# Patient Record
Sex: MALE | Race: WHITE | NOT HISPANIC OR LATINO | Employment: OTHER | URBAN - NONMETROPOLITAN AREA
[De-identification: names, ages, dates, MRNs, and addresses within clinical notes are randomized per-mention and may not be internally consistent; named-entity substitution may affect disease eponyms.]

---

## 2017-01-09 ENCOUNTER — ANTICOAGULATION - GICH (OUTPATIENT)
Dept: INTERNAL MEDICINE | Facility: OTHER | Age: 57
End: 2017-01-09

## 2017-01-09 DIAGNOSIS — Z79.01 LONG TERM CURRENT USE OF ANTICOAGULANT: ICD-10-CM

## 2017-01-09 DIAGNOSIS — I48.91 ATRIAL FIBRILLATION (H): ICD-10-CM

## 2017-01-09 LAB — INR - HISTORICAL: 2.2

## 2017-02-03 ENCOUNTER — HISTORY (OUTPATIENT)
Dept: EMERGENCY MEDICINE | Facility: OTHER | Age: 57
End: 2017-02-03

## 2017-02-03 ENCOUNTER — AMBULATORY - GICH (OUTPATIENT)
Dept: SCHEDULING | Facility: OTHER | Age: 57
End: 2017-02-03

## 2017-02-15 ENCOUNTER — AMBULATORY - GICH (OUTPATIENT)
Dept: SCHEDULING | Facility: OTHER | Age: 57
End: 2017-02-15

## 2017-02-17 ENCOUNTER — ANTICOAGULATION - GICH (OUTPATIENT)
Dept: INTERNAL MEDICINE | Facility: OTHER | Age: 57
End: 2017-02-17

## 2017-02-17 DIAGNOSIS — Z79.01 LONG TERM CURRENT USE OF ANTICOAGULANT: ICD-10-CM

## 2017-02-17 DIAGNOSIS — I48.20 CHRONIC ATRIAL FIBRILLATION (H): ICD-10-CM

## 2017-02-17 LAB — INR - HISTORICAL: 2.6

## 2017-02-20 ENCOUNTER — ANTICOAGULATION - GICH (OUTPATIENT)
Dept: INTERNAL MEDICINE | Facility: OTHER | Age: 57
End: 2017-02-20

## 2017-02-20 DIAGNOSIS — I48.20 CHRONIC ATRIAL FIBRILLATION (H): ICD-10-CM

## 2017-02-20 DIAGNOSIS — Z79.01 LONG TERM CURRENT USE OF ANTICOAGULANT: ICD-10-CM

## 2017-02-20 LAB — INR - HISTORICAL: 3

## 2017-02-21 ENCOUNTER — AMBULATORY - GICH (OUTPATIENT)
Dept: FAMILY MEDICINE | Facility: OTHER | Age: 57
End: 2017-02-21

## 2017-02-22 ENCOUNTER — OFFICE VISIT - GICH (OUTPATIENT)
Dept: FAMILY MEDICINE | Facility: OTHER | Age: 57
End: 2017-02-22

## 2017-02-22 DIAGNOSIS — I48.20 CHRONIC ATRIAL FIBRILLATION (H): ICD-10-CM

## 2017-02-22 DIAGNOSIS — R52 PAIN: ICD-10-CM

## 2017-02-22 DIAGNOSIS — M48.52XD: ICD-10-CM

## 2017-02-22 DIAGNOSIS — I10 ESSENTIAL (PRIMARY) HYPERTENSION: ICD-10-CM

## 2017-02-22 DIAGNOSIS — I63.9 CEREBRAL INFARCTION (H): ICD-10-CM

## 2017-02-23 ENCOUNTER — ANTICOAGULATION - GICH (OUTPATIENT)
Dept: INTERNAL MEDICINE | Facility: OTHER | Age: 57
End: 2017-02-23

## 2017-02-23 DIAGNOSIS — Z79.01 LONG TERM CURRENT USE OF ANTICOAGULANT: ICD-10-CM

## 2017-02-23 DIAGNOSIS — I48.20 CHRONIC ATRIAL FIBRILLATION (H): ICD-10-CM

## 2017-02-23 LAB — INR - HISTORICAL: 4.1

## 2017-02-27 ENCOUNTER — AMBULATORY - GICH (OUTPATIENT)
Dept: SCHEDULING | Facility: OTHER | Age: 57
End: 2017-02-27

## 2017-02-27 ENCOUNTER — ANTICOAGULATION - GICH (OUTPATIENT)
Dept: INTERNAL MEDICINE | Facility: OTHER | Age: 57
End: 2017-02-27

## 2017-02-27 DIAGNOSIS — Z79.01 LONG TERM CURRENT USE OF ANTICOAGULANT: ICD-10-CM

## 2017-02-27 DIAGNOSIS — I48.20 CHRONIC ATRIAL FIBRILLATION (H): ICD-10-CM

## 2017-02-27 LAB — INR - HISTORICAL: 3.2

## 2017-03-01 ENCOUNTER — OFFICE VISIT - GICH (OUTPATIENT)
Dept: FAMILY MEDICINE | Facility: OTHER | Age: 57
End: 2017-03-01

## 2017-03-01 DIAGNOSIS — I63.411 CEREBRAL INFARCTION DUE TO EMBOLISM OF RIGHT MIDDLE CEREBRAL ARTERY (H): ICD-10-CM

## 2017-03-01 DIAGNOSIS — I10 ESSENTIAL (PRIMARY) HYPERTENSION: ICD-10-CM

## 2017-03-01 DIAGNOSIS — R52 PAIN: ICD-10-CM

## 2017-03-01 LAB
ANION GAP - HISTORICAL: 8 (ref 5–18)
BUN SERPL-MCNC: 23 MG/DL (ref 7–25)
BUN/CREAT RATIO - HISTORICAL: 29
CALCIUM SERPL-MCNC: 10 MG/DL (ref 8.6–10.3)
CHLORIDE SERPLBLD-SCNC: 101 MMOL/L (ref 98–107)
CO2 SERPL-SCNC: 29 MMOL/L (ref 21–31)
CREAT SERPL-MCNC: 0.78 MG/DL (ref 0.7–1.3)
GFR IF NOT AFRICAN AMERICAN - HISTORICAL: >60 ML/MIN/1.73M2
GLUCOSE SERPL-MCNC: 96 MG/DL (ref 70–105)
POTASSIUM SERPL-SCNC: 4.1 MMOL/L (ref 3.5–5.1)
SODIUM SERPL-SCNC: 138 MMOL/L (ref 133–143)

## 2017-03-03 ENCOUNTER — ANTICOAGULATION - GICH (OUTPATIENT)
Dept: INTERNAL MEDICINE | Facility: OTHER | Age: 57
End: 2017-03-03

## 2017-03-03 DIAGNOSIS — Z79.01 LONG TERM CURRENT USE OF ANTICOAGULANT: ICD-10-CM

## 2017-03-03 DIAGNOSIS — I48.20 CHRONIC ATRIAL FIBRILLATION (H): ICD-10-CM

## 2017-03-03 DIAGNOSIS — I48.91 ATRIAL FIBRILLATION (H): ICD-10-CM

## 2017-03-03 LAB — INR - HISTORICAL: 2.4

## 2017-03-10 ENCOUNTER — ANTICOAGULATION - GICH (OUTPATIENT)
Dept: INTERNAL MEDICINE | Facility: OTHER | Age: 57
End: 2017-03-10

## 2017-03-10 DIAGNOSIS — I48.91 ATRIAL FIBRILLATION (H): ICD-10-CM

## 2017-03-10 DIAGNOSIS — Z79.01 LONG TERM CURRENT USE OF ANTICOAGULANT: ICD-10-CM

## 2017-03-10 DIAGNOSIS — I48.20 CHRONIC ATRIAL FIBRILLATION (H): ICD-10-CM

## 2017-03-10 LAB — INR - HISTORICAL: 3.4

## 2017-03-14 ENCOUNTER — COMMUNICATION - GICH (OUTPATIENT)
Dept: FAMILY MEDICINE | Facility: OTHER | Age: 57
End: 2017-03-14

## 2017-03-14 DIAGNOSIS — I10 ESSENTIAL (PRIMARY) HYPERTENSION: ICD-10-CM

## 2017-03-17 ENCOUNTER — ANTICOAGULATION - GICH (OUTPATIENT)
Dept: INTERNAL MEDICINE | Facility: OTHER | Age: 57
End: 2017-03-17

## 2017-03-17 DIAGNOSIS — Z79.01 LONG TERM CURRENT USE OF ANTICOAGULANT: ICD-10-CM

## 2017-03-17 DIAGNOSIS — I48.91 ATRIAL FIBRILLATION (H): ICD-10-CM

## 2017-03-17 LAB — INR - HISTORICAL: 3.4

## 2017-03-24 ENCOUNTER — ANTICOAGULATION - GICH (OUTPATIENT)
Dept: INTERNAL MEDICINE | Facility: OTHER | Age: 57
End: 2017-03-24

## 2017-03-24 DIAGNOSIS — I48.91 ATRIAL FIBRILLATION (H): ICD-10-CM

## 2017-03-24 DIAGNOSIS — Z79.01 LONG TERM CURRENT USE OF ANTICOAGULANT: ICD-10-CM

## 2017-03-24 LAB — INR - HISTORICAL: 3.2

## 2017-04-07 ENCOUNTER — ANTICOAGULATION - GICH (OUTPATIENT)
Dept: INTERNAL MEDICINE | Facility: OTHER | Age: 57
End: 2017-04-07

## 2017-04-07 DIAGNOSIS — Z79.01 LONG TERM CURRENT USE OF ANTICOAGULANT: ICD-10-CM

## 2017-04-07 DIAGNOSIS — I48.91 ATRIAL FIBRILLATION (H): ICD-10-CM

## 2017-04-07 LAB — INR - HISTORICAL: 2.9

## 2017-04-13 ENCOUNTER — COMMUNICATION - GICH (OUTPATIENT)
Dept: FAMILY MEDICINE | Facility: OTHER | Age: 57
End: 2017-04-13

## 2017-04-13 DIAGNOSIS — I10 ESSENTIAL (PRIMARY) HYPERTENSION: ICD-10-CM

## 2017-04-14 ENCOUNTER — OFFICE VISIT - GICH (OUTPATIENT)
Dept: FAMILY MEDICINE | Facility: OTHER | Age: 57
End: 2017-04-14

## 2017-04-14 DIAGNOSIS — I63.411 CEREBRAL INFARCTION DUE TO EMBOLISM OF RIGHT MIDDLE CEREBRAL ARTERY (H): ICD-10-CM

## 2017-04-14 DIAGNOSIS — Z87.39 PERSONAL HISTORY OF OTHER DISEASES OF THE MUSCULOSKELETAL SYSTEM AND CONNECTIVE TISSUE: ICD-10-CM

## 2017-04-14 DIAGNOSIS — I10 ESSENTIAL (PRIMARY) HYPERTENSION: ICD-10-CM

## 2017-04-14 DIAGNOSIS — G81.94 HEMIPLEGIA AFFECTING LEFT NONDOMINANT SIDE (H): ICD-10-CM

## 2017-05-03 ENCOUNTER — AMBULATORY - GICH (OUTPATIENT)
Dept: SCHEDULING | Facility: OTHER | Age: 57
End: 2017-05-03

## 2017-05-04 ENCOUNTER — ANTICOAGULATION - GICH (OUTPATIENT)
Dept: INTERNAL MEDICINE | Facility: OTHER | Age: 57
End: 2017-05-04

## 2017-05-04 DIAGNOSIS — I48.91 ATRIAL FIBRILLATION (H): ICD-10-CM

## 2017-05-04 DIAGNOSIS — Z79.01 LONG TERM CURRENT USE OF ANTICOAGULANT: ICD-10-CM

## 2017-05-04 LAB — INR - HISTORICAL: 3.5

## 2017-05-18 ENCOUNTER — ANTICOAGULATION - GICH (OUTPATIENT)
Dept: INTERNAL MEDICINE | Facility: OTHER | Age: 57
End: 2017-05-18

## 2017-05-18 DIAGNOSIS — Z79.01 LONG TERM CURRENT USE OF ANTICOAGULANT: ICD-10-CM

## 2017-05-18 DIAGNOSIS — I48.91 ATRIAL FIBRILLATION (H): ICD-10-CM

## 2017-05-18 LAB — INR - HISTORICAL: 3.1

## 2017-05-22 ENCOUNTER — COMMUNICATION - GICH (OUTPATIENT)
Dept: FAMILY MEDICINE | Facility: OTHER | Age: 57
End: 2017-05-22

## 2017-05-22 DIAGNOSIS — I48.20 CHRONIC ATRIAL FIBRILLATION (H): ICD-10-CM

## 2017-05-22 DIAGNOSIS — Z79.01 LONG TERM CURRENT USE OF ANTICOAGULANT: ICD-10-CM

## 2017-05-23 ENCOUNTER — COMMUNICATION - GICH (OUTPATIENT)
Dept: FAMILY MEDICINE | Facility: OTHER | Age: 57
End: 2017-05-23

## 2017-05-23 ENCOUNTER — AMBULATORY - GICH (OUTPATIENT)
Dept: SCHEDULING | Facility: OTHER | Age: 57
End: 2017-05-23

## 2017-05-23 DIAGNOSIS — M10.9 GOUT: ICD-10-CM

## 2017-06-01 ENCOUNTER — ANTICOAGULATION - GICH (OUTPATIENT)
Dept: INTERNAL MEDICINE | Facility: OTHER | Age: 57
End: 2017-06-01

## 2017-06-01 DIAGNOSIS — Z79.01 LONG TERM CURRENT USE OF ANTICOAGULANT: ICD-10-CM

## 2017-06-01 DIAGNOSIS — I48.20 CHRONIC ATRIAL FIBRILLATION (H): ICD-10-CM

## 2017-06-01 DIAGNOSIS — I48.91 ATRIAL FIBRILLATION (H): ICD-10-CM

## 2017-06-01 LAB — INR - HISTORICAL: 3.5

## 2017-06-02 ENCOUNTER — AMBULATORY - GICH (OUTPATIENT)
Dept: SCHEDULING | Facility: OTHER | Age: 57
End: 2017-06-02

## 2017-06-13 ENCOUNTER — COMMUNICATION - GICH (OUTPATIENT)
Dept: FAMILY MEDICINE | Facility: OTHER | Age: 57
End: 2017-06-13

## 2017-06-13 DIAGNOSIS — M10.9 GOUT: ICD-10-CM

## 2017-06-16 ENCOUNTER — ANTICOAGULATION - GICH (OUTPATIENT)
Dept: INTERNAL MEDICINE | Facility: OTHER | Age: 57
End: 2017-06-16

## 2017-06-16 DIAGNOSIS — Z79.01 LONG TERM CURRENT USE OF ANTICOAGULANT: ICD-10-CM

## 2017-06-16 DIAGNOSIS — I48.91 ATRIAL FIBRILLATION (H): ICD-10-CM

## 2017-06-16 LAB — INR - HISTORICAL: 3.5

## 2017-06-30 ENCOUNTER — ANTICOAGULATION - GICH (OUTPATIENT)
Dept: INTERNAL MEDICINE | Facility: OTHER | Age: 57
End: 2017-06-30

## 2017-06-30 DIAGNOSIS — Z79.01 LONG TERM CURRENT USE OF ANTICOAGULANT: ICD-10-CM

## 2017-06-30 DIAGNOSIS — I48.91 ATRIAL FIBRILLATION (H): ICD-10-CM

## 2017-06-30 DIAGNOSIS — I48.20 CHRONIC ATRIAL FIBRILLATION (H): ICD-10-CM

## 2017-06-30 LAB — INR - HISTORICAL: 3.3

## 2017-07-12 ENCOUNTER — AMBULATORY - GICH (OUTPATIENT)
Dept: SCHEDULING | Facility: OTHER | Age: 57
End: 2017-07-12

## 2017-07-17 ENCOUNTER — COMMUNICATION - GICH (OUTPATIENT)
Dept: FAMILY MEDICINE | Facility: OTHER | Age: 57
End: 2017-07-17

## 2017-07-17 DIAGNOSIS — Z87.39 PERSONAL HISTORY OF OTHER DISEASES OF THE MUSCULOSKELETAL SYSTEM AND CONNECTIVE TISSUE: ICD-10-CM

## 2017-07-21 ENCOUNTER — ANTICOAGULATION - GICH (OUTPATIENT)
Dept: INTERNAL MEDICINE | Facility: OTHER | Age: 57
End: 2017-07-21

## 2017-07-21 DIAGNOSIS — I48.91 ATRIAL FIBRILLATION (H): ICD-10-CM

## 2017-07-21 DIAGNOSIS — Z79.01 LONG TERM CURRENT USE OF ANTICOAGULANT: ICD-10-CM

## 2017-07-21 LAB — INR - HISTORICAL: 3.2

## 2017-08-10 ENCOUNTER — HISTORY (OUTPATIENT)
Dept: EMERGENCY MEDICINE | Facility: OTHER | Age: 57
End: 2017-08-10

## 2017-08-18 ENCOUNTER — ANTICOAGULATION - GICH (OUTPATIENT)
Dept: INTERNAL MEDICINE | Facility: OTHER | Age: 57
End: 2017-08-18

## 2017-08-18 ENCOUNTER — COMMUNICATION - GICH (OUTPATIENT)
Dept: FAMILY MEDICINE | Facility: OTHER | Age: 57
End: 2017-08-18

## 2017-08-18 DIAGNOSIS — I48.20 CHRONIC ATRIAL FIBRILLATION (H): ICD-10-CM

## 2017-08-18 DIAGNOSIS — Z79.01 LONG TERM CURRENT USE OF ANTICOAGULANT: ICD-10-CM

## 2017-08-18 DIAGNOSIS — I10 ESSENTIAL (PRIMARY) HYPERTENSION: ICD-10-CM

## 2017-08-18 LAB — INR - HISTORICAL: 2.8

## 2017-08-22 ENCOUNTER — OFFICE VISIT - GICH (OUTPATIENT)
Dept: FAMILY MEDICINE | Facility: OTHER | Age: 57
End: 2017-08-22

## 2017-08-22 ENCOUNTER — HISTORY (OUTPATIENT)
Dept: FAMILY MEDICINE | Facility: OTHER | Age: 57
End: 2017-08-22

## 2017-08-22 DIAGNOSIS — I48.20 CHRONIC ATRIAL FIBRILLATION (H): ICD-10-CM

## 2017-08-22 DIAGNOSIS — E66.9 OBESITY: ICD-10-CM

## 2017-08-22 DIAGNOSIS — I63.411 CEREBRAL INFARCTION DUE TO EMBOLISM OF RIGHT MIDDLE CEREBRAL ARTERY (H): ICD-10-CM

## 2017-08-22 DIAGNOSIS — I10 ESSENTIAL (PRIMARY) HYPERTENSION: ICD-10-CM

## 2017-09-15 ENCOUNTER — ANTICOAGULATION - GICH (OUTPATIENT)
Dept: INTERNAL MEDICINE | Facility: OTHER | Age: 57
End: 2017-09-15

## 2017-09-15 DIAGNOSIS — I48.20 CHRONIC ATRIAL FIBRILLATION (H): ICD-10-CM

## 2017-09-15 DIAGNOSIS — Z79.01 LONG TERM CURRENT USE OF ANTICOAGULANT: ICD-10-CM

## 2017-09-15 LAB — INR - HISTORICAL: 4.7

## 2017-09-22 ENCOUNTER — ANTICOAGULATION - GICH (OUTPATIENT)
Dept: INTERNAL MEDICINE | Facility: OTHER | Age: 57
End: 2017-09-22

## 2017-09-22 DIAGNOSIS — I48.20 CHRONIC ATRIAL FIBRILLATION (H): ICD-10-CM

## 2017-09-22 DIAGNOSIS — Z79.01 LONG TERM CURRENT USE OF ANTICOAGULANT: ICD-10-CM

## 2017-09-22 LAB — INR - HISTORICAL: 2.7

## 2017-10-03 ENCOUNTER — OFFICE VISIT - GICH (OUTPATIENT)
Dept: FAMILY MEDICINE | Facility: OTHER | Age: 57
End: 2017-10-03

## 2017-10-03 ENCOUNTER — ANTICOAGULATION - GICH (OUTPATIENT)
Dept: INTERNAL MEDICINE | Facility: OTHER | Age: 57
End: 2017-10-03

## 2017-10-03 ENCOUNTER — HISTORY (OUTPATIENT)
Dept: FAMILY MEDICINE | Facility: OTHER | Age: 57
End: 2017-10-03

## 2017-10-03 DIAGNOSIS — I48.20 CHRONIC ATRIAL FIBRILLATION (H): ICD-10-CM

## 2017-10-03 DIAGNOSIS — Z79.01 LONG TERM CURRENT USE OF ANTICOAGULANT: ICD-10-CM

## 2017-10-03 DIAGNOSIS — I10 ESSENTIAL (PRIMARY) HYPERTENSION: ICD-10-CM

## 2017-10-03 LAB — INR - HISTORICAL: 3.6

## 2017-10-08 ENCOUNTER — COMMUNICATION - GICH (OUTPATIENT)
Dept: FAMILY MEDICINE | Facility: OTHER | Age: 57
End: 2017-10-08

## 2017-10-08 DIAGNOSIS — M10.9 GOUT: ICD-10-CM

## 2017-10-08 DIAGNOSIS — Z87.39 PERSONAL HISTORY OF OTHER DISEASES OF THE MUSCULOSKELETAL SYSTEM AND CONNECTIVE TISSUE: ICD-10-CM

## 2017-10-19 ENCOUNTER — ANTICOAGULATION - GICH (OUTPATIENT)
Dept: INTERNAL MEDICINE | Facility: OTHER | Age: 57
End: 2017-10-19

## 2017-10-19 ENCOUNTER — AMBULATORY - GICH (OUTPATIENT)
Dept: FAMILY MEDICINE | Facility: OTHER | Age: 57
End: 2017-10-19

## 2017-10-19 DIAGNOSIS — I48.20 CHRONIC ATRIAL FIBRILLATION (H): ICD-10-CM

## 2017-10-19 DIAGNOSIS — Z79.01 LONG TERM CURRENT USE OF ANTICOAGULANT: ICD-10-CM

## 2017-10-19 LAB — INR - HISTORICAL: 3.2

## 2017-11-02 ENCOUNTER — ANTICOAGULATION - GICH (OUTPATIENT)
Dept: INTERNAL MEDICINE | Facility: OTHER | Age: 57
End: 2017-11-02

## 2017-11-02 DIAGNOSIS — Z79.01 LONG TERM CURRENT USE OF ANTICOAGULANT: ICD-10-CM

## 2017-11-02 DIAGNOSIS — I48.91 ATRIAL FIBRILLATION (H): ICD-10-CM

## 2017-11-02 DIAGNOSIS — I48.20 CHRONIC ATRIAL FIBRILLATION (H): ICD-10-CM

## 2017-11-02 LAB — INR - HISTORICAL: 2.7

## 2017-11-18 ENCOUNTER — COMMUNICATION - GICH (OUTPATIENT)
Dept: FAMILY MEDICINE | Facility: OTHER | Age: 57
End: 2017-11-18

## 2017-11-18 DIAGNOSIS — Z79.01 LONG TERM CURRENT USE OF ANTICOAGULANT: ICD-10-CM

## 2017-11-18 DIAGNOSIS — I48.20 CHRONIC ATRIAL FIBRILLATION (H): ICD-10-CM

## 2017-11-30 ENCOUNTER — ANTICOAGULATION - GICH (OUTPATIENT)
Dept: INTERNAL MEDICINE | Facility: OTHER | Age: 57
End: 2017-11-30

## 2017-11-30 ENCOUNTER — AMBULATORY - GICH (OUTPATIENT)
Dept: FAMILY MEDICINE | Facility: OTHER | Age: 57
End: 2017-11-30

## 2017-11-30 DIAGNOSIS — I48.20 CHRONIC ATRIAL FIBRILLATION (H): ICD-10-CM

## 2017-11-30 DIAGNOSIS — Z79.01 LONG TERM CURRENT USE OF ANTICOAGULANT: ICD-10-CM

## 2017-11-30 LAB — INR - HISTORICAL: 2.2

## 2017-12-03 ENCOUNTER — COMMUNICATION - GICH (OUTPATIENT)
Dept: FAMILY MEDICINE | Facility: OTHER | Age: 57
End: 2017-12-03

## 2017-12-03 DIAGNOSIS — Z87.39 PERSONAL HISTORY OF OTHER DISEASES OF THE MUSCULOSKELETAL SYSTEM AND CONNECTIVE TISSUE: ICD-10-CM

## 2017-12-27 NOTE — PROGRESS NOTES
Patient Information     Patient Name MRN Sex Babak Ribeiro Jr. [id number] Male 1960      Progress Notes by Alvino Yi MD at 10/3/2017  9:30 AM     Author:  Alvino Yi MD Service:  (none) Author Type:  Physician     Filed:  10/8/2017  9:59 AM Encounter Date:  10/3/2017 Status:  Signed     :  Alvino Yi MD (Physician)            Nursing Notes:   AndreaCarolina stoner  10/3/2017  9:43 AM  Signed  Patient presents to the clinic today to follow up on his blood pressure. He states this is at the request of Dr Yi and his provider in [place].      SUBJECTIVE:  Babak Moran Jr. is a 57 y.o. Male.  Patient comes back in to follow-up on hypertension. He is currently taking losartan 50 mg, metoprolol 100 mg and hydrochlorothiazide 25 mg. Blood pressure slightly elevated today most his home blood pressures have been better. He denies any chest pain or shortness of breath. Continues to have gradual improvement and coordination and strength following his CVA.      OBJECTIVE:  /82  Pulse 68  Wt (!) 148.3 kg (327 lb)  BMI 41.98 kg/m2  EXAM:  General Appearance: Pleasant, alert, appropriate appearance for age. No acute distress  Chest/Respiratory Exam: Normal chest wall and respirations. Clear to auscultation.  Cardiovascular Exam: Regular rate and rhythm. S1, S2, no murmur, click, gallop, or rubs.    Results for orders placed or performed in visit on 17      INR,POCT      Result  Value Ref Range    INR 2.7 (H) <1.3       ASSESSMENT/Plan :      Babak was seen today for follow up.    Diagnoses and all orders for this visit:    HTN (hypertension)  patient's blood pressure is elevated today and has been in the borderline range at home. He has not had any symptoms of orthostasis. Will restart Norvasc at 5 mg. Suggest continued healthy eating and gradual weight loss. Will recheck blood pressure with a nurse only visit in a week, sooner with any issues.  -     amLODIPine (NORVASC)  5 mg tablet; Take 1 tablet by mouth once daily.        There are no Patient Instructions on file for this visit.    Alvino Yi MD    This document was created using computer generated templates and voice activated software.

## 2017-12-28 ENCOUNTER — ANTICOAGULATION - GICH (OUTPATIENT)
Dept: INTERNAL MEDICINE | Facility: OTHER | Age: 57
End: 2017-12-28

## 2017-12-28 DIAGNOSIS — Z79.01 LONG TERM CURRENT USE OF ANTICOAGULANT: ICD-10-CM

## 2017-12-28 DIAGNOSIS — I48.20 CHRONIC ATRIAL FIBRILLATION (H): ICD-10-CM

## 2017-12-28 LAB — INR - HISTORICAL: 2.2

## 2017-12-28 NOTE — TELEPHONE ENCOUNTER
Patient Information     Patient Name MRN Sex Babak Ribeiro Jr. [id number] Male 1960      Telephone Encounter by Austin Lombardo RN at 2017 10:39 AM     Author:  Austin Lombardo RN Service:  (none) Author Type:  NURS- Registered Nurse     Filed:  2017 10:46 AM Encounter Date:  2017 Status:  Signed     :  Austin Lombardo RN (NURS- Registered Nurse)            Redundant Refill Request for Indocin refused;    Prescribing Provider: Alvino Lynn MD             Order Date: 2017  Ordered by: ALVINO LYNN  Medication:indomethacin (INDOCIN) 50 mg capsule  Prescription #:[id number]    Qty:60 capsule  Ref:1  Start:2017   End:              Route:                      TED:No   Class:eRx    Sig:Take 1 capsule by mouth 2 times daily as needed for gout flare, max 3d         per flare.    Pharmacy:[facility] [address] [place] [address]   AT [address]              OF [facility] [address] - [phone]    Chart review shows that patient with active RX as noted above. Patient is to utilize rx on an as needed basis, not scheduled. Refills shouldn't be due at this time. Will refuse rx request at this time.    Unable to complete prescription refill per RN Medication Refill Policy.................... Austin Lombardo RN ....................  2017   10:45 AM

## 2017-12-28 NOTE — TELEPHONE ENCOUNTER
Patient Information     Patient Name MRN Sex Babak Ribeiro Jr. [id number] Male 1960      Telephone Encounter by Alvino Yi MD at 2017  9:26 AM     Author:  Alvino Yi MD Service:  (none) Author Type:  Physician     Filed:  2017  9:27 AM Encounter Date:  2017 Status:  Signed     :  Alvino Yi MD (Physician)            Patient should be on uloric either once daily or stop altogether.    The indomethacin should be prn gout flare, max 2 per day, max 3d per flare while he is still on coumadin.

## 2017-12-28 NOTE — TELEPHONE ENCOUNTER
Patient Information     Patient Name MRN Babak Vasquez Jr. [id number] Male 1960      Telephone Encounter by Austin Lombardo RN at 2017  2:24 PM     Author:  Austin Lombardo RN Service:  (none) Author Type:  NURS- Registered Nurse     Filed:  2017  2:32 PM Encounter Date:  2017 Status:  Signed     :  Austin Lombardo RN (NURS- Registered Nurse)            Chart review shows that patient is being seen by PCP for an appointment with relation to his blood pressure medication on 17. Refill request received is for norvasc, of which is listed as historical on patient's med list. Call placed to patient to discuss if rx is needed urgently, or could be cared for at tomorrow's office visit with PCP. Was unable to reach patient at this time. Writer will route this encounter to PCP for his consideration/approval/discussion at tomorrow's appointment with patient.    Unable to complete prescription refill per RN Medication Refill Policy.................... Austin Lombardo RN ....................  2017   2:28 PM

## 2017-12-28 NOTE — TELEPHONE ENCOUNTER
Patient Information     Patient Name MRN Sex Babak Ribeiro Jr. [id number] Male 1960      Telephone Encounter by Austin Lombardo RN at 2017  9:58 AM     Author:  Austin Lombardo RN Service:  (none) Author Type:  NURS- Registered Nurse     Filed:  2017 10:08 AM Encounter Date:  2017 Status:  Signed     :  Austin Lombardo RN (NURS- Registered Nurse)            This is a Refill request from: [facility]  Name of Medication: Indomethacin  Quantity requested: 60 caps with 3 refills  Last fill date: 3/14/17 as per rx request-however rx as requested with different sig than current rx in chart. Rx in chart was filled on 17  Due for refill: Yes, as per rx request and per chart review  Last visit with ALVINO LYNN was on: 2017 in [facility]  PCP:  Alvino Lynn MD  Controlled Substance Agreement:  N/A   Diagnosis r/t this medication request: GOUT    Chart review shows that patient was last seen by PCP for diagnosis of GOUT on 17. Plan at that office visit as follows:    discussed with patient that'll be reasonable to be off the Uloric if he wants to see if dietary changes haven't been sufficient to prevent gout flare. Otherwise if he wants to stay on the medication that is fine but I would not suggest he take it intermittently.    - indomethacin (INDOCIN) 50 mg capsule; Take 1 capsule by mouth 2 times daily as needed    Unfortunately, indocin is ordered as PRN at this time. Writer cannot tell per above office visit notes if patient is to remain on rx as needed, or scheduled. Writer would assume that PCP meant it was ok to stay on rx on an as needed basis since it was ordered that way, but documentation doesn't support this. Will route rx request to PCP for his consideration/approval.     Unable to complete prescription refill per RN Medication Refill Policy.................... Austin Lombardo RN ....................  2017   10:02 AM

## 2017-12-28 NOTE — TELEPHONE ENCOUNTER
Patient Information     Patient Name MRN Sex Babak Ribeiro Jr. [id number] Male 1960      Telephone Encounter by Shelley Mercado RN at 2017  8:13 AM     Author:  Shelley Mercado RN Service:  (none) Author Type:  NURS- Registered Nurse     Filed:  2017  8:17 AM Encounter Date:  2017 Status:  Signed     :  Shelley Mercado RN (NURS- Registered Nurse)            Anticoagulant    Office visit in the past 12 months.    Last visit with PEPE LYNN was on: 2017 in [facility]  [facility]  Next visit with Family Practice is on: 2017 in [facility]    Lab tests:  PT/INR at least monthly    INR (no units)    Date Value   08/10/2017 2.7 (H)     HEMOGLOBIN                (g/dL)    Date Value   08/10/2017 15.7           Prescription refilled per RN Medication Refill Policy.................... Shelley Mercado RN ....................  2017   8:13 AM

## 2017-12-28 NOTE — TELEPHONE ENCOUNTER
Patient Information     Patient Name MRN Sex Babak Ribeiro Jr. [id number] Male 1960      Telephone Encounter by Alysa Rodriguez RN at 10/10/2017 11:00 AM     Author:  Alysa Rodriguez RN Service:  (none) Author Type:  NURS- Registered Nurse     Filed:  10/10/2017 11:03 AM Encounter Date:  10/8/2017 Status:  Signed     :  Alysa Rodriguez RN (NURS- Registered Nurse)            GOUT  Office visit in the past 12 months or per provider note.  Last visit with PEPE LYNN was on: 10/03/2017 in [facility]  Next visit with PEPE LYNN is on: No future appointment listed with this provider  Next visit with Family Practice is on: No future appointment listed in this department  Max refill for 12 months from last office visit or per provider note.  Prescription refilled per RN Medication Refill Policy.................... Alysa Rodriguez RN ....................  10/10/2017   11:03 AM

## 2017-12-28 NOTE — TELEPHONE ENCOUNTER
Patient Information     Patient Name MRN Sex Babak Ribeiro Jr. [id number] Male 1960      Telephone Encounter by Savannah Sifuentes RN at 2017  3:16 PM     Author:  Savannah Sifuentes RN Service:  (none) Author Type:  NURS- Registered Nurse     Filed:  2017  3:19 PM Encounter Date:  2017 Status:  Signed     :  Savannah Sifuentes RN (NURS- Registered Nurse)            Anticoagulant    Office visit in the past 12 months.    Last visit with PEPE LYNN was on: 10/03/2017 in [facility]  Next visit with PEPE LYNN is on: No future appointment listed with this provider  Next visit with Family Practice is on: No future appointment listed in this department    Lab tests:  PT/INR at least monthly    INR (no units)    Date Value   2017 2.7 (H)     HEMOGLOBIN                (g/dL)    Date Value   08/10/2017 15.7     No components found for: CBC      Max refills 6 months.    Prescription refilled per RN Medication Refill Policy.................... Savannah Sifuentes RN ....................  2017   3:18 PM

## 2017-12-28 NOTE — PROGRESS NOTES
"Patient Information     Patient Name MRN Babak Vasquez Jr. [id number] Male 1960      Progress Notes by Alvino Yi MD at 2017  9:30 AM     Author:  Alvino Yi MD Service:  (none) Author Type:  Physician     Filed:  2017 11:50 AM Encounter Date:  2017 Status:  Signed     :  Alvino Yi MD (Physician)            SUBJECTIVE:  Babak Moran Jr. is a 57 y.o. Male.  He comes in with his wife to follow-up on recent emergency department visit. He reports he was under a great deal of job stress and started to feel overwhelmed. He then noticed that his thinking seemed to be foggy. He reports that he almost felt dizzy but it was not like the room was spinning. Just felt overwhelmed. Was then found to have elevated systolic blood pressure. Contacted neurology department and it sounds like the cardiology department as well and they both recommended that he would've the emergency department. In the emergency department she was found to have minimal blood pressure elevation and overall workup was reassuring. His symptoms resolved.    He reports shortly after this he got \"written up\" for going in to the doctor and received a three-day suspension that started after a Saturday work today. He reports there is just a lot of issues going on at work at this time and it seemed to be significantly impacting his health. He quit his job and has since got a new job. He reports all these symptoms have resolved and he has not had any recurrent symptoms of hypertension, confusion. He reports gradual improvement in his left lower extremity. Wife notices that at times he still will have stumbled but has not been falling. She reports that he is walking much better. He reports the strength in his left arm is not quite completely normal but his manual dexterity seems to be back to normal.    He has not had chest pain. No shortness of breath. No other anginal symptoms. He has not had " "any transient neurologic symptoms since his massive hemorrhagic stroke.      Social History     Substance Use Topics       Smoking status: Passive Smoke Exposure - Never Smoker     Smokeless tobacco: Never Used     Alcohol use No       I have personally reviewed and updated above noted social, family and/or past medical history.    A comprehensive review of systems was negative except for items noted in HPI/Subjective.      OBJECTIVE:  /80  Pulse 68  Ht 1.88 m (6' 2\")  Wt (!) 152.4 kg (336 lb)  BMI 43.14 kg/m2  EXAM:  General Appearance: Pleasant, alert, appropriate appearance for age. No acute distress  Chest/Respiratory Exam: Normal chest wall and respirations. Clear to auscultation.  Cardiovascular Exam: Regular rate and rhythm. S1, S2, no murmur, click, gallop, or rubs.  Gastrointestinal Exam: Soft, nontender, no abnormal masses or organomegaly.  Musculoskeletal Exam: No synovitis.  Muscle strength age and body habitus appropriate as well as equal and even.     ASSESSMENT/Plan :    I personally reviewed the patients' labs and ED workup.    Results for orders placed or performed in visit on 08/18/17      INR,POCT      Result  Value Ref Range    INR 2.8 (H) <1.3       Babak was seen today for follow up.    Diagnoses and all orders for this visit:    HYPERTENSION  vision and blood pressure is actually excellent today. It was not overly high in the ER. He did have his amlodipine discontinued back during hospitalization but at this point I would not restart unless he has persistent elevation greater than 130/85.  -     BASIC METABOLIC PANEL; Future    Obesity, unspecified obesity severity, unspecified obesity type   patient has lost significant weight. Strongly suggest he continue with gradual weight loss. I explained to him that with additional weight loss of 30-40 pounds he will not only significantly improve his risk for developing end-stage osteoarthritis and large weightbearing joints but also may be " able to reduce his antihypertensives. If he does have any symptoms of orthostasis with ongoing weight loss I would first suggest reducing or discontinuing the hydrochlorothiazide. The metoprolol is used for rate control and should be the last medication discontinued if required by orthostasis.    Chronic atrial fibrillation (HC)   patient has had excellent rate control and has remained stable on Coumadin dose.    Cerebrovascular accident (CVA) due to embolism of right middle cerebral artery (HC)   continues to regain some neurologic function. At this point he is quite functional but still has some mild residual deficits.      I think a large component of his ER visit was due to job stress. He feels that this is resolved now with a change in job. I think this is excellent.    We also talked about his gout. He does not have insurance to cover you work. He has had severe nausea and vomiting with dramatic weight loss on allopurinol. He is currently taking indomethacin. I explained increased cardiovascular risk on indomethacin as well as potential for gastric bleeding which is increased with concomitant Coumadin dosage. I would suggest that he not take this every day but can definite take it with gout flare. It is possible that with weight loss and improved diet he can reduce incidence of gout flare. If he continues to have significant gout flares we will need to reconsider Uloric or retrial of allopurinol at low dose.        There are no Patient Instructions on file for this visit.    Alvino Yi MD    This document was created using computer generated templates and voice activated software.

## 2017-12-28 NOTE — PATIENT INSTRUCTIONS
Patient Information     Patient Name MRN Sex Babak Ribeiro Jr. [id number] Male 1960      Patient Instructions by Shelley Mercado RN at 2017  3:45 PM     Author:  Shelley Mercado RN Service:  (none) Author Type:  NURS- Registered Nurse     Filed:  2017  3:36 PM Encounter Date:  2017 Status:  Signed     :  Shelley Mercado RN (NURS- Registered Nurse)            2017 Details    Sun u Fri Sat         1               2               3                 4               5               6               7               8               9               10                 11               12               13               14               15               16      7.5 mg   See details      17      10 mg           18      10 mg         19      7.5 mg         20      10 mg         21      10 mg         22      10 mg         23      7.5 mg         24      10 mg           25      10 mg         26      7.5 mg         27      10 mg         28      10 mg         29      10 mg         30      7.5 mg           Date Details    This INR check       Date of next INR:  2017         How to take your warfarin dose     To take:  7.5 mg Take one and a half of the 5 mg tablets.    To take:  10 mg Take two of the 5 mg tablets.             Description          .Continue same dose and recheck in 2-3 weeks. ...............Shelley Mercado RN    2017    3:36 PM                                  Anticoagulation Summary as of 2017     INR goal 2.0-3.0    Today's INR 3.5    Next INR check 2017          Call your Anticoagulation Clinic at Dept: [phone]   if:   1. Any medications are started, stopped, or there is a change in dose.  2. You experience any bleeding that is not easily stopped or if it is recurrent.  3. You notice an increase in bruising or any bruising that does not heal.  4. You are scheduled for surgery, colonoscopy, dental extraction or any other procedure where  you may need to stop your Coumadin (warfarin).

## 2017-12-28 NOTE — PATIENT INSTRUCTIONS
Patient Information     Patient Name MRN Babak Vasquez Jr. [id number] Male 1960      Patient Instructions by Xochitl Pavon RN at 2017 10:45 AM     Author:  Xochitl Pavon RN Service:  (none) Author Type:  NURS- Registered Nurse     Filed:  2017 10:05 AM Encounter Date:  2017 Status:  Signed     :  Xochitl Pavon RN (NURS- Registered Nurse)            2017 Details    Sun Mon Tue Wed Thu Fri Sat        1               2               3               4                 5               6               7               8               9               10               11                 12               13               14               15               16               17               18                 19               20               21               22               23               24               25                 26               27               28               29               30      12.5 mg   See details         Date Details    This INR check               How to take your warfarin dose     To take:  12.5 mg Take two and a half of the 5 mg tablets.           2017 Details    Sun Mon Tue Wed Thu Fri Sat          1      7.5 mg         2      7.5 mg           3      10 mg         4      7.5 mg         5      7.5 mg         6      7.5 mg         7      10 mg         8      7.5 mg         9      7.5 mg           10      10 mg         11      7.5 mg         12      7.5 mg         13      7.5 mg         14      10 mg         15      7.5 mg         16      7.5 mg           17      10 mg         18      7.5 mg         19      7.5 mg         20      7.5 mg         21      10 mg         22      7.5 mg         23      7.5 mg           24      10 mg         25      7.5 mg         26      7.5 mg         27      7.5 mg         28      10 mg         29               30                 31                      Date Details   No additional details    Date of  next INR:  12/28/2017         How to take your warfarin dose     To take:  7.5 mg Take one and a half of the 5 mg tablets.    To take:  10 mg Take two of the 5 mg tablets.             Description          Take extra Warfarin today (12.5 mg instead of 10 mg), then continue same Warfarin dose and recheck in 1 month.  HAMILTON QUIÑONES RN ....................  11/30/2017   10:03 AM                                          Anticoagulation Summary as of 11/30/2017     INR goal 2.0-3.0    Today's INR 2.2    Next INR check 12/28/2017          Call your Anticoagulation Clinic at Dept: [phone]   if:   1. Any medications are started, stopped, or there is a change in dose.  2. You experience any bleeding that is not easily stopped or if it is recurrent.  3. You notice an increase in bruising or any bruising that does not heal.  4. You are scheduled for surgery, colonoscopy, dental extraction or any other procedure where you may need to stop your Coumadin (warfarin).

## 2017-12-28 NOTE — TELEPHONE ENCOUNTER
Patient Information     Patient Name MRN Sex Babak Ribeiro Jr. [id number] Male 1960      Telephone Encounter by Brittany Lynn at 2017  3:50 PM     Author:  Brittany Lynn Service:  (none) Author Type:  (none)     Filed:  2017  3:52 PM Encounter Date:  2017 Status:  Signed     :  Brittany Lynn            Fax received requesting Prior Authorization for Uloric.  Patient's insurance and ID #: [id number]  Phone number to help desk: [phone]  PA or change to alternative therapy? PA - filled out form provided to us by [employer]  Drugs tried and failed: Allopurinol and Colchicine- Severe anorexia and vomiting. Patient also takes Indocin along with Uloric for confirmed gout. See problem list  Diagnosis related to medication: Gout M10.9  Faxed PA form in.   Brittany Lynn CMA(AAMA)  ..................2017   3:50 PM

## 2017-12-29 ENCOUNTER — HISTORY (OUTPATIENT)
Dept: FAMILY MEDICINE | Facility: OTHER | Age: 57
End: 2017-12-29

## 2017-12-29 ENCOUNTER — AMBULATORY - GICH (OUTPATIENT)
Dept: FAMILY MEDICINE | Facility: OTHER | Age: 57
End: 2017-12-29

## 2017-12-29 ENCOUNTER — OFFICE VISIT - GICH (OUTPATIENT)
Dept: FAMILY MEDICINE | Facility: OTHER | Age: 57
End: 2017-12-29

## 2017-12-29 DIAGNOSIS — I10 ESSENTIAL (PRIMARY) HYPERTENSION: ICD-10-CM

## 2017-12-29 NOTE — PATIENT INSTRUCTIONS
Patient Information     Patient Name MRN Babak Vasquez Jr. [id number] Male 1960      Patient Instructions by Shelley Mercado RN at 2017  4:00 PM     Author:  Shelley Mercado RN Service:  (none) Author Type:  NURS- Registered Nurse     Filed:  2017  3:56 PM Encounter Date:  2017 Status:  Signed     :  Shelley Mercado RN (NURS- Registered Nurse)            2017 Details    Sun Mon Tue Wed Thu Fri Sat         1               2               3                 4               5               6               7               8               9               10                 11               12               13               14               15               16               17                 18               19               20               21               22               23               24                 25               26               27               28               29               30      7.5 mg   See details        Date Details    This INR check               How to take your warfarin dose     To take:  7.5 mg Take one and a half of the 5 mg tablets.           2017 Details    Sun Mon Tue Wed Thu Fri Sat           1      10 mg           2      10 mg         3      7.5 mg         4      10 mg         5      7.5 mg         6      10 mg         7      7.5 mg         8      10 mg           9      10 mg         10      7.5 mg         11      10 mg         12      7.5 mg         13      10 mg         14      7.5 mg         15      10 mg           16      10 mg         17      7.5 mg         18      10 mg         19      7.5 mg         20      10 mg         21      7.5 mg         22                 23               24               25               26               27               28               29                 30               31                     Date Details   No additional details    Date of next INR:  2017         How to take your warfarin dose      To take:  7.5 mg Take one and a half of the 5 mg tablets.    To take:  10 mg Take two of the 5 mg tablets.             Description          Decrease dose and recheck in 3 weeks.  ..............Shelley Mercado RN.............  6/30/2017    3:55 PM                                Anticoagulation Summary as of 6/30/2017     INR goal 2.0-3.0    Today's INR 3.3    Next INR check 7/21/2017          Call your Anticoagulation Clinic at Dept: [phone]   if:   1. Any medications are started, stopped, or there is a change in dose.  2. You experience any bleeding that is not easily stopped or if it is recurrent.  3. You notice an increase in bruising or any bruising that does not heal.  4. You are scheduled for surgery, colonoscopy, dental extraction or any other procedure where you may need to stop your Coumadin (warfarin).

## 2017-12-29 NOTE — PATIENT INSTRUCTIONS
Patient Information     Patient Name MRN Babak Vasquez Jr. [id number] Male 1960      Patient Instructions by Xochitl Pavon RN at 2017  3:15 PM     Author:  Xochitl Pavon RN Service:  (none) Author Type:  NURS- Registered Nurse     Filed:  2017  3:13 PM Encounter Date:  2017 Status:  Signed     :  Xochitl Pavon RN (NURS- Registered Nurse)            2017 Details    Sun Mon Tue Wed Thu Fri Sat         1      10 mg   See details      2      7.5 mg         3      10 mg           4      10 mg         5      7.5 mg         6      10 mg         7      10 mg         8      10 mg         9      7.5 mg         10      10 mg           11      10 mg         12      7.5 mg         13      10 mg         14      10 mg         15      10 mg         16               17                 18               19               20               21               22               23               24                 25               26               27               28               29               30                 Date Details    This INR check       Date of next INR:  6/15/2017         How to take your warfarin dose     To take:  7.5 mg Take one and a half of the 5 mg tablets.    To take:  10 mg Take two of the 5 mg tablets.             Description          Per patient, Cardiologist would like patient close to 3.0. Decrease Warfarin/Coumadin slightly and recheck INR in 2 weeks.  XOCHITL PAVON RN ....................  2017   3:13 PM                                  Anticoagulation Summary as of 2017     INR goal 2.0-3.0    Today's INR 3.5!    Next INR check 6/15/2017          Call your Anticoagulation Clinic at Dept: [phone]   if:   1. Any medications are started, stopped, or there is a change in dose.  2. You experience any bleeding that is not easily stopped or if it is recurrent.  3. You notice an increase in bruising or any bruising that does not heal.  4. You are  scheduled for surgery, colonoscopy, dental extraction or any other procedure where you may need to stop your Coumadin (warfarin).

## 2017-12-29 NOTE — PATIENT INSTRUCTIONS
Patient Information     Patient Name MRN Babak Vasquez Jr. [id number] Male 1960      Patient Instructions by Shelley Mercado RN at 9/15/2017  4:00 PM     Author:  Shelley Mercado RN Service:  (none) Author Type:  NURS- Registered Nurse     Filed:  9/15/2017  4:08 PM Encounter Date:  9/15/2017 Status:  Signed     :  Shelley Mercado RN (NURS- Registered Nurse)            2017 Details    Sun  Fri Sat          1               2                 3               4               5               6               7               8               9                 10               11               12               13               14               15      Hold   See details      16      10 mg           17      10 mg         18      7.5 mg         19      10 mg         20      7.5 mg         21      10 mg         22      7.5 mg         23                 24               25               26               27               28               29               30                Date Details   09/15 This INR check       Date of next INR:  2017         How to take your warfarin dose     To take:  7.5 mg Take one and a half of the 5 mg tablets.    To take:  10 mg Take two of the 5 mg tablets.    Hold Do not take your warfarin dose. The details table to the right may include additional information.                  Description          Hold today and then resume dose. Recheck in 1 week. Shelley Mercado RN    9/15/2017  4:08 PM                                    Anticoagulation Summary as of 9/15/2017     INR goal 2.0-3.0    Today's INR 4.7    Next INR check 2017          Call your Anticoagulation Clinic at Dept: [phone]   if:   1. Any medications are started, stopped, or there is a change in dose.  2. You experience any bleeding that is not easily stopped or if it is recurrent.  3. You notice an increase in bruising or any bruising that does not heal.  4. You are scheduled  for surgery, colonoscopy, dental extraction or any other procedure where you may need to stop your Coumadin (warfarin).

## 2017-12-29 NOTE — PATIENT INSTRUCTIONS
Patient Information     Patient Name MRN Babak Vasquez Jr. [id number] Male 1960      Patient Instructions by Shelley Mercado RN at 2017  4:00 PM     Author:  Shelley Mercado RN Service:  (none) Author Type:  NURS- Registered Nurse     Filed:  2017  3:50 PM Encounter Date:  2017 Status:  Signed     :  Shelley Mercado RN (NURS- Registered Nurse)            2017 Details    Sun Mon Tue Wed Thu Fri Sat           1                 2               3               4               5               6               7               8                 9               10               11               12               13               14               15                 16               17               18               19               20               21      7.5 mg   See details      22      10 mg           23      10 mg         24      7.5 mg         25      10 mg         26      7.5 mg         27      10 mg         28      7.5 mg         29      10 mg           30      10 mg         31      7.5 mg               Date Details    This INR check               How to take your warfarin dose     To take:  7.5 mg Take one and a half of the 5 mg tablets.    To take:  10 mg Take two of the 5 mg tablets.           2017 Details    Sun Mon Tue Wed Thu Fri Sat       1      10 mg         2      7.5 mg         3      10 mg         4      7.5 mg         5      10 mg           6      10 mg         7      7.5 mg         8      10 mg         9      7.5 mg         10      10 mg         11      7.5 mg         12      10 mg           13      10 mg         14      7.5 mg         15      10 mg         16      7.5 mg         17      10 mg         18      7.5 mg         19                 20               21               22               23               24               25               26                 27               28               29               30               31                   Date Details   No additional details    Date of next INR:  8/18/2017         How to take your warfarin dose     To take:  7.5 mg Take one and a half of the 5 mg tablets.    To take:  10 mg Take two of the 5 mg tablets.             Description          Continue same Warfarin dose and recheck in 1 month.  Shelley Mercado RN   7/21/2017    3:49 PM                                  Anticoagulation Summary as of 7/21/2017     INR goal 2.0-3.0    Today's INR 3.2    Next INR check 8/18/2017          Call your Anticoagulation Clinic at Dept: [phone]   if:   1. Any medications are started, stopped, or there is a change in dose.  2. You experience any bleeding that is not easily stopped or if it is recurrent.  3. You notice an increase in bruising or any bruising that does not heal.  4. You are scheduled for surgery, colonoscopy, dental extraction or any other procedure where you may need to stop your Coumadin (warfarin).

## 2017-12-29 NOTE — PATIENT INSTRUCTIONS
Patient Information     Patient Name MRN Babak Vasquez Jr. [id number] Male 1960      Patient Instructions by Shelley Mercado RN at 2017 10:30 AM     Author:  Shelley Mercado RN Service:  (none) Author Type:  NURS- Registered Nurse     Filed:  2017 11:14 AM Encounter Date:  2017 Status:  Signed     :  Shelley Mercado RN (NURS- Registered Nurse)            2017 Details    Sun Mon Tue Wed Thu Fri Sat        1               2      10 mg   See details      3      7.5 mg         4      7.5 mg           5      10 mg         6      7.5 mg         7      7.5 mg         8      7.5 mg         9      10 mg         10      7.5 mg         11      7.5 mg           12      10 mg         13      7.5 mg         14      7.5 mg         15      7.5 mg         16      10 mg         17      7.5 mg         18      7.5 mg           19      10 mg         20      7.5 mg         21      7.5 mg         22      7.5 mg         23      10 mg         24      7.5 mg         25      7.5 mg           26      10 mg         27      7.5 mg         28      7.5 mg         29      7.5 mg         30      10 mg            Date Details    This INR check       Date of next INR:  2017         How to take your warfarin dose     To take:  7.5 mg Take one and a half of the 5 mg tablets.    To take:  10 mg Take two of the 5 mg tablets.             Description          Continue same Warfarin dose and recheck in 1 month.  Shelley Mercado RN   2017    11:14 AM                                          Anticoagulation Summary as of 2017     INR goal 2.0-3.0    Today's INR 2.7    Next INR check 2017          Call your Anticoagulation Clinic at Dept: [phone]   if:   1. Any medications are started, stopped, or there is a change in dose.  2. You experience any bleeding that is not easily stopped or if it is recurrent.  3. You notice an increase in bruising or any bruising that does not  heal.  4. You are scheduled for surgery, colonoscopy, dental extraction or any other procedure where you may need to stop your Coumadin (warfarin).

## 2017-12-29 NOTE — PATIENT INSTRUCTIONS
Patient Information     Patient Name MRN Babak Vasquez Jr. [id number] Male 1960      Patient Instructions by Shelley Mercado RN at 2017  4:00 PM     Author:  Shelley Mercado RN  Service:  (none) Author Type:  NURS- Registered Nurse     Filed:  2017  4:03 PM  Encounter Date:  2017 Status:  Addendum     :  Shelley Mercado RN (NURS- Registered Nurse)        Related Notes: Original Note by Shelley Mercado RN (NURS- Registered Nurse) filed at 2017  3:59 PM            2017 Details    Sun Mon Tue Wed Thu Fri Sat       1               2               3               4               5                 6               7               8               9               10               11               12                 13               14               15               16               17               18      7.5 mg   See details      19      10 mg           20      10 mg         21      7.5 mg         22      10 mg         23      7.5 mg         24      10 mg         25      7.5 mg         26      10 mg           27      10 mg         28      7.5 mg         29      10 mg         30      7.5 mg         31      10 mg            Date Details    This INR check               How to take your warfarin dose     To take:  7.5 mg Take one and a half of the 5 mg tablets.    To take:  10 mg Take two of the 5 mg tablets.           2017 Details    Sun Mon Tue Wed Thu Fri Sat          1      7.5 mg         2      10 mg           3      10 mg         4      7.5 mg         5      10 mg         6      7.5 mg         7      10 mg         8      7.5 mg         9      10 mg           10      10 mg         11      7.5 mg         12      10 mg         13      7.5 mg         14      10 mg         15      7.5 mg         16                 17               18               19               20               21               22               23                 24               25                26               27               28               29               30                Date Details   No additional details    Date of next INR:  9/15/2017         How to take your warfarin dose     To take:  7.5 mg Take one and a half of the 5 mg tablets.    To take:  10 mg Take two of the 5 mg tablets.             Description          Continue same Warfarin dose and recheck in 1 month. Shelley Mercado RN    8/18/2017  3:58 PM                                  Anticoagulation Summary as of 8/18/2017     INR goal 2.0-3.0    Today's INR 2.8    Next INR check 9/15/2017          Call your Anticoagulation Clinic at Dept: [phone]   if:   1. Any medications are started, stopped, or there is a change in dose.  2. You experience any bleeding that is not easily stopped or if it is recurrent.  3. You notice an increase in bruising or any bruising that does not heal.  4. You are scheduled for surgery, colonoscopy, dental extraction or any other procedure where you may need to stop your Coumadin (warfarin).

## 2017-12-29 NOTE — PATIENT INSTRUCTIONS
Patient Information     Patient Name MRN Babak Vasquez Jr. [id number] Male 1960      Patient Instructions by Shelley Mercado RN at 10/3/2017 10:15 AM     Author:  Shelley Mercado RN Service:  (none) Author Type:  NURS- Registered Nurse     Filed:  10/3/2017 10:23 AM Encounter Date:  10/3/2017 Status:  Signed     :  Shelley Mercado RN (NURS- Registered Nurse)            2017 Details    Sun Mon e Wed Thu Fri Sat     1               2               3      7.5 mg   See details      4      7.5 mg         5      10 mg         6      7.5 mg         7      7.5 mg           8      10 mg         9      7.5 mg         10      7.5 mg         11      7.5 mg         12      10 mg         13      7.5 mg         14      7.5 mg           15      10 mg         16      7.5 mg         17      7.5 mg         18      7.5 mg         19      10 mg         20               21                 22               23               24               25               26               27               28                 29               30               31                    Date Details   10/03 This INR check       Date of next INR:  10/19/2017         How to take your warfarin dose     To take:  7.5 mg Take one and a half of the 5 mg tablets.    To take:  10 mg Take two of the 5 mg tablets.             Description          Decrease dose and recheck in 2 weeks.  .............Shelley Mercado RN............  10/3/2017    10:22 AM                                        Anticoagulation Summary as of 10/3/2017     INR goal 2.0-3.0    Today's INR 3.6    Next INR check 10/19/2017          Call your Anticoagulation Clinic at Dept: [phone]   if:   1. Any medications are started, stopped, or there is a change in dose.  2. You experience any bleeding that is not easily stopped or if it is recurrent.  3. You notice an increase in bruising or any bruising that does not heal.  4. You are scheduled for surgery, colonoscopy,  dental extraction or any other procedure where you may need to stop your Coumadin (warfarin).

## 2017-12-29 NOTE — PATIENT INSTRUCTIONS
Patient Information     Patient Name MRN Babak Vasquez Jr. [id number] Male 1960      Patient Instructions by Shelley Mercado RN at 2017  4:00 PM     Author:  Shelley Mercado RN Service:  (none) Author Type:  NURS- Registered Nurse     Filed:  2017  3:28 PM Encounter Date:  2017 Status:  Signed     :  Shelley Mercado RN (NURS- Registered Nurse)            2017 Details    Sun Mon Tue Wed Thu Fri Sat          1               2                 3               4               5               6               7               8               9                 10               11               12               13               14               15               16                 17               18               19               20               21               22      7.5 mg   See details      23      10 mg           24      10 mg         25      7.5 mg         26      10 mg         27      7.5 mg         28      10 mg         29      7.5 mg         30      10 mg          Date Details    This INR check               How to take your warfarin dose     To take:  7.5 mg Take one and a half of the 5 mg tablets.    To take:  10 mg Take two of the 5 mg tablets.           2017 Details    Sun Mon Tue Wed Thu Fri Sat     1      10 mg         2      7.5 mg         3      10 mg         4               5               6               7                 8               9               10               11               12               13               14                 15               16               17               18               19               20               21                 22               23               24               25               26               27               28                 29               30               31                    Date Details   No additional details    Date of next INR:  10/3/2017         How to take your warfarin dose     To  take:  7.5 mg Take one and a half of the 5 mg tablets.    To take:  10 mg Take two of the 5 mg tablets.             Description          Continue same dose and recheck in 2-3 weeks. ..............Shelley Mercado RN   9/22/2017    3:28 PM                                      Anticoagulation Summary as of 9/22/2017     INR goal 2.0-3.0    Today's INR 2.7    Next INR check 10/3/2017          Call your Anticoagulation Clinic at Dept: [phone]   if:   1. Any medications are started, stopped, or there is a change in dose.  2. You experience any bleeding that is not easily stopped or if it is recurrent.  3. You notice an increase in bruising or any bruising that does not heal.  4. You are scheduled for surgery, colonoscopy, dental extraction or any other procedure where you may need to stop your Coumadin (warfarin).

## 2017-12-29 NOTE — PATIENT INSTRUCTIONS
Patient Information     Patient Name MRN Babak Vasquez Jr. [id number] Male 1960      Patient Instructions by Shelley Mercado RN at 10/19/2017 10:15 AM     Author:  Shelley Mercado RN Service:  (none) Author Type:  NURS- Registered Nurse     Filed:  10/19/2017  9:46 AM Encounter Date:  10/19/2017 Status:  Signed     :  Shelley Mercado RN (NURS- Registered Nurse)            2017 Details    Sun Mon Tue Wed Thu Fri Sat     1               2               3               4               5               6               7                 8               9               10               11               12               13               14                 15               16               17               18               19      10 mg   See details      20      7.5 mg         21      7.5 mg           22      10 mg         23      7.5 mg         24      7.5 mg         25      7.5 mg         26      10 mg         27      7.5 mg         28      7.5 mg           29      10 mg         30      7.5 mg         31      7.5 mg              Date Details   10/19 This INR check               How to take your warfarin dose     To take:  7.5 mg Take one and a half of the 5 mg tablets.    To take:  10 mg Take two of the 5 mg tablets.           2017 Details    Sun Mon Tue Wed Thu Fri Sat        1      7.5 mg         2      10 mg         3               4                 5               6               7               8               9               10               11                 12               13               14               15               16               17               18                 19               20               21               22               23               24               25                 26               27               28               29               30                  Date Details   No additional details    Date of next INR:  2017         How to take your  warfarin dose     To take:  7.5 mg Take one and a half of the 5 mg tablets.    To take:  10 mg Take two of the 5 mg tablets.             Description          Continue same dose and recheck in 2-3 weeks. ..............Shelley Mercado RN   10/19/2017    9:45 AM                                        Anticoagulation Summary as of 10/19/2017     INR goal 2.0-3.0    Today's INR 3.2    Next INR check 11/2/2017          Call your Anticoagulation Clinic at Dept: [phone]   if:   1. Any medications are started, stopped, or there is a change in dose.  2. You experience any bleeding that is not easily stopped or if it is recurrent.  3. You notice an increase in bruising or any bruising that does not heal.  4. You are scheduled for surgery, colonoscopy, dental extraction or any other procedure where you may need to stop your Coumadin (warfarin).

## 2017-12-30 NOTE — NURSING NOTE
Patient Information     Patient Name MRN Sex Babak Ribeiro Jr. [id number] Male 1960      Nursing Note by Carolina Mendez at 10/3/2017  9:30 AM     Author:  Carolina Mendez Service:  (none) Author Type:  (none)     Filed:  10/3/2017  9:43 AM Encounter Date:  10/3/2017 Status:  Signed     :  Carolina Mendez            Patient presents to the clinic today to follow up on his blood pressure. He states this is at the request of Dr Yi and his provider in [place].

## 2017-12-30 NOTE — NURSING NOTE
Patient Information     Patient Name MRN Sex Babak Ribeiro Jr. [id number] Male 1960      Nursing Note by Brittany Lynn at 10/19/2017  9:30 AM     Author:  Brittany Lynn Service:  (none) Author Type:  (none)     Filed:  10/19/2017  9:36 AM Encounter Date:  10/19/2017 Status:  Signed     :  Brittany Lynn            Patient here for blood pressure check. Patient states he needs it checked every couple of weeks per Dr Yi because he recently started a new medication.  Brittany Lynn [facility]  ..................10/19/2017   9:34 AM

## 2017-12-30 NOTE — NURSING NOTE
Patient Information     Patient Name MRN Sex Babak Ribeiro Jr. [id number] Male 1960      Nursing Note by Olga Rivera at 2017 12:30 PM     Author:  Olga Rivera Service:  (none) Author Type:  (none)     Filed:  2017 12:34 PM Encounter Date:  2017 Status:  Signed     :  Olga Rivera            Patient stopped at  clinic requesting a blood pressure check.  Olga Rivera LPN .............2017  12:33 PM

## 2018-01-01 ENCOUNTER — COMMUNICATION - GICH (OUTPATIENT)
Dept: FAMILY MEDICINE | Facility: OTHER | Age: 58
End: 2018-01-01

## 2018-01-01 DIAGNOSIS — Z87.39 PERSONAL HISTORY OF OTHER DISEASES OF THE MUSCULOSKELETAL SYSTEM AND CONNECTIVE TISSUE: ICD-10-CM

## 2018-01-02 NOTE — PATIENT INSTRUCTIONS
Patient Information     Patient Name MRN Babak Vasquez Jr. [id number] Male 1960      Patient Instructions by Shelley Mercado RN at 2017  3:00 PM     Author:  Shelley Mercado RN Service:  (none) Author Type:  NURS- Registered Nurse     Filed:  2017  3:12 PM Encounter Date:  2017 Status:  Signed     :  Shelley Mercado RN (NURS- Registered Nurse)            2017 Details    Sun Mon Tue Wed Th Fri Sat     1               2               3               4               5               6               7                 8               9      7.5 mg   See details      10      10 mg         11      7.5 mg         12      10 mg         13      7.5 mg         14      10 mg           15      10 mg         16      7.5 mg         17      10 mg         18      7.5 mg         19      10 mg         20      7.5 mg         21      10 mg           22      10 mg         23      7.5 mg         24      10 mg         25      7.5 mg         26      10 mg         27      7.5 mg         28      10 mg           29      10 mg         30      7.5 mg         31      10 mg              Date Details    This INR check               How to take your warfarin dose     To take:  7.5 mg Take one and a half of the 5 mg tablets.    To take:  10 mg Take two of the 5 mg tablets.           2017 Details    Sun Mon  u Fri Sat        1      7.5 mg         2      10 mg         3      7.5 mg         4      10 mg           5      10 mg         6      7.5 mg         7      10 mg         8      7.5 mg         9      10 mg         10      7.5 mg         11      10 mg           12      10 mg         13      7.5 mg         14      10 mg         15      7.5 mg         16      10 mg         17      7.5 mg         18      10 mg           19      10 mg         20      7.5 mg         21      10 mg         22      7.5 mg         23      10 mg         24      7.5 mg         25      10 mg           26      10  mg         27      7.5 mg         28      10 mg              Date Details   No additional details            How to take your warfarin dose     To take:  7.5 mg Take one and a half of the 5 mg tablets.    To take:  10 mg Take two of the 5 mg tablets.           March 2017 Details    Sun Mon Tue Wed Thu Fri Sat        1      7.5 mg         2      10 mg         3      7.5 mg         4      10 mg           5      10 mg         6      7.5 mg         7               8               9               10               11                 12               13               14               15               16               17               18                 19               20               21               22               23               24               25                 26               27               28               29               30               31                 Date Details   No additional details    Date of next INR:  3/6/2017         How to take your warfarin dose     To take:  7.5 mg Take one and a half of the 5 mg tablets.    To take:  10 mg Take two of the 5 mg tablets.             Description          Continue same Warfarin dose and recheck in 8 weeks. Shelley Mercado RN    1/9/2017  3:11 PM    Reviewed reasons to check INR between appointments, such as medication changes.    Having labs drawn this week.                                  Anticoagulation Summary as of 1/9/2017     INR goal 2.0-3.0    Today's INR 2.2    Next INR check 3/6/2017          Call your Anticoagulation Clinic at Dept: [phone]   if:   1. Any medications are started, stopped, or there is a change in dose.  2. You experience any bleeding that is not easily stopped or if it is recurrent.  3. You notice an increase in bruising or any bruising that does not heal.  You are scheduled for surgery, colonoscopy, dental extraction or any other procedure where you may need to stop your Coumadin (warfarin).

## 2018-01-03 NOTE — PROGRESS NOTES
Patient Information     Patient Name MRN Sex Babak Ribeiro Jr. [id number] Male 1960      Progress Notes by Alvino Yi MD at 3/1/2017 10:00 AM     Author:  Alvino Yi MD Service:  (none) Author Type:  Physician     Filed:  3/6/2017  6:49 AM Encounter Date:  3/1/2017 Status:  Signed     :  Alvino Yi MD (Physician)            SUBJECTIVE:  Babak Moran Jr. is a 56 y.o. Male.  Comes in today to follow-up on hypertension as well as pain in his neck due to C-spine fracture. He reports increased soreness in his shoulders and lower neck. He has increased his activity. He does continue to take oxycodone and typically takes 3 per day. He denies any side effects. He has not had any problems with constipation. He feels his mood is stable. He reports his appetite and weight have been stable since last week.    He thinks his strength and coordination on the affected left side are about the same. Wife reports that he only had physical therapy scheduled for twice this week and reports the sessions lasted about an hour total. Prior to that he was doing 6 hours per day.      OBJECTIVE:  /78  Pulse 72  Wt (!) 143.3 kg (316 lb)  BMI 38.46 kg/m2  EXAM:  General Appearance: Pleasant, alert, appropriate appearance for age. No acute distress  Chest/Respiratory Exam: Normal chest wall and respirations. Clear to auscultation.  Cardiovascular Exam: Regular rate and irregular rhythm. S1, S2, no murmur, click, gallop, or rubs.  Gastrointestinal Exam: Soft, nontender, no abnormal masses or organomegaly.  Musculoskeletal Exam: No synovitis.  Muscle strength age and body habitus appropriate.  Very mild decrease 4+/5 on left extremities  Neurologic Exam: Fine motor notably diminished on left upper extremity    Results for orders placed or performed in visit on 17      BASIC METABOLIC PANEL      Result  Value Ref Range    SODIUM 138 133 - 143 mmol/L    POTASSIUM 4.1 3.5 - 5.1 mmol/L     "CHLORIDE 101 98 - 107 mmol/L    CO2,TOTAL 29 21 - 31 mmol/L    ANION GAP 8 5 - 18                    GLUCOSE 96 70 - 105 mg/dL    CALCIUM 10.0 8.6 - 10.3 mg/dL    BUN 23 7 - 25 mg/dL    CREATININE 0.78 0.70 - 1.30 mg/dL    BUN/CREAT RATIO           29                    GFR if African American >60 >60 ml/min/1.73m2    GFR if not African American >60 >60 ml/min/1.73m2       ASSESSMENT/Plan :      Babak was seen today for follow up.    Diagnoses and all orders for this visit:    HTN (hypertension)  blood pressure well controlled. Continue on current medication regimen. BMP normal.  -     BASIC METABOLIC PANEL; Future  -     AMB CONSULT TO PHYSICAL THERAPY; Future  -     AMB CONSULT TO OCCUPATIONAL THERAPY; Future  -     BASIC METABOLIC PANEL    Cerebrovascular accident (CVA) due to embolism of right middle cerebral artery (HC)   I do think patient needs increased frequency and intensity of physical therapy and occupational therapy. Those orders were written.    Pain  continue with oxycodone 5 mg. Patient understands both short and long-term risks of chronic narcotics.  -     oxyCODONE-acetaminophen, 5-325 mg, (PERCOCET) 5-325 mg per tablet; Take 1 tablet by mouth every 4 hours if needed  for Pain (For moderate to severe pain.) Ok to fill March 14th.      Patient's wife reports that with her insurance I am now out of network. Apparently I can \"prior authorize myself\" so that they will allow him to see me. I explained I never heard of this and do not know how to do this but I will look into it.    Patient Instructions   THings look good.  BP great.  See me back second week of April.      Alvino Yi MD    This document was created using computer generated templates and voice activated software.           "

## 2018-01-03 NOTE — PATIENT INSTRUCTIONS
Patient Information     Patient Name MRN Babak Vasquez Jr. [id number] Male 1960      Patient Instructions by Shelley Mercado RN at 2017  9:00 AM     Author:  Shelley Mercado RN Service:  (none) Author Type:  NURS- Registered Nurse     Filed:  2017  9:07 AM Encounter Date:  2017 Status:  Signed     :  Shelley Mercado RN (NURS- Registered Nurse)            2017 Details    Sun  Fri Sat        1               2               3               4                 5               6               7               8               9               10               11                 12               13               14               15               16               17               18                 19               20      10 mg   See details      21      10 mg         22      10 mg         23      10 mg         24               25                 26               27               28                    Date Details    This INR check       Date of next INR:  2017         How to take your warfarin dose     To take:  10 mg Take two of the 5 mg tablets.             Description          Take 10 mg x 3 days. Recheck on 17. Shelley Mercado RN    2017  9:06 AM                                  Anticoagulation Summary as of 2017     INR goal 2.0-3.0    Today's INR 3.0    Next INR check 2017          Call your Anticoagulation Clinic at Dept: [phone]   if:   1. Any medications are started, stopped, or there is a change in dose.  2. You experience any bleeding that is not easily stopped or if it is recurrent.  3. You notice an increase in bruising or any bruising that does not heal.  You are scheduled for surgery, colonoscopy, dental extraction or any other procedure where you may need to stop your Coumadin (warfarin).

## 2018-01-03 NOTE — PATIENT INSTRUCTIONS
Patient Information     Patient Name MRN Sex Babak Ribeiro Jr. [id number] Male 1960      Patient Instructions by Alvino Yi MD at 2017 10:30 AM     Author:  Alvino Yi MD Service:  (none) Author Type:  Physician     Filed:  2017 10:57 AM Encounter Date:  2017 Status:  Signed     :  Alvino Yi MD (Physician)            Restart the HCTZ and the losartan but start the losartan at 50mg.  See me back in a week and we will recheck BP and labs.  Keep doing PT/OT and stay on coumadin.  I would stay on the lipitor as you have decided.  I will fill and fax the paperwork.

## 2018-01-03 NOTE — PATIENT INSTRUCTIONS
Patient Information     Patient Name MRN Babak Vasquez Jr. [id number] Male 1960      Patient Instructions by Xochitl Pavon RN at 2017 10:30 AM     Author:  Xochitl Pavon RN Service:  (none) Author Type:  NURS- Registered Nurse     Filed:  2017 10:25 AM Encounter Date:  2017 Status:  Signed     :  Xochitl Pavon RN (NURS- Registered Nurse)            2017 Details    Sun Mon Tue Wed Thu Fri Sat        1               2               3               4                 5               6               7               8               9               10               11                 12               13               14               15               16               17               18                 19               20               21               22               23               24               25                 26               27      7.5 mg   See details      28      10 mg              Date Details    This INR check               How to take your warfarin dose     To take:  7.5 mg Take one and a half of the 5 mg tablets.    To take:  10 mg Take two of the 5 mg tablets.           2017 Details    Sun Mon Tue Wed Thu Fri Sat        1      7.5 mg         2      10 mg         3      10 mg         4                 5               6               7               8               9               10               11                 12               13               14               15               16               17               18                 19               20               21               22               23               24               25                 26               27               28               29               30               31                 Date Details   No additional details    Date of next INR:  3/3/2017         How to take your warfarin dose     To take:  7.5 mg Take one and a half of the 5 mg tablets.    To take:  10 mg  Take two of the 5 mg tablets.             Description          Decrease dose, take 7.5 mg two days, and 10 mg x two days, recheck INR 03/03/17.  HAMILTON QUIÑONES RN ....................  2/27/2017   10:23 AM                                Anticoagulation Summary as of 2/27/2017     INR goal 2.0-3.0    Today's INR 3.2!    Next INR check 3/3/2017          Call your Anticoagulation Clinic at Dept: [phone]   if:   1. Any medications are started, stopped, or there is a change in dose.  2. You experience any bleeding that is not easily stopped or if it is recurrent.  3. You notice an increase in bruising or any bruising that does not heal.  You are scheduled for surgery, colonoscopy, dental extraction or any other procedure where you may need to stop your Coumadin (warfarin).

## 2018-01-03 NOTE — PATIENT INSTRUCTIONS
Patient Information     Patient Name MRN Babak Vasquez Jr. [id number] Male 1960      Patient Instructions by Shelley Mercado RN at 3/10/2017 11:00 AM     Author:  Shelley Mercado RN Service:  (none) Author Type:  NURS- Registered Nurse     Filed:  3/10/2017 10:14 AM Encounter Date:  3/10/2017 Status:  Signed     :  Shelley Mercado RN (NURS- Registered Nurse)            2017 Details    Sun  Wed Thu Fri Sat        1               2               3               4                 5               6               7               8               9               10      10 mg   See details      11      10 mg           12      10 mg         13      10 mg         14      10 mg         15      7.5 mg         16      10 mg         17      10 mg         18                 19               20               21               22               23               24               25                 26               27               28               29               30               31                 Date Details   03/10 This INR check       Date of next INR:  3/17/2017         How to take your warfarin dose     To take:  7.5 mg Take one and a half of the 5 mg tablets.    To take:  10 mg Take two of the 5 mg tablets.             Description          Continue same Warfarin dose and recheck in 1 week  Shelley Mercado RN   3/10/2017    10:13 AM  Add some greens to diet.                                Anticoagulation Summary as of 3/10/2017     INR goal 2.0-3.0    Today's INR 3.4    Next INR check 3/17/2017          Call your Anticoagulation Clinic at Dept: [phone]   if:   1. Any medications are started, stopped, or there is a change in dose.  2. You experience any bleeding that is not easily stopped or if it is recurrent.  3. You notice an increase in bruising or any bruising that does not heal.  You are scheduled for surgery, colonoscopy, dental extraction or any other procedure where you may need  to stop your Coumadin (warfarin).

## 2018-01-03 NOTE — PATIENT INSTRUCTIONS
Patient Information     Patient Name MRN Babak Vasquez Jr. [id number] Male 1960      Patient Instructions by Shelley Mercado RN at 3/17/2017 10:00 AM     Author:  Shelley Mercado RN Service:  (none) Author Type:  NURS- Registered Nurse     Filed:  3/17/2017 10:03 AM Encounter Date:  3/17/2017 Status:  Signed     :  Shelley Mercado RN (NURS- Registered Nurse)            2017 Details    Sun  Wed Thu Fri Sat        1               2               3               4                 5               6               7               8               9               10               11                 12               13               14               15               16               17      10 mg   See details      18      10 mg           19      10 mg         20      10 mg         21      10 mg         22      7.5 mg         23      10 mg         24      10 mg         25                 26               27               28               29               30               31                 Date Details    This INR check       Date of next INR:  3/24/2017         How to take your warfarin dose     To take:  7.5 mg Take one and a half of the 5 mg tablets.    To take:  10 mg Take two of the 5 mg tablets.             Description          Continue same Warfarin dose and recheck in 1 week  Shelley Mercado RN    3/17/2017  10:01 AM    Add some greens to diet.                                Anticoagulation Summary as of 3/17/2017     INR goal 2.0-3.0    Today's INR 3.4    Next INR check 3/24/2017          Call your Anticoagulation Clinic at Dept: [phone]   if:   1. Any medications are started, stopped, or there is a change in dose.  2. You experience any bleeding that is not easily stopped or if it is recurrent.  3. You notice an increase in bruising or any bruising that does not heal.  You are scheduled for surgery, colonoscopy, dental extraction or any other procedure where you may  need to stop your Coumadin (warfarin).

## 2018-01-03 NOTE — TELEPHONE ENCOUNTER
Patient Information     Patient Name MRN Babak Vasquez Jr. [id number] Male 1960      Telephone Encounter by Austin Lombardo RN at 3/15/2017 10:13 AM     Author:  Austin Lombardo RN Service:  (none) Author Type:  NURS- Registered Nurse     Filed:  3/15/2017 10:18 AM Encounter Date:  3/14/2017 Status:  Signed     :  Austin Lombardo RN (NURS- Registered Nurse)            Diuretics (may be prescribed for edema)    Office visit in the past 12 months or per provider note.    [facility] with [facility]: [facility]  Next visit with PEPE LYNN is on: 2017 in [facility]  Next visit with [facility] is on: 2017 in [facility]    Lab testing requirements:  Creatinine and Potassium annually, if ordering lab, order BMP.  CREATININE (mg/dL)    Date Value   2017 0.78     POTASSIUM (mmol/L)    Date Value   2017 4.1     BP Readings from Last 4 Encounters:    17 114/78   17 140/92   17 140/92   02/15/17 132/84         Review last provider visit note.  If BP reviewed and plan is noted, can refill.  Max refill for 12 months from last office visit or per provider note.    Angiotensin Receptor Blockers (ARB)    Office visit in the past 12 months or per provider note.    Last visit with PE[facility] LY[facility] was on: 2017 [facility]  Next visit with [facility] is on: [facility]  Next visit with [facility] is on: 2017 in [facility]    Lab test requirements:  Creatinine and Potassium annually, if ordering lab, order BMP.  CREATININE (mg/dL)    Date Value   2017 0.78     POTASSIUM (mmol/L)    Date Value   2017 4.1       Max refill for 12 months from last office visit or per provider note    Chart review shows that patient was seen by PCP on 3/1/17. Dx of HTN reviewed at that time, as well as medication regimen in  relation to HTN. PCP states for patient to continue on current regimen. PCP also states for patient to follow up in April, 2017. Writer will fill requested rxs for one month supply.    Prescription refilled per RN Medication Refill Policy.................... Austin Lombardo RN ....................  3/15/2017   10:17 AM

## 2018-01-03 NOTE — PATIENT INSTRUCTIONS
Patient Information     Patient Name MRN Babak Vasquez Jr. [id number] Male 1960      Patient Instructions by Pati Christy at 3/3/2017 10:45 AM     Author:  Pati Christy Service:  (none) Author Type:  NURS- Registered Nurse     Filed:  3/3/2017 10:50 AM Encounter Date:  3/3/2017 Status:  Signed     :  Pati Christy (NURS- Registered Nurse)            2017 Details    Sun  Sat        1               2               3      10 mg   See details      4      10 mg           5      10 mg         6      10 mg         7      10 mg         8      7.5 mg         9      10 mg         10      10 mg         11                 12               13               14               15               16               17               18                 19               20               21               22               23               24               25                 26               27               28               29               30               31                 Date Details    This INR check       Date of next INR:  3/10/2017         How to take your warfarin dose     To take:  7.5 mg Take one and a half of the 5 mg tablets.    To take:  10 mg Take two of the 5 mg tablets.             Description          Increase dose-take 7.5mg on  and 10mg all other days. Recheck in one week.   Pati Christy, RN    3/3/2017  10:49 AM                                Anticoagulation Summary as of 3/3/2017     INR goal 2.0-3.0    Today's INR 2.4    Next INR check 3/10/2017          Call your Anticoagulation Clinic at Dept: [phone]   if:   1. Any medications are started, stopped, or there is a change in dose.  2. You experience any bleeding that is not easily stopped or if it is recurrent.  3. You notice an increase in bruising or any bruising that does not heal.  You are scheduled for surgery, colonoscopy, dental extraction or any other procedure where you may need  to stop your Coumadin (warfarin).

## 2018-01-03 NOTE — PATIENT INSTRUCTIONS
Patient Information     Patient Name MRN Babak Vasquez Jr. [id number] Male 1960      Patient Instructions by Alvino Yi MD at 3/1/2017 10:00 AM     Author:  Alvino Yi MD Service:  (none) Author Type:  Physician     Filed:  3/1/2017 10:36 AM Encounter Date:  3/1/2017 Status:  Signed     :  Alvino Yi MD (Physician)            THings look good.  BP great.  See me back second week of April.

## 2018-01-03 NOTE — PATIENT INSTRUCTIONS
Patient Information     Patient Name MRN Babak Vasquez Jr. [id number] Male 1960      Patient Instructions by Xochitl Pavon RN at 2017 10:15 AM     Author:  Xochitl Pavon RN Service:  (none) Author Type:  NURS- Registered Nurse     Filed:  2017 10:00 AM Encounter Date:  2017 Status:  Signed     :  Xochitl Pavon RN (NURS- Registered Nurse)            2017 Details    Sun  Fri Sat        1               2               3               4                 5               6               7               8               9               10               11                 12               13               14               15               16               17               18                 19               20               21               22               23      Hold   See details      24      10 mg         25      10 mg           26      10 mg         27      10 mg         28                    Date Details    This INR check       Date of next INR:  2017         How to take your warfarin dose     To take:  10 mg Take two of the 5 mg tablets.    Hold Do not take your warfarin dose. The details table to the right may include additional information.                  Description          HOLD today's dose, Take 10 mg x 3 days. Recheck on 17. XOCHITL PAVON RN ....................  2017   9:59 AM    Reviewed signs and symptoms of bleeding, including extreme caution against falling and hitting head. Patient advised to seek medical attention if any bleeding or injury occurs. Follow-up per protocol.                              Anticoagulation Summary as of 2017     INR goal 2.0-3.0    Today's INR 4.1!    Next INR check 2017          Call your Anticoagulation Clinic at Dept: [phone]   if:   1. Any medications are started, stopped, or there is a change in dose.  2. You experience any bleeding that is not easily stopped or if  it is recurrent.  3. You notice an increase in bruising or any bruising that does not heal.  You are scheduled for surgery, colonoscopy, dental extraction or any other procedure where you may need to stop your Coumadin (warfarin).

## 2018-01-03 NOTE — PROGRESS NOTES
"Patient Information     Patient Name MRN Babak Vasquez Jr. [id number] Male 1960      Progress Notes by Alvino Yi MD at 2017 10:30 AM     Author:  Alvino Yi MD  Service:  (none) Author Type:  Physician     Filed:  2017  9:40 AM  Encounter Date:  2017 Status:  Addendum     :  Alvino Yi MD (Physician)        Related Notes: Original Note by Alvino Yi MD (Physician) filed at 2017  7:51 AM            SUBJECTIVE:  Babak Moran Jr. is a 56 y.o. Male.  Patient comes in today to follow-up on recent hospitalization. He had forgotten to take his Coumadin for a few days\". While he was working he suddenly was unable to move his left side. 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 In doing so fell landing on the concrete floor on his head. Was taken to the emergency department. There he was found to have ischemic stroke, subdural hematoma as well as C-spine fracture. Through consultation with neurosurgery was given TPA in flight.  Symptoms of left hemiparesis started to improve almost immediately. They continued gradual improvement during hospitalization at [facility]. Was discharged to [facility] for rehabilitation. Did well there and was discharged to home a few days ago. Reports that he has some difficulties with left upper extremity coordination and does still have a little bit of weakness on the left side but reports that this is minimal. He tells me that his cognition, speech and other faculties are all normal and at baseline.    He has continued on the Coumadin. He tells me he understands the importance of taking this regularly.    He remains on metoprolol and has had good rate control. His other antihypertensives were held to allow for permissive hypertension.      Social History     Substance Use Topics       Smoking status: Passive Smoke Exposure - Never Smoker     Smokeless tobacco: Never " "Used     Alcohol use No       I have personally reviewed and updated above noted social, family and/or past medical history.    A comprehensive review of systems was negative except for items noted in HPI/Subjective.      OBJECTIVE:  /92  Pulse 64  Ht 1.93 m (6' 4\")  Wt (!) 142.9 kg (315 lb)  BMI 38.34 kg/m2  EXAM:  General Appearance: Pleasant, alert, appropriate appearance for age. No acute distress  Chest/Respiratory Exam: Normal chest wall and respirations. Clear to auscultation.  Cardiovascular Exam: Regular rate and irregular rhythm. S1, S2, no murmur, click, gallop, or rubs.  Gastrointestinal Exam: Soft, nontender, no abnormal masses or organomegaly.  Musculoskeletal Exam: No synovitis.  Muscle strength age and body habitus appropriate as well as equal and even. C-collar in place.  Distal strength 4+ out of 5 in left upper and left lower extremity.   Neurologic Exam: Nonfocal; symmetric DTRs, normal gross motor movement, tone.  Coordination minimally decreased in left upper extremity fine motor movement. No tremor.    ASSESSMENT/Plan :    I personally reviewed the patients' labs and imaging as well as all of the hospital documentation.    Results for orders placed or performed in visit on 02/20/17      INR,POCT      Result  Value Ref Range    INR 3.0 (H) <1.3       Babak was seen today for follow up.    Diagnoses and all orders for this visit:    HTN (hypertension)  we will have patient restart hydrochlorothiazide and losartan as at this time he should be compensated. We'll start losartan at lower dose as patient has had some significant weight loss and reports improved eating habits. I would like to see him back in a week at which point we can recheck his blood pressure and labs. If he has any symptoms of orthostasis or any worsening neurologic deficits he will call or come in urgently for evaluation.  -     hydroCHLOROthiazide (HCTZ) 25 mg tablet; Take 1 tablet by mouth once daily.  -     losartan " (COZAAR) 50 mg tablet; Take 1 tablet by mouth once daily.    Chronic atrial fibrillation (HC)   patient is currently rate controlled. He remains in atrial fibrillation. Stressed importance of remaining on the Coumadin lifelong.    Cerebrovascular accident (CVA), unspecified mechanism   patient will continue occupational therapy. At this point seems to be doing reasonably well.          Patient Instructions   Restart the HCTZ and the losartan but start the losartan at 50mg.  See me back in a week and we will recheck BP and labs.  Keep doing PT/OT and stay on coumadin.  I would stay on the lipitor as you have decided.  I will fill and fax the paperwork.      Alvino Yi MD    This document was created using computer generated templates and voice activated software.

## 2018-01-03 NOTE — PROGRESS NOTES
"Patient Information     Patient Name MRN Sex Babak Ribeiro Jr. [id number] Male 1960      Progress Notes by Alvino Yi MD at 2017 10:00 AM     Author:  Alvino Yi MD Service:  (none) Author Type:  Physician     Filed:  2017  7:48 AM Encounter Date:  2017 Status:  Signed     :  Alvino Yi MD (Physician)            SUBJECTIVE:  Babak Moran Jr. is a 56 y.o. Male.  He comes in today to follow-up on recent hospitalization. Patient fell off a forklift at work on  and sustained a C-spine fracture.  He fractured the facet of C7.  Patient was treated by neurosurgery and had inpatient rehabilitation by Dr. Rg of [facility]. He has been doing quite well. He reports some continued pain in his neck. He is taking Percocet sparingly. He has not had any problems with constipation, mood disturbance or any issues with respiratory depression.      OBJECTIVE:  /92  Pulse 64  Ht 1.93 m (6' 4\")  Wt (!) 142.9 kg (315 lb)  BMI 38.34 kg/m2  EXAM:  General Appearance: Pleasant, alert, appropriate appearance for age. No acute distress  Musculoskeletal Exam: Patient is wearing a c-collar which was not removed. He does have some tenderness with palpation of musculature in occiput and trapezium  Neurologic Exam: Nonfocal; symmetric DTRs, normal gross motor movement, tone, and coordination. No tremor.  Some mild decreased strength on the left upper and lower extremity which is unknown issue and seems to be gradually improving.        ASSESSMENT/Plan :      Babak was seen today for follow up.    Diagnoses and all orders for this visit:    Compression fracture of C-spine, with routine healing, subsequent encounter  patient's next appointment with neurosurgeon as on 2017. He seems to have had interval improvement in his pain but does still require Percocet 5 mg as needed for pain. He estimates he will need this 2-3 times per day " over the next couple of weeks. Prescription was written. He'll follow-up with me at which point we can reassess his pain needs and ensure that he is not having any side effects of the medication. He understands short-term risks which include respiratory sedation that can lead to death, constipation and can lead to narcotic bowel and mood disturbance which can be debilitating. He also understands medium and long-term side effects which include tolerance, dependence and addiction in addition to all the aforementioned acute issues. At this point but if it seems dull white risk.    Patient will need to remain off work at least until he has follow-up with neurosurgeon.    Pain  -     oxyCODONE-acetaminophen, 5-325 mg, (PERCOCET) 5-325 mg per tablet; Take 1 tablet by mouth every 4 hours if needed  for Pain (For moderate to severe pain.)        There are no Patient Instructions on file for this visit.    Alvino Yi MD    This document was created using computer generated templates and voice activated software.

## 2018-01-03 NOTE — PATIENT INSTRUCTIONS
Patient Information     Patient Name MRN Babak Vasquez Jr. [id number] Male 1960      Patient Instructions by Shelley Mercado RN at 2017  1:15 PM     Author:  Shelley Mercado RN Service:  (none) Author Type:  NURS- Registered Nurse     Filed:  2017  1:07 PM Encounter Date:  2017 Status:  Signed     :  Shelley Mercado RN (NURS- Registered Nurse)            2017 Details    Sun  Fri Sat        1               2               3               4                 5               6               7               8               9               10               11                 12               13               14               15               16               17      10 mg   See details      18      10 mg           19      10 mg         20      10 mg         21               22               23               24               25                 26               27               28                    Date Details    This INR check       Date of next INR:  2017         How to take your warfarin dose     To take:  10 mg Take two of the 5 mg tablets.             Description          Take 10 mg x 3 days. Recheck on 17. Shelley Mercado RN    2017  1:06 PM                                  Anticoagulation Summary as of 2017     INR goal 2.0-3.0    Today's INR 2.6    Next INR check 2017          Call your Anticoagulation Clinic at Dept: [phone]   if:   1. Any medications are started, stopped, or there is a change in dose.  2. You experience any bleeding that is not easily stopped or if it is recurrent.  3. You notice an increase in bruising or any bruising that does not heal.  You are scheduled for surgery, colonoscopy, dental extraction or any other procedure where you may need to stop your Coumadin (warfarin).

## 2018-01-04 NOTE — PROGRESS NOTES
Patient Information     Patient Name MRN Babak Vasquez Jr. [id number] Male 1960      Progress Notes by Alvino Yi MD at 2017 10:00 AM     Author:  Alvino Yi MD Service:  (none) Author Type:  Physician     Filed:  2017  6:19 AM Encounter Date:  2017 Status:  Signed     :  Alvino Yi MD (Physician)            SUBJECTIVE:  Babak Moran Jr. is a 56 y.o. Male.  He comes in today to follow-up on progress since CVA and cervical spine fracture. He reports overall he is doing okay. His pain has improved significantly. He reports he still has quite a few of the oxycodone left from his last refill. He has follow-up scheduled with spine specialist.    He has continued on Coumadin. Reports no side effects no black or bloody stools. He does still have some residual left-sided loss of strength but more so fine motor coordination that bothersome. It is gradually improving and he is doing daily therapy at home on his own as well with nuts/bolts and pegs in garage to hopefully restore manual dexterity. Regards to risk factors he does continue on losartan, hydrochlorothiazide and metoprolol for blood pressure control. Has not had any cardiopulmonary symptoms.    He is also on Lipitor.    Patient has not been taking his uloric on a regular basis. Has not had gout flare. Would like refill of indomethacin in the event he has a gout flare.    Does continue with Plaquenil for lupus.      Social History     Substance Use Topics       Smoking status: Passive Smoke Exposure - Never Smoker     Smokeless tobacco: Never Used     Alcohol use No       I have personally reviewed and updated above noted social, family and/or past medical history.    A comprehensive review of systems was negative except for items noted in HPI/Subjective.      OBJECTIVE:  /84  Pulse 72  Wt (!) 149.7 kg (330 lb)  BMI 40.17 kg/m2  EXAM:  General Appearance: Pleasant, alert, appropriate  appearance for age. No acute distress  Thyroid Exam: No nodules or enlargement.  Chest/Respiratory Exam: Normal chest wall and respirations. Clear to auscultation.  Cardiovascular Exam: Regular rate and irregular rhythm. S1, S2, no murmur, click, gallop, or rubs.  Gastrointestinal Exam: Soft, nontender, no abnormal masses or organomegaly.  Musculoskeletal Exam: I cannot appreciate any gross motor deficit but he definitely has some fine motor deficit in upper left and lower left extremities.  Skin: no rash or abnormalities  Neurologic Exam: reflexes are symmetric.    ASSESSMENT/Plan :    I personally reviewed the patients' labs and imaging.    Results for orders placed or performed in visit on 04/07/17      INR,POCT      Result  Value Ref Range    INR 2.9 (H) <1.3       Babak was seen today for follow up.    Diagnoses and all orders for this visit:    Cerebrovascular accident (CVA) due to embolism of right middle cerebral artery (HC)  continue Lipitor.  Suggest recheck a lipid panel at next visit.  -     atorvastatin (LIPITOR) 10 mg tablet; Take 1 tablet by mouth at bedtime.    Left hemiparesis (HC)  patient having some ongoing muscle spasm. He uses the Flexeril rarely. Will refill.  -     cyclobenzaprine (FLEXERIL) 5 mg tablet; Take 1 tablet by mouth at bedtime if needed for Muscle Spasm.    HTN (hypertension)  continue current antihypertensive regimen. Recheck labs next visit. Slightly above goal. Suggest low-salt diet and gradual weight loss. If persistent would suggest increase in losartan or metoprolol  -     hydroCHLOROthiazide (HCTZ) 25 mg tablet; Take 1 tablet by mouth once daily.  -     losartan (COZAAR) 50 mg tablet; Take 1 tablet by mouth once daily.    HYPERTENSION  -     metoprolol succinate (TOPROL XL) 100 mg Sustained-Release tablet; Take 1 tablet by mouth once daily.    Hx of gout  discussed with patient that'll be reasonable to be off the Uloric if he wants to see if dietary changes haven't been  sufficient to prevent gout flare. Otherwise if he wants to stay on the medication that is fine but I would not suggest he take it intermittently.  -     indomethacin (INDOCIN) 50 mg capsule; Take 1 capsule by mouth 2 times daily as needed          There are no Patient Instructions on file for this visit.    Alvino Yi MD    This document was created using computer generated templates and voice activated software.

## 2018-01-04 NOTE — PATIENT INSTRUCTIONS
Patient Information     Patient Name MRN Babak Vasquez Jr. [id number] Male 1960      Patient Instructions by Shelley Mercado RN at 2017 10:00 AM     Author:  Shelley Mercado RN Service:  (none) Author Type:  NURS- Registered Nurse     Filed:  2017 10:05 AM Encounter Date:  2017 Status:  Signed     :  Shelley Mercado RN (NURS- Registered Nurse)            2017 Details    Sun Mon e Wed Thu Fri Sat           1                 2               3               4               5               6               7      10 mg   See details      8      10 mg           9      10 mg         10      10 mg         11      10 mg         12      7.5 mg         13      10 mg         14      10 mg         15      10 mg           16      10 mg         17      10 mg         18      10 mg         19      7.5 mg         20      10 mg         21      10 mg         22      10 mg           23      10 mg         24      10 mg         25      10 mg         26      7.5 mg         27      10 mg         28      10 mg         29                 30                      Date Details    This INR check       Date of next INR:  2017         How to take your warfarin dose     To take:  7.5 mg Take one and a half of the 5 mg tablets.    To take:  10 mg Take two of the 5 mg tablets.             Description          Continue same Warfarin dose and recheck in 3 weeks. Pt prefers to be at higher end of range. Shelley Mercado RN    2017  10:04 AM                                    Anticoagulation Summary as of 2017     INR goal 2.0-3.0    Today's INR 2.9    Next INR check 2017          Call your Anticoagulation Clinic at Dept: [phone]   if:   1. Any medications are started, stopped, or there is a change in dose.  2. You experience any bleeding that is not easily stopped or if it is recurrent.  3. You notice an increase in bruising or any bruising that does not heal.  4. You are scheduled  for surgery, colonoscopy, dental extraction or any other procedure where you may need to stop your Coumadin (warfarin).

## 2018-01-04 NOTE — PATIENT INSTRUCTIONS
Patient Information     Patient Name MRBabak Garcia Jr. [id number] Male 1960      Patient Instructions by Faby Kimble RN at 2017 10:15 AM     Author:  Faby Kimble RN Service:  (none) Author Type:  NURS- Registered Nurse     Filed:  2017 10:19 AM Encounter Date:  2017 Status:  Signed     :  Faby Kimble RN (NURS- Registered Nurse)            May 2017 Details    Sun  Thu Fri Sat      1               2               3               4      5 mg   See details      5      10 mg         6      10 mg           7      10 mg         8      10 mg         9      10 mg         10      7.5 mg         11      10 mg         12      10 mg         13      10 mg           14      10 mg         15      10 mg         16      10 mg         17      7.5 mg         18      10 mg         19               20                 21             22               23               24               25               26               27                 28               29               30               31                   Date Details    This INR check 3.5       Date of next INR:  2017         How to take your warfarin dose     To take:  5 mg Take one of the 5 mg tablets.    To take:  7.5 mg Take one and a half of the 5 mg tablets.    To take:  10 mg Take two of the 5 mg tablets.             Description          Take 1/2 dose today (5 mg) then resume dosing. Recheck in two weeks. Ok to have a big salad today. FABY KIMBLE RN ....................  2017   10:19 AM                                      Anticoagulation Summary as of 2017     INR goal 2.0-3.0    Today's INR 3.5!    Next INR check 2017          Call your Anticoagulation Clinic at Dept: [phone]   if:   1. Any medications are started, stopped, or there is a change in dose.  2. You experience any bleeding that is not easily stopped or if it is recurrent.  3. You notice an increase in bruising or any bruising that  does not heal.  4. You are scheduled for surgery, colonoscopy, dental extraction or any other procedure where you may need to stop your Coumadin (warfarin).

## 2018-01-04 NOTE — PATIENT INSTRUCTIONS
Patient Information     Patient Name MRN Babak Vasquez Jr. [id number] Male 1960      Patient Instructions by Shelley Mercado RN at 3/24/2017 11:00 AM     Author:  Shelley Mercado RN Service:  (none) Author Type:  NURS- Registered Nurse     Filed:  3/24/2017 10:55 AM Encounter Date:  3/24/2017 Status:  Signed     :  Shelley Mercado RN (NURS- Registered Nurse)            2017 Details    Sun Mon Tue Wed Thu Fri Sat        1               2               3               4                 5               6               7               8               9               10               11                 12               13               14               15               16               17               18                 19               20               21               22               23               24      10 mg   See details      25      10 mg           26      10 mg         27      10 mg         28      10 mg         29      7.5 mg         30      10 mg         31      10 mg           Date Details    This INR check               How to take your warfarin dose     To take:  7.5 mg Take one and a half of the 5 mg tablets.    To take:  10 mg Take two of the 5 mg tablets.           2017 Details    Sun Mon Tue Wed Thu Fri Sat           1      10 mg           2      10 mg         3      10 mg         4      10 mg         5      7.5 mg         6      10 mg         7      10 mg         8                 9               10               11               12               13               14               15                 16               17               18               19               20               21               22                 23               24               25               26               27               28               29                 30                      Date Details   No additional details    Date of next INR:  2017         How to take your  warfarin dose     To take:  7.5 mg Take one and a half of the 5 mg tablets.    To take:  10 mg Take two of the 5 mg tablets.             Description          Continue same Warfarin dose and recheck in 2 weeks.  Continues to add greens and v8 juice to diet. Shelley Mercado RN    3/24/2017  10:54 AM                                  Anticoagulation Summary as of 3/24/2017     INR goal 2.0-3.0    Today's INR 3.2    Next INR check 4/7/2017          Call your Anticoagulation Clinic at Dept: [phone]   if:   1. Any medications are started, stopped, or there is a change in dose.  2. You experience any bleeding that is not easily stopped or if it is recurrent.  3. You notice an increase in bruising or any bruising that does not heal.  You are scheduled for surgery, colonoscopy, dental extraction or any other procedure where you may need to stop your Coumadin (warfarin).

## 2018-01-04 NOTE — TELEPHONE ENCOUNTER
Patient Information     Patient Name MRN Babak Vasquez Jr. [id number] Male 1960      Telephone Encounter by Austin Lombardo RN at 2017  3:05 PM     Author:  Austin Lombardo RN Service:  (none) Author Type:  NURS- Registered Nurse     Filed:  2017  3:13 PM Encounter Date:  2017 Status:  Signed     :  Austin Lombardo RN (NURS- Registered Nurse)            Refill requests received for patient's cozaar and HCTZ. Chart review shows that patient was last seen on 3/1/17 by PCP. Plan at that time was for patient to follow up in 2017. Writer had refilled both requested Rx's on 3/15/17 for a 30 day supply. Refills due on 4/15/17. Patient is being seen by PCP for follow up on 17. Call placed to patient to discuss. Patient unavailable, so spoke to patient's wife Erika. Patient's wife verifies sufficient supply until 4/15/17. Writer advised patient's wife to request refills at appointment with PCP on 17. Patient's wife stated understanding. Writer will refuse refills at this time.    Unable to complete prescription refill per RN Medication Refill Policy.................... Austin Lombardo RN ....................  2017   3:12 PM

## 2018-01-05 NOTE — TELEPHONE ENCOUNTER
Patient Information     Patient Name MRN Sex Babak Ribeiro Jr. [id number] Male 1960      Telephone Encounter by Shelley Mercado RN at 2017  7:53 AM     Author:  Shelley Mercado RN Service:  (none) Author Type:  NURS- Registered Nurse     Filed:  2017  7:55 AM Encounter Date:  2017 Status:  Signed     :  Shelley Mercado RN (NURS- Registered Nurse)            Anticoagulant    Office visit in the past 12 months.    Last visit with PEPE LYNN was on: 2017 in [facility]  Next visit with PEPE LYNN is on: No future appointment listed with this provider  Next visit with Family Practice is on: No future appointment listed in this department    Lab tests:  PT/INR at least monthly    INR (no units)    Date Value   2017 3.1 (H)     HEMOGLOBIN                (g/dL)    Date Value   2017 14.9           Prescription refilled per RN Medication Refill Policy.................... Shelley Mercado RN ....................  2017   7:53 AM      .

## 2018-01-05 NOTE — PATIENT INSTRUCTIONS
Patient Information     Patient Name MRN Bbaak Vasquez Jr. [id number] Male 1960      Patient Instructions by Xochitl Pavon RN at 2017  3:45 PM     Author:  Xochitl Pavon RN Service:  (none) Author Type:  NURS- Registered Nurse     Filed:  2017  3:50 PM Encounter Date:  2017 Status:  Signed     :  Xochitl Pavon RN (NURS- Registered Nurse)            May 2017 Details    Sun Mon Tue Wed Thu Fri Sat      1               2               3               4               5               6                 7               8               9               10               11               12               13                 14               15               16               17               18      10 mg   See details      19      10 mg         20      10 mg           21      10 mg         22      10 mg         23      10 mg         24      7.5 mg         25      10 mg         26      10 mg         27      10 mg           28      10 mg         29      10 mg         30      10 mg         31      7.5 mg             Date Details    This INR check               How to take your warfarin dose     To take:  7.5 mg Take one and a half of the 5 mg tablets.    To take:  10 mg Take two of the 5 mg tablets.           2017 Details    Sun Mon Tue Wed Thu Fri Sat         1      10 mg         2               3                 4               5               6               7               8               9               10                 11               12               13               14               15               16               17                 18               19               20               21               22               23               24               25               26               27               28               29               30                 Date Details   No additional details    Date of next INR:  2017         How to take your warfarin dose     To  take:  10 mg Take two of the 5 mg tablets.             Description          Per patient, Cardiologist would like patient close to 3.0.  Continue same dose and recheck in 2 weeks. HAMILTON QUIÑONES RN    5/18/2017    3:49 PM                                      Anticoagulation Summary as of 5/18/2017     INR goal 2.0-3.0    Today's INR 3.1!    Next INR check 6/1/2017          Call your Anticoagulation Clinic at Dept: [phone]   if:   1. Any medications are started, stopped, or there is a change in dose.  2. You experience any bleeding that is not easily stopped or if it is recurrent.  3. You notice an increase in bruising or any bruising that does not heal.  4. You are scheduled for surgery, colonoscopy, dental extraction or any other procedure where you may need to stop your Coumadin (warfarin).

## 2018-01-16 PROBLEM — Z92.82 TISSUE PLASMINOGEN ACTIVATOR (T-PA) ADMINISTERED AT OTHER FACILITY WITHIN 24 HOURS PRIOR TO CURRENT ADMISSION: Status: ACTIVE | Noted: 2018-01-16

## 2018-01-16 PROBLEM — E66.01 MORBID OBESITY WITH BMI OF 40.0-44.9, ADULT (H): Status: ACTIVE | Noted: 2017-02-08

## 2018-01-16 PROBLEM — G81.94 LEFT HEMIPARESIS (H): Status: ACTIVE | Noted: 2017-02-03

## 2018-01-16 PROBLEM — E66.9 OBESITY: Status: ACTIVE | Noted: 2018-01-16

## 2018-01-16 PROBLEM — I63.411 CEREBROVASCULAR ACCIDENT (CVA) DUE TO EMBOLISM OF RIGHT MIDDLE CEREBRAL ARTERY (H): Status: ACTIVE | Noted: 2017-02-03

## 2018-01-16 PROBLEM — M19.90 OSTEOARTHROSIS: Status: ACTIVE | Noted: 2018-01-16

## 2018-01-16 RX ORDER — ATORVASTATIN CALCIUM 10 MG/1
10 TABLET, FILM COATED ORAL
COMMUNITY
Start: 2017-04-14 | End: 2018-07-03

## 2018-01-16 RX ORDER — INDOMETHACIN 50 MG/1
50 CAPSULE ORAL
COMMUNITY
Start: 2018-01-05 | End: 2018-03-05

## 2018-01-16 RX ORDER — PIMECROLIMUS 10 MG/G
CREAM TOPICAL
Status: ON HOLD | COMMUNITY
Start: 2016-03-17 | End: 2019-09-05

## 2018-01-16 RX ORDER — WARFARIN SODIUM 5 MG/1
TABLET ORAL
COMMUNITY
Start: 2017-12-28 | End: 2018-03-29

## 2018-01-16 RX ORDER — METOPROLOL SUCCINATE 100 MG/1
100 TABLET, EXTENDED RELEASE ORAL
COMMUNITY
Start: 2017-04-14 | End: 2018-07-03

## 2018-01-16 RX ORDER — HYDROXYCHLOROQUINE SULFATE 200 MG/1
200 TABLET, FILM COATED ORAL
COMMUNITY
Start: 2017-02-14 | End: 2019-05-13

## 2018-01-16 RX ORDER — CLOBETASOL PROPIONATE 0.5 MG/G
OINTMENT TOPICAL
COMMUNITY
End: 2019-05-13

## 2018-01-16 RX ORDER — BENZOCAINE/MENTHOL 6 MG-10 MG
LOZENGE MUCOUS MEMBRANE
Status: ON HOLD | COMMUNITY
End: 2019-09-05

## 2018-01-16 RX ORDER — HYDROCHLOROTHIAZIDE 25 MG/1
25 TABLET ORAL
COMMUNITY
Start: 2017-04-14 | End: 2018-04-20

## 2018-01-16 RX ORDER — AMLODIPINE BESYLATE 10 MG/1
10 TABLET ORAL
COMMUNITY
Start: 2017-12-29 | End: 2018-03-30

## 2018-01-16 RX ORDER — LOSARTAN POTASSIUM 50 MG/1
50 TABLET ORAL
COMMUNITY
Start: 2017-04-14 | End: 2018-04-20

## 2018-01-25 ENCOUNTER — ANTICOAGULATION - GICH (OUTPATIENT)
Dept: INTERNAL MEDICINE | Facility: OTHER | Age: 58
End: 2018-01-25

## 2018-01-25 ENCOUNTER — AMBULATORY - GICH (OUTPATIENT)
Dept: SCHEDULING | Facility: OTHER | Age: 58
End: 2018-01-25

## 2018-01-25 DIAGNOSIS — I48.20 CHRONIC ATRIAL FIBRILLATION (H): ICD-10-CM

## 2018-01-25 DIAGNOSIS — Z79.01 LONG TERM CURRENT USE OF ANTICOAGULANT: ICD-10-CM

## 2018-01-25 DIAGNOSIS — I48.91 ATRIAL FIBRILLATION (H): ICD-10-CM

## 2018-01-25 LAB — INR - HISTORICAL: 2.8

## 2018-01-27 VITALS — SYSTOLIC BLOOD PRESSURE: 124 MMHG | HEART RATE: 86 BPM | DIASTOLIC BLOOD PRESSURE: 79 MMHG

## 2018-01-27 VITALS
HEIGHT: 74 IN | BODY MASS INDEX: 40.43 KG/M2 | DIASTOLIC BLOOD PRESSURE: 86 MMHG | SYSTOLIC BLOOD PRESSURE: 132 MMHG | HEART RATE: 60 BPM | DIASTOLIC BLOOD PRESSURE: 80 MMHG | SYSTOLIC BLOOD PRESSURE: 120 MMHG | WEIGHT: 315 LBS | HEART RATE: 68 BPM

## 2018-01-27 VITALS
WEIGHT: 315 LBS | BODY MASS INDEX: 41.8 KG/M2 | HEART RATE: 72 BPM | DIASTOLIC BLOOD PRESSURE: 84 MMHG | HEIGHT: 76 IN | BODY MASS INDEX: 38.36 KG/M2 | DIASTOLIC BLOOD PRESSURE: 92 MMHG | WEIGHT: 315 LBS | SYSTOLIC BLOOD PRESSURE: 132 MMHG | HEART RATE: 64 BPM | SYSTOLIC BLOOD PRESSURE: 140 MMHG

## 2018-01-27 VITALS
HEIGHT: 76 IN | HEART RATE: 64 BPM | SYSTOLIC BLOOD PRESSURE: 140 MMHG | DIASTOLIC BLOOD PRESSURE: 92 MMHG | BODY MASS INDEX: 38.36 KG/M2 | WEIGHT: 315 LBS

## 2018-01-27 VITALS
BODY MASS INDEX: 41.98 KG/M2 | HEART RATE: 68 BPM | WEIGHT: 315 LBS | WEIGHT: 315 LBS | DIASTOLIC BLOOD PRESSURE: 78 MMHG | HEART RATE: 72 BPM | SYSTOLIC BLOOD PRESSURE: 114 MMHG | BODY MASS INDEX: 38.46 KG/M2 | DIASTOLIC BLOOD PRESSURE: 82 MMHG | SYSTOLIC BLOOD PRESSURE: 144 MMHG

## 2018-01-30 ENCOUNTER — HISTORY (OUTPATIENT)
Dept: FAMILY MEDICINE | Facility: OTHER | Age: 58
End: 2018-01-30

## 2018-01-30 ENCOUNTER — OFFICE VISIT - GICH (OUTPATIENT)
Dept: FAMILY MEDICINE | Facility: OTHER | Age: 58
End: 2018-01-30

## 2018-01-30 DIAGNOSIS — Z12.5 ENCOUNTER FOR SCREENING FOR MALIGNANT NEOPLASM OF PROSTATE: ICD-10-CM

## 2018-01-30 DIAGNOSIS — I10 ESSENTIAL (PRIMARY) HYPERTENSION: ICD-10-CM

## 2018-01-30 DIAGNOSIS — Z79.01 LONG TERM CURRENT USE OF ANTICOAGULANT: ICD-10-CM

## 2018-01-30 DIAGNOSIS — F51.01 PRIMARY INSOMNIA: ICD-10-CM

## 2018-01-30 DIAGNOSIS — I63.411 CEREBRAL INFARCTION DUE TO EMBOLISM OF RIGHT MIDDLE CEREBRAL ARTERY (H): ICD-10-CM

## 2018-01-30 DIAGNOSIS — I48.20 CHRONIC ATRIAL FIBRILLATION (H): ICD-10-CM

## 2018-01-30 LAB
ANION GAP - HISTORICAL: 10 (ref 5–18)
BUN SERPL-MCNC: 27 MG/DL (ref 7–25)
BUN/CREAT RATIO - HISTORICAL: 32
CALCIUM SERPL-MCNC: 9.6 MG/DL (ref 8.6–10.3)
CHLORIDE SERPLBLD-SCNC: 100 MMOL/L (ref 98–107)
CHOL/HDL RATIO - HISTORICAL: 2.91
CHOLESTEROL TOTAL: 102 MG/DL
CO2 SERPL-SCNC: 28 MMOL/L (ref 21–31)
CREAT SERPL-MCNC: 0.84 MG/DL (ref 0.7–1.3)
GFR IF NOT AFRICAN AMERICAN - HISTORICAL: >60 ML/MIN/1.73M2
GLUCOSE SERPL-MCNC: 91 MG/DL (ref 70–105)
HDLC SERPL-MCNC: 35 MG/DL (ref 23–92)
LDLC SERPL CALC-MCNC: 59 MG/DL
NON-HDL CHOLESTEROL - HISTORICAL: 67 MG/DL
POTASSIUM SERPL-SCNC: 4.2 MMOL/L (ref 3.5–5.1)
PROVIDER ORDERDED STATUS - HISTORICAL: NORMAL
PSA TOTAL (DIAGNOSTIC) - HISTORICAL: 1.2 NG/ML
SODIUM SERPL-SCNC: 138 MMOL/L (ref 133–143)
TRIGL SERPL-MCNC: 39 MG/DL

## 2018-02-01 DIAGNOSIS — I48.20 CHRONIC ATRIAL FIBRILLATION (H): Primary | ICD-10-CM

## 2018-02-02 ENCOUNTER — COMMUNICATION - GICH (OUTPATIENT)
Dept: FAMILY MEDICINE | Facility: OTHER | Age: 58
End: 2018-02-02

## 2018-02-02 DIAGNOSIS — Z87.39 PERSONAL HISTORY OF OTHER DISEASES OF THE MUSCULOSKELETAL SYSTEM AND CONNECTIVE TISSUE: ICD-10-CM

## 2018-02-09 VITALS — BODY MASS INDEX: 43.65 KG/M2 | TEMPERATURE: 98 F | WEIGHT: 315 LBS

## 2018-02-09 VITALS
HEART RATE: 68 BPM | WEIGHT: 315 LBS | BODY MASS INDEX: 39.17 KG/M2 | HEIGHT: 75 IN | DIASTOLIC BLOOD PRESSURE: 82 MMHG | SYSTOLIC BLOOD PRESSURE: 122 MMHG

## 2018-02-09 VITALS — SYSTOLIC BLOOD PRESSURE: 154 MMHG | DIASTOLIC BLOOD PRESSURE: 98 MMHG | HEART RATE: 90 BPM

## 2018-02-11 PROBLEM — Z79.01 LONG TERM (CURRENT) USE OF ANTICOAGULANTS: Status: ACTIVE | Noted: 2018-02-11

## 2018-02-11 PROBLEM — I48.91 UNSPECIFIED ATRIAL FIBRILLATION: Status: ACTIVE | Noted: 2018-02-11

## 2018-02-12 NOTE — PATIENT INSTRUCTIONS
Patient Information     Patient Name MRN Babak Vasquez Jr. [id number] Male 1960      Patient Instructions by Xochitl Pavon RN at 2017 10:45 AM     Author:  Xochitl Pavon RN Service:  (none) Author Type:  NURS- Registered Nurse     Filed:  2017 10:53 AM Encounter Date:  2017 Status:  Signed     :  Xochitl Pavon RN (NURS- Registered Nurse)            2017 Details    Sun  Sat          1               2                 3               4               5               6               7               8               9                 10               11               12               13               14               15               16                 17               18               19               20               21               22               23                 24               25               26               27               28      10 mg   See details      29      7.5 mg         30      10 mg           31      7.5 mg                Date Details    This INR check               How to take your warfarin dose     To take:  7.5 mg Take one and a half of the 5 mg tablets.    To take:  10 mg Take two of the 5 mg tablets.           2018 Details    Sun  Sat      1      7.5 mg         2      10 mg         3      7.5 mg         4      10 mg         5      7.5 mg         6      10 mg           7      7.5 mg         8      7.5 mg         9      10 mg         10      7.5 mg         11      10 mg         12      7.5 mg         13      10 mg           14      7.5 mg         15      7.5 mg         16      10 mg         17      7.5 mg         18      10 mg         19      7.5 mg         20      10 mg           21      7.5 mg         22      7.5 mg         23      10 mg         24      7.5 mg         25      10 mg         26               27                 28               29               30               31                    Date Details   No additional details    Date of next INR:  1/25/2018         How to take your warfarin dose     To take:  7.5 mg Take one and a half of the 5 mg tablets.    To take:  10 mg Take two of the 5 mg tablets.             Description          Prefers to be closer to 3.0.  Increase Warfarin/Coumadin and recheck INR in one month (preference)  Patient requests staying closer to 3.0.                                    Anticoagulation Summary as of 12/28/2017     INR goal 2.0-3.0    Today's INR 2.2    Next INR check 1/25/2018          Call your Anticoagulation Clinic at Dept: [phone]   if:   1. Any medications are started, stopped, or there is a change in dose.  2. You experience any bleeding that is not easily stopped or if it is recurrent.  3. You notice an increase in bruising or any bruising that does not heal.  4. You are scheduled for surgery, colonoscopy, dental extraction or any other procedure where you may need to stop your Coumadin (warfarin).

## 2018-02-12 NOTE — PROGRESS NOTES
Patient Information     Patient Name MRN Sex Babak Ribeiro Jr. [id number] Male 1960      Progress Notes by Mariela Zarco MD at 2017  2:45 PM     Author:  Mariela Zarco MD Service:  (none) Author Type:  Physician     Filed:  2017  4:13 PM Encounter Date:  2017 Status:  Signed     :  Mariela Zarco MD (Physician)            SUBJECTIVE:    Babak Moran Jr. is a 57 y.o. male who presents for elevated blood pressure    HPI Comments: Stopped by clinic for BP check, 150/90, recheck after rest and remained elevated. He is due for refill of norvasc, this was restarted in the fall, he had been on it previously  He is compliant meds but  not checking at home      Allergies      Allergen   Reactions     Benemid [Probenecid]  Rash     Diatrizoate Meglumine (Iv Contrast Dye)  Rash     Ioxaglate Sodium  *Unknown - Follow up needed     Levofloxacin  Hives and Rash     Metrizamide  Rash     (Diagnostic X-Ray materials)     and   Current Outpatient Prescriptions on File Prior to Visit       Medication  Sig Dispense Refill     atorvastatin (LIPITOR) 10 mg tablet Take 1 tablet by mouth at bedtime. 90 tablet 3     clobetasol 0.05% (TEMOVATE 0.05% OINTMENT) 0.05 % ointment Apply  topically to affected area(s) 2 times daily. Use for 1 week then use Hydrocortisone cream then back to Clobetasole cream       hydroCHLOROthiazide (HCTZ) 25 mg tablet Take 1 tablet by mouth once daily. 90 tablet 3     hydrocortisone 1 % cream Apply  topically to affected area(s) 2 times daily. Alternate every other week with Clobetasol cream       hydroxychloroquine (PLAQUENIL) 200 mg tablet Take 1 tablet by mouth 2 times daily. 60 tablet 0     indomethacin (INDOCIN) 50 mg capsule Take one capsule by mouth two times daily as needed 60 capsule 0     losartan (COZAAR) 50 mg tablet Take 1 tablet by mouth once daily. 90 tablet 3     metoprolol succinate (TOPROL XL) 100 mg Sustained-Release tablet Take 1  tablet by mouth once daily. 90 tablet 3     pimecrolimus (ELIDEL) 1 % cream Apply  topically to affected area(s) 2 times daily if needed.       warfarin (COUMADIN) 5 mg tablet 10 mg x three days/week and 7.5 mg x four days/week or as directed by [facility]. 180 tablet 0     No current facility-administered medications on file prior to visit.        REVIEW OF SYSTEMS:  Review of Systems   Constitutional: Negative for chills and fever.   Respiratory: Negative for shortness of breath.    Cardiovascular: Negative for chest pain and palpitations.   Gastrointestinal: Negative for nausea and vomiting.   Neurological: Negative for dizziness and headaches.       OBJECTIVE:  Temp 98  F (36.7  C) (Oral)  Wt (!) 154.2 kg (340 lb)  BMI 43.65 kg/m2     154/90 on nurse check, manual check 140/100    EXAM:   Physical Exam   Constitutional: He is well-developed, well-nourished, and in no distress.   Cardiovascular: Normal rate and regular rhythm.    No murmur heard.  Pulmonary/Chest: Effort normal and breath sounds normal. No respiratory distress.       ASSESSMENT/PLAN:    ICD-10-CM    1. HTN (hypertension) I10 amLODIPine (NORVASC) 10 mg tablet        Plan:  Will increase norvasc to 10 mg daily which was his previous dose. Monitor at home, given card to track. Recheck in 1 month  Continue other medications as prescribed  Reinforced importance of weight loss, exercise, healthy diet    Mariela Bueno MD  4:13 PM 12/29/2017

## 2018-02-12 NOTE — NURSING NOTE
Patient Information     Patient Name MRN Babak Vasquez Jr. [id number] Male 1960      Nursing Note by Nata Mariee at 2017  2:45 PM     Author:  Nata Mariee Service:  (none) Author Type:  (none)     Filed:  2017  2:43 PM Encounter Date:  2017 Status:  Signed     :  Nata Mariee            Patient presents in the clinic for a blood pressure check. Patients blood pressure is elevated, non symptomatic.  Nata Mariee LPN............................ 2017 2:35 PM

## 2018-02-12 NOTE — TELEPHONE ENCOUNTER
Patient Information     Patient Name MRN Sex Babak Ribeiro Jr. [id number] Male 1960      Telephone Encounter by Austin Lombardo RN at 2017  4:19 PM     Author:  Austin Lombardo RN Service:  (none) Author Type:  NURS- Registered Nurse     Filed:  2017  4:25 PM Encounter Date:  12/3/2017 Status:  Signed     :  Austin Lombardo RN (NURS- Registered Nurse)            This is a Refill request from: [facility]  Name of Medication: Indomethacin  Quantity requested: 60 capsules  Last fill date: 17 for a 30 day supply if patient is taking rx regularly and not as needed  Due for refill: See above  Last visit with ALVINO LYNN was on: 10/03/2017 in [facility]  PCP:  Alvino Lynn MD  Controlled Substance Agreement: N/A   Diagnosis r/t this medication request: Gout    Chart review shows that patient was last seen by PCP for diagnosis of GOUT on 17. Plan as per office visit notes on that date as follows:    We also talked about his gout. He does not have insurance to cover you work. He has had severe nausea and vomiting with dramatic weight loss on allopurinol. He is currently taking indomethacin. I explained increased cardiovascular risk on indomethacin as well as potential for gastric bleeding which is increased with concomitant Coumadin dosage. I would suggest that he not take this every day but can definite take it with gout flare. It is possible that with weight loss and improved diet he can reduce incidence of gout flare. If he continues to have significant gout flares we will need to reconsider Uloric or retrial of allopurinol at low dose.    Refills from pharmacy indicate that patient may be taking indocin on a regular basis. Writer will route rx request to PCP for his consideration/approval.     Unable to complete prescription refill per RN Medication Refill Policy.................... Austin Lombardo RN ....................  2017   4:21 PM

## 2018-02-13 NOTE — NURSING NOTE
Patient Information     Patient Name MRN Sex Babak Ribeiro Jr. [id number] Male 1960      Nursing Note by Carolina Mendez at 2018 10:00 AM     Author:  Carolina Mendez Service:  (none) Author Type:  (none)     Filed:  2018 10:37 AM Encounter Date:  2018 Status:  Signed     :  Carolina Mendez            Patient comes in to the clinic today to follow up on his blood pressure.

## 2018-02-13 NOTE — PATIENT INSTRUCTIONS
Patient Information     Patient Name MRN Babak Vasquez Jr. [id number] Male 1960      Patient Instructions by Shelley Mercado RN at 2018 10:45 AM     Author:  Shelley Mercado RN Service:  (none) Author Type:  NURS- Registered Nurse     Filed:  2018 10:31 AM Encounter Date:  2018 Status:  Signed     :  Shelley Mercado RN (NURS- Registered Nurse)            2018 Details    Sun Mon Tue Wed Thu Fri Sat      1               2               3               4               5               6                 7               8               9               10               11               12               13                 14               15               16               17               18               19               20                 21               22               23               24               25      10 mg   See details      26      7.5 mg         27      10 mg           28      7.5 mg         29      7.5 mg         30      10 mg         31      7.5 mg             Date Details    This INR check               How to take your warfarin dose     To take:  7.5 mg Take one and a half of the 5 mg tablets.    To take:  10 mg Take two of the 5 mg tablets.           2018 Details    Sun Mon Tue Wed Thu Fri Sat         1      10 mg         2      7.5 mg         3      10 mg           4      7.5 mg         5      7.5 mg         6      10 mg         7      7.5 mg         8      10 mg         9      7.5 mg         10      10 mg           11      7.5 mg         12      7.5 mg         13      10 mg         14      7.5 mg         15      10 mg         16      7.5 mg         17      10 mg           18      7.5 mg         19      7.5 mg         20      10 mg         21      7.5 mg         22      10 mg         23               24                 25               26               27               28                   Date Details   No additional details    Date of next  INR:  2/22/2018         How to take your warfarin dose     To take:  7.5 mg Take one and a half of the 5 mg tablets.    To take:  10 mg Take two of the 5 mg tablets.             Description          Prefers to be closer to 3.0. Continue same Warfarin dose and recheck in 1 month.  Shelley Mercado RN   1/25/2018    10:31 AM                                    Anticoagulation Summary as of 1/25/2018     INR goal 2.0-3.0    Today's INR 2.8    Next INR check 2/22/2018          Call your Anticoagulation Clinic at Dept: [phone]   if:   1. Any medications are started, stopped, or there is a change in dose.  2. You experience any bleeding that is not easily stopped or if it is recurrent.  3. You notice an increase in bruising or any bruising that does not heal.  4. You are scheduled for surgery, colonoscopy, dental extraction or any other procedure where you may need to stop your Coumadin (warfarin).

## 2018-02-13 NOTE — TELEPHONE ENCOUNTER
Patient Information     Patient Name MRN Babak Vasquez Jr. [id number] Male 1960      Telephone Encounter by Mita Fontenot RN at 2018  8:38 AM     Author:  Mita Fontenot RN Service:  (none) Author Type:  NURS- Registered Nurse     Filed:  2018  9:09 AM Encounter Date:  2018 Status:  Signed     :  Mita Fontenot RN (NURS- Registered Nurse)            Call placed to patient to verify dosing. Unable to reach patient.    Per visit on 17, patient was to use for flares only, not daily, due to cardiovascular risk and use of coumadin     Mita Fontenot RN ....................  2018   9:01 AM

## 2018-02-13 NOTE — TELEPHONE ENCOUNTER
Patient Information     Patient Name MRN Sex Babak Ribeiro Jr. [id number] Male 1960      Telephone Encounter by Savannah Sifuentes RN at 2018  4:46 PM     Author:  Savannah Sifuentes RN Service:  (none) Author Type:  NURS- Registered Nurse     Filed:  2018  4:53 PM Encounter Date:  2018 Status:  Signed     :  Savannah Sifuentes RN (NURS- Registered Nurse)            PLEASE REVIEW, SIGN AND SEND AS APPROPRIATE: THANK YOU.    Per last refill request for Indomethacin from 2018, Patient was to use for flares only, not daily, due to cardiovascular risk and use of coumadin. When Patient as contacted, he stated he was taking it daily instead of PRN as instructed and it would be discussed at upcoming appointment with PCP on . Patient saw cardiologist on  and PCP on  and discussion was not noted.     Will send to PCP for consideration.    This is a Refill request from: [facility]  Name of Medication: indomethacin (INDOCIN) 50 mg capsule  Quantity requested: 60 caps  Last fill date: 2018 (#60, R-0)  Due for refill: Yes, Per Patient  Last visit with PEPE LYNN was on: 2018 in [facility]  PCP:  Pepe Lynn MD  Controlled Substance Agreement:N/a  Diagnosis r/t this medication request: History of gout     Unable to complete prescription refill per RN Medication Refill Policy.................... Savannah Sifuentes RN ....................  2018   4:51 PM

## 2018-02-13 NOTE — TELEPHONE ENCOUNTER
"Patient Information     Patient Name MRN Babak Vasquez Jr. [id number] Male 1960      Telephone Encounter by Mita Fontenot RN at 2018 10:09 AM     Author:  Mita Fontenot RN Service:  (none) Author Type:  NURS- Registered Nurse     Filed:  2018 10:15 AM Encounter Date:  2018 Status:  Signed     :  Mita Fontenot RN (NURS- Registered Nurse)            Patient states he is taking this medication daily, and has for \"years.\" Patient has upcoming appointment with PCP on 18. Will authorize limited refill and patient can address at next office visit.    GOUT    Office visit in the past 12 months or per provider note.    Last visit with PEPE LYNN was on: 10/03/2017 in [facility]  Next visit with PEPE LYNN is on: 2018 in [facility]  Next visit with Family Practice is on: 2018 in [facility]    Max refill for 12 months from last office visit or per provider note.    Prescription refilled per RN Medication Refill Policy.................... Mita Fontenot RN ....................  2018   10:11 AM              "

## 2018-02-13 NOTE — PROGRESS NOTES
Patient Information     Patient Name MRN Babak Vasquez Jr. [id number] Male 1960      Progress Notes by Alvino Yi MD at 2018 10:00 AM     Author:  Alvino Yi MD Service:  (none) Author Type:  Physician     Filed:  2018  2:56 PM Encounter Date:  2018 Status:  Signed     :  Alvino Yi MD (Physician)            Nursing Notes:   Carolina Mendez  2018 10:37 AM  Signed  Patient comes in to the clinic today to follow up on his blood pressure.      SUBJECTIVE:  Babak Morna Jr. is a 57 y.o. Male who comes in today to follow-up on hypertension as well as other chronic issues. Patient recently had his Norvasc increased from 5 mg 10 mg. He feels well. Blood pressure has been under better control. He continues on metoprolol 100 mg, losartan 50 mg and hydrochlorothiazide 25 mg. He has not had any orthostasis. He denies any chest pain or shortness of breath. He does continue to take Lipitor for hyperlipidemia.    Patient has not had any weight loss recently. He has gained a couple of pounds. He is currently not on any specific diet or exercising regularly.    He does continue to take Coumadin. He has not had any black or bloody stool. He reports having a single episode of bright red blood per rectum at the time of a hemorrhoid but this resolved.    He has been having difficulty with sleep. He reports he typically can get to sleep reasonably well although occasionally has up to one hour of sleep latency. He will then sleep for about 4 or 5 hours and wake up and connected back to sleep. Typically getting to bed at about 11 PM and then waking up at about 3 in the morning. He would like to get up at 6 in the morning.      Social History     Substance Use Topics       Smoking status: Passive Smoke Exposure - Never Smoker     Smokeless tobacco: Never Used     Alcohol use No       I have personally reviewed and updated above noted social, family and/or past medical  "history.    A comprehensive review of systems was negative except for items noted in HPI/Subjective.      OBJECTIVE:  /82  Pulse 68  Ht 1.905 m (6' 3\")  Wt (!) 153.8 kg (339 lb)  BMI 42.37 kg/m2  EXAM:  General Appearance: Pleasant, alert, appropriate appearance for age. No acute distress  Chest/Respiratory Exam: Normal chest wall and respirations. Clear to auscultation.  Cardiovascular Exam: Regular rate and rhythm. S1, S2, no murmur, click, gallop, or rubs.  Gastrointestinal Exam: Soft, nontender, no abnormal masses or organomegaly.  Musculoskeletal Exam: No synovitis.  Muscle strength age and body habitus appropriate as well as equal and even.   Neurologic Exam: Nonfocal; symmetric DTRs, normal gross motor movement, tone, and coordination. No tremor.    ASSESSMENT/Plan :    I personally reviewed the patients' labs    Results for orders placed or performed in visit on 01/30/18      LIPID PANEL      Result  Value Ref Range    CHOLESTEROL,TOTAL 102 <200 mg/dL    TRIGLYCERIDES 39 <150 mg/dL    HDL CHOLESTEROL 35 23 - 92 mg/dL    NON-HDL CHOLESTEROL 67 <145 mg/dl    CHOL/HDL RATIO            2.91 <4.50                    LDL CHOLESTEROL 59 <100 mg/dL    PROVIDER ORDERED STATUS FASTING    BASIC METABOLIC PANEL      Result  Value Ref Range    SODIUM 138 133 - 143 mmol/L    POTASSIUM 4.2 3.5 - 5.1 mmol/L    CHLORIDE 100 98 - 107 mmol/L    CO2,TOTAL 28 21 - 31 mmol/L    ANION GAP 10 5 - 18                    GLUCOSE 91 70 - 105 mg/dL    CALCIUM 9.6 8.6 - 10.3 mg/dL    BUN 27 (H) 7 - 25 mg/dL    CREATININE 0.84 0.70 - 1.30 mg/dL    BUN/CREAT RATIO           32                    GFR if African American >60 >60 ml/min/1.73m2    GFR if not African American >60 >60 ml/min/1.73m2   PSA, TOTAL      Result  Value Ref Range    PSA TOTAL (DIAGNOSTIC) 1.197 <=3.100 ng/mL       Babak was seen today for follow up.    Diagnoses and all orders for this visit:    Primary insomnia  Discussed good sleep hygiene with patient, this " includes using bed only for sleep or sex, no alcohol or caffiene for 5 hours prior to bedtime, no daytime naps and consistent sleep/wake times.  Also stressed importance of regular exercise.  Patient voiced good understanding.     We'll trial trazodone. Discussed with patient that I would suggest he get to bed by 10:00 at night if he wants to get up around 6 AM.    -     traZODone (DESYREL) 50 mg tablet; Take 1 tablet by mouth at bedtime.    HTN (hypertension)  blood pressure currently at goal. Continue current dose of antihypertensives. BMP normal. Lipid profile reasonable. Stay with [place] suggest gradual weight loss. BMI is currently 42.  -     amLODIPine (NORVASC) 10 mg tablet; Take 1 tablet by mouth once daily.  -     hydroCHLOROthiazide (HCTZ) 25 mg tablet; Take 1 tablet by mouth once daily.  -     losartan (COZAAR) 50 mg tablet; Take 1 tablet by mouth once daily.  -     metoprolol succinate (TOPROL XL) 100 mg Sustained-Release tablet; Take 1 tablet by mouth once daily.  -     LIPID PANEL; Future  -     BASIC METABOLIC PANEL; Future  -     LIPID PANEL  -     BASIC METABOLIC PANEL    Cerebrovascular accident (CVA) due to embolism of right middle cerebral artery (HC)    statin.  -     atorvastatin (LIPITOR) 10 mg tablet; Take 1 tablet by mouth at bedtime.    Permanent atrial fibrillation (HC)   patient is rate controlled. Suggest he continue with Coumadin. [id number] score of 3    Long-term (current) use of anticoagulants, INR goal 2.0-3.0    Prostate cancer screening  Discussed risks of false positives and benefits of early detection with LYDIA and PSA, he is currently asymptomatic.  Suggest both screening modalities.  He declines LYDIA but does accept PSA.  He understands limitations of this approach and that about 5% of prostate cancers do not produce PSA, making them undetectable by this method alone.  He voiced good understanding.      -     PSA, TOTAL; Future  -     PSA, TOTAL          There are no Patient  Instructions on file for this visit.    Alvino Yi MD    This document was created using computer generated templates and voice activated software.

## 2018-02-20 ENCOUNTER — DOCUMENTATION ONLY (OUTPATIENT)
Dept: FAMILY MEDICINE | Facility: OTHER | Age: 58
End: 2018-02-20

## 2018-02-22 ENCOUNTER — ANTICOAGULATION THERAPY VISIT (OUTPATIENT)
Dept: ANTICOAGULATION | Facility: OTHER | Age: 58
End: 2018-02-22
Attending: FAMILY MEDICINE

## 2018-02-22 LAB — INR POINT OF CARE: 3.3 (ref 2–3)

## 2018-02-22 PROCEDURE — 99207 ZZC NO CHARGE NURSE ONLY: CPT

## 2018-02-22 PROCEDURE — 85610 PROTHROMBIN TIME: CPT | Mod: QW

## 2018-02-22 PROCEDURE — 36416 COLLJ CAPILLARY BLOOD SPEC: CPT

## 2018-02-22 NOTE — PROGRESS NOTES
ANTICOAGULATION FOLLOW-UP CLINIC VISIT    Patient Name:  Babak Moran  Date:  2/22/2018  Contact Type:  Face to Face    SUBJECTIVE:     Patient Findings     Positives No Problem Findings           OBJECTIVE    INR Protime   Date Value Ref Range Status   02/22/2018 3.3 (A) 2.0 - 3.0 Final       ASSESSMENT / PLAN  INR assessment THER    Recheck INR In: 3 WEEKS    INR Location Clinic      Anticoagulation Summary as of 2/22/2018     INR goal 2.0-3.0   Today's INR 3.3!   Maintenance plan 10 mg (5 mg x 2) on Tue, Thu, Sat; 7.5 mg (5 mg x 1.5) all other days   Full instructions 2/22: 7.5 mg; Otherwise 10 mg on Tue, Thu, Sat; 7.5 mg all other days   Weekly total 60 mg   Next INR check 3/15/2018   Priority INR   Target end date Indefinite    Indications   Long term (current) use of anticoagulants [Z79.01]  Unspecified atrial fibrillation [I48.91]         Anticoagulation Episode Summary     INR check location     Preferred lab     Send INR reminders to  INR    Comments Babak prefers to be closer to 3.0 due to previous CVA.  Denclines to reduce dose when slightly over 3.0      Anticoagulation Care Providers     Provider Role Specialty Phone number    Alvino Yi MD [facility] [phone]            See the Encounter Report to view Anticoagulation Flowsheet and Dosing Calendar (Go to Encounters tab in chart review, and find the Anticoagulation Therapy Visit)        Xochitl Pavon RN

## 2018-02-22 NOTE — MR AVS SNAPSHOT
Babak Moran   2/22/2018 10:30 AM   Anticoagulation Therapy Visit    Description:  57 year old male   Provider:  [facility]   Department:  [facility]           INR as of 2/22/2018     Today's INR 3.3!      Anticoagulation Summary as of 2/22/2018     INR goal 2.0-3.0   Today's INR 3.3!   Full instructions 2/22: 7.5 mg; Otherwise 10 mg on Tue, Thu, Sat; 7.5 mg all other days   Next INR check 3/15/2018    Indications   Long term (current) use of anticoagulants [Z79.01]  Unspecified atrial fibrillation [I48.91]         Description     Take lesser dose today (7.5 mg instead of 10 mg), then continue same dose and recheck INR in 3 weeks.  Xochitl Pavon ....................  2/22/2018   10:36 AM          February 2018 Details    Sun Mon Tue Wed Thu Fri Sat         1               2               3                 4               5               6               7               8               9               10                 11               12               13               14               15               16               17                 18               19               20               21               22      7.5 mg   See details      23      7.5 mg         24      10 mg           25      7.5 mg         26      7.5 mg         27      10 mg         28      7.5 mg             Date Details   02/22 This INR check               How to take your warfarin dose     To take:  7.5 mg Take 1.5 of the 5 mg tablets.    To take:  10 mg Take 2 of the 5 mg tablets.           March 2018 Details    Sun Mon Tue Wed Thu Fri Sat         1      10 mg         2      7.5 mg         3      10 mg           4      7.5 mg         5      7.5 mg         6      10 mg         7      7.5 mg         8      10 mg         9      7.5 mg         10      10 mg           11      7.5 mg         12      7.5 mg         13      10 mg         14      7.5 mg         15            16               17                 18               19                20               21               22               23               24                 25               26               27               28               29               30               31                Date Details   No additional details    Date of next INR:  3/15/2018         How to take your warfarin dose     To take:  7.5 mg Take 1.5 of the 5 mg tablets.    To take:  10 mg Take 2 of the 5 mg tablets.

## 2018-03-05 DIAGNOSIS — Z87.39 HISTORY OF GOUT: Primary | ICD-10-CM

## 2018-03-05 NOTE — LETTER
March 13, 2018      Babak Moran  [address]   [place] [address]        Dear Babak,     This letter is to remind you that you are due for your updated Uric Acid Level lab.    A LIMITED refill of     Indomethacin (INDOCIN) 50 mg capsule     has been requested from your doctor. Additional refills require you to complete this appointment.    Please call the clinic at [phone] to schedule your LAB ONLY appointment.    If you should require additional refills before your scheduled appointment, please contact your pharmacy and we will refill your medication until the date of your appointment.    If you are no longer seeing Alvino Yi for primary care, please call to let us know. Doing so will remove you from our call/contact list.      Thank you for choosing [facility] and [facility] for your health care needs.    Sincerely,    Refill RN  [facility]

## 2018-03-12 RX ORDER — HYDROCHLOROTHIAZIDE 25 MG/1
25 TABLET ORAL
COMMUNITY
Start: 2018-01-30 | End: 2018-04-20

## 2018-03-12 RX ORDER — TRAZODONE HYDROCHLORIDE 50 MG/1
50 TABLET, FILM COATED ORAL
Status: ON HOLD | COMMUNITY
Start: 2018-01-30 | End: 2019-09-05

## 2018-03-12 RX ORDER — AMLODIPINE BESYLATE 10 MG/1
10 TABLET ORAL
COMMUNITY
Start: 2018-01-30 | End: 2018-07-03

## 2018-03-12 RX ORDER — ATORVASTATIN CALCIUM 10 MG/1
10 TABLET, FILM COATED ORAL
COMMUNITY
Start: 2018-01-30 | End: 2018-04-20

## 2018-03-12 RX ORDER — LOSARTAN POTASSIUM 50 MG/1
50 TABLET ORAL
COMMUNITY
Start: 2018-01-30 | End: 2018-07-03

## 2018-03-12 RX ORDER — METOPROLOL SUCCINATE 100 MG/1
100 TABLET, EXTENDED RELEASE ORAL
COMMUNITY
Start: 2018-01-30 | End: 2018-04-20

## 2018-03-12 RX ORDER — INDOMETHACIN 50 MG/1
50 CAPSULE ORAL
COMMUNITY
Start: 2018-02-06 | End: 2018-07-02

## 2018-03-12 NOTE — TELEPHONE ENCOUNTER
Indocin failed [facility] gout agent refill protocol due to no CBC or ALT on file in the past 12 months, however:      Last CBC: 8/10/2017      Last ALT: 8/10/2017  ALT (SGPT) - Historical 23 7 - 52 IU/L 08/10/2017  6:31 PM     Indomethacin (INDOCIN) 50 mg capsule   Last Written Prescription Date:  2/6/18  Last Fill Quantity: 60,   # refills: 0  Last Office Visit: 1/30/2018  Future Office visit:   None.    Routing refill request to provider for review/approval because:      Patient does need current uric acid level on file. Lab ordered. Reminder letter sent to schedule LAB ONLY appointment.      Per notes from previous refill requests, Patient was to discuss with PCP and received limited supplies. It is not noted in last visit with PCP on 1/30/2018. Will send to PCP for consideration.    Unable to complete prescription refill per RN Medication Refill Policy. Savannah Sifuentes RN .............. 3/13/2018  8:23 AM

## 2018-03-13 RX ORDER — INDOMETHACIN 50 MG/1
CAPSULE ORAL
Qty: 60 CAPSULE | Refills: 0 | OUTPATIENT
Start: 2018-03-13

## 2018-03-13 RX ORDER — INDOMETHACIN 50 MG/1
CAPSULE ORAL
Qty: 60 CAPSULE | Refills: 1 | Status: SHIPPED | OUTPATIENT
Start: 2018-03-13 | End: 2018-04-20

## 2018-03-15 ENCOUNTER — ANTICOAGULATION THERAPY VISIT (OUTPATIENT)
Dept: ANTICOAGULATION | Facility: OTHER | Age: 58
End: 2018-03-15
Attending: FAMILY MEDICINE
Payer: COMMERCIAL

## 2018-03-15 LAB — INR POINT OF CARE: 3.1 (ref 2–3)

## 2018-03-15 PROCEDURE — 85610 PROTHROMBIN TIME: CPT | Mod: QW

## 2018-03-15 PROCEDURE — 99207 ZZC NO CHARGE NURSE ONLY: CPT

## 2018-03-15 PROCEDURE — 36416 COLLJ CAPILLARY BLOOD SPEC: CPT

## 2018-03-15 NOTE — PROGRESS NOTES
ANTICOAGULATION FOLLOW-UP CLINIC VISIT    Patient Name:  Babak Moran  Date:  3/15/2018  Contact Type:  Face to Face    SUBJECTIVE:     Patient Findings     Positives No Problem Findings           OBJECTIVE    INR Protime   Date Value Ref Range Status   03/15/2018 3.1 (A) 2.0 - 3.0 Final       ASSESSMENT / PLAN  INR assessment THER    Recheck INR In: 4 WEEKS    INR Location Clinic      Anticoagulation Summary as of 3/15/2018     INR goal 2.0-3.0   Today's INR 3.1!   Maintenance plan 10 mg (5 mg x 2) on Tue, Thu, Sat; 7.5 mg (5 mg x 1.5) all other days   Full instructions 3/15: 7.5 mg; Otherwise 10 mg on Tue, Thu, Sat; 7.5 mg all other days   Weekly total 60 mg   Next INR check 4/12/2018   Priority INR   Target end date Indefinite    Indications   Long term (current) use of anticoagulants [Z79.01]  Unspecified atrial fibrillation [I48.91]         Anticoagulation Episode Summary     INR check location     Preferred lab     Send INR reminders to  INR    Comments Babak prefers to be closer to 3.0 due to previous CVA.  Denclines to reduce dose when slightly over 3.0      Anticoagulation Care Providers     Provider Role Specialty Phone number    Alvino Yi MD [facility] [phone]            See the Encounter Report to view Anticoagulation Flowsheet and Dosing Calendar (Go to Encounters tab in chart review, and find the Anticoagulation Therapy Visit)        Xochitl Pavon RN

## 2018-03-15 NOTE — MR AVS SNAPSHOT
Babak Moran   3/15/2018 10:45 AM   Anticoagulation Therapy Visit    Description:  57 year old male   Provider:  [facility]   Department:  [facility]           INR as of 3/15/2018     Today's INR 3.1!      Anticoagulation Summary as of 3/15/2018     INR goal 2.0-3.0   Today's INR 3.1!   Full instructions 3/15: 7.5 mg; Otherwise 10 mg on Tue, Thu, Sat; 7.5 mg all other days   Next INR check 4/12/2018    Indications   Long term (current) use of anticoagulants [Z79.01]  Unspecified atrial fibrillation [I48.91]         Description     Take lesser dose today (7.5 mg instead of 10 mg), then Continue same Coumadin/Warfarin dose and recheck INR in 4 weeks.  Xochitl Pavon ....................  3/15/2018   10:37 AM  Prefers to be closer to 3.0 than 2.0      Your next Anticoagulation Clinic appointment(s)     Mar 15, 2018 10:45 AM CDT   Anticoagulation Visit with [facility]   [facility] ([facility]    [address]  [place] [address]   [phone]              March 2018 Details    Sun Mon Tue Wed Thu Fri Sat         1               2               3                 4               5               6               7               8               9               10                 11               12               13               14               15      7.5 mg   See details      16      7.5 mg         17      10 mg           18      7.5 mg         19      7.5 mg         20      10 mg         21      7.5 mg         22      10 mg         23      7.5 mg         24      10 mg           25      7.5 mg         26      7.5 mg         27      10 mg         28      7.5 mg         29      10 mg         30      7.5 mg         31      10 mg          Date Details   03/15 This INR check               How to take your warfarin dose     To take:  7.5 mg Take 1.5 of the 5 mg tablets.    To take:  10 mg Take 2 of the 5 mg tablets.           April 2018 Details    Sun  Mon Tue Wed Thu Fri Sat     1      7.5 mg         2      7.5 mg         3      10 mg         4      7.5 mg         5      10 mg         6      7.5 mg         7      10 mg           8      7.5 mg         9      7.5 mg         10      10 mg         11      7.5 mg         12            13               14                 15               16               17               18               19               20               21                 22               23               24               25               26               27               28                 29               30                     Date Details   No additional details    Date of next INR:  4/12/2018         How to take your warfarin dose     To take:  7.5 mg Take 1.5 of the 5 mg tablets.    To take:  10 mg Take 2 of the 5 mg tablets.

## 2018-03-29 DIAGNOSIS — I48.91 ATRIAL FIBRILLATION (H): Primary | ICD-10-CM

## 2018-03-29 DIAGNOSIS — Z79.01 LONG-TERM (CURRENT) USE OF ANTICOAGULANTS, INR GOAL 2.0-3.0: ICD-10-CM

## 2018-03-29 RX ORDER — WARFARIN SODIUM 5 MG/1
TABLET ORAL
Qty: 144 TABLET | Refills: 1 | Status: SHIPPED | OUTPATIENT
Start: 2018-03-29 | End: 2018-07-16

## 2018-03-29 NOTE — TELEPHONE ENCOUNTER
Last INR was 03/15/18:  3.1  Last office visit with Dr. Yi:  01/30/18  Prescription approved per [facility] Refill Protocol.

## 2018-03-30 DIAGNOSIS — I10 BENIGN ESSENTIAL HYPERTENSION: Primary | ICD-10-CM

## 2018-04-03 ENCOUNTER — TELEPHONE (OUTPATIENT)
Dept: FAMILY MEDICINE | Facility: OTHER | Age: 58
End: 2018-04-03

## 2018-04-03 RX ORDER — AMLODIPINE BESYLATE 10 MG/1
10 TABLET ORAL DAILY
Qty: 90 TABLET | Refills: 3 | Status: SHIPPED | OUTPATIENT
Start: 2018-04-03 | End: 2018-04-20

## 2018-04-03 NOTE — TELEPHONE ENCOUNTER
Diagnosis of HTN changed to benign essential HTN. If ok, please sign order.    Savannah Sifuentes RN .............. 4/3/2018  8:30 AM

## 2018-04-06 DIAGNOSIS — Z87.39 HISTORY OF GOUT: ICD-10-CM

## 2018-04-06 LAB — URATE SERPL-MCNC: 4.9 MG/DL (ref 4.4–7.6)

## 2018-04-06 PROCEDURE — 84550 ASSAY OF BLOOD/URIC ACID: CPT | Performed by: FAMILY MEDICINE

## 2018-04-06 PROCEDURE — 36415 COLL VENOUS BLD VENIPUNCTURE: CPT | Performed by: FAMILY MEDICINE

## 2018-04-06 NOTE — PROGRESS NOTES
Patient presents to clinic today for lab only. See telephone call.  Brittany Lynn CMA([facility])..................4/6/2018   1:00 PM

## 2018-04-06 NOTE — TELEPHONE ENCOUNTER
Patient stopped in to ask about his Indomethacin refill. He didn't know he had a refill available at [facility] because he received a letter that said he needed to have labs done. There is an order for a uric acid level in the chart, so will do labs today, but there is a refill available at [facility] so they will get that ready.  Brittany Lynn CMA([facility])..................4/6/2018   12:53 PM

## 2018-04-06 NOTE — LETTER
April 7, 2018      Babak Moran     [facility] [address]        Dear Dean,    We are writing to inform you of your test results.    Your test results fall within the expected range(s) or remain unchanged from previous results.  Please continue with current treatment plan.    Resulted Orders   **Uric acid FUTURE 2mo   Result Value Ref Range    Uric Acid 4.9 4.4 - 7.6 mg/dL       If you have any questions or concerns, please call the clinic at the number listed above.       Sincerely,         LAB

## 2018-04-20 ENCOUNTER — ANTICOAGULATION THERAPY VISIT (OUTPATIENT)
Dept: ANTICOAGULATION | Facility: OTHER | Age: 58
End: 2018-04-20
Attending: FAMILY MEDICINE

## 2018-04-20 LAB — INR POINT OF CARE: 2.7 (ref 2–3)

## 2018-04-20 PROCEDURE — 99207 ZZC NO CHARGE NURSE ONLY: CPT

## 2018-04-20 PROCEDURE — 85610 PROTHROMBIN TIME: CPT | Mod: QW

## 2018-04-20 PROCEDURE — 36416 COLLJ CAPILLARY BLOOD SPEC: CPT

## 2018-04-20 NOTE — PROGRESS NOTES
ANTICOAGULATION FOLLOW-UP CLINIC VISIT    Patient Name:  Babak Moran  Date:  4/20/2018  Contact Type:  Face to Face    SUBJECTIVE:     Patient Findings     Positives Change in medications (stopped taking plaquenil)           OBJECTIVE    INR Protime   Date Value Ref Range Status   04/20/2018 2.7 2.0 - 3.0 Final       ASSESSMENT / PLAN  INR assessment THER    Recheck INR In: 4 WEEKS    INR Location Clinic      Anticoagulation Summary as of 4/20/2018     INR goal 2.0-3.0   Today's INR 2.7   Maintenance plan 10 mg (5 mg x 2) on Tue, Thu, Sat; 7.5 mg (5 mg x 1.5) all other days   Full instructions 10 mg on Tue, Thu, Sat; 7.5 mg all other days   Weekly total 60 mg   No change documented Shelley Mercado, DANYELLE   Next INR check 5/18/2018   Priority INR   Target end date Indefinite    Indications   Long term (current) use of anticoagulants [Z79.01]  Unspecified atrial fibrillation [I48.91]         Anticoagulation Episode Summary     INR check location     Preferred lab     Send INR reminders to  INR    Comments Babak prefers to be closer to 3.0 due to previous CVA.  Denclines to reduce dose when slightly over 3.0      Anticoagulation Care Providers     Provider Role Specialty Phone number    Alvino Yi MD [facility] [phone]            See the Encounter Report to view Anticoagulation Flowsheet and Dosing Calendar (Go to Encounters tab in chart review, and find the Anticoagulation Therapy Visit)        Shelley Mercado, RN

## 2018-04-20 NOTE — MR AVS SNAPSHOT
Babak Moran   4/20/2018 8:00 AM   Anticoagulation Therapy Visit    Description:  57 year old male   Provider:  [facility]   Department:  [facility]           INR as of 4/20/2018     Today's INR 2.7      Anticoagulation Summary as of 4/20/2018     INR goal 2.0-3.0   Today's INR 2.7   Full instructions 10 mg on Tue, Thu, Sat; 7.5 mg all other days   Next INR check 5/18/2018    Indications   Long term (current) use of anticoagulants [Z79.01]  Unspecified atrial fibrillation [I48.91]         Description     Continue same Warfarin dose and recheck in 1 month.  Shelley Mercado RN   4/20/2018    8:01 AM      April 2018 Details    Sun Mon Tue Wed Thu Fri Sat     1               2               3               4               5               6               7                 8               9               10               11               12               13               14                 15               16               17               18               19               20      7.5 mg   See details      21      10 mg           22      7.5 mg         23      7.5 mg         24      10 mg         25      7.5 mg         26      10 mg         27      7.5 mg         28      10 mg           29      7.5 mg         30      7.5 mg               Date Details   04/20 This INR check               How to take your warfarin dose     To take:  7.5 mg Take 1.5 of the 5 mg tablets.    To take:  10 mg Take 2 of the 5 mg tablets.           May 2018 Details    Sun Mon Tue Wed Thu Fri Sat       1      10 mg         2      7.5 mg         3      10 mg         4      7.5 mg         5      10 mg           6      7.5 mg         7      7.5 mg         8      10 mg         9      7.5 mg         10      10 mg         11      7.5 mg         12      10 mg           13      7.5 mg         14      7.5 mg         15      10 mg         16      7.5 mg         17      10 mg         18            19                 20               21                22               23               24               25               26                 27               28               29               30               31                  Date Details   No additional details    Date of next INR:  5/18/2018         How to take your warfarin dose     To take:  7.5 mg Take 1.5 of the 5 mg tablets.    To take:  10 mg Take 2 of the 5 mg tablets.

## 2018-05-03 RX ORDER — HYDROCHLOROTHIAZIDE 25 MG/1
TABLET ORAL
Qty: 90 TABLET | Refills: 0 | OUTPATIENT
Start: 2018-05-03

## 2018-05-03 NOTE — TELEPHONE ENCOUNTER
Redundant refill request refused: Too soon.    hydroCHLOROthiazide (HCTZ) 25 mg tablet 90 tablet 3 1/30/2018     Sig - Route: Take 1 tablet by mouth once daily. - Oral    Class: eRx    E-Prescribing Status: Receipt confirmed by pharmacy (1/30/2018 10:45 AM CST)      [id number] - [place], [place] - [address] [facility] - [phone]     Unable to complete prescription refill per RN Medication Refill Policy. Savannah Sifuentes RN .............. 5/3/2018  8:25 AM

## 2018-05-13 DIAGNOSIS — Z87.39 HISTORY OF GOUT: ICD-10-CM

## 2018-05-15 RX ORDER — INDOMETHACIN 50 MG/1
50 CAPSULE ORAL 2 TIMES DAILY PRN
Qty: 180 CAPSULE | Refills: 0 | Status: SHIPPED | OUTPATIENT
Start: 2018-05-15 | End: 2018-07-03

## 2018-05-15 NOTE — TELEPHONE ENCOUNTER
Patient stopped in and asked about his Indomethacin refill. He thought he would have had more refills on the one that we discussed on 4/6/18. Apparently that was the last refill.  He is requesting a 90 day supply for this, if possible, please.  Brittany Lynn CMA([facility])..................5/15/2018   3:33 PM

## 2018-05-16 NOTE — TELEPHONE ENCOUNTER
LMTCB patient needs to schedule a follow up with .  Brandy Maurice LPN .......................5/16/2018  10:40 AM

## 2018-05-21 ENCOUNTER — ANTICOAGULATION THERAPY VISIT (OUTPATIENT)
Dept: ANTICOAGULATION | Facility: OTHER | Age: 58
End: 2018-05-21
Attending: FAMILY MEDICINE

## 2018-05-21 LAB — INR POINT OF CARE: 2.5 (ref 0.86–1.14)

## 2018-05-21 PROCEDURE — 99207 ZZC NO CHARGE NURSE ONLY: CPT

## 2018-05-21 PROCEDURE — 85610 PROTHROMBIN TIME: CPT | Mod: QW

## 2018-05-21 PROCEDURE — 36416 COLLJ CAPILLARY BLOOD SPEC: CPT

## 2018-05-21 NOTE — MR AVS SNAPSHOT
Babak Moran   5/21/2018 9:15 AM   Anticoagulation Therapy Visit    Description:  57 year old male   Provider:  [facility]   Department:  [facility]           INR as of 5/21/2018     Today's INR 2.5      Anticoagulation Summary as of 5/21/2018     INR goal 2.0-3.0   Today's INR 2.5   Full instructions 10 mg on Tue, Thu, Sat; 7.5 mg all other days   Next INR check 6/18/2018    Indications   Long term (current) use of anticoagulants [Z79.01]  Unspecified atrial fibrillation [I48.91]         Description     Continue same Warfarin dose and recheck in 1 month.  Shelley Mercado RN   5/21/2018    9:06 AM      Your next Anticoagulation Clinic appointment(s)     May 21, 2018  9:15 AM CDT   Anticoagulation Visit with [facility] 1   [facility] ([facility])    [address]  [place] [address]   [phone]              May 2018 Details    Sun Mon Tue Wed Thu Fri Sat       1               2               3               4               5                 6               7               8               9               10               11               12                 13               14               15               16               17               18               19                 20               21      7.5 mg   See details      22      10 mg         23      7.5 mg         24      10 mg         25      7.5 mg         26      10 mg           27      7.5 mg         28      7.5 mg         29      10 mg         30      7.5 mg         31      10 mg            Date Details   05/21 This INR check               How to take your warfarin dose     To take:  7.5 mg Take 1.5 of the 5 mg tablets.    To take:  10 mg Take 2 of the 5 mg tablets.           June 2018 Details    Sun Mon Tue Wed Thu Fri Sat          1      7.5 mg         2      10 mg           3      7.5 mg         4      7.5 mg         5      10 mg         6      7.5 mg         7      10 mg         8       7.5 mg         9      10 mg           10      7.5 mg         11      7.5 mg         12      10 mg         13      7.5 mg         14      10 mg         15      7.5 mg         16      10 mg           17      7.5 mg         18            19               20               21               22               23                 24               25               26               27               28               29               30                Date Details   No additional details    Date of next INR:  6/18/2018         How to take your warfarin dose     To take:  7.5 mg Take 1.5 of the 5 mg tablets.    To take:  10 mg Take 2 of the 5 mg tablets.

## 2018-05-21 NOTE — PROGRESS NOTES
ANTICOAGULATION FOLLOW-UP CLINIC VISIT    Patient Name:  Babak Moran  Date:  5/21/2018  Contact Type:  Face to Face    SUBJECTIVE:     Patient Findings     Positives No Problem Findings           OBJECTIVE    INR Protime   Date Value Ref Range Status   05/21/2018 2.5 (A) 0.86 - 1.14 Final       ASSESSMENT / PLAN  INR assessment THER    Recheck INR In: 4 WEEKS    INR Location Clinic      Anticoagulation Summary as of 5/21/2018     INR goal 2.0-3.0   Today's INR 2.5   Maintenance plan 10 mg (5 mg x 2) on Tue, Thu, Sat; 7.5 mg (5 mg x 1.5) all other days   Full instructions 10 mg on Tue, Thu, Sat; 7.5 mg all other days   Weekly total 60 mg   No change documented Shelley Mercado RN   Next INR check 6/18/2018   Priority INR   Target end date Indefinite    Indications   Long term (current) use of anticoagulants [Z79.01]  Unspecified atrial fibrillation [I48.91]         Anticoagulation Episode Summary     INR check location     Preferred lab     Send INR reminders to  INR    Comments Babak prefers to be closer to 3.0 due to previous CVA.  Denclines to reduce dose when slightly over 3.0      Anticoagulation Care Providers     Provider Role Specialty Phone number    Alvino Yi MD [facility] [phone]            See the Encounter Report to view Anticoagulation Flowsheet and Dosing Calendar (Go to Encounters tab in chart review, and find the Anticoagulation Therapy Visit)        Shelley Mercado, RN

## 2018-06-18 ENCOUNTER — ANTICOAGULATION THERAPY VISIT (OUTPATIENT)
Dept: ANTICOAGULATION | Facility: OTHER | Age: 58
End: 2018-06-18
Attending: FAMILY MEDICINE

## 2018-06-18 LAB — INR POINT OF CARE: 2.9 (ref 0.86–1.14)

## 2018-06-18 PROCEDURE — 85610 PROTHROMBIN TIME: CPT | Mod: QW

## 2018-06-18 PROCEDURE — 36416 COLLJ CAPILLARY BLOOD SPEC: CPT

## 2018-06-18 NOTE — MR AVS SNAPSHOT
Babak Moran   6/18/2018 8:45 AM   Anticoagulation Therapy Visit    Description:  58 year old male   Provider:  [facility]   Department:  [facility]           INR as of 6/18/2018     Today's INR 2.9      Anticoagulation Summary as of 6/18/2018     INR goal 2.0-3.0   Today's INR 2.9   Full warfarin instructions 10 mg on Tue, Thu, Sat; 7.5 mg all other days   Next INR check 7/16/2018    Indications   Long term (current) use of anticoagulants [Z79.01]  Unspecified atrial fibrillation [I48.91]         Description     Continue same Warfarin dose and recheck in 1 month.  Shelley Mercado RN   6/18/2018    8:39 AM      Your next Anticoagulation Clinic appointment(s)     Jun 18, 2018  8:45 AM CDT   Anticoagulation Visit with [facility] CO[facility] 1   [facility]    [address]  [place] [address]   [phone]              June 2018 Details    Sun Mon Tue Wed Thu Fri Sat          1               2                 3               4               5               6               7               8               9                 10               11               12               13               14               15               16                 17               18      7.5 mg   See details      19      10 mg         20      7.5 mg         21      10 mg         22      7.5 mg         23      10 mg           24      7.5 mg         25      7.5 mg         26      10 mg         27      7.5 mg         28      10 mg         29      7.5 mg         30      10 mg          Date Details   06/18 This INR check               How to take your warfarin dose     To take:  7.5 mg Take 1.5 of the 5 mg tablets.    To take:  10 mg Take 2 of the 5 mg tablets.           July 2018 Details    Sun Mon Tue Wed Thu Fri Sat     1      7.5 mg         2      7.5 mg         3      10 mg         4      7.5 mg         5      10 mg         6      7.5 mg         7      10 mg           8       7.5 mg         9      7.5 mg         10      10 mg         11      7.5 mg         12      10 mg         13      7.5 mg         14      10 mg           15      7.5 mg         16            17               18               19               20               21                 22               23               24               25               26               27               28                 29               30               31                    Date Details   No additional details    Date of next INR:  7/16/2018         How to take your warfarin dose     To take:  7.5 mg Take 1.5 of the 5 mg tablets.    To take:  10 mg Take 2 of the 5 mg tablets.

## 2018-06-29 RX ORDER — HYDROCHLOROTHIAZIDE 25 MG/1
1 TABLET ORAL DAILY
Refills: 3 | COMMUNITY
Start: 2018-05-14 | End: 2019-02-15

## 2018-07-03 ENCOUNTER — OFFICE VISIT (OUTPATIENT)
Dept: FAMILY MEDICINE | Facility: OTHER | Age: 58
End: 2018-07-03
Attending: FAMILY MEDICINE

## 2018-07-03 VITALS
SYSTOLIC BLOOD PRESSURE: 129 MMHG | DIASTOLIC BLOOD PRESSURE: 87 MMHG | WEIGHT: 315 LBS | TEMPERATURE: 98.4 F | RESPIRATION RATE: 16 BRPM | BODY MASS INDEX: 45.87 KG/M2 | HEART RATE: 79 BPM

## 2018-07-03 DIAGNOSIS — I10 BENIGN ESSENTIAL HYPERTENSION: Primary | ICD-10-CM

## 2018-07-03 DIAGNOSIS — I48.21 PERMANENT ATRIAL FIBRILLATION (H): ICD-10-CM

## 2018-07-03 DIAGNOSIS — Z87.39 HISTORY OF GOUT: ICD-10-CM

## 2018-07-03 PROCEDURE — 99213 OFFICE O/P EST LOW 20 MIN: CPT | Performed by: FAMILY MEDICINE

## 2018-07-03 RX ORDER — AMLODIPINE BESYLATE 10 MG/1
10 TABLET ORAL DAILY
Qty: 90 TABLET | Refills: 3 | Status: SHIPPED | OUTPATIENT
Start: 2018-07-03 | End: 2019-09-25

## 2018-07-03 RX ORDER — INDOMETHACIN 50 MG/1
50 CAPSULE ORAL 2 TIMES DAILY PRN
Qty: 180 CAPSULE | Refills: 3 | Status: ON HOLD | OUTPATIENT
Start: 2018-07-03 | End: 2019-08-30

## 2018-07-03 RX ORDER — METOPROLOL SUCCINATE 200 MG/1
200 TABLET, EXTENDED RELEASE ORAL DAILY
Qty: 90 TABLET | Refills: 3 | Status: SHIPPED | OUTPATIENT
Start: 2018-07-03 | End: 2019-09-14

## 2018-07-03 ASSESSMENT — PAIN SCALES - GENERAL: PAINLEVEL: NO PAIN (0)

## 2018-07-03 NOTE — MR AVS SNAPSHOT
After Visit Summary   7/3/2018    Babak Moran    MRN: [id number]           Patient Information     Date Of Birth          1960        Visit Information        Provider Department      7/3/2018 8:00 AM Alvino Yi MD [facility]        Today's Diagnoses     Benign essential hypertension    -  1    Permanent atrial fibrillation (H)        History of gout          Care Instructions    Stop the losartan.  Increase the metoprolol to 100mg po bid.  Really watch out for any stomach issues with the indomethacin.  You should get your kidneys checked about every six months.  Come in for a lab only visit and also let me know your BP checks once you change your meds.          Follow-ups after your visit        Your next 10 appointments already scheduled     Jul 16, 2018  8:30 AM CDT   Anticoagulation Visit with  ANTI COAG 2   [facility] ([facility])    [address]  [place] [address] [phone]              Future tests that were ordered for you today     Open Standing Orders        Priority Remaining Interval Expires Ordered    **Basic metabolic panel FUTURE anytime Routine 2/2 6 month 7/3/2019 7/3/2018            Who to contact     If you have questions or need follow up information about today's clinic visit or your schedule please contact [facility] directly at [phone].  Normal or non-critical lab and imaging results will be communicated to you by MyChart, letter or phone within 4 business days after the clinic has received the results. If you do not hear from us within 7 days, please contact the clinic through MyChart or phone. If you have a critical or abnormal lab result, we will notify you by phone as soon as possible.  Submit refill requests through [employer] or call your pharmacy and they will forward the refill request to us. Please allow 3 business days for your refill to be completed.           "Additional Information About Your Visit        MyChart Information     Modebo lets you send messages to your doctor, view your test results, renew your prescriptions, schedule appointments and more. To sign up, go to [email or web address] . Click on \"Log in\" on the left side of the screen, which will take you to the Welcome page. Then click on \"Sign up Now\" on the right side of the page.     You will be asked to enter the access code listed below, as well as some personal information. Please follow the directions to create your username and password.     Your access code is: [email or web address]  Expires: 10/1/2018  8:38 AM     Your access code will  in 90 days. If you need help or a new code, please call your [facility] clinic or [phone].        Care EveryWhere ID     This is your Care EveryWhere ID. This could be used by other organizations to access your [facility] medical records  [id number]        Your Vitals Were     Pulse Temperature Respirations BMI (Body Mass Index)          79 98.4  F (36.9  C) (Tympanic) 16 45.87 kg/m2         Blood Pressure from Last 3 Encounters:   18 129/87   18 122/82   17 (!) 154/98    Weight from Last 3 Encounters:   18 (!) 367 lb (166.5 kg)   18 (!) 339 lb (153.8 kg)   17 (!) 340 lb (154.2 kg)                 Today's Medication Changes          These changes are accurate as of 7/3/18  8:38 AM.  If you have any questions, ask your nurse or doctor.               These medicines have changed or have updated prescriptions.        Dose/Directions    amLODIPine 10 MG tablet   Commonly known as:  NORVASC   This may have changed:  when to take this   Used for:  Benign essential hypertension   Changed by:  Alvino Yi MD        Dose:  10 mg   Take 1 tablet (10 mg) by mouth daily   Quantity:  90 tablet   Refills:  3       metoprolol succinate 200 MG 24 hr tablet   Commonly known as:  TOPROL-XL   This may have changed:    - medication strength  - " how much to take  - when to take this   Used for:  Benign essential hypertension, Permanent atrial fibrillation (H)   Changed by:  Alvino Yi MD        Dose:  200 mg   Take 1 tablet (200 mg) by mouth daily   Quantity:  90 tablet   Refills:  3         Stop taking these medicines if you haven't already. Please contact your care team if you have questions.     atorvastatin 10 MG tablet   Commonly known as:  LIPITOR   Stopped by:  Alvino Yi MD           losartan 50 MG tablet   Commonly known as:  COZAAR   Stopped by:  Alvino Yi MD                Where to get your medicines      These medications were sent to [facility] - [place], [place] - [address] AT [address]  [address], [facility] [address]     Phone:  [phone]     amLODIPine 10 MG tablet    indomethacin 50 MG capsule    metoprolol succinate 200 MG 24 hr tablet                Primary Care Provider Office Phone # Fax #    Alvino Yi [phone]       [address]  [facility] [address]        Equal Access to Services     [facility]: Hadii aad ku hadasho Soomaali, waaxda luqadaha, qaybta kaalmada adeegyada, waxay makayla haykatt flores . So [facility] [phone].    ATENCIÓN: Si habla español, tiene a ludwig disposición servicios gratuitos de asistencia lingüística. Llame al [phone].    We comply with applicable federal civil rights laws and [place] laws. We do not discriminate on the basis of race, color, national origin, age, disability, sex, sexual orientation, or gender identity.            Thank you!     Thank you for choosing [facility]  for your care. Our goal is always to provide you with excellent care. Hearing back from our patients is one way we can continue to improve our services. Please take a few minutes to complete the written survey that you may receive in the mail after your visit with us. Thank you!             Your  Updated Medication List - Protect others around you: Learn how to safely use, store and throw away your medicines at www.disposemymeds.org.          This list is accurate as of 7/3/18  8:38 AM.  Always use your most recent med list.                   Brand Name Dispense Instructions for use Diagnosis    amLODIPine 10 MG tablet    NORVASC    90 tablet    Take 1 tablet (10 mg) by mouth daily    Benign essential hypertension       clobetasol 0.05 % ointment    TEMOVATE          hydrochlorothiazide 25 MG tablet    HYDRODIURIL     Take 1 tablet by mouth daily        hydrocortisone 1 % cream    CORTAID          hydroxychloroquine 200 MG tablet    PLAQUENIL     Take 200 mg by mouth        indomethacin 50 MG capsule    INDOCIN    180 capsule    Take 1 capsule (50 mg) by mouth 2 times daily as needed for moderate pain    History of gout       metoprolol succinate 200 MG 24 hr tablet    TOPROL-XL    90 tablet    Take 1 tablet (200 mg) by mouth daily    Benign essential hypertension, Permanent atrial fibrillation (H)       pimecrolimus 1 % cream    ELIDEL          traZODone 50 MG tablet    DESYREL     Take 50 mg by mouth        warfarin 5 MG tablet    COUMADIN    144 tablet    Take 10 mg x three days/week and 7.5 mg x four days/week.  Or as directed by the protime clinic.    Atrial fibrillation (H), Long-term (current) use of anticoagulants, INR goal 2.0-3.0

## 2018-07-03 NOTE — PATIENT INSTRUCTIONS
Stop the losartan.  Increase the metoprolol to 100mg po bid.  Really watch out for any stomach issues with the indomethacin.  You should get your kidneys checked about every six months.  Come in for a lab only visit and also let me know your BP checks once you change your meds.

## 2018-07-03 NOTE — PROGRESS NOTES
Nursing Notes:   Alvino Chao LPN  7/3/2018  8:21 AM  Signed  Patient presents to clinic for med check and renewal. Patient denies any complaints or concerns with his health. States he has stopped taking Plaquenil and Lipitor due to cost.  Alvino Chao LPN...... 8:00 AM 7/3/2018        SUBJECTIVE:  Babak Moran is a 58 year old male who comes in today to follow-up on medications.  Patient has steadily increased his employment which has been good but unfortunately does not have any health insurance.  He has been paying out of pocket for his medications and has not been able to afford his Plaquenil or Lipitor as they are each over $100 per month.    He has a history of gout.  He was not able tolerate allopurinol with persistent severe vomiting, anorexia and weight loss.  He did tolerate you Uloric and it worked well but even with insurance was extremely expensive and now without insurance she absolutely cannot afford this.  Instead he has been taking indomethacin on a regular basis.  He has not had any indigestion or acid reflux.  He has not had any black or bloody stool.  He does continue on Coumadin.    Patient has not had any issues with RVR.  He has not had any transient or persistent neurologic deficits.  No chest pain or anginal equivalents.    Current Outpatient Prescriptions   Medication Sig Dispense Refill     amLODIPine (NORVASC) 10 MG tablet Take 1 tablet (10 mg) by mouth daily 90 tablet 3     indomethacin (INDOCIN) 50 MG capsule Take 1 capsule (50 mg) by mouth 2 times daily as needed for moderate pain 180 capsule 3     metoprolol succinate (TOPROL-XL) 200 MG 24 hr tablet Take 1 tablet (200 mg) by mouth daily 90 tablet 3     clobetasol (TEMOVATE) 0.05 % ointment        hydrochlorothiazide (HYDRODIURIL) 25 MG tablet Take 1 tablet by mouth daily  3     hydrocortisone (CORTAID) 1 % cream        hydroxychloroquine (PLAQUENIL) 200 MG tablet Take 200 mg by mouth       pimecrolimus (ELIDEL) 1 % cream         traZODone (DESYREL) 50 MG tablet Take 50 mg by mouth       warfarin (COUMADIN) 5 MG tablet Take 10 mg x three days/week and 7.5 mg x four days/week.  Or as directed by the protime clinic. 144 tablet 1     [DISCONTINUED] amLODIPine (NORVASC) 10 MG tablet Take 10 mg by mouth       [DISCONTINUED] indomethacin (INDOCIN) 50 MG capsule Take 1 capsule (50 mg) by mouth 2 times daily as needed for moderate pain 180 capsule 0     [DISCONTINUED] metoprolol succinate (TOPROL-XL) 100 MG 24 hr tablet Take 100 mg by mouth         /87 (BP Location: Right arm, Patient Position: Sitting, Cuff Size: Adult Large)  Pulse 79  Temp 98.4  F (36.9  C) (Tympanic)  Resp 16  Wt (!) 367 lb (166.5 kg)  BMI 45.87 kg/m2    Exam:  GEN:  NAD  RESP: Clear to ascultation bilaterally.  No increased respiratory effort.   CV: Regular rate and irregular rhythm.  No murmurs rubs or gallops heard.  GI: Abdomen Soft, non-tender.  No masses, rebound or guarding.         ASSESSMENT/Plan :    Results for orders placed or performed in visit on 06/18/18   INR point of care   Result Value Ref Range    INR Protime 2.9 (A) 0.86 - 1.14       No images are attached to the encounter or orders placed in the encounter.      1. Benign essential hypertension  Blood pressure well controlled but she is under quite a bit of financial stress even with current medications.  He reports she is paying over $200 per month.  It looks like his losartan is probably running him about $100 per month and with him taking indomethacin regularly I have concerns for potential for acute kidney injury.  Will discontinue the losartan and instead increase his Toprol as his pulse is 80 and I think he could tolerate additional beta blockade.  He will check his blood pressure regularly for the next week and come back in for BMP and to let us know what his blood pressure has been.  Continue on the amlodipine as well.  - metoprolol succinate (TOPROL-XL) 200 MG 24 hr tablet; Take 1 tablet  (200 mg) by mouth daily  Dispense: 90 tablet; Refill: 3  - **Basic metabolic panel FUTURE anytime; Standing  - amLODIPine (NORVASC) 10 MG tablet; Take 1 tablet (10 mg) by mouth daily  Dispense: 90 tablet; Refill: 3    2. Permanent atrial fibrillation (H)  Patient has been rate controlled.  He has had good control of his INR.  I discussed continuing statin therapy.  We reviewed all the statin medications all of which are going to cost him about $100 per month.  I discussed with him that they would significantly reduce his risk for cardiovascular event.  He does not think he can afford new medications.  He understands risks of this approach.  - metoprolol succinate (TOPROL-XL) 200 MG 24 hr tablet; Take 1 tablet (200 mg) by mouth daily  Dispense: 90 tablet; Refill: 3    3. History of gout    I discussed with patient significant risks for severe bleeding if he were to develop gastric ulcer.  He is at increased risk with indomethacin.  Currently has absolutely no symptoms.  I explained that he may not have symptoms prior to having a life-threatening event.  He voices good understanding but absolutely cannot afford you Lorick and has had severe reaction to allopurinol.  Colchicine also cost prohibitive.  Really no other options as he has had persistent severe gout flares that impact his ability to work when he goes off the Indocin.    I ordered a BMP and again in 6 months so that he hopefully will not have to come in for office visit until he is able to reobtain insurance next year unless he has concerns.    - indomethacin (INDOCIN) 50 MG capsule; Take 1 capsule (50 mg) by mouth 2 times daily as needed for moderate pain  Dispense: 180 capsule; Refill: 3          Patient Instructions   Stop the losartan.  Increase the metoprolol to 100mg po bid.  Really watch out for any stomach issues with the indomethacin.  You should get your kidneys checked about every six months.  Come in for a lab only visit and also let me know your  BP checks once you change your meds.      Alvino Yi    This document was created using computer generated templates and voiceactivated software.

## 2018-07-03 NOTE — NURSING NOTE
Patient presents to clinic for med check and renewal. Patient denies any complaints or concerns with his health. States he has stopped taking Plaquenil and Lipitor due to cost.  Alvino Chao LPN...... 8:00 AM 7/3/2018

## 2018-07-16 ENCOUNTER — ANTICOAGULATION THERAPY VISIT (OUTPATIENT)
Dept: ANTICOAGULATION | Facility: OTHER | Age: 58
End: 2018-07-16
Attending: FAMILY MEDICINE

## 2018-07-16 DIAGNOSIS — Z79.01 LONG-TERM (CURRENT) USE OF ANTICOAGULANTS, INR GOAL 2.0-3.0: ICD-10-CM

## 2018-07-16 DIAGNOSIS — I48.91 ATRIAL FIBRILLATION (H): ICD-10-CM

## 2018-07-16 LAB — INR POINT OF CARE: 3.8 (ref 2–3)

## 2018-07-16 PROCEDURE — 85610 PROTHROMBIN TIME: CPT | Mod: QW

## 2018-07-16 PROCEDURE — 36416 COLLJ CAPILLARY BLOOD SPEC: CPT

## 2018-07-16 RX ORDER — WARFARIN SODIUM 5 MG/1
TABLET ORAL
Qty: 144 TABLET | Refills: 1 | COMMUNITY
Start: 2018-07-16 | End: 2018-10-30

## 2018-07-16 NOTE — PROGRESS NOTES
ANTICOAGULATION FOLLOW-UP CLINIC VISIT    Patient Name:  Babak Moran  Date:  7/16/2018  Contact Type:  Face to Face    SUBJECTIVE:     Patient Findings     Positives Change in medications (stopped Losartan, increased Metoprolol)           OBJECTIVE    INR Protime   Date Value Ref Range Status   07/16/2018 3.8 (A) 2.0 - 3.0 Final       ASSESSMENT / PLAN  INR assessment SUPRA    Recheck INR In: 2 WEEKS    INR Location Clinic      Anticoagulation Summary as of 7/16/2018     INR goal 2.0-3.0   Today's INR 3.8!   Warfarin maintenance plan 10 mg (5 mg x 2) on Mon, Fri; 7.5 mg (5 mg x 1.5) all other days   Full warfarin instructions 7/16: 5 mg; Otherwise 10 mg on Mon, Fri; 7.5 mg all other days   Weekly warfarin total 57.5 mg   Plan last modified Xochitl Pavon RN (7/16/2018)   Next INR check 7/30/2018   Priority INR   Target end date Indefinite    Indications   Long term (current) use of anticoagulants [Z79.01]  Unspecified atrial fibrillation [I48.91]         Anticoagulation Episode Summary     INR check location     Preferred lab     Send INR reminders to  INR    Comments Babak prefers to be closer to 3.0 due to previous CVA.check Q 4 weeks.Declines to reduce dose when slightly over 3.0      Anticoagulation Care Providers     Provider Role Specialty Phone number    Alvino Yi MD [facility] [phone]            See the Encounter Report to view Anticoagulation Flowsheet and Dosing Calendar (Go to Encounters tab in chart review, and find the Anticoagulation Therapy Visit)        Xochitl Pavon RN

## 2018-07-16 NOTE — MR AVS SNAPSHOT
Babak Moran   7/16/2018 8:30 AM   Anticoagulation Therapy Visit    Description:  58 year old male   Provider:  MACIEL ANTI COAG 2   Department:  [facility]           INR as of 7/16/2018     Today's INR 3.8!      Anticoagulation Summary as of 7/16/2018     INR goal 2.0-3.0   Today's INR 3.8!   Full warfarin instructions 7/16: 5 mg; Otherwise 10 mg on Mon, Fri; 7.5 mg all other days   Next INR check 7/30/2018    Indications   Long term (current) use of anticoagulants [Z79.01]  Unspecified atrial fibrillation [I48.91]         Description     Take 1/2 dose today (5 mg), then decrease Warfarin slightly and recheck INR in 2 weeks.  Xochitl Pavon ....................  7/16/2018   8:29 AM        Your next Anticoagulation Clinic appointment(s)     Jul 16, 2018  8:30 AM CDT   Anticoagulation Visit with MACIEL ANTI COAG 2   [facility] ([facility])    [address]  [place] [address]   [phone]              July 2018 Details    Sun Mon Tue Wed Thu Fri Sat     1               2               3               4               5               6               7                 8               9               10               11               12               13               14                 15               16      5 mg   See details      17      7.5 mg         18      7.5 mg         19      7.5 mg         20      10 mg         21      7.5 mg           22      7.5 mg         23      10 mg         24      7.5 mg         25      7.5 mg         26      7.5 mg         27      10 mg         28      7.5 mg           29      7.5 mg         30            31                    Date Details   07/16 This INR check       Date of next INR:  7/30/2018         How to take your warfarin dose     To take:  5 mg Take 1 of the 5 mg tablets.    To take:  7.5 mg Take 1.5 of the 5 mg tablets.    To take:  10 mg Take 2 of the 5 mg tablets.

## 2018-07-17 DIAGNOSIS — I10 BENIGN ESSENTIAL HYPERTENSION: ICD-10-CM

## 2018-07-17 LAB
ANION GAP SERPL CALCULATED.3IONS-SCNC: 7 MMOL/L (ref 3–14)
BUN SERPL-MCNC: 27 MG/DL (ref 7–25)
CALCIUM SERPL-MCNC: 9.5 MG/DL (ref 8.6–10.3)
CHLORIDE SERPL-SCNC: 104 MMOL/L (ref 98–107)
CO2 SERPL-SCNC: 30 MMOL/L (ref 21–31)
CREAT SERPL-MCNC: 0.84 MG/DL (ref 0.7–1.3)
GFR SERPL CREATININE-BSD FRML MDRD: >90 ML/MIN/1.7M2
GLUCOSE SERPL-MCNC: 91 MG/DL (ref 70–105)
POTASSIUM SERPL-SCNC: 4 MMOL/L (ref 3.5–5.1)
SODIUM SERPL-SCNC: 141 MMOL/L (ref 134–144)

## 2018-07-17 PROCEDURE — 80048 BASIC METABOLIC PNL TOTAL CA: CPT | Performed by: FAMILY MEDICINE

## 2018-07-17 PROCEDURE — 36415 COLL VENOUS BLD VENIPUNCTURE: CPT | Performed by: FAMILY MEDICINE

## 2018-07-23 NOTE — PROGRESS NOTES
Patient Information     Patient Name  Babak Moran Jr. MRN  [id number] Sex  Male   1960      Letter by Alvino Yi MD at      Author:  Alvino Yi MD Service:  (none) Author Type:  (none)    Filed:   Encounter Date:  2018 Status:  (Other)         Babak Moran Jr.  [address]  [facility] [address]    2018      Dear Mr. Moran,      We've gotten results back from the laboratory for the samples you gave in clinic.  Things look great! Contact us with any questions.    I'm sending along your actual numbers so that you will have them for your general interest and your records.       Take Care,   Alvino Yi MD      Resulted Orders      LIPID PANEL (Collected: 2018 10:56 AM)      Result  Value Ref Range    CHOLESTEROL,TOTAL 102 <200 mg/dL    TRIGLYCERIDES 39 <150 mg/dL    HDL CHOLESTEROL 35 23 - 92 mg/dL    NON-HDL CHOLESTEROL 67 <145 mg/dl    CHOL/HDL RATIO            2.91 <4.50                    LDL CHOLESTEROL 59 <100 mg/dL    PROVIDER ORDERED STATUS FASTING    BASIC METABOLIC PANEL (Collected: 2018 10:56 AM)      Result  Value Ref Range    SODIUM 138 133 - 143 mmol/L    POTASSIUM 4.2 3.5 - 5.1 mmol/L    CHLORIDE 100 98 - 107 mmol/L    CO2,TOTAL 28 21 - 31 mmol/L    ANION GAP 10 5 - 18                    GLUCOSE 91 70 - 105 mg/dL    CALCIUM 9.6 8.6 - 10.3 mg/dL    BUN 27 (H) 7 - 25 mg/dL    CREATININE 0.84 0.70 - 1.30 mg/dL    BUN/CREAT RATIO           32                    GFR if African American >60 >60 ml/min/1.73m2    GFR if not African American >60 >60 ml/min/1.73m2   PSA, TOTAL (Collected: 2018 10:56 AM)      Result  Value Ref Range    PSA TOTAL (DIAGNOSTIC) 1.197 <=3.100 ng/mL    Narrative      The percentage of Free PSA can be used to enhance the   differentiation of prostate cancer from benign prostatic  disease in subjects whose PSA levels are between 4.00 and  10.00 ng/mL.      The DXI PSA assay is a Chemiluminescent Assay.  Assay values obtained with  different assay   methods cannot be used interchangeably due to differences  in assay methods and reagent specificity.

## 2018-07-30 ENCOUNTER — ANTICOAGULATION THERAPY VISIT (OUTPATIENT)
Dept: ANTICOAGULATION | Facility: OTHER | Age: 58
End: 2018-07-30
Attending: FAMILY MEDICINE

## 2018-07-30 LAB — INR POINT OF CARE: 3.6 (ref 0.86–1.14)

## 2018-07-30 PROCEDURE — 99207 ZZC NO CHARGE NURSE ONLY: CPT

## 2018-07-30 PROCEDURE — 36416 COLLJ CAPILLARY BLOOD SPEC: CPT

## 2018-07-30 PROCEDURE — 85610 PROTHROMBIN TIME: CPT | Mod: QW

## 2018-07-30 NOTE — PROGRESS NOTES
ANTICOAGULATION FOLLOW-UP CLINIC VISIT    Patient Name:  Babak Moran  Date:  7/30/2018  Contact Type:  Face to Face    SUBJECTIVE:     Patient Findings     Positives No Problem Findings           OBJECTIVE    INR Protime   Date Value Ref Range Status   07/30/2018 3.6 (A) 0.86 - 1.14 Final       ASSESSMENT / PLAN  INR assessment SUPRA    Recheck INR In: 2 WEEKS    INR Location Clinic      Anticoagulation Summary as of 7/30/2018     INR goal 2.0-3.0   Today's INR 3.6!   Warfarin maintenance plan 10 mg (5 mg x 2) on Mon, Fri; 7.5 mg (5 mg x 1.5) all other days   Full warfarin instructions 10 mg on Mon, Fri; 7.5 mg all other days   Weekly warfarin total 57.5 mg   No change documented Shelley Mercado RN   Plan last modified Xochitl Pavon RN (7/16/2018)   Next INR check 8/13/2018   Priority INR   Target end date Indefinite    Indications   Long term (current) use of anticoagulants [Z79.01]  Unspecified atrial fibrillation [I48.91]         Anticoagulation Episode Summary     INR check location     Preferred lab     Send INR reminders to  INR    Comments Babak prefers to be closer to 3.0 due to previous CVA.check Q 4 weeks.Declines to reduce dose when slightly over 3.0      Anticoagulation Care Providers     Provider Role Specialty Phone number    Alvino Yi MD [facility] [phone]            See the Encounter Report to view Anticoagulation Flowsheet and Dosing Calendar (Go to Encounters tab in chart review, and find the Anticoagulation Therapy Visit)        Shelley Mercado, RN

## 2018-07-30 NOTE — MR AVS SNAPSHOT
Babak Moran   7/30/2018 8:00 AM   Anticoagulation Therapy Visit    Description:  58 year old male   Provider:  MACI[facility] ANTI COAG 2   Department:  [facility]           INR as of 7/30/2018     Today's INR 3.6!      Anticoagulation Summary as of 7/30/2018     INR goal 2.0-3.0   Today's INR 3.6!   Full warfarin instructions 10 mg on Mon, Fri; 7.5 mg all other days   Next INR check 8/13/2018    Indications   Long term (current) use of anticoagulants [Z79.01]  Unspecified atrial fibrillation [I48.91]         Description     Continue same dose and recheck in 2-3 weeks. ...............Shelley Mercado RN    7/30/2018    8:03 AM        July 2018 Details    Sun Mon Tue Wed Thu Fri Sat     1               2               3               4               5               6               7                 8               9               10               11               12               13               14                 15               16               17               18               19               20               21                 22               23               24               25               26               27               28                 29               30      10 mg   See details      31      7.5 mg              Date Details   07/30 This INR check               How to take your warfarin dose     To take:  7.5 mg Take 1.5 of the 5 mg tablets.    To take:  10 mg Take 2 of the 5 mg tablets.           August 2018 Details    Sun Mon Tue Wed Thu Fri Sat        1      7.5 mg         2      7.5 mg         3      10 mg         4      7.5 mg           5      7.5 mg         6      10 mg         7      7.5 mg         8      7.5 mg         9      7.5 mg         10      10 mg         11      7.5 mg           12      7.5 mg         13            14               15               16               17               18                 19               20               21               22               23               24                25                 26               27               28               29               30               31                 Date Details   No additional details    Date of next INR:  8/13/2018         How to take your warfarin dose     To take:  7.5 mg Take 1.5 of the 5 mg tablets.    To take:  10 mg Take 2 of the 5 mg tablets.

## 2018-08-14 ENCOUNTER — ANTICOAGULATION THERAPY VISIT (OUTPATIENT)
Dept: ANTICOAGULATION | Facility: OTHER | Age: 58
End: 2018-08-14
Attending: FAMILY MEDICINE

## 2018-08-14 LAB — INR POINT OF CARE: 3.3 (ref 0.86–1.14)

## 2018-08-14 PROCEDURE — 36416 COLLJ CAPILLARY BLOOD SPEC: CPT

## 2018-08-14 PROCEDURE — 99207 ZZC NO CHARGE NURSE ONLY: CPT

## 2018-08-14 PROCEDURE — 85610 PROTHROMBIN TIME: CPT | Mod: QW

## 2018-08-14 NOTE — PROGRESS NOTES
ANTICOAGULATION FOLLOW-UP CLINIC VISIT    Patient Name:  Babak Moran  Date:  8/14/2018  Contact Type:  Face to Face    SUBJECTIVE:     Patient Findings     Positives No Problem Findings           OBJECTIVE    INR Protime   Date Value Ref Range Status   08/14/2018 3.3 (A) 0.86 - 1.14 Final       ASSESSMENT / PLAN  INR assessment THER    Recheck INR In: 4 WEEKS    INR Location Clinic      Anticoagulation Summary as of 8/14/2018     INR goal 2.0-3.0   Today's INR 3.3!   Warfarin maintenance plan 10 mg (5 mg x 2) on Mon, Fri; 7.5 mg (5 mg x 1.5) all other days   Full warfarin instructions 10 mg on Mon, Fri; 7.5 mg all other days   Weekly warfarin total 57.5 mg   No change documented Shelley Mercado RN   Plan last modified Xochitl Pavon RN (7/16/2018)   Next INR check 9/11/2018   Priority INR   Target end date Indefinite    Indications   Long term (current) use of anticoagulants [Z79.01]  Unspecified atrial fibrillation [I48.91]         Anticoagulation Episode Summary     INR check location     Preferred lab     Send INR reminders to  INR    Comments Babak prefers to be closer to 3.0 due to previous CVA.check Q 4 weeks.Declines to reduce dose when slightly over 3.0      Anticoagulation Care Providers     Provider Role Specialty Phone number    Alvino Yi MD [facility] [phone]            See the Encounter Report to view Anticoagulation Flowsheet and Dosing Calendar (Go to Encounters tab in chart review, and find the Anticoagulation Therapy Visit)        Shelley Mercado, RN               
n/a
n/a

## 2018-08-14 NOTE — MR AVS SNAPSHOT
Babak Moran   8/14/2018 8:15 AM   Anticoagulation Therapy Visit    Description:  58 year old male   Provider:  [facility]   Department:  [facility]           INR as of 8/14/2018     Today's INR 3.3!      Anticoagulation Summary as of 8/14/2018     INR goal 2.0-3.0   Today's INR 3.3!   Full warfarin instructions 10 mg on Mon, Fri; 7.5 mg all other days   Next INR check 9/11/2018    Indications   Long term (current) use of anticoagulants [Z79.01]  Unspecified atrial fibrillation [I48.91]         Description     Continue same Warfarin dose and recheck in 1 month.  Shelley Mercado RN   8/14/2018    8:02 AM      Your next Anticoagulation Clinic appointment(s)     Aug 14, 2018  8:15 AM CDT   Anticoagulation Visit with [facility] COAG 1   [facility] ([facility])    [address]  [place] [address]   [phone]              August 2018 Details    Sun Mon Tue Wed Thu Fri Sat        1               2               3               4                 5               6               7               8               9               10               11                 12               13               14      7.5 mg   See details      15      7.5 mg         16      7.5 mg         17      10 mg         18      7.5 mg           19      7.5 mg         20      10 mg         21      7.5 mg         22      7.5 mg         23      7.5 mg         24      10 mg         25      7.5 mg           26      7.5 mg         27      10 mg         28      7.5 mg         29      7.5 mg         30      7.5 mg         31      10 mg           Date Details   08/14 This INR check               How to take your warfarin dose     To take:  7.5 mg Take 1.5 of the 5 mg tablets.    To take:  10 mg Take 2 of the 5 mg tablets.           September 2018 Details    Sun Mon Tue Wed Thu Fri Sat           1      7.5 mg           2      7.5 mg         3      10 mg         4      7.5 mg         5      7.5  mg         6      7.5 mg         7      10 mg         8      7.5 mg           9      7.5 mg         10      10 mg         11            12               13               14               15                 16               17               18               19               20               21               22                 23               24               25               26               27               28               29                 30                      Date Details   No additional details    Date of next INR:  9/11/2018         How to take your warfarin dose     To take:  7.5 mg Take 1.5 of the 5 mg tablets.    To take:  10 mg Take 2 of the 5 mg tablets.

## 2018-08-16 DIAGNOSIS — Z87.39 HISTORY OF GOUT: ICD-10-CM

## 2018-08-17 RX ORDER — INDOMETHACIN 50 MG/1
CAPSULE ORAL
Qty: 180 CAPSULE | Refills: 0 | OUTPATIENT
Start: 2018-08-17

## 2018-08-17 NOTE — TELEPHONE ENCOUNTER
Redundant refill request refused: Too soon.    indomethacin (INDOCIN) 50 MG capsule 180 capsule 3 7/3/2018  No   Sig - Route: Take 1 capsule (50 mg) by mouth 2 times daily as needed for moderate pain - Oral   Class: E-Prescribe   Order: [id number]   E-Prescribing Status: Receipt confirmed by pharmacy (7/3/2018  8:37 AM CDT)     [facility] - [place], [place] - [address]     Unable to complete prescription refill per RN Medication Refill Policy. Savannah Sifuentes RN .............. 8/17/2018  4:21 PM

## 2018-09-11 ENCOUNTER — ANTICOAGULATION THERAPY VISIT (OUTPATIENT)
Dept: ANTICOAGULATION | Facility: OTHER | Age: 58
End: 2018-09-11
Attending: FAMILY MEDICINE

## 2018-09-11 LAB — INR POINT OF CARE: 3.5 (ref 0.86–1.14)

## 2018-09-11 PROCEDURE — 36416 COLLJ CAPILLARY BLOOD SPEC: CPT

## 2018-09-11 PROCEDURE — 85610 PROTHROMBIN TIME: CPT | Mod: QW

## 2018-09-11 NOTE — PROGRESS NOTES
ANTICOAGULATION FOLLOW-UP CLINIC VISIT    Patient Name:  Babak Moran  Date:  9/11/2018  Contact Type:  Face to Face    SUBJECTIVE:     Patient Findings     Positives No Problem Findings           OBJECTIVE    INR Protime   Date Value Ref Range Status   09/11/2018 3.5 (A) 0.86 - 1.14 Final       ASSESSMENT / PLAN  INR assessment SUPRA    Recheck INR In: 3 WEEKS    INR Location Clinic      Anticoagulation Summary as of 9/11/2018     INR goal 2.0-3.0   Today's INR 3.5!   Warfarin maintenance plan 10 mg (5 mg x 2) on Mon; 7.5 mg (5 mg x 1.5) all other days   Full warfarin instructions 10 mg on Mon; 7.5 mg all other days   Weekly warfarin total 55 mg   Plan last modified Shelley Mercado, RN (9/11/2018)   Next INR check 10/2/2018   Priority INR   Target end date Indefinite    Indications   Long term (current) use of anticoagulants [Z79.01]  Unspecified atrial fibrillation [I48.91]         Anticoagulation Episode Summary     INR check location     Preferred lab     Send INR reminders to  INR    Comments Babak prefers to be closer to 3.0 due to previous CVA.check Q 4 weeks.Declines to reduce dose when slightly over 3.0      Anticoagulation Care Providers     Provider Role Specialty Phone number    Alvino Yi MD [facility] [phone]            See the Encounter Report to view Anticoagulation Flowsheet and Dosing Calendar (Go to Encounters tab in chart review, and find the Anticoagulation Therapy Visit)        Shelley Mercado, RN

## 2018-09-11 NOTE — MR AVS SNAPSHOT
Babak Moran   9/11/2018 8:00 AM   Anticoagulation Therapy Visit    Description:  58 year old male   Provider:  [facility] 1   Department:  [facility]           INR as of 9/11/2018     Today's INR 3.5!      Anticoagulation Summary as of 9/11/2018     INR goal 2.0-3.0   Today's INR 3.5!   Full warfarin instructions 10 mg on Mon; 7.5 mg all other days   Next INR check 10/2/2018    Indications   Long term (current) use of anticoagulants [Z79.01]  Unspecified atrial fibrillation [I48.91]         Description     Decrease dose and recheck in 3 weeks.  ..............Shelley Mercado RN.............  9/11/2018    8:02 AM        September 2018 Details    Sun Mon Tue Wed Thu Fri Sat           1                 2               3               4               5               6               7               8                 9               10               11      7.5 mg   See details      12      7.5 mg         13      7.5 mg         14      7.5 mg         15      7.5 mg           16      7.5 mg         17      10 mg         18      7.5 mg         19      7.5 mg         20      7.5 mg         21      7.5 mg         22      7.5 mg           23      7.5 mg         24      10 mg         25      7.5 mg         26      7.5 mg         27      7.5 mg         28      7.5 mg         29      7.5 mg           30      7.5 mg                Date Details   09/11 This INR check               How to take your warfarin dose     To take:  7.5 mg Take 1.5 of the 5 mg tablets.    To take:  10 mg Take 2 of the 5 mg tablets.           October 2018 Details    Sun Mon Tue Wed Thu Fri Sat      1      10 mg         2            3               4               5               6                 7               8               9               10               11               12               13                 14               15               16               17               18               19               20                 21                22               23               24               25               26               27                 28               29               30               31                   Date Details   No additional details    Date of next INR:  10/2/2018         How to take your warfarin dose     To take:  7.5 mg Take 1.5 of the 5 mg tablets.    To take:  10 mg Take 2 of the 5 mg tablets.

## 2018-10-02 ENCOUNTER — ANTICOAGULATION THERAPY VISIT (OUTPATIENT)
Dept: ANTICOAGULATION | Facility: OTHER | Age: 58
End: 2018-10-02
Attending: FAMILY MEDICINE

## 2018-10-02 LAB — INR POINT OF CARE: 3.2 (ref 0.86–1.14)

## 2018-10-02 PROCEDURE — 36416 COLLJ CAPILLARY BLOOD SPEC: CPT

## 2018-10-02 PROCEDURE — 85610 PROTHROMBIN TIME: CPT | Mod: QW

## 2018-10-02 NOTE — PROGRESS NOTES
ANTICOAGULATION FOLLOW-UP CLINIC VISIT    Patient Name:  Babak Moran  Date:  10/2/2018  Contact Type:  Face to Face    SUBJECTIVE:     Patient Findings     Positives No Problem Findings           OBJECTIVE    INR Protime   Date Value Ref Range Status   10/02/2018 3.2 (A) 0.86 - 1.14 Final       ASSESSMENT / PLAN  INR assessment THER    Recheck INR In: 4 WEEKS    INR Location Clinic      Anticoagulation Summary as of 10/2/2018     INR goal 2.0-3.0   Today's INR 3.2!   Warfarin maintenance plan 10 mg (5 mg x 2) on Mon; 7.5 mg (5 mg x 1.5) all other days   Full warfarin instructions 10 mg on Mon; 7.5 mg all other days   Weekly warfarin total 55 mg   No change documented Shelley Mercado RN   Plan last modified Shelley Mercado RN (9/11/2018)   Next INR check 10/30/2018   Priority INR   Target end date Indefinite    Indications   Long term (current) use of anticoagulants [Z79.01]  Unspecified atrial fibrillation [I48.91]         Anticoagulation Episode Summary     INR check location     Preferred lab     Send INR reminders to  INR    Comments Babak prefers to be closer to 3.0 due to previous CVA.check Q 4 weeks.Declines to reduce dose when slightly over 3.0      Anticoagulation Care Providers     Provider Role Specialty Phone number    Alvino Yi MD [facility] [phone]            See the Encounter Report to view Anticoagulation Flowsheet and Dosing Calendar (Go to Encounters tab in chart review, and find the Anticoagulation Therapy Visit)        Shelley Mercado, RN

## 2018-10-02 NOTE — MR AVS SNAPSHOT
Babak Moran   10/2/2018 8:00 AM   Anticoagulation Therapy Visit    Description:  58 year old male   Provider:  [facility] 1   Department:  [facility]           INR as of 10/2/2018     Today's INR 3.2!      Anticoagulation Summary as of 10/2/2018     INR goal 2.0-3.0   Today's INR 3.2!   Full warfarin instructions 10 mg on Mon; 7.5 mg all other days   Next INR check 10/30/2018    Indications   Long term (current) use of anticoagulants [Z79.01]  Unspecified atrial fibrillation [I48.91]         Description     Continue same Warfarin dose and recheck in 1 month.  Shelley Mercado RN   10/2/2018    7:54 AM      Your next Anticoagulation Clinic appointment(s)     Oct 02, 2018  8:00 AM CDT   Anticoagulation Visit with [facility] ANTI COAG 1   [facility] ([facility])    [address]  [place] [address]   [phone]              October 2018 Details    Sun Mon Tue Wed Thu Fri Sat      1               2      7.5 mg   See details      3      7.5 mg         4      7.5 mg         5      7.5 mg         6      7.5 mg           7      7.5 mg         8      10 mg         9      7.5 mg         10      7.5 mg         11      7.5 mg         12      7.5 mg         13      7.5 mg           14      7.5 mg         15      10 mg         16      7.5 mg         17      7.5 mg         18      7.5 mg         19      7.5 mg         20      7.5 mg           21      7.5 mg         22      10 mg         23      7.5 mg         24      7.5 mg         25      7.5 mg         26      7.5 mg         27      7.5 mg           28      7.5 mg         29      10 mg         30            31                   Date Details   10/02 This INR check       Date of next INR:  10/30/2018         How to take your warfarin dose     To take:  7.5 mg Take 1.5 of the 5 mg tablets.    To take:  10 mg Take 2 of the 5 mg tablets.

## 2018-10-03 DIAGNOSIS — Z79.01 LONG-TERM (CURRENT) USE OF ANTICOAGULANTS, INR GOAL 2.0-3.0: ICD-10-CM

## 2018-10-03 DIAGNOSIS — I48.91 ATRIAL FIBRILLATION (H): Primary | ICD-10-CM

## 2018-10-03 RX ORDER — WARFARIN SODIUM 5 MG/1
TABLET ORAL
Qty: 144 TABLET | Refills: 1 | Status: SHIPPED | OUTPATIENT
Start: 2018-10-03 | End: 2019-04-11

## 2018-10-03 NOTE — TELEPHONE ENCOUNTER
Prescription approved per [facility].  Shelley Mercado RN    10/3/2018  10:12 AM

## 2018-10-30 ENCOUNTER — ANTICOAGULATION THERAPY VISIT (OUTPATIENT)
Dept: ANTICOAGULATION | Facility: OTHER | Age: 58
End: 2018-10-30
Attending: FAMILY MEDICINE

## 2018-10-30 DIAGNOSIS — Z79.01 ANTICOAGULATION MONITORING, INR RANGE 2-3: ICD-10-CM

## 2018-10-30 LAB — INR POINT OF CARE: 2.8 (ref 0.86–1.14)

## 2018-10-30 PROCEDURE — 36416 COLLJ CAPILLARY BLOOD SPEC: CPT

## 2018-10-30 PROCEDURE — 85610 PROTHROMBIN TIME: CPT | Mod: QW

## 2018-10-30 NOTE — MR AVS SNAPSHOT
Babak Moran   10/30/2018 8:00 AM   Anticoagulation Therapy Visit    Description:  58 year old male   Provider:  [facility] 1   Department:  [facility]           INR as of 10/30/2018     Today's INR 2.8      Anticoagulation Summary as of 10/30/2018     INR goal 2.0-3.0   Today's INR 2.8   Full warfarin instructions 10 mg on Mon; 7.5 mg all other days   Next INR check 11/27/2018    Indications   Unspecified atrial fibrillation [I48.91]  Anticoagulation monitoring  INR range 2-3 [Z79.01]         Description     Continue same Warfarin dose and recheck in 1 month.  Shelley Mercado RN   10/30/2018    7:57 AM        Your next Anticoagulation Clinic appointment(s)     Oct 30, 2018  8:00 AM CDT   Anticoagulation Visit with GH ANTI COAG 1   [facility] ([facility])    [address]  [facility] [address]   [phone]              October 2018 Details    Sun Mon Tue Wed Thu Fri Sat      1               2               3               4               5               6                 7               8               9               10               11               12               13                 14               15               16               17               18               19               20                 21               22               23               24               25               26               27                 28               29               30      7.5 mg   See details      31      7.5 mg             Date Details   10/30 This INR check               How to take your warfarin dose     To take:  7.5 mg Take 1.5 of the 5 mg tablets.           November 2018 Details    Sun Mon Tue Wed Thu Fri Sat         1      7.5 mg         2      7.5 mg         3      7.5 mg           4      7.5 mg         5      10 mg         6      7.5 mg         7      7.5 mg         8      7.5 mg         9      7.5 mg         10      7.5 mg           11      7.5 mg          12      10 mg         13      7.5 mg         14      7.5 mg         15      7.5 mg         16      7.5 mg         17      7.5 mg           18      7.5 mg         19      10 mg         20      7.5 mg         21      7.5 mg         22      7.5 mg         23      7.5 mg         24      7.5 mg           25      7.5 mg         26      10 mg         27            28               29               30                 Date Details   No additional details    Date of next INR:  11/27/2018         How to take your warfarin dose     To take:  7.5 mg Take 1.5 of the 5 mg tablets.    To take:  10 mg Take 2 of the 5 mg tablets.

## 2018-10-30 NOTE — PROGRESS NOTES
ANTICOAGULATION FOLLOW-UP CLINIC VISIT    Patient Name:  Babak Moran  Date:  10/30/2018  Contact Type:  Face to Face    SUBJECTIVE:     Patient Findings     Positives No Problem Findings           OBJECTIVE    INR Protime   Date Value Ref Range Status   10/30/2018 2.8 (A) 0.86 - 1.14 Final       ASSESSMENT / PLAN  INR assessment THER    Recheck INR In: 4 WEEKS    INR Location Clinic      Anticoagulation Summary as of 10/30/2018     INR goal 2.0-3.0   Today's INR 2.8   Warfarin maintenance plan 10 mg (5 mg x 2) on Mon; 7.5 mg (5 mg x 1.5) all other days   Full warfarin instructions 10 mg on Mon; 7.5 mg all other days   Weekly warfarin total 55 mg   No change documented Shelley Mercado RN   Plan last modified Shelley Mercado RN (9/11/2018)   Next INR check 11/27/2018   Priority INR   Target end date Indefinite    Indications   Unspecified atrial fibrillation [I48.91]  Anticoagulation monitoring  INR range 2-3 [Z79.01]         Anticoagulation Episode Summary     INR check location     Preferred lab     Send INR reminders to  INR    Comments Babak prefers to be closer to 3.0 d/t previous CVA check Q 4 weeks.Declines to reduce dose when slightly over 3.0      Anticoagulation Care Providers     Provider Role Specialty Phone number    Alvino Yi MD [facility] [phone]            See the Encounter Report to view Anticoagulation Flowsheet and Dosing Calendar (Go to Encounters tab in chart review, and find the Anticoagulation Therapy Visit)        Shelley Mercado, RN

## 2018-11-28 ENCOUNTER — ANTICOAGULATION THERAPY VISIT (OUTPATIENT)
Dept: ANTICOAGULATION | Facility: OTHER | Age: 58
End: 2018-11-28
Attending: FAMILY MEDICINE

## 2018-11-28 DIAGNOSIS — Z79.01 ANTICOAGULATION MONITORING, INR RANGE 2-3: ICD-10-CM

## 2018-11-28 LAB — INR POINT OF CARE: 3.8 (ref 0.86–1.14)

## 2018-11-28 PROCEDURE — 36416 COLLJ CAPILLARY BLOOD SPEC: CPT

## 2018-11-28 PROCEDURE — 85610 PROTHROMBIN TIME: CPT | Mod: QW

## 2018-11-28 NOTE — PROGRESS NOTES
ANTICOAGULATION FOLLOW-UP CLINIC VISIT    Patient Name:  Babak Moran  Date:  11/28/2018  Contact Type:  Face to Face    SUBJECTIVE:     Patient Findings     Positives Unexplained INR or factor level change           OBJECTIVE    INR Protime   Date Value Ref Range Status   11/28/2018 3.8 (A) 0.86 - 1.14 Final       ASSESSMENT / PLAN  INR assessment SUPRA    Recheck INR In: 2 WEEKS    INR Location Clinic      Anticoagulation Summary as of 11/28/2018     INR goal 2.0-3.0   Today's INR 3.8!   Warfarin maintenance plan 10 mg (5 mg x 2) on Mon; 7.5 mg (5 mg x 1.5) all other days   Full warfarin instructions 11/28: 5 mg; Otherwise 10 mg on Mon; 7.5 mg all other days   Weekly warfarin total 55 mg   Plan last modified Shelley Mercado, RN (9/11/2018)   Next INR check 12/12/2018   Priority INR   Target end date Indefinite    Indications   Unspecified atrial fibrillation [I48.91]  Anticoagulation monitoring  INR range 2-3 [Z79.01]         Anticoagulation Episode Summary     INR check location     Preferred lab     Send INR reminders to  INR    Comments Babak prefers to be closer to 3.0 d/t previous CVA check Q 4 weeks.Declines to reduce dose when slightly over 3.0      Anticoagulation Care Providers     Provider Role Specialty Phone number    Alvino Yi MD [facility] [phone]            See the Encounter Report to view Anticoagulation Flowsheet and Dosing Calendar (Go to Encounters tab in chart review, and find the Anticoagulation Therapy Visit)        Shelley Mercado, RN

## 2018-11-28 NOTE — MR AVS SNAPSHOT
Babak Moran   11/28/2018 3:45 PM   Anticoagulation Therapy Visit    Description:  58 year old male   Provider:  [facility] 1   Department:  [facility]           INR as of 11/28/2018     Today's INR 3.8!      Anticoagulation Summary as of 11/28/2018     INR goal 2.0-3.0   Today's INR 3.8!   Full warfarin instructions 11/28: 5 mg; Otherwise 10 mg on Mon; 7.5 mg all other days   Next INR check 12/12/2018    Indications   Unspecified atrial fibrillation [I48.91]  Anticoagulation monitoring  INR range 2-3 [Z79.01]         Description     Take 5 mg today then resume dose and recheck in 2 weeks. Shelley Mercado RN    11/28/2018  1:25 PM        Your next Anticoagulation Clinic appointment(s)     Nov 28, 2018  3:45 PM CST   Anticoagulation Visit with [facility] ANTI COAG 1   [facility] ([facility])    [address]  [facility] [address]   [phone]              November 2018 Details    Sun Mon Tue Wed Thu Fri Sat         1               2               3                 4               5               6               7               8               9               10                 11               12               13               14               15               16               17                 18               19               20               21               22               23               24                 25               26               27               28      5 mg   See details      29      7.5 mg         30      7.5 mg           Date Details   11/28 This INR check               How to take your warfarin dose     To take:  5 mg Take 1 of the 5 mg tablets.    To take:  7.5 mg Take 1.5 of the 5 mg tablets.           December 2018 Details    Sun Mon Tue Wed Thu Fri Sat           1      7.5 mg           2      7.5 mg         3      10 mg         4      7.5 mg         5      7.5 mg         6      7.5 mg         7      7.5 mg         8      7.5 mg            9      7.5 mg         10      10 mg         11      7.5 mg         12            13               14               15                 16               17               18               19               20               21               22                 23               24               25               26               27               28               29                 30               31                     Date Details   No additional details    Date of next INR:  12/12/2018         How to take your warfarin dose     To take:  7.5 mg Take 1.5 of the 5 mg tablets.    To take:  10 mg Take 2 of the 5 mg tablets.

## 2018-12-12 ENCOUNTER — ANTICOAGULATION THERAPY VISIT (OUTPATIENT)
Dept: ANTICOAGULATION | Facility: OTHER | Age: 58
End: 2018-12-12
Attending: FAMILY MEDICINE

## 2018-12-12 DIAGNOSIS — Z79.01 ANTICOAGULATION MONITORING, INR RANGE 2-3: ICD-10-CM

## 2018-12-12 LAB — INR POINT OF CARE: 2.9 (ref 0.86–1.14)

## 2018-12-12 PROCEDURE — 36416 COLLJ CAPILLARY BLOOD SPEC: CPT

## 2018-12-12 PROCEDURE — 85610 PROTHROMBIN TIME: CPT | Mod: QW

## 2018-12-12 NOTE — PROGRESS NOTES
ANTICOAGULATION FOLLOW-UP CLINIC VISIT    Patient Name:  Babak Moran  Date:  2018  Contact Type:  Face to Face    SUBJECTIVE:     Patient Findings     Positives:   No Problem Findings           OBJECTIVE    INR Protime   Date Value Ref Range Status   2018 2.9 (A) 0.86 - 1.14 Final       ASSESSMENT / PLAN  INR assessment THER    Recheck INR In: 4 WEEKS    INR Location Clinic      Anticoagulation Summary  As of 2018    INR goal:   2.0-3.0   TTR:   61.2 % (3.4 y)   INR used for dosin.9 (2018)   Warfarin maintenance plan:   10 mg (5 mg x 2) every Mon; 7.5 mg (5 mg x 1.5) all other days   Full warfarin instructions:   10 mg every Mon; 7.5 mg all other days   Weekly warfarin total:   55 mg   No change documented:   Shelley Mercado RN   Plan last modified:   Shelley Mercado RN (2018)   Next INR check:   2019   Priority:   INR   Target end date:   Indefinite    Indications    Unspecified atrial fibrillation [I48.91]  Anticoagulation monitoring  INR range 2-3 [Z79.01]             Anticoagulation Episode Summary     INR check location:       Preferred lab:       Send INR reminders to:    INR    Comments:   Babak prefers to be closer to 3.0 d/t previous CVA check Q 4 weeks.Declines to reduce dose when slightly over 3.0      Anticoagulation Care Providers     Provider Role Specialty Phone number    Alvino Yi MD [facility] [phone]            See the Encounter Report to view Anticoagulation Flowsheet and Dosing Calendar (Go to Encounters tab in chart review, and find the Anticoagulation Therapy Visit)        Shelley Mercado RN

## 2019-01-08 DIAGNOSIS — I10 BENIGN ESSENTIAL HYPERTENSION: ICD-10-CM

## 2019-01-08 DIAGNOSIS — Z82.49 FAMILY HISTORY OF CORONARY ARTERY DISEASE: Primary | ICD-10-CM

## 2019-01-08 LAB
ANION GAP SERPL CALCULATED.3IONS-SCNC: 7 MMOL/L (ref 3–14)
BUN SERPL-MCNC: 26 MG/DL (ref 7–25)
CALCIUM SERPL-MCNC: 9.2 MG/DL (ref 8.6–10.3)
CHLORIDE SERPL-SCNC: 103 MMOL/L (ref 98–107)
CO2 SERPL-SCNC: 31 MMOL/L (ref 21–31)
CREAT SERPL-MCNC: 0.98 MG/DL (ref 0.7–1.3)
GFR SERPL CREATININE-BSD FRML MDRD: 79 ML/MIN/{1.73_M2}
GLUCOSE SERPL-MCNC: 103 MG/DL (ref 70–105)
POTASSIUM SERPL-SCNC: 4.1 MMOL/L (ref 3.5–5.1)
SODIUM SERPL-SCNC: 141 MMOL/L (ref 134–144)

## 2019-01-08 PROCEDURE — 36415 COLL VENOUS BLD VENIPUNCTURE: CPT | Performed by: FAMILY MEDICINE

## 2019-01-08 PROCEDURE — 80048 BASIC METABOLIC PNL TOTAL CA: CPT | Performed by: FAMILY MEDICINE

## 2019-01-08 NOTE — LETTER
January 14, 2019      Bab[facility] [address]        Dear AvrilDean,    We are writing to inform you of your test results.    Your test results fall within the expected range(s) or remain unchanged from previous results.      Please continue with current treatment plan.    Resulted Orders   **Basic metabolic panel FUTURE anytime   Result Value Ref Range    Sodium 141 134 - 144 mmol/L    Potassium 4.1 3.5 - 5.1 mmol/L    Chloride 103 98 - 107 mmol/L    Carbon Dioxide 31 21 - 31 mmol/L    Anion Gap 7 3 - 14 mmol/L    Glucose 103 70 - 105 mg/dL    Urea Nitrogen 26 (H) 7 - 25 mg/dL    Creatinine 0.98 0.70 - 1.30 mg/dL    GFR Estimate 79 >60 mL/min/[1.73_m2]    GFR Estimate If Black >90 >60 mL/min/[1.73_m2]    Calcium 9.2 8.6 - 10.3 mg/dL       If you have any questions or concerns, please call the clinic at the number listed above.       Sincerely,         LAB

## 2019-01-08 NOTE — PROGRESS NOTES
"Patient walked in for lab only today. He states he is fasting, but the only order is for BMP. He was told that basically this should be part of a \"global fee\" with his visit last July so he believes he shouldn't get a bill for this lab (he has no insurance).  However, he does want to have his cholesterol rechecked because he has been off of his cholesterol medication and wants to know where it's at.     Last Lipid was done 1/30/18:    Cholesterol/HDL Ratio - Historical 2.91   <4.50    Non-HDL Cholesterol - Historical 67   <145 mg/dL   Cholesterol Total 102   <200 mg/dL   HDL Cholesterol 35   23 - 92 mg/dL   Triglycerides 39   <150 mg/dL   LDL Cholesterol Calculated 59   <100 mg/dL     Would you order?  Brittany Lynn CMA([facility])..................1/8/2019   9:25 AM     "

## 2019-01-15 ENCOUNTER — ANTICOAGULATION THERAPY VISIT (OUTPATIENT)
Dept: ANTICOAGULATION | Facility: OTHER | Age: 59
End: 2019-01-15
Attending: FAMILY MEDICINE

## 2019-01-15 DIAGNOSIS — Z79.01 ANTICOAGULATION MONITORING, INR RANGE 2-3: ICD-10-CM

## 2019-01-15 LAB — INR POINT OF CARE: 2.3 (ref 0.86–1.14)

## 2019-01-15 PROCEDURE — 85610 PROTHROMBIN TIME: CPT | Mod: QW

## 2019-01-15 PROCEDURE — 36416 COLLJ CAPILLARY BLOOD SPEC: CPT

## 2019-01-15 NOTE — PROGRESS NOTES
ANTICOAGULATION FOLLOW-UP CLINIC VISIT    Patient Name:  Babak Moran  Date:  1/15/2019  Contact Type:  Face to Face    SUBJECTIVE:     Patient Findings     Positives:   No Problem Findings           OBJECTIVE    INR Protime   Date Value Ref Range Status   01/15/2019 2.3 (A) 0.86 - 1.14 Final       ASSESSMENT / PLAN  INR assessment THER    Recheck INR In: 4 WEEKS    INR Location Clinic      Anticoagulation Summary  As of 1/15/2019    INR goal:   2.0-3.0   TTR:   62.2 % (3.5 y)   INR used for dosin.3 (1/15/2019)   Warfarin maintenance plan:   10 mg (5 mg x 2) every Mon; 7.5 mg (5 mg x 1.5) all other days   Full warfarin instructions:   10 mg every Mon; 7.5 mg all other days   Weekly warfarin total:   55 mg   No change documented:   Shelley Mercado RN   Plan last modified:   Shelley Mercado RN (2018)   Next INR check:   2019   Priority:   INR   Target end date:   Indefinite    Indications    Unspecified atrial fibrillation [I48.91]  Anticoagulation monitoring  INR range 2-3 [Z79.01]             Anticoagulation Episode Summary     INR check location:       Preferred lab:       Send INR reminders to:    INR    Comments:   Babak prefers to be closer to 3.0 d/t previous CVA check Q 4 weeks.Declines to reduce dose when slightly over 3.0      Anticoagulation Care Providers     Provider Role Specialty Phone number    Alvino Yi MD [facility] [phone]            See the Encounter Report to view Anticoagulation Flowsheet and Dosing Calendar (Go to Encounters tab in chart review, and find the Anticoagulation Therapy Visit)        Shelley Mercado RN

## 2019-02-12 ENCOUNTER — ANTICOAGULATION THERAPY VISIT (OUTPATIENT)
Dept: ANTICOAGULATION | Facility: OTHER | Age: 59
End: 2019-02-12
Attending: FAMILY MEDICINE

## 2019-02-12 DIAGNOSIS — Z79.01 ANTICOAGULATION MONITORING, INR RANGE 2-3: ICD-10-CM

## 2019-02-12 DIAGNOSIS — I63.411 CEREBROVASCULAR ACCIDENT (CVA) DUE TO EMBOLISM OF RIGHT MIDDLE CEREBRAL ARTERY (H): Primary | ICD-10-CM

## 2019-02-12 LAB — INR POINT OF CARE: 2.6 (ref 0.86–1.14)

## 2019-02-12 PROCEDURE — 85610 PROTHROMBIN TIME: CPT | Mod: QW

## 2019-02-12 PROCEDURE — 36416 COLLJ CAPILLARY BLOOD SPEC: CPT

## 2019-02-12 NOTE — PROGRESS NOTES
ANTICOAGULATION FOLLOW-UP CLINIC VISIT    Patient Name:  Babak Moran  Date:  2019  Contact Type:  Face to Face    SUBJECTIVE:     Patient Findings     Positives:   No Problem Findings           OBJECTIVE    INR Protime   Date Value Ref Range Status   2019 2.6 (A) 0.86 - 1.14 Final       ASSESSMENT / PLAN  INR assessment THER    Recheck INR In: 4 WEEKS    INR Location Clinic      Anticoagulation Summary  As of 2019    INR goal:   2.0-3.0   TTR:   63.0 % (3.6 y)   INR used for dosin.6 (2019)   Warfarin maintenance plan:   10 mg (5 mg x 2) every Mon; 7.5 mg (5 mg x 1.5) all other days   Full warfarin instructions:   10 mg every Mon; 7.5 mg all other days   Weekly warfarin total:   55 mg   No change documented:   Shelley Mercado RN   Plan last modified:   Shelley Mercado RN (2018)   Next INR check:   3/12/2019   Priority:   INR   Target end date:   Indefinite    Indications    Unspecified atrial fibrillation [I48.91]  Anticoagulation monitoring  INR range 2-3 [Z79.01]             Anticoagulation Episode Summary     INR check location:       Preferred lab:       Send INR reminders to:    INR    Comments:   Babak prefers to be closer to 3.0 d/t previous CVA check Q 4 weeks.Declines to reduce dose when slightly over 3.0      Anticoagulation Care Providers     Provider Role Specialty Phone number    Alvino Yi MD [facility] [phone]            See the Encounter Report to view Anticoagulation Flowsheet and Dosing Calendar (Go to Encounters tab in chart review, and find the Anticoagulation Therapy Visit)        Shelley Mercado RN

## 2019-02-15 DIAGNOSIS — I10 BENIGN ESSENTIAL HYPERTENSION: Primary | ICD-10-CM

## 2019-02-18 RX ORDER — HYDROCHLOROTHIAZIDE 25 MG/1
TABLET ORAL
Qty: 90 TABLET | Refills: 0 | Status: SHIPPED | OUTPATIENT
Start: 2019-02-18 | End: 2019-05-22

## 2019-02-18 NOTE — TELEPHONE ENCOUNTER
Oswaldo KELLY sent Rx request for the following:      HYDROCHLOROTHIAZIDE 25MG TABLETS  Sig: TAKE 1 TABLET BY MOUTH ONCE DAILY  Last Written Prescription Date:  1/30/18  Last Fill Quantity: 90,   # refills: 3    Last Office Visit: 7/3/18  Future Office visit: None.    Prescription approved per [facility] Refill Protocol for 90 days at this time. Savannah Sifuentes RN .............. 2/18/2019  3:06 PM

## 2019-03-12 ENCOUNTER — ANTICOAGULATION THERAPY VISIT (OUTPATIENT)
Dept: ANTICOAGULATION | Facility: OTHER | Age: 59
End: 2019-03-12
Attending: FAMILY MEDICINE

## 2019-03-12 DIAGNOSIS — Z79.01 ANTICOAGULATION MONITORING, INR RANGE 2-3: ICD-10-CM

## 2019-03-12 LAB — INR POINT OF CARE: 3.3 (ref 0.86–1.14)

## 2019-03-12 PROCEDURE — 85610 PROTHROMBIN TIME: CPT | Mod: QW

## 2019-03-12 PROCEDURE — 36416 COLLJ CAPILLARY BLOOD SPEC: CPT

## 2019-03-12 NOTE — PROGRESS NOTES
ANTICOAGULATION FOLLOW-UP CLINIC VISIT    Patient Name:  Babak Moran  Date:  3/12/2019  Contact Type:  Face to Face    SUBJECTIVE:     Patient Findings     Positives:   No Problem Findings           OBJECTIVE    INR Protime   Date Value Ref Range Status   03/12/2019 3.3 (A) 0.86 - 1.14 Final       ASSESSMENT / PLAN  INR assessment THER    Recheck INR In: 4 WEEKS    INR Location Clinic      Anticoagulation Summary  As of 3/12/2019    INR goal:   2.0-3.0   TTR:   62.9 % (3.7 y)   INR used for dosing:   3.3! (3/12/2019)   Warfarin maintenance plan:   10 mg (5 mg x 2) every Mon; 7.5 mg (5 mg x 1.5) all other days   Full warfarin instructions:   10 mg every Mon; 7.5 mg all other days   Weekly warfarin total:   55 mg   No change documented:   Xochitl Pavon RN   Plan last modified:   Shelley Mercado RN (9/11/2018)   Next INR check:   4/9/2019   Priority:   INR   Target end date:   Indefinite    Indications    Unspecified atrial fibrillation [I48.91]  Anticoagulation monitoring  INR range 2-3 [Z79.01]             Anticoagulation Episode Summary     INR check location:       Preferred lab:       Send INR reminders to:    INR    Comments:   Babak prefers to be closer to 3.0 d/t previous CVA check Q 4 weeks.Declines to reduce dose when slightly over 3.0      Anticoagulation Care Providers     Provider Role Specialty Phone number    Alvino Yi MD [facility] [phone]            See the Encounter Report to view Anticoagulation Flowsheet and Dosing Calendar (Go to Encounters tab in chart review, and find the Anticoagulation Therapy Visit)        Xochitl Pavon RN

## 2019-04-09 ENCOUNTER — ANTICOAGULATION THERAPY VISIT (OUTPATIENT)
Dept: ANTICOAGULATION | Facility: OTHER | Age: 59
End: 2019-04-09
Attending: FAMILY MEDICINE

## 2019-04-09 DIAGNOSIS — Z79.01 ANTICOAGULATION MONITORING, INR RANGE 2-3: ICD-10-CM

## 2019-04-09 LAB — INR POINT OF CARE: 3.1 (ref 0.86–1.14)

## 2019-04-09 PROCEDURE — 85610 PROTHROMBIN TIME: CPT | Mod: QW

## 2019-04-09 PROCEDURE — 36416 COLLJ CAPILLARY BLOOD SPEC: CPT

## 2019-04-09 NOTE — PROGRESS NOTES
ANTICOAGULATION FOLLOW-UP CLINIC VISIT    Patient Name:  Babak Moran  Date:  4/9/2019  Contact Type:  Face to Face    SUBJECTIVE:     Patient Findings     Comments:   The patient was assessed for diet, medication, and activity level changes, missed or extra doses, bruising or bleeding, with no problem findings.             OBJECTIVE    INR Protime   Date Value Ref Range Status   04/09/2019 3.1 (A) 0.86 - 1.14 Final       ASSESSMENT / PLAN  INR assessment THER    Recheck INR In: 4 WEEKS    INR Location Clinic      Anticoagulation Summary  As of 4/9/2019    INR goal:   2.0-3.0   TTR:   61.6 % (3.7 y)   INR used for dosing:   3.1! (4/9/2019)   Warfarin maintenance plan:   10 mg (5 mg x 2) every Mon; 7.5 mg (5 mg x 1.5) all other days   Full warfarin instructions:   10 mg every Mon; 7.5 mg all other days   Weekly warfarin total:   55 mg   No change documented:   Shelley Mercado RN   Plan last modified:   Shelley Mercado RN (9/11/2018)   Next INR check:   5/7/2019   Priority:   INR   Target end date:   Indefinite    Indications    Unspecified atrial fibrillation [I48.91]  Anticoagulation monitoring  INR range 2-3 [Z79.01]             Anticoagulation Episode Summary     INR check location:       Preferred lab:       Send INR reminders to:    INR    Comments:   Babak prefers to be closer to 3.0 d/t previous CVA check Q 4 weeks.Declines to reduce dose when slightly over 3.0      Anticoagulation Care Providers     Provider Role Specialty Phone number    Alvino Yi MD [facility] [phone]            See the Encounter Report to view Anticoagulation Flowsheet and Dosing Calendar (Go to Encounters tab in chart review, and find the Anticoagulation Therapy Visit)        Shelley Mercado, RN

## 2019-04-11 DIAGNOSIS — I48.91 ATRIAL FIBRILLATION (H): ICD-10-CM

## 2019-04-11 DIAGNOSIS — Z79.01 LONG-TERM (CURRENT) USE OF ANTICOAGULANTS, INR GOAL 2.0-3.0: ICD-10-CM

## 2019-04-12 RX ORDER — WARFARIN SODIUM 5 MG/1
TABLET ORAL
Qty: 144 TABLET | Refills: 1 | Status: SHIPPED | OUTPATIENT
Start: 2019-04-12 | End: 2019-09-20

## 2019-04-12 NOTE — TELEPHONE ENCOUNTER
"Requested Prescriptions   Pending Prescriptions Disp Refills     warfarin (COUMADIN) 5 MG tablet [Pharmacy Med Name: WARFARIN SOD 5MG TABLETS (PEACH)] 144 tablet 0     Sig: TAKE 2 TABLETS(10MG) X 1 DAY AND 1 1/2 TABLETS(7.5MG) X 6 DAYS/WEEK OR AS DIRECTED BY [facility]       Vitamin K Antagonists Failed - 4/11/2019 10:51 PM        Failed - INR is within goal in the past 6 weeks     Confirm INR is within goal in the past 6 weeks.     Recent Labs   Lab Test 04/09/19   INR 3.1*                       Passed - Recent (12 mo) or future (30 days) visit within the authorizing provider's specialty     Patient had office visit in the last 12 months or has a visit in the next 30 days with authorizing provider or within the authorizing provider's specialty.  See \"Patient Info\" tab in inbasket, or \"Choose Columns\" in Meds & Orders section of the refill encounter.              Passed - Medication is active on med list        Passed - Patient is 18 years of age or older        Prescription approved per [facility] Refill Protocol.    "

## 2019-05-07 ENCOUNTER — ANTICOAGULATION THERAPY VISIT (OUTPATIENT)
Dept: ANTICOAGULATION | Facility: OTHER | Age: 59
End: 2019-05-07
Attending: FAMILY MEDICINE

## 2019-05-07 ENCOUNTER — HOSPITAL ENCOUNTER (OUTPATIENT)
Dept: GENERAL RADIOLOGY | Facility: OTHER | Age: 59
Discharge: HOME | End: 2019-05-07
Admitting: FAMILY MEDICINE
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
## 2019-05-07 DIAGNOSIS — Z79.01 ANTICOAGULATION MONITORING, INR RANGE 2-3: ICD-10-CM

## 2019-05-07 LAB — INR POINT OF CARE: 3.4 (ref 0.86–1.14)

## 2019-05-07 PROCEDURE — 36416 COLLJ CAPILLARY BLOOD SPEC: CPT

## 2019-05-07 PROCEDURE — 85610 PROTHROMBIN TIME: CPT | Mod: QW

## 2019-05-07 PROCEDURE — 73090 X-RAY EXAM OF FOREARM: CPT | Mod: LT

## 2019-05-07 NOTE — PROGRESS NOTES
ANTICOAGULATION FOLLOW-UP CLINIC VISIT    Patient Name:  Babak Moran  Date:  5/7/2019  Contact Type:  Face to Face    SUBJECTIVE:     Patient Findings     Positives:   Signs/symptoms of bleeding (blood in stool today states he fell a couple days ago and left arm swollen and brusied very painfull with be seeing physican today)           OBJECTIVE    INR Protime   Date Value Ref Range Status   05/07/2019 3.4 (A) 0.86 - 1.14 Final       ASSESSMENT / PLAN  INR assessment SUPRA    Recheck INR In: 3 WEEKS    INR Location Clinic      Anticoagulation Summary  As of 5/7/2019    INR goal:   2.0-3.0   TTR:   60.4 % (3.8 y)   INR used for dosing:   3.4! (5/7/2019)   Warfarin maintenance plan:   10 mg (5 mg x 2) every Mon; 7.5 mg (5 mg x 1.5) all other days   Full warfarin instructions:   5/7: 5 mg; Otherwise 10 mg every Mon; 7.5 mg all other days   Weekly warfarin total:   55 mg   Plan last modified:   Shelley Mercado RN (9/11/2018)   Next INR check:   5/28/2019   Priority:   INR   Target end date:   Indefinite    Indications    Unspecified atrial fibrillation [I48.91]  Anticoagulation monitoring  INR range 2-3 [Z79.01]             Anticoagulation Episode Summary     INR check location:       Preferred lab:       Send INR reminders to:    INR    Comments:   Babak prefers to be closer to 3.0 d/t previous CVA check Q 4 weeks.Declines to reduce dose when slightly over 3.0      Anticoagulation Care Providers     Provider Role Specialty Phone number    Alvino Yi MD [facility] [phone]            See the Encounter Report to view Anticoagulation Flowsheet and Dosing Calendar (Go to Encounters tab in chart review, and find the Anticoagulation Therapy Visit)        Shelley Mercado, RN

## 2019-05-13 ENCOUNTER — HOSPITAL ENCOUNTER (OUTPATIENT)
Dept: GENERAL RADIOLOGY | Facility: OTHER | Age: 59
Discharge: HOME | End: 2019-05-13
Attending: ORTHOPAEDIC SURGERY | Admitting: ORTHOPAEDIC SURGERY

## 2019-05-13 ENCOUNTER — OFFICE VISIT (OUTPATIENT)
Dept: FAMILY MEDICINE | Facility: OTHER | Age: 59
End: 2019-05-13
Attending: NURSE PRACTITIONER

## 2019-05-13 ENCOUNTER — OFFICE VISIT (OUTPATIENT)
Dept: ORTHOPEDICS | Facility: OTHER | Age: 59
End: 2019-05-13
Attending: ORTHOPAEDIC SURGERY

## 2019-05-13 VITALS
OXYGEN SATURATION: 97 % | WEIGHT: 315 LBS | RESPIRATION RATE: 20 BRPM | DIASTOLIC BLOOD PRESSURE: 76 MMHG | HEART RATE: 56 BPM | BODY MASS INDEX: 40.43 KG/M2 | SYSTOLIC BLOOD PRESSURE: 110 MMHG | HEIGHT: 74 IN | TEMPERATURE: 97.3 F

## 2019-05-13 DIAGNOSIS — I48.21 PERMANENT ATRIAL FIBRILLATION (H): ICD-10-CM

## 2019-05-13 DIAGNOSIS — I63.411 CEREBROVASCULAR ACCIDENT (CVA) DUE TO EMBOLISM OF RIGHT MIDDLE CEREBRAL ARTERY (H): ICD-10-CM

## 2019-05-13 DIAGNOSIS — G47.33 OBSTRUCTIVE SLEEP APNEA SYNDROME: ICD-10-CM

## 2019-05-13 DIAGNOSIS — I77.89 ASCENDING AORTA ENLARGEMENT (H): ICD-10-CM

## 2019-05-13 DIAGNOSIS — Z01.818 PREOP GENERAL PHYSICAL EXAM: Primary | ICD-10-CM

## 2019-05-13 DIAGNOSIS — S62.102D CLOSED FRACTURE OF LEFT WRIST WITH ROUTINE HEALING, SUBSEQUENT ENCOUNTER: Primary | ICD-10-CM

## 2019-05-13 DIAGNOSIS — Z79.01 LONG-TERM (CURRENT) USE OF ANTICOAGULANTS, INR GOAL 2.0-3.0: ICD-10-CM

## 2019-05-13 DIAGNOSIS — E66.01 MORBID OBESITY WITH BMI OF 50.0-59.9, ADULT (H): ICD-10-CM

## 2019-05-13 DIAGNOSIS — I10 ESSENTIAL HYPERTENSION: ICD-10-CM

## 2019-05-13 LAB
ERYTHROCYTE [DISTWIDTH] IN BLOOD BY AUTOMATED COUNT: 15.5 % (ref 10–15)
HCT VFR BLD AUTO: 48.9 % (ref 40–53)
HGB BLD-MCNC: 15.3 G/DL (ref 13.3–17.7)
MCH RBC QN AUTO: 28.7 PG (ref 26.5–33)
MCHC RBC AUTO-ENTMCNC: 31.3 G/DL (ref 31.5–36.5)
MCV RBC AUTO: 92 FL (ref 78–100)
PLATELET # BLD AUTO: 236 10E9/L (ref 150–450)
RBC # BLD AUTO: 5.34 10E12/L (ref 4.4–5.9)
WBC # BLD AUTO: 7.1 10E9/L (ref 4–11)

## 2019-05-13 PROCEDURE — 99213 OFFICE O/P EST LOW 20 MIN: CPT | Performed by: NURSE PRACTITIONER

## 2019-05-13 PROCEDURE — G0463 HOSPITAL OUTPT CLINIC VISIT: HCPCS | Mod: 25

## 2019-05-13 PROCEDURE — 73110 X-RAY EXAM OF WRIST: CPT | Mod: LT

## 2019-05-13 PROCEDURE — 93005 ELECTROCARDIOGRAM TRACING: CPT | Performed by: NURSE PRACTITIONER

## 2019-05-13 PROCEDURE — 36415 COLL VENOUS BLD VENIPUNCTURE: CPT | Mod: ZL | Performed by: NURSE PRACTITIONER

## 2019-05-13 PROCEDURE — 85027 COMPLETE CBC AUTOMATED: CPT | Mod: ZL | Performed by: NURSE PRACTITIONER

## 2019-05-13 PROCEDURE — 93000 ELECTROCARDIOGRAM COMPLETE: CPT | Performed by: INTERNAL MEDICINE

## 2019-05-13 ASSESSMENT — PAIN SCALES - GENERAL: PAINLEVEL: SEVERE PAIN (6)

## 2019-05-13 ASSESSMENT — MIFFLIN-ST. JEOR: SCORE: 2746.56

## 2019-05-13 NOTE — NURSING NOTE
"Chief Complaint   Patient presents with     Pre-Op Exam     Left wrist 5/14/19         Initial /76   Pulse 56   Temp 97.3  F (36.3  C) (Temporal)   Resp 20   Ht 1.867 m (6' 1.5\")   Wt (!) 186 kg (410 lb)   SpO2 97%   BMI 53.36 kg/m   Estimated body mass index is 53.36 kg/m  as calculated from the following:    Height as of this encounter: 1.867 m (6' 1.5\").    Weight as of this encounter: 186 kg (410 lb).    Medication Reconciliation: complete      Norma J. Gosselin, LPN  "

## 2019-05-13 NOTE — PROGRESS NOTES
[facility]  [address]  [place] [address]  [phone]  Dept: [phone]    PRE-OP EVALUATION:  Today's date: 2019    Babak Moran (: 1960) presents for pre-operative evaluation assessment as requested by Dr. Johnson.  He requires evaluation and anesthesia risk assessment prior to undergoing surgery/procedure for treatment of Left wrist fracture .    Proposed Surgery/ Procedure: Left wrist   Date of Surgery/ Procedure: 19  Time of Surgery/ Procedure:   Hospital/Surgical Facility: [facility]    Primary Physician: Alvino Yi  Type of Anesthesia Anticipated: General    Patient has a Health Care Directive or Living Will:  NO    1. YES - DO YOU HAVE A HISTORY OF HEART ATTACK, STROKE, STENT, BYPASS OR SURGERY ON AN ARTERY IN THE HEAD, NECK, HEART OR LEG? Personal hx of stroke in 2017  2. NO - Do you ever have any pain or discomfort in your chest?  3. NO - Do you have a history of  Heart Failure?  4. YES - ARE YOUR TROUBLED BY SHORTNESS OF BREATH WHEN WALKING ON THE LEVEL, UP A SLIGHT HILL OR AT NIGHT? Up a hill  5. NO - Do you currently have a cold, bronchitis or other respiratory infection?  6. NO - Do you have a cough, shortness of breath or wheezing?  7. NO - Do you sometimes get pains in the calves of your legs when you walk?  8. YES - DO YOU OR ANYONE IN YOUR FAMILY HAVE PREVIOUS HISTORY OF BLOOD CLOTS? Personal, taking warfarin  9. YES - DO YOU OR DOES ANYONE IN YOUR FAMILY HAVE A SERIOUS BLEEDING PROBLEM SUCH AS PROLONGED BLEEDING FOLLOWING SURGERIES OR CUTS? Taking Warfarin  10. NO - Have you ever had problems with anemia or been told to take iron pills?  11. YES - HAVE YOU HAD ANY ABNORMAL BLOOD LOSS SUCH AS BLACK, TARRY OR BLOODY STOOLS, OR ABNORMAL VAGINAL BLEEDING? Personal hx of Bloody stools, internal hemorrhoid.  12. NO - Have you ever had a blood transfusion?  13. NO - Have you or any of your relatives ever had problems with  anesthesia?  14. NO - Do you have sleep apnea, excessive snoring or daytime drowsiness?  15. NO - Do you have any prosthetic heart valves?  16. NO - Do you have prosthetic joints?  17. NO - Is there any chance that you may be pregnant?      HPI:     HPI related to upcoming procedure: Fell and broke and dislocated L wrist 2 weeks ago. Just saw Dr Johnson today in clinic, was determined to need urgent repair of wrist tomorrow at [facility] in [place].     A-FIB - Patient has a longstanding history of chronic A-fib currently on rate control. Current treatment regimen includes Warfarin for stroke prevention and denies significant symptoms of lightheadedness, palpitations or dyspnea.                                                                                                                                                                             .  HYPERTENSION - Patient has longstanding history of HTN , currently denies any symptoms referable to elevated blood pressure. Specifically denies chest pain, palpitations, dyspnea, orthopnea, PND or peripheral edema. Blood pressure readings have been in normal range. Current medication regimen is as listed below. Patient denies any side effects of medication.                                                                                                                                                                 MORBID OBESITY: Weight significantly elevated, up to a high of 410lb today from a documented low of 315lb (2/22/17). Admittedly stays away from the clinic, aside from his INR which he is faithful about checking. Does not exercise, eats a regular diet.     PERSONAL HX OF CVA - on chronic anticoagulation for clot prevention. Will continue warfarin pre and post operatively per Dr Johnson. Has minimal residual effects, most notably a foot drop when tired.     OBSTRUCTIVE SLEEP APNEA - History of, has not been able to tolerate CPAP in the past.    MEDICAL  HISTORY:     Patient Active Problem List    Diagnosis Date Noted     Long term (current) use of anticoagulants 02/11/2018     Priority: Medium     Unspecified atrial fibrillation 02/11/2018     Priority: Medium     Osteoarthrosis 01/16/2018     Priority: Medium     Obesity 01/16/2018     Priority: Medium     Tissue plasminogen activator (t-PA) administered at other facility within 24 hours prior to current admission 01/16/2018     Priority: Medium     Morbid obesity with BMI of 40.0-44.9, adult (H) 02/08/2017     Priority: Medium     Cerebrovascular accident (CVA) due to embolism of right middle cerebral artery (H) 02/03/2017     Priority: Medium     Left hemiparesis (H) 02/03/2017     Priority: Medium     Anticoagulation monitoring, INR range 2-3 07/01/2015     Priority: Medium     Atrial fibrillation (H) 06/25/2015     Priority: Medium     Long-term (current) use of anticoagulants, INR goal 2.0-3.0 06/25/2015     Priority: Medium     Family history of coronary artery disease 05/19/2014     Priority: Medium     Ascending aorta enlargement (H) 02/13/2014     Priority: Medium     Gout 01/07/2014     Priority: Medium     Overview:   Overview:   after 3 attacks in < a year, tapped and crystal proven 5/2/13;  Allopurinol started then got ? Atypical side effect, stopped; (short term colchicine prophylaxis)       Pain in joint, ankle and foot 05/17/2012     Priority: Medium     Weight gain 12/13/2011     Priority: Medium     Essential hypertension 09/08/2009     Priority: Medium     Cutaneous lupus erythematosus 11/12/2008     Priority: Medium     Systemic lupus erythematosus (H) 09/18/2008     Priority: Medium     Overview:   Dermatitis       Hypoglycemia 08/26/2008     Priority: Medium     Overview:   Possible        Past Medical History:   Diagnosis Date     Atrial fibrillation (H) 02/03/2017    on Warfarin     Cerebral infarction due to unspecified occlusion or stenosis of right middle cerebral artery (H) 02/03/2017     ,Left hemiparesis, left neglect, impaired balance and gait following R MCA stroke with mild hemorrhagic transformation s/p tissue plasminogen activator and mechanical thrombectomy, s/p C7 facet fracture from fall off forklift.     Chest pain 02/1997     Disorder of skin or subcutaneous tissue 07/25/2011     Essential (primary) hypertension 02/1997     Fracture of cervical vertebra (H)     02/03/2017,C7 facet fracture from fall off forklift, nondisplaced     Gout      Hemiplegia affecting left nondominant side (H) 02/03/2017     Obesity 02/03/2017    body mass index of 40     Other local lupus erythematosus      Past Surgical History:   Procedure Laterality Date     ARTHROSCOPY KNEE      bilateral knees     COLONOSCOPY  01/02/2012    Normal; Next due 2022     OTHER SURGICAL HISTORY  02/03/2017     Current Outpatient Medications   Medication Sig Dispense Refill     amLODIPine (NORVASC) 10 MG tablet Take 1 tablet (10 mg) by mouth daily 90 tablet 3     hydrochlorothiazide (HYDRODIURIL) 25 MG tablet TAKE 1 TABLET BY MOUTH ONCE DAILY 90 tablet 0     hydrocortisone (CORTAID) 1 % cream        indomethacin (INDOCIN) 50 MG capsule Take 1 capsule (50 mg) by mouth 2 times daily as needed for moderate pain 180 capsule 3     metoprolol succinate (TOPROL-XL) 200 MG 24 hr tablet Take 1 tablet (200 mg) by mouth daily 90 tablet 3     pimecrolimus (ELIDEL) 1 % cream        warfarin (COUMADIN) 5 MG tablet TAKE 2 TABLETS(10MG) X 1 DAY AND 1 1/2 TABLETS(7.5MG) X 6 DAYS/WEEK OR AS DIRECTED BY PROTIME CLINIC 144 tablet 1     traZODone (DESYREL) 50 MG tablet Take 50 mg by mouth       OTC products: NSAIDS and CBD cream on knees    Allergies   Allergen Reactions     Ioxaglate Unknown     Diatrizoate Rash     Levofloxacin Hives and Rash     Metrizamide Rash     (Diagnostic X-Ray materials)     Probenecid Rash      Latex Allergy: NO    Social History     Tobacco Use     Smoking status: Passive Smoke Exposure - Never Smoker     Smokeless  "tobacco: Never Used   Substance Use Topics     Alcohol use: No     History   Drug use: Unknown     Types: Other     Comment: Drug use: No       REVIEW OF SYSTEMS:   CONSTITUTIONAL: NEGATIVE for fever, chills, change in weight  INTEGUMENTARY/SKIN: NEGATIVE for worrisome rashes, moles or lesions  EYES: NEGATIVE for vision changes or irritation  ENT/MOUTH: NEGATIVE for ear, mouth and throat problems  RESP: NEGATIVE for significant cough or SOB  CV: NEGATIVE for chest pain, palpitations or peripheral edema  GI: NEGATIVE for nausea, abdominal pain, heartburn, or change in bowel habits  : NEGATIVE for frequency, dysuria, or hematuria  MUSCULOSKELETAL:POSITIVE  for L wrist pain/fracture/dislocation  NEURO: POSITIVE for paresthesias fingers of L hand  ENDOCRINE: NEGATIVE for temperature intolerance, skin/hair changes  HEME: NEGATIVE for bleeding problems  PSYCHIATRIC: NEGATIVE for changes in mood or affect    EXAM:   /76   Pulse 56   Temp 97.3  F (36.3  C) (Temporal)   Resp 20   Ht 1.867 m (6' 1.5\")   Wt (!) 186 kg (410 lb)   SpO2 97%   BMI 53.36 kg/m      GENERAL APPEARANCE: alert, active, no distress, cooperative and morbidly obese     EYES: EOMI,  PERRL     HENT: ear canals and TM's normal and nose and mouth without ulcers or lesions     NECK: no adenopathy, no asymmetry, masses, or scars and thyroid normal to palpation     RESP: lungs clear to auscultation - no rales, rhonchi or wheezes     CV: irregularly irregular, normal S1 S2, no S3 or S4 and no murmur, click or rub     ABDOMEN:  soft, nontender, no HSM or masses and bowel sounds normal     MS: L wrist in splint     SKIN: no suspicious lesions or rashes     NEURO: Normal strength and tone, sensory exam grossly normal, mentation intact and speech normal     PSYCH: mentation appears normal. and affect normal/bright    Of note, cardiac and lung sounds distant to ausculation. Abd exam significantly limited due to size.     DIAGNOSTICS:   EKG: atrial " fibrillation with premature ventricular or aberrantly conducted complexes, rate 63. Nonspecific ST abnormality. Reviewed with cardiology, stable.     Results for orders placed or performed in visit on 05/13/19   CBC W PLT No Diff   Result Value Ref Range    WBC 7.1 4.0 - 11.0 10e9/L    RBC Count 5.34 4.4 - 5.9 10e12/L    Hemoglobin 15.3 13.3 - 17.7 g/dL    Hematocrit 48.9 40.0 - 53.0 %    MCV 92 78 - 100 fl    MCH 28.7 26.5 - 33.0 pg    MCHC 31.3 (L) 31.5 - 36.5 g/dL    RDW 15.5 (H) 10.0 - 15.0 %    Platelet Count 236 150 - 450 10e9/L       Recent Labs   Lab Test 05/07/19 04/09/19 01/08/19  0913  07/17/18  0831  08/10/17  1809  02/03/17  1124   HGB  --   --   --   --   --   --   --  15.7  --  15.8   PLT  --   --   --   --   --   --   --  234  --  200   INR 3.4* 3.1*   < >  --    < >  --    < >  --    < > 1.2   NA  --   --   --  141  --  141   < >  --    < >  --    POTASSIUM  --   --   --  4.1  --  4.0   < >  --    < >  --    CR  --   --   --  0.98  --  0.84   < >  --    < >  --     < > = values in this interval not displayed.      IMPRESSION:   Reason for surgery/procedure: Fracture and dislocation of L wrist  Diagnosis/reason for consult: Pre-operative clearance    The proposed surgical procedure is considered INTERMEDIATE risk.    REVISED CARDIAC RISK INDEX  The patient has the following serious cardiovascular risks for perioperative complications such as (MI, PE, VFib and 3  AV Block):  Cerebrovascular Disease (TIA or CVA)  INTERPRETATION: 1 risks: Class II (low risk - 0.9% complication rate)    The patient has the following additional risks for perioperative complications:  Morbid obesity  Long-term anticoagulation use      ICD-10-CM    1. Preop general physical exam Z01.818 CBC W PLT No Diff     CBC W PLT No Diff   2. Permanent atrial fibrillation (H) I48.2    3. Long-term (current) use of anticoagulants, INR goal 2.0-3.0 Z79.01    4. Cerebrovascular accident (CVA) due to embolism of right middle cerebral  artery (H) I63.411    5. Morbid obesity with BMI of 50.0-59.9, adult (H) E66.01     Z68.43    6. Ascending aorta enlargement (H) I77.89    7. Essential hypertension I10        RECOMMENDATIONS:       Cardiovascular Risk  Patient is already on a Beta Blocker. Continue Betablocker therapy after surgery, using Beta blocker order set as necessary for NPO status.    Pulmonary Risk  Incentive spirometry post op    Obstructive Sleep Apnea (or suspected sleep apnea)  Hospital staff are advised to monitor for sleep related oxygen desaturations due to suspicion of BARRIE  Has not been able to tolerate CPAP in the past, does not use at home.     --Patient is to take all scheduled medications on the day of surgery EXCEPT for modifications listed below.  Discussed use of warfarin with Dr Johnson, he is not concerned with risk of bleeding as he uses a tourniquet in surgery to control blood loss. Patient will not take any further Motrin until instructed by ortho, will take hydrochlorothiazide and indocin after surgery. Takes coumadin at night.     APPROVAL GIVEN to proceed with proposed procedure, without further diagnostic evaluation       Signed Electronically by: Lili Braun NP    Copy of this evaluation report is provided to requesting physician.    [facility] Preop Guidelines    Revised Cardiac Risk Index

## 2019-05-15 NOTE — PROGRESS NOTES
VITALS:   Height:   76  Weight:   405  Pulse rate:  68  Blood Pressure:  140 / 88      CHIEF COMPLAINT: Left Wrist Pain    PROBLEMS:   Patient has no noted problems.    PATIENT REPORTED MEDICATIONS:  COUMADIN 5 MG ORAL TABLET (WARFARIN SODIUM) ; Route: ORAL  TRAZODONE HCL 50 MG ORAL TABLET (TRAZODONE HCL) ; Route: ORAL  ELIDEL 1 % EXTERNAL CREAM (PIMECROLIMUS) ; Route: EXTERNAL  TOPROL  MG ORAL TABLET EXTENDED RELEASE 24 HOUR (METOPROLOL SUCCINATE) ; Route: ORAL  INDOMETHACIN 50 MG ORAL CAPSULE (INDOMETHACIN) ; Route: ORAL  PLAQUENIL 200 MG ORAL TABLET (HYDROXYCHLOROQUINE SULFATE) ; Route: ORAL  CORTAID MAXIMUM STRENGTH 1 % EXTERNAL CREAM (HYDROCORTISONE) ; Route: EXTERNAL  HYDROCHLOROTHIAZIDE 25 MG ORAL TABLET (HYDROCHLOROTHIAZIDE) ; Route: ORAL  CLOBETASOL PROPIONATE 0.05 % EXTERNAL CREAM (CLOBETASOL PROPIONATE) ; Route: EXTERNAL  NORVASC 10 MG ORAL TABLET (AMLODIPINE BESYLATE) ; Route: ORAL    Medications were reviewed with patient this visit.    PATIENT REPORTED ALLERGIES:  * IOXAGLATE (SevereReaction: Unknown)  * DIATRIZOATE (SevereReaction: Rash)  * LEVOFLOXACIN (SevereReaction: Hives, Rash)  * METRIZAMIDE (SevereReaction: Rash)  * PROBENECID (SevereReaction: Rash)    Allergies were reviewed during this visit.    RISK FACTORS:  Tobacco use:   never smoker  Passive smoke exposure: Yes  Alcohol Use:   No    HISTORY OF PRESENT ILLNESS:    Babak Moran is a 58-year-old gentleman who fell approximately two weeks ago outside of his home.   He had immediate pain and deformity about his left wrist.   He was seen by someone in the clinic, who said that she did not like the look of his left wrist, but that he could probably get by without seeing anybody recently because he was not able to sleep with the pain severity of five to six and throbbing in the wrist.   He finally went to the clinic and had x-rays done of his left wrist.  X-rays revealed that he had a displaced distal radius fracture of his left wrist.     The x-rays were obtained 05/07/19, approximately five days ago.   The patient was placed into a sugar-tong splint at that time and was asked to follow up with orthopedics.  He comes in today for consult.        PMH:   Health history form dated 05/13/19 is reviewed and signed.  Past medical history, surgical history, social history, family history, medications, and review of systems is noted and scanned into the chart.     PAST MEDICAL HISTORY:    High Blood Pressure  Heart Trouble  Bleeding Tendency  Stroke  Gout    PAST ORTHOPEDIC SURGICAL HISTORY:    Knee Arthroscopy    PAST SURGICAL HISTORY:    None    SOCIAL HISTORY:   Marital Status:    Occupation:  Caregiver  Alcohol Use:  No  Tobacco Use:  Never  Secondhand Smoke Exposure:  Yes    REVIEW OF SYSTEMS:  Joint or Muscle pain: Yes  Stiffness:  Yes  Swelling:  Yes  Difficulty in walking: No  Cold extremities: No  Weakness of muscles: No  Rash or Itching: No  Bruising:  Yes  Numbness/Tingling: Yes    PHYSICAL EXAMINATION:    On examination today the patient's arm was not taken out of the splint.  He is a comfortable-appearing gentleman in the clinic today.    He is able to wiggle his fingers without too much difficulty.  He is neurologically and vascularly intact as well.   Body habitus prevents us from really being able to tell that he has a significant amount of deformity about the wrist.       X-RAY:  X-ray examination of the left forearm taken 05/07/19 shows a comminuted distal radius fracture of the left wrist.   It is dorsally angulated, as an estimate about 30  to 45  dorsally.    The wrist is in significant flexion.        ASSESSMENT:    This is a 58-year-old gentleman with a displaced and at this point not reducible left distal radius fracture.    At this point he is not going to do well with this left wrist unless he has a more anatomic alignment of the distal radius.   Being two weeks out he is going to require likely a surgical reduction of  this.    PLAN:    At this point he is not going to do well with this left wrist unless he has a more anatomic alignment of the distal radius.   Being two weeks out he is going to require likely a surgical reduction of this.  All of the risks and benefits of surgical intervention for this were discussed with him.  He does wish to proceed.   We will get him preoperative evaluation as he has a history of a stroke and he needs to be at an INR of about 3, per his cardiologist.    We will get him preopped but he will require surgical intervention for this, which will be done under tourniquet.    I will see him back at the time of surgery.    Dictated by Elliott Johnson M.D.  D:  05/13/19  T:   05/14/19    Typed and/or reviewed and corrected by signing  below, and sent to the Physician for final review and signature.     This report was created using voice recording software and computer-generated templates. Although every effort has been made to review for and eliminate errors, some errors may still occur.     Electronically signed by Beth Gibbs  on 05/14/2019 at 9:22 AM    ________________________________________________________________________

## 2019-05-22 DIAGNOSIS — I10 BENIGN ESSENTIAL HYPERTENSION: ICD-10-CM

## 2019-05-23 ENCOUNTER — TRANSFERRED RECORDS (OUTPATIENT)
Dept: HEALTH INFORMATION MANAGEMENT | Facility: OTHER | Age: 59
End: 2019-05-23

## 2019-05-23 RX ORDER — HYDROCHLOROTHIAZIDE 25 MG/1
TABLET ORAL
Qty: 90 TABLET | Refills: 1 | Status: ON HOLD | OUTPATIENT
Start: 2019-05-23 | End: 2019-09-13

## 2019-05-23 RX ORDER — AMLODIPINE BESYLATE 10 MG/1
TABLET ORAL
Qty: 90 TABLET | Refills: 1 | Status: ON HOLD | OUTPATIENT
Start: 2019-05-23 | End: 2019-09-05

## 2019-05-23 NOTE — TELEPHONE ENCOUNTER
Prescription approved per [facility].  Savannah Gan RN on 5/23/2019 at 12:17 PM

## 2019-05-28 ENCOUNTER — ANTICOAGULATION THERAPY VISIT (OUTPATIENT)
Dept: ANTICOAGULATION | Facility: OTHER | Age: 59
End: 2019-05-28
Attending: FAMILY MEDICINE

## 2019-05-28 DIAGNOSIS — Z79.01 ANTICOAGULATION MONITORING, INR RANGE 2-3: ICD-10-CM

## 2019-05-28 LAB — INR POINT OF CARE: 2.8 (ref 0.86–1.14)

## 2019-05-28 PROCEDURE — 85610 PROTHROMBIN TIME: CPT | Mod: QW

## 2019-05-28 PROCEDURE — 36416 COLLJ CAPILLARY BLOOD SPEC: CPT

## 2019-05-28 NOTE — PROGRESS NOTES
ANTICOAGULATION FOLLOW-UP CLINIC VISIT    Patient Name:  Babak Moran  Date:  2019  Contact Type:  Face to Face    SUBJECTIVE:  Patient Findings         Clinical Outcomes     Negatives:   Major bleeding event, Thromboembolic event, Anticoagulation-related hospital admission, Anticoagulation-related ED visit, Anticoagulation-related fatality           OBJECTIVE    INR Protime   Date Value Ref Range Status   2019 2.8 (A) 0.86 - 1.14 Final       ASSESSMENT / PLAN  INR assessment THER    Recheck INR In: 4 WEEKS    INR Location Clinic      Anticoagulation Summary  As of 2019    INR goal:   2.0-3.0   TTR:   59.9 % (3.9 y)   INR used for dosin.8 (2019)   Warfarin maintenance plan:   10 mg (5 mg x 2) every Mon; 7.5 mg (5 mg x 1.5) all other days   Full warfarin instructions:   10 mg every Mon; 7.5 mg all other days   Weekly warfarin total:   55 mg   No change documented:   Shelley Mercado RN   Plan last modified:   Shelley Mercado RN (2018)   Next INR check:   2019   Priority:   INR   Target end date:   Indefinite    Indications    Unspecified atrial fibrillation [I48.91]  Anticoagulation monitoring  INR range 2-3 [Z79.01]             Anticoagulation Episode Summary     INR check location:       Preferred lab:       Send INR reminders to:    INR    Comments:   Babak prefers to be closer to 3.0 d/t previous CVA check Q 4 weeks.Declines to reduce dose when slightly over 3.0      Anticoagulation Care Providers     Provider Role Specialty Phone number    Alvino Yi MD [facility] [phone]            See the Encounter Report to view Anticoagulation Flowsheet and Dosing Calendar (Go to Encounters tab in chart review, and find the Anticoagulation Therapy Visit)        Shelley Mercado RN

## 2019-06-07 DIAGNOSIS — S62.102D CLOSED FRACTURE OF LEFT WRIST WITH ROUTINE HEALING, SUBSEQUENT ENCOUNTER: Primary | ICD-10-CM

## 2019-06-10 ENCOUNTER — OFFICE VISIT (OUTPATIENT)
Dept: ORTHOPEDICS | Facility: OTHER | Age: 59
End: 2019-06-10
Attending: ORTHOPAEDIC SURGERY

## 2019-06-10 DIAGNOSIS — Z00.00 ROUTINE GENERAL MEDICAL EXAMINATION AT A HEALTH CARE FACILITY: Primary | ICD-10-CM

## 2019-06-10 PROCEDURE — 99024 POSTOP FOLLOW-UP VISIT: CPT | Performed by: ORTHOPAEDIC SURGERY

## 2019-06-13 NOTE — PROGRESS NOTES
CHIEF COMPLAINT: ORIF Left Distal Radius Fracture Recheck    PROBLEMS:  Colles' fracture of left radius, sequela ([id number])    PATIENT REPORTED MEDICATIONS:  COUMADIN 5 MG ORAL TABLET (WARFARIN SODIUM) ; Route: ORAL  TRAZODONE HCL 50 MG ORAL TABLET (TRAZODONE HCL) ; Route: ORAL  ELIDEL 1 % EXTERNAL CREAM (PIMECROLIMUS) ; Route: EXTERNAL  TOPROL  MG ORAL TABLET EXTENDED RELEASE 24 HOUR (METOPROLOL SUCCINATE) ; Route: ORAL  INDOMETHACIN 50 MG ORAL CAPSULE (INDOMETHACIN) ; Route: ORAL  PLAQUENIL 200 MG ORAL TABLET (HYDROXYCHLOROQUINE SULFATE) ; Route: ORAL  CORTAID MAXIMUM STRENGTH 1 % EXTERNAL CREAM (HYDROCORTISONE) ; Route: EXTERNAL  HYDROCHLOROTHIAZIDE 25 MG ORAL TABLET (HYDROCHLOROTHIAZIDE) ; Route: ORAL  CLOBETASOL PROPIONATE 0.05 % EXTERNAL CREAM (CLOBETASOL PROPIONATE) ; Route: EXTERNAL  NORVASC 10 MG ORAL TABLET (AMLODIPINE BESYLATE) ; Route: ORAL    PATIENT REPORTED ALLERGIES:  * IOXAGLATE (SevereReaction: Unknown)  * DIATRIZOATE (SevereReaction: Rash)  * LEVOFLOXACIN (SevereReaction: Hives, Rash)  * METRIZAMIDE (SevereReaction: Rash)  * PROBENECID (SevereReaction: Rash)      HISTORY OF PRESENT ILLNESS:    The patient is four weeks status post open reduction internal fixation of a left distal radius fracture.  He is doing really well.  He is wearing a removable splint at this point.  He does not want x-rays today because of the cost associated with that.  He also is not interested in doing physical therapy secondary to the cost associated with that.    PAST MEDICAL HISTORY:    High Blood Pressure  Heart Trouble  Bleeding Tendency  Stroke  Gout    PAST ORTHOPEDIC SURGICAL HISTORY:    Left Wrist ORIF 5/14/19, Elliott Johnson MD  Knee Arthroscopy    PAST SURGICAL HISTORY:    None    SOCIAL HISTORY:   Marital Status:    Occupation:  Caregiver  Alcohol Use:  No  Tobacco Use:  Never  Secondhand Smoke Exposure:  Yes    PHYSICAL EXAMINATION:    On examination today his wound has healed nicely.  The  wrist appears completely benign.  He has about 30 degrees of dorsiflexion, 30 degrees of volar flexion and 10 degrees of ulnar and radial deviation.  He has almost 180 degrees actually of pronation and supination at this point.      ASSESSMENT:    Four weeks status post open reduction internal fixation of a left distal radius fracture.  He is doing very well with this.  No real issues at all.    PLAN:   We are going to see him back in three more weeks for a final follow up.    Dictated by Elliott Johnson MD  cc:  MD Dr. Alex Wray at [facility]    D:  06/10/2019  T:  06/13/2019    Typed and/or reviewed and corrected by signing  below, and sent to the Physician for final review and signature.    This report was created using voice recording software and computer-generated templates. Although every effort has been made to review for and eliminate errors, some errors may still occur.         Electronically signed by Alec Romero -  on 06/13/2019 at 11:44 AM    Electronically signed by Elliott Johnson MD on 06/13/2019 at 1:43 PM

## 2019-06-25 ENCOUNTER — ANTICOAGULATION THERAPY VISIT (OUTPATIENT)
Dept: ANTICOAGULATION | Facility: OTHER | Age: 59
End: 2019-06-25
Attending: FAMILY MEDICINE

## 2019-06-25 DIAGNOSIS — Z79.01 ANTICOAGULATION MONITORING, INR RANGE 2-3: ICD-10-CM

## 2019-06-25 LAB — INR POINT OF CARE: 2.6 (ref 0.86–1.14)

## 2019-06-25 PROCEDURE — 36416 COLLJ CAPILLARY BLOOD SPEC: CPT

## 2019-06-25 PROCEDURE — 85610 PROTHROMBIN TIME: CPT | Mod: QW

## 2019-06-25 NOTE — PROGRESS NOTES
ANTICOAGULATION FOLLOW-UP CLINIC VISIT    Patient Name:  Babak Moran  Date:  2019  Contact Type:  Face to Face    SUBJECTIVE:  Patient Findings     Comments:   The patient was assessed for diet, medication, and activity level changes, missed or extra doses, bruising or bleeding, with no problem findings.          Clinical Outcomes     Negatives:   Major bleeding event, Thromboembolic event, Anticoagulation-related hospital admission, Anticoagulation-related ED visit, Anticoagulation-related fatality    Comments:   The patient was assessed for diet, medication, and activity level changes, missed or extra doses, bruising or bleeding, with no problem findings.             OBJECTIVE    INR Protime   Date Value Ref Range Status   2019 2.6 (A) 0.86 - 1.14 Final       ASSESSMENT / PLAN  INR assessment THER    Recheck INR In: 4 WEEKS    INR Location Clinic      Anticoagulation Summary  As of 2019    INR goal:   2.0-3.0   TTR:   60.7 % (4 y)   INR used for dosin.6 (2019)   Warfarin maintenance plan:   10 mg (5 mg x 2) every Mon; 7.5 mg (5 mg x 1.5) all other days   Full warfarin instructions:   10 mg every Mon; 7.5 mg all other days   Weekly warfarin total:   55 mg   No change documented:   Shelley Mercado RN   Plan last modified:   Shelley Mercado RN (2018)   Next INR check:   2019   Priority:   INR   Target end date:   Indefinite    Indications    Unspecified atrial fibrillation [I48.91]  Anticoagulation monitoring  INR range 2-3 [Z79.01]             Anticoagulation Episode Summary     INR check location:       Preferred lab:       Send INR reminders to:    INR    Comments:   Babak prefers to be closer to 3.0 d/t previous CVA check Q 4 weeks.Declines to reduce dose when slightly over 3.0      Anticoagulation Care Providers     Provider Role Specialty Phone number    Alvino Yi MD [facility] [phone]            See the Encounter Report to view  Anticoagulation Flowsheet and Dosing Calendar (Go to Encounters tab in chart review, and find the Anticoagulation Therapy Visit)        Shelley Mercado RN

## 2019-07-15 ENCOUNTER — HOSPITAL ENCOUNTER (OUTPATIENT)
Dept: GENERAL RADIOLOGY | Facility: OTHER | Age: 59
Discharge: HOME | End: 2019-07-15
Attending: ORTHOPAEDIC SURGERY | Admitting: ORTHOPAEDIC SURGERY

## 2019-07-15 ENCOUNTER — OFFICE VISIT (OUTPATIENT)
Dept: ORTHOPEDICS | Facility: OTHER | Age: 59
End: 2019-07-15
Attending: ORTHOPAEDIC SURGERY

## 2019-07-15 DIAGNOSIS — Z00.00 ROUTINE GENERAL MEDICAL EXAMINATION AT A HEALTH CARE FACILITY: Primary | ICD-10-CM

## 2019-07-15 DIAGNOSIS — S62.102D CLOSED FRACTURE OF LEFT WRIST WITH ROUTINE HEALING, SUBSEQUENT ENCOUNTER: ICD-10-CM

## 2019-07-15 PROCEDURE — 99024 POSTOP FOLLOW-UP VISIT: CPT | Performed by: ORTHOPAEDIC SURGERY

## 2019-07-15 PROCEDURE — 73110 X-RAY EXAM OF WRIST: CPT | Mod: LT

## 2019-07-22 NOTE — PROGRESS NOTES
CHIEF COMPLAINT: ORIF Left Distal Radius Fracture Recheck    PROBLEMS:  Colles' fracture of left radius, sequela ([id number])    PATIENT REPORTED MEDICATIONS:  COUMADIN 5 MG ORAL TABLET (WARFARIN SODIUM) ; Route: ORAL  TRAZODONE HCL 50 MG ORAL TABLET (TRAZODONE HCL) ; Route: ORAL  ELIDEL 1 % EXTERNAL CREAM (PIMECROLIMUS) ; Route: EXTERNAL  TOPROL  MG ORAL TABLET EXTENDED RELEASE 24 HOUR (METOPROLOL SUCCINATE) ; Route: ORAL  INDOMETHACIN 50 MG ORAL CAPSULE (INDOMETHACIN) ; Route: ORAL  PLAQUENIL 200 MG ORAL TABLET (HYDROXYCHLOROQUINE SULFATE) ; Route: ORAL  CORTAID MAXIMUM STRENGTH 1 % EXTERNAL CREAM (HYDROCORTISONE) ; Route: EXTERNAL  HYDROCHLOROTHIAZIDE 25 MG ORAL TABLET (HYDROCHLOROTHIAZIDE) ; Route: ORAL  CLOBETASOL PROPIONATE 0.05 % EXTERNAL CREAM (CLOBETASOL PROPIONATE) ; Route: EXTERNAL  NORVASC 10 MG ORAL TABLET (AMLODIPINE BESYLATE) ; Route: ORAL    PATIENT REPORTED ALLERGIES:  * IOXAGLATE (SevereReaction: Unknown)  * DIATRIZOATE (SevereReaction: Rash)  * LEVOFLOXACIN (SevereReaction: Hives, Rash)  * METRIZAMIDE (SevereReaction: Rash)  * PROBENECID (SevereReaction: Rash)      HISTORY OF PRESENT ILLNESS:    He is nine weeks status post open reduction internal fixation of a left distal radius fracture.  He is doing very well at this time.    PAST MEDICAL HISTORY:    High Blood Pressure  Heart Trouble  Bleeding Tendency  Stroke  Gout    PAST ORTHOPEDIC SURGICAL HISTORY:    Left Wrist ORIF 5/14/19, Elliott Johnson MD  Knee Arthroscopy    PAST SURGICAL HISTORY:    None    SOCIAL HISTORY:   Marital Status:    Occupation:  Caregiver  Alcohol Use:  No  Tobacco Use:  Never  Secondhand Smoke Exposure:  Yes    PHYSICAL EXAMINATION:    He has full range of motion of his wrist.  No significant abnormalities are really appreciated.  His wound is well-healed.    ASSESSMENT:    Nine weeks status post left distal radius fracture.    PLAN:   I am going to see him back on an as needed basis.  He seems to be  doing just fine.    Dictated by Elliott Johnson MD  cc:  MD Dr. Alex Wray at [facility]    D:  07/15/2019  T:  07/19/2019    Typed and/or reviewed and corrected by signing  below, and sent to the Physician for final review and signature.    This report was created using voice recording software and computer-generated templates. Although every effort has been made to review for and eliminate errors, some errors may still occur.

## 2019-07-23 ENCOUNTER — ANTICOAGULATION THERAPY VISIT (OUTPATIENT)
Dept: ANTICOAGULATION | Facility: OTHER | Age: 59
End: 2019-07-23
Attending: FAMILY MEDICINE

## 2019-07-23 DIAGNOSIS — Z79.01 ANTICOAGULATION MONITORING, INR RANGE 2-3: ICD-10-CM

## 2019-07-23 LAB — INR POINT OF CARE: 2.5 (ref 0.86–1.14)

## 2019-07-23 PROCEDURE — 36416 COLLJ CAPILLARY BLOOD SPEC: CPT

## 2019-07-23 PROCEDURE — 85610 PROTHROMBIN TIME: CPT | Mod: QW

## 2019-07-23 NOTE — PROGRESS NOTES
ANTICOAGULATION FOLLOW-UP CLINIC VISIT    Patient Name:  Babak Moran  Date:  2019  Contact Type:  Face to Face    SUBJECTIVE:  Patient Findings     Comments:   The patient was assessed for diet, medication, and activity level changes, missed or extra doses, bruising or bleeding, with no problem findings.        Clinical Outcomes     Negatives:   Major bleeding event, Thromboembolic event, Anticoagulation-related hospital admission, Anticoagulation-related ED visit, Anticoagulation-related fatality    Comments:   The patient was assessed for diet, medication, and activity level changes, missed or extra doses, bruising or bleeding, with no problem findings.           OBJECTIVE    INR Protime   Date Value Ref Range Status   2019 2.5 (A) 0.86 - 1.14 Final       ASSESSMENT / PLAN  INR assessment THER    Recheck INR In: 4 WEEKS    INR Location Clinic      Anticoagulation Summary  As of 2019    INR goal:   2.0-3.0   TTR:   61.5 % (4 y)   INR used for dosin.5 (2019)   Warfarin maintenance plan:   10 mg (5 mg x 2) every Mon; 7.5 mg (5 mg x 1.5) all other days   Full warfarin instructions:   10 mg every Mon; 7.5 mg all other days   Weekly warfarin total:   55 mg   Plan last modified:   Shelley Mercado RN (2018)   Next INR check:   2019   Priority:   INR   Target end date:   Indefinite    Indications    Unspecified atrial fibrillation [I48.91]  Anticoagulation monitoring  INR range 2-3 [Z79.01]             Anticoagulation Episode Summary     INR check location:       Preferred lab:       Send INR reminders to:    INR    Comments:   Babak prefers to be closer to 3.0 d/t previous CVA check Q 4 weeks.Declines to reduce dose when slightly over 3.0      Anticoagulation Care Providers     Provider Role Specialty Phone number    Alvino Yi MD [facility] [phone]            See the Encounter Report to view Anticoagulation Flowsheet and Dosing Calendar (Go to  Encounters tab in chart review, and find the Anticoagulation Therapy Visit)      Dorcas Jonas RN

## 2019-08-20 ENCOUNTER — ANTICOAGULATION THERAPY VISIT (OUTPATIENT)
Dept: ANTICOAGULATION | Facility: OTHER | Age: 59
End: 2019-08-20

## 2019-08-20 DIAGNOSIS — Z79.01 ANTICOAGULATION MONITORING, INR RANGE 2-3: ICD-10-CM

## 2019-08-20 LAB — INR POINT OF CARE: 3 (ref 0.86–1.14)

## 2019-08-20 PROCEDURE — 85610 PROTHROMBIN TIME: CPT | Mod: QW

## 2019-08-20 PROCEDURE — 36416 COLLJ CAPILLARY BLOOD SPEC: CPT

## 2019-08-20 NOTE — PROGRESS NOTES
ANTICOAGULATION FOLLOW-UP CLINIC VISIT    Patient Name:  Babak Moran  Date:  8/20/2019  Contact Type:  Face to Face    SUBJECTIVE:  Patient Findings         Clinical Outcomes     Negatives:   Major bleeding event, Thromboembolic event, Anticoagulation-related hospital admission, Anticoagulation-related ED visit, Anticoagulation-related fatality           OBJECTIVE    INR Protime   Date Value Ref Range Status   08/20/2019 3.0 (A) 0.86 - 1.14 Final       ASSESSMENT / PLAN  INR assessment THER    Recheck INR In: 4 WEEKS    INR Location Clinic      Anticoagulation Summary  As of 8/20/2019    INR goal:   2.0-3.0   TTR:   62.2 % (4.1 y)   INR used for dosing:   3.0 (8/20/2019)   Warfarin maintenance plan:   10 mg (5 mg x 2) every Mon; 7.5 mg (5 mg x 1.5) all other days   Full warfarin instructions:   10 mg every Mon; 7.5 mg all other days   Weekly warfarin total:   55 mg   No change documented:   Shelley Mercado RN   Plan last modified:   Shelley Mercado RN (9/11/2018)   Next INR check:   9/17/2019   Priority:   INR   Target end date:   Indefinite    Indications    Unspecified atrial fibrillation [I48.91]  Anticoagulation monitoring  INR range 2-3 [Z79.01]             Anticoagulation Episode Summary     INR check location:       Preferred lab:       Send INR reminders to:    INR    Comments:   Babak prefers to be closer to 3.0 d/t previous CVA check Q 4 weeks.Declines to reduce dose when slightly over 3.0      Anticoagulation Care Providers     Provider Role Specialty Phone number    Alvino Yi MD [facility] [phone]            See the Encounter Report to view Anticoagulation Flowsheet and Dosing Calendar (Go to Encounters tab in chart review, and find the Anticoagulation Therapy Visit)        Shelley Mercado RN

## 2019-09-05 ENCOUNTER — HOSPITAL ENCOUNTER (EMERGENCY)
Facility: OTHER | Age: 59
Discharge: HOME | DRG: 872 | End: 2019-09-05
Attending: FAMILY MEDICINE

## 2019-09-05 ENCOUNTER — APPOINTMENT (OUTPATIENT)
Dept: ULTRASOUND IMAGING | Facility: OTHER | Age: 59
DRG: 872 | End: 2019-09-05
Attending: FAMILY MEDICINE

## 2019-09-05 ENCOUNTER — NURSE TRIAGE (OUTPATIENT)
Dept: FAMILY MEDICINE | Facility: OTHER | Age: 59
End: 2019-09-05

## 2019-09-05 ENCOUNTER — HOSPITAL ENCOUNTER (INPATIENT)
Facility: OTHER | Age: 59
LOS: 8 days | Discharge: HOME | DRG: 872 | End: 2019-09-13
Attending: FAMILY MEDICINE | Admitting: FAMILY MEDICINE

## 2019-09-05 VITALS
OXYGEN SATURATION: 93 % | TEMPERATURE: 98.1 F | DIASTOLIC BLOOD PRESSURE: 96 MMHG | RESPIRATION RATE: 18 BRPM | SYSTOLIC BLOOD PRESSURE: 145 MMHG

## 2019-09-05 DIAGNOSIS — Z86.73 PERSONAL HISTORY OF TRANSIENT CEREBRAL ISCHEMIA: ICD-10-CM

## 2019-09-05 DIAGNOSIS — Z79.899 ENCOUNTER FOR LONG-TERM (CURRENT) USE OF OTHER MEDICATIONS: ICD-10-CM

## 2019-09-05 DIAGNOSIS — L03.116 CELLULITIS OF LEFT LOWER EXTREMITY: ICD-10-CM

## 2019-09-05 DIAGNOSIS — M32.9 SYSTEMIC LUPUS ERYTHEMATOSUS, UNSPECIFIED SLE TYPE, UNSPECIFIED ORGAN INVOLVEMENT STATUS (H): ICD-10-CM

## 2019-09-05 DIAGNOSIS — I48.91 ATRIAL FIBRILLATION WITH RVR (H): ICD-10-CM

## 2019-09-05 DIAGNOSIS — M79.89 SWELLING OF LIMB: ICD-10-CM

## 2019-09-05 DIAGNOSIS — I48.91 ATRIAL FIBRILLATION, UNSPECIFIED TYPE (H): ICD-10-CM

## 2019-09-05 DIAGNOSIS — A09 DIARRHEA OF INFECTIOUS ORIGIN: Primary | ICD-10-CM

## 2019-09-05 DIAGNOSIS — I10 ESSENTIAL HYPERTENSION, BENIGN: ICD-10-CM

## 2019-09-05 DIAGNOSIS — Z79.01 LONG TERM (CURRENT) USE OF ANTICOAGULANTS: ICD-10-CM

## 2019-09-05 DIAGNOSIS — L03.116 LEFT LEG CELLULITIS: ICD-10-CM

## 2019-09-05 PROBLEM — L03.90 CELLULITIS: Status: ACTIVE | Noted: 2019-09-05

## 2019-09-05 LAB
ANION GAP SERPL CALCULATED.3IONS-SCNC: 10 MMOL/L (ref 3–14)
APTT PPP: 48 SEC (ref 22–37)
BASOPHILS # BLD AUTO: 0 10E9/L (ref 0–0.2)
BASOPHILS NFR BLD AUTO: 0.2 %
BUN SERPL-MCNC: 27 MG/DL (ref 7–25)
C DIFF TOX B STL QL: NEGATIVE
CALCIUM SERPL-MCNC: 9.3 MG/DL (ref 8.6–10.3)
CHLORIDE SERPL-SCNC: 95 MMOL/L (ref 98–107)
CO2 SERPL-SCNC: 30 MMOL/L (ref 21–31)
CREAT SERPL-MCNC: 1.09 MG/DL (ref 0.7–1.3)
CRP SERPL-MCNC: 25.6 MG/L
D DIMER PPP DDU-MCNC: 243 NG/ML D-DU (ref 0–230)
DIFFERENTIAL METHOD BLD: ABNORMAL
EOSINOPHIL # BLD AUTO: 0 10E9/L (ref 0–0.7)
EOSINOPHIL NFR BLD AUTO: 0 %
ERYTHROCYTE [DISTWIDTH] IN BLOOD BY AUTOMATED COUNT: 16.1 % (ref 10–15)
GFR SERPL CREATININE-BSD FRML MDRD: 69 ML/MIN/{1.73_M2}
GLUCOSE SERPL-MCNC: 109 MG/DL (ref 70–105)
HCT VFR BLD AUTO: 50.8 % (ref 40–53)
HGB BLD-MCNC: 16.4 G/DL (ref 13.3–17.7)
IMM GRANULOCYTES # BLD: 0.1 10E9/L (ref 0–0.4)
IMM GRANULOCYTES NFR BLD: 0.5 %
INR PPP: 3.58 (ref 0–1.3)
LACTATE SERPL-SCNC: 1.5 MMOL/L (ref 0.5–2.2)
LYMPHOCYTES # BLD AUTO: 0.9 10E9/L (ref 0.8–5.3)
LYMPHOCYTES NFR BLD AUTO: 5.5 %
MCH RBC QN AUTO: 28.3 PG (ref 26.5–33)
MCHC RBC AUTO-ENTMCNC: 32.3 G/DL (ref 31.5–36.5)
MCV RBC AUTO: 88 FL (ref 78–100)
MONOCYTES # BLD AUTO: 1.5 10E9/L (ref 0–1.3)
MONOCYTES NFR BLD AUTO: 8.9 %
NEUTROPHILS # BLD AUTO: 14.6 10E9/L (ref 1.6–8.3)
NEUTROPHILS NFR BLD AUTO: 84.9 %
PLATELET # BLD AUTO: 228 10E9/L (ref 150–450)
POTASSIUM SERPL-SCNC: 3.2 MMOL/L (ref 3.5–5.1)
POTASSIUM SERPL-SCNC: 3.2 MMOL/L (ref 3.5–5.1)
PROCALCITONIN SERPL-MCNC: 1.5 NG/ML
RBC # BLD AUTO: 5.79 10E12/L (ref 4.4–5.9)
SODIUM SERPL-SCNC: 135 MMOL/L (ref 134–144)
SPECIMEN SOURCE: NORMAL
WBC # BLD AUTO: 17.2 10E9/L (ref 4–11)

## 2019-09-05 PROCEDURE — 99285 EMERGENCY DEPT VISIT HI MDM: CPT | Mod: 25 | Performed by: FAMILY MEDICINE

## 2019-09-05 PROCEDURE — 80048 BASIC METABOLIC PNL TOTAL CA: CPT | Performed by: FAMILY MEDICINE

## 2019-09-05 PROCEDURE — 85379 FIBRIN DEGRADATION QUANT: CPT | Performed by: FAMILY MEDICINE

## 2019-09-05 PROCEDURE — 87040 BLOOD CULTURE FOR BACTERIA: CPT | Performed by: FAMILY MEDICINE

## 2019-09-05 PROCEDURE — 87493 C DIFF AMPLIFIED PROBE: CPT | Performed by: FAMILY MEDICINE

## 2019-09-05 PROCEDURE — 25000128 H RX IP 250 OP 636: Performed by: FAMILY MEDICINE

## 2019-09-05 PROCEDURE — 85610 PROTHROMBIN TIME: CPT | Performed by: FAMILY MEDICINE

## 2019-09-05 PROCEDURE — 84145 PROCALCITONIN (PCT): CPT | Performed by: FAMILY MEDICINE

## 2019-09-05 PROCEDURE — 25000132 ZZH RX MED GY IP 250 OP 250 PS 637: Performed by: FAMILY MEDICINE

## 2019-09-05 PROCEDURE — 25800030 ZZH RX IP 258 OP 636: Performed by: FAMILY MEDICINE

## 2019-09-05 PROCEDURE — 93005 ELECTROCARDIOGRAM TRACING: CPT | Performed by: FAMILY MEDICINE

## 2019-09-05 PROCEDURE — 83605 ASSAY OF LACTIC ACID: CPT | Performed by: FAMILY MEDICINE

## 2019-09-05 PROCEDURE — 84132 ASSAY OF SERUM POTASSIUM: CPT | Performed by: FAMILY MEDICINE

## 2019-09-05 PROCEDURE — 99285 EMERGENCY DEPT VISIT HI MDM: CPT | Mod: Z6 | Performed by: FAMILY MEDICINE

## 2019-09-05 PROCEDURE — 93010 ELECTROCARDIOGRAM REPORT: CPT | Performed by: INTERNAL MEDICINE

## 2019-09-05 PROCEDURE — 99222 1ST HOSP IP/OBS MODERATE 55: CPT | Performed by: FAMILY MEDICINE

## 2019-09-05 PROCEDURE — 86140 C-REACTIVE PROTEIN: CPT | Performed by: FAMILY MEDICINE

## 2019-09-05 PROCEDURE — 36415 COLL VENOUS BLD VENIPUNCTURE: CPT | Performed by: FAMILY MEDICINE

## 2019-09-05 PROCEDURE — 93971 EXTREMITY STUDY: CPT | Mod: LT

## 2019-09-05 PROCEDURE — 12000000 ZZH R&B MED SURG/OB

## 2019-09-05 PROCEDURE — 85025 COMPLETE CBC W/AUTO DIFF WBC: CPT | Performed by: FAMILY MEDICINE

## 2019-09-05 PROCEDURE — 85730 THROMBOPLASTIN TIME PARTIAL: CPT | Performed by: FAMILY MEDICINE

## 2019-09-05 RX ORDER — POTASSIUM CHLORIDE 1500 MG/1
20-40 TABLET, EXTENDED RELEASE ORAL
Status: DISCONTINUED | OUTPATIENT
Start: 2019-09-05 | End: 2019-09-13 | Stop reason: HOSPADM

## 2019-09-05 RX ORDER — HYDROCHLOROTHIAZIDE 25 MG/1
25 TABLET ORAL DAILY
Status: CANCELLED | OUTPATIENT
Start: 2019-09-05

## 2019-09-05 RX ORDER — NALOXONE HYDROCHLORIDE 0.4 MG/ML
.1-.4 INJECTION, SOLUTION INTRAMUSCULAR; INTRAVENOUS; SUBCUTANEOUS
Status: CANCELLED | OUTPATIENT
Start: 2019-09-05

## 2019-09-05 RX ORDER — LIDOCAINE 40 MG/G
CREAM TOPICAL
Status: DISCONTINUED | OUTPATIENT
Start: 2019-09-05 | End: 2019-09-13 | Stop reason: HOSPADM

## 2019-09-05 RX ORDER — AMLODIPINE BESYLATE 10 MG/1
10 TABLET ORAL DAILY
Status: DISCONTINUED | OUTPATIENT
Start: 2019-09-05 | End: 2019-09-06

## 2019-09-05 RX ORDER — ACETAMINOPHEN 650 MG/1
650 SUPPOSITORY RECTAL EVERY 4 HOURS PRN
Status: DISCONTINUED | OUTPATIENT
Start: 2019-09-05 | End: 2019-09-06

## 2019-09-05 RX ORDER — POTASSIUM CHLORIDE 7.45 MG/ML
10 INJECTION INTRAVENOUS
Status: DISCONTINUED | OUTPATIENT
Start: 2019-09-05 | End: 2019-09-13 | Stop reason: HOSPADM

## 2019-09-05 RX ORDER — TRAZODONE HYDROCHLORIDE 50 MG/1
50 TABLET, FILM COATED ORAL AT BEDTIME
Status: DISCONTINUED | OUTPATIENT
Start: 2019-09-05 | End: 2019-09-06

## 2019-09-05 RX ORDER — NALOXONE HYDROCHLORIDE 0.4 MG/ML
.1-.4 INJECTION, SOLUTION INTRAMUSCULAR; INTRAVENOUS; SUBCUTANEOUS
Status: DISCONTINUED | OUTPATIENT
Start: 2019-09-05 | End: 2019-09-13 | Stop reason: HOSPADM

## 2019-09-05 RX ORDER — METOPROLOL SUCCINATE 100 MG/1
200 TABLET, EXTENDED RELEASE ORAL DAILY
Status: CANCELLED | OUTPATIENT
Start: 2019-09-05

## 2019-09-05 RX ORDER — LIDOCAINE 40 MG/G
CREAM TOPICAL
Status: CANCELLED | OUTPATIENT
Start: 2019-09-05

## 2019-09-05 RX ORDER — METOPROLOL SUCCINATE 100 MG/1
200 TABLET, EXTENDED RELEASE ORAL DAILY
Status: DISCONTINUED | OUTPATIENT
Start: 2019-09-05 | End: 2019-09-13 | Stop reason: HOSPADM

## 2019-09-05 RX ORDER — TRAZODONE HYDROCHLORIDE 50 MG/1
50 TABLET, FILM COATED ORAL AT BEDTIME
Status: CANCELLED | OUTPATIENT
Start: 2019-09-05

## 2019-09-05 RX ORDER — ACETAMINOPHEN 325 MG/1
650 TABLET ORAL EVERY 4 HOURS PRN
Status: DISCONTINUED | OUTPATIENT
Start: 2019-09-05 | End: 2019-09-13 | Stop reason: HOSPADM

## 2019-09-05 RX ORDER — HYDROCHLOROTHIAZIDE 25 MG/1
25 TABLET ORAL DAILY
Status: DISCONTINUED | OUTPATIENT
Start: 2019-09-05 | End: 2019-09-06

## 2019-09-05 RX ORDER — AMLODIPINE BESYLATE 5 MG/1
10 TABLET ORAL DAILY
Status: CANCELLED | OUTPATIENT
Start: 2019-09-05

## 2019-09-05 RX ORDER — HYDROCODONE BITARTRATE AND ACETAMINOPHEN 5; 325 MG/1; MG/1
1-2 TABLET ORAL EVERY 4 HOURS PRN
Status: DISCONTINUED | OUTPATIENT
Start: 2019-09-05 | End: 2019-09-13 | Stop reason: HOSPADM

## 2019-09-05 RX ADMIN — POTASSIUM CHLORIDE 20 MEQ: 20 TABLET, EXTENDED RELEASE ORAL at 17:35

## 2019-09-05 RX ADMIN — TAZOBACTAM SODIUM AND PIPERACILLIN SODIUM 4.5 G: 500; 4 INJECTION, SOLUTION INTRAVENOUS at 13:27

## 2019-09-05 RX ADMIN — VANCOMYCIN HYDROCHLORIDE 2000 MG: 1 INJECTION, POWDER, LYOPHILIZED, FOR SOLUTION INTRAVENOUS at 13:54

## 2019-09-05 RX ADMIN — METOPROLOL SUCCINATE 200 MG: 100 TABLET, EXTENDED RELEASE ORAL at 13:54

## 2019-09-05 RX ADMIN — TAZOBACTAM SODIUM AND PIPERACILLIN SODIUM 4.5 G: 500; 4 INJECTION, SOLUTION INTRAVENOUS at 21:32

## 2019-09-05 RX ADMIN — ACETAMINOPHEN 650 MG: 325 TABLET, FILM COATED ORAL at 14:14

## 2019-09-05 RX ADMIN — WARFARIN SODIUM 7.5 MG: 2.5 TABLET ORAL at 17:35

## 2019-09-05 RX ADMIN — HYDROCHLOROTHIAZIDE 25 MG: 25 TABLET ORAL at 13:54

## 2019-09-05 RX ADMIN — POTASSIUM CHLORIDE 40 MEQ: 20 TABLET, EXTENDED RELEASE ORAL at 15:26

## 2019-09-05 RX ADMIN — ACETAMINOPHEN 650 MG: 325 TABLET, FILM COATED ORAL at 21:40

## 2019-09-05 RX ADMIN — AMLODIPINE BESYLATE 10 MG: 10 TABLET ORAL at 13:54

## 2019-09-05 RX ADMIN — POTASSIUM CHLORIDE 40 MEQ: 20 TABLET, EXTENDED RELEASE ORAL at 23:33

## 2019-09-05 ASSESSMENT — ENCOUNTER SYMPTOMS
NEUROLOGICAL NEGATIVE: 1
HEMATOLOGIC/LYMPHATIC NEGATIVE: 1
GASTROINTESTINAL NEGATIVE: 1
FEVER: 1
WHEEZING: 0
COLOR CHANGE: 1
PSYCHIATRIC NEGATIVE: 1
STRIDOR: 0
CHEST TIGHTNESS: 0
SHORTNESS OF BREATH: 0

## 2019-09-05 ASSESSMENT — ACTIVITIES OF DAILY LIVING (ADL)
SWALLOWING: 0-->SWALLOWS FOODS/LIQUIDS WITHOUT DIFFICULTY
BATHING: 0-->INDEPENDENT
COGNITION: 0 - NO COGNITION ISSUES REPORTED
RETIRED_EATING: 0-->INDEPENDENT
ADLS_ACUITY_SCORE: 13
AMBULATION: 0-->INDEPENDENT
DRESS: 0-->INDEPENDENT
FALL_HISTORY_WITHIN_LAST_SIX_MONTHS: NO
ADLS_ACUITY_SCORE: 11
RETIRED_COMMUNICATION: 0-->UNDERSTANDS/COMMUNICATES WITHOUT DIFFICULTY
TRANSFERRING: 0-->INDEPENDENT
TOILETING: 0-->INDEPENDENT

## 2019-09-05 ASSESSMENT — MIFFLIN-ST. JEOR: SCORE: 2648.88

## 2019-09-05 NOTE — ED PROVIDER NOTES
History     Chief Complaint   Patient presents with     Leg Pain     HPI  Babak Moran is a 59 year old male who presents with increased redness and swelling of the left leg starting about 2 days . Has also had a fever up to 100.3 . Has not taken tylenol or ibuprofen. . NO history of diabetes. NO history of cellulitis No shortness of breath . NO chest pain .  Is allergic to levaquin . Has history of stroke . No history of PE . NO history of long road trips, plane travel, or recent surgery . No recent trauma to the leg. NO chills or sweats.      Allergies:  Allergies   Allergen Reactions     Ioxaglate Unknown     Diatrizoate Rash     Levofloxacin Hives and Rash     Metrizamide Rash     (Diagnostic X-Ray materials)     Probenecid Rash       Problem List:    Patient Active Problem List    Diagnosis Date Noted     Obstructive sleep apnea syndrome 05/13/2019     Priority: Medium     Long term (current) use of anticoagulants 02/11/2018     Priority: Medium     Unspecified atrial fibrillation 02/11/2018     Priority: Medium     Osteoarthrosis 01/16/2018     Priority: Medium     Obesity 01/16/2018     Priority: Medium     Tissue plasminogen activator (t-PA) administered at other facility within 24 hours prior to current admission 01/16/2018     Priority: Medium     Morbid obesity with BMI of 40.0-44.9, adult (H) 02/08/2017     Priority: Medium     Cerebrovascular accident (CVA) due to embolism of right middle cerebral artery (H) 02/03/2017     Priority: Medium     Left hemiparesis (H) 02/03/2017     Priority: Medium     Anticoagulation monitoring, INR range 2-3 07/01/2015     Priority: Medium     Atrial fibrillation (H) 06/25/2015     Priority: Medium     Long-term (current) use of anticoagulants, INR goal 2.0-3.0 06/25/2015     Priority: Medium     Family history of coronary artery disease 05/19/2014     Priority: Medium     Ascending aorta enlargement (H) 02/13/2014     Priority: Medium     Gout 01/07/2014     Priority:  Medium     Overview:   Overview:   after 3 attacks in < a year, tapped and crystal proven 13;  Allopurinol started then got ? Atypical side effect, stopped; (short term colchicine prophylaxis)       Pain in joint, ankle and foot 2012     Priority: Medium     Weight gain 2011     Priority: Medium     Essential hypertension 2009     Priority: Medium     Cutaneous lupus erythematosus 2008     Priority: Medium     Systemic lupus erythematosus (H) 2008     Priority: Medium     Overview:   Dermatitis       Hypoglycemia 2008     Priority: Medium     Overview:   Possible          Past Medical History:    Past Medical History:   Diagnosis Date     Atrial fibrillation (H) 2017     Cerebral infarction (H)      Cerebral infarction due to unspecified occlusion or stenosis of right middle cerebral artery (H) 2017     Chest pain 1997     Disorder of skin or subcutaneous tissue 2011     Essential (primary) hypertension 1997     Fracture of cervical vertebra (H)      Gout      Hemiplegia affecting left nondominant side (H) 2017     Obesity 2017     Other local lupus erythematosus        Past Surgical History:    Past Surgical History:   Procedure Laterality Date     ARTHROSCOPY KNEE      bilateral knees     COLONOSCOPY  2012    Normal; Next due      OTHER SURGICAL HISTORY  2017       Family History:    Family History   Problem Relation Age of Onset     Unknown/Adopted Paternal Grandfather         Unknown, around age 67.     Other - See Comments Father         Parkinson's disease Alzheimer's     Cancer Father         Cancer     Heart Disease Maternal Grandmother         Heart Disease,MI in her 60's.     Heart Disease Mother 60        Heart Disease,MI     Other - See Comments Mother         rheumatoid arthritis.     Heart Disease Maternal Uncle 69        Heart Disease     Hypertension Sister         Hypertension     Cancer Sister          Cancer,melanoma     Heart Disease Paternal Uncle         Heart Disease, around 69.       Social History:  Marital Status:   [2]  Social History     Tobacco Use     Smoking status: Passive Smoke Exposure - Never Smoker     Smokeless tobacco: Never Used   Substance Use Topics     Alcohol use: Not Currently     Drug use: Unknown     Types: Other     Comment: Drug use: No        Medications:      amLODIPine (NORVASC) 10 MG tablet   hydrochlorothiazide (HYDRODIURIL) 25 MG tablet   indomethacin (INDOCIN) 50 MG capsule   metoprolol succinate (TOPROL-XL) 200 MG 24 hr tablet   warfarin (COUMADIN) 5 MG tablet   amLODIPine (NORVASC) 10 MG tablet   hydrocortisone (CORTAID) 1 % cream   pimecrolimus (ELIDEL) 1 % cream   traZODone (DESYREL) 50 MG tablet         Review of Systems   Constitutional: Positive for fever.   HENT: Negative.    Respiratory: Negative for chest tightness, shortness of breath, wheezing and stridor.    Cardiovascular: Negative for chest pain and leg swelling.   Gastrointestinal: Negative.    Genitourinary: Negative.    Musculoskeletal:        Left lower leg swelling, erythema ,    Skin: Positive for color change and rash.   Neurological: Negative.    Hematological: Negative.    Psychiatric/Behavioral: Negative.        Physical Exam   BP: (!) 145/96  Heart Rate: 82  Temp: 98.1  F (36.7  C)  Resp: 18  SpO2: 93 %      Physical Exam   Constitutional: He is oriented to person, place, and time. He appears well-developed and well-nourished.   HENT:   Head: Normocephalic and atraumatic.   Eyes: Pupils are equal, round, and reactive to light.   Neck: Normal range of motion. Neck supple. No JVD present.   Cardiovascular:   Irregularly irregular rate and rhythm    Pulmonary/Chest: Effort normal and breath sounds normal.   Abdominal: Soft.   Protuberant non tender    Musculoskeletal: He exhibits edema. He exhibits no tenderness.   Negative homans    Lymphadenopathy:     He has no cervical adenopathy.    Neurological: He is alert and oriented to person, place, and time.   Skin: There is erythema.   Teto left lower extremity venous dermatitis. Increased redness , warmth , swelling compared to right extending to knee. Non tender Negative homans . NO cord    Nursing note and vitals reviewed.      ED Course        Procedures               EKG Interpretation:      Interpreted by Isaiah Ragsdale MD  Time reviewed: 0913  Symptoms at time of EKG: None   Rhythm: atrial fibrillation - rapid  Rate: 100-110  Axis: Normal  Ectopy: premature ventricular contractions (multifocal)  Conduction: normal  ST Segments/ T Waves: Non-specific ST-T wave changes  Q Waves: v1  Comparison to prior: increased ST segment depression lateral leads compared to previous EKG    Clinical Impression: non-specific EKG      Patient presents to ER with concern of 2 day history of increase redness , Swellling , warmth of left lower extremity associated with intermittent fever.  Patient without previous history of cellulitis.but has history of CVA so very concerned regarding blood clot. Patient triaged to exam room . Vital signs reviewed .History and exam completed. History and exam most suggestive of cellulitis. Venous doppler ultrasound, labs and diagnostics ordered. Venous doppler ultrasound negative for DVT . Labs suggestive of significant cellulitis with marked elevation of white count to 17.5 with left shift, marked elevation of CRP and moderate risk procalcitonin. Given severity of cellultis and rapidly progressive nature discussed patient with Dr Mcgarry. REcommend admission for IV antibiotics while await blood culture results and symptomatic improvement as patient high risk for progression to sepsis   Results for orders placed or performed during the hospital encounter of 09/05/19   US Lower Extremity Venous Duplex Left    Narrative    Procedure: US LOWER EXTREMITY VENOUS DUPLEX LEFT    HISTORY:  Left lower leg  edema.    TECHNIQUE: Grayscale, color Doppler and compression views of the deep  venous structures of the left lower extremity.    COMPARISON: None.    FINDINGS:    Interrogation of the deep venous structures from the left common  femoral vein through the veins of the proximal calf demonstrate normal  grayscale appearance, compressibility and augmentable color Doppler  flow. Subcutaneous edema is present.      Impression    IMPRESSION:     No evidence of left lower extremity DVT.      PRAVIN BARRAGAN MD   CBC with platelets differential   Result Value Ref Range    WBC 17.2 (H) 4.0 - 11.0 10e9/L    RBC Count 5.79 4.4 - 5.9 10e12/L    Hemoglobin 16.4 13.3 - 17.7 g/dL    Hematocrit 50.8 40.0 - 53.0 %    MCV 88 78 - 100 fl    MCH 28.3 26.5 - 33.0 pg    MCHC 32.3 31.5 - 36.5 g/dL    RDW 16.1 (H) 10.0 - 15.0 %    Platelet Count 228 150 - 450 10e9/L    Diff Method Automated Method     % Neutrophils 84.9 %    % Lymphocytes 5.5 %    % Monocytes 8.9 %    % Eosinophils 0.0 %    % Basophils 0.2 %    % Immature Granulocytes 0.5 %    Absolute Neutrophil 14.6 (H) 1.6 - 8.3 10e9/L    Absolute Lymphocytes 0.9 0.8 - 5.3 10e9/L    Absolute Monocytes 1.5 (H) 0.0 - 1.3 10e9/L    Absolute Eosinophils 0.0 0.0 - 0.7 10e9/L    Absolute Basophils 0.0 0.0 - 0.2 10e9/L    Abs Immature Granulocytes 0.1 0 - 0.4 10e9/L   D-Dimer (HI,GH)   Result Value Ref Range    D-Dimer ng/mL 243 (H) 0 - 230 ng/ml D-DU   INR   Result Value Ref Range    INR 3.58 (H) 0 - 1.3   Partial thromboplastin time   Result Value Ref Range    PTT 48 (H) 22 - 37 sec   Basic metabolic panel   Result Value Ref Range    Sodium 135 134 - 144 mmol/L    Potassium 3.2 (L) 3.5 - 5.1 mmol/L    Chloride 95 (L) 98 - 107 mmol/L    Carbon Dioxide 30 21 - 31 mmol/L    Anion Gap 10 3 - 14 mmol/L    Glucose 109 (H) 70 - 105 mg/dL    Urea Nitrogen 27 (H) 7 - 25 mg/dL    Creatinine 1.09 0.70 - 1.30 mg/dL    GFR Estimate 69 >60 mL/min/[1.73_m2]    GFR Estimate If Black 84 >60  mL/min/[1.73_m2]    Calcium 9.3 8.6 - 10.3 mg/dL   Lactic acid   Result Value Ref Range    Lactic Acid 1.5 0.5 - 2.2 mmol/L   CRP inflammation   Result Value Ref Range    CRP Inflammation 25.6 (H) <0.5 mg/L   Procalcitonin   Result Value Ref Range    Procalcitonin 1.50 ng/ml                     No results found for this or any previous visit (from the past 24 hour(s)).    Medications - No data to display    Assessments & Plan (with Medical Decision Making)     I have reviewed the nursing notes.    I have reviewed the findings, diagnosis, plan and need for follow up with the patient.      New Prescriptions    No medications on file       Final diagnoses:   Left leg cellulitis       9/5/2019   [facility] [place] Sania Colon MD  09/05/19 1288

## 2019-09-05 NOTE — TELEPHONE ENCOUNTER
"Patient called stating he might have a blood clot in his leg. His leg is swollen and red from the knee down. Pt transferred to nurse triage call line. Pt's last name and  verified. Pt triaged:    Additional Information    Thigh, calf, or ankle swelling in only one leg    Negative: Thigh or calf pain and only 1 side and present > 1 hour    Negative: SEVERE swelling (e.g., swelling extends above knee, entire leg is swollen, weeping fluid)    Negative: Difficulty breathing at rest    Negative: Entire foot is cool or blue in comparison to other side    Negative: Chest pain    Negative: Small area of swelling and followed an insect bite to the area    Negative: Followed a knee injury    Negative: Ankle or foot injury    Negative: Pregnant with leg swelling or edema    Negative: Sounds like a life-threatening emergency to the triager    Answer Assessment - Initial Assessment Questions  Pt states \"I have been sicker than a dog over the past 4 days with puking, diarrhea, and no energy.\" Pt reports vomiting 4 times per day and 1-2 watery diarrhea stools per day. Pt drinks 1 gallon of water and was surprised that his urine has been very dark. Denies: foul smelling urine, burning with urination, flank pain, blood in urine.    Pt states he is on blood thinner for A-fib and has hx of stroke.    1. ONSET: \"When did the swelling start?\" (e.g., minutes, hours, days)  2 days ago (9/3)    2. LOCATION: \"What part of the leg is swollen?\"  \"Are both legs swollen or just one leg?\"  Left leg, knee down to ankle and top of foot.     3. SEVERITY: \"How bad is the swelling?\" (e.g., localized; mild, moderate, severe)  MODERATE edema - swelling of lower leg to knee    Able to rest leg, but cannot elevated it, as he is a caregiver.    4. REDNESS: \"Does the swelling look red or infected?\"  Red like sunburn, slightly warmer than right leg. No change in color of affected foot compared to other foot.    5. PAIN: \"Is the swelling painful to " "touch?\" If so, ask: \"How painful is it?\"   (Scale 1-10; mild, moderate or severe)  Not painful to touch, slightly stiff    6. FEVER: \"Do you have a fever?\" If so, ask: \"What is it, how was it measured, and when did it start?\"   Fever/chills/sweating the past couple of days: 99.9, 100.3. Pt has not tried taking anything to relieve his fever.    7. CAUSE: \"What do you think is causing the leg swelling?\"  Patient thinks he has a blood clot. With Pt being sick the past 4 days, it is possible, that his blood thinner is not working like it should.    8. MEDICAL HISTORY: \"Do you have a history of heart failure, kidney disease, liver failure, or cancer?\"  Denies the above issues.    9. RECURRENT SYMPTOM: \"Have you had leg swelling before?\" If so, ask: \"When was the last time?\" \"What happened that time?\"  Pt has had swelling in his legs in the past, but not the redness. He never did anything about it and it resolved on its own in the past.    10. OTHER SYMPTOMS: \"Do you have any other symptoms?\" (e.g., chest pain, difficulty breathing)  Denies chest pain, SOB or increased pain with deep breath.    Protocols used: LEG SWELLING AND EDEMA-A-OH    Protocol recommends Pt go to ED now and have another adult drive him. 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 The patient indicates understanding of these issues and agrees, however will drive himself. He states he is nearby and can be here in 5 minutes. Writer will close encounter at this time. Savannah Sifuentes RN .............. 9/5/2019  8:35 AM        "

## 2019-09-05 NOTE — PROGRESS NOTES
Received report at 3:30 pm from DANYELLE Herbert. No further questions or concerns at this time.    Jaleel Reynolds RN 09/05/19 3:32 PM

## 2019-09-05 NOTE — PROGRESS NOTES
Spoke with Dr. Mcgarry and placed patient on enteric precautions for possible c-diff. Patient has had x4 loose stools since admit to [facility] today. Patient aware that we need to collect a stool sample and send to lab. Supplies in room at this time.    Jaleel Reynolds RN 09/05/19 7:00 PM

## 2019-09-05 NOTE — ED TRIAGE NOTES
Sept 2nd started getting sore, red, swollen left lower leg and foot. Was camping this past weekend and had some scratched on lower leg then this developed. Also for same amount of time he has had nausea, vomiting and diarrhea. Currently on warfarin and has had a previous stroke in 2017.   24-May-2019 00:15

## 2019-09-05 NOTE — PHARMACY-ANTICOAGULATION SERVICE
Pharmacy Consult- Initial Coumadin Assessment    Babak Moran is a 59 year old male admitted on 9/5/2019 with Cellulitis of left lower extremity    Primary Indication(s) for Anticoagulation: afib    Goal INR:2-3 (note that patient prefers to keep ~3 due to previous CVA)    FYI, patient is followed by the Anticoagulation/[facility]time Clinic at: [facility]    Patient Active Problem List   Diagnosis     Pain in joint, ankle and foot     Anticoagulation monitoring, INR range 2-3     Atrial fibrillation (H)     Cerebrovascular accident (CVA) due to embolism of right middle cerebral artery (H)     Osteoarthrosis     Cutaneous lupus erythematosus     Gout     Ascending aorta enlargement (H)     Obesity     Family history of coronary artery disease     Essential hypertension     Left hemiparesis (H)     Long-term (current) use of anticoagulants, INR goal 2.0-3.0     Systemic lupus erythematosus (H)     Hypoglycemia     Tissue plasminogen activator (t-PA) administered at other facility within 24 hours prior to current admission     Morbid obesity with BMI of 40.0-44.9, adult (H)     Weight gain     Long term (current) use of anticoagulants     Unspecified atrial fibrillation     Obstructive sleep apnea syndrome     Cellulitis of left lower extremity     Cellulitis       Patient previously anticoagulated on Coumadin at a dose of 55mg/week; dosed as: 10mg Monday and 7.5mg ROW        Recent Labs   Lab Test 09/05/19  0918 08/20/19 07/23/19 06/25/19 05/13/19  1028  08/10/17  1809  02/03/17  1124   HGB 16.4  --   --   --   --  15.3  --  15.7  --  15.8   INR 3.58* 3.0* 2.5* 2.6*   < >  --    < >  --    < > 1.2     --   --   --   --  236  --  234  --  200    < > = values in this interval not displayed.        Plan: Discussed dosing with patient today given acute illness of cellulitis and starting of antibiotic therapy today. Agreed to give normal 7.5mg to keep INR ~3, but advised that we will likely need to adjust down if upward trend  detected tomorrow. Patient is correct that data supports safety in 3-4 range and increased risk of CVA <2.    Thank You for the Consult. Will continue to follow.    Radha Hogue [facility] ....................  9/5/2019   1:53 PM

## 2019-09-05 NOTE — PHARMACY-ADMISSION MEDICATION HISTORY
Pharmacy -- Admission Medication Reconciliation    Prior to admission (PTA) medications were reviewed and the patient's PTA medication list was updated.    Sources Consulted: patientFrench    The reliability of this Medication Reconciliation is: Reliability: Reliable    The following significant changes were made:  REMOVED creams - hydrocortisone and pimecrolimus   REMOVED trazodone  ADDED non-prescription CBD oil    In addition, the patient's allergies were reviewed with the patient and updated as follows:   Allergies: Ioxaglate; Diatrizoate; Levofloxacin; Metrizamide; and Probenecid    The pharmacist has reviewed with the patient that all personal medications should be removed from the building or locked in the belongings safe.  Patient shall only take medications ordered by the physician and administered by the nursing staff.       Medication barriers identified: Patient is on warfarin for afib and has had a previous CVA. He wants to keep his INR ~3 and does not reduce dose when slightly above 3. He is very fearful of having another stroke.   Medication adherence concerns: none - very adherent   Understanding of emergency medications: n/a    Radha Hogue [facility], 9/5/2019,  2:53 PM

## 2019-09-05 NOTE — H&P
[facility]    History and Physical  Hospitalist       Date of Admission:  9/5/2019    Assessment & Plan   Babak Moran is a 59 year old male who presents with a swollen painful reddened left leg associated with a low-grade fever and chills.    Principal Problem:    Cellulitis of left lower extremity    Assessment: Patient had a fever up to 100.3 at home.  Symptoms started about 2 days ago.  Ultrasound for DVT is negative.    Plan: Admit for IV antibiotics with vancomycin and Zosyn.  Cultures have been obtained.  There are no open wounds for culture.  Active Problems:    Atrial fibrillation (H)    Assessment: Stable and chronic atrial fibrillation on warfarin for anticoagulation    Plan: Continue warfarin per pharmacy    Cerebrovascular accident (CVA) due to embolism of right middle cerebral artery (H)    Assessment: No significant sequelae other than very mild left leg weakness    Plan: PT/OT    Cutaneous lupus erythematosus    Assessment: Presently in remission.  No lupus in the area of the cellulitis.    Plan: No treatment needed    Essential hypertension    Assessment: Control is been adequate on his current meds    Plan: Continue the same    Left hemiparesis (H)    Assessment: Very minimal left lower extremity    Plan: PT/OT    DVT Prophylaxis: Warfarin  Code Status: Full Code    LANE MOLINA    Primary Care Physician   Physician No Ref-Primary    Chief Complaint   Left leg pain associated with low-grade fever and chills.    History is obtained from the patient, ED physician, and chart review.    History of Present Illness   Babak Moran is a 59 year old male who presents with above complaint.  Symptoms started about 2 days ago.  He has a history of varicose veins and has cracked skin on his feet, but he has had no open sores, no history of diabetes, and no history of previous cellulitis.  He does have a history of cutaneous lupus, but this was over his knees and is not in the area  of the cellulitis, and he has never had an infection.  The lupus is in remission and he has not been on any anti-inflammatories or Biologics.  He has had no chest pain, shortness of breath, or any other associated symptoms.  He has a history of stroke but has no significant sequelae other than mild left leg weakness.  There is been no trauma.  He states he has had chills at home but no rigors.  He is a non-smoker and uses minimal alcohol.    Past Medical History    I have reviewed this patient's medical history and updated it with pertinent information if needed.   Past Medical History:   Diagnosis Date     Atrial fibrillation (H) 02/03/2017    on Warfarin     Cerebral infarction (H)      Cerebral infarction due to unspecified occlusion or stenosis of right middle cerebral artery (H) 02/03/2017    ,Left hemiparesis, left neglect, impaired balance and gait following R MCA stroke with mild hemorrhagic transformation s/p tissue plasminogen activator and mechanical thrombectomy, s/p C7 facet fracture from fall off forklift.     Chest pain 02/1997     Disorder of skin or subcutaneous tissue 07/25/2011     Essential (primary) hypertension 02/1997     Fracture of cervical vertebra (H)     02/03/2017,C7 facet fracture from fall off forklift, nondisplaced     Gout      Hemiplegia affecting left nondominant side (H) 02/03/2017     Obesity 02/03/2017    body mass index of 40     Other local lupus erythematosus        Past Surgical History   I have reviewed this patient's surgical history and updated it with pertinent information if needed.  Past Surgical History:   Procedure Laterality Date     ARTHROSCOPY KNEE      bilateral knees     COLONOSCOPY  01/02/2012    Normal; Next due 2022     OTHER SURGICAL HISTORY  02/03/2017       Prior to Admission Medications   Prior to Admission Medications   Prescriptions Last Dose Informant Patient Reported? Taking?   amLODIPine (NORVASC) 10 MG tablet   No No   Sig: Take 1 tablet (10 mg) by  mouth daily   amLODIPine (NORVASC) 10 MG tablet   No No   Sig: TAKE 1 TABLET(10 MG) BY MOUTH DAILY   hydrochlorothiazide (HYDRODIURIL) 25 MG tablet   No No   Sig: TAKE 1 TABLET BY MOUTH ONCE DAILY   hydrocortisone (CORTAID) 1 % cream   Yes No   indomethacin (INDOCIN) 50 MG capsule   No No   Sig: Take 1 capsule (50 mg) by mouth 2 times daily as needed for moderate pain   metoprolol succinate (TOPROL-XL) 200 MG 24 hr tablet   No No   Sig: Take 1 tablet (200 mg) by mouth daily   pimecrolimus (ELIDEL) 1 % cream   Yes No   traZODone (DESYREL) 50 MG tablet   Yes No   Sig: Take 50 mg by mouth   warfarin (COUMADIN) 5 MG tablet   No No   Sig: TAKE 2 TABLETS(10MG) X 1 DAY AND 1 1/2 TABLETS(7.5MG) X 6 DAYS/WEEK OR AS DIRECTED BY [facility]      Facility-Administered Medications: None     Allergies   Allergies   Allergen Reactions     Ioxaglate Unknown     Diatrizoate Rash     Levofloxacin Hives and Rash     Metrizamide Rash     (Diagnostic X-Ray materials)     Probenecid Rash       Social History   I have reviewed this patient's social history and updated it with pertinent information if needed. Babak MANA Moran  reports that he is a non-smoker but has been exposed to tobacco smoke. He has never used smokeless tobacco. He reports that he drank alcohol.    Family History   I have reviewed this patient's family history and updated it with pertinent information if needed.   Family History   Problem Relation Age of Onset     Unknown/Adopted Paternal Grandfather         Unknown, around age 67.     Other - See Comments Father         Parkinson's disease Alzheimer's     Cancer Father         Cancer     Heart Disease Maternal Grandmother         Heart Disease,MI in her 60's.     Heart Disease Mother 60        Heart Disease,MI     Other - See Comments Mother         rheumatoid arthritis.     Heart Disease Maternal Uncle 69        Heart Disease     Hypertension Sister         Hypertension     Cancer Sister         Cancer,melanoma      Heart Disease Paternal Uncle         Heart Disease, around 69.       Review of Systems     REVIEW OF SYSTEMS:    Constitutional: normal energy and appetite, no recent sick contacts  Eyes: no changes in vision  Ears, nose, mouth, throat, and face: no mouth sores, dysphagia, or odynophagia  Respiratory: no shortness of breath, cough, or wheezing. No aspiration symptoms.   Cardiovascular: no chest pain, palpitations, orthopnea, increased lower extremity edema, or syncope.   Gastrointestinal: no constipation, diarrhea, nausea, vomiting or abdominal pain.  Genitourinary: no dysuria, hematuria, urgency or frequency.   Hematologic/lymphatic: no unintentional weight loss or night sweats.  He has had some mild chills.  Musculoskeletal: no pain to extremities or falls.   Neurological: no new weakness, tingling, numbness.  History of an old stroke with very minimal left-sided weakness  Psychiatric: no hallucinations ordelusions.  Endocrine: Is not a known diabetic.     Physical Exam                      Vital Signs with Ranges  Temp:  [98.1  F (36.7  C)] 98.1  F (36.7  C)  Heart Rate:  [82] 82  Resp:  [18] 18  BP: (145)/(96) 145/96  SpO2:  [93 %] 93 %  0 lbs 0 oz    Constitutional: He is a very pleasant 59-year-old man, cooperative, answers questions appropriately, quite large, and in no acute distress   Eyes: Within normal limits  HEENT: Normal  Respiratory: Lungs are clear  Cardiovascular: Irregularly irregular rhythm with a rate of 80, no murmur gallop appreciated  GI: Obese but soft and nontender  Lymph/Hematologic: No adenopathy, no bruising  Genitourinary: Not examined  Skin: Warm and dry, no diaphoresis.  Examination of the left lower extremity reveals marked erythema, warmth, and swelling from the knee to the ankle.  There are varicosities present on both lower extremities.  Homans sign is negative.  No tenderness over the Achilles.  Distal CMS is intact.  Musculoskeletal: No significant muscle weakness  noted  Neurologic: Intact and nonfocal  Psychiatric: Alert and oriented with normal mood and affect    Data   Data reviewed today:  I personally reviewed no images or EKG's today.  The patient had a lower extremity ultrasound which was negative for DVT.  Laboratory studies are remarkable for an elevated white count 17,200, INR 3.58, potassium of 3.2, normal renal function, no elevation of lactic acid, and minimal elevation of procalcitonin.  Recent Labs   Lab 09/05/19  0918   WBC 17.2*   HGB 16.4   MCV 88      INR 3.58*      POTASSIUM 3.2*   CHLORIDE 95*   CO2 30   BUN 27*   CR 1.09   ANIONGAP 10   VIRGEN 9.3   *       Recent Results (from the past 24 hour(s))   US Lower Extremity Venous Duplex Left    Narrative    Procedure: US LOWER EXTREMITY VENOUS DUPLEX LEFT    HISTORY:  Left lower leg edema.    TECHNIQUE: Grayscale, color Doppler and compression views of the deep  venous structures of the left lower extremity.    COMPARISON: None.    FINDINGS:    Interrogation of the deep venous structures from the left common  femoral vein through the veins of the proximal calf demonstrate normal  grayscale appearance, compressibility and augmentable color Doppler  flow. Subcutaneous edema is present.      Impression    IMPRESSION:     No evidence of left lower extremity DVT.      PRAVIN BARRAGAN MD

## 2019-09-05 NOTE — PHARMACY-VANCOMYCIN DOSING SERVICE
"Pharmacy Consult- Vancomycin Assessment    Babak Moran is a 59 year old male admitted on 2019.    Vancomycin has been ordered per MD, for the indication of: cellulitis    Current Antibiotic Regimen Includes: Zosyn    Patient Active Problem List   Diagnosis     Pain in joint, ankle and foot     Anticoagulation monitoring, INR range 2-3     Atrial fibrillation (H)     Cerebrovascular accident (CVA) due to embolism of right middle cerebral artery (H)     Osteoarthrosis     Cutaneous lupus erythematosus     Gout     Ascending aorta enlargement (H)     Obesity     Family history of coronary artery disease     Essential hypertension     Left hemiparesis (H)     Long-term (current) use of anticoagulants, INR goal 2.0-3.0     Systemic lupus erythematosus (H)     Hypoglycemia     Tissue plasminogen activator (t-PA) administered at other facility within 24 hours prior to current admission     Morbid obesity with BMI of 40.0-44.9, adult (H)     Weight gain     Long term (current) use of anticoagulants     Unspecified atrial fibrillation     Obstructive sleep apnea syndrome     Cellulitis of left lower extremity     Cellulitis       Allergies (and reaction): Ioxaglate; Diatrizoate; Levofloxacin; Metrizamide; and Probenecid    Most recent flowsheet Weight: (!) 177.5 kg (391 lb 5.1 oz)  Most recent flowsheet Height: 185.4 cm (6' 1\")      Intake/Output Summary (Last 24 hours) at 2019 1358  Last data filed at 2019 1330  Gross per 24 hour   Intake 80 ml   Output --   Net 80 ml       Tmax = Temp (24hrs), Av.6  F (37.6  C), Min:98.1  F (36.7  C), Max:101  F (38.3  C)      Recent Labs   Lab Test 1918   WBC 17.2*       Recent Labs   Lab Test 1918 19  0913 18  0831   BUN 27* 26* 27*   CR 1.09 0.98 0.84       estimated creatinine clearance is 124.2 mL/min (based on SCr of 1.09 mg/dL).    Cultures Pending: blood    Plan: Start 2g (max dose per protocol) = 16mg/kg q12h for goal trough of " 10-15 mcg/ml.    Thank You for the consult. Will continue to follow.    Radha Hogue, [facility] ....................  9/5/2019   1:58 PM

## 2019-09-05 NOTE — PROGRESS NOTES
Admission Note    Data:  Babak Moran admitted to 314 from emergency room at 12:00 pm.      Action:  Dr. Mcgarry has been notified of admission. Pt oriented to unit, call light in reach.     Response:  Patient tolerated transfer, is slightly unsteady on feet, bed alarm on. Denies any pain at this time. IV access and water at bedside. Initial vitals taken, stable at this time. Refuses lunch.    Jaleel Reynolds RN 09/05/19 1:21 PM

## 2019-09-06 PROBLEM — A41.9 SEPSIS (H): Status: ACTIVE | Noted: 2019-09-06

## 2019-09-06 PROBLEM — L03.90 CELLULITIS: Status: RESOLVED | Noted: 2019-09-05 | Resolved: 2019-09-06

## 2019-09-06 PROBLEM — I48.91 UNSPECIFIED ATRIAL FIBRILLATION: Status: RESOLVED | Noted: 2018-02-11 | Resolved: 2019-09-06

## 2019-09-06 PROBLEM — Z79.01 LONG TERM (CURRENT) USE OF ANTICOAGULANTS: Status: RESOLVED | Noted: 2018-02-11 | Resolved: 2019-09-06

## 2019-09-06 LAB
ALBUMIN UR-MCNC: NEGATIVE MG/DL
ANION GAP SERPL CALCULATED.3IONS-SCNC: 8 MMOL/L (ref 3–14)
APPEARANCE UR: CLEAR
BILIRUB UR QL STRIP: NEGATIVE
BUN SERPL-MCNC: 18 MG/DL (ref 7–25)
CALCIUM SERPL-MCNC: 8.7 MG/DL (ref 8.6–10.3)
CHLORIDE SERPL-SCNC: 98 MMOL/L (ref 98–107)
CO2 SERPL-SCNC: 31 MMOL/L (ref 21–31)
COLOR UR AUTO: YELLOW
CREAT SERPL-MCNC: 1.03 MG/DL (ref 0.7–1.3)
ERYTHROCYTE [DISTWIDTH] IN BLOOD BY AUTOMATED COUNT: 15.9 % (ref 10–15)
GFR SERPL CREATININE-BSD FRML MDRD: 74 ML/MIN/{1.73_M2}
GLUCOSE SERPL-MCNC: 105 MG/DL (ref 70–105)
GLUCOSE UR STRIP-MCNC: NEGATIVE MG/DL
HBA1C MFR BLD: 6 % (ref 4–6)
HCT VFR BLD AUTO: 47.5 % (ref 40–53)
HGB BLD-MCNC: 15.4 G/DL (ref 13.3–17.7)
HGB UR QL STRIP: ABNORMAL
INR PPP: 5.94 (ref 0–1.3)
KETONES UR STRIP-MCNC: NEGATIVE MG/DL
LACTATE SERPL-SCNC: 1 MMOL/L (ref 0.5–2.2)
LEUKOCYTE ESTERASE UR QL STRIP: NEGATIVE
MAGNESIUM SERPL-MCNC: 2.3 MG/DL (ref 1.9–2.7)
MCH RBC QN AUTO: 28.1 PG (ref 26.5–33)
MCHC RBC AUTO-ENTMCNC: 32.4 G/DL (ref 31.5–36.5)
MCV RBC AUTO: 87 FL (ref 78–100)
NITRATE UR QL: NEGATIVE
NON-SQ EPI CELLS #/AREA URNS LPF: NORMAL /LPF
PH UR STRIP: 5 PH (ref 5–9)
PLATELET # BLD AUTO: 205 10E9/L (ref 150–450)
POTASSIUM SERPL-SCNC: 3.3 MMOL/L (ref 3.5–5.1)
POTASSIUM SERPL-SCNC: 3.4 MMOL/L (ref 3.5–5.1)
RBC # BLD AUTO: 5.49 10E12/L (ref 4.4–5.9)
RBC #/AREA URNS AUTO: NORMAL /HPF
SODIUM SERPL-SCNC: 137 MMOL/L (ref 134–144)
SOURCE: ABNORMAL
SP GR UR STRIP: 1.02 (ref 1–1.03)
UROBILINOGEN UR STRIP-ACNC: 0.2 EU/DL (ref 0.2–1)
WBC # BLD AUTO: 19.3 10E9/L (ref 4–11)
WBC #/AREA URNS AUTO: NORMAL /HPF

## 2019-09-06 PROCEDURE — 81001 URINALYSIS AUTO W/SCOPE: CPT | Performed by: INTERNAL MEDICINE

## 2019-09-06 PROCEDURE — 25000131 ZZH RX MED GY IP 250 OP 636 PS 637: Performed by: INTERNAL MEDICINE

## 2019-09-06 PROCEDURE — 85610 PROTHROMBIN TIME: CPT | Performed by: FAMILY MEDICINE

## 2019-09-06 PROCEDURE — 25800030 ZZH RX IP 258 OP 636: Performed by: INTERNAL MEDICINE

## 2019-09-06 PROCEDURE — 80048 BASIC METABOLIC PNL TOTAL CA: CPT | Performed by: FAMILY MEDICINE

## 2019-09-06 PROCEDURE — 25000128 H RX IP 250 OP 636: Performed by: FAMILY MEDICINE

## 2019-09-06 PROCEDURE — 86618 LYME DISEASE ANTIBODY: CPT | Performed by: INTERNAL MEDICINE

## 2019-09-06 PROCEDURE — 83735 ASSAY OF MAGNESIUM: CPT | Performed by: INTERNAL MEDICINE

## 2019-09-06 PROCEDURE — 36415 COLL VENOUS BLD VENIPUNCTURE: CPT | Performed by: INTERNAL MEDICINE

## 2019-09-06 PROCEDURE — 36415 COLL VENOUS BLD VENIPUNCTURE: CPT | Performed by: FAMILY MEDICINE

## 2019-09-06 PROCEDURE — 25000132 ZZH RX MED GY IP 250 OP 250 PS 637: Performed by: FAMILY MEDICINE

## 2019-09-06 PROCEDURE — 12000000 ZZH R&B MED SURG/OB

## 2019-09-06 PROCEDURE — 84132 ASSAY OF SERUM POTASSIUM: CPT | Performed by: INTERNAL MEDICINE

## 2019-09-06 PROCEDURE — 25000132 ZZH RX MED GY IP 250 OP 250 PS 637: Performed by: INTERNAL MEDICINE

## 2019-09-06 PROCEDURE — 83036 HEMOGLOBIN GLYCOSYLATED A1C: CPT | Performed by: INTERNAL MEDICINE

## 2019-09-06 PROCEDURE — 85027 COMPLETE CBC AUTOMATED: CPT | Performed by: FAMILY MEDICINE

## 2019-09-06 PROCEDURE — 83605 ASSAY OF LACTIC ACID: CPT | Performed by: INTERNAL MEDICINE

## 2019-09-06 PROCEDURE — 25800030 ZZH RX IP 258 OP 636: Performed by: FAMILY MEDICINE

## 2019-09-06 PROCEDURE — 87040 BLOOD CULTURE FOR BACTERIA: CPT | Performed by: INTERNAL MEDICINE

## 2019-09-06 PROCEDURE — 40000275 ZZH STATISTIC RCP TIME EA 10 MIN

## 2019-09-06 PROCEDURE — 99233 SBSQ HOSP IP/OBS HIGH 50: CPT | Performed by: INTERNAL MEDICINE

## 2019-09-06 PROCEDURE — 87798 DETECT AGENT NOS DNA AMP: CPT | Performed by: INTERNAL MEDICINE

## 2019-09-06 RX ORDER — POTASSIUM CHLORIDE 7.45 MG/ML
10 INJECTION INTRAVENOUS
Status: DISCONTINUED | OUTPATIENT
Start: 2019-09-06 | End: 2019-09-13 | Stop reason: HOSPADM

## 2019-09-06 RX ORDER — NYSTATIN 100000 U/G
CREAM TOPICAL 2 TIMES DAILY
Status: DISCONTINUED | OUTPATIENT
Start: 2019-09-06 | End: 2019-09-13 | Stop reason: HOSPADM

## 2019-09-06 RX ORDER — POTASSIUM CHLORIDE 1500 MG/1
20-40 TABLET, EXTENDED RELEASE ORAL
Status: DISCONTINUED | OUTPATIENT
Start: 2019-09-06 | End: 2019-09-13 | Stop reason: HOSPADM

## 2019-09-06 RX ORDER — LOPERAMIDE HCL 2 MG
2 CAPSULE ORAL 4 TIMES DAILY PRN
Status: DISCONTINUED | OUTPATIENT
Start: 2019-09-06 | End: 2019-09-10

## 2019-09-06 RX ORDER — ONDANSETRON 2 MG/ML
4 INJECTION INTRAMUSCULAR; INTRAVENOUS EVERY 6 HOURS PRN
Status: DISCONTINUED | OUTPATIENT
Start: 2019-09-06 | End: 2019-09-13 | Stop reason: HOSPADM

## 2019-09-06 RX ORDER — ONDANSETRON 4 MG/1
4 TABLET, ORALLY DISINTEGRATING ORAL EVERY 6 HOURS PRN
Status: DISCONTINUED | OUTPATIENT
Start: 2019-09-06 | End: 2019-09-13 | Stop reason: HOSPADM

## 2019-09-06 RX ORDER — SODIUM CHLORIDE 9 MG/ML
INJECTION, SOLUTION INTRAVENOUS CONTINUOUS
Status: DISCONTINUED | OUTPATIENT
Start: 2019-09-06 | End: 2019-09-08

## 2019-09-06 RX ORDER — MAGNESIUM SULFATE HEPTAHYDRATE 40 MG/ML
4 INJECTION, SOLUTION INTRAVENOUS EVERY 4 HOURS PRN
Status: DISCONTINUED | OUTPATIENT
Start: 2019-09-06 | End: 2019-09-13 | Stop reason: HOSPADM

## 2019-09-06 RX ADMIN — ONDANSETRON 4 MG: 4 TABLET, ORALLY DISINTEGRATING ORAL at 14:32

## 2019-09-06 RX ADMIN — TAZOBACTAM SODIUM AND PIPERACILLIN SODIUM 4.5 G: 500; 4 INJECTION, SOLUTION INTRAVENOUS at 20:39

## 2019-09-06 RX ADMIN — LOPERAMIDE HYDROCHLORIDE 2 MG: 2 CAPSULE ORAL at 11:30

## 2019-09-06 RX ADMIN — ONDANSETRON 4 MG: 4 TABLET, ORALLY DISINTEGRATING ORAL at 08:05

## 2019-09-06 RX ADMIN — ACETAMINOPHEN 650 MG: 325 TABLET, FILM COATED ORAL at 02:02

## 2019-09-06 RX ADMIN — ACETAMINOPHEN 650 MG: 325 TABLET, FILM COATED ORAL at 18:04

## 2019-09-06 RX ADMIN — VANCOMYCIN HYDROCHLORIDE 2000 MG: 1 INJECTION, POWDER, LYOPHILIZED, FOR SOLUTION INTRAVENOUS at 03:02

## 2019-09-06 RX ADMIN — VANCOMYCIN HYDROCHLORIDE 2000 MG: 1 INJECTION, POWDER, LYOPHILIZED, FOR SOLUTION INTRAVENOUS at 15:26

## 2019-09-06 RX ADMIN — POTASSIUM CHLORIDE 40 MEQ: 20 TABLET, EXTENDED RELEASE ORAL at 08:05

## 2019-09-06 RX ADMIN — METOPROLOL SUCCINATE 200 MG: 100 TABLET, EXTENDED RELEASE ORAL at 11:08

## 2019-09-06 RX ADMIN — ONDANSETRON 4 MG: 4 TABLET, ORALLY DISINTEGRATING ORAL at 23:08

## 2019-09-06 RX ADMIN — TAZOBACTAM SODIUM AND PIPERACILLIN SODIUM 4.5 G: 500; 4 INJECTION, SOLUTION INTRAVENOUS at 14:32

## 2019-09-06 RX ADMIN — SODIUM CHLORIDE: 9 INJECTION, SOLUTION INTRAVENOUS at 09:18

## 2019-09-06 RX ADMIN — TAZOBACTAM SODIUM AND PIPERACILLIN SODIUM 4.5 G: 500; 4 INJECTION, SOLUTION INTRAVENOUS at 02:03

## 2019-09-06 RX ADMIN — POTASSIUM CHLORIDE 20 MEQ: 20 TABLET, EXTENDED RELEASE ORAL at 01:54

## 2019-09-06 RX ADMIN — ACETAMINOPHEN 650 MG: 325 TABLET, FILM COATED ORAL at 11:08

## 2019-09-06 RX ADMIN — SODIUM CHLORIDE: 9 INJECTION, SOLUTION INTRAVENOUS at 18:17

## 2019-09-06 RX ADMIN — POTASSIUM CHLORIDE 20 MEQ: 20 TABLET, EXTENDED RELEASE ORAL at 12:05

## 2019-09-06 RX ADMIN — TAZOBACTAM SODIUM AND PIPERACILLIN SODIUM 4.5 G: 500; 4 INJECTION, SOLUTION INTRAVENOUS at 07:56

## 2019-09-06 RX ADMIN — LOPERAMIDE HYDROCHLORIDE 2 MG: 2 CAPSULE ORAL at 08:51

## 2019-09-06 RX ADMIN — NYSTATIN: 100000 CREAM TOPICAL at 11:12

## 2019-09-06 ASSESSMENT — ACTIVITIES OF DAILY LIVING (ADL)
ADLS_ACUITY_SCORE: 14
ADLS_ACUITY_SCORE: 14
ADLS_ACUITY_SCORE: 15
ADLS_ACUITY_SCORE: 14
ADLS_ACUITY_SCORE: 13
ADLS_ACUITY_SCORE: 13

## 2019-09-06 ASSESSMENT — MIFFLIN-ST. JEOR: SCORE: 2545.84

## 2019-09-06 NOTE — PROGRESS NOTES
:    Received a call from Mee .  She met with patient to discuss insurance and provided with a irving care application.     JASON Griffith on 9/6/2019 at 2:51 PM

## 2019-09-06 NOTE — PROGRESS NOTES
RCAT completed and pt score scored 1.  RT recommends O2 @ HS if O2 sats drop - pt stated he does nothing for his BARRIE .  RT will continue to follow and re-assess as needed.

## 2019-09-06 NOTE — PLAN OF CARE
"HR remains elevated between 100-115, tele applied per order, temp this AM 99.5. Has been given Imodium x's 2 PRN for diarrhea and has not yet been effective, Zofran PRN for nausea and was effective, and Tylenol PRN for headache and was effective. Continues to feel weaker than usual, poor appetite, drank grape juice for breakfast and wants nothing to eat for lunch, encouraged adequate hydration. LLE continues to be red to a maroon/purple color that worsens when in a dependent position, denies any pain or discomfort to that leg, cap refill greater than 3 seconds and unable to palpate tibial pulse. BLE remain edematous, more so on left. Alarms continue due to weakness and unsteady gait. Urine sample sent to lab for evaluation.     /58   Pulse 112   Temp 99.5  F (37.5  C) (Tympanic)   Resp 18   Ht 1.854 m (6' 1\")   Wt (!) 167.2 kg (368 lb 9.6 oz)   SpO2 96%   BMI 48.63 kg/m      Lydia Vigil RN on 9/6/2019 at 12:00 PM    "

## 2019-09-06 NOTE — PROGRESS NOTES
Call received from lab with critical INR value 5.94. Primary nurse and Dr. Gonzales notified. Adalgisa Castro RN on 9/6/2019 at 8:34 AM

## 2019-09-06 NOTE — PROGRESS NOTES
:    Received  consult, as it appears patient does not have any insurance. Called Mee,  and left voicemail notifying her that patient does not have insurance listed. Requested that she meet with patient.     BHARGAVI Santacruz on 9/6/2019 at 7:42 AM

## 2019-09-06 NOTE — PLAN OF CARE
"Problem: Adult Inpatient Plan of Care  Goal: Plan of Care Review  Note:   Pt had temp of 101.8, PRN tylenol given. Pt refused trazodone stating that he doesn't take it anymore \"It really messes with my head\". C.Diff sample negative, enteric precautions discontinued. Pt states that he does not have insurance and this hospital stay will be expensive. Writer offered , pt declined stating that he makes too much money for any financial assistance. Fall precautions in place.   Floridalma Holt RN on 9/5/2019 at 9:54 PM    /71   Pulse 112   Temp 101.8  F (38.8  C) (Tympanic)   Resp 22   Ht 1.854 m (6' 1\")   Wt (!) 177.5 kg (391 lb 5.1 oz)   SpO2 92%   BMI 51.63 kg/m         "

## 2019-09-06 NOTE — PROGRESS NOTES
[facility]    Hospitalist Progress Note      Assessment & Plan   Babak Moran is a 59 year old male who was admitted on 9/5/2019.       Sepsis (H)    Assessment: present on admission and initial criteria met given persistent fevers overnight, stable blood pressures, tachycardia, normal lactate and source likely due to nonnecrotizing soft tissue infection of the left lower leg.  White count has worsened overnight, erythema has spread from previously outlined border.    Plan: - IV vancomycin/Zosyn day 2   - topical nystatin to left groin   - check Lyme/Anaplasma   - repeat blood cultures   - start IV fluids   - hemoglobin A1c negative for dm2   - C. difficile negative   -Ultrasound negative for DVT   -UA    Active Problems:    Atrial fibrillation (H)    Assessment: Chronic, rate controlled and stable.    Plan: - Continue beta-blocker   - Coumadin consult   - tele      Cerebrovascular accident (CVA) due to embolism of right middle cerebral artery (H)    Assessment: Chronic    Plan: - monitor      Essential hypertension    Assessment: stable    Plan: - hold hydrochlorothiazide   - continue with bb   - hold norvasc      Obstructive sleep apnea syndrome    Assessment: chronic and untreated    Plan: - appreciate rcat    FEN: regular diet, normal saline at 125 mL/hr, mg/k replacement protocol  PPX: coumadin, PROTON PUMP INHIBITOR    Code Status: Full Code    Jean Claude Lick    Interval History   Overnight noted frequent fevers and chills, no headaches, neck stiffness, shortness of breath, he has had significant nausea but no vomiting, ongoing intermittent diarrhea without blood, his left leg remains painless but the erythema has spread slightly, no new tingling or numbness, left groin remains stable and unchanged with erythema, he was incontinent for days prior to coming in because he felt so poorly.  No known tick bites.    -Data reviewed today: I reviewed all new labs and imaging results over the last 24  hours. I personally reviewed no images or EKG's today.    Physical Exam   Temp: 101  F (38.3  C) Temp src: Tympanic BP: 128/64 Pulse: 112 Heart Rate: 102 Resp: 20 SpO2: 90 % O2 Device: None (Room air)    Vitals:    09/05/19 1210 09/06/19 0509   Weight: (!) 177.5 kg (391 lb 5.1 oz) (!) 167.2 kg (368 lb 9.6 oz)     Vital Signs with Ranges  Temp:  [100.5  F (38.1  C)-101.8  F (38.8  C)] 101  F (38.3  C)  Pulse:  [104-112] 112  Heart Rate:  [] 102  Resp:  [20-24] 20  BP: (118-161)/(61-78) 128/64  SpO2:  [90 %-94 %] 90 %  I/O last 3 completed shifts:  In: 164.6 [P.O.:80; I.V.:84.6]  Out: 1000 [Urine:1000]    Exam:   GENERAL: Talkative, in no apparent distress.  CARDIOVASCULAR: Tachycardic rate and irregular rhythm consistent with A. fib, no murmurs, rubs, or gallops. Normal S1/S2.   RESPIRATORY: clear to auscultation bilaterally, no wheezes, no crackles.   GI: Obese, soft, non-tender, non-distended, normoactive bowel sounds.  MUSCULOSKELETAL: warm and well perfused, 2+ dorsalis pedis pulses bilaterally.    SKIN: Left groin with nonraised erythema with small satellite lytic lesions with greasy appearance consistent with probable candidal infection.  No fluctuance or active drainage.  Left leg with diffuse nonraised warm erythema which is spread slightly to cover all of his toes small nonraised patch of erythema that spread approximately over his kneecap, no pain with palpation, no vesicles, bullae, fluctuance and no pain with range of motion of the left knee.    Medications     - MEDICATION INSTRUCTIONS -       sodium chloride 125 mL/hr at 09/06/19 0918     Warfarin Therapy Reminder         metoprolol succinate ER  200 mg Oral Daily     nystatin   Topical BID     piperacillin-tazobactam  4.5 g Intravenous Q6H     sodium chloride (PF)  3 mL Intracatheter Q8H     vancomycin (VANCOCIN) IV  2,000 mg Intravenous Q12H     warfarin-No DOSE today  1 each Does not apply no dose today (warfarin)       Data   Recent Labs   Lab  09/06/19  0535 09/05/19  2205 09/05/19  0918   WBC 19.3*  --  17.2*   HGB 15.4  --  16.4   MCV 87  --  88     --  228   INR 5.94*  --  3.58*     --  135   POTASSIUM 3.3* 3.2* 3.2*   CHLORIDE 98  --  95*   CO2 31  --  30   BUN 18  --  27*   CR 1.03  --  1.09   ANIONGAP 8  --  10   VIRGEN 8.7  --  9.3     --  109*       Recent Results (from the past 24 hour(s))   US Lower Extremity Venous Duplex Left    Narrative    Procedure: US LOWER EXTREMITY VENOUS DUPLEX LEFT    HISTORY:  Left lower leg edema.    TECHNIQUE: Grayscale, color Doppler and compression views of the deep  venous structures of the left lower extremity.    COMPARISON: None.    FINDINGS:    Interrogation of the deep venous structures from the left common  femoral vein through the veins of the proximal calf demonstrate normal  grayscale appearance, compressibility and augmentable color Doppler  flow. Subcutaneous edema is present.      Impression    IMPRESSION:     No evidence of left lower extremity DVT.      PRAVIN BARRAGAN MD

## 2019-09-06 NOTE — PHARMACY-VANCOMYCIN DOSING SERVICE
"Pharmacy Consult- Vancomycin Assessment    Babak Moran is a 59 year old male admitted on 2019.    Vancomycin has been ordered per MD, for the indication of: cellulitis    Current Antibiotic Regimen Includes: Zosyn    Patient Active Problem List   Diagnosis     Pain in joint, ankle and foot     Anticoagulation monitoring, INR range 2-3     Atrial fibrillation (H)     Cerebrovascular accident (CVA) due to embolism of right middle cerebral artery (H)     Osteoarthrosis     Cutaneous lupus erythematosus     Gout     Ascending aorta enlargement (H)     Obesity     Family history of coronary artery disease     Essential hypertension     Left hemiparesis (H)     Long-term (current) use of anticoagulants, INR goal 2.0-3.0     Systemic lupus erythematosus (H)     Hypoglycemia     Tissue plasminogen activator (t-PA) administered at other facility within 24 hours prior to current admission     Morbid obesity with BMI of 40.0-44.9, adult (H)     Obstructive sleep apnea syndrome     Cellulitis of left lower extremity     Sepsis (H)       Allergies (and reaction): Ioxaglate; Diatrizoate; Levofloxacin; Metrizamide; and Probenecid    Most recent flowsheet Weight: (!) 167.2 kg (368 lb 9.6 oz)  Most recent flowsheet Height: 185.4 cm (6' 1\")      Intake/Output Summary (Last 24 hours) at 2019 1256  Last data filed at 2019 1112  Gross per 24 hour   Intake 164.6 ml   Output 1620 ml   Net -1455.4 ml       Tmax = Temp (24hrs), Av  F (38.3  C), Min:99.5  F (37.5  C), Max:101.8  F (38.8  C)      Recent Labs   Lab Test 19  0535   WBC 19.3*       Recent Labs   Lab Test 19  0535 19  0918 19  0913   BUN 18 27* 26*   CR 1.03 1.09 0.98       estimated creatinine clearance is 126.9 mL/min (based on SCr of 1.03 mg/dL).    Cultures Pending: blood      Plan: Per MD, will discontinue vanco tomorrow if improvement continues. Plan to continue 2g q12 overnight with goal trough of 10-15 mcg/ml. Plan to draw trough " tomorrow if not discontinued.    Thank You for the consult. Will continue to follow.    Radha Hogue [facility] ....................  9/6/2019   12:56 PM

## 2019-09-06 NOTE — PHARMACY-ANTICOAGULATION SERVICE
Pharmacy Consult- Coumadin Follow-Up    Babak Moran is a 59 year old male admitted on 9/5/2019 with Cellulitis of left lower extremity    Primary Indication(s) for Anticoagulation: afib    Goal INR:2-3    FYI, patient is followed by the Anticoagulation/Protime Clinic at: [facility]    Patient Active Problem List   Diagnosis     Pain in joint, ankle and foot     Anticoagulation monitoring, INR range 2-3     Atrial fibrillation (H)     Cerebrovascular accident (CVA) due to embolism of right middle cerebral artery (H)     Osteoarthrosis     Cutaneous lupus erythematosus     Gout     Ascending aorta enlargement (H)     Obesity     Family history of coronary artery disease     Essential hypertension     Left hemiparesis (H)     Long-term (current) use of anticoagulants, INR goal 2.0-3.0     Systemic lupus erythematosus (H)     Hypoglycemia     Tissue plasminogen activator (t-PA) administered at other facility within 24 hours prior to current admission     Morbid obesity with BMI of 40.0-44.9, adult (H)     Obstructive sleep apnea syndrome     Cellulitis of left lower extremity       New factors that may increase patient's sensitivity to warfarin (Coumadin) include: antibiotics      Recent Labs   Lab Test 09/06/19  0535 09/05/19  0918 08/20/19 07/23/19 05/13/19  1028  08/10/17  1809   HGB 15.4 16.4  --   --   --  15.3  --  15.7   INR 5.94* 3.58* 3.0* 2.5*   < >  --    < >  --     228  --   --   --  236  --  234    < > = values in this interval not displayed.        Recent Dosing:    Date INR Dose Given   9/5 3.58 7.5mg                       Plan: No dose today    Thank You for the Consult. Will continue to follow.    Radha Hogue [facility] ....................  9/6/2019   8:48 AM

## 2019-09-06 NOTE — PLAN OF CARE
"Problem: Adult Inpatient Plan of Care  Goal: Plan of Care Review  9/6/2019 0524 by Floridalma Holt, RN  Note:   Pt A&O x4, impulsive at times, bed alarm on. Left lower leg remains hot and red, no open area noted. Pt denies pain. Pt encouraged to keep leg elevated. LS diminished, HR regular but tachy at times. Pt noted to have temp of 101.8 at the highest and 100.5 at the lowest. PRN Tylenol given x2 for fever. IV Abx given. Potassium replaced per protocol. Will continue to monitor.   Floridalma Holt, RN on 9/6/2019 at 5:31 AM    /64   Pulse 112   Temp 101  F (38.3  C) (Tympanic)   Resp 20   Ht 1.854 m (6' 1\")   Wt (!) 167.2 kg (368 lb 9.6 oz)   SpO2 90%   BMI 48.63 kg/m           "

## 2019-09-07 LAB
ANION GAP SERPL CALCULATED.3IONS-SCNC: 9 MMOL/L (ref 3–14)
BUN SERPL-MCNC: 11 MG/DL (ref 7–25)
CALCIUM SERPL-MCNC: 7.9 MG/DL (ref 8.6–10.3)
CHLORIDE SERPL-SCNC: 99 MMOL/L (ref 98–107)
CO2 SERPL-SCNC: 30 MMOL/L (ref 21–31)
CREAT SERPL-MCNC: 0.91 MG/DL (ref 0.7–1.3)
ERYTHROCYTE [DISTWIDTH] IN BLOOD BY AUTOMATED COUNT: 16 % (ref 10–15)
GFR SERPL CREATININE-BSD FRML MDRD: 86 ML/MIN/{1.73_M2}
GLUCOSE SERPL-MCNC: 108 MG/DL (ref 70–105)
HCT VFR BLD AUTO: 48.1 % (ref 40–53)
HGB BLD-MCNC: 15.4 G/DL (ref 13.3–17.7)
INR PPP: 6.77 (ref 0–1.3)
MAGNESIUM SERPL-MCNC: 2.4 MG/DL (ref 1.9–2.7)
MCH RBC QN AUTO: 28.1 PG (ref 26.5–33)
MCHC RBC AUTO-ENTMCNC: 32 G/DL (ref 31.5–36.5)
MCV RBC AUTO: 88 FL (ref 78–100)
PLATELET # BLD AUTO: 255 10E9/L (ref 150–450)
POTASSIUM SERPL-SCNC: 3.4 MMOL/L (ref 3.5–5.1)
RBC # BLD AUTO: 5.49 10E12/L (ref 4.4–5.9)
SODIUM SERPL-SCNC: 138 MMOL/L (ref 134–144)
VANCOMYCIN SERPL-MCNC: 10.4 UG/ML (ref 7–45)
WBC # BLD AUTO: 21.1 10E9/L (ref 4–11)

## 2019-09-07 PROCEDURE — 80202 ASSAY OF VANCOMYCIN: CPT | Performed by: INTERNAL MEDICINE

## 2019-09-07 PROCEDURE — 36415 COLL VENOUS BLD VENIPUNCTURE: CPT | Performed by: FAMILY MEDICINE

## 2019-09-07 PROCEDURE — 85027 COMPLETE CBC AUTOMATED: CPT | Performed by: FAMILY MEDICINE

## 2019-09-07 PROCEDURE — 25000128 H RX IP 250 OP 636: Performed by: FAMILY MEDICINE

## 2019-09-07 PROCEDURE — 25000132 ZZH RX MED GY IP 250 OP 250 PS 637: Performed by: FAMILY MEDICINE

## 2019-09-07 PROCEDURE — 25800030 ZZH RX IP 258 OP 636: Performed by: FAMILY MEDICINE

## 2019-09-07 PROCEDURE — 85610 PROTHROMBIN TIME: CPT | Performed by: FAMILY MEDICINE

## 2019-09-07 PROCEDURE — 80048 BASIC METABOLIC PNL TOTAL CA: CPT | Performed by: FAMILY MEDICINE

## 2019-09-07 PROCEDURE — 25000132 ZZH RX MED GY IP 250 OP 250 PS 637: Performed by: INTERNAL MEDICINE

## 2019-09-07 PROCEDURE — 99232 SBSQ HOSP IP/OBS MODERATE 35: CPT | Performed by: INTERNAL MEDICINE

## 2019-09-07 PROCEDURE — 12000000 ZZH R&B MED SURG/OB

## 2019-09-07 PROCEDURE — 25000128 H RX IP 250 OP 636: Performed by: INTERNAL MEDICINE

## 2019-09-07 PROCEDURE — 83735 ASSAY OF MAGNESIUM: CPT | Performed by: FAMILY MEDICINE

## 2019-09-07 PROCEDURE — 25800030 ZZH RX IP 258 OP 636: Performed by: INTERNAL MEDICINE

## 2019-09-07 RX ADMIN — SODIUM CHLORIDE: 9 INJECTION, SOLUTION INTRAVENOUS at 18:33

## 2019-09-07 RX ADMIN — ONDANSETRON HYDROCHLORIDE 4 MG: 2 SOLUTION INTRAMUSCULAR; INTRAVENOUS at 08:37

## 2019-09-07 RX ADMIN — NYSTATIN: 100000 CREAM TOPICAL at 09:48

## 2019-09-07 RX ADMIN — ONDANSETRON HYDROCHLORIDE 4 MG: 2 SOLUTION INTRAMUSCULAR; INTRAVENOUS at 17:24

## 2019-09-07 RX ADMIN — METOPROLOL SUCCINATE 200 MG: 100 TABLET, EXTENDED RELEASE ORAL at 09:47

## 2019-09-07 RX ADMIN — PROCHLORPERAZINE EDISYLATE 5 MG: 5 INJECTION INTRAMUSCULAR; INTRAVENOUS at 13:07

## 2019-09-07 RX ADMIN — TAZOBACTAM SODIUM AND PIPERACILLIN SODIUM 4.5 G: 500; 4 INJECTION, SOLUTION INTRAVENOUS at 19:33

## 2019-09-07 RX ADMIN — TAZOBACTAM SODIUM AND PIPERACILLIN SODIUM 4.5 G: 500; 4 INJECTION, SOLUTION INTRAVENOUS at 01:25

## 2019-09-07 RX ADMIN — TAZOBACTAM SODIUM AND PIPERACILLIN SODIUM 4.5 G: 500; 4 INJECTION, SOLUTION INTRAVENOUS at 08:28

## 2019-09-07 RX ADMIN — PROCHLORPERAZINE EDISYLATE 5 MG: 5 INJECTION INTRAMUSCULAR; INTRAVENOUS at 19:39

## 2019-09-07 RX ADMIN — VANCOMYCIN HYDROCHLORIDE 2000 MG: 1 INJECTION, POWDER, LYOPHILIZED, FOR SOLUTION INTRAVENOUS at 02:16

## 2019-09-07 RX ADMIN — PROCHLORPERAZINE EDISYLATE 5 MG: 5 INJECTION INTRAMUSCULAR; INTRAVENOUS at 00:13

## 2019-09-07 RX ADMIN — TAZOBACTAM SODIUM AND PIPERACILLIN SODIUM 4.5 G: 500; 4 INJECTION, SOLUTION INTRAVENOUS at 13:56

## 2019-09-07 RX ADMIN — VANCOMYCIN HYDROCHLORIDE 2000 MG: 1 INJECTION, POWDER, LYOPHILIZED, FOR SOLUTION INTRAVENOUS at 15:17

## 2019-09-07 RX ADMIN — SODIUM CHLORIDE: 9 INJECTION, SOLUTION INTRAVENOUS at 05:41

## 2019-09-07 RX ADMIN — LOPERAMIDE HYDROCHLORIDE 2 MG: 2 CAPSULE ORAL at 09:47

## 2019-09-07 ASSESSMENT — ACTIVITIES OF DAILY LIVING (ADL)
ADLS_ACUITY_SCORE: 13
ADLS_ACUITY_SCORE: 14
ADLS_ACUITY_SCORE: 14
ADLS_ACUITY_SCORE: 13
ADLS_ACUITY_SCORE: 14
ADLS_ACUITY_SCORE: 13

## 2019-09-07 ASSESSMENT — MIFFLIN-ST. JEOR: SCORE: 2655.61

## 2019-09-07 NOTE — PLAN OF CARE
"Pt is A&O x4. VSS. /58   Pulse 95   Temp 99.8  F (37.7  C) (Tympanic)   Resp 16   Ht 1.854 m (6' 1\")   Wt (!) 167.2 kg (368 lb 9.6 oz)   SpO2 92%   BMI 48.63 kg/m  .   O2 is 92% on RA. LS diminished. LLE +3 edema. LLE afshin and red in color. Redness has not spread beyond boarders. Will continue to monitor.     Kary Shabazz RN on 9/7/2019 at 3:18 AM    "

## 2019-09-07 NOTE — PHARMACY-ANTICOAGULATION SERVICE
Pharmacy Consult- Coumadin Follow-Up    Babak Moran is a 59 year old male admitted on 9/5/2019 with Cellulitis of left lower extremity    Primary Indication(s) for Anticoagulation: A Fib    Goal INR: 2.5 (2-3)    FYI, patient is followed by the Anticoagulation/Protime Clinic at: [facility]    Patient Active Problem List   Diagnosis     Pain in joint, ankle and foot     Anticoagulation monitoring, INR range 2-3     Atrial fibrillation (H)     Cerebrovascular accident (CVA) due to embolism of right middle cerebral artery (H)     Osteoarthrosis     Cutaneous lupus erythematosus     Gout     Ascending aorta enlargement (H)     Obesity     Family history of coronary artery disease     Essential hypertension     Left hemiparesis (H)     Long-term (current) use of anticoagulants, INR goal 2.0-3.0     Systemic lupus erythematosus (H)     Hypoglycemia     Tissue plasminogen activator (t-PA) administered at other facility within 24 hours prior to current admission     Morbid obesity with BMI of 40.0-44.9, adult (H)     Obstructive sleep apnea syndrome     Cellulitis of left lower extremity     Sepsis (H)       New factors that may increase patient's sensitivity to warfarin (Coumadin) include: antibiotics, acute illness    New factors that may decrease patient's sensitivity to warfarin (Coumadin) include: none noted        Recent Labs   Lab Test 09/07/19  0430 09/06/19  0535 09/05/19  0918 08/20/19 05/13/19  1028   HGB 15.4 15.4 16.4  --   --  15.3   INR 6.77* 5.94* 3.58* 3.0*   < >  --     205 228  --   --  236    < > = values in this interval not displayed.        Recent Dosing:    Date INR Dose Given   09/05/19 3.58 7.5mg   09/06/19 5.94 HELD       Plan: INR continues to increase and be supra therapeutic.  Will HOLD warfarin x 1 today.  INR with am labs.  Vitamin K discussed with MD, agreed to hold off and evaluate tomorrow.    Thank You for the Consult. Will continue to follow.    Alicia Livingston [facility]  ....................  9/7/2019   9:26 AM

## 2019-09-07 NOTE — PHARMACY-VANCOMYCIN DOSING SERVICE
"Pharmacy Consult- Vancomycin Assessment Day 3    Babak Moran is a 59 year old male admitted on 2019.    Vancomycin has been ordered per MD, for the indication of: Cellulitis    Current Antibiotic Regimen Includes: Zosyn, Vancomycin per pharmacy    Patient Active Problem List   Diagnosis     Pain in joint, ankle and foot     Anticoagulation monitoring, INR range 2-3     Atrial fibrillation (H)     Cerebrovascular accident (CVA) due to embolism of right middle cerebral artery (H)     Osteoarthrosis     Cutaneous lupus erythematosus     Gout     Ascending aorta enlargement (H)     Obesity     Family history of coronary artery disease     Essential hypertension     Left hemiparesis (H)     Long-term (current) use of anticoagulants, INR goal 2.0-3.0     Systemic lupus erythematosus (H)     Hypoglycemia     Tissue plasminogen activator (t-PA) administered at other facility within 24 hours prior to current admission     Morbid obesity with BMI of 40.0-44.9, adult (H)     Obstructive sleep apnea syndrome     Cellulitis of left lower extremity     Sepsis (H)       Allergies (and reaction): Ioxaglate; Diatrizoate; Levofloxacin; Metrizamide; and Probenecid    Most recent flowsheet Weight: (!) 178.2 kg (392 lb 12.8 oz)  Most recent flowsheet Height: 185.4 cm (6' 1\")      Intake/Output Summary (Last 24 hours) at 2019 0921  Last data filed at 2019 0900  Gross per 24 hour   Intake 5602.2 ml   Output 3320 ml   Net 2282.2 ml       Tmax = Temp (24hrs), Av.6  F (37.6  C), Min:98.9  F (37.2  C), Max:100.7  F (38.2  C)      Recent Labs   Lab Test 19  0430   WBC 21.1*       Recent Labs   Lab Test 19  0430 19  0535 19  0918   BUN 11 18 27*   CR 0.91 1.03 1.09       estimated creatinine clearance is 149.2 mL/min (based on SCr of 0.91 mg/dL).    Cultures Pending: blood    Culture Results: pending    Plan: Per MD, he wants one more day of Vancomycin due to no improvement in cellulitis and no cultures " yet.  Patient has had 4 doses, will draw trough today prior to fifth dose.  Plan to continue 2000mg Q12H if trough results appropriately.  Goal trough 10-15 mcg/ml.    Trough resulted at 10.4 mcg/ml.  Continue current dose 2000mg Q12H.        Thank You for the consult. Will continue to follow.    Alicia Livingston [facility] ....................  9/7/2019   9:21 AM

## 2019-09-07 NOTE — PROGRESS NOTES
[facility]    Hospitalist Progress Note      Assessment & Plan   Babak Moran is a 59 year old male who was admitted on 9/5/2019.       Sepsis (H)    Assessment: Improved, no further fevers overnight, stable blood pressures, no tachycardia or elevated lactate.  Source likely due to nonnecrotizing soft tissue infection of the left lower leg.  White count has worsened overnight likely leg and clinical course, exam otherwise improved.    Plan: - IV vancomycin/Zosyn day 3   - topical nystatin to left groin   - follow-up Lyme/Anaplasma   - repeat blood cultures   - hemoglobin A1c negative for dm2   - C. difficile negative   -Ultrasound negative for DVT    Active Problems:    Atrial fibrillation (H)    Assessment: Chronic, rate controlled and stable.  INR supratherapeutic likely from antibiotic interaction, no evidence of active bleeding, per pharmacy recommendations, hold Coumadin dose.  Active reversal today.    Plan: - Continue beta-blocker   - Coumadin consult   - tele      Cerebrovascular accident (CVA) due to embolism of right middle cerebral artery (H)    Assessment: Chronic    Plan: - monitor      Essential hypertension    Assessment: stable    Plan: - hold hydrochlorothiazide   - continue with bb   - hold norvasc      Obstructive sleep apnea syndrome    Assessment: chronic and untreated    Plan: - appreciate rcat    FEN: regular diet, normal saline at 100 mL/hr, mg/k replacement protocol  PPX: coumadin, PROTON PUMP INHIBITOR    Code Status: Full Code    Jean Claude Mariahzully    Interval History   Overnight no acute events and afebrile, appetite remains poor but feeling somewhat better today, no shortness of breath, cough, abdominal pain nausea or vomiting, diarrhea has cough, leg feels sore but less swollen and overall improved.    -Data reviewed today: I reviewed all new labs and imaging results over the last 24 hours. I personally reviewed no images or EKG's today.    Physical Exam   Temp: 98.9  F  (37.2  C) Temp src: Tympanic BP: 121/55 Pulse: 66 Heart Rate: 100 Resp: 16 SpO2: 93 % O2 Device: Nasal cannula Oxygen Delivery: 1.5 LPM  Vitals:    09/05/19 1210 09/06/19 0509 09/07/19 0426   Weight: (!) 177.5 kg (391 lb 5.1 oz) (!) 167.2 kg (368 lb 9.6 oz) (!) 178.2 kg (392 lb 12.8 oz)     Vital Signs with Ranges  Temp:  [98.9  F (37.2  C)-100.7  F (38.2  C)] 98.9  F (37.2  C)  Pulse:  [] 66  Heart Rate:  [] 100  Resp:  [16-18] 16  BP: (103-136)/(55-83) 121/55  SpO2:  [87 %-93 %] 93 %  I/O last 3 completed shifts:  In: 5357.2 [P.O.:2640; I.V.:2717.2]  Out: 2895 [Urine:2895]    Exam:   GENERAL: Talkative, in no apparent distress.  CARDIOVASCULAR: regular rate and irregular rhythm consistent with A. fib, no murmurs, rubs, or gallops. Normal S1/S2.   RESPIRATORY: clear to auscultation bilaterally, no wheezes, no crackles.   GI: Obese, soft, non-tender, non-distended, normoactive bowel sounds.  MUSCULOSKELETAL: warm and well perfused, 2+ dorsalis pedis pulses bilaterally.    SKIN: Left groin with nonraised erythema with small satellite lesions with dried out appearance consistent with probable yeast.  No fluctuance or active drainage.  Left leg with diffuse nonraised warm erythema which is regressed from prior boarder, also no longer involving the toes, slightly improved erythema overall, no vesicles, bullae, fluctuance and no pain with range of motion of the left knee.    Medications     - MEDICATION INSTRUCTIONS -       sodium chloride 100 mL/hr at 09/07/19 0948     Warfarin Therapy Reminder         metoprolol succinate ER  200 mg Oral Daily     nystatin   Topical BID     piperacillin-tazobactam  4.5 g Intravenous Q6H     sodium chloride (PF)  3 mL Intracatheter Q8H     vancomycin (VANCOCIN) IV  2,000 mg Intravenous Q12H     warfarin-No DOSE today  1 each Does not apply no dose today (warfarin)       Data   Recent Labs   Lab 09/07/19  0430 09/06/19  1642 09/06/19  0535  09/05/19  0918   WBC 21.1*  --   19.3*  --  17.2*   HGB 15.4  --  15.4  --  16.4   MCV 88  --  87  --  88     --  205  --  228   INR 6.77*  --  5.94*  --  3.58*     --  137  --  135   POTASSIUM 3.4* 3.4* 3.3*   < > 3.2*   CHLORIDE 99  --  98  --  95*   CO2 30  --  31  --  30   BUN 11  --  18  --  27*   CR 0.91  --  1.03  --  1.09   ANIONGAP 9  --  8  --  10   VIRGEN 7.9*  --  8.7  --  9.3   *  --  105  --  109*    < > = values in this interval not displayed.       No results found for this or any previous visit (from the past 24 hour(s)).

## 2019-09-07 NOTE — PLAN OF CARE
"SpO2 has remained 91-93% on room air rest of shift, color is much less pale, energy level is up per patient report, appetite much improved today consuming 100% of each meals but smaller portions, and adequate fluid intake as well. No changes in warmth, redness, or edema to LLE from this AM assessment, denies any pain or discomfort. Reports loose stools are starting become more formed and has only had 1 this day. Continues to report nausea throughout the day and was given total of Zofran PRN x's 2 and Compazine x's 1 PRN and this was effective.  Has been up to bathroom several times, declined encouragement to ambulate in hallway, states too weak today, patient reports he will attempt again tomorrow. LS remain diminished throughout with short and shallow repsirations.     /67   Pulse 66   Temp 97.4  F (36.3  C) (Tympanic)   Resp 16   Ht 1.854 m (6' 1\")   Wt (!) 178.2 kg (392 lb 12.8 oz)   SpO2 92%   BMI 51.82 kg/m      Lydia Vigil RN on 9/7/2019 at 6:28 PM    "

## 2019-09-07 NOTE — PLAN OF CARE
"Alert and oriented, states his head feels \"a little fuzzy\", reports feeling nauseous and more weak today than yeterday, and denies any pain or discomfort.     SpO2 upon initial assessment 84-87% on RA, provided education on purse lipped breathing, Incentive spirometer, had sit on edge of bed to move around a little, these were not effective. Applied O2 at 2 lpm, SpO2 up to 89%, O2 up to 2.5 lpm and SpO2 up to 91%, will recheck in an hour after more alert and awake. Short shallow respiration, LS diminished throughout, no cough noted, and shortness of breath with exertion.    Continues to have diarrhea, reported x's 2 overnight, smaller quantities, and is less loose than yesterday. Was given Zofran PRN for nausea and this was effective. Encouraged to consume solid food for breakfast today, despite nausea and poor appetite, and did order food.     HR rapid this AM, update from ICU that rate was 120-130's, Dr. Gonzales was notified as well as requiring use of O2.     LLE continues to be warm, red, and edematous, with no growth of this outside of marked borders, no other new skin conditions.     Uses call light appropriately, asks for help as needed, does not independently ambulate around in room, alarms not on this day to allow patient to sit on edge of bed as desired.     /59   Pulse 66   Temp 98.9  F (37.2  C) (Tympanic)   Resp 16   Ht 1.854 m (6' 1\")   Wt (!) 178.2 kg (392 lb 12.8 oz)   SpO2 91%   BMI 51.82 kg/m      Lydia Vigil RN on 9/7/2019 at 9:35 AM    "

## 2019-09-08 LAB
ANION GAP SERPL CALCULATED.3IONS-SCNC: 9 MMOL/L (ref 3–14)
BUN SERPL-MCNC: 11 MG/DL (ref 7–25)
CALCIUM SERPL-MCNC: 8 MG/DL (ref 8.6–10.3)
CHLORIDE SERPL-SCNC: 99 MMOL/L (ref 98–107)
CO2 SERPL-SCNC: 29 MMOL/L (ref 21–31)
CREAT SERPL-MCNC: 1.06 MG/DL (ref 0.7–1.3)
ERYTHROCYTE [DISTWIDTH] IN BLOOD BY AUTOMATED COUNT: 16.3 % (ref 10–15)
GFR SERPL CREATININE-BSD FRML MDRD: 72 ML/MIN/{1.73_M2}
GLUCOSE SERPL-MCNC: 97 MG/DL (ref 70–105)
HCT VFR BLD AUTO: 45.1 % (ref 40–53)
HGB BLD-MCNC: 14 G/DL (ref 13.3–17.7)
INR PPP: 6.18 (ref 0–1.3)
MAGNESIUM SERPL-MCNC: 2.3 MG/DL (ref 1.9–2.7)
MCH RBC QN AUTO: 27.8 PG (ref 26.5–33)
MCHC RBC AUTO-ENTMCNC: 31 G/DL (ref 31.5–36.5)
MCV RBC AUTO: 90 FL (ref 78–100)
PLATELET # BLD AUTO: 238 10E9/L (ref 150–450)
POTASSIUM SERPL-SCNC: 3.2 MMOL/L (ref 3.5–5.1)
POTASSIUM SERPL-SCNC: 3.5 MMOL/L (ref 3.5–5.1)
RBC # BLD AUTO: 5.03 10E12/L (ref 4.4–5.9)
SODIUM SERPL-SCNC: 137 MMOL/L (ref 134–144)
WBC # BLD AUTO: 15.7 10E9/L (ref 4–11)

## 2019-09-08 PROCEDURE — 99233 SBSQ HOSP IP/OBS HIGH 50: CPT | Performed by: INTERNAL MEDICINE

## 2019-09-08 PROCEDURE — 25000132 ZZH RX MED GY IP 250 OP 250 PS 637: Performed by: INTERNAL MEDICINE

## 2019-09-08 PROCEDURE — 25800030 ZZH RX IP 258 OP 636: Performed by: INTERNAL MEDICINE

## 2019-09-08 PROCEDURE — 85610 PROTHROMBIN TIME: CPT | Performed by: FAMILY MEDICINE

## 2019-09-08 PROCEDURE — 25000128 H RX IP 250 OP 636: Performed by: FAMILY MEDICINE

## 2019-09-08 PROCEDURE — 25000131 ZZH RX MED GY IP 250 OP 636 PS 637: Performed by: INTERNAL MEDICINE

## 2019-09-08 PROCEDURE — 80048 BASIC METABOLIC PNL TOTAL CA: CPT | Performed by: FAMILY MEDICINE

## 2019-09-08 PROCEDURE — 36415 COLL VENOUS BLD VENIPUNCTURE: CPT | Performed by: INTERNAL MEDICINE

## 2019-09-08 PROCEDURE — 12000000 ZZH R&B MED SURG/OB

## 2019-09-08 PROCEDURE — 85027 COMPLETE CBC AUTOMATED: CPT | Performed by: INTERNAL MEDICINE

## 2019-09-08 PROCEDURE — 25000132 ZZH RX MED GY IP 250 OP 250 PS 637: Performed by: FAMILY MEDICINE

## 2019-09-08 PROCEDURE — 83735 ASSAY OF MAGNESIUM: CPT | Performed by: FAMILY MEDICINE

## 2019-09-08 PROCEDURE — 36415 COLL VENOUS BLD VENIPUNCTURE: CPT | Performed by: FAMILY MEDICINE

## 2019-09-08 PROCEDURE — 84132 ASSAY OF SERUM POTASSIUM: CPT | Performed by: INTERNAL MEDICINE

## 2019-09-08 PROCEDURE — 25000128 H RX IP 250 OP 636: Performed by: INTERNAL MEDICINE

## 2019-09-08 RX ORDER — HYDROCHLOROTHIAZIDE 25 MG/1
25 TABLET ORAL DAILY
Status: DISCONTINUED | OUTPATIENT
Start: 2019-09-08 | End: 2019-09-10

## 2019-09-08 RX ADMIN — POTASSIUM CHLORIDE 20 MEQ: 1500 TABLET, EXTENDED RELEASE ORAL at 11:20

## 2019-09-08 RX ADMIN — TAZOBACTAM SODIUM AND PIPERACILLIN SODIUM 4.5 G: 500; 4 INJECTION, SOLUTION INTRAVENOUS at 21:22

## 2019-09-08 RX ADMIN — TAZOBACTAM SODIUM AND PIPERACILLIN SODIUM 4.5 G: 500; 4 INJECTION, SOLUTION INTRAVENOUS at 01:29

## 2019-09-08 RX ADMIN — HYDROCHLOROTHIAZIDE 25 MG: 25 TABLET ORAL at 11:20

## 2019-09-08 RX ADMIN — NYSTATIN: 100000 CREAM TOPICAL at 21:58

## 2019-09-08 RX ADMIN — ACETAMINOPHEN 650 MG: 325 TABLET, FILM COATED ORAL at 21:51

## 2019-09-08 RX ADMIN — SODIUM CHLORIDE: 9 INJECTION, SOLUTION INTRAVENOUS at 08:45

## 2019-09-08 RX ADMIN — PROCHLORPERAZINE EDISYLATE 5 MG: 5 INJECTION INTRAMUSCULAR; INTRAVENOUS at 03:56

## 2019-09-08 RX ADMIN — VANCOMYCIN HYDROCHLORIDE 2000 MG: 1 INJECTION, POWDER, LYOPHILIZED, FOR SOLUTION INTRAVENOUS at 02:35

## 2019-09-08 RX ADMIN — NYSTATIN: 100000 CREAM TOPICAL at 10:09

## 2019-09-08 RX ADMIN — ONDANSETRON 4 MG: 4 TABLET, ORALLY DISINTEGRATING ORAL at 18:58

## 2019-09-08 RX ADMIN — TAZOBACTAM SODIUM AND PIPERACILLIN SODIUM 4.5 G: 500; 4 INJECTION, SOLUTION INTRAVENOUS at 14:49

## 2019-09-08 RX ADMIN — ACETAMINOPHEN 650 MG: 325 TABLET, FILM COATED ORAL at 00:27

## 2019-09-08 RX ADMIN — METOPROLOL SUCCINATE 200 MG: 100 TABLET, EXTENDED RELEASE ORAL at 10:08

## 2019-09-08 RX ADMIN — TAZOBACTAM SODIUM AND PIPERACILLIN SODIUM 4.5 G: 500; 4 INJECTION, SOLUTION INTRAVENOUS at 07:33

## 2019-09-08 RX ADMIN — PROCHLORPERAZINE EDISYLATE 5 MG: 5 INJECTION INTRAMUSCULAR; INTRAVENOUS at 12:27

## 2019-09-08 RX ADMIN — PROCHLORPERAZINE EDISYLATE 5 MG: 5 INJECTION INTRAMUSCULAR; INTRAVENOUS at 21:53

## 2019-09-08 RX ADMIN — POTASSIUM CHLORIDE 40 MEQ: 1500 TABLET, EXTENDED RELEASE ORAL at 06:43

## 2019-09-08 RX ADMIN — ONDANSETRON 4 MG: 4 TABLET, ORALLY DISINTEGRATING ORAL at 07:33

## 2019-09-08 ASSESSMENT — ACTIVITIES OF DAILY LIVING (ADL)
ADLS_ACUITY_SCORE: 13

## 2019-09-08 ASSESSMENT — MIFFLIN-ST. JEOR: SCORE: 2698.25

## 2019-09-08 NOTE — PLAN OF CARE
"Pt is A&O x4. Pleasant and cooperative. VSS. /71   Pulse 91   Temp 96.4  F (35.8  C) (Oral)   Resp 16   Ht 1.854 m (6' 1\")   Wt (!) 182.4 kg (402 lb 3.2 oz)   SpO2 91%   BMI 53.06 kg/m    SpO2 88% on RA HS. 1.5 LPM O2 applied. O2 stats remained above 91% throughout the night. Pt states he does not feel fuzzy as yesterday. LS diminished throughout. No cough. Denies SOB.     Complains of nausea at beginning of shift. PRN was administered and effective. Towards end the nausea continued and PRN was administered and effective. Temperature in middle of shift was 100.1. Pt requested tylenol, and was effective. Temp is now 96.4.     Pt appetite has increased. Adequate intake and output throughout shift.     LLE is warm to the touch. +3 edema, redness spreading beyond the boards in back on leg. Redness is also increased on L inner thigh between the boarders of the groin and lower extremity. Pt noticed as well and informed the writer. Pt stated that was not noticed yesterday.    Will continue to monitor.     Kary Shabazz RN on 9/8/2019 at 5:38 AM      "

## 2019-09-08 NOTE — PLAN OF CARE
"Continued to ambulate in bunn with staff x's 2 this shift, tolerated well, awake and alert most of shift, sat in recliner for meals, good appetite today was able to consume 100% of both meals thus far, SpO2 on RA was 91% for rest of day after approximately 0930, with no shortness of breath noted in afternoon after walking.     No other changes from earlier assessment and redness remains same.     /74   Pulse 105   Temp 96.7  F (35.9  C) (Oral)   Resp 16   Ht 1.854 m (6' 1\")   Wt (!) 182.4 kg (402 lb 3.2 oz)   SpO2 92%   BMI 53.06 kg/m      Lydia Vigil RN on 9/8/2019 at 5:59 PM    "

## 2019-09-08 NOTE — PROGRESS NOTES
[facility]    Hospitalist Progress Note      Assessment & Plan   Babak Moran is a 59 year old male who was admitted on 9/5/2019.       Sepsis (H)    Assessment: resolving.  No fevers in 48 hours.  Source likely due to nonnecrotizing soft tissue infection of the left lower leg.  White count has improved, erythema also improving but slow.    Plan: -Discontinue IV vancomycin after day 3   - IV Zosyn day 4   - topical nystatin to left groin   - follow-up Lyme/Anaplasma   - repeat blood cultures negative   - hemoglobin A1c negative for dm2   - C. difficile negative   - Ultrasound negative for DVT    Active Problems:    Atrial fibrillation (H)    Assessment: Chronic, rate controlled and stable.  INR supratherapeutic likely from antibiotic interaction, no evidence of active bleeding, per pharmacy recommendations, hold Coumadin dose.     Plan: - Continue beta-blocker   - Coumadin consult   - tele      Cerebrovascular accident (CVA) due to embolism of right middle cerebral artery (H)    Assessment: Chronic    Plan: - monitor      Essential hypertension    Assessment: stable    Plan: - resume home hydrochlorothiazide   - continue with bb   - hold norvasc      Obstructive sleep apnea syndrome    Assessment: chronic and untreated, comes hypoxic when sleeping.  He is not tolerated prior CPAP masks in the past and after prolonged counseling he will consider seeing a dentist for a mouth guard.    Plan: - appreciate rcat    FEN: regular diet, discontinue normal saline, mg/k replacement protocol  PPX: coumadin, PROTON PUMP INHIBITOR    Code Status: Full Code    Jean Claude Janet    Interval History   Overnight no acute events and afebrile, leg continues to feel stiff but no significant pain, up and ambulating today, appetite is improving, he has not tolerated CPAP masks in the past and is noted to become hypoxic when he sleeps, groin rash is improving, overall feeling much better but slow, no other new  complaints.    -Data reviewed today: I reviewed all new labs and imaging results over the last 24 hours. I personally reviewed no images or EKG's today.    Physical Exam   Temp: 96.8  F (36  C) Temp src: Oral BP: (!) 144/82 Pulse: 105 Heart Rate: 78 Resp: 16 SpO2: 92 % O2 Device: Nasal cannula Oxygen Delivery: 1 LPM  Vitals:    09/06/19 0509 09/07/19 0426 09/08/19 0327   Weight: (!) 167.2 kg (368 lb 9.6 oz) (!) 178.2 kg (392 lb 12.8 oz) (!) 182.4 kg (402 lb 3.2 oz)     Vital Signs with Ranges  Temp:  [96.4  F (35.8  C)-99.6  F (37.6  C)] 96.8  F (36  C)  Pulse:  [] 105  Heart Rate:  [] 78  Resp:  [16] 16  BP: (119-144)/(67-82) 144/82  SpO2:  [88 %-95 %] 92 %  I/O last 3 completed shifts:  In: 5114 [P.O.:2560; I.V.:2554]  Out: 3500 [Urine:3500]    Exam:   GENERAL: Talkative, in no apparent distress.  CARDIOVASCULAR: regular rate and irregular rhythm consistent with A. fib, no murmurs, rubs, or gallops. Normal S1/S2.   RESPIRATORY: clear to auscultation bilaterally, no wheezes, no crackles.   GI: Obese, soft, non-tender, non-distended, normoactive bowel sounds.  MUSCULOSKELETAL: warm and well perfused, 2+ dorsalis pedis pulses bilaterally.    SKIN: Left groin with nonraised erythema with improvement, regressing from previously outlined border.  Left leg with diffuse nonraised warm erythema which is regressed from prior boarder, also no longer involving the toes, significantly improved erythema overall, underlying chronic hyperpigmented changes of his legs, no pain with range of motion of the left knee.    Medications     - MEDICATION INSTRUCTIONS -       Warfarin Therapy Reminder         metoprolol succinate ER  200 mg Oral Daily     nystatin   Topical BID     piperacillin-tazobactam  4.5 g Intravenous Q6H     sodium chloride (PF)  3 mL Intracatheter Q8H     warfarin-No DOSE today  1 each Does not apply no dose today (warfarin)       Data   Recent Labs   Lab 09/08/19  0755 09/08/19  0528 09/07/19  9914  09/06/19  1642 09/06/19  0535   WBC 15.7*  --  21.1*  --  19.3*   HGB 14.0  --  15.4  --  15.4   MCV 90  --  88  --  87     --  255  --  205   INR  --  6.18* 6.77*  --  5.94*   NA  --  137 138  --  137   POTASSIUM  --  3.2* 3.4* 3.4* 3.3*   CHLORIDE  --  99 99  --  98   CO2  --  29 30  --  31   BUN  --  11 11  --  18   CR  --  1.06 0.91  --  1.03   ANIONGAP  --  9 9  --  8   VIRGEN  --  8.0* 7.9*  --  8.7   GLC  --  97 108*  --  105       No results found for this or any previous visit (from the past 24 hour(s)).     I have spent greater than 35 minutes withface to face patient care with greater than 50% of this time dedicated to care coordination and communication regarding clinical plan of care with wife and patient.

## 2019-09-08 NOTE — PHARMACY-ANTICOAGULATION SERVICE
Pharmacy Consult- Coumadin Follow-Up    Babak Moran is a 59 year old male admitted on 9/5/2019 with Cellulitis of left lower extremity    Primary Indication(s) for Anticoagulation: A fib    Goal INR: 2.5 (2-3)    FYI, patient is followed by the Anticoagulation/Protime Clinic at: [facility]    Patient Active Problem List   Diagnosis     Pain in joint, ankle and foot     Anticoagulation monitoring, INR range 2-3     Atrial fibrillation (H)     Cerebrovascular accident (CVA) due to embolism of right middle cerebral artery (H)     Osteoarthrosis     Cutaneous lupus erythematosus     Gout     Ascending aorta enlargement (H)     Obesity     Family history of coronary artery disease     Essential hypertension     Left hemiparesis (H)     Long-term (current) use of anticoagulants, INR goal 2.0-3.0     Systemic lupus erythematosus (H)     Hypoglycemia     Tissue plasminogen activator (t-PA) administered at other facility within 24 hours prior to current admission     Morbid obesity with BMI of 40.0-44.9, adult (H)     Obstructive sleep apnea syndrome     Cellulitis of left lower extremity     Sepsis (H)       New factors that may increase patient's sensitivity to warfarin (Coumadin) include: antibiotics, acute illness, poor appetite as noted in nursing note    New factors that may decrease patient's sensitivity to warfarin (Coumadin) include: none noted        Recent Labs   Lab Test 09/08/19  0755 09/08/19  0528 09/07/19  0430 09/06/19  0535 09/05/19  0918   HGB 14.0  --  15.4 15.4 16.4   INR  --  6.18* 6.77* 5.94* 3.58*     --  255 205 228        Recent Dosing:    Date INR Dose Given   09/05/19 3.58 7.5mg   09/06/19 5.94 HELD   09/07/19 6.77 HELD       Plan: INR supra therapeutic again today.  HOLD warfarin x 1 today, INR with am labs.  This will be third held dose, will discuss vitamin k with MD if INR remains elevated.    Thank You for the Consult. Will continue to follow.    Alicia Livingston RPH ....................   9/8/2019   9:59 AM

## 2019-09-08 NOTE — PLAN OF CARE
"Alert and oriented, denies any pain or discomfort reports still feeling weak, poor appetite, nausea was given Zofran PRN and this was effective.     Respirations shallow, dyspnea with exertion, and fine crackles to RLL that did not clear with cough. SpO2 88% on room air, did cough and deep breath, purse lipped breathing and was not effective. SpO2 on 1 lpm 91%. Did agree to attempt to walk in bunn today for a longer duration with use of walker 4-5 times.     Redness has grown outside marked border on posterior aspect of the knee and lower thigh, and groin and upper thigh area appear slightly more red than yesterday.Continues to have +3 edema to most of LLE.     /78   Pulse 105   Temp 96.8  F (36  C) (Oral)   Resp 16   Ht 1.854 m (6' 1\")   Wt (!) 182.4 kg (402 lb 3.2 oz)   SpO2 92%   BMI 53.06 kg/m      Lydia Vigil RN on 9/8/2019 at 8:25 AM     "

## 2019-09-09 LAB
ANION GAP SERPL CALCULATED.3IONS-SCNC: 4 MMOL/L (ref 3–14)
B BURGDOR IGG+IGM SER QL: 0.03 (ref 0–0.89)
BUN SERPL-MCNC: 11 MG/DL (ref 7–25)
CALCIUM SERPL-MCNC: 8.4 MG/DL (ref 8.6–10.3)
CHLORIDE SERPL-SCNC: 97 MMOL/L (ref 98–107)
CO2 SERPL-SCNC: 37 MMOL/L (ref 21–31)
CREAT SERPL-MCNC: 1.14 MG/DL (ref 0.7–1.3)
ERYTHROCYTE [DISTWIDTH] IN BLOOD BY AUTOMATED COUNT: 15.8 % (ref 10–15)
GFR SERPL CREATININE-BSD FRML MDRD: 66 ML/MIN/{1.73_M2}
GLUCOSE SERPL-MCNC: 105 MG/DL (ref 70–105)
HCT VFR BLD AUTO: 42.4 % (ref 40–53)
HGB BLD-MCNC: 13.4 G/DL (ref 13.3–17.7)
INR PPP: 4.48 (ref 0–1.3)
MAGNESIUM SERPL-MCNC: 2.4 MG/DL (ref 1.9–2.7)
MCH RBC QN AUTO: 28.1 PG (ref 26.5–33)
MCHC RBC AUTO-ENTMCNC: 31.6 G/DL (ref 31.5–36.5)
MCV RBC AUTO: 89 FL (ref 78–100)
PLATELET # BLD AUTO: 298 10E9/L (ref 150–450)
POTASSIUM SERPL-SCNC: 3.2 MMOL/L (ref 3.5–5.1)
POTASSIUM SERPL-SCNC: 3.5 MMOL/L (ref 3.5–5.1)
RBC # BLD AUTO: 4.77 10E12/L (ref 4.4–5.9)
SODIUM SERPL-SCNC: 138 MMOL/L (ref 134–144)
TSH SERPL DL<=0.05 MIU/L-ACNC: 1.85 IU/ML (ref 0.34–5.6)
VIT B12 SERPL-MCNC: 197 PG/ML (ref 180–914)
WBC # BLD AUTO: 12.9 10E9/L (ref 4–11)

## 2019-09-09 PROCEDURE — 83735 ASSAY OF MAGNESIUM: CPT | Performed by: FAMILY MEDICINE

## 2019-09-09 PROCEDURE — 25000131 ZZH RX MED GY IP 250 OP 636 PS 637: Performed by: INTERNAL MEDICINE

## 2019-09-09 PROCEDURE — 25000128 H RX IP 250 OP 636: Performed by: FAMILY MEDICINE

## 2019-09-09 PROCEDURE — 36415 COLL VENOUS BLD VENIPUNCTURE: CPT | Performed by: FAMILY MEDICINE

## 2019-09-09 PROCEDURE — 84443 ASSAY THYROID STIM HORMONE: CPT | Performed by: FAMILY MEDICINE

## 2019-09-09 PROCEDURE — 12000000 ZZH R&B MED SURG/OB

## 2019-09-09 PROCEDURE — 80048 BASIC METABOLIC PNL TOTAL CA: CPT | Performed by: FAMILY MEDICINE

## 2019-09-09 PROCEDURE — 25000132 ZZH RX MED GY IP 250 OP 250 PS 637: Performed by: INTERNAL MEDICINE

## 2019-09-09 PROCEDURE — 84132 ASSAY OF SERUM POTASSIUM: CPT | Performed by: INTERNAL MEDICINE

## 2019-09-09 PROCEDURE — 99232 SBSQ HOSP IP/OBS MODERATE 35: CPT | Performed by: FAMILY MEDICINE

## 2019-09-09 PROCEDURE — 85610 PROTHROMBIN TIME: CPT | Performed by: FAMILY MEDICINE

## 2019-09-09 PROCEDURE — 36415 COLL VENOUS BLD VENIPUNCTURE: CPT | Performed by: INTERNAL MEDICINE

## 2019-09-09 PROCEDURE — 25000132 ZZH RX MED GY IP 250 OP 250 PS 637: Performed by: FAMILY MEDICINE

## 2019-09-09 PROCEDURE — 25000128 H RX IP 250 OP 636: Performed by: INTERNAL MEDICINE

## 2019-09-09 PROCEDURE — 82607 VITAMIN B-12: CPT | Performed by: FAMILY MEDICINE

## 2019-09-09 PROCEDURE — 85027 COMPLETE CBC AUTOMATED: CPT | Performed by: FAMILY MEDICINE

## 2019-09-09 RX ORDER — SACCHAROMYCES BOULARDII 250 MG
250 CAPSULE ORAL 2 TIMES DAILY
Status: DISCONTINUED | OUTPATIENT
Start: 2019-09-09 | End: 2019-09-13 | Stop reason: HOSPADM

## 2019-09-09 RX ORDER — WARFARIN SODIUM 2.5 MG/1
2.5 TABLET ORAL
Status: DISCONTINUED | OUTPATIENT
Start: 2019-09-09 | End: 2019-09-09 | Stop reason: DRUGHIGH

## 2019-09-09 RX ADMIN — Medication 250 MG: at 09:46

## 2019-09-09 RX ADMIN — NYSTATIN: 100000 CREAM TOPICAL at 21:04

## 2019-09-09 RX ADMIN — NYSTATIN: 100000 CREAM TOPICAL at 11:50

## 2019-09-09 RX ADMIN — TAZOBACTAM SODIUM AND PIPERACILLIN SODIUM 4.5 G: 500; 4 INJECTION, SOLUTION INTRAVENOUS at 01:32

## 2019-09-09 RX ADMIN — POTASSIUM CHLORIDE 40 MEQ: 1500 TABLET, EXTENDED RELEASE ORAL at 06:28

## 2019-09-09 RX ADMIN — AMOXICILLIN AND CLAVULANATE POTASSIUM 1 TABLET: 875; 125 TABLET, FILM COATED ORAL at 09:46

## 2019-09-09 RX ADMIN — LOPERAMIDE HYDROCHLORIDE 2 MG: 2 CAPSULE ORAL at 18:06

## 2019-09-09 RX ADMIN — TAZOBACTAM SODIUM AND PIPERACILLIN SODIUM 4.5 G: 500; 4 INJECTION, SOLUTION INTRAVENOUS at 08:07

## 2019-09-09 RX ADMIN — Medication 250 MG: at 21:02

## 2019-09-09 RX ADMIN — POTASSIUM CHLORIDE 20 MEQ: 1500 TABLET, EXTENDED RELEASE ORAL at 09:02

## 2019-09-09 RX ADMIN — METOPROLOL SUCCINATE 200 MG: 100 TABLET, EXTENDED RELEASE ORAL at 09:46

## 2019-09-09 RX ADMIN — ONDANSETRON 4 MG: 4 TABLET, ORALLY DISINTEGRATING ORAL at 11:51

## 2019-09-09 RX ADMIN — ACETAMINOPHEN 650 MG: 325 TABLET, FILM COATED ORAL at 15:10

## 2019-09-09 RX ADMIN — HYDROCHLOROTHIAZIDE 25 MG: 25 TABLET ORAL at 09:46

## 2019-09-09 RX ADMIN — AMOXICILLIN AND CLAVULANATE POTASSIUM 1 TABLET: 875; 125 TABLET, FILM COATED ORAL at 21:02

## 2019-09-09 RX ADMIN — PROCHLORPERAZINE EDISYLATE 5 MG: 5 INJECTION INTRAMUSCULAR; INTRAVENOUS at 20:54

## 2019-09-09 ASSESSMENT — ACTIVITIES OF DAILY LIVING (ADL)
ADLS_ACUITY_SCORE: 13

## 2019-09-09 ASSESSMENT — MIFFLIN-ST. JEOR: SCORE: 2682.82

## 2019-09-09 NOTE — PHARMACY-ANTICOAGULATION SERVICE
Pharmacy Consult- Coumadin Follow-Up    Babak Moran is a 59 year old male admitted on 9/5/2019 with Cellulitis of left lower extremity    Primary Indication(s) for Anticoagulation: A fib    Goal INR: 2.5 (2-3).  Patient prefers to be close to 3 per anticoag note 8/19 FYI, patient is followed by the Anticoagulation/Protime Clinic at: [facility]    Patient Active Problem List   Diagnosis     Pain in joint, ankle and foot     Anticoagulation monitoring, INR range 2-3     Atrial fibrillation (H)     Cerebrovascular accident (CVA) due to embolism of right middle cerebral artery (H)     Osteoarthrosis     Cutaneous lupus erythematosus     Gout     Ascending aorta enlargement (H)     Obesity     Family history of coronary artery disease     Essential hypertension     Left hemiparesis (H)     Long-term (current) use of anticoagulants, INR goal 2.0-3.0     Systemic lupus erythematosus (H)     Hypoglycemia     Tissue plasminogen activator (t-PA) administered at other facility within 24 hours prior to current admission     Morbid obesity with BMI of 40.0-44.9, adult (H)     Obstructive sleep apnea syndrome     Cellulitis of left lower extremity     Sepsis (H)       New factors that may increase patient's sensitivity to warfarin (Coumadin) include: antibiotics, acute illness    New factors that may decrease patient's sensitivity to warfarin (Coumadin) include: warfarin held for three days, increasing appetite        Recent Labs   Lab Test 09/09/19  0510 09/08/19  0755 09/08/19  0528 09/07/19  0430 09/06/19  0535   HGB 13.4 14.0  --  15.4 15.4   INR 4.48*  --  6.18* 6.77* 5.94*    238  --  255 205        Recent Dosing:    Date INR Dose Given   09/05/19 3.58 7.5mg   09/06/19 5.94 HELD   09/07/19 6.77 HELD   09/08/19 6.18 HELD       Plan: INR remains supra therapeutic, however trending down.  Patient is very concerned about INR going lower than around 3.  Patient has had three doses held, will HOLD Warfarin x 1  tonight given  INR is still over 4.  INR with am labs.    Thank You for the Consult. Will continue to follow.    Alicia Livingston [facility] ....................  9/9/2019   10:07 AM

## 2019-09-09 NOTE — PROGRESS NOTES
[facility]    Medicine Progress Note - Hospitalist Service       Date of Admission:  9/5/2019  Assessment & Plan      Babak Moran is a 59 year old male who was admitted on 9/5/2019.       Sepsis (H)    Assessment: Resolved.  Remains afebrile with normalizing leukocytosis.  Source likely due to nonnecrotizing soft tissue infection of the left lower leg.  Erythema and edema slow to improve due to chronic stasis and body habitus.    Plan:  -vancomycin discontinued after day 3   - Zosyn day 4, change to Augmentin today.   - topical nystatin to left groin   - follow-up Lyme/Anaplasma   - repeat blood cultures negative   - hemoglobin A1c negative for dm2   - C. difficile negative   - Ultrasound negative for DVT    Active Problems:    Atrial fibrillation (H)    Assessment: Chronic, rate controlled and stable.  INR supratherapeutic likely from antibiotic interaction, no evidence of active bleeding, per pharmacy recommendations, hold Coumadin dose.     Plan: - Continue beta-blocker   - Coumadin consult   - tele      Cerebrovascular accident (CVA) due to embolism of right middle cerebral artery (H)    Assessment: Chronic    Plan: - monitor      Essential hypertension    Assessment: stable    Plan: - resume home hydrochlorothiazide   - continue with bb   - hold norvasc      Obstructive sleep apnea syndrome    Assessment: chronic and untreated, hypoxic when sleeping.  He is not tolerated prior CPAP masks in the past and after prolonged counseling he will consider seeing a dentist for a mouth guard.    Plan: - appreciate rcat        Hypokalemia    Assessment:  Likely secondary to hydrochlorothiazide   Plan:  Continue potassium replacement.    FEN: regular diet, discontinue normal saline, mg/k replacement protocol  PPX: coumadin, PROTON PUMP INHIBITOR         Diet: Combination Diet Regular Diet Adult    DVT Prophylaxis: Warfarin  Ramirez Catheter: not present  Code Status: Full Code      Disposition Plan  "  Expected discharge: Tomorrow, recommended to prior living arrangement once antibiotic plan established and SIRS/Sepsis treated.  Entered: Alvino Yi MD 09/09/2019, 8:44 AM       The patient's care was discussed with the Patient and Patient's Family.    Alvino Yi MD  Hospitalist Service  [facility]    ______________________________________________________________________    Interval History   \"Tiny\"  Continues to feel weak and tired but does note improvement since yesterday.  Continues to feel very thirsty and is drinking a lot of water.  Some pain in the back of his left calf when sitting in a recliner, otherwise no pain.  No fevers overnight.  Has noticed redness and swelling slowly improving.  Mild cough without shortness of breath, otherwise no new issues.    Data reviewed today: I reviewed all medications, new labs and imaging results over the last 24 hours. I personally reviewed no images or EKG's today.    Physical Exam   Vital Signs: Temp: 98.9  F (37.2  C) Temp src: Tympanic BP: 124/74 Pulse: 109 Heart Rate: 66 Resp: 18 SpO2: 94 % O2 Device: Nasal cannula Oxygen Delivery: 1.5 LPM  Weight: 398 lbs 12.8 oz  Constitutional: awake, alert, cooperative, no apparent distress, and appears stated age  Respiratory: No increased work of breathing, good air exchange, clear to auscultation bilaterally, no crackles or wheezing  Cardiovascular: Regular rate and irregular rhythm.  No murmurs rubs or gallops heard.   GI: Abdomen Soft, non-tender.  No masses, rebound or guarding.   Skin: Chronic stasis changes bilaterally.  Left leg with increased circumference below the knee, 2+ pitting edema compared with trace on left.  Minimal warmth.  Erythema persists but has receeded about 3-4cm from initial marking line.    Data   Recent Labs   Lab 09/09/19  0510 09/08/19  1544 09/08/19  0755 09/08/19  0528 09/07/19  0430   WBC 12.9*  --  15.7*  --  21.1*   HGB 13.4  --  14.0  --  15.4   MCV 89  " --  90  --  88     --  238  --  255   INR 4.48*  --   --  6.18* 6.77*     --   --  137 138   POTASSIUM 3.2* 3.5  --  3.2* 3.4*   CHLORIDE 97*  --   --  99 99   CO2 37*  --   --  29 30   BUN 11  --   --  11 11   CR 1.14  --   --  1.06 0.91   ANIONGAP 4  --   --  9 9   VIRGEN 8.4*  --   --  8.0* 7.9*     --   --  97 108*

## 2019-09-09 NOTE — PLAN OF CARE
"C/o diarrhea \"every time I go ever since I started eating.\" His stools are very dark brown, but not black. Watery. Not malodorous. Requested immodium. Will administer now as it is on the PRN med list. Denies abdominal cramping and pain r/t cellulitis of LLE.     Kyara Erazo RN on 9/9/2019 at 5:24 PM    "

## 2019-09-09 NOTE — PLAN OF CARE
"Pt is A&O x4. Is pleasant and cooperative. VSS. /85   Pulse 75   Temp 98.9  F (37.2  C) (Tympanic)   Resp 18   Ht 1.854 m (6' 1\")   Wt (!) 182.4 kg (402 lb 3.2 oz)   SpO2 93%   BMI 53.06 kg/m   . Denies pain. Reported nausea, PRN administered and effective. Temp elevated in beginning of shift, 99.7, tylenol given per Pt request. Pt has been afebrile throughout rest of shift.    LS crackles BLL. Denies SOB. O2 was 91% on RA in beginning of shift. Pt destated to 88% on RA HS. O2 was put on 1.5 LPM. SpO2 has been >93% throughout the rest of shift.     Tolerating ambulation well. Up multiple times during the night to the bathroom. Pt had one occurrence of fecal incontinence with diarrhea. Adequate intake and output.     +3 edema LLE. Redness is is receeding at the boarders anterior and posteriorly. Still warm to touch and reddened.     Will continue to monitor.     Kary Shabazz RN on 9/9/2019 at 4:52 AM      "

## 2019-09-09 NOTE — PLAN OF CARE
Assessment complete, see flowsheet, Vss WDL. Redness of Lt leg receding from marked boarders. Denies complaint at this time.

## 2019-09-10 LAB
A PHAGOCYTOPH DNA BLD QL NAA+PROBE: NOT DETECTED
E CHAFFEENSIS DNA BLD QL NAA+PROBE: NOT DETECTED
E EWINGII DNA SPEC QL NAA+PROBE: NOT DETECTED
EHRLICHIA DNA SPEC QL NAA+PROBE: NOT DETECTED
ERYTHROCYTE [DISTWIDTH] IN BLOOD BY AUTOMATED COUNT: 15.7 % (ref 10–15)
HCT VFR BLD AUTO: 39.9 % (ref 40–53)
HGB BLD-MCNC: 12.4 G/DL (ref 13.3–17.7)
INR PPP: 2.81 (ref 0–1.3)
MAGNESIUM SERPL-MCNC: 2.2 MG/DL (ref 1.9–2.7)
MCH RBC QN AUTO: 27.5 PG (ref 26.5–33)
MCHC RBC AUTO-ENTMCNC: 31.1 G/DL (ref 31.5–36.5)
MCV RBC AUTO: 89 FL (ref 78–100)
PLATELET # BLD AUTO: 334 10E9/L (ref 150–450)
POTASSIUM SERPL-SCNC: 3.5 MMOL/L (ref 3.5–5.1)
PROCALCITONIN SERPL-MCNC: 0.5 NG/ML
RBC # BLD AUTO: 4.51 10E12/L (ref 4.4–5.9)
WBC # BLD AUTO: 13.7 10E9/L (ref 4–11)

## 2019-09-10 PROCEDURE — 85610 PROTHROMBIN TIME: CPT | Performed by: FAMILY MEDICINE

## 2019-09-10 PROCEDURE — 84132 ASSAY OF SERUM POTASSIUM: CPT | Performed by: FAMILY MEDICINE

## 2019-09-10 PROCEDURE — 25000128 H RX IP 250 OP 636: Performed by: FAMILY MEDICINE

## 2019-09-10 PROCEDURE — 83735 ASSAY OF MAGNESIUM: CPT | Performed by: FAMILY MEDICINE

## 2019-09-10 PROCEDURE — 25000132 ZZH RX MED GY IP 250 OP 250 PS 637: Performed by: INTERNAL MEDICINE

## 2019-09-10 PROCEDURE — 84145 PROCALCITONIN (PCT): CPT | Performed by: FAMILY MEDICINE

## 2019-09-10 PROCEDURE — 12000000 ZZH R&B MED SURG/OB

## 2019-09-10 PROCEDURE — 25000132 ZZH RX MED GY IP 250 OP 250 PS 637: Performed by: FAMILY MEDICINE

## 2019-09-10 PROCEDURE — 85027 COMPLETE CBC AUTOMATED: CPT | Performed by: FAMILY MEDICINE

## 2019-09-10 PROCEDURE — 99232 SBSQ HOSP IP/OBS MODERATE 35: CPT | Performed by: FAMILY MEDICINE

## 2019-09-10 PROCEDURE — 36415 COLL VENOUS BLD VENIPUNCTURE: CPT | Performed by: FAMILY MEDICINE

## 2019-09-10 PROCEDURE — 25000131 ZZH RX MED GY IP 250 OP 636 PS 637: Performed by: INTERNAL MEDICINE

## 2019-09-10 RX ORDER — METOPROLOL TARTRATE 50 MG
50 TABLET ORAL ONCE
Status: COMPLETED | OUTPATIENT
Start: 2019-09-10 | End: 2019-09-10

## 2019-09-10 RX ORDER — LOPERAMIDE HCL 2 MG
2 CAPSULE ORAL 4 TIMES DAILY PRN
Status: DISCONTINUED | OUTPATIENT
Start: 2019-09-10 | End: 2019-09-13 | Stop reason: HOSPADM

## 2019-09-10 RX ADMIN — WARFARIN SODIUM 7.5 MG: 2.5 TABLET ORAL at 17:23

## 2019-09-10 RX ADMIN — AMOXICILLIN AND CLAVULANATE POTASSIUM 1 TABLET: 875; 125 TABLET, FILM COATED ORAL at 10:08

## 2019-09-10 RX ADMIN — ONDANSETRON 4 MG: 4 TABLET, ORALLY DISINTEGRATING ORAL at 20:37

## 2019-09-10 RX ADMIN — ACETAMINOPHEN 650 MG: 325 TABLET, FILM COATED ORAL at 20:37

## 2019-09-10 RX ADMIN — NYSTATIN: 100000 CREAM TOPICAL at 10:40

## 2019-09-10 RX ADMIN — NYSTATIN: 100000 CREAM TOPICAL at 20:44

## 2019-09-10 RX ADMIN — LOPERAMIDE HYDROCHLORIDE 2 MG: 2 CAPSULE ORAL at 00:52

## 2019-09-10 RX ADMIN — Medication 250 MG: at 20:43

## 2019-09-10 RX ADMIN — SODIUM CHLORIDE 500 ML: 9 INJECTION, SOLUTION INTRAVENOUS at 08:03

## 2019-09-10 RX ADMIN — Medication 250 MG: at 10:08

## 2019-09-10 RX ADMIN — METOPROLOL SUCCINATE 200 MG: 100 TABLET, EXTENDED RELEASE ORAL at 10:08

## 2019-09-10 RX ADMIN — AMOXICILLIN AND CLAVULANATE POTASSIUM 1 TABLET: 875; 125 TABLET, FILM COATED ORAL at 20:43

## 2019-09-10 RX ADMIN — LOPERAMIDE HYDROCHLORIDE 2 MG: 2 CAPSULE ORAL at 16:12

## 2019-09-10 RX ADMIN — METOPROLOL TARTRATE 50 MG: 50 TABLET, FILM COATED ORAL at 10:08

## 2019-09-10 ASSESSMENT — ACTIVITIES OF DAILY LIVING (ADL)
ADLS_ACUITY_SCORE: 17
ADLS_ACUITY_SCORE: 19

## 2019-09-10 ASSESSMENT — MIFFLIN-ST. JEOR: SCORE: 2669.22

## 2019-09-10 NOTE — PHARMACY-ANTICOAGULATION SERVICE
Pharmacy Consult- Coumadin Follow-Up    Babak Moran is a 59 year old male admitted on 9/5/2019 with Cellulitis of left lower extremity    Primary Indication(s) for Anticoagulation: A fib    Goal INR: 2.5 (2-3).  Patient prefers to be close to 3 per anticoag note 8/19 FYI, patient is followed by the Anticoagulation/Protime Clinic at: [facility]    Patient Active Problem List   Diagnosis     Pain in joint, ankle and foot     Anticoagulation monitoring, INR range 2-3     Atrial fibrillation with RVR (H)     Cerebrovascular accident (CVA) due to embolism of right middle cerebral artery (H)     Osteoarthrosis     Cutaneous lupus erythematosus     Gout     Ascending aorta enlargement (H)     Obesity     Family history of coronary artery disease     Essential hypertension     Left hemiparesis (H)     Long-term (current) use of anticoagulants, INR goal 2.0-3.0     Systemic lupus erythematosus (H)     Hypoglycemia     Tissue plasminogen activator (t-PA) administered at other facility within 24 hours prior to current admission     Morbid obesity with BMI of 40.0-44.9, adult (H)     Obstructive sleep apnea syndrome     Cellulitis of left lower extremity     Sepsis (H)       New factors that may increase patient's sensitivity to warfarin (Coumadin) include: antibiotics, acute illness    New factors that may decrease patient's sensitivity to warfarin (Coumadin) include: Warfarin held for FOUR doses        Recent Labs   Lab Test 09/10/19  0515 09/09/19  0510 09/08/19  0755 09/08/19  0528 09/07/19  0430   HGB 12.4* 13.4 14.0  --  15.4   INR 2.81* 4.48*  --  6.18* 6.77*    298 238  --  255        Recent Dosing:    Date INR Dose Given   09/05/19 3.58 7.5mg   09/06/19 5.94 HELD   09/07/19 6.77 HELD   09/08/19 6.18 HELD   09/09/19 4.48 HELD       Plan: Patient's INR is therapeutic.  Due to 4 doses being held as well as patient having a preference to keeping INR as close to 3 as possible, will give home dose of Warfarin 7.5mg x1  today.  INR with am labs.    Thank You for the Consult. Will continue to follow.    Alicia Livingston formerly [facility] ....................  9/10/2019   12:06 PM

## 2019-09-10 NOTE — PLAN OF CARE
Discharge Planner PT   Patient observed to ambulate with Fww and SBA in hallway with nursing staff along with performance of ADLs. PT/OT to remain available to assist nursing staff with patient mobility and ADLs as needed.       Entered by: Moi Caba 09/10/2019 4:21 PM

## 2019-09-10 NOTE — PROGRESS NOTES
Tele review.  Several times during the night patients heart rate was elevated. At approx. 2315 pt was up to the bathroom and forgot his oxygen,   Then again at 4:45 it was at 170, contacted Medical staff, pt sitting at the edge  of bed using the urinal.

## 2019-09-10 NOTE — PROGRESS NOTES
[facility]    Medicine Progress Note - Hospitalist Service       Date of Admission:  9/5/2019  Assessment & Plan         Babak Moran is a 59 year old male who was admitted on 9/5/2019.       Sepsis (H) due to cellulitis    Assessment: Resolved.  Remains afebrile.  Leukocytosis improved from admit but slightly worse than yesterday.  Procalcitonin also improved.  Patient feels improvement but still with fatigue and achiness.  Source likely due to nonnecrotizing cellulitis of the left lower leg has improved but not resolved..  Erythema and edema slow improvement likely due to chronic stasis and body habitus.  I think he needs to remain in the hospital for an additional day.  We will continue Augmentin unless he develops fever or worsening condition and recheck CBC and procalcitonin tomorrow to trend.  If improving will finish course of Augmentin.  If labs/condition worsening will need to broaden coverage to include MRSA.    Plan:  -vancomycin discontinued after day 3   - Zosyn discontinued after day 4,    - Augmentin day #2.   - topical nystatin to left groin   - Lyme/Anaplasma negative   - repeat blood cultures negative x4 days   - hemoglobin A1c negative for dm2   - C. difficile negative   - Ultrasound negative for DVT              - PT/OT    Active Problems:    Atrial fibrillation (H)    Assessment: Chronic, developed rapid ventricular response overnight.  Currently rate to 120s.  Gave fluid bolus with no improvement.  Will increase metoprolol.  INR now therapeutic.  Pharmacy to continue to manage.    Plan: - Continue beta-blocker and increase total daily dose   -Pharmacy to continue to manage anticoagulation   - tele      Cerebrovascular accident (CVA) due to embolism of right middle cerebral artery (H)    Assessment: Chronic    Plan: - monitor      Essential hypertension    Assessment: stable    Plan: - resume home hydrochlorothiazide   - continue with bb   - hold norvasc      Obstructive  sleep apnea syndrome    Assessment: chronic and untreated, hypoxic when sleeping.  He is not tolerated prior CPAP masks in the past and after prolonged counseling he will consider seeing a dentist for a mouth guard.    Plan: - appreciate rcat      Morbid Obesity   Assessment:  Chronic   Plan:  Suggest low calorie, high protein diet as outpatient.        Hypokalemia - POA    Assessment:  Likely secondary to hydrochlorothiazide as well as GI losses with persistent diarrhea.   Plan:  Continue potassium replacement.  Trial Imodium.    FEN: regular diet, normal saline tko, mg/k replacement protocol  PPX: coumadin, PROTON PUMP INHIBITOR       Diet: Combination Diet Regular Diet Adult    DVT Prophylaxis: Warfarin  Ramirez Catheter: not present  Code Status: Full Code      Disposition Plan   Expected discharge: Tomorrow, recommended to prior living arrangement once antibiotic plan established, but rate controlled and diarrhea managed.  Entered: Alvino Yi MD 09/10/2019, 9:42 AM       The patient's care was discussed with the Patient and Patient's Family.    Alvino Yi MD  Hospitalist Service  [facility]    ______________________________________________________________________    Interval History   Patient reports he continues to feel fatigued.  Continues to have some pain in his left lower leg.  Not any worse than yesterday.  Perhaps slightly better.  No fevers overnight.  Tells me that he did not drink much fluid yesterday evening or overnight.  Did not have any sensation of shortness of breath, chest pain or palpitations despite heart rate as high as 180 with activity.  Now resting heart rate 120 and again asymptomatic.  His main concern is persistent diarrhea.  Reports has been going on for greater than 7 days.  C. difficile was negative.  Has not tried anything for this yet.    Data reviewed today: I reviewed all medications, new labs and imaging results over the last 24 hours. I  personally reviewed no images or EKG's today.    Physical Exam   Vital Signs: Temp: 98.8  F (37.1  C) Temp src: Tympanic BP: 134/72 Pulse: 130 Heart Rate: 107 Resp: 18 SpO2: 93 % O2 Device: None (Room air) Oxygen Delivery: 2 LPM  Weight: 395 lbs 12.8 oz  Constitutional: awake, alert, cooperative, no apparent distress, and appears stated age  Respiratory: No increased work of breathing, good air exchange, clear to auscultation bilaterally, no crackles or wheezing  Cardiovascular: Tachycardic rate, irregular rhythm.  No murmurs rubs or gallops.  GI: Abdomen soft, nontender.  Skin: Persistent erythema of left lower extremity with pitting edema 2+.  Area of erythema is about the same as yesterday.  Small blisters also persistent with no change.  No purulence.  Patient has underlying chronic venous stasis changes in both lower extremities.  Neuropsychiatric: General: normal, calm and normal eye contact  Orientation: oriented to self, place, time and situation  Memory and insight: normal, memory for past and recent events intact and thought process normal    Data   Recent Labs   Lab 09/10/19  0515 09/09/19  1252 09/09/19  0510  09/08/19  0755 09/08/19  0528 09/07/19  0430   WBC 13.7*  --  12.9*  --  15.7*  --  21.1*   HGB 12.4*  --  13.4  --  14.0  --  15.4   MCV 89  --  89  --  90  --  88     --  298  --  238  --  255   INR 2.81*  --  4.48*  --   --  6.18* 6.77*   NA  --   --  138  --   --  137 138   POTASSIUM 3.5 3.5 3.2*   < >  --  3.2* 3.4*   CHLORIDE  --   --  97*  --   --  99 99   CO2  --   --  37*  --   --  29 30   BUN  --   --  11  --   --  11 11   CR  --   --  1.14  --   --  1.06 0.91   ANIONGAP  --   --  4  --   --  9 9   VIRGEN  --   --  8.4*  --   --  8.0* 7.9*   GLC  --   --  105  --   --  97 108*    < > = values in this interval not displayed.     No results found for this or any previous visit (from the past 24 hour(s)).

## 2019-09-10 NOTE — PLAN OF CARE
LLE cellulitis gradually improving. Patient has had high heart rate today, up to 190 when up. Mostly had been in 130's. Dr. Yi aware. Patient received Metoprolol and Lopressor around 1015 and HR has since come down to 90's. BP stable. Patient denies chest pain and SOB.     Kyara Erazo RN on 9/10/2019 at 11:38 AM

## 2019-09-10 NOTE — PLAN OF CARE
"Pt is A&O x4. Is pleasant and cooperative. VSS. BP (!) 141/77   Pulse 70   Temp 99.1  F (37.3  C) (Tympanic)   Resp 18   Ht 1.854 m (6' 1\")   Wt (!) 180.9 kg (398 lb 12.8 oz)   SpO2 91%   BMI 52.62 kg/m    Denies SOB. LS diminished. Denies pain. Complains of nausea, PRN administered and effective. Pt had one loose stool, PRN imodium given and effective. +3 edema LLE. Warm to the touch, receding from the boarders.     Pt has been tachycardic throughout the night on ambulation. HR got as high as 160 during activity. HR goes back to normal at rest. Will continue to monitor.     Kary Shabazz RN on 9/10/2019 at 5:18 AM    "

## 2019-09-11 LAB
ANION GAP SERPL CALCULATED.3IONS-SCNC: 3 MMOL/L (ref 3–14)
BACTERIA SPEC CULT: NORMAL
BACTERIA SPEC CULT: NORMAL
BUN SERPL-MCNC: 32 MG/DL (ref 7–25)
C DIFF TOX B STL QL: NEGATIVE
CALCIUM SERPL-MCNC: 8.2 MG/DL (ref 8.6–10.3)
CHLORIDE SERPL-SCNC: 99 MMOL/L (ref 98–107)
CO2 SERPL-SCNC: 35 MMOL/L (ref 21–31)
CREAT SERPL-MCNC: 0.8 MG/DL (ref 0.7–1.3)
ERYTHROCYTE [DISTWIDTH] IN BLOOD BY AUTOMATED COUNT: 15.7 % (ref 10–15)
GFR SERPL CREATININE-BSD FRML MDRD: >90 ML/MIN/{1.73_M2}
GLUCOSE SERPL-MCNC: 92 MG/DL (ref 70–105)
HCT VFR BLD AUTO: 32 % (ref 40–53)
HGB BLD-MCNC: 10.1 G/DL (ref 13.3–17.7)
INR PPP: 2.04 (ref 0–1.3)
MCH RBC QN AUTO: 28 PG (ref 26.5–33)
MCHC RBC AUTO-ENTMCNC: 31.6 G/DL (ref 31.5–36.5)
MCV RBC AUTO: 89 FL (ref 78–100)
PLATELET # BLD AUTO: 337 10E9/L (ref 150–450)
POTASSIUM SERPL-SCNC: 3.6 MMOL/L (ref 3.5–5.1)
PROCALCITONIN SERPL-MCNC: 0.2 NG/ML
RBC # BLD AUTO: 3.61 10E12/L (ref 4.4–5.9)
SODIUM SERPL-SCNC: 137 MMOL/L (ref 134–144)
SPECIMEN SOURCE: NORMAL
WBC # BLD AUTO: 16.3 10E9/L (ref 4–11)

## 2019-09-11 PROCEDURE — 87329 GIARDIA AG IA: CPT | Performed by: FAMILY MEDICINE

## 2019-09-11 PROCEDURE — 87209 SMEAR COMPLEX STAIN: CPT | Performed by: FAMILY MEDICINE

## 2019-09-11 PROCEDURE — 25000132 ZZH RX MED GY IP 250 OP 250 PS 637: Performed by: INTERNAL MEDICINE

## 2019-09-11 PROCEDURE — 87177 OVA AND PARASITES SMEARS: CPT | Performed by: FAMILY MEDICINE

## 2019-09-11 PROCEDURE — 25800030 ZZH RX IP 258 OP 636: Performed by: FAMILY MEDICINE

## 2019-09-11 PROCEDURE — 87493 C DIFF AMPLIFIED PROBE: CPT | Performed by: FAMILY MEDICINE

## 2019-09-11 PROCEDURE — 12000000 ZZH R&B MED SURG/OB

## 2019-09-11 PROCEDURE — 80048 BASIC METABOLIC PNL TOTAL CA: CPT | Performed by: FAMILY MEDICINE

## 2019-09-11 PROCEDURE — 84145 PROCALCITONIN (PCT): CPT | Performed by: FAMILY MEDICINE

## 2019-09-11 PROCEDURE — 25000128 H RX IP 250 OP 636: Performed by: INTERNAL MEDICINE

## 2019-09-11 PROCEDURE — 85610 PROTHROMBIN TIME: CPT | Performed by: FAMILY MEDICINE

## 2019-09-11 PROCEDURE — 87506 IADNA-DNA/RNA PROBE TQ 6-11: CPT | Performed by: FAMILY MEDICINE

## 2019-09-11 PROCEDURE — 36415 COLL VENOUS BLD VENIPUNCTURE: CPT | Performed by: FAMILY MEDICINE

## 2019-09-11 PROCEDURE — 25000132 ZZH RX MED GY IP 250 OP 250 PS 637: Performed by: FAMILY MEDICINE

## 2019-09-11 PROCEDURE — 99232 SBSQ HOSP IP/OBS MODERATE 35: CPT | Performed by: FAMILY MEDICINE

## 2019-09-11 PROCEDURE — 25000131 ZZH RX MED GY IP 250 OP 636 PS 637: Performed by: INTERNAL MEDICINE

## 2019-09-11 PROCEDURE — 85027 COMPLETE CBC AUTOMATED: CPT | Performed by: FAMILY MEDICINE

## 2019-09-11 PROCEDURE — 25000128 H RX IP 250 OP 636: Performed by: FAMILY MEDICINE

## 2019-09-11 RX ORDER — METOPROLOL TARTRATE 50 MG
50 TABLET ORAL 2 TIMES DAILY
Status: DISCONTINUED | OUTPATIENT
Start: 2019-09-11 | End: 2019-09-12

## 2019-09-11 RX ORDER — FUROSEMIDE 10 MG/ML
20 INJECTION INTRAMUSCULAR; INTRAVENOUS
Status: DISCONTINUED | OUTPATIENT
Start: 2019-09-11 | End: 2019-09-13 | Stop reason: HOSPADM

## 2019-09-11 RX ORDER — TRAZODONE HYDROCHLORIDE 50 MG/1
50 TABLET, FILM COATED ORAL
Status: DISCONTINUED | OUTPATIENT
Start: 2019-09-11 | End: 2019-09-13 | Stop reason: HOSPADM

## 2019-09-11 RX ORDER — ZOLPIDEM TARTRATE 5 MG/1
5 TABLET ORAL
Status: DISCONTINUED | OUTPATIENT
Start: 2019-09-11 | End: 2019-09-13 | Stop reason: HOSPADM

## 2019-09-11 RX ORDER — WARFARIN SODIUM 5 MG/1
5 TABLET ORAL
Status: COMPLETED | OUTPATIENT
Start: 2019-09-11 | End: 2019-09-11

## 2019-09-11 RX ADMIN — METOPROLOL TARTRATE 50 MG: 50 TABLET, FILM COATED ORAL at 21:39

## 2019-09-11 RX ADMIN — Medication 250 MG: at 10:11

## 2019-09-11 RX ADMIN — ONDANSETRON 4 MG: 4 TABLET, ORALLY DISINTEGRATING ORAL at 17:36

## 2019-09-11 RX ADMIN — FUROSEMIDE 20 MG: 10 INJECTION, SOLUTION INTRAVENOUS at 10:58

## 2019-09-11 RX ADMIN — LOPERAMIDE HYDROCHLORIDE 2 MG: 2 CAPSULE ORAL at 01:58

## 2019-09-11 RX ADMIN — AMOXICILLIN AND CLAVULANATE POTASSIUM 1 TABLET: 875; 125 TABLET, FILM COATED ORAL at 10:11

## 2019-09-11 RX ADMIN — ONDANSETRON 4 MG: 4 TABLET, ORALLY DISINTEGRATING ORAL at 08:15

## 2019-09-11 RX ADMIN — METOPROLOL TARTRATE 50 MG: 50 TABLET, FILM COATED ORAL at 10:58

## 2019-09-11 RX ADMIN — VANCOMYCIN HYDROCHLORIDE 2000 MG: 1 INJECTION, POWDER, LYOPHILIZED, FOR SOLUTION INTRAVENOUS at 12:32

## 2019-09-11 RX ADMIN — NYSTATIN: 100000 CREAM TOPICAL at 10:46

## 2019-09-11 RX ADMIN — METOPROLOL SUCCINATE 200 MG: 100 TABLET, EXTENDED RELEASE ORAL at 10:11

## 2019-09-11 RX ADMIN — FUROSEMIDE 20 MG: 10 INJECTION, SOLUTION INTRAVENOUS at 14:21

## 2019-09-11 RX ADMIN — METRONIDAZOLE 500 MG: 500 INJECTION, SOLUTION INTRAVENOUS at 14:48

## 2019-09-11 RX ADMIN — Medication 250 MG: at 21:39

## 2019-09-11 RX ADMIN — PROCHLORPERAZINE EDISYLATE 5 MG: 5 INJECTION INTRAMUSCULAR; INTRAVENOUS at 21:39

## 2019-09-11 RX ADMIN — WARFARIN SODIUM 5 MG: 5 TABLET ORAL at 18:25

## 2019-09-11 RX ADMIN — LOPERAMIDE HYDROCHLORIDE 2 MG: 2 CAPSULE ORAL at 15:04

## 2019-09-11 ASSESSMENT — ACTIVITIES OF DAILY LIVING (ADL)
ADLS_ACUITY_SCORE: 19

## 2019-09-11 NOTE — PLAN OF CARE
Late entry for 0840: Assessment complete, see flowsheet. Pt states he feels worse. Dr. Yi aware of pulse and BP. Orders received. Pt having lg loose mud brown odorless bm's and nausea. Continue to order.

## 2019-09-11 NOTE — PROGRESS NOTES
Notified  of Med Surg of patient's heart rat of 170's sustained for 3 minutes.  Now 110-130's.  A Fib.

## 2019-09-11 NOTE — PHARMACY-VANCOMYCIN DOSING SERVICE
"Pharmacy Consult- Vancomycin Assessment    Babak Moran is a 59 year old male admitted on 2019.    Vancomycin has been ordered per MD, for the indication of: Cellulitis    Current Antibiotic Regimen Includes: Vancomycin (restart, patient received dose -)    Patient Active Problem List   Diagnosis     Pain in joint, ankle and foot     Anticoagulation monitoring, INR range 2-3     Atrial fibrillation with RVR (H)     Cerebrovascular accident (CVA) due to embolism of right middle cerebral artery (H)     Osteoarthrosis     Cutaneous lupus erythematosus     Gout     Ascending aorta enlargement (H)     Obesity     Family history of coronary artery disease     Essential hypertension     Left hemiparesis (H)     Long-term (current) use of anticoagulants, INR goal 2.0-3.0     Systemic lupus erythematosus (H)     Hypoglycemia     Tissue plasminogen activator (t-PA) administered at other facility within 24 hours prior to current admission     Morbid obesity with BMI of 40.0-44.9, adult (H)     Obstructive sleep apnea syndrome     Cellulitis of left lower extremity     Sepsis (H)       Allergies (and reaction): Ioxaglate; Diatrizoate; Levofloxacin; Metrizamide; and Probenecid    Most recent flowsheet Weight: (!) 179.5 kg (395 lb 12.8 oz)  Most recent flowsheet Height: 185.4 cm (6' 1\")      Intake/Output Summary (Last 24 hours) at 2019 1053  Last data filed at 2019 0200  Gross per 24 hour   Intake 760 ml   Output 675 ml   Net 85 ml       Tmax = Temp (24hrs), Av.4  F (37.4  C), Min:97.8  F (36.6  C), Max:100.4  F (38  C)      Recent Labs   Lab Test 19  0458   WBC 16.3*       Recent Labs   Lab Test 19  0458 19  0510 19  0528   BUN 32* 11 11   CR 0.80 1.14 1.06       estimated creatinine clearance is 170.4 mL/min (based on SCr of 0.8 mg/dL).    Cultures Pending:  Enteric/virus pane. Giardai, parasite, c diff  Culture Results: blood x 3 no growth    Plan: Patient has not been improving " since discontinuation of vancomycin on 9/8.  Patient was receiving 2000mg Q12H and a trough resulted at a therapeutic level prior to fifth dose.  Will resume this same dose for the same indication.    Regimen Start Date: 09/11/19  Recommended Dose: 2000mg, which provides 11.1 mg/kg/dose  Interval:Q12H  Goal Trough: 10-15 mg/L    Thank You for the consult. Will continue to follow.    Alicia Livingston RPH ....................  9/11/2019   10:53 AM

## 2019-09-11 NOTE — PHARMACY-ANTICOAGULATION SERVICE
Pharmacy Consult- Coumadin Follow-Up    Babak Moran is a 59 year old male admitted on 9/5/2019 with Cellulitis of left lower extremity    Primary Indication(s) for Anticoagulation: A fib    Goal INR: 2.5 (2-3).  Patient prefers to be close to 3 per anticoag note 8/19 FYI, patient is followed by the Anticoagulation/Protime Clinic at: [facility]    Patient Active Problem List   Diagnosis     Pain in joint, ankle and foot     Anticoagulation monitoring, INR range 2-3     Atrial fibrillation with RVR (H)     Cerebrovascular accident (CVA) due to embolism of right middle cerebral artery (H)     Osteoarthrosis     Cutaneous lupus erythematosus     Gout     Ascending aorta enlargement (H)     Obesity     Family history of coronary artery disease     Essential hypertension     Left hemiparesis (H)     Long-term (current) use of anticoagulants, INR goal 2.0-3.0     Systemic lupus erythematosus (H)     Hypoglycemia     Tissue plasminogen activator (t-PA) administered at other facility within 24 hours prior to current admission     Morbid obesity with BMI of 40.0-44.9, adult (H)     Obstructive sleep apnea syndrome     Cellulitis of left lower extremity     Sepsis (H)       New factors that may increase patient's sensitivity to warfarin (Coumadin) include: Antibiotics, acute illness    New factors that may decrease patient's sensitivity to warfarin (Coumadin) include: wafarin held for FOUR doses        Recent Labs   Lab Test 09/11/19  0458 09/10/19  0515 09/09/19  0510 09/08/19  0755 09/08/19  0528   HGB 10.1* 12.4* 13.4 14.0  --    INR 2.04* 2.81* 4.48*  --  6.18*    334 298 238  --         Recent Dosing:    Date INR Dose Given   09/5/19 3.58 7.5mg   09/6/19 5.94 HELD   09/7/19 6.77 HELD   09/8/19 6.18 HELD   09/9/19 4.48 HELD   09/10/19 2.81 7.5mg       Plan: INR is therapeutic today, however still decreasing from FOUR held doses.  Will give patient's home dose today of Warfarin7.5mg x 1.  INR with am labs.    Patient  was started on Metronidazole 500mg IV Q12H for treatment of Giardia.  Therefore, will decrease today's dose to Warfarin 5mg x 1 given patient's recent supra therapeutic INR's likely from antibiotics.    Thank You for the Consult. Will continue to follow.    Alicia Livingston RPH ....................  9/11/2019   11:01 AM

## 2019-09-11 NOTE — PROGRESS NOTES
"[facility]    Medicine Progress Note - Hospitalist Service       Date of Admission:  9/5/2019  Assessment & Plan            Babak Moran is a 59 year old male who was admitted on 9/5/2019.       Sepsis (H) due to cellulitis    Assessment: Mild elevation in temperature not to the degree of fever.  Leukocytosis continues to worsen despite improvement in procalcitonin.  Procalcitonin also improved.  Patient reports that he feels like \"crap\".  Most of his difficulty and concern stems from severe diarrhea.  Is still having quite a bit of pain in the left lower extremity however.  Given lack of definitive improvement suspect this represents MRSA.  Will discontinue Augmentin and restart vancomycin.    Plan:  -vancomycin discontinued after day 3 -we will restart now.   - Zosyn discontinued after day 4,    - Augmentin day #2.  Will discontinue.   - topical nystatin to left groin   - Lyme/Anaplasma negative   - repeat blood cultures negative x5 days   - hemoglobin A1c negative for dm2   - C. difficile negative, will recheck along with other stool studies   - Ultrasound negative for DVT              - PT/OT    Active Problems:    Atrial fibrillation (H)    Assessment: Chronic, developed RVR 2 nights ago.  Did not improve with fluid bolus.  Did improve with increased dose of metoprolol.  Again recurred last night and this morning.  Currently rate to 120s at rest and up to 180 with activity.  Will schedule metoprolol 50 mg twice daily and monitor blood pressure and pulse.  Continue with Coumadin per pharmacy    Plan: - Continue beta-blocker and increase total daily dose   -Pharmacy to continue to manage anticoagulation   - tele      Cerebrovascular accident (CVA) due to embolism of right middle cerebral artery (H)    Assessment: Chronic    Plan: - monitor      Essential hypertension    Assessment: stable    Plan: - resume home hydrochlorothiazide   - continue with bb   - hold norvasc      Obstructive sleep " "apnea syndrome    Assessment: chronic and untreated, hypoxic when sleeping.  He is not tolerated prior CPAP masks in the past and after prolonged counseling he will consider seeing a dentist for a mouth guard.    Plan: - appreciate rcat      Morbid Obesity   Assessment:  Chronic   Plan:  Suggest low calorie, high protein diet as outpatient.        Hypokalemia - POA    Assessment:  Likely secondary to hydrochlorothiazide as well as GI losses with persistent diarrhea.   Plan:  Continue potassium replacement.  Continue Imodium as needed    FEN: regular diet, normal saline tko, mg/k replacement protocol  PPX: coumadin, PROTON PUMP INHIBITOR     Diet: Combination Diet Regular Diet Adult    DVT Prophylaxis: Warfarin  Ramirez Catheter: not present  Code Status: Full Code      Disposition Plan   Expected discharge: Tomorrow, recommended to prior living arrangement once antibiotic plan established, diarrhea controlled and RVR with activity resolved  Entered: Alvino Yi MD 09/11/2019, 10:35 AM       The patient's care was discussed with the Patient and Patient's Family.    Alvino Yi MD  Hospitalist Service  [facility]    ______________________________________________________________________    Interval History   Patient reports he feels like \"crap\".  Reports main frustration has to do with ongoing diarrhea.  Tells me that he \"cannot go home\" as he would not be able to leave the bathroom.  No abdominal pain just frequent watery stools.  He is still having pain in left lower extremity especially if he tries to prop the leg up.  Will indent the back of his leg and become very sore.  Did not feel febrile overnight.  Had elevated temperatures but not to the degree that would be considered febrile.  Appetite is mediocre.  Activity level remains mediocre.      Data reviewed today: I reviewed all medications, new labs and imaging results over the last 24 hours. I personally reviewed no images or " EKG's today.    Physical Exam   Vital Signs: Temp: 97.8  F (36.6  C) Temp src: Tympanic BP: 108/60 Pulse: 94 Heart Rate: 104 Resp: 18 SpO2: 92 % O2 Device: Nasal cannula Oxygen Delivery: 1 LPM  Weight: 395 lbs 12.8 oz  Constitutional: awake, alert, cooperative, no apparent distress, and appears stated age  Respiratory: No increased work of breathing, good air exchange, clear to auscultation bilaterally, no crackles or wheezing  Cardiovascular: Regular rate and irregular rhythm.  No murmurs rubs or gallops heard. No KRISTA.  GI: Abdomen Soft, non-tender.  No masses, rebound or guarding.   Skin: Ongoing erythema of left lower extremity just below knee extending to the base of toes.  Continued warmth.  Minimal blistering over anterior shin without any fluctuance or purulence.  Overall I would say that it has not dramatically worsened since yesterday but has definitely not improved.  There is no erythema or purulence at the small eschar over tip of toe  Neuropsychiatric: General: normal, calm and normal eye contact  Orientation: oriented to self, place, time and situation  Memory and insight: normal, memory for past and recent events intact and thought process normal    Data   Recent Labs   Lab 09/11/19  0458 09/10/19  0515 09/09/19  1252 09/09/19  0510  09/08/19  0528   WBC 16.3* 13.7*  --  12.9*   < >  --    HGB 10.1* 12.4*  --  13.4   < >  --    MCV 89 89  --  89   < >  --     334  --  298   < >  --    INR 2.04* 2.81*  --  4.48*  --  6.18*     --   --  138  --  137   POTASSIUM 3.6 3.5 3.5 3.2*   < > 3.2*   CHLORIDE 99  --   --  97*  --  99   CO2 35*  --   --  37*  --  29   BUN 32*  --   --  11  --  11   CR 0.80  --   --  1.14  --  1.06   ANIONGAP 3  --   --  4  --  9   VIRGEN 8.2*  --   --  8.4*  --  8.0*   GLC 92  --   --  105  --  97    < > = values in this interval not displayed.     No results found for this or any previous visit (from the past 24 hour(s)).

## 2019-09-11 NOTE — PLAN OF CARE
Discharge Planner PT    Patient seen by PT for evaluation of mobility. Patient able to demonstrate safe, functional bed mobility, transfers and ambulation all without use of assistive gait device. No  formal physical therapy required presently in the acute care setting. PT and OT will remain available to assist with patient mobility and/or ADL performance as needed.        Entered by: Moi Caba 09/11/2019 5:39 PM

## 2019-09-11 NOTE — PLAN OF CARE
Blisters on RLE began to drain, left open to air. No complaints of pain. Reports anxiety over not being home. VSS ex for mild fever of 100.0 and 99.8.

## 2019-09-11 NOTE — PLAN OF CARE
Pt does not get up and move only to the bathroom. LLE appears more red, but is receeding from boarders. Multiple BM's continue, adequate UO. Received an imodium, Dr Yi is aware of pt status.

## 2019-09-12 LAB
ANION GAP SERPL CALCULATED.3IONS-SCNC: 8 MMOL/L (ref 3–14)
BACTERIA SPEC CULT: NORMAL
BUN SERPL-MCNC: 25 MG/DL (ref 7–25)
C COLI+JEJUNI+LARI FUSA STL QL NAA+PROBE: NOT DETECTED
CALCIUM SERPL-MCNC: 7.9 MG/DL (ref 8.6–10.3)
CHLORIDE SERPL-SCNC: 100 MMOL/L (ref 98–107)
CO2 SERPL-SCNC: 30 MMOL/L (ref 21–31)
CREAT SERPL-MCNC: 0.83 MG/DL (ref 0.7–1.3)
CREAT SERPL-MCNC: 0.93 MG/DL (ref 0.7–1.3)
EC STX1 GENE STL QL NAA+PROBE: NOT DETECTED
EC STX2 GENE STL QL NAA+PROBE: NOT DETECTED
ENTERIC PATHOGEN COMMENT: NORMAL
ERYTHROCYTE [DISTWIDTH] IN BLOOD BY AUTOMATED COUNT: 15.7 % (ref 10–15)
G LAMBLIA AG STL QL IA: ABNORMAL
GFR SERPL CREATININE-BSD FRML MDRD: 83 ML/MIN/{1.73_M2}
GFR SERPL CREATININE-BSD FRML MDRD: >90 ML/MIN/{1.73_M2}
GLUCOSE SERPL-MCNC: 99 MG/DL (ref 70–105)
HCT VFR BLD AUTO: 29.9 % (ref 40–53)
HGB BLD-MCNC: 9.4 G/DL (ref 13.3–17.7)
INR PPP: 1.91 (ref 0–1.3)
MAGNESIUM SERPL-MCNC: 2.2 MG/DL (ref 1.9–2.7)
MCH RBC QN AUTO: 28.1 PG (ref 26.5–33)
MCHC RBC AUTO-ENTMCNC: 31.4 G/DL (ref 31.5–36.5)
MCV RBC AUTO: 90 FL (ref 78–100)
NOROV GI+II ORF1-ORF2 JNC STL QL NAA+PR: NOT DETECTED
O+P STL MICRO: NORMAL
O+P STL MICRO: NORMAL
PLATELET # BLD AUTO: 347 10E9/L (ref 150–450)
POTASSIUM SERPL-SCNC: 4.1 MMOL/L (ref 3.5–5.1)
PROCALCITONIN SERPL-MCNC: 0.3 NG/ML
RBC # BLD AUTO: 3.34 10E12/L (ref 4.4–5.9)
RVA NSP5 STL QL NAA+PROBE: NOT DETECTED
SALMONELLA SP RPOD STL QL NAA+PROBE: NOT DETECTED
SHIGELLA SP+EIEC IPAH STL QL NAA+PROBE: NOT DETECTED
SODIUM SERPL-SCNC: 138 MMOL/L (ref 134–144)
SPECIMEN SOURCE: ABNORMAL
SPECIMEN SOURCE: NORMAL
SPECIMEN SOURCE: NORMAL
V CHOL+PARA RFBL+TRKH+TNAA STL QL NAA+PR: NOT DETECTED
WBC # BLD AUTO: 17.2 10E9/L (ref 4–11)
Y ENTERO RECN STL QL NAA+PROBE: NOT DETECTED

## 2019-09-12 PROCEDURE — 25000128 H RX IP 250 OP 636: Performed by: FAMILY MEDICINE

## 2019-09-12 PROCEDURE — 85610 PROTHROMBIN TIME: CPT | Performed by: FAMILY MEDICINE

## 2019-09-12 PROCEDURE — 85027 COMPLETE CBC AUTOMATED: CPT | Performed by: FAMILY MEDICINE

## 2019-09-12 PROCEDURE — 25800030 ZZH RX IP 258 OP 636: Performed by: FAMILY MEDICINE

## 2019-09-12 PROCEDURE — 12000000 ZZH R&B MED SURG/OB

## 2019-09-12 PROCEDURE — 36415 COLL VENOUS BLD VENIPUNCTURE: CPT | Performed by: FAMILY MEDICINE

## 2019-09-12 PROCEDURE — 80048 BASIC METABOLIC PNL TOTAL CA: CPT | Performed by: FAMILY MEDICINE

## 2019-09-12 PROCEDURE — 25000132 ZZH RX MED GY IP 250 OP 250 PS 637: Performed by: FAMILY MEDICINE

## 2019-09-12 PROCEDURE — 82565 ASSAY OF CREATININE: CPT | Performed by: FAMILY MEDICINE

## 2019-09-12 PROCEDURE — 83735 ASSAY OF MAGNESIUM: CPT | Performed by: FAMILY MEDICINE

## 2019-09-12 PROCEDURE — 25000131 ZZH RX MED GY IP 250 OP 636 PS 637: Performed by: INTERNAL MEDICINE

## 2019-09-12 PROCEDURE — 84145 PROCALCITONIN (PCT): CPT | Performed by: FAMILY MEDICINE

## 2019-09-12 PROCEDURE — 99232 SBSQ HOSP IP/OBS MODERATE 35: CPT | Performed by: FAMILY MEDICINE

## 2019-09-12 RX ORDER — METOPROLOL TARTRATE 25 MG/1
25 TABLET, FILM COATED ORAL 2 TIMES DAILY
Status: DISCONTINUED | OUTPATIENT
Start: 2019-09-12 | End: 2019-09-13 | Stop reason: HOSPADM

## 2019-09-12 RX ORDER — WARFARIN SODIUM 5 MG/1
5 TABLET ORAL
Status: DISCONTINUED | OUTPATIENT
Start: 2019-09-12 | End: 2019-09-12

## 2019-09-12 RX ADMIN — METOPROLOL TARTRATE 25 MG: 25 TABLET ORAL at 21:57

## 2019-09-12 RX ADMIN — FUROSEMIDE 20 MG: 10 INJECTION, SOLUTION INTRAVENOUS at 08:53

## 2019-09-12 RX ADMIN — ONDANSETRON 4 MG: 4 TABLET, ORALLY DISINTEGRATING ORAL at 15:33

## 2019-09-12 RX ADMIN — VANCOMYCIN HYDROCHLORIDE 2000 MG: 1 INJECTION, POWDER, LYOPHILIZED, FOR SOLUTION INTRAVENOUS at 23:31

## 2019-09-12 RX ADMIN — NYSTATIN: 100000 CREAM TOPICAL at 10:10

## 2019-09-12 RX ADMIN — METOPROLOL TARTRATE 25 MG: 25 TABLET ORAL at 10:09

## 2019-09-12 RX ADMIN — Medication 250 MG: at 21:57

## 2019-09-12 RX ADMIN — METRONIDAZOLE 500 MG: 500 INJECTION, SOLUTION INTRAVENOUS at 03:46

## 2019-09-12 RX ADMIN — VANCOMYCIN HYDROCHLORIDE 2000 MG: 1 INJECTION, POWDER, LYOPHILIZED, FOR SOLUTION INTRAVENOUS at 00:38

## 2019-09-12 RX ADMIN — METOPROLOL SUCCINATE 200 MG: 100 TABLET, EXTENDED RELEASE ORAL at 10:09

## 2019-09-12 RX ADMIN — Medication 250 MG: at 10:10

## 2019-09-12 RX ADMIN — METRONIDAZOLE 500 MG: 500 INJECTION, SOLUTION INTRAVENOUS at 14:21

## 2019-09-12 RX ADMIN — WARFARIN SODIUM 7.5 MG: 2.5 TABLET ORAL at 17:49

## 2019-09-12 RX ADMIN — FUROSEMIDE 20 MG: 10 INJECTION, SOLUTION INTRAVENOUS at 14:20

## 2019-09-12 RX ADMIN — VANCOMYCIN HYDROCHLORIDE 2000 MG: 1 INJECTION, POWDER, LYOPHILIZED, FOR SOLUTION INTRAVENOUS at 11:59

## 2019-09-12 ASSESSMENT — MIFFLIN-ST. JEOR: SCORE: 2671.88

## 2019-09-12 ASSESSMENT — ACTIVITIES OF DAILY LIVING (ADL)
ADLS_ACUITY_SCORE: 19

## 2019-09-12 NOTE — PROGRESS NOTES
:    Received a referral to see patient as nursing stated patient had some financial questions.  Met with patient and wife in room and they stated  Mee met with them yesterday and explained process.  They do not have any other questions or concerns.  Anticipated discharge today to home with wife.    JASON Huitron on 9/12/2019 at 9:53 AM

## 2019-09-12 NOTE — PROGRESS NOTES
Notified Alexandra Cody RN of patient HR up to 170's. RN notified this writer that patient's HR increases with activity and MD is aware.

## 2019-09-12 NOTE — PROGRESS NOTES
[facility]    Medicine Progress Note - Hospitalist Service       Date of Admission:  9/5/2019  Assessment & Plan               Babak Moran is a 59 year old male who was admitted on 9/5/2019.       Sepsis (H) due to cellulitis    Assessment: Yesterday restarted vancomycin and added Flagyl due to Giardia in stool.  Significant improvement in diarrhea today.  Still with loose stools but no longer having fecal incontinence.  Also significant improvement in erythema and degree of edema in leg.  Still with quite a bit of warmth and erythema but definitely improving after 2 days of stagnation.   Plan:     -vancomycin discontinued after day 3.  Restarted on day 6.  Significant improvement in all symptoms since restart.   - Zosyn discontinued after day 4,    - Augmentin discontinued after day #2.                -Flagyl day #2 with positive Giardia and stool yesterday.   - topical nystatin to left groin   - Lyme/Anaplasma negative   - repeat blood cultures negative x6 days   - hemoglobin A1c negative for dm2   - C. difficile negative, Giardia positive.   - Ultrasound negative for DVT              - PT/OT    Active Problems:    Atrial fibrillation (H)    Assessment: Chronic, has developed RVR intermittently the past few nights which resolves when given an extra dose of metoprolol.  Last night did well as we kept him on 50 metoprolol twice daily in addition to his 200 mg with Toprol-XL.  Continue with Coumadin per pharmacy.  Confirmed with cardiology that goal INR 2.5-3.  Explained to patient and will explained to his wife that right now he is hitting a nadr due to his elevated INR on admit and his risk of developing a clot given his recent elevated INR is very low.    Plan: - Continue beta-blocker and will trial reduction in short acting metoprolol to 25 mg twice daily.   -Pharmacy to continue to manage anticoagulation   - tele      Cerebrovascular accident (CVA) due to embolism of right middle cerebral  "artery (H)    Assessment: Chronic    Plan: - monitor      Essential hypertension    Assessment: stable    Plan: - resume home hydrochlorothiazide   - continue with bb   - hold norvasc      Obstructive sleep apnea syndrome    Assessment: chronic and untreated, hypoxic when sleeping.  He is not tolerated prior CPAP masks in the past and after prolonged counseling he will consider seeing a dentist for a mouth guard.    Plan: - appreciate rcat      Morbid Obesity   Assessment:  Chronic   Plan:  Suggest low calorie, high protein diet as outpatient.        Hypokalemia - POA    Assessment: Potassium normal yesterday.  Does not appear that it was checked this morning.  Likely secondary to hydrochlorothiazide as well as GI losses with persistent diarrhea.   Plan: We will add on lab.  Continue potassium replacement.  Continue Imodium as needed.      FEN: regular diet, normal saline tko, mg/k replacement protocol  PPX: coumadin, PROTON PUMP INHIBITOR     Diet: Combination Diet Regular Diet Adult    DVT Prophylaxis: Warfarin  Ramirez Catheter: not present  Code Status: Full Code      Disposition Plan   Expected discharge: Tomorrow, recommended to prior living arrangement once antibiotic plan established.  Entered: Alvino Yi MD 09/12/2019, 5:43 PM       The patient's care was discussed with the Patient.    Alvino Yi MD  Hospitalist Service  [facility]    ______________________________________________________________________    Interval History   Patient reports that he feels \"better\" today.  Tells me that he is no longer having any fecal incontinence.  Does still have loose stools.  Not having any abdominal pain.  Reports he woke up last night and was hungry for the first time.  Appetite has steadily improved through the day.  Pain in the leg also improving.  Still red and develops more blisters overnight but overall feels like is improving.  No fevers.    Denies any chest pain or shortness of " breath.  No new issues.    Data reviewed today: I reviewed all medications, new labs and imaging results over the last 24 hours. I personally reviewed no images or EKG's today.    Physical Exam   Vital Signs: Temp: 98.3  F (36.8  C) Temp src: Tympanic BP: 130/62 Pulse: 70 Heart Rate: 101 Resp: 18 SpO2: 98 % O2 Device: None (Room air)    Weight: 396 lbs 6.19 oz  Constitutional: awake, alert, cooperative, no apparent distress, and appears stated age  Respiratory: No increased work of breathing, good air exchange, clear to auscultation bilaterally, no crackles or wheezing  Cardiovascular: Regular rate and irregular rhythm.  No murmurs rubs or gallops heard.   Skin: Improvement in degree of erythema and warmth.  Now with a fairly large area over upper anterior shin that is starting to clear.  Also visible wrinkles and less edema.  He does have several blisters over the lower to mid anterior shin.  Musculoskeletal: No synovitis.  Muscle strength age and body habitus appropriateas well as equal and even.   Neuropsychiatric: General: normal, calm and normal eye contact  Orientation: oriented to self, place, time and situation  Memory and insight: normal, memory for past and recent events intact and thought process normal    Data   Recent Labs   Lab 09/12/19  1415 09/12/19  0515 09/11/19  0458 09/10/19  0515 09/09/19  1252 09/09/19  0510  09/08/19  0528   WBC  --  17.2* 16.3* 13.7*  --  12.9*   < >  --    HGB  --  9.4* 10.1* 12.4*  --  13.4   < >  --    MCV  --  90 89 89  --  89   < >  --    PLT  --  347 337 334  --  298   < >  --    INR  --  1.91* 2.04* 2.81*  --  4.48*  --  6.18*   NA  --   --  137  --   --  138  --  137   POTASSIUM  --   --  3.6 3.5 3.5 3.2*   < > 3.2*   CHLORIDE  --   --  99  --   --  97*  --  99   CO2  --   --  35*  --   --  37*  --  29   BUN  --   --  32*  --   --  11  --  11   CR 0.93  --  0.80  --   --  1.14  --  1.06   ANIONGAP  --   --  3  --   --  4  --  9   VIRGEN  --   --  8.2*  --   --  8.4*  --   8.0*   GLC  --   --  92  --   --  105  --  97    < > = values in this interval not displayed.     No results found for this or any previous visit (from the past 24 hour(s)).

## 2019-09-12 NOTE — PHARMACY-ANTICOAGULATION SERVICE
Pharmacy Consult- Coumadin Follow-Up    Babak Moran is a 59 year old male admitted on 9/5/2019 with Cellulitis of left lower extremity    Primary Indication(s) for Anticoagulation: A fib    Goal INR: 2.5-3 (**per Dr Acosta's note from 2017), patient prefers to be close to 3 per anticoag note 8/19 FYI, patient is followed by the Anticoagulation/Protime Clinic at: [facility]    Patient Active Problem List   Diagnosis     Pain in joint, ankle and foot     Anticoagulation monitoring, INR range 2-3     Atrial fibrillation with RVR (H)     Cerebrovascular accident (CVA) due to embolism of right middle cerebral artery (H)     Osteoarthrosis     Cutaneous lupus erythematosus     Gout     Ascending aorta enlargement (H)     Obesity     Family history of coronary artery disease     Essential hypertension     Left hemiparesis (H)     Long-term (current) use of anticoagulants, INR goal 2.0-3.0     Systemic lupus erythematosus (H)     Hypoglycemia     Tissue plasminogen activator (t-PA) administered at other facility within 24 hours prior to current admission     Morbid obesity with BMI of 40.0-44.9, adult (H)     Obstructive sleep apnea syndrome     Cellulitis of left lower extremity     Sepsis (H)       New factors that may increase patient's sensitivity to warfarin (Coumadin) include: metronidazole, acute illness    New factors that may decrease patient's sensitivity to warfarin (Coumadin) include: warfarin held for FOUR doses        Recent Labs   Lab Test 09/12/19  0515 09/11/19  0458 09/10/19  0515 09/09/19  0510   HGB 9.4* 10.1* 12.4* 13.4   INR 1.91* 2.04* 2.81* 4.48*    337 334 298        Recent Dosing:    Date INR Dose Given   09/05/19 3.58 7.5 mg   09/06/19 5.94 HELD   09/07/19 6.77 HELD   09/08/19 6.18 HELD   09/09/19 4.48 HELD   09/10/19 2.81 7.5 mg   09/11/19 2.04 5 mg       Plan: INR is sub therapeutic today.   Patient was given a conservative dose yesterday due to new start of Flagyl and prior INR level.   Patient and spouse are VERY adamant about having INR close to 3 and are questioning MD why it is low today and why he is not getting home dose.  Per discussion with MD and patient's potential discharge tomorrow, goal is to get INR increased quickly.  Will give home dose Warfarin 7.5mg x 1 tonight.  INR with am labs.    Thank You for the Consult. Will continue to follow.    Alicia Livingston RPH ....................  9/12/2019   2:14 PM

## 2019-09-12 NOTE — PLAN OF CARE
Late entry for 0800am: Pt states he is feeling like he can go home now. BM's are starting to take form.

## 2019-09-12 NOTE — PLAN OF CARE
Assessment complete, see flow sheets. Pt denies pain, states he feels like he can go home today. Enteric precautions in place. Pt encouraged to keep leg elevated.

## 2019-09-12 NOTE — PROGRESS NOTES
"Patient frequently found sitting at the edge of his bed with legs dependent.  Area of redness on left leg remains within outlined area, but continues very edematous and red.  Central shin has several areas of fluid filled blisters.  These have increased overnight as patient has been sitting up.  Patient verbalizes frustration that he \"is not getting better.\"    "

## 2019-09-12 NOTE — PHARMACY-VANCOMYCIN DOSING SERVICE
"Pharmacy Consult- Vancomycin Assessment    Babak Moran is a 59 year old male admitted on 2019.    Vancomycin has been ordered per MD, for the indication of: Cellulitis    Current Antibiotic Regimen Includes: Vancomycin (restart, patient received dose -), Metronidazole (Giardia)    Patient Active Problem List   Diagnosis     Pain in joint, ankle and foot     Anticoagulation monitoring, INR range 2-3     Atrial fibrillation with RVR (H)     Cerebrovascular accident (CVA) due to embolism of right middle cerebral artery (H)     Osteoarthrosis     Cutaneous lupus erythematosus     Gout     Ascending aorta enlargement (H)     Obesity     Family history of coronary artery disease     Essential hypertension     Left hemiparesis (H)     Long-term (current) use of anticoagulants, INR goal 2.0-3.0     Systemic lupus erythematosus (H)     Hypoglycemia     Tissue plasminogen activator (t-PA) administered at other facility within 24 hours prior to current admission     Morbid obesity with BMI of 40.0-44.9, adult (H)     Obstructive sleep apnea syndrome     Cellulitis of left lower extremity     Sepsis (H)       Allergies (and reaction): Ioxaglate; Diatrizoate; Levofloxacin; Metrizamide; and Probenecid    Most recent flowsheet Weight: (!) 179.8 kg (396 lb 6.2 oz)  Most recent flowsheet Height: 185.4 cm (6' 1\")      Intake/Output Summary (Last 24 hours) at 2019 1407  Last data filed at 2019 1159  Gross per 24 hour   Intake 6 ml   Output 2150 ml   Net -2144 ml       Tmax = Temp (24hrs), Av.2  F (37.3  C), Min:98.4  F (36.9  C), Max:99.8  F (37.7  C)      Recent Labs   Lab Test 19  0515   WBC 17.2*       Recent Labs   Lab Test 19  0458 19  0510 19  0528   BUN 32* 11 11   CR 0.80 1.14 1.06       estimated creatinine clearance is 170.7 mL/min (based on SCr of 0.8 mg/dL).      Culture Results: blood negative x 3, c diff negative, giardia positive    Plan: Continue Vancomycin 2000mg Q12H.  " Patient was on this dose previously which resulted in a therapeutic trough, then stopped for a few days, then restarted.  Will draw trough tomorrow prior to fifth dose.    Goal Trough: 10-15 mg/L    Thank You for the consult. Will continue to follow.    Alicia Livingston RPH ....................  9/12/2019   2:07 PM

## 2019-09-12 NOTE — PLAN OF CARE
Pt has many barriers to keeping feet elevated. Explained importance of keeping them elevated above heart.        BM's much decreased. Vss WDL. Wife here she states she is not well either. Will continue to encourage.

## 2019-09-13 ENCOUNTER — TELEPHONE (OUTPATIENT)
Dept: FAMILY MEDICINE | Facility: OTHER | Age: 59
End: 2019-09-13

## 2019-09-13 VITALS
HEIGHT: 73 IN | SYSTOLIC BLOOD PRESSURE: 113 MMHG | DIASTOLIC BLOOD PRESSURE: 60 MMHG | BODY MASS INDEX: 41.75 KG/M2 | TEMPERATURE: 98.5 F | WEIGHT: 315 LBS | RESPIRATION RATE: 16 BRPM | OXYGEN SATURATION: 93 % | HEART RATE: 89 BPM

## 2019-09-13 LAB
CREAT SERPL-MCNC: 0.79 MG/DL (ref 0.7–1.3)
ERYTHROCYTE [DISTWIDTH] IN BLOOD BY AUTOMATED COUNT: 16 % (ref 10–15)
GFR SERPL CREATININE-BSD FRML MDRD: >90 ML/MIN/{1.73_M2}
HCT VFR BLD AUTO: 29.1 % (ref 40–53)
HGB BLD-MCNC: 9.1 G/DL (ref 13.3–17.7)
INR PPP: 2 (ref 0–1.3)
MAGNESIUM SERPL-MCNC: 2.2 MG/DL (ref 1.9–2.7)
MCH RBC QN AUTO: 28.1 PG (ref 26.5–33)
MCHC RBC AUTO-ENTMCNC: 31.3 G/DL (ref 31.5–36.5)
MCV RBC AUTO: 90 FL (ref 78–100)
PLATELET # BLD AUTO: 348 10E9/L (ref 150–450)
POTASSIUM SERPL-SCNC: 4.4 MMOL/L (ref 3.5–5.1)
PROCALCITONIN SERPL-MCNC: 0.4 NG/ML
RBC # BLD AUTO: 3.24 10E12/L (ref 4.4–5.9)
WBC # BLD AUTO: 13.9 10E9/L (ref 4–11)

## 2019-09-13 PROCEDURE — 25000132 ZZH RX MED GY IP 250 OP 250 PS 637: Performed by: FAMILY MEDICINE

## 2019-09-13 PROCEDURE — 85610 PROTHROMBIN TIME: CPT | Performed by: FAMILY MEDICINE

## 2019-09-13 PROCEDURE — 83735 ASSAY OF MAGNESIUM: CPT | Performed by: FAMILY MEDICINE

## 2019-09-13 PROCEDURE — 36415 COLL VENOUS BLD VENIPUNCTURE: CPT | Performed by: FAMILY MEDICINE

## 2019-09-13 PROCEDURE — 85027 COMPLETE CBC AUTOMATED: CPT | Performed by: FAMILY MEDICINE

## 2019-09-13 PROCEDURE — 84145 PROCALCITONIN (PCT): CPT | Performed by: FAMILY MEDICINE

## 2019-09-13 PROCEDURE — 99239 HOSP IP/OBS DSCHRG MGMT >30: CPT | Performed by: INTERNAL MEDICINE

## 2019-09-13 PROCEDURE — 25000128 H RX IP 250 OP 636: Performed by: FAMILY MEDICINE

## 2019-09-13 PROCEDURE — 82565 ASSAY OF CREATININE: CPT | Performed by: FAMILY MEDICINE

## 2019-09-13 PROCEDURE — 84132 ASSAY OF SERUM POTASSIUM: CPT | Performed by: FAMILY MEDICINE

## 2019-09-13 RX ORDER — FUROSEMIDE 40 MG
40 TABLET ORAL DAILY
DISCHARGE
Start: 2019-09-13 | End: 2019-09-13

## 2019-09-13 RX ORDER — DOXYCYCLINE 100 MG/1
100 CAPSULE ORAL 2 TIMES DAILY
DISCHARGE
Start: 2019-09-13 | End: 2019-09-13

## 2019-09-13 RX ORDER — DOXYCYCLINE 100 MG/1
100 CAPSULE ORAL 2 TIMES DAILY
Qty: 20 CAPSULE | Refills: 0 | Status: SHIPPED | OUTPATIENT
Start: 2019-09-13 | End: 2019-10-23

## 2019-09-13 RX ORDER — METRONIDAZOLE 500 MG/1
500 TABLET ORAL 3 TIMES DAILY
DISCHARGE
Start: 2019-09-13 | End: 2019-09-13

## 2019-09-13 RX ORDER — FUROSEMIDE 40 MG
40 TABLET ORAL DAILY
Qty: 30 TABLET | Refills: 0 | Status: SHIPPED | OUTPATIENT
Start: 2019-09-13 | End: 2019-09-25

## 2019-09-13 RX ORDER — METRONIDAZOLE 500 MG/1
500 TABLET ORAL 3 TIMES DAILY
Qty: 15 TABLET | Refills: 0 | Status: SHIPPED | OUTPATIENT
Start: 2019-09-13 | End: 2019-09-25

## 2019-09-13 RX ADMIN — Medication 250 MG: at 10:04

## 2019-09-13 RX ADMIN — FUROSEMIDE 20 MG: 10 INJECTION, SOLUTION INTRAVENOUS at 07:47

## 2019-09-13 RX ADMIN — METOPROLOL TARTRATE 25 MG: 25 TABLET ORAL at 10:04

## 2019-09-13 RX ADMIN — METOPROLOL SUCCINATE 200 MG: 100 TABLET, EXTENDED RELEASE ORAL at 10:04

## 2019-09-13 RX ADMIN — METRONIDAZOLE 500 MG: 500 INJECTION, SOLUTION INTRAVENOUS at 01:45

## 2019-09-13 ASSESSMENT — ACTIVITIES OF DAILY LIVING (ADL)
ADLS_ACUITY_SCORE: 19

## 2019-09-13 NOTE — DISCHARGE SUMMARY
[facility]  Hospitalist Discharge Summary       Date of Admission:  9/5/2019  Date of Discharge:  9/13/2019  Discharging Provider: Casey Friedman MD      Discharge Diagnoses   Lower extremity cellulitis, left leg  A. Fib  Anticoagulation  Lower extremity swelling      Follow-ups Needed After Discharge   Patient will have follow-up visit with PCP in a week.  Patient will follow-up with Coumadin clinic for INR  He will be placed on Lasix, 40 mg daily.  He will follow-up BMP in a week  Patient does have chronic anemia, he will follow this up with PCP.  He might need further work-up  His urinalysis shows some blood on September 6.  Again, he was instructed to follow this up with his PCP.  He might need urology evaluation down the line.  Patient expressed understanding    Unresulted Labs Ordered in the Past 30 Days of this Admission     No orders found from 8/6/2019 to 9/6/2019.      These results will be followed up by pcp    Discharge Disposition   Discharged to home  Condition at discharge: Stable    Hospital Course   9 years old male past medical history significant for morbid obesity, hypertension, obstructive sleep apnea, history of CVA, atrial fibrillation.  The patient had admitted to hospital on September 5 with increasing left lower extremity cellulitis.  The patient had had left lower extremity ultrasound showing no clots or abscess.  Over the last 2 3 days, he has been getting vancomycin.  Patient tells me, cellulitis is improving significantly.  He feels like 90% better compared to prior.  I do not have any cultures available.  I am switching the antibiotic to doxycycline.  He would continue it for about 10 days.  I asked him to follow-up with primary in 1 week.  Patient did have some diarrhea during the hospital course.  Infectious work-up showed positive for Giardia.  At this point he does not have any diarrhea.  All symptoms improved.  Will give a course of  Flagyl  Consultations This Hospital Stay   PHYSICAL THERAPY ADULT IP CONSULT  OCCUPATIONAL THERAPY ADULT IP CONSULT  PHARMACY TO DOSE VANCO  PHARMACY TO DOSE WARFARIN  SOCIAL WORK IP CONSULT  RESPIRATORY CARE IP CONSULT  PHARMACY TO DOSE VANCO  PHYSICAL THERAPY ADULT IP CONSULT  OCCUPATIONAL THERAPY ADULT IP CONSULT  PHARMACY TO DOSE VANCO  SOCIAL WORK IP CONSULT  PHARMACY TO DOSE WARFARIN    Code Status   Full Code    Time Spent on this Encounter   I, Casey Friedman MD, personally saw the patient today and spent greater than 30 minutes discharging this patient.       Casey Friedman MD  [facility]  ______________________________________________________________________    Physical Exam   Vital Signs: Temp: 98.5  F (36.9  C) Temp src: Tympanic BP: 113/60 Pulse: 89 Heart Rate: 70 Resp: 16 SpO2: 93 % O2 Device: None (Room air)    Weight: 396 lbs 6.19 oz  General Appearance: Is morbidly obese, comfortable in lying position  Respiratory: clear to auscultation anterior  Cardiovascular: Rate regular rhythm, heart sounds are distant  GI: Obese, nontender  Skin: Left leg redness has improved about 50%.  I do not see any fluctuations to suspect any abscesses          Primary Care Physician   Please arrange primary care visit  Discharge Orders      INR Clinic Referral      Reason for your hospital stay    She is admitted to the hospital with cellulitis     Follow-up and recommended labs and tests     He should follow-up with primary in a week.  He has been advised to come back to the ER or seek help quickly if the redness or swelling it was     Activity    Your activity upon discharge: bedrest, keep his left leg elevated     Wound care and dressings    Follow-up with PCP for wound care     Full Code     Diet    Follow this diet upon discharge: Orders Placed This Encounter      Combination Diet Regular Diet Adult       Significant Results and Procedures   Results for orders placed or performed  during the hospital encounter of 09/05/19   US Lower Extremity Venous Duplex Left    Narrative    Procedure: US LOWER EXTREMITY VENOUS DUPLEX LEFT    HISTORY:  Left lower leg edema.    TECHNIQUE: Grayscale, color Doppler and compression views of the deep  venous structures of the left lower extremity.    COMPARISON: None.    FINDINGS:    Interrogation of the deep venous structures from the left common  femoral vein through the veins of the proximal calf demonstrate normal  grayscale appearance, compressibility and augmentable color Doppler  flow. Subcutaneous edema is present.      Impression    IMPRESSION:     No evidence of left lower extremity DVT.      PRAVIN BARRAGAN MD       Discharge Medications   Current Discharge Medication List      START taking these medications    Details   doxycycline hyclate (VIBRAMYCIN) 100 MG capsule Take 1 capsule (100 mg) by mouth 2 times daily for 10 days    Associated Diagnoses: Cellulitis of left lower extremity      furosemide (LASIX) 40 MG tablet Take 1 tablet (40 mg) by mouth daily    Associated Diagnoses: Swelling of limb      metroNIDAZOLE (FLAGYL) 500 MG tablet Take 1 tablet (500 mg) by mouth 3 times daily for 5 days    Associated Diagnoses: Diarrhea of infectious origin         CONTINUE these medications which have NOT CHANGED    Details   HEMP OIL OR EXTRACT OR OTHER CBD CANNABINOID, NOT MEDICAL CANNABIS, Uses topical on knees and oral as needed      amLODIPine (NORVASC) 10 MG tablet Take 1 tablet (10 mg) by mouth daily  Qty: 90 tablet, Refills: 3    Associated Diagnoses: Benign essential hypertension      indomethacin (INDOCIN) 50 MG capsule TAKE 1 CAPSULE BY MOUTH TWICE DAILY AS NEEDED FOR MODERATE PAIN.  Qty: 20 capsule, Refills: 0    Associated Diagnoses: History of gout      metoprolol succinate (TOPROL-XL) 200 MG 24 hr tablet Take 1 tablet (200 mg) by mouth daily  Qty: 90 tablet, Refills: 3    Associated Diagnoses: Benign essential hypertension; Permanent  atrial fibrillation (H)      warfarin (COUMADIN) 5 MG tablet TAKE 2 TABLETS(10MG) X 1 DAY AND 1 1/2 TABLETS(7.5MG) X 6 DAYS/WEEK OR AS DIRECTED BY [facility]  Qty: 144 tablet, Refills: 1    Associated Diagnoses: Atrial fibrillation (H); Long-term (current) use of anticoagulants, INR goal 2.0-3.0         STOP taking these medications       hydrochlorothiazide (HYDRODIURIL) 25 MG tablet Comments:   Reason for Stopping:             Allergies   Allergies   Allergen Reactions     Ioxaglate Unknown     Diatrizoate Rash     Levofloxacin Hives and Rash     Metrizamide Rash     (Diagnostic X-Ray materials)     Probenecid Rash

## 2019-09-13 NOTE — PROGRESS NOTES
"Patient alert, oriented this morning.  Denied pain.  /60   Pulse 89   Temp 98.5  F (36.9  C) (Tympanic)   Resp 16   Ht 1.854 m (6' 1\")   Wt (!) 179.8 kg (396 lb 6.2 oz)   SpO2 93%   BMI 52.30 kg/m   lungs clear/diminished, edema 1-3+ in BLE- left leg more edematous, cap refill greater than 3 seconds, pedal pulses weak, has chronic numbness/tingling in extremities, bowel sounds normoactive, denies diarrhea, is passing gas.  Mary Sellers RN on 9/13/2019 at 10:18 AM   "

## 2019-09-13 NOTE — PHARMACY - DISCHARGE MEDICATION RECONCILIATION AND EDUCATION
Pharmacy:  Discharge Counseling and Medication Reconciliation    Babak Moran     [place] [address]  [phone] (home)   59 year old male  PCP: No Ref-Primary, Physician    Allergies: Ioxaglate; Diatrizoate; Levofloxacin; Metrizamide; and Probenecid    Discharge Counseling:    Pharmacist met with patient (and/or family) today to review the medication portion of the After Visit Summary (with an emphasis on NEW medications) and to address patient's questions/concerns.    Summary of Education: Met with patient and wife to discuss new medications: doxycycline, furosemide, metronidazole and stopping hydrochlorothiazide.  We also discussed continued warfarin dosing due to significant interactions with metronidazole.  Patient has a protime appointment currently scheduled for 9/17 at 0800.  Prescriptions were faxed to [facility].    Materials Provided:  MedCounselor sheets printed from Clinical Pharmacology on: doxycycline, furosemide, metronidazole; patient was also provided with a pink warfarin dosing card    Discharge Medication Reconciliation:    It has been determined that the patient has an adequate supply of medications available or which can be obtained from the patient's preferred pharmacy, which HE/SHE has confirmed as: [facility]    Thank you for the consult.    Oneyda Hare RPH........September 13, 2019 11:14 AM

## 2019-09-13 NOTE — PROGRESS NOTES
DISCHARGE NOTE    Patient discharged to home at 11:25 AM via wheel chair. Accompanied by spouse and staff. Discharge instructions reviewed with patient, opportunity offered to ask questions. Prescriptions sent with patient to fill . All belongings sent with patient.  Emphasized importance of elevating leg, antibiotic use/parameters, s/s infection, anticoagulation, follow up appointments.  Patient verbalized understanding and had no questions.    Mary Sellers RN

## 2019-09-13 NOTE — DISCHARGE INSTRUCTIONS
Patient will be discharged on doxycycline for 10 days.  He was advised to keep his leg elevated.  He would have a follow-up visit with PCP in a week.  He might need to ultrasound his leg again if redness/swelling does not improve

## 2019-09-13 NOTE — PHARMACY-ANTICOAGULATION SERVICE
Pharmacy Consult- Coumadin Follow-Up    Babak Moran is a 59 year old male admitted on 9/5/2019 with Cellulitis of left lower extremity    Primary Indication(s) for Anticoagulation: atrial fibrillation, h/o CVA    Goal INR: 2.5-3 per Dr. Derikc RICO, patient is followed by the Anticoagulation/Protime Clinic at: [facility]    Patient Active Problem List   Diagnosis     Pain in joint, ankle and foot     Anticoagulation monitoring, INR range 2-3     Atrial fibrillation with RVR (H)     Cerebrovascular accident (CVA) due to embolism of right middle cerebral artery (H)     Osteoarthrosis     Cutaneous lupus erythematosus     Gout     Ascending aorta enlargement (H)     Obesity     Family history of coronary artery disease     Essential hypertension     Left hemiparesis (H)     Long-term (current) use of anticoagulants, INR goal 2.0-3.0     Systemic lupus erythematosus (H)     Hypoglycemia     Tissue plasminogen activator (t-PA) administered at other facility within 24 hours prior to current admission     Morbid obesity with BMI of 40.0-44.9, adult (H)     Obstructive sleep apnea syndrome     Cellulitis of left lower extremity     Sepsis (H)     New factors that may increase patient's sensitivity to warfarin (Coumadin) include: metronidazole started on 9/11, doxycycline started on 9/13/19    New factors that may decrease patient's sensitivity to warfarin (Coumadin) include: warfarin was held for 4 days 9/6-9/9    Dietary Intake: decreased    Recent Labs   Lab Test 09/13/19  0610 09/12/19  0515 09/11/19  0458 09/10/19  0515   HGB 9.1* 9.4* 10.1* 12.4*   INR 2.00* 1.91* 2.04* 2.81*    [phone]        Recent Dosing:    Date INR Dose Given   9/5 3.58 7.5 mg   9/6 5.94 HELD   9/7 6.77 HELD   9/8 6.18 HELD   9/9 4.48 HELD   9/10 2.81 7.5 mg   9/11 2.04 5 mg   9/12 1.91 7.5 mg   9/13 2 5 mg   9/14  7.5 mg   9/15  5 mg   9/16  7.5 mg   9/17 Check INR      Plan: Patient will take warfarin as above.  Pink card provided  at discharge.  Patient has INR appointment scheduled for 9/17 at 8 AM.    Thank You for the Consult.    Oneyda Hare [facility] ....................  9/13/2019   11:18 AM

## 2019-09-13 NOTE — PLAN OF CARE
"/71 (BP Location: Left arm)   Pulse 89   Temp 99.7  F (37.6  C) (Tympanic)   Resp 16   Ht 1.854 m (6' 1\")   Wt (!) 179.8 kg (396 lb 6.2 oz)   SpO2 94%   BMI 52.30 kg/m      Pt remains VS.  Slight increase in temperature, pt remains asymptomatic.  Swelling/redness remains within borders.  Will continue to monitor.  Cathy Yi RN.............................9/13/2019 3:51 AM        "

## 2019-09-14 DIAGNOSIS — I48.21 PERMANENT ATRIAL FIBRILLATION (H): ICD-10-CM

## 2019-09-14 DIAGNOSIS — I10 BENIGN ESSENTIAL HYPERTENSION: ICD-10-CM

## 2019-09-16 RX ORDER — METOPROLOL SUCCINATE 200 MG/1
TABLET, EXTENDED RELEASE ORAL
Qty: 90 TABLET | Refills: 2 | Status: SHIPPED | OUTPATIENT
Start: 2019-09-16 | End: 2020-07-14

## 2019-09-16 NOTE — TELEPHONE ENCOUNTER
"Requested Prescriptions   Pending Prescriptions Disp Refills     metoprolol succinate ER (TOPROL-XL) 200 MG 24 hr tablet [Pharmacy Med Name: METOPROLOL ER SUCCINATE 200MG TABS] 90 tablet 0     Sig: TAKE 1 TABLET BY MOUTH ONCE DAILY.       Beta-Blockers Protocol Passed - 9/14/2019 11:51 AM        Passed - Blood pressure under 140/90 in past 12 months     BP Readings from Last 3 Encounters:   09/13/19 113/60   09/05/19 (!) 145/96   05/13/19 110/76                 Passed - Patient is age 6 or older        Passed - Recent (12 mo) or future (30 days) visit within the authorizing provider's specialty     Patient had office visit in the last 12 months or has a visit in the next 30 days with authorizing provider or within the authorizing provider's specialty.  See \"Patient Info\" tab in inbasket, or \"Choose Columns\" in Meds & Orders section of the refill encounter.              Passed - Medication is active on med list      LOV- 5/13/2019 with no changes in this medication.  "

## 2019-09-17 ENCOUNTER — ANTICOAGULATION THERAPY VISIT (OUTPATIENT)
Dept: ANTICOAGULATION | Facility: OTHER | Age: 59
End: 2019-09-17

## 2019-09-17 ENCOUNTER — TELEPHONE (OUTPATIENT)
Dept: FAMILY MEDICINE | Facility: OTHER | Age: 59
End: 2019-09-17

## 2019-09-17 DIAGNOSIS — Z79.01 ANTICOAGULATION MONITORING, INR RANGE 2-3: ICD-10-CM

## 2019-09-17 LAB — INR POINT OF CARE: 1.8 (ref 0.86–1.14)

## 2019-09-17 PROCEDURE — 36416 COLLJ CAPILLARY BLOOD SPEC: CPT

## 2019-09-17 PROCEDURE — 85610 PROTHROMBIN TIME: CPT | Mod: QW

## 2019-09-17 NOTE — TELEPHONE ENCOUNTER
Transitional Care Management Phone Call    Summary of hospitalization:  [facility] discharge summary reviewed  HOSPITAL DISCHARGE DIAGNOSIS: cellulitis    Diagnostic Tests/Treatments reviewed.  Follow up needed: none    Post Discharge Medication Reconciliation: discharge medications reconciled, continue medications without change.  Medications reviewed by: by myself    Problems taking medications regularly:  None  Problems adhering to non-medication therapy:  None    Other Healthcare Providers Involved in Patient s Care:         None  Update since discharge: stable.   Plan of care communicated with patient  Just a friendly reminder that you appointment is   Next 5 appointments (look out 90 days)    Sep 25, 2019  4:00 PM CDT  Office Visit with Teo Funes MD  [facility] [address] [address] [phone]      .  We encourage you to keep this appointment.    Please remember to bring all of your pills in their bottles (including any vitamins or over the counter pills) with you to your appointment.    The patient indicates understanding of these issues and agrees with the plan of care.   Yes    Was the patient contacted within the 2 business days or other approved timeframe?  Yes  Was the Medication reconciliation and management done since the patient was discharged? Yes    Patient states is back to work yesterday . State was told bedrest couple days and then could go back to work but is trying to keep leg up as much as possible.      Roz Zapata RN  9/17/2019 9:34 AM

## 2019-09-20 ENCOUNTER — ANTICOAGULATION THERAPY VISIT (OUTPATIENT)
Dept: ANTICOAGULATION | Facility: OTHER | Age: 59
End: 2019-09-20

## 2019-09-20 DIAGNOSIS — Z79.01 ANTICOAGULATION MONITORING, INR RANGE 2-3: ICD-10-CM

## 2019-09-20 DIAGNOSIS — Z79.01 LONG-TERM (CURRENT) USE OF ANTICOAGULANTS, INR GOAL 2.0-3.0: ICD-10-CM

## 2019-09-20 DIAGNOSIS — I48.91 ATRIAL FIBRILLATION (H): ICD-10-CM

## 2019-09-20 LAB — INR POINT OF CARE: 2.8 (ref 0.86–1.14)

## 2019-09-20 PROCEDURE — 85610 PROTHROMBIN TIME: CPT | Mod: QW

## 2019-09-20 PROCEDURE — 36416 COLLJ CAPILLARY BLOOD SPEC: CPT

## 2019-09-20 RX ORDER — WARFARIN SODIUM 5 MG/1
TABLET ORAL
Qty: 144 TABLET | Refills: 1 | COMMUNITY
Start: 2019-09-20 | End: 2019-10-17

## 2019-09-20 NOTE — PROGRESS NOTES
ANTICOAGULATION FOLLOW-UP CLINIC VISIT    Patient Name:  Babak Moran  Date:  2019  Contact Type:  Face to Face    SUBJECTIVE:  Patient Findings     Positives:   Change in medications (completed Flagyl, continues Doxycycline)        Clinical Outcomes     Negatives:   Major bleeding event, Thromboembolic event, Anticoagulation-related hospital admission, Anticoagulation-related ED visit, Anticoagulation-related fatality           OBJECTIVE    INR Protime   Date Value Ref Range Status   2019 2.8 (A) 0.86 - 1.14 Final       ASSESSMENT / PLAN  INR assessment THER    Recheck INR In: 5 DAYS    INR Location Clinic      Anticoagulation Summary  As of 2019    INR goal:   2.0-3.0   TTR:   61.6 % (4.2 y)   INR used for dosin.8 (2019)   Warfarin maintenance plan:   7.5 mg (5 mg x 1.5) every day   Full warfarin instructions:   7.5 mg every day   Weekly warfarin total:   52.5 mg   Plan last modified:   Xochitl Pavon RN (2019)   Next INR check:   2019   Priority:   INR   Target end date:   Indefinite    Indications    Unspecified atrial fibrillation (Resolved) [I48.91]  Anticoagulation monitoring  INR range 2-3 [Z79.01]             Anticoagulation Episode Summary     INR check location:       Preferred lab:       Send INR reminders to:    INR    Comments:   Babak prefers to be closer to 3.0 d/t previous CVA check Q 4 weeks.Declines to reduce dose when slightly over 3.0  Needs to change PCP      Anticoagulation Care Providers     Provider Role Specialty Phone number    Alvino Yi MD [facility] [phone]            See the Encounter Report to view Anticoagulation Flowsheet and Dosing Calendar (Go to Encounters tab in chart review, and find the Anticoagulation Therapy Visit)        Xochitl Pavon RN

## 2019-09-25 ENCOUNTER — ANTICOAGULATION THERAPY VISIT (OUTPATIENT)
Dept: ANTICOAGULATION | Facility: OTHER | Age: 59
End: 2019-09-25

## 2019-09-25 ENCOUNTER — OFFICE VISIT (OUTPATIENT)
Dept: FAMILY MEDICINE | Facility: OTHER | Age: 59
End: 2019-09-25
Attending: FAMILY MEDICINE

## 2019-09-25 VITALS
HEART RATE: 60 BPM | TEMPERATURE: 98.6 F | RESPIRATION RATE: 18 BRPM | HEIGHT: 73 IN | BODY MASS INDEX: 41.75 KG/M2 | DIASTOLIC BLOOD PRESSURE: 70 MMHG | WEIGHT: 315 LBS | SYSTOLIC BLOOD PRESSURE: 122 MMHG

## 2019-09-25 DIAGNOSIS — Z87.39 HISTORY OF GOUT: ICD-10-CM

## 2019-09-25 DIAGNOSIS — I10 BENIGN ESSENTIAL HYPERTENSION: ICD-10-CM

## 2019-09-25 DIAGNOSIS — M79.89 SWELLING OF LIMB: ICD-10-CM

## 2019-09-25 DIAGNOSIS — Z79.01 ANTICOAGULATION MONITORING, INR RANGE 2-3: ICD-10-CM

## 2019-09-25 DIAGNOSIS — L03.116 CELLULITIS OF LEFT LEG: Primary | ICD-10-CM

## 2019-09-25 LAB
ANION GAP SERPL CALCULATED.3IONS-SCNC: 5 MMOL/L (ref 3–14)
BASOPHILS # BLD AUTO: 0.1 10E9/L (ref 0–0.2)
BASOPHILS NFR BLD AUTO: 1.1 %
BUN SERPL-MCNC: 24 MG/DL (ref 7–25)
CALCIUM SERPL-MCNC: 8.8 MG/DL (ref 8.6–10.3)
CHLORIDE SERPL-SCNC: 102 MMOL/L (ref 98–107)
CO2 SERPL-SCNC: 32 MMOL/L (ref 21–31)
CREAT SERPL-MCNC: 1.1 MG/DL (ref 0.7–1.3)
DIFFERENTIAL METHOD BLD: ABNORMAL
EOSINOPHIL # BLD AUTO: 0.1 10E9/L (ref 0–0.7)
EOSINOPHIL NFR BLD AUTO: 1.3 %
ERYTHROCYTE [DISTWIDTH] IN BLOOD BY AUTOMATED COUNT: 17 % (ref 10–15)
GFR SERPL CREATININE-BSD FRML MDRD: 69 ML/MIN/{1.73_M2}
GLUCOSE SERPL-MCNC: 95 MG/DL (ref 70–105)
HCT VFR BLD AUTO: 32.9 % (ref 40–53)
HGB BLD-MCNC: 9.9 G/DL (ref 13.3–17.7)
IMM GRANULOCYTES # BLD: 0 10E9/L (ref 0–0.4)
IMM GRANULOCYTES NFR BLD: 0.4 %
INR POINT OF CARE: 3.6 (ref 0.86–1.14)
LYMPHOCYTES # BLD AUTO: 1.8 10E9/L (ref 0.8–5.3)
LYMPHOCYTES NFR BLD AUTO: 25.3 %
MCH RBC QN AUTO: 27.7 PG (ref 26.5–33)
MCHC RBC AUTO-ENTMCNC: 30.1 G/DL (ref 31.5–36.5)
MCV RBC AUTO: 92 FL (ref 78–100)
MONOCYTES # BLD AUTO: 0.7 10E9/L (ref 0–1.3)
MONOCYTES NFR BLD AUTO: 9.5 %
NEUTROPHILS # BLD AUTO: 4.4 10E9/L (ref 1.6–8.3)
NEUTROPHILS NFR BLD AUTO: 62.4 %
PLATELET # BLD AUTO: 454 10E9/L (ref 150–450)
POTASSIUM SERPL-SCNC: 4 MMOL/L (ref 3.5–5.1)
PROCALCITONIN SERPL-MCNC: 0.5 NG/ML
RBC # BLD AUTO: 3.57 10E12/L (ref 4.4–5.9)
SODIUM SERPL-SCNC: 139 MMOL/L (ref 134–144)
WBC # BLD AUTO: 7.1 10E9/L (ref 4–11)

## 2019-09-25 PROCEDURE — 36416 COLLJ CAPILLARY BLOOD SPEC: CPT

## 2019-09-25 PROCEDURE — 84145 PROCALCITONIN (PCT): CPT | Mod: ZL | Performed by: FAMILY MEDICINE

## 2019-09-25 PROCEDURE — 99495 TRANSJ CARE MGMT MOD F2F 14D: CPT | Performed by: FAMILY MEDICINE

## 2019-09-25 PROCEDURE — 85025 COMPLETE CBC W/AUTO DIFF WBC: CPT | Mod: ZL | Performed by: FAMILY MEDICINE

## 2019-09-25 PROCEDURE — 80048 BASIC METABOLIC PNL TOTAL CA: CPT | Mod: ZL | Performed by: FAMILY MEDICINE

## 2019-09-25 PROCEDURE — 85610 PROTHROMBIN TIME: CPT | Mod: QW

## 2019-09-25 RX ORDER — CLOBETASOL PROPIONATE 0.5 MG/G
OINTMENT TOPICAL
COMMUNITY
End: 2020-09-25

## 2019-09-25 RX ORDER — FUROSEMIDE 40 MG
40 TABLET ORAL DAILY
Qty: 90 TABLET | Refills: 0 | Status: SHIPPED | OUTPATIENT
Start: 2019-09-25 | End: 2019-10-08

## 2019-09-25 RX ORDER — AMLODIPINE BESYLATE 10 MG/1
10 TABLET ORAL DAILY
Qty: 90 TABLET | Refills: 3 | Status: SHIPPED | OUTPATIENT
Start: 2019-09-25 | End: 2019-11-06

## 2019-09-25 RX ORDER — BENZOCAINE/MENTHOL 6 MG-10 MG
LOZENGE MUCOUS MEMBRANE
COMMUNITY
End: 2022-08-08

## 2019-09-25 RX ORDER — PIMECROLIMUS 10 MG/G
CREAM TOPICAL
COMMUNITY
Start: 2016-03-17 | End: 2020-09-25

## 2019-09-25 ASSESSMENT — PATIENT HEALTH QUESTIONNAIRE - PHQ9: SUM OF ALL RESPONSES TO PHQ QUESTIONS 1-9: 0

## 2019-09-25 ASSESSMENT — ANXIETY QUESTIONNAIRES
7. FEELING AFRAID AS IF SOMETHING AWFUL MIGHT HAPPEN: NOT AT ALL
6. BECOMING EASILY ANNOYED OR IRRITABLE: NOT AT ALL
2. NOT BEING ABLE TO STOP OR CONTROL WORRYING: NOT AT ALL
GAD7 TOTAL SCORE: 0
3. WORRYING TOO MUCH ABOUT DIFFERENT THINGS: NOT AT ALL
4. TROUBLE RELAXING: NOT AT ALL
5. BEING SO RESTLESS THAT IT IS HARD TO SIT STILL: NOT AT ALL
1. FEELING NERVOUS, ANXIOUS, OR ON EDGE: NOT AT ALL

## 2019-09-25 ASSESSMENT — MIFFLIN-ST. JEOR: SCORE: 2627.03

## 2019-09-25 ASSESSMENT — PAIN SCALES - GENERAL: PAINLEVEL: NO PAIN (0)

## 2019-09-25 NOTE — PROGRESS NOTES
ANTICOAGULATION FOLLOW-UP CLINIC VISIT    Patient Name:  Babak Moran  Date:  9/25/2019  Contact Type:  Face to Face    SUBJECTIVE:  Patient Findings     Positives:   Change in medications (finished antibotics yesterday)        Clinical Outcomes     Negatives:   Major bleeding event, Thromboembolic event, Anticoagulation-related hospital admission, Anticoagulation-related ED visit, Anticoagulation-related fatality           OBJECTIVE    INR Protime   Date Value Ref Range Status   09/25/2019 3.6 (A) 0.86 - 1.14 Final       ASSESSMENT / PLAN  INR assessment SUPRA    Recheck INR In: 2 WEEKS    INR Location Clinic      Anticoagulation Summary  As of 9/25/2019    INR goal:   2.0-3.0   TTR:   61.4 % (4.2 y)   INR used for dosing:   3.6! (9/25/2019)   Warfarin maintenance plan:   7.5 mg (5 mg x 1.5) every day   Full warfarin instructions:   9/25: 5 mg; Otherwise 7.5 mg every day   Weekly warfarin total:   52.5 mg   Plan last modified:   Xochitl Pavon RN (9/20/2019)   Next INR check:   10/9/2019   Priority:   INR   Target end date:   Indefinite    Indications    Unspecified atrial fibrillation (Resolved) [I48.91]  Anticoagulation monitoring  INR range 2-3 [Z79.01]             Anticoagulation Episode Summary     INR check location:       Preferred lab:       Send INR reminders to:    INR    Comments:   Babak prefers to be closer to 3.0 d/t previous CVA check Q 4 weeks.Declines to reduce dose when slightly over 3.0  Needs to change PCP      Anticoagulation Care Providers     Provider Role Specialty Phone number    Alvino Yi MD [facility] [phone]            See the Encounter Report to view Anticoagulation Flowsheet and Dosing Calendar (Go to Encounters tab in chart review, and find the Anticoagulation Therapy Visit)        Shelley Mercado RN

## 2019-09-25 NOTE — PROGRESS NOTES
Hospitalization Follow-up Visit         \[facility] [place] [facility]\""       Hospital Follow-up Visit:    Hospital:  [facility]   Date of Admission: 9/5  Date of Discharge: 9/13/19  Reason(s) for Admission: leg cellulitis            Problems taking medications regularly:  costs       Post Discharge Medication Reconciliation: discharge medications reconciled and changed, per note/orders (see AVS).       Problems adhering to non-medication therapy:  None       Medications reviewed by: by myself. Findings include accurate list.    Summary of hospitalization:  [facility] discharge summary reviewed  Diagnostic Tests/Treatments reviewed.  Follow up needed: INR monitoring  Other Healthcare Providers Involved in Patient s Care:         None  Update since discharge: improved.   Plan of care communicated with patient and family     Transitional care call made 9/17/2019         59-year-old male with morbid obesity, hypertension, sleep apnea, A. fib, history of CVA who was admitted to the hospital for erythema in the left lower leg consistent with cellulitis.  An ultrasound showed no blood clots.  He is placed on vancomycin.  No cultures from the cellulitis were obtained.  He was then placed on doxycycline at time of discharge.  He seems to be tolerating it fine.  The erythema has reduced.  He has no fever.    Was having black stools.  Testing returned positive for Giardia.  Treated with metronidazole. Now stools are formed.     Furosemide increased.  Leg edema has improved.  He is tolerating the increased furosemide.    Noted to be anemic with hemoglobin on admit 16.4.  Eventually hemoglobin dropped as far as at 9.1 at time of discharge.  He is on warfarin.  Has also been taking indomethacin consistently for knee pain.  He attributes the knee pain to gout.  However his uric acid level has been generally good.  He does not have any knee swelling.             Review of Systems:   General: Denies general constitutional problems  Eyes: Denies  "problems  Ears/Nose/Throat: Denies problems  Genitourinary: Denies problems  Neurologic: Denies problems  Psychiatric: Denies problems              Physical Exam:     Vitals:    09/25/19 1558   BP: 122/70   BP Location: Right arm   Patient Position: Sitting   Cuff Size: Adult Large   Pulse: 60   Resp: 18   Temp: 98.6  F (37  C)   Weight: (!) 175.3 kg (386 lb 8 oz)   Height: 1.854 m (6' 1\")     Body mass index is 50.99 kg/m .    General Appearance: Alert. No acute distress  Chest/Respiratory Exam: Clear to auscultation bilaterally  Cardiovascular Exam: Regular rate and rhythm. S1, S2, no murmur, gallop, or rubs.  Extremities: 1+ pitting lower extremity edema.  Skin: Left leg with no significant erythema.  There is some duskiness and deep induration consistent with chronic venous stasis and resolved cellulitis.  No apparent infection. See picture.  Psychiatric: Normal affect and mentation                   Results:     Results for orders placed or performed in visit on 09/25/19   Procalcitonin   Result Value Ref Range    Procalcitonin 0.50 ng/ml   Basic Metabolic Panel   Result Value Ref Range    Sodium 139 134 - 144 mmol/L    Potassium 4.0 3.5 - 5.1 mmol/L    Chloride 102 98 - 107 mmol/L    Carbon Dioxide 32 (H) 21 - 31 mmol/L    Anion Gap 5 3 - 14 mmol/L    Glucose 95 70 - 105 mg/dL    Urea Nitrogen 24 7 - 25 mg/dL    Creatinine 1.10 0.70 - 1.30 mg/dL    GFR Estimate 69 >60 mL/min/[1.73_m2]    GFR Estimate If Black 83 >60 mL/min/[1.73_m2]    Calcium 8.8 8.6 - 10.3 mg/dL   CBC and Differential   Result Value Ref Range    WBC 7.1 4.0 - 11.0 10e9/L    RBC Count 3.57 (L) 4.4 - 5.9 10e12/L    Hemoglobin 9.9 (L) 13.3 - 17.7 g/dL    Hematocrit 32.9 (L) 40.0 - 53.0 %    MCV 92 78 - 100 fl    MCH 27.7 26.5 - 33.0 pg    MCHC 30.1 (L) 31.5 - 36.5 g/dL    RDW 17.0 (H) 10.0 - 15.0 %    Platelet Count 454 (H) 150 - 450 10e9/L    Diff Method Automated Method     % Neutrophils 62.4 %    % Lymphocytes 25.3 %    % Monocytes 9.5 %    " % Eosinophils 1.3 %    % Basophils 1.1 %    % Immature Granulocytes 0.4 %    Absolute Neutrophil 4.4 1.6 - 8.3 10e9/L    Absolute Lymphocytes 1.8 0.8 - 5.3 10e9/L    Absolute Monocytes 0.7 0.0 - 1.3 10e9/L    Absolute Eosinophils 0.1 0.0 - 0.7 10e9/L    Absolute Basophils 0.1 0.0 - 0.2 10e9/L    Abs Immature Granulocytes 0.0 0 - 0.4 10e9/L        Assessment and Plan      Babak was seen today for hospital f/u and [facility] care.    Diagnoses and all orders for this visit:    Cellulitis of left leg  -     Procalcitonin; Future  -     Basic Metabolic Panel; Future  -     CBC and Differential; Future  -     Procalcitonin  -     Basic Metabolic Panel  -     CBC and Differential    Swelling of limb  -     furosemide (LASIX) 40 MG tablet; Take 1 tablet (40 mg) by mouth daily    Benign essential hypertension  -     amLODIPine (NORVASC) 10 MG tablet; Take 1 tablet (10 mg) by mouth daily    History of gout  -     Uric acid; Future        1.  Cellulitis appears to be improved and potentially resolved.  He is off doxycycline.  Elected to check a procalcitonin and white count in comparison to previous levels.  White count was elevated to 20 during hospitalization, but is normal.  Procalcitonin was 1.5 and improved to 1.2, but trending up at time of discharge to 0.3.  0.5 today.  Therefore we will just monitor the leg.  No additional antibiotics at this time.  2.  Diuresis has helped with leg edema.  Continue with 40 mg furosemide daily.  3.  With anemia, potentially he has an upper GI bleed from gastritis associated with chronic NSAID use and warfarin use.  Did not refill indomethacin.  Recommend cessation.  No other NSAID use.  Tylenol for pain.  4.  Start famotidine 40 mg daily to help protect stomach.  We discussed EGD if hemoglobin does not improve.  On recheck it is improving.  This can be trended and we can make a decision on the EGD in the future.    Follow up in 2 weeks    E&M code to be billed if TCM cannot be:  72088    Type of decision making: Moderate complexity (24678)    Options for treatment and follow-up care were reviewed with the patient  Babak Moran   engaged in the decision making process and verbalized understanding of the options discussed and agreed with the final plan.      Teo Funes MD

## 2019-09-25 NOTE — PATIENT INSTRUCTIONS
Continue with higher dose furosemide  May need to stop indomethacin. This also means no ibuprofen or naproxen. Tylenol would be okay  Start famotidine 40 mg daily to lower stomach acid and protect stomach  May need knee x-rays and to consider different blood pressure medication to treat gout  Follow-up in a couple weeks - depending on lab results

## 2019-09-25 NOTE — NURSING NOTE
Patient presents to the clinic for a hospital follow up and to establish care.    Medication Reconciliation Completed.    Delano Prince LPN  9/25/2019 3:53 PM

## 2019-09-26 ASSESSMENT — ANXIETY QUESTIONNAIRES: GAD7 TOTAL SCORE: 0

## 2019-10-08 ENCOUNTER — OFFICE VISIT (OUTPATIENT)
Dept: FAMILY MEDICINE | Facility: OTHER | Age: 59
End: 2019-10-08
Attending: FAMILY MEDICINE

## 2019-10-08 VITALS
OXYGEN SATURATION: 95 % | BODY MASS INDEX: 49.82 KG/M2 | SYSTOLIC BLOOD PRESSURE: 122 MMHG | WEIGHT: 315 LBS | DIASTOLIC BLOOD PRESSURE: 74 MMHG | TEMPERATURE: 97.2 F | RESPIRATION RATE: 20 BRPM | HEART RATE: 68 BPM

## 2019-10-08 DIAGNOSIS — M25.561 CHRONIC PAIN OF BOTH KNEES: ICD-10-CM

## 2019-10-08 DIAGNOSIS — M79.89 SWELLING OF LIMB: ICD-10-CM

## 2019-10-08 DIAGNOSIS — D64.9 ANEMIA, UNSPECIFIED TYPE: ICD-10-CM

## 2019-10-08 DIAGNOSIS — L03.116 CELLULITIS OF LEFT LEG: Primary | ICD-10-CM

## 2019-10-08 DIAGNOSIS — E87.6 HYPOKALEMIA: ICD-10-CM

## 2019-10-08 DIAGNOSIS — I10 BENIGN ESSENTIAL HYPERTENSION: ICD-10-CM

## 2019-10-08 DIAGNOSIS — G89.29 CHRONIC PAIN OF BOTH KNEES: ICD-10-CM

## 2019-10-08 DIAGNOSIS — M25.562 CHRONIC PAIN OF BOTH KNEES: ICD-10-CM

## 2019-10-08 LAB
ANION GAP SERPL CALCULATED.3IONS-SCNC: 7 MMOL/L (ref 3–14)
BASOPHILS # BLD AUTO: 0.1 10E9/L (ref 0–0.2)
BASOPHILS NFR BLD AUTO: 1.1 %
BUN SERPL-MCNC: 20 MG/DL (ref 7–25)
CALCIUM SERPL-MCNC: 9 MG/DL (ref 8.6–10.3)
CHLORIDE SERPL-SCNC: 99 MMOL/L (ref 98–107)
CO2 SERPL-SCNC: 32 MMOL/L (ref 21–31)
CREAT SERPL-MCNC: 1.04 MG/DL (ref 0.7–1.3)
DIFFERENTIAL METHOD BLD: ABNORMAL
EOSINOPHIL # BLD AUTO: 0 10E9/L (ref 0–0.7)
EOSINOPHIL NFR BLD AUTO: 0.5 %
ERYTHROCYTE [DISTWIDTH] IN BLOOD BY AUTOMATED COUNT: 16.2 % (ref 10–15)
GFR SERPL CREATININE-BSD FRML MDRD: 73 ML/MIN/{1.73_M2}
GLUCOSE SERPL-MCNC: 104 MG/DL (ref 70–105)
HCT VFR BLD AUTO: 33.8 % (ref 40–53)
HGB BLD-MCNC: 9.9 G/DL (ref 13.3–17.7)
IMM GRANULOCYTES # BLD: 0 10E9/L (ref 0–0.4)
IMM GRANULOCYTES NFR BLD: 0.5 %
LYMPHOCYTES # BLD AUTO: 1.7 10E9/L (ref 0.8–5.3)
LYMPHOCYTES NFR BLD AUTO: 20.1 %
MCH RBC QN AUTO: 26 PG (ref 26.5–33)
MCHC RBC AUTO-ENTMCNC: 29.3 G/DL (ref 31.5–36.5)
MCV RBC AUTO: 89 FL (ref 78–100)
MONOCYTES # BLD AUTO: 0.8 10E9/L (ref 0–1.3)
MONOCYTES NFR BLD AUTO: 9.9 %
NEUTROPHILS # BLD AUTO: 5.7 10E9/L (ref 1.6–8.3)
NEUTROPHILS NFR BLD AUTO: 67.9 %
PLATELET # BLD AUTO: 303 10E9/L (ref 150–450)
POTASSIUM SERPL-SCNC: 3 MMOL/L (ref 3.5–5.1)
PROCALCITONIN SERPL-MCNC: 0.2 NG/ML
RBC # BLD AUTO: 3.81 10E12/L (ref 4.4–5.9)
SODIUM SERPL-SCNC: 138 MMOL/L (ref 134–144)
WBC # BLD AUTO: 8.5 10E9/L (ref 4–11)

## 2019-10-08 PROCEDURE — 84145 PROCALCITONIN (PCT): CPT | Mod: ZL | Performed by: FAMILY MEDICINE

## 2019-10-08 PROCEDURE — 36415 COLL VENOUS BLD VENIPUNCTURE: CPT | Mod: ZL | Performed by: FAMILY MEDICINE

## 2019-10-08 PROCEDURE — 80048 BASIC METABOLIC PNL TOTAL CA: CPT | Mod: ZL | Performed by: FAMILY MEDICINE

## 2019-10-08 PROCEDURE — 87077 CULTURE AEROBIC IDENTIFY: CPT | Mod: ZL,91 | Performed by: FAMILY MEDICINE

## 2019-10-08 PROCEDURE — 99214 OFFICE O/P EST MOD 30 MIN: CPT | Performed by: FAMILY MEDICINE

## 2019-10-08 PROCEDURE — 87070 CULTURE OTHR SPECIMN AEROBIC: CPT | Mod: ZL | Performed by: FAMILY MEDICINE

## 2019-10-08 PROCEDURE — 85025 COMPLETE CBC W/AUTO DIFF WBC: CPT | Mod: ZL | Performed by: FAMILY MEDICINE

## 2019-10-08 RX ORDER — FUROSEMIDE 40 MG
TABLET ORAL
Qty: 90 TABLET | Refills: 0
Start: 2019-10-08 | End: 2019-10-24

## 2019-10-08 RX ORDER — POTASSIUM CHLORIDE 1500 MG/1
20 TABLET, EXTENDED RELEASE ORAL 2 TIMES DAILY
Qty: 60 TABLET | Refills: 0 | Status: SHIPPED | OUTPATIENT
Start: 2019-10-08 | End: 2019-10-24

## 2019-10-08 RX ORDER — GABAPENTIN 300 MG/1
300 CAPSULE ORAL 3 TIMES DAILY
Qty: 90 CAPSULE | Refills: 1 | Status: SHIPPED | OUTPATIENT
Start: 2019-10-08 | End: 2019-10-23

## 2019-10-08 RX ORDER — FAMOTIDINE 40 MG/1
40 TABLET, FILM COATED ORAL DAILY
Qty: 90 TABLET | Refills: 0 | Status: SHIPPED | OUTPATIENT
Start: 2019-10-08 | End: 2019-10-24

## 2019-10-08 ASSESSMENT — PAIN SCALES - GENERAL: PAINLEVEL: NO PAIN (0)

## 2019-10-08 NOTE — PROGRESS NOTES
Nursing Notes:   Faith Pitt LPN  10/8/2019 10:08 AM  Sign at exiting of workspace  Pt presents to clinic today for hospital follow up.      Medication Reconciliation: complete  Faith Pitt LPN      SUBJECTIVE:  59 year old male with morbid obesity, hypertension, sleep apnea, A. fib, history of CVA presents to follow up on left leg cellulitis, anemia, leg edema.     Hemoglobin was 16.4 on admission to the hospital and dropped to 9.1 at time of discharge.  Improved to 9.9 on recheck 12 days after discharge.  He has not been taking indomethacin any longer, but prior to the hospitalization was taking indomethacin and warfarin together.  Recommended famotidine, but he did not take it as he did not believe that he had any stomach issues.  Today he admits to not feeling very hungry.  We discussed how this could be a sign of stomach irritation.  He seems to now believe that gastritis could be a possibility.    He had been treated with metronidazole for Giardia during the hospitalization as well.  Stools remain normal after treatment.    He is wondering why his left leg remains so thick feeling.  Right leg still has edema, but the left leg edema will not improve.  Has lots of induration.  He is not sure if there is more erythema than before or not.    Is on a higher dose of furosemide that he took prior to hospitalization.  He used to take 20 mg daily and now is taking 40 mg daily.    Is running what to do for bilateral knee pain.  He is to take indomethacin.  Acetaminophen has not been very helpful.    REVIEW OF SYSTEMS:    Pertinent items are noted in HPI.    Current Outpatient Medications   Medication Sig Dispense Refill     amLODIPine (NORVASC) 10 MG tablet Take 1 tablet (10 mg) by mouth daily 90 tablet 3     clobetasol (TEMOVATE) 0.05 % external ointment        furosemide (LASIX) 40 MG tablet Take 1 tablet (40 mg) by mouth daily 90 tablet 0     HEMP OIL OR EXTRACT OR OTHER CBD CANNABINOID, NOT MEDICAL  CANNABIS, Uses topical on knees and oral as needed       hydrocortisone (CORTAID) 1 % external cream        metoprolol succinate ER (TOPROL-XL) 200 MG 24 hr tablet TAKE 1 TABLET BY MOUTH ONCE DAILY. 90 tablet 2     pimecrolimus (ELIDEL) 1 % external cream        warfarin ANTICOAGULANT (COUMADIN) 5 MG tablet TAKE 7.5 MG DAILY OR AS DIRECTED BY PROTIME CLINIC 144 tablet 1     indomethacin (INDOCIN) 50 MG capsule TAKE 1 CAPSULE BY MOUTH TWICE DAILY AS NEEDED FOR MODERATE PAIN. (Patient not taking: Reported on 10/8/2019) 20 capsule 0     Allergies   Allergen Reactions     Ioxaglate Unknown     Diatrizoate Rash     Levofloxacin Hives and Rash     Metrizamide Rash     (Diagnostic X-Ray materials)     Probenecid Rash       OBJECTIVE:  /74   Pulse 68   Temp 97.2  F (36.2  C) (Temporal)   Resp 20   Wt (!) 171.3 kg (377 lb 9.6 oz)   SpO2 95%   BMI 49.82 kg/m      EXAM:  General Appearance: Alert. No acute distress  Chest/Respiratory Exam: Clear to auscultation bilaterally  Cardiovascular Exam: Regular rate and rhythm. S1, S2, no murmur, gallop, or rubs.  Extremities: 1+ pitting right lower extremity edema. Chronic venous stasis changes on both legs.  Skin: Left leg with more erythema than before. See picture. Significant induration. Numerous areas of eschar, but no significant drainage.  Psychiatric: Normal affect and mentation    Results for orders placed or performed in visit on 10/08/19   Procalcitonin   Result Value Ref Range    Procalcitonin 0.20 ng/ml   Basic Metabolic Panel   Result Value Ref Range    Sodium 138 134 - 144 mmol/L    Potassium 3.0 (L) 3.5 - 5.1 mmol/L    Chloride 99 98 - 107 mmol/L    Carbon Dioxide 32 (H) 21 - 31 mmol/L    Anion Gap 7 3 - 14 mmol/L    Glucose 104 70 - 105 mg/dL    Urea Nitrogen 20 7 - 25 mg/dL    Creatinine 1.04 0.70 - 1.30 mg/dL    GFR Estimate 73 >60 mL/min/[1.73_m2]    GFR Estimate If Black 89 >60 mL/min/[1.73_m2]    Calcium 9.0 8.6 - 10.3 mg/dL   CBC and Differential    Result Value Ref Range    WBC 8.5 4.0 - 11.0 10e9/L    RBC Count 3.81 (L) 4.4 - 5.9 10e12/L    Hemoglobin 9.9 (L) 13.3 - 17.7 g/dL    Hematocrit 33.8 (L) 40.0 - 53.0 %    MCV 89 78 - 100 fl    MCH 26.0 (L) 26.5 - 33.0 pg    MCHC 29.3 (L) 31.5 - 36.5 g/dL    RDW 16.2 (H) 10.0 - 15.0 %    Platelet Count 303 150 - 450 10e9/L    Diff Method Automated Method     % Neutrophils 67.9 %    % Lymphocytes 20.1 %    % Monocytes 9.9 %    % Eosinophils 0.5 %    % Basophils 1.1 %    % Immature Granulocytes 0.5 %    Absolute Neutrophil 5.7 1.6 - 8.3 10e9/L    Absolute Lymphocytes 1.7 0.8 - 5.3 10e9/L    Absolute Monocytes 0.8 0.0 - 1.3 10e9/L    Absolute Eosinophils 0.0 0.0 - 0.7 10e9/L    Absolute Basophils 0.1 0.0 - 0.2 10e9/L    Abs Immature Granulocytes 0.0 0 - 0.4 10e9/L      ASSESSMENT/PLAN:    ICD-10-CM    1. Cellulitis of left leg L03.116 CBC and Differential     Procalcitonin     Wound Culture Aerobic Bacterial     Procalcitonin     CBC and Differential   2. Swelling of limb M79.89 furosemide (LASIX) 40 MG tablet   3. Benign essential hypertension I10 Basic Metabolic Panel     Basic Metabolic Panel   4. Chronic pain of both knees M25.561 gabapentin (NEURONTIN) 300 MG capsule    M25.562     G89.29    5. Anemia, unspecified type D64.9 famotidine (PEPCID) 40 MG tablet   6. Hypokalemia E87.6 potassium chloride ER (K-DUR/KLOR-CON M) 20 MEQ CR tablet       He had cellulitis of the left leg treated with vancomycin and then discharged on doxycycline.  Is not significantly rapidly worse after stopping doxycycline, but it does appear to have worsened since the last appointment.  Elected to obtain labs to see if these help with diagnosis.  He did have an elevated white count initially and a procalcitonin to 1.5.  Labs today appear generally good.  Obtained a wound culture from a slight area of drainage on the left anterior leg.  Based on results we will treat with appropriate antibiotics and if no growth, then no antibiotics.  Not  currently on any antibiotic until the wound culture returns.    Continue with furosemide, try increasing dosing from current 40 mg daily up to 40 mg every day and 40 mg every other afternoon.    With increase in furosemide potassium is now low.  Given potassium replacement of 20 mEq twice daily especially with slight increase in furosemide.    Hemoglobin has improved.  However, still remains anemic.  Start famotidine to help heal stomach.  We discussed an EGD, but he does not have insurance and this needs to get sorted out first.    Start on gabapentin to help knee pain. See patient instructions.    F/U 2-3 weeks    eTo Funes MD    This document was prepared using a combination of typing and voice generated software.  While every attempt was made for accuracy, spelling and grammatical errors may exist.

## 2019-10-08 NOTE — PATIENT INSTRUCTIONS
We will call when wound culture returns    Ask the pharmacy about cost of gabapentin 300 mg for 90 pills and compare to Oswaldo    Start gabapentin 300 mg daily for 3 days, then twice daily for 3 days, then up to three times daily    Increase furosemide to 40 mg every day, but then 40 mg every other afternoon    May want to take famotidine 40 mg daily to protect stomach    Follow-up in 2-3 weeks

## 2019-10-08 NOTE — NURSING NOTE
Pt presents to clinic today for hospital follow up.      Medication Reconciliation: complete  Faith Pitt LPN

## 2019-10-09 ENCOUNTER — ANTICOAGULATION THERAPY VISIT (OUTPATIENT)
Dept: ANTICOAGULATION | Facility: OTHER | Age: 59
End: 2019-10-09

## 2019-10-09 DIAGNOSIS — Z79.01 ANTICOAGULATION MONITORING, INR RANGE 2-3: ICD-10-CM

## 2019-10-09 LAB — INR POINT OF CARE: 3.5 (ref 0.86–1.14)

## 2019-10-09 PROCEDURE — 85610 PROTHROMBIN TIME: CPT | Mod: QW

## 2019-10-09 PROCEDURE — 36416 COLLJ CAPILLARY BLOOD SPEC: CPT

## 2019-10-09 NOTE — PROGRESS NOTES
ANTICOAGULATION FOLLOW-UP CLINIC VISIT    Patient Name:  Babak Moran  Date:  10/9/2019  Contact Type:  Face to Face    SUBJECTIVE:  Patient Findings     Positives:   Change in medications (starting famotidine increase lasix starting potassium and d/c indomethicin), Other complaints (may be starting antibiotic for wound but waiting for culture results)        Clinical Outcomes     Negatives:   Major bleeding event, Thromboembolic event, Anticoagulation-related hospital admission, Anticoagulation-related ED visit, Anticoagulation-related fatality           OBJECTIVE    INR Protime   Date Value Ref Range Status   10/09/2019 3.5 (A) 0.86 - 1.14 Final       ASSESSMENT / PLAN  INR assessment SUPRA    Recheck INR In: 1 WEEK    INR Location Clinic      Anticoagulation Summary  As of 10/9/2019    INR goal:   2.0-3.0   TTR:   60.9 % (4.2 y)   INR used for dosing:   3.5! (10/9/2019)   Warfarin maintenance plan:   5 mg (5 mg x 1) every Wed; 7.5 mg (5 mg x 1.5) all other days   Full warfarin instructions:   5 mg every Wed; 7.5 mg all other days   Weekly warfarin total:   50 mg   Plan last modified:   Shelley Mercado RN (10/9/2019)   Next INR check:   10/16/2019   Priority:   INR   Target end date:   Indefinite    Indications    Unspecified atrial fibrillation (Resolved) [I48.91]  Anticoagulation monitoring  INR range 2-3 [Z79.01]             Anticoagulation Episode Summary     INR check location:       Preferred lab:       Send INR reminders to:    INR    Comments:   Babak prefers to be closer to 3.0 d/t previous CVA check Q 4 weeks.Declines to reduce dose when slightly over 3.0  Needs to change PCP      Anticoagulation Care Providers     Provider Role Specialty Phone number    Alvino Yi MD [facility] [phone]            See the Encounter Report to view Anticoagulation Flowsheet and Dosing Calendar (Go to Encounters tab in chart review, and find the Anticoagulation Therapy Visit)        Shelley BAUTISTA  Rogelio RN

## 2019-10-10 ENCOUNTER — TELEPHONE (OUTPATIENT)
Dept: FAMILY MEDICINE | Facility: OTHER | Age: 59
End: 2019-10-10

## 2019-10-10 DIAGNOSIS — L03.116 CELLULITIS OF LEFT LEG: Primary | ICD-10-CM

## 2019-10-10 LAB
BACTERIA SPEC CULT: ABNORMAL
BACTERIA SPEC CULT: ABNORMAL
SPECIMEN SOURCE: ABNORMAL

## 2019-10-10 RX ORDER — SULFAMETHOXAZOLE/TRIMETHOPRIM 800-160 MG
1 TABLET ORAL 2 TIMES DAILY
Qty: 42 TABLET | Refills: 0 | Status: SHIPPED | OUTPATIENT
Start: 2019-10-10 | End: 2019-10-24

## 2019-10-10 NOTE — TELEPHONE ENCOUNTER
Called patient about wound culture results.  He has staph resistant to tetracyclines.  It was on doxycycline before therapy completed.  Additionally has Enterobacter, which should be pathogenic.  Therefore it since both bacteria on culture are sensitive to Bactrim, will treat with Bactrim DS 1 tablet twice daily for 21 days.  Follow-up with me before antibiotics are finished.

## 2019-10-12 DIAGNOSIS — I48.91 ATRIAL FIBRILLATION (H): ICD-10-CM

## 2019-10-12 DIAGNOSIS — Z79.01 LONG-TERM (CURRENT) USE OF ANTICOAGULANTS, INR GOAL 2.0-3.0: ICD-10-CM

## 2019-10-14 RX ORDER — WARFARIN SODIUM 5 MG/1
TABLET ORAL
Qty: 144 TABLET | Refills: 1 | Status: SHIPPED | OUTPATIENT
Start: 2019-10-14 | End: 2019-11-01

## 2019-10-14 NOTE — TELEPHONE ENCOUNTER
"Requested Prescriptions   Pending Prescriptions Disp Refills     warfarin ANTICOAGULANT (COUMADIN) 5 MG tablet [Pharmacy Med Name: WARFARIN SOD 5MG TABLETS (PEACH)] 144 tablet 0     Sig: TAKE 2 TABLETS(10MG) X 1 DAY AND 1 1/2 TABLETS(7.5MG) X 6 DAYS/WEEK OR AS DIRECTED BY [facility]       Vitamin K Antagonists Failed - 10/12/2019  3:36 PM        Failed - INR is within goal in the past 6 weeks     Confirm INR is within goal in the past 6 weeks.     Recent Labs   Lab Test 10/09/19   INR 3.5*                       Passed - Recent (12 mo) or future (30 days) visit within the authorizing provider's specialty     Patient has had an office visit with the authorizing provider or a provider within the authorizing providers department within the previous 12 mos or has a future within next 30 days. See \"Patient Info\" tab in inbasket, or \"Choose Columns\" in Meds & Orders section of the refill encounter.              Passed - Medication is active on med list        Passed - Patient is 18 years of age or older        Prescription approved per [facility] Ref[facility] Protocol.    "

## 2019-10-15 ENCOUNTER — TELEPHONE (OUTPATIENT)
Dept: FAMILY MEDICINE | Facility: OTHER | Age: 59
End: 2019-10-15

## 2019-10-15 NOTE — TELEPHONE ENCOUNTER
Started new med 10/09/19  And started getting dizzy 10/13/2019, fell last night, had to call 911 to get help getting up.    He stopped the med today.    Please call patient back with what to do next.   Thank You Sindy Bradshaw on 10/15/2019 at 2:32 PM

## 2019-10-16 NOTE — TELEPHONE ENCOUNTER
After verifying pts name and date of birth with pt, pt notified of message below. Pt states the gabapentin has not been helping his knee pain. Pt was offered tramadol for the knee pain but declined. Pt states he is going to wait until his next appointment to discuss something for his pain.  Faith Pitt LPN

## 2019-10-16 NOTE — TELEPHONE ENCOUNTER
I'm guessing that gabapentin was the problem. Is that the medication he is referring to?  If so, we could try a lower dose. Did it help his knee pain at all? If not, then we may have to try something else.   I think he wants to avoid opioids, but would he consider tramadol?

## 2019-10-17 ENCOUNTER — ANTICOAGULATION THERAPY VISIT (OUTPATIENT)
Dept: ANTICOAGULATION | Facility: OTHER | Age: 59
End: 2019-10-17

## 2019-10-17 DIAGNOSIS — Z79.01 ANTICOAGULATION MONITORING, INR RANGE 2-3: ICD-10-CM

## 2019-10-17 LAB — INR POINT OF CARE: 4.4 (ref 0.86–1.14)

## 2019-10-17 PROCEDURE — 36416 COLLJ CAPILLARY BLOOD SPEC: CPT

## 2019-10-17 PROCEDURE — 85610 PROTHROMBIN TIME: CPT | Mod: QW

## 2019-10-17 NOTE — PROGRESS NOTES
ANTICOAGULATION FOLLOW-UP CLINIC VISIT    Patient Name:  Babak Moran  Date:  10/17/2019  Contact Type:  Face to Face    SUBJECTIVE:  Patient Findings     Positives:   Change in medications (started on potassium and bactrim x 21 days)             OBJECTIVE    INR Protime   Date Value Ref Range Status   10/17/2019 4.4 (A) 0.86 - 1.14 Final       ASSESSMENT / PLAN  INR assessment SUPRA    Recheck INR In: 1 WEEK    INR Location Clinic      Anticoagulation Summary  As of 10/17/2019    INR goal:   2.0-3.0   TTR:   60.6 % (4.2 y)   INR used for dosin.4! (10/17/2019)   Warfarin maintenance plan:   5 mg (5 mg x 1) every Wed; 7.5 mg (5 mg x 1.5) all other days   Full warfarin instructions:   10/17: Hold; 10/19: 5 mg; 10/21: 5 mg; Otherwise 5 mg every Wed; 7.5 mg all other days   Weekly warfarin total:   50 mg   Plan last modified:   Shelley Mercado RN (10/17/2019)   Next INR check:   10/23/2019   Priority:   INR   Target end date:   Indefinite    Indications    Unspecified atrial fibrillation (Resolved) [I48.91]  Anticoagulation monitoring  INR range 2-3 [Z79.01]             Anticoagulation Episode Summary     INR check location:       Preferred lab:       Send INR reminders to:    INR    Comments:   Babak prefers to be closer to 3.0 d/t previous CVA check Q 4 weeks.Declines to reduce dose when slightly over 3.0  Needs to change PCP      Anticoagulation Care Providers     Provider Role Specialty Phone number    Alvino Yi MD [facility] [phone]            See the Encounter Report to view Anticoagulation Flowsheet and Dosing Calendar (Go to Encounters tab in chart review, and find the Anticoagulation Therapy Visit)        Shelley Mercado, RN

## 2019-10-23 ENCOUNTER — ANTICOAGULATION THERAPY VISIT (OUTPATIENT)
Dept: ANTICOAGULATION | Facility: OTHER | Age: 59
End: 2019-10-23

## 2019-10-23 ENCOUNTER — OFFICE VISIT (OUTPATIENT)
Dept: FAMILY MEDICINE | Facility: OTHER | Age: 59
End: 2019-10-23
Attending: FAMILY MEDICINE

## 2019-10-23 VITALS
SYSTOLIC BLOOD PRESSURE: 122 MMHG | WEIGHT: 315 LBS | TEMPERATURE: 97 F | DIASTOLIC BLOOD PRESSURE: 68 MMHG | HEART RATE: 94 BPM | RESPIRATION RATE: 20 BRPM | BODY MASS INDEX: 48.68 KG/M2 | OXYGEN SATURATION: 96 %

## 2019-10-23 DIAGNOSIS — L03.116 CELLULITIS OF LEFT LEG: Primary | ICD-10-CM

## 2019-10-23 DIAGNOSIS — E87.6 HYPOKALEMIA: ICD-10-CM

## 2019-10-23 DIAGNOSIS — Z79.01 ANTICOAGULATION MONITORING, INR RANGE 2-3: ICD-10-CM

## 2019-10-23 DIAGNOSIS — M79.89 SWELLING OF LIMB: ICD-10-CM

## 2019-10-23 DIAGNOSIS — D64.9 ANEMIA, UNSPECIFIED TYPE: ICD-10-CM

## 2019-10-23 LAB
ANION GAP SERPL CALCULATED.3IONS-SCNC: 4 MMOL/L (ref 3–14)
BUN SERPL-MCNC: 32 MG/DL (ref 7–25)
CALCIUM SERPL-MCNC: 9.2 MG/DL (ref 8.6–10.3)
CHLORIDE SERPL-SCNC: 102 MMOL/L (ref 98–107)
CO2 SERPL-SCNC: 28 MMOL/L (ref 21–31)
CREAT SERPL-MCNC: 1.32 MG/DL (ref 0.7–1.3)
ERYTHROCYTE [DISTWIDTH] IN BLOOD BY AUTOMATED COUNT: 16.3 % (ref 10–15)
GFR SERPL CREATININE-BSD FRML MDRD: 56 ML/MIN/{1.73_M2}
GLUCOSE SERPL-MCNC: 99 MG/DL (ref 70–105)
HCT VFR BLD AUTO: 35.2 % (ref 40–53)
HGB BLD-MCNC: 10.2 G/DL (ref 13.3–17.7)
INR POINT OF CARE: 3.2 (ref 0.86–1.14)
MCH RBC QN AUTO: 25.1 PG (ref 26.5–33)
MCHC RBC AUTO-ENTMCNC: 29 G/DL (ref 31.5–36.5)
MCV RBC AUTO: 87 FL (ref 78–100)
PLATELET # BLD AUTO: 460 10E9/L (ref 150–450)
POTASSIUM SERPL-SCNC: 4.2 MMOL/L (ref 3.5–5.1)
PROCALCITONIN SERPL-MCNC: 0.3 NG/ML
RBC # BLD AUTO: 4.07 10E12/L (ref 4.4–5.9)
SODIUM SERPL-SCNC: 134 MMOL/L (ref 134–144)
WBC # BLD AUTO: 6.8 10E9/L (ref 4–11)

## 2019-10-23 PROCEDURE — 80048 BASIC METABOLIC PNL TOTAL CA: CPT | Mod: ZL | Performed by: FAMILY MEDICINE

## 2019-10-23 PROCEDURE — 84145 PROCALCITONIN (PCT): CPT | Mod: ZL | Performed by: FAMILY MEDICINE

## 2019-10-23 PROCEDURE — 99214 OFFICE O/P EST MOD 30 MIN: CPT | Performed by: FAMILY MEDICINE

## 2019-10-23 PROCEDURE — 85027 COMPLETE CBC AUTOMATED: CPT | Mod: ZL | Performed by: FAMILY MEDICINE

## 2019-10-23 PROCEDURE — 36416 COLLJ CAPILLARY BLOOD SPEC: CPT

## 2019-10-23 PROCEDURE — 85610 PROTHROMBIN TIME: CPT | Mod: QW

## 2019-10-23 NOTE — NURSING NOTE
Pt presents to clinic today for follow up cellulitis of the left leg.      Medication Reconciliation: complete  Faith Pitt LPN

## 2019-10-23 NOTE — PROGRESS NOTES
ANTICOAGULATION FOLLOW-UP CLINIC VISIT    Patient Name:  Babak Moran  Date:  10/23/2019  Contact Type:  Face to Face    SUBJECTIVE:  Patient Findings     Positives:   Change in medications (continues bactrim for another 1 week)             OBJECTIVE    INR Protime   Date Value Ref Range Status   10/23/2019 3.2 (A) 0.86 - 1.14 Final       ASSESSMENT / PLAN  INR assessment SUPRA    Recheck INR In: 5 DAYS    INR Location Clinic      Anticoagulation Summary  As of 10/23/2019    INR goal:   2.0-3.0   TTR:   60.3 % (4.3 y)   INR used for dosing:   3.2! (10/23/2019)   Warfarin maintenance plan:   5 mg (5 mg x 1) every Wed; 7.5 mg (5 mg x 1.5) all other days   Full warfarin instructions:   10/24: 5 mg; 10/25: 5 mg; 10/26: 5 mg; 10/27: 5 mg; 10/28: 5 mg; 10/29: 5 mg; Otherwise 5 mg every Wed; 7.5 mg all other days   Weekly warfarin total:   50 mg   Plan last modified:   Shelley Mercado RN (10/17/2019)   Next INR check:   10/28/2019   Priority:   INR   Target end date:   Indefinite    Indications    Unspecified atrial fibrillation (Resolved) [I48.91]  Anticoagulation monitoring  INR range 2-3 [Z79.01]             Anticoagulation Episode Summary     INR check location:       Preferred lab:       Send INR reminders to:    INR    Comments:   Babak prefers to be closer to 3.0 d/t previous CVA check Q 4 weeks.Declines to reduce dose when slightly over 3.0  Needs to change PCP      Anticoagulation Care Providers     Provider Role Specialty Phone number    Teo Funes MD [facility] [phone]            See the Encounter Report to view Anticoagulation Flowsheet and Dosing Calendar (Go to Encounters tab in chart review, and find the Anticoagulation Therapy Visit)        Shelley Mercado, RN

## 2019-10-23 NOTE — PATIENT INSTRUCTIONS
Medication dosing depends on labs  Likely will take antibiotics until Nov 6  Likely will reduce furosemide to daily  Not sure yet about potassium dose  Start on famotidine to help with the lack of appetite - could be from stomach irritation (even though you do not feel pain)  Follow-up in a couple weeks

## 2019-10-23 NOTE — PROGRESS NOTES
Nursing Notes:   Faith Pitt LPN  10/23/2019  8:13 AM  Signed  Pt presents to clinic today for follow up cellulitis of the left leg.      Medication Reconciliation: complete  Faith Pitt LPN      SUBJECTIVE:  59 year old male with morbid obesity, hypertension, sleep apnea, A. fib, history of CVA presents to follow up on left leg cellulitis, anemia, leg edema.     Wound culture grew out staph and Enterobacter.  Treating with Bactrim twice daily for the past couple weeks.  His left leg appears reduced in size and less erythematous.  There is less induration.  He agrees it it has improved gradually over time.  Tolerating the antibiotics.    INR has increased with Bactrim, but is being managed.    He remains anemic.  Suspect gastritis, but he has no insurance and therefore cannot afford an EGD.  He still has not started famotidine.  Previously he disagreed that he is not having any stomach problems, but then admitted to reduction in appetite.  He believes his weight is down over 30 pounds over the past month.    He is on a higher amount of furosemide than usual.  Typically he took 20 mg daily.  Has been taking 40 mg every morning and then 40 mg every other afternoon.    At last appointment, potassium was low.  Started on 20 mEq potassium chloride twice daily.  Did not report weakness last appointment and has not noticed any improvement in strength.    Tried gabapentin for knee pain since he is not able to take indomethacin with warfarin.  He took it in the morning, but did not notice any improvement on pain.  When he tried taking doses twice daily he then reports falling.  However, he actually reports a fall prior to ever starting on the gabapentin.  He did not follow my written directions on the AVS last appointment that recommended taking the gabapentin at night.  At this point he does not want to take it again.    REVIEW OF SYSTEMS:    Pertinent items are noted in HPI.    Current Outpatient Medications    Medication Sig Dispense Refill     amLODIPine (NORVASC) 10 MG tablet Take 1 tablet (10 mg) by mouth daily 90 tablet 3     clobetasol (TEMOVATE) 0.05 % external ointment        famotidine (PEPCID) 40 MG tablet Take 1 tablet (40 mg) by mouth daily 90 tablet 0     furosemide (LASIX) 40 MG tablet Take 40 mg every morning and 40 mg every other afternoon 90 tablet 0     HEMP OIL OR EXTRACT OR OTHER CBD CANNABINOID, NOT MEDICAL CANNABIS, Uses topical on knees and oral as needed       hydrocortisone (CORTAID) 1 % external cream        metoprolol succinate ER (TOPROL-XL) 200 MG 24 hr tablet TAKE 1 TABLET BY MOUTH ONCE DAILY. 90 tablet 2     pimecrolimus (ELIDEL) 1 % external cream        potassium chloride ER (K-DUR/KLOR-CON M) 20 MEQ CR tablet Take 1 tablet (20 mEq) by mouth 2 times daily 60 tablet 0     sulfamethoxazole-trimethoprim (BACTRIM DS/SEPTRA DS) 800-160 MG tablet Take 1 tablet by mouth 2 times daily for 21 days 42 tablet 0     warfarin ANTICOAGULANT (COUMADIN) 5 MG tablet TAKE 1 TABLETS(5MG) X 1 DAY AND 1 1/2 TABLETS(7.5MG) X 6 DAYS/WEEK OR AS DIRECTED BY PROTIME CLINIC 144 tablet 1     Allergies   Allergen Reactions     Ioxaglate Unknown     Diatrizoate Rash     Levofloxacin Hives and Rash     Metrizamide Rash     (Diagnostic X-Ray materials)     Probenecid Rash       OBJECTIVE:  /68   Pulse 94   Temp 97  F (36.1  C) (Tympanic)   Resp 20   Wt (!) 167.4 kg (369 lb)   SpO2 96%   BMI 48.68 kg/m      EXAM:  General Appearance: Alert. No acute distress  Chest/Respiratory Exam: Clear to auscultation bilaterally  Cardiovascular Exam: Regular rate and rhythm. S1, S2, no murmur, gallop, or rubs.  Extremities: Trace pitting right lower extremity edema. Chronic venous stasis changes on both legs.  Skin: Left leg with less erythema, induration and swelling than before. Much improved  Psychiatric: Normal affect and mentation    Results for orders placed or performed in visit on 10/23/19   Procalcitonin   Result  Value Ref Range    Procalcitonin 0.30 ng/ml   CBC W PLT No Diff   Result Value Ref Range    WBC 6.8 4.0 - 11.0 10e9/L    RBC Count 4.07 (L) 4.4 - 5.9 10e12/L    Hemoglobin 10.2 (L) 13.3 - 17.7 g/dL    Hematocrit 35.2 (L) 40.0 - 53.0 %    MCV 87 78 - 100 fl    MCH 25.1 (L) 26.5 - 33.0 pg    MCHC 29.0 (L) 31.5 - 36.5 g/dL    RDW 16.3 (H) 10.0 - 15.0 %    Platelet Count 460 (H) 150 - 450 10e9/L   Basic Metabolic Panel   Result Value Ref Range    Sodium 134 134 - 144 mmol/L    Potassium 4.2 3.5 - 5.1 mmol/L    Chloride 102 98 - 107 mmol/L    Carbon Dioxide 28 21 - 31 mmol/L    Anion Gap 4 3 - 14 mmol/L    Glucose 99 70 - 105 mg/dL    Urea Nitrogen 32 (H) 7 - 25 mg/dL    Creatinine 1.32 (H) 0.70 - 1.30 mg/dL    GFR Estimate 56 (L) >60 mL/min/[1.73_m2]    GFR Estimate If Black 67 >60 mL/min/[1.73_m2]    Calcium 9.2 8.6 - 10.3 mg/dL       ASSESSMENT/PLAN:    ICD-10-CM    1. Cellulitis of left leg L03.116 CBC W PLT No Diff     Procalcitonin     Procalcitonin     CBC W PLT No Diff     sulfamethoxazole-trimethoprim (BACTRIM DS/SEPTRA DS) 800-160 MG tablet   2. Hypokalemia E87.6 Basic Metabolic Panel     Basic Metabolic Panel     potassium chloride ER (K-DUR/KLOR-CON M) 20 MEQ CR tablet   3. Anemia, unspecified type D64.9 famotidine (PEPCID) 40 MG tablet   4. Swelling of limb M79.89 furosemide (LASIX) 40 MG tablet       Cellulitis is improving.  Procalcitonin still remains slightly elevated.  However clinically much better.  He already has a 3-week course of Bactrim, but may need longer treatment.  Extended by another week so that he has a full 28-day course.    Leg edema improved.  Expected at appointment that he would need to reduce furosemide.  Now has slight elevation in creatinine.  Reduce furosemide to just 40 mg daily.    Potassium normalized, reduce potassium to 20 mEq daily.    Remains anemic.  Likely NSAID induced gastritis along with warfarin use.  Likely he is not continuing to lose blood substantial enough to drop  hemoglobin.  Again reiterated need to start famotidine 40 mg daily to help protect stomach.  Stay off NSAIDs.    Follow-up in a couple weeks    Teo Funes MD    This document was prepared using a combination of typing and voice generated software.  While every attempt was made for accuracy, spelling and grammatical errors may exist.

## 2019-10-24 RX ORDER — POTASSIUM CHLORIDE 1500 MG/1
20 TABLET, EXTENDED RELEASE ORAL DAILY
Qty: 30 TABLET | Refills: 1 | Status: SHIPPED | OUTPATIENT
Start: 2019-10-24 | End: 2019-11-27

## 2019-10-24 RX ORDER — SULFAMETHOXAZOLE/TRIMETHOPRIM 800-160 MG
1 TABLET ORAL 2 TIMES DAILY
Qty: 14 TABLET | Refills: 0 | Status: SHIPPED | OUTPATIENT
Start: 2019-10-30 | End: 2019-11-06

## 2019-10-24 RX ORDER — FAMOTIDINE 40 MG/1
40 TABLET, FILM COATED ORAL DAILY
Qty: 90 TABLET | Refills: 0 | Status: SHIPPED | OUTPATIENT
Start: 2019-10-24 | End: 2022-06-26

## 2019-10-24 RX ORDER — FUROSEMIDE 40 MG
40 TABLET ORAL DAILY
Qty: 90 TABLET | Refills: 0 | Status: SHIPPED | OUTPATIENT
Start: 2019-10-24 | End: 2020-01-03

## 2019-10-28 ENCOUNTER — ANTICOAGULATION THERAPY VISIT (OUTPATIENT)
Dept: ANTICOAGULATION | Facility: OTHER | Age: 59
End: 2019-10-28

## 2019-10-28 DIAGNOSIS — Z79.01 ANTICOAGULATION MONITORING, INR RANGE 2-3: ICD-10-CM

## 2019-10-28 LAB — INR POINT OF CARE: 2.7 (ref 0.86–1.14)

## 2019-10-28 PROCEDURE — 85610 PROTHROMBIN TIME: CPT | Mod: QW

## 2019-10-28 PROCEDURE — 36416 COLLJ CAPILLARY BLOOD SPEC: CPT

## 2019-10-28 NOTE — PROGRESS NOTES
ANTICOAGULATION FOLLOW-UP CLINIC VISIT    Patient Name:  Babak Moran  Date:  10/28/2019  Contact Type:  Face to Face    SUBJECTIVE:  Patient Findings     Positives:   Change in medications (continues with 28 day course of bactrim)             OBJECTIVE    INR Protime   Date Value Ref Range Status   10/28/2019 2.7 (A) 0.86 - 1.14 Final       ASSESSMENT / PLAN  INR assessment THER    Recheck INR In: 5 DAYS    INR Location Clinic      Anticoagulation Summary  As of 10/28/2019    INR goal:   2.0-3.0   TTR:   60.3 % (4.3 y)   INR used for dosin.7 (10/28/2019)   Warfarin maintenance plan:   5 mg (5 mg x 1) every Wed; 7.5 mg (5 mg x 1.5) all other days   Full warfarin instructions:   10/29: 5 mg; Otherwise 5 mg every Wed; 7.5 mg all other days   Weekly warfarin total:   50 mg   Plan last modified:   Shelley Mercado RN (10/17/2019)   Next INR check:   2019   Priority:   INR   Target end date:   Indefinite    Indications    Unspecified atrial fibrillation (Resolved) [I48.91]  Anticoagulation monitoring  INR range 2-3 [Z79.01]             Anticoagulation Episode Summary     INR check location:       Preferred lab:       Send INR reminders to:    INR    Comments:   Babak prefers to be closer to 3.0 d/t previous CVA check Q 4 weeks.Declines to reduce dose when slightly over 3.0  Needs to change PCP      Anticoagulation Care Providers     Provider Role Specialty Phone number    Teo Funes MD [facility] [phone]            See the Encounter Report to view Anticoagulation Flowsheet and Dosing Calendar (Go to Encounters tab in chart review, and find the Anticoagulation Therapy Visit)        Shelley Mercado, RN

## 2019-11-01 ENCOUNTER — ANTICOAGULATION THERAPY VISIT (OUTPATIENT)
Dept: ANTICOAGULATION | Facility: OTHER | Age: 59
End: 2019-11-01

## 2019-11-01 DIAGNOSIS — I48.91 ATRIAL FIBRILLATION (H): ICD-10-CM

## 2019-11-01 DIAGNOSIS — Z79.01 ANTICOAGULATION MONITORING, INR RANGE 2-3: ICD-10-CM

## 2019-11-01 DIAGNOSIS — Z79.01 LONG-TERM (CURRENT) USE OF ANTICOAGULANTS, INR GOAL 2.0-3.0: ICD-10-CM

## 2019-11-01 LAB — INR POINT OF CARE: 2.6 (ref 0.86–1.14)

## 2019-11-01 PROCEDURE — 85610 PROTHROMBIN TIME: CPT | Mod: QW

## 2019-11-01 PROCEDURE — 36416 COLLJ CAPILLARY BLOOD SPEC: CPT

## 2019-11-01 RX ORDER — WARFARIN SODIUM 5 MG/1
TABLET ORAL
Qty: 144 TABLET | Refills: 1 | COMMUNITY
Start: 2019-11-01 | End: 2020-02-12

## 2019-11-01 NOTE — PROGRESS NOTES
ANTICOAGULATION FOLLOW-UP CLINIC VISIT    Patient Name:  Babak Moran  Date:  2019  Contact Type:  Face to Face    SUBJECTIVE:  Patient Findings     Positives:   Change in medications (Continues Bactrim until )        Clinical Outcomes     Negatives:   Major bleeding event, Thromboembolic event, Anticoagulation-related hospital admission, Anticoagulation-related ED visit, Anticoagulation-related fatality           OBJECTIVE    INR Protime   Date Value Ref Range Status   2019 2.6 (A) 0.86 - 1.14 Final       ASSESSMENT / PLAN  INR assessment THER    Recheck INR In: 1 WEEK    INR Location Clinic      Anticoagulation Summary  As of 2019    INR goal:   2.0-3.0   TTR:   60.4 % (4.3 y)   INR used for dosin.6 (2019)   Warfarin maintenance plan:   7.5 mg (5 mg x 1.5) every Mon, Fri; 5 mg (5 mg x 1) all other days   Full warfarin instructions:   7.5 mg every Mon, Fri; 5 mg all other days   Weekly warfarin total:   40 mg   Plan last modified:   Xochitl Pavon RN (2019)   Next INR check:   2019   Priority:   INR   Target end date:   Indefinite    Indications    Unspecified atrial fibrillation (Resolved) [I48.91]  Anticoagulation monitoring  INR range 2-3 [Z79.01]             Anticoagulation Episode Summary     INR check location:       Preferred lab:       Send INR reminders to:    INR    Comments:   Babak prefers to be closer to 3.0 d/t previous CVA check Q 4 weeks.Declines to reduce dose when slightly over 3.0  Needs to change PCP      Anticoagulation Care Providers     Provider Role Specialty Phone number    Teo Funes MD [facility] [phone]            See the Encounter Report to view Anticoagulation Flowsheet and Dosing Calendar (Go to Encounters tab in chart review, and find the Anticoagulation Therapy Visit)        Xochitl Pavon RN

## 2019-11-06 ENCOUNTER — OFFICE VISIT (OUTPATIENT)
Dept: FAMILY MEDICINE | Facility: OTHER | Age: 59
End: 2019-11-06
Attending: FAMILY MEDICINE

## 2019-11-06 VITALS
TEMPERATURE: 98.7 F | SYSTOLIC BLOOD PRESSURE: 112 MMHG | HEART RATE: 65 BPM | RESPIRATION RATE: 18 BRPM | DIASTOLIC BLOOD PRESSURE: 70 MMHG | WEIGHT: 315 LBS | OXYGEN SATURATION: 95 % | BODY MASS INDEX: 48.09 KG/M2

## 2019-11-06 DIAGNOSIS — L03.116 CELLULITIS OF LEFT LEG: ICD-10-CM

## 2019-11-06 DIAGNOSIS — I10 BENIGN ESSENTIAL HYPERTENSION: ICD-10-CM

## 2019-11-06 DIAGNOSIS — D51.0 PERNICIOUS ANEMIA: Primary | ICD-10-CM

## 2019-11-06 LAB
ANION GAP SERPL CALCULATED.3IONS-SCNC: 6 MMOL/L (ref 3–14)
BUN SERPL-MCNC: 26 MG/DL (ref 7–25)
CALCIUM SERPL-MCNC: 9.1 MG/DL (ref 8.6–10.3)
CHLORIDE SERPL-SCNC: 100 MMOL/L (ref 98–107)
CO2 SERPL-SCNC: 28 MMOL/L (ref 21–31)
CREAT SERPL-MCNC: 1.24 MG/DL (ref 0.7–1.3)
ERYTHROCYTE [DISTWIDTH] IN BLOOD BY AUTOMATED COUNT: 16.4 % (ref 10–15)
GFR SERPL CREATININE-BSD FRML MDRD: ABNORMAL ML/MIN/{1.73_M2}
GLUCOSE SERPL-MCNC: 108 MG/DL (ref 70–105)
HCT VFR BLD AUTO: 37.1 % (ref 40–53)
HGB BLD-MCNC: 10.7 G/DL (ref 13.3–17.7)
IRON SATN MFR SERPL: 4 % (ref 20–55)
IRON SERPL-MCNC: 16 UG/DL (ref 50–212)
MCH RBC QN AUTO: 23.9 PG (ref 26.5–33)
MCHC RBC AUTO-ENTMCNC: 28.8 G/DL (ref 31.5–36.5)
MCV RBC AUTO: 83 FL (ref 78–100)
PLATELET # BLD AUTO: 306 10E9/L (ref 150–450)
POTASSIUM SERPL-SCNC: 4.1 MMOL/L (ref 3.5–5.1)
RBC # BLD AUTO: 4.47 10E12/L (ref 4.4–5.9)
SODIUM SERPL-SCNC: 134 MMOL/L (ref 134–144)
TIBC SERPL-MCNC: 408.8 UG/DL (ref 245–400)
UIBC (UNSATURATED): 392.8 MG/DL
WBC # BLD AUTO: 7.5 10E9/L (ref 4–11)

## 2019-11-06 PROCEDURE — 83550 IRON BINDING TEST: CPT | Mod: ZL | Performed by: FAMILY MEDICINE

## 2019-11-06 PROCEDURE — 25000128 H RX IP 250 OP 636: Performed by: FAMILY MEDICINE

## 2019-11-06 PROCEDURE — 80048 BASIC METABOLIC PNL TOTAL CA: CPT | Mod: ZL | Performed by: FAMILY MEDICINE

## 2019-11-06 PROCEDURE — 36415 COLL VENOUS BLD VENIPUNCTURE: CPT | Mod: ZL | Performed by: FAMILY MEDICINE

## 2019-11-06 PROCEDURE — 96372 THER/PROPH/DIAG INJ SC/IM: CPT

## 2019-11-06 PROCEDURE — 99214 OFFICE O/P EST MOD 30 MIN: CPT | Performed by: FAMILY MEDICINE

## 2019-11-06 PROCEDURE — 85027 COMPLETE CBC AUTOMATED: CPT | Mod: ZL | Performed by: FAMILY MEDICINE

## 2019-11-06 PROCEDURE — 83540 ASSAY OF IRON: CPT | Mod: ZL | Performed by: FAMILY MEDICINE

## 2019-11-06 RX ORDER — CYANOCOBALAMIN 1000 UG/ML
1000 INJECTION, SOLUTION INTRAMUSCULAR; SUBCUTANEOUS ONCE
Status: COMPLETED | OUTPATIENT
Start: 2019-11-06 | End: 2019-11-06

## 2019-11-06 RX ORDER — SULFAMETHOXAZOLE/TRIMETHOPRIM 800-160 MG
1 TABLET ORAL 2 TIMES DAILY
Qty: 14 TABLET | Refills: 0 | Status: SHIPPED | OUTPATIENT
Start: 2019-11-06 | End: 2019-11-27

## 2019-11-06 RX ORDER — AMLODIPINE BESYLATE 10 MG/1
5 TABLET ORAL DAILY
Start: 2019-11-06 | End: 2019-11-27

## 2019-11-06 RX ORDER — MUPIROCIN 20 MG/G
OINTMENT TOPICAL 3 TIMES DAILY
Qty: 60 G | Refills: 1 | Status: SHIPPED | OUTPATIENT
Start: 2019-11-06 | End: 2020-09-25

## 2019-11-06 RX ADMIN — CYANOCOBALAMIN 1000 MCG: 1000 INJECTION, SOLUTION INTRAMUSCULAR at 10:21

## 2019-11-06 ASSESSMENT — PAIN SCALES - GENERAL: PAINLEVEL: NO PAIN (0)

## 2019-11-06 NOTE — NURSING NOTE
Pt presents to clinic today for follow up left leg. Pt states slight improvement since last office visit.        Medication Reconciliation: complete  Faith Pitt LPN

## 2019-11-06 NOTE — PROGRESS NOTES
Nursing Notes:   Faith Pitt LPN  11/6/2019 10:02 AM  Signed  Pt presents to clinic today for follow up left leg. Pt states slight improvement since last office visit.        Medication Reconciliation: complete  Faith Pitt LPN      SUBJECTIVE:  59 year old male with morbid obesity, hypertension, sleep apnea, A. fib, history of CVA presents to follow up on left leg cellulitis, anemia, leg edema.     Is previously hospitalized for cellulitis.  Improved with doxycycline, but cellulitis did not seem to resolve.  A repeat wound culture grew out staph and Enterobacter.  Treating with Bactrim twice daily for the past month.  Leg has improved, but still remains slightly erythematous.  Tolerating Bactrim.    Watching INR carefully due to Bactrim.    Furosemide was increased during hospitalization.  On lab monitoring, eventually his creatinine increased and potassium dropped.  Furosemide has been reduced and is currently at 40 mg daily.  He is to take 20 mg daily.  Weight has been stable.    Anemia noted, likely due to gastritis with warfarin and previous indomethacin use.  Indomethacin has been stopped.  He finally picked up famotidine after repeated prompting.  Has not noticed any change with appetite.      REVIEW OF SYSTEMS:    Pertinent items are noted in HPI.    Current Outpatient Medications   Medication Sig Dispense Refill     amLODIPine (NORVASC) 10 MG tablet Take 1 tablet (10 mg) by mouth daily 90 tablet 3     clobetasol (TEMOVATE) 0.05 % external ointment        famotidine (PEPCID) 40 MG tablet Take 1 tablet (40 mg) by mouth daily 90 tablet 0     furosemide (LASIX) 40 MG tablet Take 1 tablet (40 mg) by mouth daily 90 tablet 0     HEMP OIL OR EXTRACT OR OTHER CBD CANNABINOID, NOT MEDICAL CANNABIS, Uses topical on knees and oral as needed       hydrocortisone (CORTAID) 1 % external cream        metoprolol succinate ER (TOPROL-XL) 200 MG 24 hr tablet TAKE 1 TABLET BY MOUTH ONCE DAILY. 90 tablet 2      pimecrolimus (ELIDEL) 1 % external cream        potassium chloride ER (K-DUR/KLOR-CON M) 20 MEQ CR tablet Take 1 tablet (20 mEq) by mouth daily 30 tablet 1     sulfamethoxazole-trimethoprim (BACTRIM DS/SEPTRA DS) 800-160 MG tablet Take 1 tablet by mouth 2 times daily for 7 days To extend previous prescription for a total of 28 days of antibiotic until Nov 6 14 tablet 0     warfarin ANTICOAGULANT (COUMADIN) 5 MG tablet TAKE 1 TABLETS(5MG) X FIVE DAYS/WEEK AND 1 1/2 TABLETS(7.5MG) X TWO DAYS/WEEK [facility] tablet 1     Allergies   Allergen Reactions     Ioxaglate Unknown     Diatrizoate Rash     Levofloxacin Hives and Rash     Metrizamide Rash     (Diagnostic X-Ray materials)     Probenecid Rash       OBJECTIVE:  /70   Pulse 65   Temp 98.7  F (37.1  C) (Tympanic)   Resp 18   Wt (!) 165.3 kg (364 lb 8 oz)   SpO2 95%   BMI 48.09 kg/m      EXAM:  General Appearance: Alert. No acute distress  Chest/Respiratory Exam: Clear to auscultation bilaterally  Cardiovascular Exam: Regular rate and rhythm. S1, S2, no murmur, gallop, or rubs.  Extremities: Trace pitting right lower extremity edema. Chronic venous stasis changes on both legs.  Skin: Left leg with mild erythema.  Psychiatric: Normal affect and mentation    Results for orders placed or performed in visit on 11/06/19   Iron Binding Panel     Status: Abnormal   Result Value Ref Range    Iron 16 (L) 50 - 212 ug/dL    UIBC (Unsaturated) 392.80 mg/dL    Iron Binding Capacity 408.80 (H) 245.00 - 400.00 ug/dL    Iron Saturation 4 (L) 20 - 55 %   CBC W PLT No Diff     Status: Abnormal   Result Value Ref Range    WBC 7.5 4.0 - 11.0 10e9/L    RBC Count 4.47 4.4 - 5.9 10e12/L    Hemoglobin 10.7 (L) 13.3 - 17.7 g/dL    Hematocrit 37.1 (L) 40.0 - 53.0 %    MCV 83 78 - 100 fl    MCH 23.9 (L) 26.5 - 33.0 pg    MCHC 28.8 (L) 31.5 - 36.5 g/dL    RDW 16.4 (H) 10.0 - 15.0 %    Platelet Count 306 150 - 450 10e9/L   Basic Metabolic Panel     Status:  Abnormal   Result Value Ref Range    Sodium 134 134 - 144 mmol/L    Potassium 4.1 3.5 - 5.1 mmol/L    Chloride 100 98 - 107 mmol/L    Carbon Dioxide 28 21 - 31 mmol/L    Anion Gap 6 3 - 14 mmol/L    Glucose 108 (H) 70 - 105 mg/dL    Urea Nitrogen 26 (H) 7 - 25 mg/dL    Creatinine 1.24 0.70 - 1.30 mg/dL    GFR Estimate Not Calculated >60 mL/min/[1.73_m2]    GFR Estimate If Black Not Calculated >60 mL/min/[1.73_m2]    Calcium 9.1 8.6 - 10.3 mg/dL       ASSESSMENT/PLAN:    ICD-10-CM    1. Pernicious anemia D51.0 cyanocobalamin injection 1,000 mcg     Iron Binding Panel     Iron Binding Panel   2. Cellulitis of left leg L03.116 sulfamethoxazole-trimethoprim (BACTRIM DS/SEPTRA DS) 800-160 MG tablet     mupirocin (BACTROBAN) 2 % external ointment   3. Benign essential hypertension I10 amLODIPine (NORVASC) 10 MG tablet     Basic Metabolic Panel     CBC W PLT No Diff     CBC W PLT No Diff     Basic Metabolic Panel     Cellulitis is stable.  I expected it would appear better today though.  Elected to continue with Bactrim for another week until November 13.  He has poor circulation and this may be inhibiting clearance.  Other possibility is that there is resistance to Bactrim.  No drainage to culture at this time.  Recommend applying mupirocin ointment 2-3 times daily over the next couple weeks to see if this will resolve the infection.    BMP stable with current dosing of furosemide and potassium.  Continue same doses.    Hemoglobin improved strength starting famotidine.  He had a low B12 while hospitalized.  Given B12 injection today.  Checked iron levels and is iron deficient, which is fitting for blood loss anemia due to gastritis from previous NSAID use along with warfarin.  Continue with famotidine and avoid NSAIDs.    Blood pressure is very well controlled.  Reduce amlodipine to 5 mg daily as this could help with leg edema.    Follow-up in 3 to 4 weeks    Teo Funes MD    This document was prepared using a  combination of typing and voice generated software.  While every attempt was made for accuracy, spelling and grammatical errors may exist.

## 2019-11-06 NOTE — PATIENT INSTRUCTIONS
Reduce amlodipine to 5 mg (1/2 pill) daily  Apply mupirocin ointment to the left lower leg two to three times daily. (if too expensive, then use triple antibiotic ointment)    Labs to figure out potassium and furosemide dose  Continue other medications the same like famotidine    Follow-up in 3-4 weeks

## 2019-11-08 ENCOUNTER — ANTICOAGULATION THERAPY VISIT (OUTPATIENT)
Dept: ANTICOAGULATION | Facility: OTHER | Age: 59
End: 2019-11-08

## 2019-11-08 DIAGNOSIS — Z79.01 ANTICOAGULATION MONITORING, INR RANGE 2-3: ICD-10-CM

## 2019-11-08 LAB — INR POINT OF CARE: 2.9 (ref 0.86–1.14)

## 2019-11-08 PROCEDURE — 85610 PROTHROMBIN TIME: CPT | Mod: QW

## 2019-11-08 PROCEDURE — 36416 COLLJ CAPILLARY BLOOD SPEC: CPT

## 2019-11-08 NOTE — PROGRESS NOTES
ANTICOAGULATION FOLLOW-UP CLINIC VISIT    Patient Name:  Babak Moran  Date:  2019  Contact Type:  Face to Face    SUBJECTIVE:  Patient Findings     Positives:   Change in medications (continuing with bactrim and started bactroban)             OBJECTIVE    INR Protime   Date Value Ref Range Status   2019 2.9 (A) 0.86 - 1.14 Final       ASSESSMENT / PLAN  INR assessment THER    Recheck INR In: 2 WEEKS    INR Location Clinic      Anticoagulation Summary  As of 2019    INR goal:   2.0-3.0   TTR:   60.6 % (4.3 y)   INR used for dosin.9 (2019)   Warfarin maintenance plan:   7.5 mg (5 mg x 1.5) every Mon, Fri; 5 mg (5 mg x 1) all other days   Full warfarin instructions:   7.5 mg every Mon, Fri; 5 mg all other days   Weekly warfarin total:   40 mg   Plan last modified:   Xochitl Pavon RN (2019)   Next INR check:   2019   Priority:   INR   Target end date:   Indefinite    Indications    Unspecified atrial fibrillation (Resolved) [I48.91]  Anticoagulation monitoring  INR range 2-3 [Z79.01]             Anticoagulation Episode Summary     INR check location:       Preferred lab:       Send INR reminders to:    INR    Comments:   Babak prefers to be closer to 3.0 d/t previous CVA check Q 4 weeks.Declines to reduce dose when slightly over 3.0  Needs to change PCP      Anticoagulation Care Providers     Provider Role Specialty Phone number    Teo Funes MD [facility] [phone]            See the Encounter Report to view Anticoagulation Flowsheet and Dosing Calendar (Go to Encounters tab in chart review, and find the Anticoagulation Therapy Visit)        Shelley Mercado, RN

## 2019-11-14 DIAGNOSIS — E87.6 HYPOKALEMIA: ICD-10-CM

## 2019-11-18 RX ORDER — POTASSIUM CHLORIDE 1500 MG/1
TABLET, EXTENDED RELEASE ORAL
Qty: 60 TABLET | Refills: 0 | OUTPATIENT
Start: 2019-11-18

## 2019-11-18 NOTE — TELEPHONE ENCOUNTER
[facility] in [place], [place] is requesting a refill on the following medication:    POTASSIUM CL 20MEQ ER TABLETS    Will file in chart as: potassium chloride ER (K-DUR/KLOR-CON M) 20 MEQ CR tablet   The source prescription was reordered on 10/24/2019 by Teo Funes MD.   Sig: TAKE 1 TABLET BY MOUTH TWICE DAILY   Disp:  60 tablet    Refills:  0   Start: 11/14/2019   For: Hypokalemia   Last ordered: 1 month ago by Teo Funes MD Last refill: 10/9/2019   Potassium Supplements Protocol Passed     Last Written Prescription Date:  10/24/19  Last Fill Quantity: 30,  # refills: 1   Last office visit: 11/6/2019 with prescribing provider:  Marin    Future Office Visit:   Next 5 appointments (look out 90 days)    Nov 27, 2019  9:00 AM CST  Office Visit with Teo Funes MD  [facility] ([facility]) [address] [facility] [place] [id number] [phone]         Patient to return for follow up appointment on 11/27/19, will refuse refill at this time. Jacquelyn Mohamud RN on 11/18/2019 at 3:43 PM

## 2019-11-22 ENCOUNTER — ANTICOAGULATION THERAPY VISIT (OUTPATIENT)
Dept: ANTICOAGULATION | Facility: OTHER | Age: 59
End: 2019-11-22

## 2019-11-22 DIAGNOSIS — Z79.01 ANTICOAGULATION MONITORING, INR RANGE 2-3: ICD-10-CM

## 2019-11-22 LAB — INR POINT OF CARE: 3.3 (ref 0.86–1.14)

## 2019-11-22 PROCEDURE — 36416 COLLJ CAPILLARY BLOOD SPEC: CPT

## 2019-11-22 PROCEDURE — 85610 PROTHROMBIN TIME: CPT | Mod: QW

## 2019-11-22 NOTE — PROGRESS NOTES
ANTICOAGULATION FOLLOW-UP CLINIC VISIT    Patient Name:  Babak Moran  Date:  11/22/2019  Contact Type:  Face to Face    SUBJECTIVE:  Patient Findings         Clinical Outcomes     Negatives:   Major bleeding event, Thromboembolic event, Anticoagulation-related hospital admission, Anticoagulation-related ED visit, Anticoagulation-related fatality           OBJECTIVE    INR Protime   Date Value Ref Range Status   11/22/2019 3.3 (A) 0.86 - 1.14 Final       ASSESSMENT / PLAN  INR assessment THER    Recheck INR In: 2 WEEKS    INR Location Clinic      Anticoagulation Summary  As of 11/22/2019    INR goal:   2.0-3.0   TTR:   54.2 % (11.8 mo)   INR used for dosing:   3.3! (11/22/2019)   Warfarin maintenance plan:   7.5 mg (5 mg x 1.5) every Mon, Fri; 5 mg (5 mg x 1) all other days   Full warfarin instructions:   7.5 mg every Mon, Fri; 5 mg all other days   Weekly warfarin total:   40 mg   No change documented:   Shelley Mercado RN   Plan last modified:   Xochitl Pavon RN (11/1/2019)   Next INR check:   12/6/2019   Priority:   Maintenance   Target end date:   Indefinite    Indications    Unspecified atrial fibrillation (Resolved) [I48.91]  Anticoagulation monitoring  INR range 2-3 [Z79.01]             Anticoagulation Episode Summary     INR check location:       Preferred lab:       Send INR reminders to:    INR    Comments:   Babak prefers to be closer to 3.0 d/t previous CVA check Q 4 weeks.Declines to reduce dose when slightly over 3.0  Needs to change PCP      Anticoagulation Care Providers     Provider Role Specialty Phone number    Teo Funes MD [facility] [phone]            See the Encounter Report to view Anticoagulation Flowsheet and Dosing Calendar (Go to Encounters tab in chart review, and find the Anticoagulation Therapy Visit)        Shelley Mercado, RN

## 2019-11-27 ENCOUNTER — OFFICE VISIT (OUTPATIENT)
Dept: FAMILY MEDICINE | Facility: OTHER | Age: 59
End: 2019-11-27
Attending: FAMILY MEDICINE

## 2019-11-27 VITALS
RESPIRATION RATE: 16 BRPM | BODY MASS INDEX: 48.02 KG/M2 | WEIGHT: 315 LBS | SYSTOLIC BLOOD PRESSURE: 126 MMHG | DIASTOLIC BLOOD PRESSURE: 70 MMHG | TEMPERATURE: 98 F | HEART RATE: 68 BPM

## 2019-11-27 DIAGNOSIS — I10 BENIGN ESSENTIAL HYPERTENSION: ICD-10-CM

## 2019-11-27 DIAGNOSIS — E87.6 HYPOKALEMIA: ICD-10-CM

## 2019-11-27 DIAGNOSIS — L03.116 CELLULITIS OF LEFT LOWER EXTREMITY: ICD-10-CM

## 2019-11-27 DIAGNOSIS — M79.89 SWELLING OF LIMB: Primary | ICD-10-CM

## 2019-11-27 DIAGNOSIS — D51.0 PERNICIOUS ANEMIA: ICD-10-CM

## 2019-11-27 LAB
ANION GAP SERPL CALCULATED.3IONS-SCNC: 6 MMOL/L (ref 3–14)
BASOPHILS # BLD AUTO: 0.1 10E9/L (ref 0–0.2)
BASOPHILS NFR BLD AUTO: 1.4 %
BUN SERPL-MCNC: 26 MG/DL (ref 7–25)
CALCIUM SERPL-MCNC: 8.9 MG/DL (ref 8.6–10.3)
CHLORIDE SERPL-SCNC: 102 MMOL/L (ref 98–107)
CO2 SERPL-SCNC: 31 MMOL/L (ref 21–31)
CREAT SERPL-MCNC: 1.03 MG/DL (ref 0.7–1.3)
DIFFERENTIAL METHOD BLD: ABNORMAL
EOSINOPHIL # BLD AUTO: 0.1 10E9/L (ref 0–0.7)
EOSINOPHIL NFR BLD AUTO: 1.4 %
ERYTHROCYTE [DISTWIDTH] IN BLOOD BY AUTOMATED COUNT: 17.2 % (ref 10–15)
GFR SERPL CREATININE-BSD FRML MDRD: 74 ML/MIN/{1.73_M2}
GLUCOSE SERPL-MCNC: 96 MG/DL (ref 70–105)
HCT VFR BLD AUTO: 36.1 % (ref 40–53)
HGB BLD-MCNC: 10.3 G/DL (ref 13.3–17.7)
IMM GRANULOCYTES # BLD: 0 10E9/L (ref 0–0.4)
IMM GRANULOCYTES NFR BLD: 0.3 %
LYMPHOCYTES # BLD AUTO: 1.2 10E9/L (ref 0.8–5.3)
LYMPHOCYTES NFR BLD AUTO: 20 %
MCH RBC QN AUTO: 23.2 PG (ref 26.5–33)
MCHC RBC AUTO-ENTMCNC: 28.5 G/DL (ref 31.5–36.5)
MCV RBC AUTO: 81 FL (ref 78–100)
MONOCYTES # BLD AUTO: 0.6 10E9/L (ref 0–1.3)
MONOCYTES NFR BLD AUTO: 9.7 %
NEUTROPHILS # BLD AUTO: 3.9 10E9/L (ref 1.6–8.3)
NEUTROPHILS NFR BLD AUTO: 67.2 %
PLATELET # BLD AUTO: 408 10E9/L (ref 150–450)
POTASSIUM SERPL-SCNC: 3.9 MMOL/L (ref 3.5–5.1)
RBC # BLD AUTO: 4.44 10E12/L (ref 4.4–5.9)
SODIUM SERPL-SCNC: 139 MMOL/L (ref 134–144)
WBC # BLD AUTO: 5.9 10E9/L (ref 4–11)

## 2019-11-27 PROCEDURE — 99214 OFFICE O/P EST MOD 30 MIN: CPT | Performed by: FAMILY MEDICINE

## 2019-11-27 PROCEDURE — 80048 BASIC METABOLIC PNL TOTAL CA: CPT | Mod: ZL | Performed by: FAMILY MEDICINE

## 2019-11-27 PROCEDURE — 36415 COLL VENOUS BLD VENIPUNCTURE: CPT | Mod: ZL | Performed by: FAMILY MEDICINE

## 2019-11-27 PROCEDURE — 96372 THER/PROPH/DIAG INJ SC/IM: CPT

## 2019-11-27 PROCEDURE — 85025 COMPLETE CBC W/AUTO DIFF WBC: CPT | Mod: ZL | Performed by: FAMILY MEDICINE

## 2019-11-27 PROCEDURE — 25000128 H RX IP 250 OP 636: Performed by: FAMILY MEDICINE

## 2019-11-27 RX ORDER — POTASSIUM CHLORIDE 750 MG/1
10 TABLET, EXTENDED RELEASE ORAL DAILY
Qty: 90 TABLET | Refills: 0 | Status: SHIPPED | OUTPATIENT
Start: 2019-11-27 | End: 2020-01-08

## 2019-11-27 RX ORDER — CYANOCOBALAMIN 1000 UG/ML
1000 INJECTION, SOLUTION INTRAMUSCULAR; SUBCUTANEOUS ONCE
Status: COMPLETED | OUTPATIENT
Start: 2019-11-27 | End: 2019-11-27

## 2019-11-27 RX ORDER — AMLODIPINE BESYLATE 5 MG/1
5 TABLET ORAL DAILY
Qty: 90 TABLET | Refills: 3 | Status: SHIPPED | OUTPATIENT
Start: 2019-11-27 | End: 2020-09-29

## 2019-11-27 RX ADMIN — CYANOCOBALAMIN 1000 MCG: 1000 INJECTION, SOLUTION INTRAMUSCULAR at 09:42

## 2019-11-27 ASSESSMENT — PAIN SCALES - GENERAL: PAINLEVEL: NO PAIN (0)

## 2019-11-27 NOTE — NURSING NOTE
Babak Moran is a 59 year old male presenting today for: follow up on leg edema and medications  Medication Reconciliation: complete    Jena Serrano LPN 11/27/2019 8:55 AM

## 2019-11-27 NOTE — LETTER
November 27, 2019      Babak Moran     [facility] [id number]        Dear AvrilDean,    We are writing to inform you of your test results. Electrolytes and kidney tests are normal. The hemoglobin is still low, so we will have to monitor and make sure this improves with time.    Resulted Orders   Basic Metabolic Panel   Result Value Ref Range    Sodium 139 134 - 144 mmol/L    Potassium 3.9 3.5 - 5.1 mmol/L    Chloride 102 98 - 107 mmol/L    Carbon Dioxide 31 21 - 31 mmol/L    Anion Gap 6 3 - 14 mmol/L    Glucose 96 70 - 105 mg/dL    Urea Nitrogen 26 (H) 7 - 25 mg/dL    Creatinine 1.03 0.70 - 1.30 mg/dL    GFR Estimate 74 >60 mL/min/[1.73_m2]    GFR Estimate If Black 90 >60 mL/min/[1.73_m2]    Calcium 8.9 8.6 - 10.3 mg/dL   CBC and Differential   Result Value Ref Range    WBC 5.9 4.0 - 11.0 10e9/L    RBC Count 4.44 4.4 - 5.9 10e12/L    Hemoglobin 10.3 (L) 13.3 - 17.7 g/dL    Hematocrit 36.1 (L) 40.0 - 53.0 %    MCV 81 78 - 100 fl    MCH 23.2 (L) 26.5 - 33.0 pg    MCHC 28.5 (L) 31.5 - 36.5 g/dL    RDW 17.2 (H) 10.0 - 15.0 %    Platelet Count 408 150 - 450 10e9/L    Diff Method Automated Method     % Neutrophils 67.2 %    % Lymphocytes 20.0 %    % Monocytes 9.7 %    % Eosinophils 1.4 %    % Basophils 1.4 %    % Immature Granulocytes 0.3 %    Absolute Neutrophil 3.9 1.6 - 8.3 10e9/L    Absolute Lymphocytes 1.2 0.8 - 5.3 10e9/L    Absolute Monocytes 0.6 0.0 - 1.3 10e9/L    Absolute Eosinophils 0.1 0.0 - 0.7 10e9/L    Absolute Basophils 0.1 0.0 - 0.2 10e9/L    Abs Immature Granulocytes 0.0 0 - 0.4 10e9/L       If you have any questions or concerns, please call the clinic at the number listed above.       Sincerely,        Teo Funes MD

## 2019-11-27 NOTE — NURSING NOTE
Clinic Administered Medication Documentation    MEDICATION LIST:   Injectable Medication Documentation    Patient was given Cyanocobalamin (B-12). Prior to medication administration, verified patients identity using patient s name and date of birth. Please see MAR and medication order for additional information. Patient instructed to report any adverse reaction to staff immediately .      Was entire vial of medication used? Yes  Vial/Syringe: Single dose vial  Expiration Date:  02/2021  Was this medication supplied by the patient? No   Brittany Lynn CMA(AAMA)..................11/27/2019   9:45 AM

## 2019-11-27 NOTE — PROGRESS NOTES
Nursing Notes:   Jena Woo LPN  11/27/2019  9:13 AM  Signed  Babak Moran is a 59 year old male presenting today for: follow up on leg edema and medications  Medication Reconciliation: complete    Jena Serrano LPN 11/27/2019 8:55 AM                  SUBJECTIVE:  59 year old male with morbid obesity, hypertension, sleep apnea, A. fib, history of CVA presents to follow up on left leg cellulitis, anemia, leg edema.     Hospitalized in September for cellulitis.  Improved with doxycycline, but cellulitis did not seem to resolve.  A repeat wound culture grew out staph and Enterobacter.  Treated with Bactrim twice daily for a month.  Leg improved, but still remained slightly erythematous so given mupirocin ointment.  He has been applying it generally twice daily, sometimes forgetting, and overall it is stable to improved.    Furosemide was increased during hospitalization.  On lab monitoring, eventually his creatinine increased and potassium dropped.  Furosemide reduced to 40 mg daily.  He used to take 20 mg daily.  Weight has been stable.    Anemia noted, likely due to gastritis with warfarin and previous indomethacin use.  Indomethacin has been stopped.  He finally picked up famotidine after repeated prompting.  This resulted in improved hemoglobin. B12 low during hospitalization at 197. Given B12 shot last appointment.    REVIEW OF SYSTEMS:    Pertinent items are noted in HPI.    Current Outpatient Medications   Medication Sig Dispense Refill     amLODIPine (NORVASC) 10 MG tablet Take 0.5 tablets (5 mg) by mouth daily       clobetasol (TEMOVATE) 0.05 % external ointment        famotidine (PEPCID) 40 MG tablet Take 1 tablet (40 mg) by mouth daily 90 tablet 0     furosemide (LASIX) 40 MG tablet Take 1 tablet (40 mg) by mouth daily 90 tablet 0     HEMP OIL OR EXTRACT OR OTHER CBD CANNABINOID, NOT MEDICAL CANNABIS, Uses topical on knees and oral as needed       hydrocortisone (CORTAID) 1 %  external cream        metoprolol succinate ER (TOPROL-XL) 200 MG 24 hr tablet TAKE 1 TABLET BY MOUTH ONCE DAILY. 90 tablet 2     mupirocin (BACTROBAN) 2 % external ointment Apply topically 3 times daily 60 g 1     pimecrolimus (ELIDEL) 1 % external cream        potassium chloride ER (K-DUR/KLOR-CON M) 20 MEQ CR tablet Take 1 tablet (20 mEq) by mouth daily 30 tablet 1     warfarin ANTICOAGULANT (COUMADIN) 5 MG tablet TAKE 1 TABLETS(5MG) X FIVE DAYS/WEEK AND 1 1/2 TABLETS(7.5MG) X TWO DAYS/WEEK OR AS DIRECTED BY [facility] 144 tablet 1     Allergies   Allergen Reactions     Ioxaglate Unknown     Diatrizoate Rash     Levofloxacin Hives and Rash     Metrizamide Rash     (Diagnostic X-Ray materials)     Probenecid Rash       OBJECTIVE:  /70   Pulse 68   Temp 98  F (36.7  C) (Tympanic)   Resp 16   Wt (!) 165.1 kg (364 lb)   BMI 48.02 kg/m      EXAM:  General Appearance: Alert. No acute distress  Chest/Respiratory Exam: Clear to auscultation bilaterally  Cardiovascular Exam: Regular rate and rhythm. S1, S2, no murmur, gallop, or rubs.  Extremities: Trace pitting right lower extremity edema. Chronic venous stasis changes on both legs.  Skin: Left leg with mild erythema, stable  Psychiatric: Normal affect and mentation    Results for orders placed or performed in visit on 11/27/19   Basic Metabolic Panel     Status: Abnormal   Result Value Ref Range    Sodium 139 134 - 144 mmol/L    Potassium 3.9 3.5 - 5.1 mmol/L    Chloride 102 98 - 107 mmol/L    Carbon Dioxide 31 21 - 31 mmol/L    Anion Gap 6 3 - 14 mmol/L    Glucose 96 70 - 105 mg/dL    Urea Nitrogen 26 (H) 7 - 25 mg/dL    Creatinine 1.03 0.70 - 1.30 mg/dL    GFR Estimate 74 >60 mL/min/[1.73_m2]    GFR Estimate If Black 90 >60 mL/min/[1.73_m2]    Calcium 8.9 8.6 - 10.3 mg/dL   CBC and Differential     Status: Abnormal   Result Value Ref Range    WBC 5.9 4.0 - 11.0 10e9/L    RBC Count 4.44 4.4 - 5.9 10e12/L    Hemoglobin 10.3 (L) 13.3 - 17.7 g/dL     Hematocrit 36.1 (L) 40.0 - 53.0 %    MCV 81 78 - 100 fl    MCH 23.2 (L) 26.5 - 33.0 pg    MCHC 28.5 (L) 31.5 - 36.5 g/dL    RDW 17.2 (H) 10.0 - 15.0 %    Platelet Count 408 150 - 450 10e9/L    Diff Method Automated Method     % Neutrophils 67.2 %    % Lymphocytes 20.0 %    % Monocytes 9.7 %    % Eosinophils 1.4 %    % Basophils 1.4 %    % Immature Granulocytes 0.3 %    Absolute Neutrophil 3.9 1.6 - 8.3 10e9/L    Absolute Lymphocytes 1.2 0.8 - 5.3 10e9/L    Absolute Monocytes 0.6 0.0 - 1.3 10e9/L    Absolute Eosinophils 0.1 0.0 - 0.7 10e9/L    Absolute Basophils 0.1 0.0 - 0.2 10e9/L    Abs Immature Granulocytes 0.0 0 - 0.4 10e9/L       ASSESSMENT/PLAN:    ICD-10-CM    1. Swelling of limb M79.89    2. Cellulitis of left lower extremity L03.116    3. Hypokalemia E87.6 potassium chloride ER (K-DUR/KLOR-CON M) 10 MEQ CR tablet     Basic Metabolic Panel     Basic Metabolic Panel   4. Benign essential hypertension I10 amLODIPine (NORVASC) 5 MG tablet     CBC and Differential     Basic Metabolic Panel     Basic Metabolic Panel     CBC and Differential   5. Pernicious anemia D51.0 cyanocobalamin injection 1,000 mcg     Leg edema and weight are stable.  Continue with same furosemide dosing.  Reduced potassium from 20 down to 10 mEq daily.  BMP shows stable BUN and creatinine.    Cellulitis is stable.  Complete mupirocin and then stop.  If it flares, then he may require a longer dose of oral antibiotics.    Reduced amlodipine dose has resulted in a slight increase in blood pressure, but still at goal.  Refilled at same 5 mg daily.    Given B12 shot.  Continue famotidine.  Unfortunately, his hemoglobin is slightly reduced.  If it declines further, then he may need an EGD.  He lacks insurance however and so may be better treated with iron and sucralfate.  We can discuss further next appointment.    Follow up in 1-1/2 months    Teo Funes MD    This document was prepared using a combination of typing and voice generated  software.  While every attempt was made for accuracy, spelling and grammatical errors may exist.

## 2019-12-02 ENCOUNTER — OFFICE VISIT (OUTPATIENT)
Dept: FAMILY MEDICINE | Facility: OTHER | Age: 59
End: 2019-12-02
Attending: FAMILY MEDICINE

## 2019-12-02 ENCOUNTER — TELEPHONE (OUTPATIENT)
Dept: FAMILY MEDICINE | Facility: OTHER | Age: 59
End: 2019-12-02

## 2019-12-02 VITALS
RESPIRATION RATE: 18 BRPM | OXYGEN SATURATION: 97 % | TEMPERATURE: 98.6 F | HEART RATE: 86 BPM | DIASTOLIC BLOOD PRESSURE: 75 MMHG | BODY MASS INDEX: 48.02 KG/M2 | WEIGHT: 315 LBS | SYSTOLIC BLOOD PRESSURE: 129 MMHG

## 2019-12-02 DIAGNOSIS — L03.116 CELLULITIS OF LEFT LEG: ICD-10-CM

## 2019-12-02 DIAGNOSIS — L03.116 CELLULITIS OF LEFT LEG: Primary | ICD-10-CM

## 2019-12-02 PROCEDURE — 99213 OFFICE O/P EST LOW 20 MIN: CPT | Performed by: FAMILY MEDICINE

## 2019-12-02 PROCEDURE — 87070 CULTURE OTHR SPECIMN AEROBIC: CPT | Mod: ZL | Performed by: FAMILY MEDICINE

## 2019-12-02 RX ORDER — SULFAMETHOXAZOLE/TRIMETHOPRIM 800-160 MG
1 TABLET ORAL 2 TIMES DAILY
Qty: 60 TABLET | Refills: 0 | Status: SHIPPED | OUTPATIENT
Start: 2019-12-02 | End: 2020-01-08

## 2019-12-02 NOTE — TELEPHONE ENCOUNTER
After verifying pts name and date of birth with pt, pt notified of message below. Pt states he does have drainage. Pt asked if he could come into the [facility] clinic today and pt is able to.  Faith Pitt LPN

## 2019-12-02 NOTE — NURSING NOTE
Pt presents to clinic today for left leg swelling, redness and drainage.      Medication Reconciliation: complete  Faith Pitt LPN

## 2019-12-02 NOTE — TELEPHONE ENCOUNTER
After verifying pts name and date of birth with pt, pt was scheduled on 12/4 @1330. Pt states his left leg is red, hard and started swelling.  Faith Pitt LPN

## 2019-12-02 NOTE — TELEPHONE ENCOUNTER
He can start back on antibiotics, but if there is drainage it would be better to collect a swab before treating as this can help pick the antibiotic. If if there is drainage, then should not take antibiotic until appointment with me.    It would also be good if he can take a picture using a cell phone and I can see how it looked at appointment.    Sent in Bactrim to take if there is no drainage and he feels treatment cannot wait until Wednesday.

## 2019-12-02 NOTE — PROGRESS NOTES
Nursing Notes:   Faith Pitt LPN  12/2/2019  3:36 PM  Sign at exiting of workspace  Pt presents to clinic today for left leg swelling, redness and drainage.      Medication Reconciliation: complete  Faith Pitt LPN     SUBJECTIVE:  59 year old male presents for left leg cellulitis. He was hospitalized in September for cellulitis.  Improved with doxycycline, but cellulitis did not seem to resolve.  A repeat wound culture grew out staph and Enterobacter.  Treated with Bactrim twice daily for over a month.  Leg improved, but still remained slightly erythematous so given mupirocin ointment.  He has been applying it generally twice daily, sometimes forgetting, and overall it was stable to improved at appointment last week. He is still applying mupirocin, but the leg is now erythematous in the past day and getting more firm feeling. More painful.      REVIEW OF SYSTEMS:    Pertinent items are noted in HPI.    Current Outpatient Medications   Medication Sig Dispense Refill     amLODIPine (NORVASC) 5 MG tablet Take 1 tablet (5 mg) by mouth daily 90 tablet 3     clobetasol (TEMOVATE) 0.05 % external ointment        famotidine (PEPCID) 40 MG tablet Take 1 tablet (40 mg) by mouth daily 90 tablet 0     furosemide (LASIX) 40 MG tablet Take 1 tablet (40 mg) by mouth daily 90 tablet 0     HEMP OIL OR EXTRACT OR OTHER CBD CANNABINOID, NOT MEDICAL CANNABIS, Uses topical on knees and oral as needed       hydrocortisone (CORTAID) 1 % external cream        metoprolol succinate ER (TOPROL-XL) 200 MG 24 hr tablet TAKE 1 TABLET BY MOUTH ONCE DAILY. 90 tablet 2     mupirocin (BACTROBAN) 2 % external ointment Apply topically 3 times daily 60 g 1     pimecrolimus (ELIDEL) 1 % external cream        potassium chloride ER (K-DUR/KLOR-CON M) 10 MEQ CR tablet Take 1 tablet (10 mEq) by mouth daily 90 tablet 0     warfarin ANTICOAGULANT (COUMADIN) 5 MG tablet TAKE 1 TABLETS(5MG) X FIVE DAYS/WEEK AND 1 1/2 TABLETS(7.5MG) X TWO DAYS/WEEK OR  AS DIRECTED BY [facility] 144 tablet 1     sulfamethoxazole-trimethoprim (BACTRIM DS/SEPTRA DS) 800-160 MG tablet Take 1 tablet by mouth 2 times daily To extend previous prescription for a total of 28 days of antibiotic until Nov 6 (Patient not taking: Reported on 12/2/2019) 60 tablet 0     Allergies   Allergen Reactions     Ioxaglate Unknown     Diatrizoate Rash     Levofloxacin Hives and Rash     Metrizamide Rash     (Diagnostic X-Ray materials)     Probenecid Rash       OBJECTIVE:  /75   Pulse 86   Temp 98.6  F (37  C) (Tympanic)   Resp 18   Wt (!) 165.1 kg (364 lb)   SpO2 97%   BMI 48.02 kg/m      EXAM:  General Appearance: Alert. No acute distress  Psychiatric: Normal affect and mentation  Skin: Mildly indurated, light erythema. Blister on posterior leg. Edema is no worse.              ASSESSMENT/PLAN:    ICD-10-CM    1. Cellulitis of left leg L03.116 Wound Culture Aerobic Bacterial       Recurrent cellulitis, likely due to venous stasis, obesity and potentially PAD. He has no insurance, so getting JANETT unlikely. May require a long treatment course of antibiotics. Obtained wound culture from the blister. Further treatment depends on results.  Started on Bactrim as this was only treatment for staph and Enterobacter on last culture.    Teo Funes MD    This document was prepared using a combination of typing and voice generated software.  While every attempt was made for accuracy, spelling and grammatical errors may exist.

## 2019-12-05 LAB
BACTERIA SPEC CULT: NORMAL
SPECIMEN SOURCE: NORMAL

## 2019-12-06 ENCOUNTER — TELEPHONE (OUTPATIENT)
Dept: FAMILY MEDICINE | Facility: OTHER | Age: 59
End: 2019-12-06

## 2019-12-06 ENCOUNTER — ANTICOAGULATION THERAPY VISIT (OUTPATIENT)
Dept: ANTICOAGULATION | Facility: OTHER | Age: 59
End: 2019-12-06

## 2019-12-06 DIAGNOSIS — Z79.01 ANTICOAGULATION MONITORING, INR RANGE 2-3: ICD-10-CM

## 2019-12-06 LAB — INR POINT OF CARE: 1.9 (ref 0.86–1.14)

## 2019-12-06 PROCEDURE — 85610 PROTHROMBIN TIME: CPT | Mod: QW

## 2019-12-06 PROCEDURE — 36416 COLLJ CAPILLARY BLOOD SPEC: CPT

## 2019-12-06 NOTE — TELEPHONE ENCOUNTER
Please let him know that the wound culture is negative. If he is better on the Bactrim, then finish the course. If it has not helped, then legs may have just had some increase in swelling causing the redness and blister. Treatment would be elevation and not antibiotics. If in doubt, finish antibiotic.

## 2019-12-06 NOTE — TELEPHONE ENCOUNTER
After verifying pts name and date of birth with pt, pt notified of message below and states understanding.  Faith Pitt LPN

## 2019-12-06 NOTE — PROGRESS NOTES
ANTICOAGULATION FOLLOW-UP CLINIC VISIT    Patient Name:  Babak Moran  Date:  2019  Contact Type:  Face to Face    SUBJECTIVE:  Patient Findings     Positives:   Change in medications (taking bactrim x 29 days for cellulitis)             OBJECTIVE    INR Protime   Date Value Ref Range Status   2019 1.9 (A) 0.86 - 1.14 Final       ASSESSMENT / PLAN  INR assessment SUB    Recheck INR In: 1 WEEK    INR Location Clinic      Anticoagulation Summary  As of 2019    INR goal:   2.0-3.0   TTR:   57.1 % (11.8 mo)   INR used for dosin.9! (2019)   Warfarin maintenance plan:   7.5 mg (5 mg x 1.5) every Mon, Wed, Fri; 5 mg (5 mg x 1) all other days   Full warfarin instructions:   7.5 mg every Mon, Wed, Fri; 5 mg all other days   Weekly warfarin total:   42.5 mg   Plan last modified:   Shelley Mercado RN (2019)   Next INR check:   2019   Priority:   Maintenance   Target end date:   Indefinite    Indications    Unspecified atrial fibrillation (Resolved) [I48.91]  Anticoagulation monitoring  INR range 2-3 [Z79.01]             Anticoagulation Episode Summary     INR check location:       Preferred lab:       Send INR reminders to:    INR    Comments:   Babak prefers to be closer to 3.0 d/t previous CVA check Q 4 weeks.Declines to reduce dose when slightly over 3.0  Needs to change PCP      Anticoagulation Care Providers     Provider Role Specialty Phone number    Teo Funes MD [facility] [phone]            See the Encounter Report to view Anticoagulation Flowsheet and Dosing Calendar (Go to Encounters tab in chart review, and find the Anticoagulation Therapy Visit)        Shelley Mercado, RN

## 2019-12-13 ENCOUNTER — ANTICOAGULATION THERAPY VISIT (OUTPATIENT)
Dept: ANTICOAGULATION | Facility: OTHER | Age: 59
End: 2019-12-13

## 2019-12-13 DIAGNOSIS — Z79.01 ANTICOAGULATION MONITORING, INR RANGE 2-3: ICD-10-CM

## 2019-12-13 LAB — INR POINT OF CARE: 2.8 (ref 0.86–1.14)

## 2019-12-13 PROCEDURE — 36416 COLLJ CAPILLARY BLOOD SPEC: CPT

## 2019-12-13 PROCEDURE — 85610 PROTHROMBIN TIME: CPT | Mod: QW

## 2019-12-13 NOTE — PROGRESS NOTES
ANTICOAGULATION FOLLOW-UP CLINIC VISIT    Patient Name:  Babak Moran  Date:  2019  Contact Type:  Face to Face    SUBJECTIVE:  Patient Findings     Positives:   Change in medications (continues Bactrim)        Clinical Outcomes     Negatives:   Major bleeding event, Thromboembolic event, Anticoagulation-related hospital admission, Anticoagulation-related ED visit, Anticoagulation-related fatality           OBJECTIVE    INR Protime   Date Value Ref Range Status   2019 2.8 (A) 0.86 - 1.14 Final       ASSESSMENT / PLAN  INR assessment THER    Recheck INR In: 1 WEEK    INR Location Clinic      Anticoagulation Summary  As of 2019    INR goal:   2.0-3.0   TTR:   58.1 % (11.8 mo)   INR used for dosin.8 (2019)   Warfarin maintenance plan:   7.5 mg (5 mg x 1.5) every Mon, Wed, Fri; 5 mg (5 mg x 1) all other days   Full warfarin instructions:   7.5 mg every Mon, Wed, Fri; 5 mg all other days   Weekly warfarin total:   42.5 mg   No change documented:   Xochitl Pavon RN   Plan last modified:   Shelley Mercado RN (2019)   Next INR check:   2019   Priority:   Maintenance   Target end date:   Indefinite    Indications    Unspecified atrial fibrillation (Resolved) [I48.91]  Anticoagulation monitoring  INR range 2-3 [Z79.01]             Anticoagulation Episode Summary     INR check location:       Preferred lab:       Send INR reminders to:    INR    Comments:   Babak prefers to be closer to 3.0 d/t previous CVA check Q 4 weeks.Declines to reduce dose when slightly over 3.0  Needs to change PCP      Anticoagulation Care Providers     Provider Role Specialty Phone number    Teo Funes MD [facility] [phone]            See the Encounter Report to view Anticoagulation Flowsheet and Dosing Calendar (Go to Encounters tab in chart review, and find the Anticoagulation Therapy Visit)        Xochitl Pavon RN

## 2019-12-19 NOTE — TELEPHONE ENCOUNTER
"Attempted to close encounter and message comes up that states \"A return date for the next anticoagulation check is required.\" Please sign and close.  Delano Prince LPN,..............12/19/2019 3:46 PM            "

## 2019-12-20 ENCOUNTER — ANTICOAGULATION THERAPY VISIT (OUTPATIENT)
Dept: ANTICOAGULATION | Facility: OTHER | Age: 59
End: 2019-12-20

## 2019-12-20 DIAGNOSIS — Z79.01 ANTICOAGULATION MONITORING, INR RANGE 2-3: ICD-10-CM

## 2019-12-20 LAB — INR POINT OF CARE: 2.2 (ref 0.86–1.14)

## 2019-12-20 PROCEDURE — 85610 PROTHROMBIN TIME: CPT | Mod: QW

## 2019-12-20 PROCEDURE — 36416 COLLJ CAPILLARY BLOOD SPEC: CPT

## 2019-12-20 NOTE — PROGRESS NOTES
ANTICOAGULATION FOLLOW-UP CLINIC VISIT    Patient Name:  Babak Moran  Date:  2019  Contact Type:  Face to Face    SUBJECTIVE:  Patient Findings     Positives:   Change in medications (continues with bactrim till 20)        Clinical Outcomes     Negatives:   Major bleeding event, Thromboembolic event, Anticoagulation-related hospital admission, Anticoagulation-related ED visit, Anticoagulation-related fatality           OBJECTIVE    INR Protime   Date Value Ref Range Status   2019 2.2 (A) 0.86 - 1.14 Final       ASSESSMENT / PLAN  INR assessment THER    Recheck INR In: 2 WEEKS    INR Location Clinic      Anticoagulation Summary  As of 2019    INR goal:   2.0-3.0   TTR:   58.1 % (11.8 mo)   INR used for dosin.2 (2019)   Warfarin maintenance plan:   5 mg (5 mg x 1) every Sun, Tue, Thu; 7.5 mg (5 mg x 1.5) all other days   Full warfarin instructions:   5 mg every Sun, Tue, Thu; 7.5 mg all other days   Weekly warfarin total:   45 mg   Plan last modified:   Shelley Meracdo RN (2019)   Next INR check:   1/3/2020   Priority:   Maintenance   Target end date:   Indefinite    Indications    Unspecified atrial fibrillation (Resolved) [I48.91]  Anticoagulation monitoring  INR range 2-3 [Z79.01]             Anticoagulation Episode Summary     INR check location:       Preferred lab:       Send INR reminders to:    INR    Comments:   Babak prefers to be closer to 3.0 d/t previous CVA check Q 4 weeks.Declines to reduce dose when slightly over 3.0  Needs to change PCP      Anticoagulation Care Providers     Provider Role Specialty Phone number    Teo Funes MD [facility] [phone]            See the Encounter Report to view Anticoagulation Flowsheet and Dosing Calendar (Go to Encounters tab in chart review, and find the Anticoagulation Therapy Visit)        Shelley Mercado, RN

## 2020-01-03 ENCOUNTER — ANTICOAGULATION THERAPY VISIT (OUTPATIENT)
Dept: ANTICOAGULATION | Facility: OTHER | Age: 60
End: 2020-01-03

## 2020-01-03 DIAGNOSIS — M79.89 SWELLING OF LIMB: ICD-10-CM

## 2020-01-03 DIAGNOSIS — Z79.01 ANTICOAGULATION MONITORING, INR RANGE 2-3: ICD-10-CM

## 2020-01-03 LAB — INR POINT OF CARE: 2.2 (ref 0.86–1.14)

## 2020-01-03 PROCEDURE — 36416 COLLJ CAPILLARY BLOOD SPEC: CPT

## 2020-01-03 PROCEDURE — 85610 PROTHROMBIN TIME: CPT | Mod: QW

## 2020-01-03 RX ORDER — FUROSEMIDE 40 MG
TABLET ORAL
Qty: 90 TABLET | Refills: 1 | Status: SHIPPED | OUTPATIENT
Start: 2020-01-03 | End: 2020-01-08

## 2020-01-03 NOTE — PROGRESS NOTES
ANTICOAGULATION FOLLOW-UP CLINIC VISIT    Patient Name:  Babak Moran  Date:  1/3/2020  Contact Type:  Face to Face    SUBJECTIVE:  Patient Findings     Positives:   Change in medications (Completed Bactrim 2020)        Clinical Outcomes     Negatives:   Major bleeding event, Thromboembolic event, Anticoagulation-related hospital admission, Anticoagulation-related ED visit, Anticoagulation-related fatality           OBJECTIVE    INR Protime   Date Value Ref Range Status   2020 2.2 (A) 0.86 - 1.14 Final       ASSESSMENT / PLAN  No question data found.  Anticoagulation Summary  As of 1/3/2020    INR goal:   2.0-3.0   TTR:   58.1 % (11.8 mo)   INR used for dosin.2 (1/3/2020)   Warfarin maintenance plan:   7.5 mg (5 mg x 1.5) every Mon, Wed, Fri; 5 mg (5 mg x 1) all other days   Full warfarin instructions:   7.5 mg every Mon, Wed, Fri; 5 mg all other days   Weekly warfarin total:   42.5 mg   Plan last modified:   Jose Roberto Stubbs RN (1/3/2020)   Next INR check:   2020   Priority:   Maintenance   Target end date:   Indefinite    Indications    Unspecified atrial fibrillation (Resolved) [I48.91]  Anticoagulation monitoring  INR range 2-3 [Z79.01]             Anticoagulation Episode Summary     INR check location:       Preferred lab:       Send INR reminders to:    INR    Comments:   Babak prefers to be closer to 3.0 d/t previous CVA check Q 4 weeks.Declines to reduce dose when slightly over 3.0  Needs to change PCP      Anticoagulation Care Providers     Provider Role Specialty Phone number    Teo Funes MD [facility] [phone]            See the Encounter Report to view Anticoagulation Flowsheet and Dosing Calendar (Go to Encounters tab in chart review, and find the Anticoagulation Therapy Visit)      Jose Roberto Stubbs RN

## 2020-01-03 NOTE — TELEPHONE ENCOUNTER
"Requested Prescriptions   Pending Prescriptions Disp Refills     furosemide (LASIX) 40 MG tablet [Pharmacy Med Name: FUROSEMIDE 40MG TABLETS] 90 tablet 0     Sig: TAKE 1 TABLET BY MOUTH EVERY DAY       Diuretics (Including Combos) Protocol Passed - 1/3/2020  9:08 AM        Passed - Blood pressure under 140/90 in past 12 months     BP Readings from Last 3 Encounters:   12/02/19 129/75   11/27/19 126/70   11/06/19 112/70                 Passed - Recent (12 mo) or future (30 days) visit within the authorizing provider's specialty     Patient has had an office visit with the authorizing provider or a provider within the authorizing providers department within the previous 12 mos or has a future within next 30 days. See \"Patient Info\" tab in inbasket, or \"Choose Columns\" in Meds & Orders section of the refill encounter.              Passed - Medication is active on med list        Passed - Patient is age 18 or older        Passed - Normal serum creatinine on file in past 12 months     Recent Labs   Lab Test 11/27/19  0932   CR 1.03              Passed - Normal serum potassium on file in past 12 months     Recent Labs   Lab Test 11/27/19  0932   POTASSIUM 3.9                    Passed - Normal serum sodium on file in past 12 months     Recent Labs   Lab Test 11/27/19  0932                 Lov 12/02/19  Routing refill request to provider for review/approval because:  Patient has appointment with primary MD on the 8th of January and will allow for MD to evaluate dose of Lasix at this time and if patient is to continue this medication.         "

## 2020-01-08 ENCOUNTER — OFFICE VISIT (OUTPATIENT)
Dept: FAMILY MEDICINE | Facility: OTHER | Age: 60
End: 2020-01-08
Attending: FAMILY MEDICINE

## 2020-01-08 VITALS
SYSTOLIC BLOOD PRESSURE: 127 MMHG | OXYGEN SATURATION: 96 % | BODY MASS INDEX: 40.43 KG/M2 | WEIGHT: 315 LBS | DIASTOLIC BLOOD PRESSURE: 75 MMHG | RESPIRATION RATE: 16 BRPM | TEMPERATURE: 96.4 F | HEART RATE: 66 BPM | HEIGHT: 74 IN

## 2020-01-08 DIAGNOSIS — I10 BENIGN ESSENTIAL HYPERTENSION: ICD-10-CM

## 2020-01-08 DIAGNOSIS — M25.561 CHRONIC PAIN OF BOTH KNEES: ICD-10-CM

## 2020-01-08 DIAGNOSIS — E87.6 HYPOKALEMIA: ICD-10-CM

## 2020-01-08 DIAGNOSIS — M79.89 SWELLING OF LIMB: ICD-10-CM

## 2020-01-08 DIAGNOSIS — G89.29 CHRONIC PAIN OF BOTH KNEES: ICD-10-CM

## 2020-01-08 DIAGNOSIS — D51.0 PERNICIOUS ANEMIA: Primary | ICD-10-CM

## 2020-01-08 DIAGNOSIS — M25.562 CHRONIC PAIN OF BOTH KNEES: ICD-10-CM

## 2020-01-08 LAB
ANION GAP SERPL CALCULATED.3IONS-SCNC: 6 MMOL/L (ref 3–14)
BASOPHILS # BLD AUTO: 0.1 10E9/L (ref 0–0.2)
BASOPHILS NFR BLD AUTO: 0.8 %
BUN SERPL-MCNC: 23 MG/DL (ref 7–25)
CALCIUM SERPL-MCNC: 9.3 MG/DL (ref 8.6–10.3)
CHLORIDE SERPL-SCNC: 103 MMOL/L (ref 98–107)
CO2 SERPL-SCNC: 31 MMOL/L (ref 21–31)
CREAT SERPL-MCNC: 0.98 MG/DL (ref 0.7–1.3)
DIFFERENTIAL METHOD BLD: ABNORMAL
EOSINOPHIL # BLD AUTO: 0.1 10E9/L (ref 0–0.7)
EOSINOPHIL NFR BLD AUTO: 1.4 %
ERYTHROCYTE [DISTWIDTH] IN BLOOD BY AUTOMATED COUNT: 19.7 % (ref 10–15)
GFR SERPL CREATININE-BSD FRML MDRD: 79 ML/MIN/{1.73_M2}
GLUCOSE SERPL-MCNC: 100 MG/DL (ref 70–105)
HCT VFR BLD AUTO: 40.9 % (ref 40–53)
HGB BLD-MCNC: 11.7 G/DL (ref 13.3–17.7)
IMM GRANULOCYTES # BLD: 0 10E9/L (ref 0–0.4)
IMM GRANULOCYTES NFR BLD: 0.3 %
LYMPHOCYTES # BLD AUTO: 1.5 10E9/L (ref 0.8–5.3)
LYMPHOCYTES NFR BLD AUTO: 20.6 %
MCH RBC QN AUTO: 22.5 PG (ref 26.5–33)
MCHC RBC AUTO-ENTMCNC: 28.6 G/DL (ref 31.5–36.5)
MCV RBC AUTO: 79 FL (ref 78–100)
MONOCYTES # BLD AUTO: 0.9 10E9/L (ref 0–1.3)
MONOCYTES NFR BLD AUTO: 12.4 %
NEUTROPHILS # BLD AUTO: 4.7 10E9/L (ref 1.6–8.3)
NEUTROPHILS NFR BLD AUTO: 64.5 %
PLATELET # BLD AUTO: 328 10E9/L (ref 150–450)
POTASSIUM SERPL-SCNC: 3.9 MMOL/L (ref 3.5–5.1)
RBC # BLD AUTO: 5.21 10E12/L (ref 4.4–5.9)
SODIUM SERPL-SCNC: 140 MMOL/L (ref 134–144)
VIT B12 SERPL-MCNC: 248 PG/ML (ref 180–914)
WBC # BLD AUTO: 7.2 10E9/L (ref 4–11)

## 2020-01-08 PROCEDURE — 85025 COMPLETE CBC W/AUTO DIFF WBC: CPT | Mod: ZL | Performed by: FAMILY MEDICINE

## 2020-01-08 PROCEDURE — 80048 BASIC METABOLIC PNL TOTAL CA: CPT | Mod: ZL | Performed by: FAMILY MEDICINE

## 2020-01-08 PROCEDURE — 96372 THER/PROPH/DIAG INJ SC/IM: CPT

## 2020-01-08 PROCEDURE — 82607 VITAMIN B-12: CPT | Mod: ZL | Performed by: FAMILY MEDICINE

## 2020-01-08 PROCEDURE — 36415 COLL VENOUS BLD VENIPUNCTURE: CPT | Mod: ZL | Performed by: FAMILY MEDICINE

## 2020-01-08 PROCEDURE — 25000128 H RX IP 250 OP 636: Performed by: FAMILY MEDICINE

## 2020-01-08 PROCEDURE — 99214 OFFICE O/P EST MOD 30 MIN: CPT | Performed by: FAMILY MEDICINE

## 2020-01-08 RX ORDER — GABAPENTIN 300 MG/1
300 CAPSULE ORAL AT BEDTIME
COMMUNITY
Start: 2020-01-08 | End: 2020-01-24 | Stop reason: SINTOL

## 2020-01-08 RX ORDER — FUROSEMIDE 40 MG
40 TABLET ORAL DAILY
Qty: 90 TABLET | Refills: 1 | Status: SHIPPED | OUTPATIENT
Start: 2020-01-08 | End: 2020-09-29

## 2020-01-08 RX ORDER — POTASSIUM CHLORIDE 750 MG/1
10 TABLET, EXTENDED RELEASE ORAL DAILY
Qty: 90 TABLET | Refills: 0 | Status: SHIPPED | OUTPATIENT
Start: 2020-01-08 | End: 2020-02-27

## 2020-01-08 RX ORDER — CYANOCOBALAMIN 1000 UG/ML
1000 INJECTION, SOLUTION INTRAMUSCULAR; SUBCUTANEOUS ONCE
Status: COMPLETED | OUTPATIENT
Start: 2020-01-08 | End: 2020-01-08

## 2020-01-08 RX ADMIN — CYANOCOBALAMIN 1000 MCG: 1000 INJECTION, SOLUTION INTRAMUSCULAR at 08:42

## 2020-01-08 ASSESSMENT — MIFFLIN-ST. JEOR: SCORE: 2479.61

## 2020-01-08 NOTE — PROGRESS NOTES
"Nursing Notes:   Faith Pitt, LPN  1/8/2020  8:04 AM  Sign at exiting of workspace  Pt presents to clinic today for follow up left leg cellulitis.      Medication Reconciliation: complete  Faith Pitt LPN      SUBJECTIVE:  59 year old male with morbid obesity, hypertension, sleep apnea, A. fib, history of CVA presents to follow up on left leg cellulitis, anemia, leg edema.     He had ongoing problems with erythema in the left leg ever since being hospitalized in September 2019 for cellulitis.  Was initially treated with doxycycline, but cellulitis did not seem to resolve.  Wound culture grew out staph and Enterobacter.  Improved with Bactrim twice daily, but not resolved, so treated with mupirocin ointment.  Shortly after his last scheduled follow-up over a month ago, the leg appeared to develop erythema. Treated again with Bactrim course of 2 weeks a month ago. Leg good since.    Weight down 14 lbs since last appointment. Eating less.    Had a lot more leg edema after hospitalization.  Diuresed with increased furosemide dosing, but eventually creatinine increased and so his dosing was reduced.  Stable on furosemide 40 mg daily.    He is having a lot of bilateral knee pain.  Previously took indomethacin to help with this pain.  I recommended he avoid indomethacin since he is on warfarin and was anemic, likely from gastritis.  Indomethacin is no longer prescribed.  Due to pain he has been taking ibuprofen on his own.  We discussed how this is harmful.  He said if I do not give him something for his knees, he will find a different doctor.  He said \"you are known for taking pills away from people.\"  Informed him that I would not give him medication if it was going to harm him in some other way.  If he takes ibuprofen and harms himself, is his own fault, but not mine as I recommended against NSAIDs.    Previously we tried gabapentin.  He did not fall my directions to start 300 mg every evening.  Started taking " 300 mg twice daily and couple days later developed unsteadiness and fell.  He has stopped the gabapentin, but still has it.    Has been anemic, likely due to gastritis because of warfarin and previous indomethacin use.  As noted above, indomethacin has been stopped, but he has been taking ibuprofen.  Continues on warfarin.  After repeated prompting, he finally started famotidine.  Hemoglobin has been improving since.  B12 was down to 197 during hospitalization.  He received a B12 shot the last 2 appointments.  Cannot tell if he feels any different because of the shots.    REVIEW OF SYSTEMS:    Pertinent items are noted in HPI.    Current Outpatient Medications   Medication Sig Dispense Refill     amLODIPine (NORVASC) 5 MG tablet Take 1 tablet (5 mg) by mouth daily 90 tablet 3     clobetasol (TEMOVATE) 0.05 % external ointment        famotidine (PEPCID) 40 MG tablet Take 1 tablet (40 mg) by mouth daily 90 tablet 0     furosemide (LASIX) 40 MG tablet TAKE 1 TABLET BY MOUTH EVERY DAY 90 tablet 1     HEMP OIL OR EXTRACT OR OTHER CBD CANNABINOID, NOT MEDICAL CANNABIS, Uses topical on knees and oral as needed       hydrocortisone (CORTAID) 1 % external cream        metoprolol succinate ER (TOPROL-XL) 200 MG 24 hr tablet TAKE 1 TABLET BY MOUTH ONCE DAILY. 90 tablet 2     mupirocin (BACTROBAN) 2 % external ointment Apply topically 3 times daily 60 g 1     pimecrolimus (ELIDEL) 1 % external cream        potassium chloride ER (K-DUR/KLOR-CON M) 10 MEQ CR tablet Take 1 tablet (10 mEq) by mouth daily 90 tablet 0     warfarin ANTICOAGULANT (COUMADIN) 5 MG tablet TAKE 1 TABLETS(5MG) X 4 DAYS/WEEK AND 1 1/2 TABLETS(7.5MG) X 3 DAYS/WEEK OR AS DIRECTED BY [facility] 144 tablet 1     Allergies   Allergen Reactions     Ioxaglate Unknown     Diatrizoate Rash     Levofloxacin Hives and Rash     Metrizamide Rash     (Diagnostic X-Ray materials)     Probenecid Rash       OBJECTIVE:  /75   Pulse 66   Temp 96.4  F (35.8  C)  "(Tympanic)   Resp 16   Ht 1.88 m (6' 2\")   Wt (!) 159 kg (350 lb 8 oz)   SpO2 96%   BMI 45.00 kg/m      EXAM:  General Appearance: Alert. No acute distress  Chest/Respiratory Exam: Clear to auscultation bilaterally  Cardiovascular Exam: Regular rate and rhythm. S1, S2, no murmur, gallop, or rubs.  Extremities: Trace pitting right lower extremity edema. Chronic venous stasis changes on both legs.   Musculoskeletal: Crepitus both knees. Tender along joint line bilateral knees.  Skin: Left leg with only dusky erythema, better than last appointment  Psychiatric: Normal affect and mentation    Results for orders placed or performed in visit on 01/08/20   Vitamin B12     Status: None   Result Value Ref Range    Vitamin B12 248 180 - 914 pg/mL   Basic Metabolic Panel     Status: None   Result Value Ref Range    Sodium 140 134 - 144 mmol/L    Potassium 3.9 3.5 - 5.1 mmol/L    Chloride 103 98 - 107 mmol/L    Carbon Dioxide 31 21 - 31 mmol/L    Anion Gap 6 3 - 14 mmol/L    Glucose 100 70 - 105 mg/dL    Urea Nitrogen 23 7 - 25 mg/dL    Creatinine 0.98 0.70 - 1.30 mg/dL    GFR Estimate 79 >60 mL/min/[1.73_m2]    GFR Estimate If Black >90 >60 mL/min/[1.73_m2]    Calcium 9.3 8.6 - 10.3 mg/dL   CBC and Differential     Status: Abnormal   Result Value Ref Range    WBC 7.2 4.0 - 11.0 10e9/L    RBC Count 5.21 4.4 - 5.9 10e12/L    Hemoglobin 11.7 (L) 13.3 - 17.7 g/dL    Hematocrit 40.9 40.0 - 53.0 %    MCV 79 78 - 100 fl    MCH 22.5 (L) 26.5 - 33.0 pg    MCHC 28.6 (L) 31.5 - 36.5 g/dL    RDW 19.7 (H) 10.0 - 15.0 %    Platelet Count 328 150 - 450 10e9/L    Diff Method Automated Method     % Neutrophils 64.5 %    % Lymphocytes 20.6 %    % Monocytes 12.4 %    % Eosinophils 1.4 %    % Basophils 0.8 %    % Immature Granulocytes 0.3 %    Absolute Neutrophil 4.7 1.6 - 8.3 10e9/L    Absolute Lymphocytes 1.5 0.8 - 5.3 10e9/L    Absolute Monocytes 0.9 0.0 - 1.3 10e9/L    Absolute Eosinophils 0.1 0.0 - 0.7 10e9/L    Absolute Basophils 0.1 " 0.0 - 0.2 10e9/L    Abs Immature Granulocytes 0.0 0 - 0.4 10e9/L       ASSESSMENT/PLAN:    ICD-10-CM    1. Pernicious anemia D51.0 CBC and Differential     Vitamin B12     cyanocobalamin injection 1,000 mcg     Vitamin B12     CBC and Differential   2. Hypokalemia E87.6 potassium chloride ER (K-DUR/KLOR-CON M) 10 MEQ CR tablet     Basic Metabolic Panel     Basic Metabolic Panel   3. Swelling of limb M79.89 furosemide (LASIX) 40 MG tablet   4. Benign essential hypertension I10 furosemide (LASIX) 40 MG tablet   5. Chronic pain of both knees M25.561 gabapentin (NEURONTIN) 300 MG capsule    M25.562     G89.29      His leg looks better.  Edema is well controlled.  BMP checked and normal.  Refilled for same furosemide and potassium doses.    Recheck on CBC shows improved hemoglobin.  Continue famotidine.  Should be avoiding NSAIDs due to suspected gastritis.  B12 remains low despite receiving injections past 2 appointments.  Injection was repeated today as well.  He should be taking vitamin B12 1000 mcg daily to help raise the level.    His greatest concern is pain from knee arthritis.   We reviewed that acetaminophen is safe.  NSAIDs should be avoided.  Physical therapy, braces, steroid injections, hyaluronic acid injections, or knee replacement are the main treatments.  There is benefit in harm with each treatment. Lacks insurance, so more costly options for treatment are difficult.  He still has gabapentin at home.  Pain is worst at night.  Will try gabapentin 300 mg every evening.  Do not increase and take it in the day as he had side effects with this previously.  May use Tylenol in the day.  If this is ineffective, he may not be able to afford pregabalin or duloxetine of those options were discussed as well.  Certainly steroid injections could be provided.  He can call in 1 week if not seeing benefit on the gabapentin.    Plan follow-up in 2 months with lab at that time.    Teo Funes MD    This document was  prepared using a combination of typing and voice generated software.  While every attempt was made for accuracy, spelling and grammatical errors may exist.

## 2020-01-08 NOTE — NURSING NOTE
Pt presents to clinic today for follow up left leg cellulitis.      Medication Reconciliation: complete  Faith Pitt LPN

## 2020-01-08 NOTE — PATIENT INSTRUCTIONS
Take gabapentin 300 mg every evening  If no benefit in a week, then we can consider injections. Call the nurse to schedule  If helping, then continue gabapentin until follow up appointment   We will call with lab results

## 2020-01-17 ENCOUNTER — ANTICOAGULATION THERAPY VISIT (OUTPATIENT)
Dept: ANTICOAGULATION | Facility: OTHER | Age: 60
End: 2020-01-17

## 2020-01-17 DIAGNOSIS — Z79.01 ANTICOAGULATION MONITORING, INR RANGE 2-3: ICD-10-CM

## 2020-01-17 LAB — INR POINT OF CARE: 1.9 (ref 0.86–1.14)

## 2020-01-17 PROCEDURE — 36416 COLLJ CAPILLARY BLOOD SPEC: CPT

## 2020-01-17 PROCEDURE — 85610 PROTHROMBIN TIME: CPT | Mod: QW

## 2020-01-17 NOTE — PROGRESS NOTES
ANTICOAGULATION FOLLOW-UP CLINIC VISIT    Patient Name:  Babak Moran  Date:  2020  Contact Type:  Face to Face    SUBJECTIVE:  Patient Findings     Positives:   Change in medications (started gabapentin)             OBJECTIVE    INR Protime   Date Value Ref Range Status   2020 1.9 (A) 0.86 - 1.14 Final       ASSESSMENT / PLAN  INR assessment SUB    Recheck INR In: 2 WEEKS    INR Location Clinic      Anticoagulation Summary  As of 2020    INR goal:   2.0-3.0   TTR:   56.8 % (11.8 mo)   INR used for dosin.9! (2020)   Warfarin maintenance plan:   5 mg (5 mg x 1) every Tue, Thu; 7.5 mg (5 mg x 1.5) all other days   Full warfarin instructions:   5 mg every Tue, Thu; 7.5 mg all other days   Weekly warfarin total:   47.5 mg   Plan last modified:   Shelley Mercado, RN (2020)   Next INR check:   2020   Priority:   Maintenance   Target end date:   Indefinite    Indications    Unspecified atrial fibrillation (Resolved) [I48.91]  Anticoagulation monitoring  INR range 2-3 [Z79.01]             Anticoagulation Episode Summary     INR check location:       Preferred lab:       Send INR reminders to:    INR    Comments:   Babak prefers to be closer to 3.0 d/t previous CVA check Q 4 weeks.Declines to reduce dose when slightly over 3.0  Needs to change PCP      Anticoagulation Care Providers     Provider Role Specialty Phone number    Teo Funes MD [facility] [phone]            See the Encounter Report to view Anticoagulation Flowsheet and Dosing Calendar (Go to Encounters tab in chart review, and find the Anticoagulation Therapy Visit)        Shelley Mercado, RN

## 2020-01-24 ENCOUNTER — TELEPHONE (OUTPATIENT)
Dept: FAMILY MEDICINE | Facility: OTHER | Age: 60
End: 2020-01-24

## 2020-01-24 DIAGNOSIS — M17.0 PRIMARY OSTEOARTHRITIS OF BOTH KNEES: Primary | ICD-10-CM

## 2020-01-24 RX ORDER — DULOXETIN HYDROCHLORIDE 30 MG/1
30 CAPSULE, DELAYED RELEASE ORAL DAILY
Qty: 30 CAPSULE | Refills: 1 | Status: SHIPPED | OUTPATIENT
Start: 2020-01-24 | End: 2020-03-18

## 2020-01-24 NOTE — TELEPHONE ENCOUNTER
After verifying pts name and date of birth with pt, pt states he started the gabapentin on 1/8/20 and states it does not seem to be helping and thinks its making his leg swelling worse.  Faith Pitt LPN

## 2020-01-24 NOTE — TELEPHONE ENCOUNTER
OK, stop. I sent in duloxetine 30 mg daily to help with knee arthritis. It is an anti-depressant derivative that has been shown to reduce pain with knee arthritis.    Other option is to get steroid shots in his knees. I can do the shots next week if he holds warfarin for 2 days prior.

## 2020-01-24 NOTE — TELEPHONE ENCOUNTER
After verifying pts name and date of birth with pt, pt notified of message below. Pt states he will try the Duloxetine for a few week and will call back if not helping for an appointment for the knee injection.  Faith Pitt LPN

## 2020-01-31 ENCOUNTER — ANTICOAGULATION THERAPY VISIT (OUTPATIENT)
Dept: ANTICOAGULATION | Facility: OTHER | Age: 60
End: 2020-01-31

## 2020-01-31 DIAGNOSIS — Z79.01 ANTICOAGULATION MONITORING, INR RANGE 2-3: ICD-10-CM

## 2020-01-31 LAB — INR POINT OF CARE: 2 (ref 0.86–1.14)

## 2020-01-31 PROCEDURE — 85610 PROTHROMBIN TIME: CPT | Mod: QW

## 2020-01-31 PROCEDURE — 36416 COLLJ CAPILLARY BLOOD SPEC: CPT

## 2020-01-31 NOTE — PROGRESS NOTES
ANTICOAGULATION FOLLOW-UP CLINIC VISIT    Patient Name:  Babak oMran  Date:  2020  Contact Type:  Face to Face    SUBJECTIVE:  Patient Findings     Positives:   Change in medications (stopped gabapentin and started on cymbalta), Other complaints (redness and swelling coming back in right lower leg)        Clinical Outcomes     Negatives:   Major bleeding event, Thromboembolic event, Anticoagulation-related hospital admission, Anticoagulation-related ED visit, Anticoagulation-related fatality           OBJECTIVE    INR Protime   Date Value Ref Range Status   2020 2.0 (A) 0.86 - 1.14 Final       ASSESSMENT / PLAN  INR assessment THER    Recheck INR In: 2 WEEKS    INR Location Clinic      Anticoagulation Summary  As of 2020    INR goal:   2.0-3.0   TTR:   52.8 % (11.8 mo)   INR used for dosin.0 (2020)   Warfarin maintenance plan:   7.5 mg (5 mg x 1.5) every day   Full warfarin instructions:   7.5 mg every day   Weekly warfarin total:   52.5 mg   Plan last modified:   Shelley Mercado RN (2020)   Next INR check:   2020   Priority:   Maintenance   Target end date:   Indefinite    Indications    Unspecified atrial fibrillation (Resolved) [I48.91]  Anticoagulation monitoring  INR range 2-3 [Z79.01]             Anticoagulation Episode Summary     INR check location:       Preferred lab:       Send INR reminders to:    INR    Comments:   Babak prefers to be closer to 3.0 d/t previous CVA check Q 4 weeks.Declines to reduce dose when slightly over 3.0  Needs to change PCP      Anticoagulation Care Providers     Provider Role Specialty Phone number    Teo Funes MD [facility] [phone]            See the Encounter Report to view Anticoagulation Flowsheet and Dosing Calendar (Go to Encounters tab in chart review, and find the Anticoagulation Therapy Visit)        Shelley Mercado, RN

## 2020-02-12 ENCOUNTER — ANTICOAGULATION THERAPY VISIT (OUTPATIENT)
Dept: ANTICOAGULATION | Facility: OTHER | Age: 60
End: 2020-02-12

## 2020-02-12 DIAGNOSIS — Z79.01 LONG-TERM (CURRENT) USE OF ANTICOAGULANTS, INR GOAL 2.0-3.0: ICD-10-CM

## 2020-02-12 DIAGNOSIS — I48.91 ATRIAL FIBRILLATION (H): ICD-10-CM

## 2020-02-12 DIAGNOSIS — Z79.01 ANTICOAGULATION MONITORING, INR RANGE 2-3: ICD-10-CM

## 2020-02-12 LAB — INR POINT OF CARE: 3.1 (ref 0.86–1.14)

## 2020-02-12 PROCEDURE — 85610 PROTHROMBIN TIME: CPT | Mod: QW

## 2020-02-12 PROCEDURE — 36416 COLLJ CAPILLARY BLOOD SPEC: CPT

## 2020-02-12 RX ORDER — WARFARIN SODIUM 5 MG/1
TABLET ORAL
Qty: 135 TABLET | Refills: 1 | Status: SHIPPED | OUTPATIENT
Start: 2020-02-12 | End: 2020-03-18

## 2020-02-12 NOTE — PROGRESS NOTES
ANTICOAGULATION FOLLOW-UP CLINIC VISIT    Patient Name:  Babak Moran  Date:  2/12/2020  Contact Type:  Face to Face    SUBJECTIVE:  Patient Findings     Positives:   Change in medications        Clinical Outcomes     Negatives:   Major bleeding event, Thromboembolic event, Anticoagulation-related hospital admission, Anticoagulation-related ED visit, Anticoagulation-related fatality           OBJECTIVE    INR Protime   Date Value Ref Range Status   02/12/2020 3.1 (A) 0.86 - 1.14 Final       ASSESSMENT / PLAN  INR assessment THER    Recheck INR In: 2 WEEKS    INR Location Clinic      Anticoagulation Summary  As of 2/12/2020    INR goal:   2.0-3.0   TTR:   52.5 % (11.8 mo)   INR used for dosing:   3.1! (2/12/2020)   Warfarin maintenance plan:   7.5 mg (5 mg x 1.5) every day   Full warfarin instructions:   7.5 mg every day   Weekly warfarin total:   52.5 mg   No change documented:   Xochitl Pavon RN   Plan last modified:   Shelley Mercado RN (1/31/2020)   Next INR check:   2/26/2020   Priority:   Maintenance   Target end date:   Indefinite    Indications    Unspecified atrial fibrillation (Resolved) [I48.91]  Anticoagulation monitoring  INR range 2-3 [Z79.01]             Anticoagulation Episode Summary     INR check location:       Preferred lab:       Send INR reminders to:    INR    Comments:   Babak prefers to be closer to 3.0 d/t previous CVA check Q 4 weeks.Declines to reduce dose when slightly over 3.0  Needs to change PCP      Anticoagulation Care Providers     Provider Role Specialty Phone number    Teo Funes MD [facility] [phone]            See the Encounter Report to view Anticoagulation Flowsheet and Dosing Calendar (Go to Encounters tab in chart review, and find the Anticoagulation Therapy Visit)        Xochitl Pavon RN

## 2020-02-27 DIAGNOSIS — E87.6 HYPOKALEMIA: ICD-10-CM

## 2020-02-27 RX ORDER — POTASSIUM CHLORIDE 750 MG/1
TABLET, EXTENDED RELEASE ORAL
Qty: 90 TABLET | Refills: 2 | Status: SHIPPED | OUTPATIENT
Start: 2020-02-27 | End: 2020-09-25

## 2020-02-27 NOTE — TELEPHONE ENCOUNTER
"Requested Prescriptions   Pending Prescriptions Disp Refills     KLOR-CON 10 MEQ CR tablet [Pharmacy Med Name: KLOR-CON M10 ER TABLETS] 90 tablet 0     Sig: TAKE 1 TABLET BY MOUTH ONCE DAILY       Potassium Supplements Protocol Passed - 2/27/2020  8:14 AM        Passed - Recent (12 mo) or future (30 days) visit within the authorizing provider's department     Patient has had an office visit with the authorizing provider or a provider within the authorizing providers department within the previous 12 mos or has a future within next 30 days. See \"Patient Info\" tab in inbasket, or \"Choose Columns\" in Meds & Orders section of the refill encounter.              Passed - Medication is active on med list        Passed - Patient is age 18 or older        Passed - Normal serum potassium in past 12 months     Recent Labs   Lab Test 01/08/20  0831   POTASSIUM 3.9                  LOV 1/8/20  Prescription approved per [facility] Refill Protocol.  Brenda J. Goodell, RN on 2/27/2020 at 3:42 PM      "

## 2020-03-04 ENCOUNTER — ANTICOAGULATION THERAPY VISIT (OUTPATIENT)
Dept: ANTICOAGULATION | Facility: OTHER | Age: 60
End: 2020-03-04

## 2020-03-04 DIAGNOSIS — Z79.01 ANTICOAGULATION MONITORING, INR RANGE 2-3: ICD-10-CM

## 2020-03-04 LAB — INR POINT OF CARE: 3.1 (ref 0.86–1.14)

## 2020-03-04 PROCEDURE — 36416 COLLJ CAPILLARY BLOOD SPEC: CPT

## 2020-03-04 PROCEDURE — 85610 PROTHROMBIN TIME: CPT | Mod: QW

## 2020-03-04 NOTE — PROGRESS NOTES
ANTICOAGULATION FOLLOW-UP CLINIC VISIT    Patient Name:  Babak Moran  Date:  3/4/2020  Contact Type:  Face to Face    SUBJECTIVE:  Patient Findings     Positives:   Change in medications (D/C potassium )        Clinical Outcomes     Negatives:   Major bleeding event, Thromboembolic event, Anticoagulation-related hospital admission, Anticoagulation-related ED visit, Anticoagulation-related fatality           OBJECTIVE    INR Protime   Date Value Ref Range Status   03/04/2020 3.1 (A) 0.86 - 1.14 Final       ASSESSMENT / PLAN  INR assessment SUPRA    Recheck INR In: 2 WEEKS    INR Location Clinic      Anticoagulation Summary  As of 3/4/2020    INR goal:   2.0-3.0   TTR:   48.0 % (11.8 mo)   INR used for dosing:   3.1! (3/4/2020)   Warfarin maintenance plan:   7.5 mg (5 mg x 1.5) every day   Full warfarin instructions:   7.5 mg every day   Weekly warfarin total:   52.5 mg   No change documented:   An Gaming RN   Plan last modified:   Shelley Mercado RN (1/31/2020)   Next INR check:   3/18/2020   Priority:   Maintenance   Target end date:   Indefinite    Indications    Unspecified atrial fibrillation (Resolved) [I48.91]  Anticoagulation monitoring  INR range 2-3 [Z79.01]             Anticoagulation Episode Summary     INR check location:       Preferred lab:       Send INR reminders to:    INR    Comments:   Babak prefers to be closer to 3.0 d/t previous CVA check Q 4 weeks.Declines to reduce dose when slightly over 3.0  Needs to change PCP      Anticoagulation Care Providers     Provider Role Specialty Phone number    Teo Funes MD [facility] [phone]            See the Encounter Report to view Anticoagulation Flowsheet and Dosing Calendar (Go to Encounters tab in chart review, and find the Anticoagulation Therapy Visit)        An Gaming, RN

## 2020-03-18 ENCOUNTER — ANTICOAGULATION THERAPY VISIT (OUTPATIENT)
Dept: ANTICOAGULATION | Facility: OTHER | Age: 60
End: 2020-03-18

## 2020-03-18 DIAGNOSIS — Z79.01 ANTICOAGULATION MONITORING, INR RANGE 2-3: ICD-10-CM

## 2020-03-18 DIAGNOSIS — Z79.01 LONG-TERM (CURRENT) USE OF ANTICOAGULANTS, INR GOAL 2.0-3.0: ICD-10-CM

## 2020-03-18 DIAGNOSIS — I48.91 ATRIAL FIBRILLATION (H): ICD-10-CM

## 2020-03-18 DIAGNOSIS — M17.0 PRIMARY OSTEOARTHRITIS OF BOTH KNEES: ICD-10-CM

## 2020-03-18 LAB — INR POINT OF CARE: 3.7 (ref 0.86–1.14)

## 2020-03-18 PROCEDURE — 85610 PROTHROMBIN TIME: CPT | Mod: QW

## 2020-03-18 PROCEDURE — 36416 COLLJ CAPILLARY BLOOD SPEC: CPT

## 2020-03-18 RX ORDER — DULOXETIN HYDROCHLORIDE 30 MG/1
30 CAPSULE, DELAYED RELEASE ORAL DAILY
Qty: 90 CAPSULE | Refills: 0 | Status: SHIPPED | OUTPATIENT
Start: 2020-03-18 | End: 2020-04-06

## 2020-03-18 RX ORDER — WARFARIN SODIUM 5 MG/1
TABLET ORAL
Qty: 135 TABLET | Refills: 1
Start: 2020-03-18 | End: 2020-04-06

## 2020-03-18 NOTE — TELEPHONE ENCOUNTER
Patient was started on new med for gout and it is working great.  Patient is requestin refill.  Please call to discuss and advise.  Danielle Birmingham on 3/18/2020 at 7:55 AM

## 2020-03-18 NOTE — PROGRESS NOTES
ANTICOAGULATION FOLLOW-UP CLINIC VISIT    Patient Name:  Babak Moran  Date:  3/18/2020  Contact Type:  Face to Face    SUBJECTIVE:  Patient Findings     Positives:   Change in diet/appetite (states he has lost 60 lbs in last 6 months)        Clinical Outcomes     Negatives:   Major bleeding event, Thromboembolic event, Anticoagulation-related hospital admission, Anticoagulation-related ED visit, Anticoagulation-related fatality           OBJECTIVE    INR Protime   Date Value Ref Range Status   03/18/2020 3.7 (A) 0.86 - 1.14 Final       ASSESSMENT / PLAN  INR assessment SUPRA    Recheck INR In: 2 WEEKS    INR Location Clinic      Anticoagulation Summary  As of 3/18/2020    INR goal:   2.0-3.0   TTR:   48.0 % (11.8 mo)   INR used for dosing:   3.7! (3/18/2020)   Warfarin maintenance plan:   5 mg (5 mg x 1) every Wed; 7.5 mg (5 mg x 1.5) all other days   Full warfarin instructions:   5 mg every Wed; 7.5 mg all other days   Weekly warfarin total:   50 mg   Plan last modified:   Shelley Mercado RN (3/18/2020)   Next INR check:   4/1/2020   Priority:   Maintenance   Target end date:   Indefinite    Indications    Unspecified atrial fibrillation (Resolved) [I48.91]  Anticoagulation monitoring  INR range 2-3 [Z79.01]             Anticoagulation Episode Summary     INR check location:       Preferred lab:       Send INR reminders to:    INR    Comments:   Babak prefers to be closer to 3.0 d/t previous CVA check Q 4 weeks.Declines to reduce dose when slightly over 3.0  Needs to change PCP      Anticoagulation Care Providers     Provider Role Specialty Phone number    Teo Funes MD [facility] [phone]            See the Encounter Report to view Anticoagulation Flowsheet and Dosing Calendar (Go to Encounters tab in chart review, and find the Anticoagulation Therapy Visit)        Shelley Mercado, RN

## 2020-03-18 NOTE — TELEPHONE ENCOUNTER
After verifying pts name and date of birth with pt, pt requesting refill for Cymbalta.  Faith Pitt LPN

## 2020-03-19 ENCOUNTER — OFFICE VISIT (OUTPATIENT)
Dept: FAMILY MEDICINE | Facility: OTHER | Age: 60
End: 2020-03-19
Attending: FAMILY MEDICINE

## 2020-03-19 ENCOUNTER — NURSE TRIAGE (OUTPATIENT)
Dept: FAMILY MEDICINE | Facility: OTHER | Age: 60
End: 2020-03-19

## 2020-03-19 VITALS
WEIGHT: 315 LBS | BODY MASS INDEX: 40.43 KG/M2 | DIASTOLIC BLOOD PRESSURE: 68 MMHG | SYSTOLIC BLOOD PRESSURE: 124 MMHG | HEART RATE: 60 BPM | TEMPERATURE: 96 F | HEIGHT: 74 IN | RESPIRATION RATE: 20 BRPM

## 2020-03-19 DIAGNOSIS — L03.116 CELLULITIS OF LEFT LEG: Primary | ICD-10-CM

## 2020-03-19 LAB
BASOPHILS # BLD AUTO: 0.1 10E9/L (ref 0–0.2)
BASOPHILS NFR BLD AUTO: 0.3 %
DIFFERENTIAL METHOD BLD: ABNORMAL
EOSINOPHIL # BLD AUTO: 0 10E9/L (ref 0–0.7)
EOSINOPHIL NFR BLD AUTO: 0.2 %
ERYTHROCYTE [DISTWIDTH] IN BLOOD BY AUTOMATED COUNT: 20.6 % (ref 10–15)
HCT VFR BLD AUTO: 44.5 % (ref 40–53)
HGB BLD-MCNC: 13.5 G/DL (ref 13.3–17.7)
IMM GRANULOCYTES # BLD: 0.2 10E9/L (ref 0–0.4)
IMM GRANULOCYTES NFR BLD: 0.9 %
LYMPHOCYTES # BLD AUTO: 0.6 10E9/L (ref 0.8–5.3)
LYMPHOCYTES NFR BLD AUTO: 2.3 %
MCH RBC QN AUTO: 23.3 PG (ref 26.5–33)
MCHC RBC AUTO-ENTMCNC: 30.3 G/DL (ref 31.5–36.5)
MCV RBC AUTO: 77 FL (ref 78–100)
MONOCYTES # BLD AUTO: 0.8 10E9/L (ref 0–1.3)
MONOCYTES NFR BLD AUTO: 3 %
NEUTROPHILS # BLD AUTO: 24.5 10E9/L (ref 1.6–8.3)
NEUTROPHILS NFR BLD AUTO: 93.3 %
PLATELET # BLD AUTO: 314 10E9/L (ref 150–450)
RBC # BLD AUTO: 5.79 10E12/L (ref 4.4–5.9)
WBC # BLD AUTO: 26.2 10E9/L (ref 4–11)

## 2020-03-19 PROCEDURE — 96372 THER/PROPH/DIAG INJ SC/IM: CPT | Performed by: FAMILY MEDICINE

## 2020-03-19 PROCEDURE — 36415 COLL VENOUS BLD VENIPUNCTURE: CPT | Mod: ZL | Performed by: FAMILY MEDICINE

## 2020-03-19 PROCEDURE — 25000128 H RX IP 250 OP 636: Performed by: FAMILY MEDICINE

## 2020-03-19 PROCEDURE — 99213 OFFICE O/P EST LOW 20 MIN: CPT | Performed by: FAMILY MEDICINE

## 2020-03-19 PROCEDURE — 85025 COMPLETE CBC W/AUTO DIFF WBC: CPT | Mod: ZL | Performed by: FAMILY MEDICINE

## 2020-03-19 RX ORDER — CLINDAMYCIN HCL 300 MG
300 CAPSULE ORAL 4 TIMES DAILY
Qty: 28 CAPSULE | Refills: 0 | Status: SHIPPED | OUTPATIENT
Start: 2020-03-19 | End: 2020-03-26

## 2020-03-19 RX ORDER — CEFTRIAXONE SODIUM 1 G
1 VIAL (EA) INJECTION ONCE
Status: COMPLETED | OUTPATIENT
Start: 2020-03-19 | End: 2020-03-19

## 2020-03-19 RX ADMIN — CEFTRIAXONE SODIUM 1 G: 1 INJECTION, POWDER, FOR SOLUTION INTRAMUSCULAR; INTRAVENOUS at 17:18

## 2020-03-19 ASSESSMENT — PATIENT HEALTH QUESTIONNAIRE - PHQ9: SUM OF ALL RESPONSES TO PHQ QUESTIONS 1-9: 0

## 2020-03-19 ASSESSMENT — PAIN SCALES - GENERAL: PAINLEVEL: NO PAIN (0)

## 2020-03-19 ASSESSMENT — MIFFLIN-ST. JEOR: SCORE: 2400.23

## 2020-03-19 NOTE — PROGRESS NOTES
SUBJECTIVE:   Babak Moran is a 59 year old male who presents to clinic today for the following health issues: Left leg redness    Patient arrives here for left leg redness.  He has a history of cellulitis back in September was admitted  for about 5 days of.  He states it started last night.  Not associate with any fevers or chills.  No tenderness or pain.  Involving the left leg which has occurred in the past.  Review of the chart shows he is kind of always had a little redness to his lower extremity.  He denies any trauma.        Patient Active Problem List    Diagnosis Date Noted     Sepsis (H) 09/06/2019     Priority: Medium     Cellulitis of left lower extremity 09/05/2019     Priority: Medium     Obstructive sleep apnea syndrome 05/13/2019     Priority: Medium     Osteoarthrosis 01/16/2018     Priority: Medium     Obesity 01/16/2018     Priority: Medium     Tissue plasminogen activator (t-PA) administered at other facility within 24 hours prior to current admission 01/16/2018     Priority: Medium     Morbid obesity with BMI of 40.0-44.9, adult (H) 02/08/2017     Priority: Medium     Cerebrovascular accident (CVA) due to embolism of right middle cerebral artery (H) 02/03/2017     Priority: Medium     Left hemiparesis (H) 02/03/2017     Priority: Medium     Anticoagulation monitoring, INR range 2-3 07/01/2015     Priority: Medium     Atrial fibrillation with RVR (H) 06/25/2015     Priority: Medium     Long-term (current) use of anticoagulants, INR goal 2.0-3.0 06/25/2015     Priority: Medium     Family history of coronary artery disease 05/19/2014     Priority: Medium     Ascending aorta enlargement (H) 02/13/2014     Priority: Medium     Gout 01/07/2014     Priority: Medium     Overview:   Overview:   after 3 attacks in < a year, tapped and crystal proven 5/2/13;  Allopurinol started then got ? Atypical side effect, stopped; (short term colchicine prophylaxis)       Pain in joint, ankle and foot 05/17/2012     " Priority: Medium     Essential hypertension 09/08/2009     Priority: Medium     Cutaneous lupus erythematosus 11/12/2008     Priority: Medium     Systemic lupus erythematosus (H) 09/18/2008     Priority: Medium     Overview:   Dermatitis       Hypoglycemia 08/26/2008     Priority: Medium     Overview:   Possible       Past Medical History:   Diagnosis Date     Atrial fibrillation (H) 02/03/2017    on Warfarin     Cerebral infarction (H)      Cerebral infarction due to unspecified occlusion or stenosis of right middle cerebral artery (H) 02/03/2017    ,Left hemiparesis, left neglect, impaired balance and gait following R MCA stroke with mild hemorrhagic transformation s/p tissue plasminogen activator and mechanical thrombectomy, s/p C7 facet fracture from fall off forklift.     Chest pain 02/1997     Disorder of skin or subcutaneous tissue 07/25/2011     Essential (primary) hypertension 02/1997     Fracture of cervical vertebra (H)     02/03/2017,C7 facet fracture from fall off forklift, nondisplaced     Gout      Hemiplegia affecting left nondominant side (H) 02/03/2017     Obesity 02/03/2017    body mass index of 40     Other local lupus erythematosus       Past Surgical History:   Procedure Laterality Date     ARTHROSCOPY KNEE      bilateral knees     COLONOSCOPY  01/02/2012    Normal; Next due 2022     OTHER SURGICAL HISTORY  02/03/2017     Allergies   Allergen Reactions     Ioxaglate Unknown     Diatrizoate Rash     Levofloxacin Hives and Rash     Metrizamide Rash     (Diagnostic X-Ray materials)     Probenecid Rash       Review of Systems     OBJECTIVE:     /68   Pulse 60   Temp 96  F (35.6  C)   Resp 20   Ht 1.88 m (6' 2\")   Wt (!) 151 kg (333 lb)   BMI 42.75 kg/m    Body mass index is 42.75 kg/m .  Physical Exam  Constitutional:       Appearance: Normal appearance.      Comments: Does not appear in any acute distress.   Musculoskeletal:         General: Swelling present. No tenderness.      " Comments: There is redness extending almost up to the tibial plateau.  With some swelling.  Not painful on palpation.  Slightly warm to touch.   Skin:     Findings: Erythema present.   Neurological:      Mental Status: He is alert.         Diagnostic Test Results:  Results for orders placed or performed in visit on 03/19/20 (from the past 24 hour(s))   CBC and Differential   Result Value Ref Range    WBC 26.2 (H) 4.0 - 11.0 10e9/L    RBC Count 5.79 4.4 - 5.9 10e12/L    Hemoglobin 13.5 13.3 - 17.7 g/dL    Hematocrit 44.5 40.0 - 53.0 %    MCV 77 (L) 78 - 100 fl    MCH 23.3 (L) 26.5 - 33.0 pg    MCHC 30.3 (L) 31.5 - 36.5 g/dL    RDW 20.6 (H) 10.0 - 15.0 %    Platelet Count 314 150 - 450 10e9/L    Diff Method Automated Method     % Neutrophils 93.3 %    % Lymphocytes 2.3 %    % Monocytes 3.0 %    % Eosinophils 0.2 %    % Basophils 0.3 %    % Immature Granulocytes 0.9 %    Absolute Neutrophil 24.5 (H) 1.6 - 8.3 10e9/L    Absolute Lymphocytes 0.6 (L) 0.8 - 5.3 10e9/L    Absolute Monocytes 0.8 0.0 - 1.3 10e9/L    Absolute Eosinophils 0.0 0.0 - 0.7 10e9/L    Absolute Basophils 0.1 0.0 - 0.2 10e9/L    Abs Immature Granulocytes 0.2 0 - 0.4 10e9/L       ASSESSMENT/PLAN:       White count is elevated.  1. Cellulitis of left leg    Patient does not appear toxic.  Examination vital signs and patient's history not consistent with sepsis.  Will start the patient on 1 g of Rocephin.  Also clindamycin 300 mg 4 times a day.  If he should develop fevers or chills.  Or if the rash should worsen he will follow-up.  It was marked.  He will come and see me tomorrow if worsening slightly.  Otherwise follow-up with his primary physician next week.  - CBC and Differential; Future  - CBC and Differential  - cefTRIAXone (ROCEPHIN) injection 1 g  - clindamycin (CLEOCIN) 300 MG capsule; Take 1 capsule (300 mg) by mouth 4 times daily  Dispense: 28 capsule; Refill: 0  Also encouraged the patient to keep it elevated.    Igor Spence MD  [facility]

## 2020-03-19 NOTE — NURSING NOTE
Patient here for cellulitis in the left leg that started overnight. No pain. Medication Reconciliation: complete.    Brandy Maurice LPN  3/19/2020 4:07 PM

## 2020-03-19 NOTE — TELEPHONE ENCOUNTER
Called and informed patient of Dr. Bal's response and patient states verbalized understanding.  Patient transferred to scheduling and informed of new process when entering clinic due to coronavirus and verbalized understanding.    Roz Zapata RN on 3/19/2020 at 3:32 PM

## 2020-03-19 NOTE — TELEPHONE ENCOUNTER
I do have concern that this infection is already beyond what an oral antibiotic can treat.  I would recommend that he be seen to determine if he needs IV antibiotics.  If he absolutely refuses I will send an oral antibiotic however this would not be recommended.

## 2020-03-19 NOTE — TELEPHONE ENCOUNTER
"S-(situation): patient calling and states that his left leg is red, swollen from ankle to almost the knee    B-(background): patient states had cellulitis back in September and ended up hospitalized for 8 days. States it was worse then.    A-(assessment): states left leg ankle to almost knee swollen , red.  Denies pain, or leaving an indent,shortness of breath or fever.    R-(recommendations): patient looking for antibiotic to be sent to [facility] as he does not want to come in due to the pandemic .    Will route to Arminda Ortiz for review and consideration.  Roz Zapata RN on 3/19/2020 at 2:32 PM      Additional Information    Negative: Chest pain    Negative: Small area of swelling and followed an insect bite to the area    Negative: Followed a knee injury    Negative: Ankle or foot injury    Negative: Pregnant with leg swelling or edema    Negative: Difficulty breathing at rest    Negative: Entire foot is cool or blue in comparison to other side    Answer Assessment - Initial Assessment Questions  1. ONSET: \"When did the swelling start?\" (e.g., minutes, hours, days)      overnighty  2. LOCATION: \"What part of the leg is swollen?\"  \"Are both legs swollen or just one leg?\"      Left ankle up to knee  3. SEVERITY: \"How bad is the swelling?\" (e.g., localized; mild, moderate, severe)   - Localized - small area of swelling localized to one leg   - MILD pedal edema - swelling limited to foot and ankle, pitting edema < 1/4 inch (6 mm) deep, rest and elevation eliminate most or all swelling   - MODERATE edema - swelling of lower leg to knee, pitting edema > 1/4 inch (6 mm) deep, rest and elevation only partially reduce swelling   - SEVERE edema - swelling extends above knee, facial or hand swelling present       Mild-mod  4. REDNESS: \"Does the swelling look red or infected?\"      Ankle to knee  5. PAIN: \"Is the swelling painful to touch?\" If so, ask: \"How painful is it?\"   (Scale 1-10; mild, moderate or severe)   " "   denies  6. FEVER: \"Do you have a fever?\" If so, ask: \"What is it, how was it measured, and when did it start?\"       denies  7. CAUSE: \"What do you think is causing the leg swelling?\"      Unsure, cellulitis   8. MEDICAL HISTORY: \"Do you have a history of heart failure, kidney disease, liver failure, or cancer?\"      denies  9. RECURRENT SYMPTOM: \"Have you had leg swelling before?\" If so, ask: \"When was the last time?\" \"What happened that time?\"      Yes back in September in the hospital  10. OTHER SYMPTOMS: \"Do you have any other symptoms?\" (e.g., chest pain, difficulty breathing)        denies  11. PREGNANCY: \"Is there any chance you are pregnant?\" \"When was your last menstrual period?\"        n/a    Protocols used: LEG SWELLING AND EDEMA-A-OH      "

## 2020-03-26 ENCOUNTER — NURSE TRIAGE (OUTPATIENT)
Dept: FAMILY MEDICINE | Facility: OTHER | Age: 60
End: 2020-03-26

## 2020-03-26 DIAGNOSIS — L03.116 CELLULITIS OF LEFT LEG: Primary | ICD-10-CM

## 2020-03-26 RX ORDER — CLINDAMYCIN HCL 300 MG
300 CAPSULE ORAL 4 TIMES DAILY
Qty: 28 CAPSULE | Refills: 0 | Status: SHIPPED | OUTPATIENT
Start: 2020-03-26 | End: 2020-04-06

## 2020-03-26 NOTE — TELEPHONE ENCOUNTER
Spoke with patient and relayed message below. Patient verbalized understanding.   Reena Birmingham LPN...................3/26/2020  1:13 PM

## 2020-03-26 NOTE — TELEPHONE ENCOUNTER
S-(situation): Seen for leg cellulitis last week, given abx, not fully cleared up.    B-(background): Hx of: Cellulitis (hospitalized Sept 2019), Sepsis, Lupus. OV, 3/19, MBL, Left leg cellulitis. Area marked, given Rocephin injection and 7 day Rx Clindamycin.    A-(assessment): Last dose abx today and redenned area reduced by >50%. Denies: tenderness, fever/chills. Pharmacy: [id number]. Allergies verified w/ Pt.    R-(recommendations): Protocol recommends OV in 3 days. Pt instead requesting consideration of abx change or extension by MLB. Pls call Pt: [phone]; ok to leave detailed voicemail.    Savannah Sifuentes RN .............. 3/26/2020  9:50 AM        ______________________________________________        Reason for Disposition    [1] Finished taking antibiotic AND [2] cellulitis symptoms are BETTER  (e.g., redness, pain  drainage, swelling) BUT [3] not completely gone    Diabetes mellitus or weak immune system (e.g., HIV positive, cancer chemo, splenectomy, chronic steroids)     Lupus    Additional Information    Negative: [1] Finished taking antibiotic AND [2] cellulitis symptoms are WORSE (e.g., redness, pain  drainage, swelling)    Negative: [1] Red streak (or line) runs from area of infection AND [2] longer than 4 inches (10 cm)    Negative: [1] Taking antibiotic > 24 hours AND [2] cellulitis symptoms are WORSE (e.g., spreading redness, pain, swelling)    Negative: [1] Fever > 100.0 F (37.8 C) AND [2] new onset    Negative: [1] Red streak runs from area of infection AND [2] new onset    Negative: [1] Caller has URGENT question AND [2] triager unable to answer question    Negative: [1] Taking antibiotic > 24 hours AND [2] fever > 100.5 F (38.1 C)    Negative: [1] Taking antibiotic > 24 hours AND [2] red streak (or line) runs from area of infection    Negative: Black (necrotic), dark purple, or blisters develop in area of cellulitis    Negative: Patient sounds very sick or weak to the triager    Negative:  SEVERE pain    Negative: [1] SEVERE pain with bending of finger (or toe) AND [2] cellulitis on hand (or foot)    Negative: Fever > 104 F (40 C)    Negative: [1] Widespread rash AND [2] bright red, sunburn-like    Negative: Shock suspected (e.g., cold/pale/clammy skin, too weak to stand, low BP, rapid pulse)    Negative: Sounds like a life-threatening emergency to the triager    Negative: [1] Caller has NON-URGENT question AND [2] triager unable to answer question    Negative: [1] Taking antibiotic < 24 hours AND [2] cellulitis symptoms are WORSE (e.g., spreading redness, pain, swelling, drainage) AND [3] no fever    Negative: [1] Taking antibiotic > 72 hours (3 days) AND [2] cellulitis symptoms are SAME (not getting better)    Negative: [1] Taking antibiotic < 72 hours (3 days) AND [2] cellulitis symptoms are SAME (not getting better) AND [3] no fever    Protocols used: CELLULITIS ON ANTIBIOTIC FOLLOW-UP CALL-A-    Protocol recommends Pt be seen w/in 3 days in office visit (routine office f/u appointment) due to dx lupus. See reason for disposition above. Pt is requesting instead that message be routed to Dr. Spence, in absence of Dr. Funes (PCP), for review and consideration of extending current abx or new abx, to be sent to [facility] (#[phone], by [facility] in [place]). Savannah Sifuentes RN .............. 3/26/2020  9:38 AM

## 2020-04-06 ENCOUNTER — VIRTUAL VISIT (OUTPATIENT)
Dept: FAMILY MEDICINE | Facility: OTHER | Age: 60
End: 2020-04-06
Attending: FAMILY MEDICINE

## 2020-04-06 ENCOUNTER — ANTICOAGULATION THERAPY VISIT (OUTPATIENT)
Dept: ANTICOAGULATION | Facility: OTHER | Age: 60
End: 2020-04-06

## 2020-04-06 DIAGNOSIS — Z79.01 LONG-TERM (CURRENT) USE OF ANTICOAGULANTS, INR GOAL 2.0-3.0: ICD-10-CM

## 2020-04-06 DIAGNOSIS — M17.0 PRIMARY OSTEOARTHRITIS OF BOTH KNEES: ICD-10-CM

## 2020-04-06 DIAGNOSIS — L03.116 CELLULITIS OF LEFT LEG: ICD-10-CM

## 2020-04-06 DIAGNOSIS — Z79.01 ANTICOAGULATION MONITORING, INR RANGE 2-3: ICD-10-CM

## 2020-04-06 DIAGNOSIS — I48.91 ATRIAL FIBRILLATION (H): ICD-10-CM

## 2020-04-06 LAB — INR POINT OF CARE: 3.4 (ref 0.86–1.14)

## 2020-04-06 PROCEDURE — 99212 OFFICE O/P EST SF 10 MIN: CPT | Mod: TEL | Performed by: FAMILY MEDICINE

## 2020-04-06 PROCEDURE — 85610 PROTHROMBIN TIME: CPT | Mod: QW

## 2020-04-06 PROCEDURE — 36416 COLLJ CAPILLARY BLOOD SPEC: CPT

## 2020-04-06 RX ORDER — DULOXETIN HYDROCHLORIDE 30 MG/1
30 CAPSULE, DELAYED RELEASE ORAL DAILY
Qty: 90 CAPSULE | Refills: 3 | Status: SHIPPED | OUTPATIENT
Start: 2020-04-06 | End: 2020-09-29

## 2020-04-06 RX ORDER — CLINDAMYCIN HCL 300 MG
300 CAPSULE ORAL 4 TIMES DAILY
Qty: 40 CAPSULE | Refills: 0 | Status: SHIPPED | OUTPATIENT
Start: 2020-04-06 | End: 2020-04-22

## 2020-04-06 RX ORDER — WARFARIN SODIUM 5 MG/1
TABLET ORAL
Qty: 135 TABLET | Refills: 1
Start: 2020-04-06 | End: 2020-05-26

## 2020-04-06 NOTE — PROGRESS NOTES
"Subjective     Babak Moran is a 59 year old male who is being evaluated via a billable telephone visit.      The patient has been notified of following:     \"This telephone visit will be conducted via a call between you and your physician/provider. We have found that certain health care needs can be provided without the need for a physical exam.  This service lets us provide the care you need with a short phone conversation.  If a prescription is necessary we can send it directly to your pharmacy.  If lab work is needed we can place an order for that and you can then stop by our lab to have the test done at a later time.    If during the course of the call the physician/provider feels a telephone visit is not appropriate, you will not be charged for this service.\"     Patient has given verbal consent for Telephone visit?  Yes    Babak Moran complains of   Chief Complaint   Patient presents with     Cellulitis     more medication needed     He has recently been treated for left leg cellulitis with clindamycin.  Reports that redness improved, but did not resolve.  He was seen by Dr. Rachel on March 19.  After 1 week antibiotics, the antibiotic was extended for another week.  He is now been out for a few days.  He thinks that he needs a longer course and things should resolve based on improvement noted.  There is no drainage.  No fever.    He was hospitalized in September for significant cellulitis.  Following that he required prolonged treatment with antibiotics for over a month to eventually resolve the infection.  He did have a flare and wound culture was obtained on October 8 showing Enterobacter and staph intermedius.  Bactrim was the only medication that would treat both organisms.  He took this for a number of weeks and then was fine until recently.    Additionally has longstanding knee pain.  He was taking NSAIDs previously, but is on chronic warfarin.  I declined to further refill his indomethacin.  He tried " gabapentin, but it caused adverse side effects.  Most recently he is taking duloxetine 30 mg daily with good results.  He would like to continue on this medication.      ALLERGIES  Ioxaglate; Diatrizoate; Levofloxacin; Metrizamide; and Probenecid        Current Outpatient Medications   Medication Sig Dispense Refill     amLODIPine (NORVASC) 5 MG tablet Take 1 tablet (5 mg) by mouth daily 90 tablet 3     clindamycin (CLEOCIN) 300 MG capsule Take 1 capsule (300 mg) by mouth 4 times daily for 10 days 40 capsule 0     clobetasol (TEMOVATE) 0.05 % external ointment        DULoxetine (CYMBALTA) 30 MG capsule Take 1 capsule (30 mg) by mouth daily 90 capsule 3     famotidine (PEPCID) 40 MG tablet Take 1 tablet (40 mg) by mouth daily 90 tablet 0     furosemide (LASIX) 40 MG tablet Take 1 tablet (40 mg) by mouth daily 90 tablet 1     HEMP OIL OR EXTRACT OR OTHER CBD CANNABINOID, NOT MEDICAL CANNABIS, Uses topical on knees and oral as needed       hydrocortisone (CORTAID) 1 % external cream        KLOR-CON 10 MEQ CR tablet TAKE 1 TABLET BY MOUTH ONCE DAILY 90 tablet 2     metoprolol succinate ER (TOPROL-XL) 200 MG 24 hr tablet TAKE 1 TABLET BY MOUTH ONCE DAILY. 90 tablet 2     mupirocin (BACTROBAN) 2 % external ointment Apply topically 3 times daily 60 g 1     pimecrolimus (ELIDEL) 1 % external cream        warfarin ANTICOAGULANT (COUMADIN) 5 MG tablet TAKE 1 1/2 TABLETS(7.5MG) X 5 DAYS AND 5 MG X 2 DAYS/WEEK OR AS DIRECTED BY [facility] 135 tablet 1     Allergies   Allergen Reactions     Ioxaglate Unknown     Diatrizoate Rash     Levofloxacin Hives and Rash     Metrizamide Rash     (Diagnostic X-Ray materials)     Probenecid Rash       Reviewed and updated as needed this visit by Provider  Tobacco  Allergies  Meds  Problems  Med Hx  Surg Hx  Fam Hx         Review of Systems   No fever.  Not SOB       Objective   Reported vitals:  There were no vitals taken for this visit.   Psych: Alert and oriented times 3;  coherent speech, normal   rate and volume, able to articulate logical thoughts, able   to abstract reason, no tangential thoughts, no hallucinations   or delusions          Assessment/Plan:  1. Cellulitis of left leg  We reviewed findings from the wound culture on October 8, 2019.  I am a little concerned the clindamycin will not adequately cover his infection if he continues to have Enterobacter.  However this is not clearly present.  He has no drainage.  He reports improvement on the clindamycin.  He would like to continue with this medication as he is tolerating it.  Given the duration of time it took to resolve his cellulitis in the past because of obesity and leg edema, recommend continued prolonged course with the clindamycin.  Given prescription to take 10 more days.  If this does not seem to resolve his infection enough, then he may need to be switched to Bactrim for at least a 2-week course.  He will call if not better.  - clindamycin (CLEOCIN) 300 MG capsule; Take 1 capsule (300 mg) by mouth 4 times daily for 10 days  Dispense: 40 capsule; Refill: 0    2. Primary osteoarthritis of both knees  Reports good results from duloxetine for knee arthritis.  Refilled prescription for 1 year.  - DULoxetine (CYMBALTA) 30 MG capsule; Take 1 capsule (30 mg) by mouth daily  Dispense: 90 capsule; Refill: 3    Ideally he would have some lab monitoring in 3 to 6 months depending on situation with COVID-19 outbreak.    Phone call duration:  8 minutes    Teo Funes MD

## 2020-04-06 NOTE — PROGRESS NOTES
ANTICOAGULATION FOLLOW-UP CLINIC VISIT    Patient Name:  Babak Moran  Date:  4/6/2020  Contact Type:  Face to Face    SUBJECTIVE:  Patient Findings     Positives:   Change in medications (continuing with clidamycin)             OBJECTIVE    INR Protime   Date Value Ref Range Status   04/06/2020 3.4 (A) 0.86 - 1.14 Final       ASSESSMENT / PLAN  INR assessment SUPRA    Recheck INR In: 2 WEEKS    INR Location Clinic      Anticoagulation Summary  As of 4/6/2020    INR goal:   2.0-3.0   TTR:   48.0 % (11.8 mo)   INR used for dosing:   3.4! (4/6/2020)   Warfarin maintenance plan:   5 mg (5 mg x 1) every Mon, Fri; 7.5 mg (5 mg x 1.5) all other days   Full warfarin instructions:   5 mg every Mon, Fri; 7.5 mg all other days   Weekly warfarin total:   47.5 mg   Plan last modified:   Shelley Mercado, RN (4/6/2020)   Next INR check:   4/20/2020   Priority:   Maintenance   Target end date:   Indefinite    Indications    Unspecified atrial fibrillation (Resolved) [I48.91]  Anticoagulation monitoring  INR range 2-3 [Z79.01]             Anticoagulation Episode Summary     INR check location:       Preferred lab:       Send INR reminders to:    INR    Comments:   Babak prefers to be closer to 3.0 d/t previous CVA check Q 4 weeks.Declines to reduce dose when slightly over 3.0  Needs to change PCP      Anticoagulation Care Providers     Provider Role Specialty Phone number    Teo Funes MD [facility] [phone]            See the Encounter Report to view Anticoagulation Flowsheet and Dosing Calendar (Go to Encounters tab in chart review, and find the Anticoagulation Therapy Visit)        Shelley Mercado, RN

## 2020-04-20 ENCOUNTER — ANTICOAGULATION THERAPY VISIT (OUTPATIENT)
Dept: ANTICOAGULATION | Facility: OTHER | Age: 60
End: 2020-04-20

## 2020-04-20 DIAGNOSIS — Z79.01 ANTICOAGULATION MONITORING, INR RANGE 2-3: ICD-10-CM

## 2020-04-20 LAB — INR POINT OF CARE: 3.1 (ref 0.86–1.14)

## 2020-04-20 PROCEDURE — 85610 PROTHROMBIN TIME: CPT | Mod: QW

## 2020-04-20 PROCEDURE — 36416 COLLJ CAPILLARY BLOOD SPEC: CPT

## 2020-04-20 NOTE — PROGRESS NOTES
ANTICOAGULATION FOLLOW-UP CLINIC VISIT    Patient Name:  Babak Moran  Date:  4/20/2020  Contact Type:  Face to Face    SUBJECTIVE:  Patient Findings     Positives:   Change in medications        Clinical Outcomes     Negatives:   Major bleeding event, Thromboembolic event, Anticoagulation-related hospital admission, Anticoagulation-related ED visit, Anticoagulation-related fatality           OBJECTIVE    INR Protime   Date Value Ref Range Status   04/20/2020 3.1 (A) 0.86 - 1.14 Final       ASSESSMENT / PLAN  INR assessment THER    Recheck INR In: 3 WEEKS    INR Location Clinic      Anticoagulation Summary  As of 4/20/2020    INR goal:   2.0-3.0   TTR:   48.0 % (11.8 mo)   INR used for dosing:   3.1! (4/20/2020)   Warfarin maintenance plan:   5 mg (5 mg x 1) every Mon, Fri; 7.5 mg (5 mg x 1.5) all other days   Full warfarin instructions:   5 mg every Mon, Fri; 7.5 mg all other days   Weekly warfarin total:   47.5 mg   Plan last modified:   Shelley Mercado RN (4/6/2020)   Next INR check:   5/11/2020   Priority:   Maintenance   Target end date:   Indefinite    Indications    Unspecified atrial fibrillation (Resolved) [I48.91]  Anticoagulation monitoring  INR range 2-3 [Z79.01]             Anticoagulation Episode Summary     INR check location:       Preferred lab:       Send INR reminders to:    INR    Comments:   Babak prefers to be closer to 3.0 d/t previous CVA check Q 4 weeks.Declines to reduce dose when slightly over 3.0  Needs to change PCP      Anticoagulation Care Providers     Provider Role Specialty Phone number    Teo Funes MD [facility] [phone]            See the Encounter Report to view Anticoagulation Flowsheet and Dosing Calendar (Go to Encounters tab in chart review, and find the Anticoagulation Therapy Visit)        Xochitl Pavon, RN

## 2020-04-22 ENCOUNTER — VIRTUAL VISIT (OUTPATIENT)
Dept: FAMILY MEDICINE | Facility: OTHER | Age: 60
End: 2020-04-22
Attending: FAMILY MEDICINE

## 2020-04-22 DIAGNOSIS — L03.116 CELLULITIS OF LEFT LEG: ICD-10-CM

## 2020-04-22 PROCEDURE — 99212 OFFICE O/P EST SF 10 MIN: CPT | Mod: 95 | Performed by: FAMILY MEDICINE

## 2020-04-22 RX ORDER — SULFAMETHOXAZOLE/TRIMETHOPRIM 800-160 MG
1 TABLET ORAL 2 TIMES DAILY
Qty: 42 TABLET | Refills: 0 | Status: SHIPPED | OUTPATIENT
Start: 2020-04-22 | End: 2020-05-13

## 2020-04-22 ASSESSMENT — PAIN SCALES - GENERAL: PAINLEVEL: NO PAIN (0)

## 2020-04-22 NOTE — PROGRESS NOTES
"Babak Moran is a 59 year old male who is being evaluated via a billable telephone visit.      The patient has been notified of following:     \"This telephone visit will be conducted via a call between you and your physician/provider. We have found that certain health care needs can be provided without the need for a physical exam.  This service lets us provide the care you need with a short phone conversation.  If a prescription is necessary we can send it directly to your pharmacy.  If lab work is needed we can place an order for that and you can then stop by our lab to have the test done at a later time.    Telephone visits are billed at different rates depending on your insurance coverage. During this emergency period, for some insurers they may be billed the same as an in-person visit.  Please reach out to your insurance provider with any questions.    If during the course of the call the physician/provider feels a telephone visit is not appropriate, you will not be charged for this service.\"    Patient has given verbal consent for Telephone visit?  Yes    How would you like to obtain your AVS?     Subjective     Babak Moran is a 59 year old male who presents to clinic today for the following health issues:    HPI  See note from 4/6 for details. Has been treated with clindamycin for left leg cellulitis for the past month. No improvement on clindamycin. Not worse either.    He was hospitalized in September for significant cellulitis.  Following that he required prolonged treatment with antibiotics for over a month to eventually resolve the infection.  He did have a flare and wound culture was obtained on October 8 showing Enterobacter and staph intermedius.  Bactrim was the only medication that would treat both organisms.     Patient Active Problem List   Diagnosis     Pain in joint, ankle and foot     Anticoagulation monitoring, INR range 2-3     Atrial fibrillation with RVR (H)     Cerebrovascular accident " (CVA) due to embolism of right middle cerebral artery (H)     Osteoarthrosis     Cutaneous lupus erythematosus     Gout     Ascending aorta enlargement (H)     Obesity     Family history of coronary artery disease     Essential hypertension     Left hemiparesis (H)     Long-term (current) use of anticoagulants, INR goal 2.0-3.0     Systemic lupus erythematosus (H)     Hypoglycemia     Tissue plasminogen activator (t-PA) administered at other facility within 24 hours prior to current admission     Morbid obesity with BMI of 40.0-44.9, adult (H)     Obstructive sleep apnea syndrome     Cellulitis of left lower extremity     Sepsis (H)     Past Surgical History:   Procedure Laterality Date     ARTHROSCOPY KNEE      bilateral knees     COLONOSCOPY  2012    Normal; Next due      OTHER SURGICAL HISTORY  2017       Social History     Tobacco Use     Smoking status: Passive Smoke Exposure - Never Smoker     Smokeless tobacco: Never Used   Substance Use Topics     Alcohol use: Yes     Family History   Problem Relation Age of Onset     Unknown/Adopted Paternal Grandfather         Unknown, around age 67.     Other - See Comments Father         Parkinson's disease Alzheimer's     Cancer Father         Cancer     Heart Disease Maternal Grandmother         Heart Disease,MI in her 60's.     Heart Disease Mother 60        Heart Disease,MI     Other - See Comments Mother         rheumatoid arthritis.     Heart Disease Maternal Uncle 69        Heart Disease     Hypertension Sister         Hypertension     Cancer Sister         Cancer,melanoma     Heart Disease Paternal Uncle         Heart Disease, around 69.         Current Outpatient Medications   Medication Sig Dispense Refill     amLODIPine (NORVASC) 5 MG tablet Take 1 tablet (5 mg) by mouth daily 90 tablet 3     clobetasol (TEMOVATE) 0.05 % external ointment        DULoxetine (CYMBALTA) 30 MG capsule Take 1 capsule (30 mg) by mouth daily 90 capsule 3      famotidine (PEPCID) 40 MG tablet Take 1 tablet (40 mg) by mouth daily 90 tablet 0     furosemide (LASIX) 40 MG tablet Take 1 tablet (40 mg) by mouth daily 90 tablet 1     HEMP OIL OR EXTRACT OR OTHER CBD CANNABINOID, NOT MEDICAL CANNABIS, Uses topical on knees and oral as needed       hydrocortisone (CORTAID) 1 % external cream        KLOR-CON 10 MEQ CR tablet TAKE 1 TABLET BY MOUTH ONCE DAILY 90 tablet 2     metoprolol succinate ER (TOPROL-XL) 200 MG 24 hr tablet TAKE 1 TABLET BY MOUTH ONCE DAILY. 90 tablet 2     mupirocin (BACTROBAN) 2 % external ointment Apply topically 3 times daily 60 g 1     pimecrolimus (ELIDEL) 1 % external cream        warfarin ANTICOAGULANT (COUMADIN) 5 MG tablet TAKE 1 1/2 TABLETS(7.5MG) X 5 DAYS AND 5 MG X 2 DAYS/WEEK OR AS DIRECTED BY PROTIME CLINIC 135 tablet 1     Allergies   Allergen Reactions     Ioxaglate Unknown     Diatrizoate Rash     Levofloxacin Hives and Rash     Metrizamide Rash     (Diagnostic X-Ray materials)     Probenecid Rash       Reviewed and updated as needed this visit by Provider  Tobacco  Allergies  Meds  Problems  Med Hx  Surg Hx  Fam Hx         Review of Systems   General: Denies general constitutional problems  Cardiovascular: Denies problems  Respiratory: Denies problems       Objective   Reported vitals:  There were no vitals taken for this visit.   PSYCH: Alert and oriented times 3; coherent speech, normal   rate and volume, able to articulate logical thoughts, able   to abstract reason, no tangential thoughts, no hallucinations   or delusions  RESP: No cough, no audible wheezing, able to talk in full sentences  Remainder of exam unable to be completed due to telephone visits        Assessment/Plan:  1. Cellulitis of left leg  Previously Bactrim was only antibiotic that would treat both Enterobacter and staph intermedius. No drainage, no new culture obtained since that time. Clindamycin has not been fully effective. Try Bactrim for 3 weeks. If not  better, then may need to be seen to assess whether this is truly cellulitis. When he presented previously he has been correct about infection 2 out of 2 times.  - sulfamethoxazole-trimethoprim (BACTRIM DS) 800-160 MG tablet; Take 1 tablet by mouth 2 times daily for 21 days  Dispense: 42 tablet; Refill: 0    No follow-ups on file.      Phone call duration:  5 minutes    Teo Funes MD

## 2020-04-22 NOTE — NURSING NOTE
"Chief Complaint   Patient presents with     Cellulitis     left leg     finished 3rd week of antibiotic and no change to cellulitis.    Initial There were no vitals taken for this visit. Estimated body mass index is 42.75 kg/m  as calculated from the following:    Height as of 3/19/20: 1.88 m (6' 2\").    Weight as of 3/19/20: 151 kg (333 lb).    Medication Reconciliation: complete      Norma J. Gosselin, LPN  "

## 2020-05-11 ENCOUNTER — ANTICOAGULATION THERAPY VISIT (OUTPATIENT)
Dept: ANTICOAGULATION | Facility: OTHER | Age: 60
End: 2020-05-11

## 2020-05-11 DIAGNOSIS — Z79.01 ANTICOAGULATION MONITORING, INR RANGE 2-3: ICD-10-CM

## 2020-05-11 LAB — INR POINT OF CARE: 4.6 (ref 0.86–1.14)

## 2020-05-11 PROCEDURE — 36416 COLLJ CAPILLARY BLOOD SPEC: CPT

## 2020-05-11 PROCEDURE — 85610 PROTHROMBIN TIME: CPT | Mod: QW

## 2020-05-11 NOTE — PROGRESS NOTES
ANTICOAGULATION FOLLOW-UP CLINIC VISIT    Patient Name:  Babak Moran  Date:  2020  Contact Type:  Face to Face    SUBJECTIVE:  Patient Findings     Positives:   Change in medications (has been taking bactrim since 4/22/20 x 21 days)             OBJECTIVE    Recent labs: (last 7 days)     20   INR 4.6*       ASSESSMENT / PLAN  INR assessment SUPRA    Recheck INR In: 2 WEEKS    INR Location Clinic      Anticoagulation Summary  As of 2020    INR goal:   2.0-3.0   TTR:   48.0 % (11.8 mo)   INR used for dosin.6! (2020)   Warfarin maintenance plan:   5 mg (5 mg x 1) every Mon, Fri; 7.5 mg (5 mg x 1.5) all other days   Full warfarin instructions:   : Hold; : 5 mg; Otherwise 5 mg every Mon, Fri; 7.5 mg all other days   Weekly warfarin total:   47.5 mg   Plan last modified:   Shelley Mercado RN (2020)   Next INR check:   2020   Priority:   Maintenance   Target end date:   Indefinite    Indications    Unspecified atrial fibrillation (Resolved) [I48.91]  Anticoagulation monitoring  INR range 2-3 [Z79.01]             Anticoagulation Episode Summary     INR check location:       Preferred lab:       Send INR reminders to:   [facility]    Comments:   Babak prefers to be closer to 3.0 d/t previous CVA check Q 4 weeks.Declines to reduce dose when slightly over 3.0  Needs to change PCP      Anticoagulation Care Providers     Provider Role Specialty Phone number    Teo Funes MD [facility] [phone]            See the Encounter Report to view Anticoagulation Flowsheet and Dosing Calendar (Go to Encounters tab in chart review, and find the Anticoagulation Therapy Visit)        Shelley Mercado, RN

## 2020-05-25 DIAGNOSIS — Z79.01 LONG-TERM (CURRENT) USE OF ANTICOAGULANTS, INR GOAL 2.0-3.0: ICD-10-CM

## 2020-05-25 DIAGNOSIS — I48.91 ATRIAL FIBRILLATION (H): ICD-10-CM

## 2020-05-26 RX ORDER — WARFARIN SODIUM 5 MG/1
TABLET ORAL
Qty: 135 TABLET | Refills: 1 | Status: SHIPPED | OUTPATIENT
Start: 2020-05-26 | End: 2020-08-14

## 2020-05-26 NOTE — TELEPHONE ENCOUNTER
"Requested Prescriptions   Pending Prescriptions Disp Refills     warfarin ANTICOAGULANT (JANTOVEN ANTICOAGULANT) 5 MG tablet [Pharmacy Med Name: JANTOVEN 5MG TABLET] 135 tablet 1     Sig: TAKE 1&1/2 TABLETS(7.5MG) BY MOUTH DAILY OR AS DIRECTED BY [facility]       Vitamin K Antagonists Failed - 5/25/2020  4:38 PM        Failed - INR is within goal in the past 6 weeks     Confirm INR is within goal in the past 6 weeks.     Recent Labs   Lab Test 05/11/20   INR 4.6*                       Passed - Recent (12 mo) or future (30 days) visit within the authorizing provider's specialty     Patient has had an office visit with the authorizing provider or a provider within the authorizing providers department within the previous 12 mos or has a future within next 30 days. See \"Patient Info\" tab in inbasket, or \"Choose Columns\" in Meds & Orders section of the refill encounter.              Passed - Medication is active on med list        Passed - Patient is 18 years of age or older           Prescription approved per [facility] Refill [facility].    "

## 2020-06-03 ENCOUNTER — ANTICOAGULATION THERAPY VISIT (OUTPATIENT)
Dept: ANTICOAGULATION | Facility: OTHER | Age: 60
End: 2020-06-03
Attending: FAMILY MEDICINE

## 2020-06-03 DIAGNOSIS — Z79.01 ANTICOAGULATION MONITORING, INR RANGE 2-3: ICD-10-CM

## 2020-06-03 LAB — INR POINT OF CARE: 2.5 (ref 0.86–1.14)

## 2020-06-03 PROCEDURE — 85610 PROTHROMBIN TIME: CPT | Mod: QW

## 2020-06-03 PROCEDURE — 36416 COLLJ CAPILLARY BLOOD SPEC: CPT

## 2020-06-03 NOTE — PROGRESS NOTES
ANTICOAGULATION FOLLOW-UP CLINIC VISIT    Patient Name:  Babak Moran  Date:  6/3/2020  Contact Type:  Face to Face    SUBJECTIVE:  Patient Findings         Clinical Outcomes     Negatives:   Major bleeding event, Thromboembolic event, Anticoagulation-related hospital admission, Anticoagulation-related ED visit, Anticoagulation-related fatality           OBJECTIVE    Recent labs: (last 7 days)     20   INR 2.5*       ASSESSMENT / PLAN  INR assessment THER    Recheck INR In: 4 WEEKS    INR Location Clinic      Anticoagulation Summary  As of 6/3/2020    INR goal:   2.0-3.0   TTR:   45.6 % (11.8 mo)   INR used for dosin.5 (6/3/2020)   Warfarin maintenance plan:   5 mg (5 mg x 1) every Mon, Fri; 7.5 mg (5 mg x 1.5) all other days   Full warfarin instructions:   5 mg every Mon, Fri; 7.5 mg all other days   Weekly warfarin total:   47.5 mg   No change documented:   Shelley Mercado RN   Plan last modified:   Shelley Mercado RN (2020)   Next INR check:   2020   Priority:   Maintenance   Target end date:   Indefinite    Indications    Unspecified atrial fibrillation (Resolved) [I48.91]  Anticoagulation monitoring  INR range 2-3 [Z79.01]             Anticoagulation Episode Summary     INR check location:       Preferred lab:       Send INR reminders to:   [facility]    Comments:   Bbaak prefers to be closer to 3.0 d/t previous CVA check Q 4 weeks.Declines to reduce dose when slightly over 3.0  Needs to change PCP      Anticoagulation Care Providers     Provider Role Specialty Phone number    Teo Funes MD [facility] [phone]            See the Encounter Report to view Anticoagulation Flowsheet and Dosing Calendar (Go to Encounters tab in chart review, and find the Anticoagulation Therapy Visit)        Shelley Mercado RN

## 2020-07-09 DIAGNOSIS — I10 BENIGN ESSENTIAL HYPERTENSION: Primary | ICD-10-CM

## 2020-07-09 DIAGNOSIS — I48.21 PERMANENT ATRIAL FIBRILLATION (H): ICD-10-CM

## 2020-07-10 ENCOUNTER — ANTICOAGULATION THERAPY VISIT (OUTPATIENT)
Dept: ANTICOAGULATION | Facility: OTHER | Age: 60
End: 2020-07-10
Attending: FAMILY MEDICINE

## 2020-07-10 DIAGNOSIS — Z79.01 ANTICOAGULATION MONITORING, INR RANGE 2-3: ICD-10-CM

## 2020-07-10 LAB — INR POINT OF CARE: 3 (ref 0.86–1.14)

## 2020-07-10 PROCEDURE — 85610 PROTHROMBIN TIME: CPT | Mod: QW

## 2020-07-10 PROCEDURE — 36416 COLLJ CAPILLARY BLOOD SPEC: CPT

## 2020-07-10 NOTE — PROGRESS NOTES
ANTICOAGULATION FOLLOW-UP CLINIC VISIT    Patient Name:  Babak Moran  Date:  7/10/2020  Contact Type:  Face to Face    SUBJECTIVE:  Patient Findings         Clinical Outcomes     Negatives:   Major bleeding event, Thromboembolic event, Anticoagulation-related hospital admission, Anticoagulation-related ED visit, Anticoagulation-related fatality           OBJECTIVE    Recent labs: (last 7 days)     07/10/20   INR 3.0*       ASSESSMENT / PLAN  INR assessment THER    Recheck INR In: 4 WEEKS    INR Location Clinic      Anticoagulation Summary  As of 7/10/2020    INR goal:   2.0-3.0   TTR:   45.6 % (11.8 mo)   INR used for dosing:   3.0 (7/10/2020)   Warfarin maintenance plan:   5 mg (5 mg x 1) every Mon, Fri; 7.5 mg (5 mg x 1.5) all other days   Full warfarin instructions:   5 mg every Mon, Fri; 7.5 mg all other days   Weekly warfarin total:   47.5 mg   Plan last modified:   Shelley Mercado RN (4/6/2020)   Next INR check:   8/7/2020   Priority:   Maintenance   Target end date:   Indefinite    Indications    Unspecified atrial fibrillation (Resolved) [I48.91]  Anticoagulation monitoring  INR range 2-3 [Z79.01]             Anticoagulation Episode Summary     INR check location:       Preferred lab:       Send INR reminders to:   [facility]    Comments:   Babak prefers to be closer to 3.0 d/t previous CVA check Q 4 weeks.Declines to reduce dose when slightly over 3.0  Needs to change PCP      Anticoagulation Care Providers     Provider Role Specialty Phone number    Teo Funes MD [facility] [phone]            See the Encounter Report to view Anticoagulation Flowsheet and Dosing Calendar (Go to Encounters tab in chart review, and find the Anticoagulation Therapy Visit)        Xochitl Pavon, DANYELLE

## 2020-07-14 RX ORDER — METOPROLOL SUCCINATE 200 MG/1
TABLET, EXTENDED RELEASE ORAL
Qty: 90 TABLET | Refills: 2 | Status: SHIPPED | OUTPATIENT
Start: 2020-07-14 | End: 2020-09-29

## 2020-07-14 NOTE — TELEPHONE ENCOUNTER
[id number] sent Rx request for the following:      Requested Prescriptions   Pending Prescriptions Disp Refills   metoprolol succinate ER (TOPROL-XL) 200 MG 24 hr tablet [Pharmacy Med Name: METOPROLOL SUCC 200MG ER TAB] 90 tablet 2    Sig: TAKE 1 TABLET BY MOUTH EVERY DAY   Last Prescription Date:   9/16/19  Last Fill Qty/Refills:         90, R-2    Last Office Visit:              4/22/20  Future Office visit:           None.    Prescription approved per [facility] Refill Protocol for 90 days and 2 additional refills. Savannah Sifuentes RN .............. 7/14/2020  10:50 AM

## 2020-08-14 ENCOUNTER — ANTICOAGULATION THERAPY VISIT (OUTPATIENT)
Dept: ANTICOAGULATION | Facility: OTHER | Age: 60
End: 2020-08-14
Attending: FAMILY MEDICINE

## 2020-08-14 DIAGNOSIS — Z79.01 ANTICOAGULATION MONITORING, INR RANGE 2-3: ICD-10-CM

## 2020-08-14 DIAGNOSIS — I48.91 ATRIAL FIBRILLATION WITH RVR (H): Primary | ICD-10-CM

## 2020-08-14 DIAGNOSIS — I48.91 ATRIAL FIBRILLATION (H): ICD-10-CM

## 2020-08-14 DIAGNOSIS — Z79.01 LONG-TERM (CURRENT) USE OF ANTICOAGULANTS, INR GOAL 2.0-3.0: ICD-10-CM

## 2020-08-14 LAB — INR POINT OF CARE: 2.5 (ref 0.86–1.14)

## 2020-08-14 PROCEDURE — 36416 COLLJ CAPILLARY BLOOD SPEC: CPT

## 2020-08-14 PROCEDURE — 85610 PROTHROMBIN TIME: CPT | Mod: QW

## 2020-08-14 RX ORDER — WARFARIN SODIUM 5 MG/1
TABLET ORAL
Qty: 135 TABLET | Refills: 1
Start: 2020-08-14 | End: 2020-11-04

## 2020-08-14 NOTE — PROGRESS NOTES
ANTICOAGULATION FOLLOW-UP CLINIC VISIT    Patient Name:  Babak Moran  Date:  2020  Contact Type:  Face to Face    SUBJECTIVE:  Patient Findings         Clinical Outcomes     Negatives:   Major bleeding event, Thromboembolic event, Anticoagulation-related hospital admission, Anticoagulation-related ED visit, Anticoagulation-related fatality           OBJECTIVE    Recent labs: (last 7 days)     20   INR 2.5*       ASSESSMENT / PLAN  INR assessment THER    Recheck INR In: 4 WEEKS    INR Location Clinic      Anticoagulation Summary  As of 2020    INR goal:   2.0-3.0   TTR:   45.6 % (11.8 mo)   INR used for dosin.5 (2020)   Warfarin maintenance plan:   5 mg (5 mg x 1) every Mon; 7.5 mg (5 mg x 1.5) all other days   Full warfarin instructions:   5 mg every Mon; 7.5 mg all other days   Weekly warfarin total:   50 mg   Plan last modified:   Shelley Mercado, RN (2020)   Next INR check:   2020   Priority:   Maintenance   Target end date:   Indefinite    Indications    Unspecified atrial fibrillation (Resolved) [I48.91]  Anticoagulation monitoring  INR range 2-3 [Z79.01]             Anticoagulation Episode Summary     INR check location:       Preferred lab:       Send INR reminders to:   [facility]    Comments:   Babak prefers to be closer to 3.0 d/t previous CVA check Q 4 weeks.Declines to reduce dose when slightly over 3.0  Needs to change PCP      Anticoagulation Care Providers     Provider Role Specialty Phone number    Teo Funes MD [facility] [phone]            See the Encounter Report to view Anticoagulation Flowsheet and Dosing Calendar (Go to Encounters tab in chart review, and find the Anticoagulation Therapy Visit)        Shelley Mercado, RN

## 2020-09-10 ENCOUNTER — ANTICOAGULATION THERAPY VISIT (OUTPATIENT)
Dept: ANTICOAGULATION | Facility: OTHER | Age: 60
End: 2020-09-10
Attending: FAMILY MEDICINE

## 2020-09-10 DIAGNOSIS — Z79.01 ANTICOAGULATION MONITORING, INR RANGE 2-3: ICD-10-CM

## 2020-09-10 DIAGNOSIS — I48.91 ATRIAL FIBRILLATION WITH RVR (H): ICD-10-CM

## 2020-09-10 LAB — INR POINT OF CARE: 2.5 (ref 0.86–1.14)

## 2020-09-10 PROCEDURE — 85610 PROTHROMBIN TIME: CPT | Mod: QW

## 2020-09-10 PROCEDURE — 36416 COLLJ CAPILLARY BLOOD SPEC: CPT

## 2020-09-10 NOTE — PROGRESS NOTES
ANTICOAGULATION FOLLOW-UP CLINIC VISIT    Patient Name:  Babak Moran  Date:  9/10/2020  Contact Type:  Face to Face    SUBJECTIVE:  Patient Findings         Clinical Outcomes     Negatives:   Major bleeding event, Thromboembolic event, Anticoagulation-related hospital admission, Anticoagulation-related ED visit, Anticoagulation-related fatality           OBJECTIVE    Recent labs: (last 7 days)     09/10/20   INR 2.5*       ASSESSMENT / PLAN  INR assessment THER    Recheck INR In: 4 WEEKS    INR Location Clinic      Anticoagulation Summary  As of 9/10/2020    INR goal:   2.0-3.0   TTR:   51.0 % (12 mo)   INR used for dosin.5 (9/10/2020)   Warfarin maintenance plan:   5 mg (5 mg x 1) every Mon; 7.5 mg (5 mg x 1.5) all other days   Full warfarin instructions:   5 mg every Mon; 7.5 mg all other days   Weekly warfarin total:   50 mg   No change documented:   Shelley Mercado RN   Plan last modified:   Shelley Mercado RN (2020)   Next INR check:   10/8/2020   Priority:   Maintenance   Target end date:   Indefinite    Indications    Unspecified atrial fibrillation (Resolved) [I48.91]  Anticoagulation monitoring  INR range 2-3 [Z79.01]  Atrial fibrillation with RVR (H) [I48.91]             Anticoagulation Episode Summary     INR check location:       Preferred lab:       Send INR reminders to:   [facility]    Comments:   Babak prefers to be closer to 3.0 d/t previous CVA check Q 4 weeks.Declines to reduce dose when slightly over 3.0  Needs to change PCP      Anticoagulation Care Providers     Provider Role Specialty Phone number    Teo Funes MD Referring [facility] [phone]            See the Encounter Report to view Anticoagulation Flowsheet and Dosing Calendar (Go to Encounters tab in chart review, and find the Anticoagulation Therapy Visit)        Shelley Mercado RN

## 2020-09-24 ENCOUNTER — TELEPHONE (OUTPATIENT)
Dept: FAMILY MEDICINE | Facility: OTHER | Age: 60
End: 2020-09-24

## 2020-09-25 ENCOUNTER — OFFICE VISIT (OUTPATIENT)
Dept: FAMILY MEDICINE | Facility: OTHER | Age: 60
End: 2020-09-25
Attending: NURSE PRACTITIONER

## 2020-09-25 VITALS
DIASTOLIC BLOOD PRESSURE: 72 MMHG | OXYGEN SATURATION: 97 % | HEART RATE: 50 BPM | BODY MASS INDEX: 40.43 KG/M2 | HEIGHT: 74 IN | RESPIRATION RATE: 20 BRPM | SYSTOLIC BLOOD PRESSURE: 130 MMHG | WEIGHT: 315 LBS | TEMPERATURE: 97.7 F

## 2020-09-25 DIAGNOSIS — L03.116 CELLULITIS OF LEFT LOWER EXTREMITY: Primary | ICD-10-CM

## 2020-09-25 PROCEDURE — 99213 OFFICE O/P EST LOW 20 MIN: CPT | Performed by: FAMILY MEDICINE

## 2020-09-25 RX ORDER — SILVER SULFADIAZINE 10 MG/G
CREAM TOPICAL DAILY
Status: DISCONTINUED | OUTPATIENT
Start: 2020-09-25 | End: 2022-08-08

## 2020-09-25 RX ORDER — SULFAMETHOXAZOLE/TRIMETHOPRIM 800-160 MG
1 TABLET ORAL 2 TIMES DAILY
Qty: 20 TABLET | Refills: 0 | Status: SHIPPED | OUTPATIENT
Start: 2020-09-25 | End: 2020-09-29

## 2020-09-25 ASSESSMENT — ENCOUNTER SYMPTOMS
FEVER: 0
COLOR CHANGE: 1
FATIGUE: 0
WOUND: 0
CHILLS: 0

## 2020-09-25 ASSESSMENT — MIFFLIN-ST. JEOR: SCORE: 2337.17

## 2020-09-25 ASSESSMENT — PAIN SCALES - GENERAL: PAINLEVEL: NO PAIN (0)

## 2020-09-25 NOTE — TELEPHONE ENCOUNTER
After verifying pts name and date of birth with pt, notified pt he needs to be seen. PCP has no availability today and there is nothing in the clinic. Notified pt that he should be seen in [facility] today. Follow up appointment offered on 9/29/20 @1020 and pt accepted.  Faith Pitt LPN

## 2020-09-25 NOTE — PROGRESS NOTES
"  SUBJECTIVE:   Babak Moran is a 60 year old male who presents to clinic today for the following health issues:  Nursing Notes:   Anjelica Castro LPN  9/25/2020 10:50 AM  Sign at exiting of workspace  Chief Complaint   Patient presents with     Leg Swelling     left     Patient presented to the clinic with left lower leg swelling and it is reddish purple in color. Patient reported that he first noticed it on Wednesday night and seemed to get worse yesterday morning.    Initial /72 (BP Location: Right arm, Patient Position: Sitting, Cuff Size: Adult Large)   Pulse 50   Temp 97.7  F (36.5  C) (Tympanic)   Resp 20   Ht 1.88 m (6' 2\")   Wt 145.2 kg (320 lb 3.2 oz)   SpO2 97%   BMI 41.11 kg/m   Estimated body mass index is 41.11 kg/m  as calculated from the following:    Height as of this encounter: 1.88 m (6' 2\").    Weight as of this encounter: 145.2 kg (320 lb 3.2 oz).    Medication Reconciliation: jody Castro LPN      HPI    60-year-old male presents with left leg redness and swelling.  He has a long recurrent history of cellulitis in the left leg.  Records are reviewed.  He is currently on Coumadin.  Previous vascular ultrasound showed no DVT and good arterial flow.  Woke up yesterday morning with redness and swelling.  He got a new kitten and has quite a few scratches on the front of his leg.  Denies fever, drainage.  Patient Active Problem List    Diagnosis Date Noted     Sepsis (H) 09/06/2019     Priority: Medium     Cellulitis of left lower extremity 09/05/2019     Priority: Medium     Obstructive sleep apnea syndrome 05/13/2019     Priority: Medium     Osteoarthrosis 01/16/2018     Priority: Medium     Obesity 01/16/2018     Priority: Medium     Tissue plasminogen activator (t-PA) administered at other facility within 24 hours prior to current admission 01/16/2018     Priority: Medium     Morbid obesity with BMI of 40.0-44.9, adult (H) 02/08/2017     Priority: Medium     " Cerebrovascular accident (CVA) due to embolism of right middle cerebral artery (H) 02/03/2017     Priority: Medium     Left hemiparesis (H) 02/03/2017     Priority: Medium     Anticoagulation monitoring, INR range 2-3 07/01/2015     Priority: Medium     Atrial fibrillation with RVR (H) 06/25/2015     Priority: Medium     Long-term (current) use of anticoagulants, INR goal 2.0-3.0 06/25/2015     Priority: Medium     Family history of coronary artery disease 05/19/2014     Priority: Medium     Ascending aorta enlargement (H) 02/13/2014     Priority: Medium     Gout 01/07/2014     Priority: Medium     Overview:   Overview:   after 3 attacks in < a year, tapped and crystal proven 5/2/13;  Allopurinol started then got ? Atypical side effect, stopped; (short term colchicine prophylaxis)       Pain in joint, ankle and foot 05/17/2012     Priority: Medium     Essential hypertension 09/08/2009     Priority: Medium     Cutaneous lupus erythematosus 11/12/2008     Priority: Medium     Systemic lupus erythematosus (H) 09/18/2008     Priority: Medium     Overview:   Dermatitis       Hypoglycemia 08/26/2008     Priority: Medium     Overview:   Possible       Past Medical History:   Diagnosis Date     Atrial fibrillation (H) 02/03/2017    on Warfarin     Cerebral infarction (H)      Cerebral infarction due to unspecified occlusion or stenosis of right middle cerebral artery (H) 02/03/2017    ,Left hemiparesis, left neglect, impaired balance and gait following R MCA stroke with mild hemorrhagic transformation s/p tissue plasminogen activator and mechanical thrombectomy, s/p C7 facet fracture from fall off forklift.     Chest pain 02/1997     Disorder of skin or subcutaneous tissue 07/25/2011     Essential (primary) hypertension 02/1997     Fracture of cervical vertebra (H)     02/03/2017,C7 facet fracture from fall off forklift, nondisplaced     Gout      Hemiplegia affecting left nondominant side (H) 02/03/2017     Obesity  "02/03/2017    body mass index of 40     Other local lupus erythematosus       Current Outpatient Medications   Medication Sig Dispense Refill     amLODIPine (NORVASC) 5 MG tablet Take 1 tablet (5 mg) by mouth daily 90 tablet 3     DULoxetine (CYMBALTA) 30 MG capsule Take 1 capsule (30 mg) by mouth daily 90 capsule 3     famotidine (PEPCID) 40 MG tablet Take 1 tablet (40 mg) by mouth daily 90 tablet 0     furosemide (LASIX) 40 MG tablet Take 1 tablet (40 mg) by mouth daily 90 tablet 1     HEMP OIL OR EXTRACT OR OTHER CBD CANNABINOID, NOT MEDICAL CANNABIS, Uses topical on knees and oral as needed       hydrocortisone (CORTAID) 1 % external cream        metoprolol succinate ER (TOPROL-XL) 200 MG 24 hr tablet TAKE 1 TABLET BY MOUTH EVERY DAY 90 tablet 2     sulfamethoxazole-trimethoprim (BACTRIM DS) 800-160 MG tablet Take 1 tablet by mouth 2 times daily for 10 days 20 tablet 0     warfarin ANTICOAGULANT (JANTOVEN ANTICOAGULANT) 5 MG tablet Take 7.5 mg x 6 days and 5 mg x 1 day/week OR AS DIRECTED BY [facility] 135 tablet 1       Review of Systems   Constitutional: Negative for chills, fatigue and fever.   Skin: Positive for color change and rash. Negative for wound.        OBJECTIVE:     /72 (BP Location: Right arm, Patient Position: Sitting, Cuff Size: Adult Large)   Pulse 50   Temp 97.7  F (36.5  C) (Tympanic)   Resp 20   Ht 1.88 m (6' 2\")   Wt 145.2 kg (320 lb 3.2 oz)   SpO2 97%   BMI 41.11 kg/m    Body mass index is 41.11 kg/m .  Physical Exam  Skin:     General: Skin is warm.      Capillary Refill: Capillary refill takes less than 2 seconds.      Coloration: Skin is not cyanotic or mottled.      Findings: Erythema present. No bruising, lesion or wound.             Comments: Redness on anterior left lower leg, edges marked    No drainage    DP strong, CR normal  Calf nontender         Diagnostic Test Results:  none     ASSESSMENT/PLAN:           ICD-10-CM    1. Cellulitis of left lower extremity  " L03.116 sulfamethoxazole-trimethoprim (BACTRIM DS) 800-160 MG tablet       Based on previous cultures we will start him on Bactrim twice daily for 10 days.  He has a follow-up appointment scheduled for Tuesday with his primary.  Edges were marked today.  Recommend elevating Ace wrap   Will need repeat INR done early next week.  Mariela Zarco MD  [facility]

## 2020-09-25 NOTE — PATIENT INSTRUCTIONS
Apply silvadene once daily    Wrap with ace wrap, elevated to keep swelling down    Start antibiotics    See Primary on Tuesday, will need INR drawn

## 2020-09-25 NOTE — NURSING NOTE
"Chief Complaint   Patient presents with     Leg Swelling     left     Patient presented to the clinic with left lower leg swelling and it is reddish purple in color. Patient reported that he first noticed it on Wednesday night and seemed to get worse yesterday morning.    Initial /72 (BP Location: Right arm, Patient Position: Sitting, Cuff Size: Adult Large)   Pulse 50   Temp 97.7  F (36.5  C) (Tympanic)   Resp 20   Ht 1.88 m (6' 2\")   Wt 145.2 kg (320 lb 3.2 oz)   SpO2 97%   BMI 41.11 kg/m   Estimated body mass index is 41.11 kg/m  as calculated from the following:    Height as of this encounter: 1.88 m (6' 2\").    Weight as of this encounter: 145.2 kg (320 lb 3.2 oz).    Medication Reconciliation: complete      Anjelica Castro LPN  "

## 2020-09-29 ENCOUNTER — ANTICOAGULATION THERAPY VISIT (OUTPATIENT)
Dept: ANTICOAGULATION | Facility: OTHER | Age: 60
End: 2020-09-29

## 2020-09-29 ENCOUNTER — OFFICE VISIT (OUTPATIENT)
Dept: FAMILY MEDICINE | Facility: OTHER | Age: 60
End: 2020-09-29
Attending: FAMILY MEDICINE

## 2020-09-29 VITALS
SYSTOLIC BLOOD PRESSURE: 136 MMHG | RESPIRATION RATE: 19 BRPM | HEART RATE: 56 BPM | WEIGHT: 314.2 LBS | OXYGEN SATURATION: 95 % | DIASTOLIC BLOOD PRESSURE: 86 MMHG | TEMPERATURE: 96.3 F | BODY MASS INDEX: 40.34 KG/M2

## 2020-09-29 DIAGNOSIS — I48.21 PERMANENT ATRIAL FIBRILLATION (H): ICD-10-CM

## 2020-09-29 DIAGNOSIS — M79.89 SWELLING OF LIMB: ICD-10-CM

## 2020-09-29 DIAGNOSIS — I48.91 ATRIAL FIBRILLATION WITH RVR (H): ICD-10-CM

## 2020-09-29 DIAGNOSIS — L03.116 CELLULITIS OF LEFT LOWER EXTREMITY: Primary | ICD-10-CM

## 2020-09-29 DIAGNOSIS — D51.0 PERNICIOUS ANEMIA: ICD-10-CM

## 2020-09-29 DIAGNOSIS — D64.9 ANEMIA, UNSPECIFIED TYPE: ICD-10-CM

## 2020-09-29 DIAGNOSIS — Z79.01 ANTICOAGULATION MONITORING, INR RANGE 2-3: ICD-10-CM

## 2020-09-29 DIAGNOSIS — I10 BENIGN ESSENTIAL HYPERTENSION: ICD-10-CM

## 2020-09-29 DIAGNOSIS — M17.0 PRIMARY OSTEOARTHRITIS OF BOTH KNEES: ICD-10-CM

## 2020-09-29 LAB
ALBUMIN SERPL-MCNC: 4 G/DL (ref 3.5–5.7)
ALP SERPL-CCNC: 60 U/L (ref 34–104)
ALT SERPL W P-5'-P-CCNC: 16 U/L (ref 7–52)
ANION GAP SERPL CALCULATED.3IONS-SCNC: 4 MMOL/L (ref 3–14)
AST SERPL W P-5'-P-CCNC: 19 U/L (ref 13–39)
BILIRUB SERPL-MCNC: 0.8 MG/DL (ref 0.3–1)
BUN SERPL-MCNC: 14 MG/DL (ref 7–25)
CALCIUM SERPL-MCNC: 9.7 MG/DL (ref 8.6–10.3)
CHLORIDE SERPL-SCNC: 101 MMOL/L (ref 98–107)
CO2 SERPL-SCNC: 33 MMOL/L (ref 21–31)
CREAT SERPL-MCNC: 0.99 MG/DL (ref 0.7–1.3)
ERYTHROCYTE [DISTWIDTH] IN BLOOD BY AUTOMATED COUNT: 16.1 % (ref 10–15)
GFR SERPL CREATININE-BSD FRML MDRD: 77 ML/MIN/{1.73_M2}
GLUCOSE SERPL-MCNC: 98 MG/DL (ref 70–105)
HCT VFR BLD AUTO: 47.3 % (ref 40–53)
HGB BLD-MCNC: 14.5 G/DL (ref 13.3–17.7)
INR POINT OF CARE: 2.2 (ref 0.86–1.14)
MCH RBC QN AUTO: 26.4 PG (ref 26.5–33)
MCHC RBC AUTO-ENTMCNC: 30.7 G/DL (ref 31.5–36.5)
MCV RBC AUTO: 86 FL (ref 78–100)
PLATELET # BLD AUTO: 315 10E9/L (ref 150–450)
POTASSIUM SERPL-SCNC: 4.3 MMOL/L (ref 3.5–5.1)
PROT SERPL-MCNC: 7.5 G/DL (ref 6.4–8.9)
RBC # BLD AUTO: 5.49 10E12/L (ref 4.4–5.9)
SODIUM SERPL-SCNC: 138 MMOL/L (ref 134–144)
VIT B12 SERPL-MCNC: 417 PG/ML (ref 180–914)
WBC # BLD AUTO: 7.1 10E9/L (ref 4–11)

## 2020-09-29 PROCEDURE — 85610 PROTHROMBIN TIME: CPT | Mod: QW

## 2020-09-29 PROCEDURE — 82607 VITAMIN B-12: CPT | Mod: ZL | Performed by: FAMILY MEDICINE

## 2020-09-29 PROCEDURE — 85027 COMPLETE CBC AUTOMATED: CPT | Mod: ZL | Performed by: FAMILY MEDICINE

## 2020-09-29 PROCEDURE — 80053 COMPREHEN METABOLIC PANEL: CPT | Mod: ZL | Performed by: FAMILY MEDICINE

## 2020-09-29 PROCEDURE — 99214 OFFICE O/P EST MOD 30 MIN: CPT | Performed by: FAMILY MEDICINE

## 2020-09-29 PROCEDURE — 36416 COLLJ CAPILLARY BLOOD SPEC: CPT

## 2020-09-29 RX ORDER — FUROSEMIDE 40 MG
40 TABLET ORAL DAILY
Qty: 90 TABLET | Refills: 1 | Status: SHIPPED | OUTPATIENT
Start: 2020-09-29 | End: 2021-10-08

## 2020-09-29 RX ORDER — DULOXETIN HYDROCHLORIDE 30 MG/1
30 CAPSULE, DELAYED RELEASE ORAL DAILY
Qty: 90 CAPSULE | Refills: 3 | Status: SHIPPED | OUTPATIENT
Start: 2020-09-29 | End: 2022-06-26

## 2020-09-29 RX ORDER — SULFAMETHOXAZOLE/TRIMETHOPRIM 800-160 MG
1 TABLET ORAL 2 TIMES DAILY
Qty: 20 TABLET | Refills: 0 | Status: SHIPPED | OUTPATIENT
Start: 2020-10-04 | End: 2021-04-23

## 2020-09-29 RX ORDER — METOPROLOL SUCCINATE 200 MG/1
200 TABLET, EXTENDED RELEASE ORAL DAILY
Qty: 90 TABLET | Refills: 3 | Status: SHIPPED | OUTPATIENT
Start: 2020-09-29 | End: 2022-05-13

## 2020-09-29 RX ORDER — AMLODIPINE BESYLATE 5 MG/1
5 TABLET ORAL DAILY
Qty: 90 TABLET | Refills: 3 | Status: SHIPPED | OUTPATIENT
Start: 2020-09-29 | End: 2022-03-28

## 2020-09-29 RX ORDER — LANOLIN ALCOHOL/MO/W.PET/CERES
1000 CREAM (GRAM) TOPICAL DAILY
COMMUNITY
Start: 2020-09-29 | End: 2022-06-26

## 2020-09-29 ASSESSMENT — PAIN SCALES - GENERAL: PAINLEVEL: NO PAIN (0)

## 2020-09-29 NOTE — LETTER
September 29, 2020      Babak Moran     [place] [address]        Dear ,    Your labs are included below. The vitamin B12 level improved on supplement. It appears you will need B12 supplementation for life. Electrolytes, liver, and kidney tests are fine.  Blood counts have improved and the hemoglobin is now normal, no longer showing anemia.       Resulted Orders   Vitamin B12   Result Value Ref Range    Vitamin B12 417 180 - 914 pg/mL   Comprehensive Metabolic Panel   Result Value Ref Range    Sodium 138 134 - 144 mmol/L    Potassium 4.3 3.5 - 5.1 mmol/L    Chloride 101 98 - 107 mmol/L    Carbon Dioxide 33 (H) 21 - 31 mmol/L    Anion Gap 4 3 - 14 mmol/L    Glucose 98 70 - 105 mg/dL    Urea Nitrogen 14 7 - 25 mg/dL    Creatinine 0.99 0.70 - 1.30 mg/dL    GFR Estimate 77 >60 mL/min/[1.73_m2]    GFR Estimate If Black >90 >60 mL/min/[1.73_m2]    Calcium 9.7 8.6 - 10.3 mg/dL    Bilirubin Total 0.8 0.3 - 1.0 mg/dL    Albumin 4.0 3.5 - 5.7 g/dL    Protein Total 7.5 6.4 - 8.9 g/dL    Alkaline Phosphatase 60 34 - 104 U/L    ALT 16 7 - 52 U/L    AST 19 13 - 39 U/L   CBC W PLT No Diff   Result Value Ref Range    WBC 7.1 4.0 - 11.0 10e9/L    RBC Count 5.49 4.4 - 5.9 10e12/L    Hemoglobin 14.5 13.3 - 17.7 g/dL    Hematocrit 47.3 40.0 - 53.0 %    MCV 86 78 - 100 fl    MCH 26.4 (L) 26.5 - 33.0 pg    MCHC 30.7 (L) 31.5 - 36.5 g/dL    RDW 16.1 (H) 10.0 - 15.0 %    Platelet Count 315 150 - 450 10e9/L       If you have any questions or concerns, please call the clinic at the number listed above.       Sincerely,      Teo Funes MD  Electronically signed

## 2020-09-29 NOTE — NURSING NOTE
Pt presents to clinic today for left leg cellulitis. Pt states improvement since last office visit 4 days ago.      Medication Reconciliation: complete  Faith Pitt LPN

## 2020-09-29 NOTE — PROGRESS NOTES
Nursing Notes:   Faith Pitt LPN  9/29/2020 10:28 AM  Signed  Pt presents to clinic today for left leg cellulitis. Pt states improvement since last office visit 4 days ago.      Medication Reconciliation: complete  Faith Pitt LPN      SUBJECTIVE:  60 year old male presents to follow up on left leg cellulitis.  Started 6 days ago.  Seen 4 days ago and placed on Bactrim. Improved since that time.     He had recurrent left leg cellulitis present in 2019 in the spring 2020.  Previous wound culture in October 2019 showing Enterobacter and staph intermedius.  Bactrim is only medication that would treat both organisms.    Doing well with blood pressure medications.  Blood pressure controlled.    He has lost significant weight by eating less.  Down 50 pounds since November 2019.    Leg edema has improved with weight loss.  Taking furosemide 40 mg daily, but not consistently every day.  He avoids taking it when he works at [employer] .  Walking more    Duloxetine has helped with knee pain    He was anemic last year with hemoglobin down to 9.1. B12 low at 248 and iron slightly low. Hemoglobin improved to normal with B12 and famotidine.    REVIEW OF SYSTEMS:    Constitutional: negative  Respiratory: negative  Cardiovascular: negative  Gastrointestinal: negative    Current Outpatient Medications   Medication Sig Dispense Refill     amLODIPine (NORVASC) 5 MG tablet Take 1 tablet (5 mg) by mouth daily 90 tablet 3     DULoxetine (CYMBALTA) 30 MG capsule Take 1 capsule (30 mg) by mouth daily 90 capsule 3     famotidine (PEPCID) 40 MG tablet Take 1 tablet (40 mg) by mouth daily 90 tablet 0     furosemide (LASIX) 40 MG tablet Take 1 tablet (40 mg) by mouth daily 90 tablet 1     HEMP OIL OR EXTRACT OR OTHER CBD CANNABINOID, NOT MEDICAL CANNABIS, Uses topical on knees and oral as needed       hydrocortisone (CORTAID) 1 % external cream        metoprolol succinate ER (TOPROL-XL) 200 MG 24 hr tablet TAKE 1  TABLET BY MOUTH EVERY DAY 90 tablet 2     sulfamethoxazole-trimethoprim (BACTRIM DS) 800-160 MG tablet Take 1 tablet by mouth 2 times daily for 10 days 20 tablet 0     warfarin ANTICOAGULANT (JANTOVEN ANTICOAGULANT) 5 MG tablet Take 7.5 mg x 6 days and 5 mg x 1 day/week OR AS DIRECTED BY [facility] 135 tablet 1     Allergies   Allergen Reactions     Ioxaglate Unknown     Diatrizoate Rash     Levofloxacin Hives and Rash     Metrizamide Rash     (Diagnostic X-Ray materials)     Probenecid Rash       OBJECTIVE:  /86   Pulse 56   Temp 96.3  F (35.7  C) (Temporal)   Resp 19   Wt 142.5 kg (314 lb 3.2 oz)   SpO2 95%   BMI 40.34 kg/m      EXAM:  General Appearance: Alert. No acute distress  Chest/Respiratory Exam: Clear to auscultation bilaterally  Cardiovascular Exam: Regular rate and rhythm. S1, S2, no murmur, gallop, or rubs.  Extremities: No lower extremity edema.  Skin: Dusky erythema left lower extremity.  No erythema outside of outlined area.  Psychiatric: Normal affect and mentation      ASSESSMENT/PLAN:    ICD-10-CM    1. Cellulitis of left lower extremity  L03.116 sulfamethoxazole-trimethoprim (BACTRIM DS) 800-160 MG tablet   2. Swelling of limb  M79.89 furosemide (LASIX) 40 MG tablet   3. Primary osteoarthritis of both knees  M17.0 DULoxetine (CYMBALTA) 30 MG capsule   4. Benign essential hypertension  I10 amLODIPine (NORVASC) 5 MG tablet     furosemide (LASIX) 40 MG tablet     metoprolol succinate ER (TOPROL-XL) 200 MG 24 hr tablet     CBC W PLT No Diff     Comprehensive Metabolic Panel     Comprehensive Metabolic Panel     CBC W PLT No Diff   5. Permanent atrial fibrillation (H)  I48.21 metoprolol succinate ER (TOPROL-XL) 200 MG 24 hr tablet   6. Anemia, unspecified type  D64.9 CBC W PLT No Diff     CBC W PLT No Diff   7. Pernicious anemia  D51.0 cyanocobalamin (VITAMIN B-12) 1000 MCG tablet     Vitamin B12     Vitamin B12       He has had recurrent problems with cellulitis.  Latest episode may  have been precipitated by scratches from his kitten.  Improved greatly on Bactrim.  He is required prolonged courses in the past.  Extended Bactrim another 10 days for total of a 20-day course.    Leg edema is much improved with weight loss.  He likely does not need furosemide every day.  In fact, he is already started taking it last.  May continue with current process.    Refilled duloxetine helpful for knee arthritis.  Weight loss has been very beneficial for knee pain.    INR checked today is normal.  Refilled blood pressure medications.    Anemia has resolved with famotidine for presumed gastritis from warfarin and previous NSAID use.  No longer on NSAIDs.  B12 level improved.  Appears to require lifelong B12 supplementation.    Follow-up 1 year    Teo Funes MD    This document was prepared using a combination of typing and voice generated software.  While every attempt was made for accuracy, spelling and grammatical errors may exist.

## 2020-10-09 ENCOUNTER — ANTICOAGULATION THERAPY VISIT (OUTPATIENT)
Dept: ANTICOAGULATION | Facility: OTHER | Age: 60
End: 2020-10-09
Attending: FAMILY MEDICINE

## 2020-10-09 DIAGNOSIS — I48.91 ATRIAL FIBRILLATION WITH RVR (H): ICD-10-CM

## 2020-10-09 DIAGNOSIS — Z79.01 ANTICOAGULATION MONITORING, INR RANGE 2-3: ICD-10-CM

## 2020-10-09 LAB — INR POINT OF CARE: 3.3 (ref 0.86–1.14)

## 2020-10-09 PROCEDURE — 36416 COLLJ CAPILLARY BLOOD SPEC: CPT

## 2020-10-09 PROCEDURE — 85610 PROTHROMBIN TIME: CPT

## 2020-10-09 NOTE — PROGRESS NOTES
ANTICOAGULATION FOLLOW-UP CLINIC VISIT    Patient Name:  Babak Moran  Date:  10/9/2020  Contact Type:  Face to Face    SUBJECTIVE:  Patient Findings     Positives:  Change in medications (continues with bactrim x 10 days)             OBJECTIVE    Recent labs: (last 7 days)     10/09/20   INR 3.3*       ASSESSMENT / PLAN  INR assessment SUPRA    Recheck INR In: 1 WEEK    INR Location Clinic      Anticoagulation Summary  As of 10/9/2020    INR goal:  2.0-3.0   TTR:  56.4 % (1 y)   INR used for dosing:  3.3 (10/9/2020)   Warfarin maintenance plan:  5 mg (5 mg x 1) every Mon; 7.5 mg (5 mg x 1.5) all other days   Full warfarin instructions:  10/9: 5 mg; Otherwise 5 mg every Mon; 7.5 mg all other days   Weekly warfarin total:  50 mg   Plan last modified:  Shelley Mercado RN (8/14/2020)   Next INR check:  10/16/2020   Priority:  Maintenance   Target end date:  Indefinite    Indications    Unspecified atrial fibrillation (Resolved) [I48.91]  Anticoagulation monitoring  INR range 2-3 [Z79.01]  Atrial fibrillation with RVR (H) [I48.91]             Anticoagulation Episode Summary     INR check location:      Preferred lab:      Send INR reminders to:  [facility]    Comments:  Babak prefers to be closer to 3.0 d/t previous CVA check Q 4 weeks.Declines to reduce dose when slightly over 3.0  Needs to change PCP      Anticoagulation Care Providers     Provider Role Specialty Phone number    Teo Funes MD Referring [facility] [phone]            See the Encounter Report to view Anticoagulation Flowsheet and Dosing Calendar (Go to Encounters tab in chart review, and find the Anticoagulation Therapy Visit)        Shelley Mercado, RN

## 2020-10-22 ENCOUNTER — ANTICOAGULATION THERAPY VISIT (OUTPATIENT)
Dept: ANTICOAGULATION | Facility: OTHER | Age: 60
End: 2020-10-22

## 2020-10-22 DIAGNOSIS — Z79.01 ANTICOAGULATION MONITORING, INR RANGE 2-3: ICD-10-CM

## 2020-10-22 DIAGNOSIS — I48.91 ATRIAL FIBRILLATION WITH RVR (H): ICD-10-CM

## 2020-10-22 LAB — INR POINT OF CARE: 2.6 (ref 0.86–1.14)

## 2020-10-22 PROCEDURE — 85610 PROTHROMBIN TIME: CPT

## 2020-10-22 PROCEDURE — 36416 COLLJ CAPILLARY BLOOD SPEC: CPT

## 2020-11-04 DIAGNOSIS — I48.91 ATRIAL FIBRILLATION (H): ICD-10-CM

## 2020-11-04 DIAGNOSIS — Z79.01 LONG-TERM (CURRENT) USE OF ANTICOAGULANTS, INR GOAL 2.0-3.0: ICD-10-CM

## 2020-11-04 RX ORDER — WARFARIN SODIUM 5 MG/1
TABLET ORAL
Qty: 150 TABLET | Refills: 1 | Status: SHIPPED | OUTPATIENT
Start: 2020-11-04 | End: 2021-04-23

## 2020-11-04 NOTE — TELEPHONE ENCOUNTER
"Requested Prescriptions   Pending Prescriptions Disp Refills     warfarin ANTICOAGULANT (JANTOVEN ANTICOAGULANT) 5 MG tablet [Pharmacy Med Name: JANTOVEN 5MG TABLET] 135 tablet 1     Sig: TAKE 7.5 MG X 5 DAYS AND 5 MG X 2 DAYS/WEEK OR AS DIRECTED BY [facility]       Vitamin K Antagonists Failed - 11/4/2020  1:07 AM        Failed - INR is within goal in the past 6 weeks     Confirm INR is within goal in the past 6 weeks.     Recent Labs   Lab Test 10/22/20   INR 2.6*                       Passed - Recent (12 mo) or future (30 days) visit within the authorizing provider's specialty     Patient has had an office visit with the authorizing provider or a provider within the authorizing providers department within the previous 12 mos or has a future within next 30 days. See \"Patient Info\" tab in inbasket, or \"Choose Columns\" in Meds & Orders section of the refill encounter.              Passed - Medication is active on med list        Passed - Patient is 18 years of age or older           Prescription approved per [facility] [place].    "

## 2020-11-06 ENCOUNTER — ANTICOAGULATION THERAPY VISIT (OUTPATIENT)
Dept: ANTICOAGULATION | Facility: OTHER | Age: 60
End: 2020-11-06
Attending: FAMILY MEDICINE

## 2020-11-06 DIAGNOSIS — I48.91 ATRIAL FIBRILLATION WITH RVR (H): ICD-10-CM

## 2020-11-06 DIAGNOSIS — Z79.01 ANTICOAGULATION MONITORING, INR RANGE 2-3: ICD-10-CM

## 2020-11-06 LAB — INR POINT OF CARE: 3 (ref 0.86–1.14)

## 2020-11-06 PROCEDURE — 36416 COLLJ CAPILLARY BLOOD SPEC: CPT | Performed by: FAMILY MEDICINE

## 2020-11-06 PROCEDURE — 85610 PROTHROMBIN TIME: CPT | Performed by: FAMILY MEDICINE

## 2020-11-06 NOTE — PROGRESS NOTES
ANTICOAGULATION FOLLOW-UP CLINIC VISIT    Patient Name:  Babak Moran  Date:  11/6/2020  Contact Type:  Face to Face    SUBJECTIVE:  Patient Findings         Clinical Outcomes     Negatives:  Major bleeding event, Thromboembolic event, Anticoagulation-related hospital admission, Anticoagulation-related ED visit, Anticoagulation-related fatality           OBJECTIVE    Recent labs: (last 7 days)     11/06/20   INR 3.0*       ASSESSMENT / PLAN  INR assessment THER    Recheck INR In: 4 WEEKS    INR Location Clinic      Anticoagulation Summary  As of 11/6/2020    INR goal:  2.0-3.0   TTR:  58.9 % (1 y)   INR used for dosing:  3.0 (11/6/2020)   Warfarin maintenance plan:  5 mg (5 mg x 1) every Mon; 7.5 mg (5 mg x 1.5) all other days   Full warfarin instructions:  5 mg every Mon; 7.5 mg all other days   Weekly warfarin total:  50 mg   No change documented:  Shelley Mercado RN   Plan last modified:  Shelley Mercado RN (8/14/2020)   Next INR check:  12/4/2020   Priority:  Maintenance   Target end date:  Indefinite    Indications    Unspecified atrial fibrillation (Resolved) [I48.91]  Anticoagulation monitoring  INR range 2-3 [Z79.01]  Atrial fibrillation with RVR (H) [I48.91]             Anticoagulation Episode Summary     INR check location:      Preferred lab:      Send INR reminders to:  [facility]    Comments:  Babak prefers to be closer to 3.0 d/t previous CVA check Q 4 weeks.Declines to reduce dose when slightly over 3.0  Needs to change PCP      Anticoagulation Care Providers     Provider Role Specialty Phone number    Teo Funes MD Referring Family Medicine [phone]            See the Encounter Report to view Anticoagulation Flowsheet and Dosing Calendar (Go to Encounters tab in chart review, and find the Anticoagulation Therapy Visit)        Shelley Mercado RN

## 2020-12-04 ENCOUNTER — ANTICOAGULATION THERAPY VISIT (OUTPATIENT)
Dept: ANTICOAGULATION | Facility: OTHER | Age: 60
End: 2020-12-04

## 2020-12-04 DIAGNOSIS — Z79.01 ANTICOAGULATION MONITORING, INR RANGE 2-3: ICD-10-CM

## 2020-12-04 DIAGNOSIS — I48.91 ATRIAL FIBRILLATION WITH RVR (H): ICD-10-CM

## 2020-12-04 LAB — INR POINT OF CARE: 2.9 (ref 0.86–1.14)

## 2020-12-04 PROCEDURE — 85610 PROTHROMBIN TIME: CPT | Mod: ZL

## 2020-12-04 PROCEDURE — 36416 COLLJ CAPILLARY BLOOD SPEC: CPT | Performed by: FAMILY MEDICINE

## 2020-12-04 PROCEDURE — 85610 PROTHROMBIN TIME: CPT | Performed by: FAMILY MEDICINE

## 2020-12-04 NOTE — PROGRESS NOTES
ANTICOAGULATION FOLLOW-UP CLINIC VISIT    Patient Name:  Babak Moran  Date:  2020  Contact Type:  Face to Face    SUBJECTIVE:  Patient Findings         Clinical Outcomes     Negatives:  Major bleeding event, Thromboembolic event, Anticoagulation-related hospital admission, Anticoagulation-related ED visit, Anticoagulation-related fatality           OBJECTIVE    Recent labs: (last 7 days)     20   INR 2.9*       ASSESSMENT / PLAN  INR assessment THER    Recheck INR In: 4 WEEKS    INR Location Clinic      Anticoagulation Summary  As of 2020    INR goal:  2.0-3.0   TTR:  62.6 % (1 y)   INR used for dosin.9 (2020)   Warfarin maintenance plan:  5 mg (5 mg x 1) every Mon; 7.5 mg (5 mg x 1.5) all other days   Full warfarin instructions:  5 mg every Mon; 7.5 mg all other days   Weekly warfarin total:  50 mg   No change documented:  Laura Mathias RN   Plan last modified:  Shelley Mercado RN (2020)   Next INR check:  2020   Priority:  Maintenance   Target end date:  Indefinite    Indications    Unspecified atrial fibrillation (Resolved) [I48.91]  Anticoagulation monitoring  INR range 2-3 [Z79.01]  Atrial fibrillation with RVR (H) [I48.91]             Anticoagulation Episode Summary     INR check location:      Preferred lab:      Send INR reminders to:  [facility]    Comments:  Babak prefers to be closer to 3.0 d/t previous CVA check Q 4 weeks.Declines to reduce dose when slightly over 3.0  Needs to change PCP      Anticoagulation Care Providers     Provider Role Specialty Phone number    Teo Funes MD Referring Family Medicine [phone]            See the Encounter Report to view Anticoagulation Flowsheet and Dosing Calendar (Go to Encounters tab in chart review, and find the Anticoagulation Therapy Visit)        Laura Mathias, DANYELLE

## 2021-01-08 ENCOUNTER — ANTICOAGULATION THERAPY VISIT (OUTPATIENT)
Dept: ANTICOAGULATION | Facility: OTHER | Age: 61
End: 2021-01-08
Attending: FAMILY MEDICINE

## 2021-01-08 DIAGNOSIS — I48.91 ATRIAL FIBRILLATION WITH RVR (H): ICD-10-CM

## 2021-01-08 DIAGNOSIS — Z79.01 ANTICOAGULATION MONITORING, INR RANGE 2-3: ICD-10-CM

## 2021-01-08 LAB — INR POINT OF CARE: 2.4 (ref 0.86–1.14)

## 2021-01-08 PROCEDURE — 85610 PROTHROMBIN TIME: CPT

## 2021-01-08 PROCEDURE — 36416 COLLJ CAPILLARY BLOOD SPEC: CPT

## 2021-01-08 NOTE — PROGRESS NOTES
ANTICOAGULATION FOLLOW-UP CLINIC VISIT    Patient Name:  Babak Moran  Date:  2021  Contact Type:  Face to Face    SUBJECTIVE:  Patient Findings         Clinical Outcomes     Negatives:  Major bleeding event, Thromboembolic event, Anticoagulation-related hospital admission, Anticoagulation-related ED visit, Anticoagulation-related fatality           OBJECTIVE    Recent labs: (last 7 days)     21   INR 2.4*       ASSESSMENT / PLAN  INR assessment THER    Recheck INR In: 4 WEEKS    INR Location Clinic      Anticoagulation Summary  As of 2021    INR goal:  2.0-3.0   TTR:  63.1 % (1 y)   INR used for dosin.4 (2021)   Warfarin maintenance plan:  5 mg (5 mg x 1) every Mon; 7.5 mg (5 mg x 1.5) all other days   Full warfarin instructions:  5 mg every Mon; 7.5 mg all other days   Weekly warfarin total:  50 mg   No change documented:  Xochitl Pavon RN   Plan last modified:  Shelley Mercado RN (2020)   Next INR check:  2021   Priority:  Maintenance   Target end date:  Indefinite    Indications    Unspecified atrial fibrillation (Resolved) [I48.91]  Anticoagulation monitoring  INR range 2-3 [Z79.01]  Atrial fibrillation with RVR (H) [I48.91]             Anticoagulation Episode Summary     INR check location:      Preferred lab:      Send INR reminders to:  [facility]    Comments:  Babak prefers to be closer to 3.0 d/t previous CVA check Q 4 weeks.Declines to reduce dose when slightly over 3.0  Needs to change PCP      Anticoagulation Care Providers     Provider Role Specialty Phone number    Teo Funes MD Referring Family Medicine [phone]            See the Encounter Report to view Anticoagulation Flowsheet and Dosing Calendar (Go to Encounters tab in chart review, and find the Anticoagulation Therapy Visit)        Xochitl Pavon RN

## 2021-02-11 ENCOUNTER — ANTICOAGULATION THERAPY VISIT (OUTPATIENT)
Dept: ANTICOAGULATION | Facility: OTHER | Age: 61
End: 2021-02-11
Attending: FAMILY MEDICINE

## 2021-02-11 DIAGNOSIS — Z79.01 ANTICOAGULATION MONITORING, INR RANGE 2-3: ICD-10-CM

## 2021-02-11 DIAGNOSIS — I48.91 ATRIAL FIBRILLATION WITH RVR (H): ICD-10-CM

## 2021-02-11 LAB — INR POINT OF CARE: 2.4 (ref 0.86–1.14)

## 2021-02-11 PROCEDURE — 36416 COLLJ CAPILLARY BLOOD SPEC: CPT

## 2021-02-11 PROCEDURE — 85610 PROTHROMBIN TIME: CPT | Mod: QW

## 2021-02-11 NOTE — PROGRESS NOTES
ANTICOAGULATION FOLLOW-UP CLINIC VISIT    Patient Name:  Babak Moran  Date:  2021  Contact Type:  Face to Face    SUBJECTIVE:  Patient Findings         Clinical Outcomes     Negatives:  Major bleeding event, Thromboembolic event, Anticoagulation-related hospital admission, Anticoagulation-related ED visit, Anticoagulation-related fatality           OBJECTIVE    Recent labs: (last 7 days)     21   INR 2.4*       ASSESSMENT / PLAN  INR assessment THER    Recheck INR In: 4 WEEKS    INR Location Clinic      Anticoagulation Summary  As of 2021    INR goal:  2.0-3.0   TTR:  68.5 % (1 y)   INR used for dosin.4 (2021)   Warfarin maintenance plan:  5 mg (5 mg x 1) every Mon; 7.5 mg (5 mg x 1.5) all other days   Full warfarin instructions:  5 mg every Mon; 7.5 mg all other days   Weekly warfarin total:  50 mg   Plan last modified:  Shelley Mercado RN (2020)   Next INR check:  3/11/2021   Priority:  Maintenance   Target end date:  Indefinite    Indications    Unspecified atrial fibrillation (Resolved) [I48.91]  Anticoagulation monitoring  INR range 2-3 [Z79.01]  Atrial fibrillation with RVR (H) [I48.91]             Anticoagulation Episode Summary     INR check location:      Preferred lab:      Send INR reminders to:  [facility]    Comments:  Babak prefers to be closer to 3.0 d/t previous CVA check Q 4 weeks.Declines to reduce dose when slightly over 3.0  Needs to change PCP      Anticoagulation Care Providers     Provider Role Specialty Phone number    Teo Funes MD Referring Family Medicine [phone]            See the Encounter Report to view Anticoagulation Flowsheet and Dosing Calendar (Go to Encounters tab in chart review, and find the Anticoagulation Therapy Visit)      Alysa Rodriguez, DANYELLE

## 2021-03-11 ENCOUNTER — ANTICOAGULATION THERAPY VISIT (OUTPATIENT)
Dept: ANTICOAGULATION | Facility: OTHER | Age: 61
End: 2021-03-11

## 2021-03-11 DIAGNOSIS — Z79.01 ANTICOAGULATION MONITORING, INR RANGE 2-3: ICD-10-CM

## 2021-03-11 DIAGNOSIS — I48.91 ATRIAL FIBRILLATION WITH RVR (H): ICD-10-CM

## 2021-03-11 LAB — INR POINT OF CARE: 2.4 (ref 0.86–1.14)

## 2021-03-11 PROCEDURE — 36416 COLLJ CAPILLARY BLOOD SPEC: CPT

## 2021-03-11 PROCEDURE — 85610 PROTHROMBIN TIME: CPT | Mod: QW

## 2021-03-11 NOTE — PROGRESS NOTES
ANTICOAGULATION FOLLOW-UP CLINIC VISIT    Patient Name:  Babak Moran  Date:  3/11/2021  Contact Type:  Face to Face    SUBJECTIVE:  Patient Findings         Clinical Outcomes     Negatives:  Major bleeding event, Thromboembolic event, Anticoagulation-related hospital admission, Anticoagulation-related ED visit, Anticoagulation-related fatality           OBJECTIVE    Recent labs: (last 7 days)     21   INR 2.4*       ASSESSMENT / PLAN  INR assessment THER    Recheck INR In: 6 WEEKS    INR Location Clinic      Anticoagulation Summary  As of 3/11/2021    INR goal:  2.0-3.0   TTR:  76.2 % (1 y)   INR used for dosin.4 (3/11/2021)   Warfarin maintenance plan:  5 mg (5 mg x 1) every Mon; 7.5 mg (5 mg x 1.5) all other days   Full warfarin instructions:  5 mg every Mon; 7.5 mg all other days   Weekly warfarin total:  50 mg   No change documented:  Shelley Mercado RN   Plan last modified:  Shelley Mercado RN (2020)   Next INR check:  2021   Priority:  Maintenance   Target end date:  Indefinite    Indications    Unspecified atrial fibrillation (Resolved) [I48.91]  Anticoagulation monitoring  INR range 2-3 [Z79.01]  Atrial fibrillation with RVR (H) [I48.91]             Anticoagulation Episode Summary     INR check location:      Preferred lab:      Send INR reminders to:  [facility]    Comments:  Babak prefers to be closer to 3.0 d/t previous CVA check Q 4 weeks.Declines to reduce dose when slightly over 3.0  Needs to change PCP      Anticoagulation Care Providers     Provider Role Specialty Phone number    Teo Funes MD Referring Family Medicine [phone]            See the Encounter Report to view Anticoagulation Flowsheet and Dosing Calendar (Go to Encounters tab in chart review, and find the Anticoagulation Therapy Visit)        Shelley Mercado RN

## 2021-04-23 DIAGNOSIS — I48.91 ATRIAL FIBRILLATION (H): ICD-10-CM

## 2021-04-23 DIAGNOSIS — Z79.01 LONG-TERM (CURRENT) USE OF ANTICOAGULANTS, INR GOAL 2.0-3.0: ICD-10-CM

## 2021-04-23 RX ORDER — WARFARIN SODIUM 5 MG/1
TABLET ORAL
Qty: 150 TABLET | Refills: 0 | Status: SHIPPED | OUTPATIENT
Start: 2021-04-23 | End: 2021-05-03

## 2021-04-23 NOTE — TELEPHONE ENCOUNTER
"Requested Prescriptions   Pending Prescriptions Disp Refills     warfarin ANTICOAGULANT (JANTOVEN ANTICOAGULANT) 5 MG tablet [Pharmacy Med Name: JANTOVEN 5MG TABLET] 150 tablet 1     Sig: TAKE 1&1/2 TABLETS(7.5MG) BY MOUTH FOR 6 DAYS/WK AND TAKE 1 TABLET(5MG) FOR 1 DAY/WK OR AS DIRECTED BY [facility]       Vitamin K Antagonists Failed - 4/23/2021  1:00 AM        Failed - INR is within goal in the past 6 weeks     Confirm INR is within goal in the past 6 weeks.     Recent Labs   Lab Test 03/11/21   INR 2.4*                       Passed - Recent (12 mo) or future (30 days) visit within the authorizing provider's specialty     Patient has had an office visit with the authorizing provider or a provider within the authorizing providers department within the previous 12 mos or has a future within next 30 days. See \"Patient Info\" tab in inbasket, or \"Choose Columns\" in Meds & Orders section of the refill encounter.              Passed - Medication is active on med list        Passed - Patient is 18 years of age or older           Prescription refilled per RN MedicationRefill Policy.................... Shelley Mercado RN ....................  4/23/2021   8:52 AM      "

## 2021-04-27 DIAGNOSIS — I48.91 ATRIAL FIBRILLATION (H): ICD-10-CM

## 2021-04-27 DIAGNOSIS — Z79.01 LONG-TERM (CURRENT) USE OF ANTICOAGULANTS, INR GOAL 2.0-3.0: ICD-10-CM

## 2021-04-27 RX ORDER — WARFARIN SODIUM 5 MG/1
TABLET ORAL
Qty: 150 TABLET | Refills: 0 | OUTPATIENT
Start: 2021-04-27

## 2021-04-27 NOTE — TELEPHONE ENCOUNTER
This was just filled on 4/23/21. Too soon. Unable to complete prescription refillper RN Medication Refill Policy....................  Shelley Mercado RN....................  4/27/2021   10:12 AM

## 2021-05-03 ENCOUNTER — ANTICOAGULATION THERAPY VISIT (OUTPATIENT)
Dept: ANTICOAGULATION | Facility: OTHER | Age: 61
End: 2021-05-03
Attending: FAMILY MEDICINE

## 2021-05-03 DIAGNOSIS — Z79.01 ANTICOAGULATION MONITORING, INR RANGE 2-3: ICD-10-CM

## 2021-05-03 DIAGNOSIS — I48.91 ATRIAL FIBRILLATION WITH RVR (H): ICD-10-CM

## 2021-05-03 DIAGNOSIS — I48.91 ATRIAL FIBRILLATION (H): ICD-10-CM

## 2021-05-03 DIAGNOSIS — Z79.01 LONG-TERM (CURRENT) USE OF ANTICOAGULANTS, INR GOAL 2.0-3.0: ICD-10-CM

## 2021-05-03 LAB — INR POINT OF CARE: 2.3 (ref 0.86–1.14)

## 2021-05-03 PROCEDURE — 85610 PROTHROMBIN TIME: CPT | Mod: QW

## 2021-05-03 PROCEDURE — 36416 COLLJ CAPILLARY BLOOD SPEC: CPT

## 2021-05-03 RX ORDER — WARFARIN SODIUM 5 MG/1
TABLET ORAL
Qty: 160 TABLET | Refills: 1 | Status: SHIPPED | OUTPATIENT
Start: 2021-05-03 | End: 2021-11-11

## 2021-05-03 NOTE — PROGRESS NOTES
ANTICOAGULATION FOLLOW-UP CLINIC VISIT    Patient Name:  Babak Moran  Date:  5/3/2021  Contact Type:  Face to Face    SUBJECTIVE:  Patient Findings         Clinical Outcomes     Negatives:  Major bleeding event, Thromboembolic event, Anticoagulation-related hospital admission, Anticoagulation-related ED visit, Anticoagulation-related fatality           OBJECTIVE    Recent labs: (last 7 days)     21   INR 2.3*       ASSESSMENT / PLAN  INR assessment THER    Recheck INR In: 4 WEEKS    INR Location Clinic      Anticoagulation Summary  As of 5/3/2021    INR goal:  2.0-3.0   TTR:  90.7 % (1 y)   INR used for dosin.3 (5/3/2021)   Warfarin maintenance plan:  7.5 mg (5 mg x 1.5) every day   Full warfarin instructions:  7.5 mg every day   Weekly warfarin total:  52.5 mg   Plan last modified:  Shelley Mercado, RN (5/3/2021)   Next INR check:  2021   Priority:  Maintenance   Target end date:  Indefinite    Indications    Unspecified atrial fibrillation (Resolved) [I48.91]  Anticoagulation monitoring  INR range 2-3 [Z79.01]  Atrial fibrillation with RVR (H) [I48.91]             Anticoagulation Episode Summary     INR check location:      Preferred lab:      Send INR reminders to:  [facility]    Comments:  Babak prefers to be closer to 3.0 d/t previous CVA check Q 4 weeks.Declines to reduce dose when slightly over 3.0  Needs to change PCP      Anticoagulation Care Providers     Provider Role Specialty Phone number    Teo Funes MD Referring Family Medicine [phone]            See the Encounter Report to view Anticoagulation Flowsheet and Dosing Calendar (Go to Encounters tab in chart review, and find the Anticoagulation Therapy Visit)    Would like to see if we can get his INR closer to 3.0    Shelley Mercado, DANYELLE

## 2021-06-08 ENCOUNTER — ANTICOAGULATION THERAPY VISIT (OUTPATIENT)
Dept: ANTICOAGULATION | Facility: OTHER | Age: 61
End: 2021-06-08
Attending: FAMILY MEDICINE

## 2021-06-08 DIAGNOSIS — Z79.01 ANTICOAGULATION MONITORING, INR RANGE 2-3: ICD-10-CM

## 2021-06-08 DIAGNOSIS — I48.91 ATRIAL FIBRILLATION WITH RVR (H): ICD-10-CM

## 2021-06-08 LAB — INR POINT OF CARE: 2.2 (ref 0.86–1.14)

## 2021-06-08 PROCEDURE — 36416 COLLJ CAPILLARY BLOOD SPEC: CPT

## 2021-06-08 PROCEDURE — 85610 PROTHROMBIN TIME: CPT | Mod: QW

## 2021-06-08 NOTE — PROGRESS NOTES
"ANTICOAGULATION FOLLOW-UP CLINIC VISIT    Patient Name:  Babak Moran  Date:  2021  Contact Type:  Face to Face    SUBJECTIVE:  Patient Findings     Comments:  Patient states, \" I don't want to make any changes now. Let's continue the same and I will just come back in a couple of weeks and decide then\".         Clinical Outcomes     Negatives:  Major bleeding event, Thromboembolic event, Anticoagulation-related hospital admission, Anticoagulation-related ED visit, Anticoagulation-related fatality    Comments:  Patient states, \" I don't want to make any changes now. Let's continue the same and I will just come back in a couple of weeks and decide then\".            OBJECTIVE    Recent labs: (last 7 days)     21   INR 2.2*       ASSESSMENT / PLAN  INR assessment THER    Recheck INR In: 2 WEEKS    INR Location Clinic      Anticoagulation Summary  As of 2021    INR goal:  2.0-3.0   TTR:  97.7 % (1 y)   INR used for dosin.2 (2021)   Warfarin maintenance plan:  7.5 mg (5 mg x 1.5) every day   Full warfarin instructions:  7.5 mg every day   Weekly warfarin total:  52.5 mg   No change documented:  Alysa Rodriguez RN   Plan last modified:  Shelley Mercado RN (5/3/2021)   Next INR check:  2021   Priority:  Maintenance   Target end date:  Indefinite    Indications    Unspecified atrial fibrillation (Resolved) [I48.91]  Anticoagulation monitoring  INR range 2-3 [Z79.01]  Atrial fibrillation with RVR (H) [I48.91]             Anticoagulation Episode Summary     INR check location:      Preferred lab:      Send INR reminders to:  [facility]    Comments:  Babak prefers to be closer to 3.0 d/t previous CVA check Q 4 weeks.Declines to reduce dose when slightly over 3.0  Needs to change PCP      Anticoagulation Care Providers     Provider Role Specialty Phone number    Teo Funes MD Referring Family Medicine [phone]            See the Encounter Report to view Anticoagulation " Flowsheet and Dosing Calendar (Go to Encounters tab in chart review, and find the Anticoagulation Therapy Visit)        Alysa Rodriguez RN

## 2021-06-22 ENCOUNTER — ANTICOAGULATION THERAPY VISIT (OUTPATIENT)
Dept: ANTICOAGULATION | Facility: OTHER | Age: 61
End: 2021-06-22
Attending: FAMILY MEDICINE

## 2021-06-22 DIAGNOSIS — Z79.01 ANTICOAGULATION MONITORING, INR RANGE 2-3: ICD-10-CM

## 2021-06-22 DIAGNOSIS — I48.91 ATRIAL FIBRILLATION WITH RVR (H): ICD-10-CM

## 2021-06-22 LAB — INR POINT OF CARE: 2.6 (ref 0.86–1.14)

## 2021-06-22 PROCEDURE — 36416 COLLJ CAPILLARY BLOOD SPEC: CPT

## 2021-06-22 PROCEDURE — 85610 PROTHROMBIN TIME: CPT | Mod: QW

## 2021-06-22 NOTE — PROGRESS NOTES
ANTICOAGULATION FOLLOW-UP CLINIC VISIT    Patient Name:  Babak Moran  Date:  2021  Contact Type:  Face to Face    SUBJECTIVE:  Patient Findings         Clinical Outcomes     Negatives:  Major bleeding event, Thromboembolic event, Anticoagulation-related hospital admission, Anticoagulation-related ED visit, Anticoagulation-related fatality           OBJECTIVE    Recent labs: (last 7 days)     21   INR 2.6*       ASSESSMENT / PLAN  INR assessment THER    Recheck INR In: 4 WEEKS    INR Location Clinic      Anticoagulation Summary  As of 2021    INR goal:  2.0-3.0   TTR:  97.7 % (1 y)   INR used for dosin.6 (2021)   Warfarin maintenance plan:  7.5 mg (5 mg x 1.5) every day   Full warfarin instructions:  7.5 mg every day   Weekly warfarin total:  52.5 mg   No change documented:  Shelley Mercado RN   Plan last modified:  Shelley Mercado RN (5/3/2021)   Next INR check:  2021   Priority:  Maintenance   Target end date:  Indefinite    Indications    Unspecified atrial fibrillation (Resolved) [I48.91]  Anticoagulation monitoring  INR range 2-3 [Z79.01]  Atrial fibrillation with RVR (H) [I48.91]             Anticoagulation Episode Summary     INR check location:      Preferred lab:      Send INR reminders to:  [facility]    Comments:  Babak prefers to be closer to 3.0 d/t previous CVA check Q 4 weeks.Declines to reduce dose when slightly over 3.0  Needs to change PCP      Anticoagulation Care Providers     Provider Role Specialty Phone number    Teo Funes MD Referring Family Medicine [phone]            See the Encounter Report to view Anticoagulation Flowsheet and Dosing Calendar (Go to Encounters tab in chart review, and find the Anticoagulation Therapy Visit)        Shelley Mercado, RN

## 2021-08-12 ENCOUNTER — ANTICOAGULATION THERAPY VISIT (OUTPATIENT)
Dept: ANTICOAGULATION | Facility: OTHER | Age: 61
End: 2021-08-12
Attending: FAMILY MEDICINE

## 2021-08-12 DIAGNOSIS — I48.91 ATRIAL FIBRILLATION WITH RVR (H): ICD-10-CM

## 2021-08-12 DIAGNOSIS — Z79.01 ANTICOAGULATION MONITORING, INR RANGE 2-3: Primary | ICD-10-CM

## 2021-08-12 LAB — INR POINT OF CARE: 2.6 (ref 0.9–1.1)

## 2021-08-12 PROCEDURE — 85610 PROTHROMBIN TIME: CPT | Mod: QW

## 2021-08-12 PROCEDURE — 36416 COLLJ CAPILLARY BLOOD SPEC: CPT

## 2021-08-12 NOTE — PROGRESS NOTES
ANTICOAGULATION FOLLOW-UP CLINIC VISIT    Patient Name:  Babak Moran  Date:  2021  Contact Type:  Face to Face    SUBJECTIVE:  Patient Findings         Clinical Outcomes     Negatives:  Major bleeding event, Thromboembolic event, Anticoagulation-related hospital admission, Anticoagulation-related ED visit, Anticoagulation-related fatality           OBJECTIVE    Recent labs: (last 7 days)     21  1541   INR 2.6*       ASSESSMENT / PLAN  INR assessment THER    Recheck INR In: 4 WEEKS    INR Location Clinic      Anticoagulation Summary  As of 2021    INR goal:  2.0-3.0   TTR:  97.7 % (1 y)   INR used for dosin.6 (2021)   Warfarin maintenance plan:  7.5 mg (5 mg x 1.5) every day   Full warfarin instructions:  7.5 mg every day   Weekly warfarin total:  52.5 mg   Plan last modified:  Shelley Mercado, RN (5/3/2021)   Next INR check:  2021   Priority:  Maintenance   Target end date:  Indefinite    Indications    Unspecified atrial fibrillation (Resolved) [I48.91]  Anticoagulation monitoring  INR range 2-3 [Z79.01]  Atrial fibrillation with RVR (H) [I48.91]             Anticoagulation Episode Summary     INR check location:      Preferred lab:      Send INR reminders to:  [facility]    Comments:  Babak prefers to be closer to 3.0 d/t previous CVA check Q 4 weeks.Declines to reduce dose when slightly over 3.0  Needs to change PCP      Anticoagulation Care Providers     Provider Role Specialty Phone number    Teo Funes MD Referring Family Medicine [phone]            See the Encounter Report to view Anticoagulation Flowsheet and Dosing Calendar (Go to Encounters tab in chart review, and find the Anticoagulation Therapy Visit)        Shelley Mercado, RN

## 2021-09-07 NOTE — TELEPHONE ENCOUNTER
Notified Cathy Yi RN on Med/Surg/Peds, she called Rxs into [facility].    Laura Croft LPN on 9/13/2019 at 9:03 PM    
Patient called stating he was discharged from hospital today with instructions to  three medications from [facility] pharmacy. Patient states he received flagyl from pharmacy but they did not have lasix or vibramyacin. Please call pharmacy to inquire or give verbal order.     Dorcas Carlos on 9/13/2019 at 7:24 PM    
[General Appearance - Alert] : alert
[General Appearance - In No Acute Distress] : in no acute distress
[Sclera] : the sclera and conjunctiva were normal
[PERRL With Normal Accommodation] : pupils were equal in size, round, and reactive to light
07-Sep-2021
[Neck Appearance] : the appearance of the neck was normal
[Outer Ear] : the ears and nose were normal in appearance
[Extraocular Movements] : extraocular movements were intact
[Neck Cervical Mass (___cm)] : no neck mass was observed
[Thyroid Nodule] : there were no palpable thyroid nodules
[Thyroid Diffuse Enlargement] : the thyroid was not enlarged
[Jugular Venous Distention Increased] : there was no jugular-venous distention
[] : no respiratory distress
[Oriented To Time, Place, And Person] : oriented to person, place, and time
[Affect] : the affect was normal
[Impaired Insight] : insight and judgment were intact

## 2021-09-17 ENCOUNTER — ANTICOAGULATION THERAPY VISIT (OUTPATIENT)
Dept: ANTICOAGULATION | Facility: OTHER | Age: 61
End: 2021-09-17
Attending: FAMILY MEDICINE

## 2021-09-17 DIAGNOSIS — Z79.01 ANTICOAGULATION MONITORING, INR RANGE 2-3: Primary | ICD-10-CM

## 2021-09-17 DIAGNOSIS — I48.91 ATRIAL FIBRILLATION WITH RVR (H): ICD-10-CM

## 2021-09-17 LAB — INR POINT OF CARE: 3.6 (ref 0.9–1.1)

## 2021-09-17 PROCEDURE — 36416 COLLJ CAPILLARY BLOOD SPEC: CPT

## 2021-09-17 PROCEDURE — 85610 PROTHROMBIN TIME: CPT | Mod: QW

## 2021-09-17 NOTE — PROGRESS NOTES
"ANTICOAGULATION FOLLOW-UP CLINIC VISIT    Patient Name:  Babak Moran  Date:  9/17/2021  Contact Type:  Face to Face    SUBJECTIVE:  Patient Findings     Comments:  \" I have been taking tylenol for my knee. Don't usually take it. Don't think I will need it for a while\". Otherwise patient denies any identifiable changes that caused the supratherapeutic INR. Will have patient take 2.5 mg today instead of 7.5 mg to equal a 9.5 % decrease which should lower INR between 0.6-0.8 points to bring INR down to 2.8-3.0. Recheck in one week.           Clinical Outcomes     Negatives:  Major bleeding event, Thromboembolic event, Anticoagulation-related hospital admission, Anticoagulation-related ED visit, Anticoagulation-related fatality    Comments:  \" I have been taking tylenol for my knee. Don't usually take it. Don't think I will need it for a while\". Otherwise patient denies any identifiable changes that caused the supratherapeutic INR. Will have patient take 2.5 mg today instead of 7.5 mg to equal a 9.5 % decrease which should lower INR between 0.6-0.8 points to bring INR down to 2.8-3.0. Recheck in one week.              OBJECTIVE    Recent labs: (last 7 days)     09/17/21  1256   INR 3.6*       ASSESSMENT / PLAN  INR assessment SUPRA    Recheck INR In: 1 WEEK    INR Location Clinic      Anticoagulation Summary  As of 9/17/2021    INR goal:  2.0-3.0   TTR:  91.8 % (1 y)   INR used for dosing:  3.6 (9/17/2021)   Warfarin maintenance plan:  7.5 mg (5 mg x 1.5) every day   Full warfarin instructions:  9/17: 2.5 mg; Otherwise 7.5 mg every day   Weekly warfarin total:  52.5 mg   Plan last modified:  Alysa Rodriguez RN (9/17/2021)   Next INR check:  9/24/2021   Priority:  Maintenance   Target end date:  Indefinite    Indications    Unspecified atrial fibrillation (Resolved) [I48.91]  Anticoagulation monitoring  INR range 2-3 [Z79.01]  Atrial fibrillation with RVR (H) [I48.91]             Anticoagulation Episode Summary     INR " check location:      Preferred lab:      Send INR reminders to:  [facility]    Comments:  Babak prefers to be closer to 3.0 d/t previous CVA check Q 4 weeks.Declines to reduce dose when slightly over 3.0  Needs to change PCP      Anticoagulation Care Providers     Provider Role Specialty Phone number    Teo Funes MD Referring Family Medicine [phone]            See the Encounter Report to view Anticoagulation Flowsheet and Dosing Calendar (Go to Encounters tab in chart review, and find the Anticoagulation Therapy Visit)        Alysa Rodriguez RN

## 2021-09-24 ENCOUNTER — ANTICOAGULATION THERAPY VISIT (OUTPATIENT)
Dept: ANTICOAGULATION | Facility: OTHER | Age: 61
End: 2021-09-24
Attending: FAMILY MEDICINE

## 2021-09-24 DIAGNOSIS — I48.91 ATRIAL FIBRILLATION WITH RVR (H): ICD-10-CM

## 2021-09-24 DIAGNOSIS — Z79.01 ANTICOAGULATION MONITORING, INR RANGE 2-3: Primary | ICD-10-CM

## 2021-09-24 LAB — INR POINT OF CARE: 2.5 (ref 0.9–1.1)

## 2021-09-24 PROCEDURE — 36416 COLLJ CAPILLARY BLOOD SPEC: CPT

## 2021-09-24 PROCEDURE — 85610 PROTHROMBIN TIME: CPT | Mod: QW

## 2021-09-24 NOTE — PROGRESS NOTES
ANTICOAGULATION FOLLOW-UP CLINIC VISIT    Patient Name:  Babak Moran  Date:  2021  Contact Type:  Face to Face    SUBJECTIVE:  Patient Findings         Clinical Outcomes     Negatives:  Major bleeding event, Thromboembolic event, Anticoagulation-related hospital admission, Anticoagulation-related ED visit, Anticoagulation-related fatality           OBJECTIVE    Recent labs: (last 7 days)     21  1616   INR 2.5*       ASSESSMENT / PLAN  INR assessment THER    Recheck INR In: 2 WEEKS    INR Location Clinic      Anticoagulation Summary  As of 2021    INR goal:  2.0-3.0   TTR:  90.8 % (1 y)   INR used for dosin.5 (2021)   Warfarin maintenance plan:  7.5 mg (5 mg x 1.5) every day   Full warfarin instructions:  7.5 mg every day   Weekly warfarin total:  52.5 mg   No change documented:  Shelley Mercado RN   Plan last modified:  Alysa Rodriguez RN (2021)   Next INR check:  10/8/2021   Priority:  Maintenance   Target end date:  Indefinite    Indications    Unspecified atrial fibrillation (Resolved) [I48.91]  Anticoagulation monitoring  INR range 2-3 [Z79.01]  Atrial fibrillation with RVR (H) [I48.91]             Anticoagulation Episode Summary     INR check location:      Preferred lab:      Send INR reminders to:  [facility]    Comments:  Babak prefers to be closer to 3.0 d/t previous CVA check Q 4 weeks.Declines to reduce dose when slightly over 3.0  Needs to change PCP      Anticoagulation Care Providers     Provider Role Specialty Phone number    Teo Funes MD Referring Family Medicine [phone]            See the Encounter Report to view Anticoagulation Flowsheet and Dosing Calendar (Go to Encounters tab in chart review, and find the Anticoagulation Therapy Visit)        Shelley Mercado, RN

## 2021-10-06 DIAGNOSIS — M79.89 SWELLING OF LIMB: ICD-10-CM

## 2021-10-06 DIAGNOSIS — I10 BENIGN ESSENTIAL HYPERTENSION: ICD-10-CM

## 2021-10-06 NOTE — LETTER
October 8, 2021      Babak Moran  [address]   [place] [address]        Dear Babak,     This letter is to remind you that you are due for your annual exam with Teo Funes. Your last comprehensive visit was more than 12 months ago.    Please call the clinic at [phone] to schedule your appointment.    If you are no longer seeing Teo Funes for primary care, please call to let us know. Doing so will remove you from our call/contact list.    Thank you for choosing [facility] and [facility] for your health care needs.    Sincerely,    Bright BASSETT  [facility]

## 2021-10-08 ENCOUNTER — ANTICOAGULATION THERAPY VISIT (OUTPATIENT)
Dept: ANTICOAGULATION | Facility: OTHER | Age: 61
End: 2021-10-08
Attending: FAMILY MEDICINE

## 2021-10-08 DIAGNOSIS — Z79.01 ANTICOAGULATION MONITORING, INR RANGE 2-3: Primary | ICD-10-CM

## 2021-10-08 DIAGNOSIS — M79.89 SWELLING OF LIMB: ICD-10-CM

## 2021-10-08 DIAGNOSIS — I48.91 ATRIAL FIBRILLATION WITH RVR (H): ICD-10-CM

## 2021-10-08 DIAGNOSIS — I10 BENIGN ESSENTIAL HYPERTENSION: ICD-10-CM

## 2021-10-08 LAB — INR POINT OF CARE: 2.2 (ref 0.9–1.1)

## 2021-10-08 PROCEDURE — 85610 PROTHROMBIN TIME: CPT | Mod: QW

## 2021-10-08 PROCEDURE — 36416 COLLJ CAPILLARY BLOOD SPEC: CPT

## 2021-10-08 RX ORDER — FUROSEMIDE 40 MG
40 TABLET ORAL DAILY
Qty: 90 TABLET | Refills: 0 | Status: SHIPPED | OUTPATIENT
Start: 2021-10-08 | End: 2022-06-26

## 2021-10-08 NOTE — TELEPHONE ENCOUNTER
[id number] sent Rx request for the following:      Requested Prescriptions   Pending Prescriptions Disp Refills     furosemide (LASIX) 40 MG tablet 90 tablet 1     Sig: Take 1 tablet (40 mg) by mouth daily   Last Prescription Date:   9/29/20  Last Fill Qty/Refills:         90, R-1    Last Office Visit:              9/29/20   Future Office visit:           None  Routing refill request to provider for review/approval because:    Diuretics (Including Combos) Protocol Failed - 10/8/2021  8:50 AM        Failed - Blood pressure under 140/90 in past 12 months        Failed - Recent (12 mo) or future (30 days) visit within the authorizing provider's specialty        Failed - Normal serum creatinine on file in past 12 months        Failed - Normal serum potassium on file in past 12 months        Failed - Normal serum sodium on file in past 12 months     Patient is due for annual exam. Reminder letter sent to Pt.    Unable to complete prescription refill per RN Medication Refill Policy. Savannah Sifuentes RN .............. 10/8/2021  8:54 AM

## 2021-10-11 RX ORDER — FUROSEMIDE 40 MG
40 TABLET ORAL DAILY
Qty: 90 TABLET | Refills: 0 | OUTPATIENT
Start: 2021-10-11

## 2021-10-11 NOTE — TELEPHONE ENCOUNTER
Patient's chart was accessed to determine the status of a refill request - nothing needed at this time as the request was previously addressed.    Savannah Sifuentes RN .............. 10/11/2021  11:21 AM

## 2021-11-11 DIAGNOSIS — I48.91 ATRIAL FIBRILLATION (H): ICD-10-CM

## 2021-11-11 DIAGNOSIS — Z79.01 LONG-TERM (CURRENT) USE OF ANTICOAGULANTS, INR GOAL 2.0-3.0: ICD-10-CM

## 2021-11-11 RX ORDER — WARFARIN SODIUM 5 MG/1
TABLET ORAL
Qty: 160 TABLET | Refills: 0 | Status: SHIPPED | OUTPATIENT
Start: 2021-11-11 | End: 2021-12-13

## 2021-11-11 NOTE — PROGRESS NOTES
Patient Information     Patient Name  Babak Moran Jr. MRN  [id number] Sex  Male   1960      Letter by Alvino Yi MD at      Author:  Alvino Yi MD Service:  (none) Author Type:  (none)    Filed:   Encounter Date:  2017 Status:  (Other)           Babak Moran Jr.  [address]  [facility] [address]          2017    To whom it may concern:    Mr Moran has been under my care for over six years.  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  He has several medical comorbities that have been managed quite well for the past few years.  I think it is very important that he continues to have acces to me as his primary care physician given his complexity especially now with the recent severe event and need for recovery back to his baseline functioning.    If you have any further questions or problems contact my office at  [phone].    Thank you,    Alvino Yi MD            Refill request: oxycodone

## 2021-11-11 NOTE — TELEPHONE ENCOUNTER
"Requested Prescriptions   Pending Prescriptions Disp Refills     warfarin ANTICOAGULANT (JANTOVEN ANTICOAGULANT) 5 MG tablet [Pharmacy Med Name: JANTOVEN 5MG TABLET] 160 tablet 0     Sig: TAKE 1 AND 1/2 TABLETS(7.5MG) BY MOUTH DAILY OR AS DIRECTED BY [facility]       Vitamin K Antagonists Failed - 11/11/2021  5:01 AM        Failed - INR is within goal in the past 6 weeks     Confirm INR is within goal in the past 6 weeks.     Recent Labs   Lab Test 10/08/21  1511   INR 2.2*                       Passed - Recent (12 mo) or future (30 days) visit within the authorizing provider's specialty     Patient has had an office visit with the authorizing provider or a provider within the authorizing providers department within the previous 12 mos or has a future within next 30 days. See \"Patient Info\" tab in inbasket, or \"Choose Columns\" in Meds & Orders section of the refill encounter.              Passed - Medication is active on med list        Passed - Patient is 18 years of age or older           Prescription refilled per RN MedicationRefill Policy.................... Shelley Mercado RN ....................  11/11/2021   2:17 PM      "

## 2021-11-22 ENCOUNTER — ANTICOAGULATION THERAPY VISIT (OUTPATIENT)
Dept: ANTICOAGULATION | Facility: OTHER | Age: 61
End: 2021-11-22
Attending: FAMILY MEDICINE

## 2021-11-22 DIAGNOSIS — Z79.01 ANTICOAGULATION MONITORING, INR RANGE 2-3: Primary | ICD-10-CM

## 2021-11-22 DIAGNOSIS — I48.91 ATRIAL FIBRILLATION WITH RVR (H): ICD-10-CM

## 2021-11-22 LAB — INR POINT OF CARE: 2.2 (ref 0.9–1.1)

## 2021-11-22 PROCEDURE — 85610 PROTHROMBIN TIME: CPT | Mod: QW

## 2021-11-22 PROCEDURE — 36416 COLLJ CAPILLARY BLOOD SPEC: CPT

## 2021-11-22 NOTE — PROGRESS NOTES
ANTICOAGULATION FOLLOW-UP CLINIC VISIT    Patient Name:  Babak Moran  Date:  2021  Contact Type:  Face to Face    SUBJECTIVE:  Patient Findings     Positives:  Change in medications (Stopped Duloxetine about a month ago)        Clinical Outcomes     Negatives:  Major bleeding event, Thromboembolic event, Anticoagulation-related hospital admission, Anticoagulation-related ED visit, Anticoagulation-related fatality           OBJECTIVE    Recent labs: (last 7 days)     21  1050   INR 2.2*       ASSESSMENT / PLAN  INR assessment THER    Recheck INR In: 2 WEEKS    INR Location Clinic      Anticoagulation Summary  As of 2021    INR goal:  2.0-3.0   TTR:  93.0 % (1 y)   INR used for dosin.2 (2021)   Warfarin maintenance plan:  7.5 mg (5 mg x 1.5) every day   Full warfarin instructions:  7.5 mg every day   Weekly warfarin total:  52.5 mg   No change documented:  Alysa Rodriguez RN   Plan last modified:  Alysa Rodriguez RN (2021)   Next INR check:  2021   Priority:  Maintenance   Target end date:  Indefinite    Indications    Unspecified atrial fibrillation (Resolved) [I48.91]  Anticoagulation monitoring  INR range 2-3 [Z79.01]  Atrial fibrillation with RVR (H) [I48.91]             Anticoagulation Episode Summary     INR check location:      Preferred lab:      Send INR reminders to:  [facility]    Comments:  Babak prefers to be closer to 3.0 d/t previous CVA check Q 4 weeks.Declines to reduce dose when slightly over 3.0  Needs to change PCP      Anticoagulation Care Providers     Provider Role Specialty Phone number    Teo Funes MD Referring Family Medicine [phone]            See the Encounter Report to view Anticoagulation Flowsheet and Dosing Calendar (Go to Encounters tab in chart review, and find the Anticoagulation Therapy Visit)        Alysa Rodriguez RN

## 2021-12-13 ENCOUNTER — ANTICOAGULATION THERAPY VISIT (OUTPATIENT)
Dept: ANTICOAGULATION | Facility: OTHER | Age: 61
End: 2021-12-13
Attending: FAMILY MEDICINE

## 2021-12-13 DIAGNOSIS — Z79.01 LONG-TERM (CURRENT) USE OF ANTICOAGULANTS, INR GOAL 2.0-3.0: ICD-10-CM

## 2021-12-13 DIAGNOSIS — I48.91 ATRIAL FIBRILLATION (H): ICD-10-CM

## 2021-12-13 DIAGNOSIS — Z79.01 ANTICOAGULATION MONITORING, INR RANGE 2-3: Primary | ICD-10-CM

## 2021-12-13 DIAGNOSIS — I48.91 ATRIAL FIBRILLATION WITH RVR (H): ICD-10-CM

## 2021-12-13 LAB — INR POINT OF CARE: 1.9 (ref 0.9–1.1)

## 2021-12-13 PROCEDURE — 85610 PROTHROMBIN TIME: CPT | Mod: QW

## 2021-12-13 PROCEDURE — 36416 COLLJ CAPILLARY BLOOD SPEC: CPT

## 2021-12-13 RX ORDER — WARFARIN SODIUM 5 MG/1
TABLET ORAL
Qty: 160 TABLET | Refills: 0 | COMMUNITY
Start: 2021-12-13 | End: 2022-01-20

## 2021-12-13 NOTE — PROGRESS NOTES
ANTICOAGULATION FOLLOW-UP CLINIC VISIT    Patient Name:  Babak Moran  Date:  2021  Contact Type:  Face to Face    SUBJECTIVE:  Patient Findings         Clinical Outcomes     Negatives:  Major bleeding event, Thromboembolic event, Anticoagulation-related hospital admission, Anticoagulation-related ED visit, Anticoagulation-related fatality           OBJECTIVE    Recent labs: (last 7 days)     21  1401   INR 1.9*       ASSESSMENT / PLAN  INR assessment SUB    Recheck INR In: 2 WEEKS    INR Location Clinic      Anticoagulation Summary  As of 2021    INR goal:  2.0-3.0   TTR:  91.1 % (1 y)   INR used for dosin.9 (2021)   Warfarin maintenance plan:  10 mg (5 mg x 2) every Mon, Fri; 7.5 mg (5 mg x 1.5) all other days   Full warfarin instructions:  10 mg every Mon, Fri; 7.5 mg all other days   Weekly warfarin total:  57.5 mg   Plan last modified:  Shelley Mercado RN (2021)   Next INR check:  2021   Priority:  Maintenance   Target end date:  Indefinite    Indications    Unspecified atrial fibrillation (Resolved) [I48.91]  Anticoagulation monitoring  INR range 2-3 [Z79.01]  Atrial fibrillation with RVR (H) [I48.91]             Anticoagulation Episode Summary     INR check location:      Preferred lab:      Send INR reminders to:  [facility]    Comments:  Babak prefers to be closer to 3.0 d/t previous CVA check Q 4 weeks.Declines to reduce dose when slightly over 3.0  Needs to change PCP      Anticoagulation Care Providers     Provider Role Specialty Phone number    Teo Funes MD [facility] [phone]            See the Encounter Report to view Anticoagulation Flowsheet and Dosing Calendar (Go to Encounters tab in chart review, and find the Anticoagulation Therapy Visit)        Shelley Mercado, RN

## 2021-12-22 ENCOUNTER — ANTICOAGULATION THERAPY VISIT (OUTPATIENT)
Dept: ANTICOAGULATION | Facility: OTHER | Age: 61
End: 2021-12-22
Attending: FAMILY MEDICINE

## 2021-12-22 DIAGNOSIS — Z79.01 ANTICOAGULATION MONITORING, INR RANGE 2-3: Primary | ICD-10-CM

## 2021-12-22 DIAGNOSIS — I48.91 ATRIAL FIBRILLATION WITH RVR (H): ICD-10-CM

## 2021-12-22 LAB — INR POINT OF CARE: 2.6 (ref 0.9–1.1)

## 2021-12-22 PROCEDURE — 36416 COLLJ CAPILLARY BLOOD SPEC: CPT

## 2021-12-22 PROCEDURE — 85610 PROTHROMBIN TIME: CPT | Mod: QW

## 2021-12-22 NOTE — PROGRESS NOTES
ANTICOAGULATION FOLLOW-UP CLINIC VISIT    Patient Name:  Babak Moran  Date:  2021  Contact Type:  Face to Face    SUBJECTIVE:  Patient Findings         Clinical Outcomes     Negatives:  Major bleeding event, Thromboembolic event, Anticoagulation-related hospital admission, Anticoagulation-related ED visit, Anticoagulation-related fatality           OBJECTIVE    Recent labs: (last 7 days)     21  1303   INR 2.6*       ASSESSMENT / PLAN  INR assessment THER    Recheck INR In: 2 WEEKS    INR Location Clinic      Anticoagulation Summary  As of 2021    INR goal:  2.0-3.0   TTR:  90.8 % (1 y)   INR used for dosin.6 (2021)   Warfarin maintenance plan:  10 mg (5 mg x 2) every Mon, Fri; 7.5 mg (5 mg x 1.5) all other days   Full warfarin instructions:  10 mg every Mon, Fri; 7.5 mg all other days   Weekly warfarin total:  57.5 mg   No change documented:  Shelley Mercado RN   Plan last modified:  Shelley Mercado RN (2021)   Next INR check:  2022   Priority:  Maintenance   Target end date:  Indefinite    Indications    Unspecified atrial fibrillation (Resolved) [I48.91]  Anticoagulation monitoring  INR range 2-3 [Z79.01]  Atrial fibrillation with RVR (H) [I48.91]             Anticoagulation Episode Summary     INR check location:      Preferred lab:      Send INR reminders to:  [facility]    Comments:  Babak prefers to be closer to 3.0 d/t previous CVA check Q 4 weeks.Declines to reduce dose when slightly over 3.0  Needs to change PCP      Anticoagulation Care Providers     Provider Role Specialty Phone number    Teo Funes MD [facility] [phone]            See the Encounter Report to view Anticoagulation Flowsheet and Dosing Calendar (Go to Encounters tab in chart review, and find the Anticoagulation Therapy Visit)        Shelley Mercado RN

## 2022-01-20 ENCOUNTER — ANTICOAGULATION THERAPY VISIT (OUTPATIENT)
Dept: ANTICOAGULATION | Facility: OTHER | Age: 62
End: 2022-01-20
Attending: FAMILY MEDICINE

## 2022-01-20 DIAGNOSIS — Z79.01 LONG-TERM (CURRENT) USE OF ANTICOAGULANTS, INR GOAL 2.0-3.0: ICD-10-CM

## 2022-01-20 DIAGNOSIS — I48.91 ATRIAL FIBRILLATION WITH RVR (H): ICD-10-CM

## 2022-01-20 DIAGNOSIS — Z79.01 ANTICOAGULATION MONITORING, INR RANGE 2-3: Primary | ICD-10-CM

## 2022-01-20 DIAGNOSIS — I48.91 ATRIAL FIBRILLATION (H): ICD-10-CM

## 2022-01-20 LAB — INR POINT OF CARE: 2 (ref 0.9–1.1)

## 2022-01-20 PROCEDURE — 36416 COLLJ CAPILLARY BLOOD SPEC: CPT

## 2022-01-20 PROCEDURE — 85610 PROTHROMBIN TIME: CPT | Mod: QW

## 2022-01-20 RX ORDER — WARFARIN SODIUM 5 MG/1
TABLET ORAL
Qty: 160 TABLET | Refills: 1 | Status: SHIPPED | OUTPATIENT
Start: 2022-01-20 | End: 2022-05-02

## 2022-01-20 NOTE — PROGRESS NOTES
ANTICOAGULATION FOLLOW-UP CLINIC VISIT    Patient Name:  Babak Moran  Date:  2022  Contact Type:  Face to Face    SUBJECTIVE:  Patient Findings         Clinical Outcomes     Negatives:  Major bleeding event, Thromboembolic event, Anticoagulation-related hospital admission, Anticoagulation-related ED visit, Anticoagulation-related fatality           OBJECTIVE    Recent labs: (last 7 days)     22  1246   INR 2.0*       ASSESSMENT / PLAN  INR assessment THER    Recheck INR In: 3 WEEKS    INR Location Clinic      Anticoagulation Summary  As of 2022    INR goal:  2.0-3.0   TTR:  90.8 % (1 y)   INR used for dosin.0 (2022)   Warfarin maintenance plan:  10 mg (5 mg x 2) every Mon, Wed, Fri; 7.5 mg (5 mg x 1.5) all other days   Full warfarin instructions:  10 mg every Mon, Wed, Fri; 7.5 mg all other days   Weekly warfarin total:  60 mg   Plan last modified:  Shelley Mercado RN (2022)   Next INR check:  2/10/2022   Priority:  Maintenance   Target end date:  Indefinite    Indications    Unspecified atrial fibrillation (Resolved) [I48.91]  Anticoagulation monitoring  INR range 2-3 [Z79.01]  Atrial fibrillation with RVR (H) [I48.91]             Anticoagulation Episode Summary     INR check location:      Preferred lab:      Send INR reminders to:  [facility]    Comments:  Babak prefers to be closer to 3.0 d/t previous CVA check Q 4 weeks.Declines to reduce dose when slightly over 3.0  Needs to change PCP      Anticoagulation Care Providers     Provider Role Specialty Phone number    Teo Funes MD [facility] Medicine [phone]            See the Encounter Report to view Anticoagulation Flowsheet and Dosing Calendar (Go to Encounters tab in chart review, and find the Anticoagulation Therapy Visit)        Shelley Mercado, RN

## 2022-02-17 ENCOUNTER — ANTICOAGULATION THERAPY VISIT (OUTPATIENT)
Dept: ANTICOAGULATION | Facility: OTHER | Age: 62
End: 2022-02-17
Attending: FAMILY MEDICINE

## 2022-02-17 DIAGNOSIS — I48.91 ATRIAL FIBRILLATION WITH RVR (H): ICD-10-CM

## 2022-02-17 DIAGNOSIS — Z79.01 ANTICOAGULATION MONITORING, INR RANGE 2-3: Primary | ICD-10-CM

## 2022-02-17 LAB — INR POINT OF CARE: 2.7 (ref 0.9–1.1)

## 2022-02-17 PROCEDURE — 36416 COLLJ CAPILLARY BLOOD SPEC: CPT

## 2022-02-17 PROCEDURE — 85610 PROTHROMBIN TIME: CPT | Mod: QW

## 2022-02-17 NOTE — PROGRESS NOTES
ANTICOAGULATION FOLLOW-UP CLINIC VISIT    Patient Name:  Babak Moran  Date:  2022  Contact Type:  Face to Face    SUBJECTIVE:  Patient Findings         Clinical Outcomes     Negatives:  Major bleeding event, Thromboembolic event, Anticoagulation-related hospital admission, Anticoagulation-related ED visit, Anticoagulation-related fatality           OBJECTIVE    Recent labs: (last 7 days)     22  1534   INR 2.7*       ASSESSMENT / PLAN  INR assessment THER    Recheck INR In: 4 WEEKS    INR Location Clinic      Anticoagulation Summary  As of 2022    INR goal:  2.0-3.0   TTR:  90.8 % (1 y)   INR used for dosin.7 (2022)   Warfarin maintenance plan:  10 mg (5 mg x 2) every Mon, Wed, Fri; 7.5 mg (5 mg x 1.5) all other days   Full warfarin instructions:  10 mg every Mon, Wed, Fri; 7.5 mg all other days   Weekly warfarin total:  60 mg   No change documented:  Shelley Mercado RN   Plan last modified:  Shelley Mercado RN (2022)   Next INR check:  3/17/2022   Priority:  Maintenance   Target end date:  Indefinite    Indications    Unspecified atrial fibrillation (Resolved) [I48.91]  Anticoagulation monitoring  INR range 2-3 [Z79.01]  Atrial fibrillation with RVR (H) [I48.91]             Anticoagulation Episode Summary     INR check location:      Preferred lab:      Send INR reminders to:  [facility]    Comments:  Babak prefers to be closer to 3.0 d/t previous CVA check Q 4 weeks.Declines to reduce dose when slightly over 3.0  Needs to change PCP      Anticoagulation Care Providers     Provider Role Specialty Phone number    Teo Funes MD [facility] [phone]            See the Encounter Report to view Anticoagulation Flowsheet and Dosing Calendar (Go to Encounters tab in chart review, and find the Anticoagulation Therapy Visit)        Shelley Mercado RN

## 2022-03-21 ENCOUNTER — ANTICOAGULATION THERAPY VISIT (OUTPATIENT)
Dept: ANTICOAGULATION | Facility: OTHER | Age: 62
End: 2022-03-21
Attending: FAMILY MEDICINE

## 2022-03-21 DIAGNOSIS — Z79.01 ANTICOAGULATION MONITORING, INR RANGE 2-3: Primary | ICD-10-CM

## 2022-03-21 DIAGNOSIS — I48.91 ATRIAL FIBRILLATION WITH RVR (H): ICD-10-CM

## 2022-03-21 LAB — INR POINT OF CARE: 3.2 (ref 0.9–1.1)

## 2022-03-21 PROCEDURE — 85610 PROTHROMBIN TIME: CPT | Mod: QW

## 2022-03-21 PROCEDURE — 36416 COLLJ CAPILLARY BLOOD SPEC: CPT

## 2022-03-21 NOTE — PROGRESS NOTES
ANTICOAGULATION FOLLOW-UP CLINIC VISIT    Patient Name:  Babak Moran  Date:  3/21/2022  Contact Type:  Face to Face    SUBJECTIVE:  Patient Findings         Clinical Outcomes     Negatives:  Major bleeding event, Thromboembolic event, Anticoagulation-related hospital admission, Anticoagulation-related ED visit, Anticoagulation-related fatality           OBJECTIVE    Recent labs: (last 7 days)     03/21/22  1445   INR 3.2*       ASSESSMENT / PLAN  INR assessment SUPRA    Recheck INR In: 2 WEEKS    INR Location Clinic      Anticoagulation Summary  As of 3/21/2022    INR goal:  2.0-3.0   TTR:  87.3 % (1 y)   INR used for dosing:  3.2 (3/21/2022)   Warfarin maintenance plan:  10 mg (5 mg x 2) every Mon, Wed, Fri; 7.5 mg (5 mg x 1.5) all other days   Full warfarin instructions:  10 mg every Mon, Wed, Fri; 7.5 mg all other days   Weekly warfarin total:  60 mg   No change documented:  Jose Roberto Stubbs RN   Plan last modified:  Shelley Mercado RN (1/20/2022)   Next INR check:  4/4/2022   Priority:  Maintenance   Target end date:  Indefinite    Indications    Unspecified atrial fibrillation (Resolved) [I48.91]  Anticoagulation monitoring  INR range 2-3 [Z79.01]  Atrial fibrillation with RVR (H) [I48.91]             Anticoagulation Episode Summary     INR check location:      Preferred lab:      Send INR reminders to:  [facility]    Comments:  Babak prefers to be closer to 3.0 d/t previous CVA check Q 4 weeks.Declines to reduce dose when slightly over 3.0  Needs to change PCP      Anticoagulation Care Providers     Provider Role Specialty Phone number    Teo Funes MD [facility] Medicine [phone]            See the Encounter Report to view Anticoagulation Flowsheet and Dosing Calendar (Go to Encounters tab in chart review, and find the Anticoagulation Therapy Visit)    Jose Roberto Stubbs, DANYELLE

## 2022-03-26 DIAGNOSIS — I10 BENIGN ESSENTIAL HYPERTENSION: Primary | ICD-10-CM

## 2022-03-28 NOTE — TELEPHONE ENCOUNTER
PRESCRIPTION REFILL REQUEST    Medication requested/protocol details:  Requested Prescriptions   Pending Prescriptions Disp Refills     amLODIPine (NORVASC) 5 MG tablet [Pharmacy Med Name: AMLODIPINE 5MG TABLET] 90 tablet 3     Sig: TAKE 1 TABLET (5 MG) BY MOUTH DAILY       Calcium Channel Blockers Protocol  Failed - 3/28/2022  2:42 PM        Failed - Blood pressure under 140/90 in past 12 months     BP Readings from Last 3 Encounters:   09/29/20 136/86   09/25/20 130/72   03/19/20 124/68           Failed - Recent (12 mo) or future (30 days) visit within the authorizing provider's specialty        Failed - Normal serum creatinine on file in past 12 months     Recent Labs   Lab Test 09/29/20  1054   CR 0.99        Requesting Pharmacy: [id number]  Last prescription date 9/29/90  Last fill quantity: 90  Number of refills: 3  Last Office Visit date: 9/9/20  Future Office Visit date:   Next 5 appointments (look out 90 days)    Apr 11, 2022  9:20 AM  Office Visit with Arminda Ortiz NP  [facility] ([facility] and [facility] ) [address]  [place] [address]  [phone]        Savannah Sifuentes RN .............. 3/28/2022  2:44 PM

## 2022-03-29 RX ORDER — AMLODIPINE BESYLATE 5 MG/1
5 TABLET ORAL DAILY
Qty: 90 TABLET | Refills: 0 | Status: SHIPPED | OUTPATIENT
Start: 2022-03-29 | End: 2022-07-18

## 2022-04-10 PROBLEM — Z23 NEED FOR VACCINATION: Chronic | Status: ACTIVE | Noted: 2022-04-10

## 2022-04-10 PROBLEM — E66.9 OBESITY: Status: RESOLVED | Noted: 2018-01-16 | Resolved: 2022-04-10

## 2022-04-10 PROBLEM — A41.9 SEPSIS (H): Status: RESOLVED | Noted: 2019-09-06 | Resolved: 2022-04-10

## 2022-04-10 NOTE — PROGRESS NOTES
"Babak Moran  : 1960 Age: 61 year old Sex: male MRN: [id number]    Nursing Notes:   Rena Sousa LPN  2022 10:27 AM  Signed  Chief Complaint   Patient presents with     F/N Cares       Initial /88 (BP Location: Right arm, Patient Position: Sitting, Cuff Size: Adult Large)   Pulse 52   Temp 97.2  F (36.2  C) (Tympanic)   Resp 18   Wt 140.6 kg (310 lb)   SpO2 98%   BMI 39.80 kg/m   Estimated body mass index is 39.8 kg/m  as calculated from the following:    Height as of 20: 1.88 m (6' 2\").    Weight as of this encounter: 140.6 kg (310 lb).     Medication Reconciliation: complete    FOOD SECURITY SCREENING QUESTIONS:    The next two questions are to help us understand your food security.  If you are feeling you need any assistance in this area, we have resources available to support you today.    Hunger Vital Signs:  Within the past 12 months we worried whether our food would run out before we got money to buy more. Never  Within the past 12 months the food we bought just didn't last and we didn't have money to get more. Never      Advance care plan reviewed      Rena Sousa LPN on 2022 at 9:41 AM      Answers for HPI/ROS submitted by the patient on 2022  How many servings of fruits and vegetables do you eat daily?: 0-1  On average, how many sweetened beverages do you drink each day (Examples: soda, juice, sweet tea, etc.  Do NOT count diet or artificially sweetened beverages)?: 0  How many minutes a day do you exercise enough to make your heart beat faster?: 9 or less  How many days a week do you exercise enough to make your heart beat faster?: 3 or less  How many days per week do you miss taking your medication?: 1  What makes it hard for you to take your medication every day?: remembering to take  What is the reason for your visit today?: F/N Cares  When did your symptoms begin?: Today  What are your symptoms?: Needs foot care         Nursing note reviewed with patient.  " Accuracy and completeness verified.     Encounter Date:  04/10/2022    Patient Name:  Babak Moran  Patient MRN:   [id number]     Last Footcare Appt: Visit date not found    PCP: Teo Funes    Other Providers Seen for Foot/Nail Care (name/location/date): [] Yes [x] No List (if applicable):    ABN Given to patient and signed in clinic today:    [x] Yes [] No    SUBJECTIVE:  Patient was seen by me in [facility] for toe pain.  His feet were in very poor condition, toenails on his right foot are growing backwards with excessive growth of fungus and nail growth.  He has a wound on the tip of his third toe on the right side which was treated with Bactrim and dressing change in the free clinic.  He does not have health insurance and was encouraged to come to clinic for 1 time to have his toenails fixed.  I will try to manage his toenails at the free clinic from now on.  His wife has been doing dressing changes at home and reports he is still having yellow pus like drainage from the third toe on the right.  Patient reports he continues to have pain in the toes of both feet.  He currently has a blood blister on the right second toe.    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  He does not wear good supportive shoes, wears crocs all the time.    Patient has an extensive health history to include CVA with left side affected, A. fib with RVR, ascending aorta enlargement, hypertension, SLE, history of sepsis.  He is obese.  He has poor hygiene.    Patient Reported Symptoms: RIGHT  LEFT   Neuropathic symptoms [x]  [x]  DESCRIBE: Bilateral feet and toes   Pain in foot/toes at rest [x]  [x]  DESCRIBE: Bilateral   Intermittent claudication []  []  DESCRIBE:   Previous foot ulcer/injury [x]  []  DESCRIBE: Wound on tip of right third toe, blood blister right second toe   Amputation []  []  DESCRIBE:    OBJECTIVE:    Nurse/Provider Inspection of  Feet/Nails: RIGHT  LEFT   Infection [x]  []  DESCRIBE: Wound on tip of right third toe, wife has been treating at home.  He was given a prescription of Bactrim at [facility] when I saw him there which he tolerated and finished.  Wife reports he continues to have some yellow drainage from the toe   Ulcerations [x]  []  DESCRIBE: Right third toe   Calluses/Corns [x]  [x]  DESCRIBE: Bilateral heels, right great toe   Fissures/Cracks []  []  DESCRIBE:   Nail Disorders/Changes [x]  [x]  DESCRIBE: All toes   Other []      []  DESCRIBE:  VASCULAR TESTING:    Foot Pulses:  RIGHT  LEFT   Dorsalis pedis [x]  [x]    Posterior tibial []  []   Capillary refill of the hallux (<3 seconds): [] []  Digital hair present: [x] [x]  Varicosities (+/- presents): [] []  Edema (pitting vs. non pitting): [x] [x]     DESCRIBE: [] 1+ minimal   [x] 2+ moderate   [] 3+ severe  Skin Temperature:  [x] [x]     DESCRIBE: [x] Cold   [] Hot    [] Warm to touch    NEUROLOGIC TESTING:        RIGHT LEFT  Sharp/dull Testing: (the blunt end and sharp end of swab stick) [x] [x]  Proprioception: (moves hallux up/down in space) [] []  Superficial Reflexes: (Babinski sign) [] []    Monofilament Testing: RIGHT  LEFT  RIGHT LEFT    Site 1 []  []  Site 6 [] []    Site 2 []  []  Site 7 [] []    Site 3 []  []  Site 8 [] []    Site 4 []  []  Site 9 [] []    Site 5 []  []  Site 10 [] []      MUSCULOSKELETAL: RIGHT LEFT  Foot Type: [x] [x]  DESCRIBE: [] Pes cavus (high arch) [x] pes rectus (normal) [] pes planus (flat)  Digital deformity: [] []  DESCRIBE: [] bunion [] claw toe [] hammer toe [] mallet toe [] hallux limitus [] other  Joint ROM: [x] [x]  DESCRIBE: [x] adequate ROM to MTPH, STJ, and AJ without crepitus  Adequate Muscle Strength: [x] [x]   Other [] []  DESCRIBE:     DERMATOLOGIC:      Nails: RIGHT LEFT  Onychauxis (thickened, long nail): [x] [x]  DESCRIBE: All 10 toes  Onycholysis ( nail): [] []  DESCRIBE:   Onychocryptosis (ingrown  toenail): [] []  DESCRIBE:    Onychogryphosis (carmen's horn nail): [] []  DESCRIBE:   Onchomycosis (fungal nail): [x] [x]  DESCRIBE: Right: Great toe, 2, 3, 5; Left: 3, 4, 5    Skin: RIGHT LEFT  [x] Hyperkeratotic lesions [] verruca tissue [] foreign body: [x] [x]  DESCRIBE: Bilateral heels, right great toe  [x] Mild edema [] erythema [x] open lesions [] interdigit maceration: [x] []  DESCRIBE: Third toe tip has a wound  [] Varicosities [] telangiectasis [] pigmented lesion [x] venous stasis:  [] []  DESCRIBE:   Adequate padding to the plantar aspect of the foot: [x] [x]  DESCRIBE:     Footwear Assessment:  Type: Crocs   Condition Good [] Fair [] Poor [x]   Fit Good [] Fair [] Poor [x]            Yes No  Does the patient use an ambulatory aid?      [] [x] DESCRIBE:  Does the patient understand the effects of diabetes on foot health? [] [x]  Can the patient identify appropriate foot care practices?    [] [x]  Are the patient's feet adequately cared for?      [] [x]            Yes No  Does the patient have impaired vision?      [x] []  Can the patient reach own feet for safe self care?     [] [x]  Are there other factors influencing ability to safely care for own feet? [x] []     Pertinent Past Medical History:  Patient Active Problem List   Diagnosis     Pain in joint, ankle and foot     Anticoagulation monitoring, INR range 2-3     Atrial fibrillation with RVR (H)     Cerebrovascular accident (CVA) due to embolism of right middle cerebral artery (H)     Osteoarthrosis     Cutaneous lupus erythematosus     Gout     Ascending aorta enlargement (H)     Obesity     Family history of coronary artery disease     Essential hypertension     Left hemiparesis (H)     Long-term (current) use of anticoagulants, INR goal 2.0-3.0     Systemic lupus erythematosus (H)     Hypoglycemia     Tissue plasminogen activator (t-PA) administered at other facility within 24 hours prior to current admission     Morbid obesity with BMI of  40.0-44.9, adult (H)     Obstructive sleep apnea syndrome     Cellulitis of left lower extremity     Sepsis (H)     Past Medical History:   Diagnosis Date     Atrial fibrillation (H) 02/03/2017    on Warfarin     Cerebral infarction (H)      Cerebral infarction due to unspecified occlusion or stenosis of right middle cerebral artery (H) 02/03/2017    ,Left hemiparesis, left neglect, impaired balance and gait following R MCA stroke with mild hemorrhagic transformation s/p tissue plasminogen activator and mechanical thrombectomy, s/p C7 facet fracture from fall off forklift.     Chest pain 02/1997     Disorder of skin or subcutaneous tissue 07/25/2011     Essential (primary) hypertension 02/1997     Fracture of cervical vertebra (H)     02/03/2017,C7 facet fracture from fall off forklift, nondisplaced     Gout      Hemiplegia affecting left nondominant side (H) 02/03/2017     Obesity 02/03/2017    body mass index of 40     Other local lupus erythematosus        Diagnostics:  Hemoglobin A1C POCT   Date Value Ref Range Status   09/06/2019 6.0 4.0 - 6.0 % Final       ASSESSMENT/PLAN:    Dystrophic nail  Long-term (current) use of anticoagulants, INR goal 2.0-3.0  Left hemiparesis (H)  Morbid obesity with BMI of 40.0-44.9, adult (H)  Anticoagulation monitoring, INR range 2-3  All toenails filed down to manageable thickness with Dremel tool and angled Rhea.  Toenails were then trimmed with clippers.  Lotion applied to bilateral lower extremities.  Patient tolerated well. Time spent doing foot/nail care was 35 minutes.    Callus of foot  Calluses shaved down with #15 blade and then sanded smooth with Dremel tool and angled Rhea. Lotion applied. Patient tolerated well. Time spent doing foot/nail care was 35 minutes.    Need for vaccination  Patient is due for tetanus which was encouraged but he declines today.    Open wound of toe, subsequent encounter  Wound care completed today.  Supplies sent home with patient for  management at home.  Patient to contact [facility] if he should need any additional supplies or if he has any concerns.  I am going to treat him with another round of Bactrim due to ongoing symptoms of drainage and pain.  - sulfamethoxazole-trimethoprim (BACTRIM DS) 800-160 MG tablet  Dispense: 14 tablet; Refill: 0    [x] Patient is unable to trim own toenails due to mobility limitation as a result of left hemiparesis, CVA, morbid obesity, osteoarthritis    [x] Patient has limited sensation in both feet as a result of neuropathic damage due to CVA, SLE    [x] Patient is on chronic anticoagulation and chronic use of this medication poses a increased risk of bleeding should patient sustain a nail/skin injury.    [x] Patient has noted onychomycosis for some time now and has tried various OTC treatments without success.     Educated Patient On:   [x] Proper healthy diet. Eliminate soda, high fructose corn syrup products, artificial sweeteners, and high sodium, high cholesterol foods. Encouraged more fruits and   vegetables and an increase in daily water intake.  [x] Educated on importance of diet and exercise for weight loss and reduction on symptoms.   [x] Daily exercise for at least 30 minutes is recommended. This can be walking, riding a bike, low impact aerobic activity, or yoga.    [x] Importance of daily skin assessments.   [x] Importance of not walking barefoot. Recommend wearing Crocs in house versus going barefoot.   [x] Importance of good, supportive shoes.   [x] Importance of smoking cessation. Greater than 3 minutes were spent on smoking cessation including encouragement to reduce cigarette use and discussion of modalities to assist with this. Cessation was encouraged in order to improve pain, reduce mortality, improve quality of life, and long term outcomes.     Recommended:      YES NO   Diabetic socks:      [] [] [x] Not applicable   Compression stockings/sleeves:   [x] [] [] Not  applicable   Diabetic/supportive shoegear with wide toe box:  [x] [] [] Not applicable   Use of Russ's VapoRub daily to feet and/or toenails: [x] [] [] Not applicable   Smoking Cessation:     [x] [] [] Not applicable    FOLLOW-UP:    Return to clinic: [] One week [] 30 Days (wounds) [x] 63+ Days   [x] As directed by Provider           [facility]ail care at [facility]    I explained my diagnostic considerations and recommendations to the patient, who voiced understanding and agreement with the treatment plan. All questions were answered. We discussed potential side effects of any prescribed or recommended therapies, as well as expectations for response to treatments.     JAYLEN Lu, AGNP-C  Internal Medicine  [facility]    04/12/2022 8:00 AM    Total time spent with this patient was 40 minutes which included chart review, procedures, patient education, visualization and interpretation of labs/images, time spent with the patient and documentation.     [x]  TRIMMING DYSTROPHIC NAILS,ANY NUMBER  [x] 15394 TRIM HYPERKERATOTIC SKIN LESION, 1  [] 42542 TRIM HYPERKERATOTIC SKIN LESION,2-4  [] 09038 TRIM HYPERKERATOTIC SKIN LESION,>4  [] 84722 TRIM NON DYSTROPHIC NAIL(S)  [] 70468 DEBRIDEMENT OF NAIL(S) 1-5  [] 26979 DEBRIDEMENT OF NAIL(S) 6 OR MORE  [] 21533 REMOVAL NAIL/NAIL BED, PARTIAL OR COMPLETE

## 2022-04-11 ENCOUNTER — TELEPHONE (OUTPATIENT)
Dept: FAMILY MEDICINE | Facility: OTHER | Age: 62
End: 2022-04-11

## 2022-04-11 ENCOUNTER — OFFICE VISIT (OUTPATIENT)
Dept: INTERNAL MEDICINE | Facility: OTHER | Age: 62
End: 2022-04-11
Attending: NURSE PRACTITIONER

## 2022-04-11 VITALS
SYSTOLIC BLOOD PRESSURE: 139 MMHG | OXYGEN SATURATION: 98 % | RESPIRATION RATE: 18 BRPM | WEIGHT: 310 LBS | DIASTOLIC BLOOD PRESSURE: 88 MMHG | BODY MASS INDEX: 39.8 KG/M2 | HEART RATE: 52 BPM | TEMPERATURE: 97.2 F

## 2022-04-11 DIAGNOSIS — G81.94 LEFT HEMIPARESIS (H): ICD-10-CM

## 2022-04-11 DIAGNOSIS — L60.3 DYSTROPHIC NAIL: Primary | ICD-10-CM

## 2022-04-11 DIAGNOSIS — Z23 NEED FOR VACCINATION: Chronic | ICD-10-CM

## 2022-04-11 DIAGNOSIS — Z79.01 LONG-TERM (CURRENT) USE OF ANTICOAGULANTS, INR GOAL 2.0-3.0: Chronic | ICD-10-CM

## 2022-04-11 DIAGNOSIS — E66.01 MORBID OBESITY WITH BMI OF 40.0-44.9, ADULT (H): ICD-10-CM

## 2022-04-11 DIAGNOSIS — Z79.01 ANTICOAGULATION MONITORING, INR RANGE 2-3: Chronic | ICD-10-CM

## 2022-04-11 DIAGNOSIS — S91.109D OPEN WOUND OF TOE, SUBSEQUENT ENCOUNTER: ICD-10-CM

## 2022-04-11 DIAGNOSIS — L84 CALLUS OF FOOT: ICD-10-CM

## 2022-04-11 PROCEDURE — 11055 PARING/CUTG B9 HYPRKER LES 1: CPT | Performed by: NURSE PRACTITIONER

## 2022-04-11 PROCEDURE — G0127 TRIM NAIL(S): HCPCS | Performed by: NURSE PRACTITIONER

## 2022-04-11 PROCEDURE — 99213 OFFICE O/P EST LOW 20 MIN: CPT | Mod: 25 | Performed by: NURSE PRACTITIONER

## 2022-04-11 RX ORDER — SULFAMETHOXAZOLE/TRIMETHOPRIM 800-160 MG
TABLET ORAL
COMMUNITY
Start: 2022-03-22 | End: 2022-08-07

## 2022-04-11 RX ORDER — SULFAMETHOXAZOLE/TRIMETHOPRIM 800-160 MG
1 TABLET ORAL 2 TIMES DAILY
Qty: 14 TABLET | Refills: 0 | Status: SHIPPED | OUTPATIENT
Start: 2022-04-11 | End: 2022-04-18

## 2022-04-11 ASSESSMENT — PAIN SCALES - GENERAL: PAINLEVEL: MODERATE PAIN (4)

## 2022-04-11 NOTE — NURSING NOTE
"Chief Complaint   Patient presents with     F/N Cares       Initial /88 (BP Location: Right arm, Patient Position: Sitting, Cuff Size: Adult Large)   Pulse 52   Temp 97.2  F (36.2  C) (Tympanic)   Resp 18   Wt 140.6 kg (310 lb)   SpO2 98%   BMI 39.80 kg/m   Estimated body mass index is 39.8 kg/m  as calculated from the following:    Height as of 9/25/20: 1.88 m (6' 2\").    Weight as of this encounter: 140.6 kg (310 lb).     Medication Reconciliation: complete    FOOD SECURITY SCREENING QUESTIONS:    The next two questions are to help us understand your food security.  If you are feeling you need any assistance in this area, we have resources available to support you today.    Hunger Vital Signs:  Within the past 12 months we worried whether our food would run out before we got money to buy more. Never  Within the past 12 months the food we bought just didn't last and we didn't have money to get more. Never      Advance care plan reviewed      Rena Sousa LPN on 4/11/2022 at 9:41 AM      "

## 2022-04-11 NOTE — TELEPHONE ENCOUNTER
Call to patient to review prescribed medication bactruim and taking warfarin. Called to schedule earlier coumadin check due to antibiotic use. Left detailed message to call back to the [facility]. Liz Coles RN on 4/11/2022 at 3:34 PM

## 2022-04-11 NOTE — PATIENT INSTRUCTIONS
Call [facility] if you need more supplies. They can contact me for more info.    I will find out my next shift at [facility] and let you know. Any questions or concerns about infection or worsening, contact Blanka Pérez and she will contact me.

## 2022-04-12 PROBLEM — L03.116 CELLULITIS OF LEFT LOWER EXTREMITY: Status: RESOLVED | Noted: 2019-09-05 | Resolved: 2022-04-12

## 2022-04-20 DIAGNOSIS — L03.115 CELLULITIS OF RIGHT LEG: Primary | ICD-10-CM

## 2022-04-20 RX ORDER — DOXYCYCLINE HYCLATE 100 MG
100 TABLET ORAL 2 TIMES DAILY
Qty: 20 TABLET | Refills: 0 | Status: SHIPPED | OUTPATIENT
Start: 2022-04-20 | End: 2022-04-30

## 2022-04-21 ENCOUNTER — ANTICOAGULATION THERAPY VISIT (OUTPATIENT)
Dept: ANTICOAGULATION | Facility: OTHER | Age: 62
End: 2022-04-21
Attending: FAMILY MEDICINE

## 2022-04-21 DIAGNOSIS — I48.91 ATRIAL FIBRILLATION WITH RVR (H): ICD-10-CM

## 2022-04-21 DIAGNOSIS — Z79.01 ANTICOAGULATION MONITORING, INR RANGE 2-3: Primary | ICD-10-CM

## 2022-04-21 LAB — INR POINT OF CARE: 4 (ref 0.9–1.1)

## 2022-04-21 PROCEDURE — 85610 PROTHROMBIN TIME: CPT | Mod: QW

## 2022-04-21 PROCEDURE — 36416 COLLJ CAPILLARY BLOOD SPEC: CPT

## 2022-04-21 NOTE — PROGRESS NOTES
ANTICOAGULATION FOLLOW-UP CLINIC VISIT    Patient Name:  Babak Moran  Date:  2022  Contact Type:  Face to Face    SUBJECTIVE:  Patient Findings     Positives:  Change in medications (finished bactrim yesterday. started doxycycline today X 10 days)        Clinical Outcomes     Negatives:  Major bleeding event, Thromboembolic event, Anticoagulation-related hospital admission, Anticoagulation-related ED visit, Anticoagulation-related fatality        Reviewed signs and symptoms of bleeding, including extremecaution against falling and hitting head. Patient advised to seek medical attention if any bleeding or injury occurs. Follow-up per protocol.  Patient did not want to hold 2 days days of his warfarin or only take half of his dose tomorrow (5 mg) instead of 10 mg like I suggested. Patient educated on the increased risk for bleeding. Patient agreed to have INR checked on Monday.      OBJECTIVE    Recent labs: (last 7 days)     22  0801   INR 4.0*       ASSESSMENT / PLAN  INR assessment SUPRA    Recheck INR In: 4 DAYS    INR Location Clinic      Anticoagulation Summary  As of 2022    INR goal:  2.0-3.0   TTR:  78.9 % (1 y)   INR used for dosin.0 (2022)   Warfarin maintenance plan:  10 mg (5 mg x 2) every Mon, Wed, Fri; 7.5 mg (5 mg x 1.5) all other days   Full warfarin instructions:  : Hold; Otherwise 10 mg every Mon, Wed, Fri; 7.5 mg all other days   Weekly warfarin total:  60 mg   Plan last modified:  Shelley Mercado RN (2022)   Next INR check:  2022   Priority:  Maintenance   Target end date:  Indefinite    Indications    Unspecified atrial fibrillation (Resolved) [I48.91]  Anticoagulation monitoring  INR range 2-3 [Z79.01]  Atrial fibrillation with RVR (H) [I48.91]             Anticoagulation Episode Summary     INR check location:      Preferred lab:      Send INR reminders to:  [facility]    Comments:  Babak prefers to be closer to 3.0 d/t previous CVA check Q 4  weeks.Declines to reduce dose when slightly over 3.0  Needs to change PCP      Anticoagulation Care Providers     Provider Role Specialty Phone number    Teo Funes MD [facility] [phone]            See the Encounter Report to view Anticoagulation Flowsheet and Dosing Calendar (Go to Encounters tab in chart review, and find the Anticoagulation Therapy Visit)        Nikki Aguilar RN

## 2022-04-25 ENCOUNTER — ANTICOAGULATION THERAPY VISIT (OUTPATIENT)
Dept: ANTICOAGULATION | Facility: OTHER | Age: 62
End: 2022-04-25
Attending: FAMILY MEDICINE

## 2022-04-25 DIAGNOSIS — Z79.01 ANTICOAGULATION MONITORING, INR RANGE 2-3: Primary | ICD-10-CM

## 2022-04-25 DIAGNOSIS — I48.91 ATRIAL FIBRILLATION WITH RVR (H): ICD-10-CM

## 2022-04-25 LAB — INR POINT OF CARE: 2.9 (ref 0.9–1.1)

## 2022-04-25 PROCEDURE — 36416 COLLJ CAPILLARY BLOOD SPEC: CPT

## 2022-04-25 PROCEDURE — 85610 PROTHROMBIN TIME: CPT | Mod: QW

## 2022-04-25 NOTE — PROGRESS NOTES
ANTICOAGULATION FOLLOW-UP CLINIC VISIT    Patient Name:  Babak Moran  Date:  2022  Contact Type:  Face to Face    SUBJECTIVE:  Patient Findings     Positives:  Change in medications (five days left of doxycycline)        Clinical Outcomes     Negatives:  Major bleeding event, Thromboembolic event, Anticoagulation-related hospital admission, Anticoagulation-related ED visit, Anticoagulation-related fatality           OBJECTIVE    Recent labs: (last 7 days)     22  1443   INR 2.9*       ASSESSMENT / PLAN  INR assessment THER    Recheck INR In: 1 WEEK    INR Location Clinic      Anticoagulation Summary  As of 2022    INR goal:  2.0-3.0   TTR:  77.8 % (1 y)   INR used for dosin.9 (2022)   Warfarin maintenance plan:  10 mg (5 mg x 2) every Mon, Wed, Fri; 7.5 mg (5 mg x 1.5) all other days   Full warfarin instructions:  10 mg every Mon, Wed, Fri; 7.5 mg all other days   Weekly warfarin total:  60 mg   No change documented:  Nikki Aguilar RN   Plan last modified:  Shelley Mercado RN (2022)   Next INR check:  2022   Priority:  Maintenance   Target end date:  Indefinite    Indications    Unspecified atrial fibrillation (Resolved) [I48.91]  Anticoagulation monitoring  INR range 2-3 [Z79.01]  Atrial fibrillation with RVR (H) [I48.91]             Anticoagulation Episode Summary     INR check location:      Preferred lab:      Send INR reminders to:  [facility]    Comments:  Babak prefers to be closer to 3.0 d/t previous CVA check Q 4 weeks.Declines to reduce dose when slightly over 3.0  Needs to change PCP      Anticoagulation Care Providers     Provider Role Specialty Phone number    Teo Funes MD [facility] [phone]            See the Encounter Report to view Anticoagulation Flowsheet and Dosing Calendar (Go to Encounters tab in chart review, and find the Anticoagulation Therapy Visit)        Nikki Aguilar RN

## 2022-05-02 ENCOUNTER — ANTICOAGULATION THERAPY VISIT (OUTPATIENT)
Dept: ANTICOAGULATION | Facility: OTHER | Age: 62
End: 2022-05-02
Attending: FAMILY MEDICINE

## 2022-05-02 DIAGNOSIS — I48.91 ATRIAL FIBRILLATION WITH RVR (H): ICD-10-CM

## 2022-05-02 DIAGNOSIS — Z79.01 ANTICOAGULATION MONITORING, INR RANGE 2-3: Primary | ICD-10-CM

## 2022-05-02 DIAGNOSIS — I48.91 ATRIAL FIBRILLATION (H): ICD-10-CM

## 2022-05-02 DIAGNOSIS — Z79.01 LONG-TERM (CURRENT) USE OF ANTICOAGULANTS, INR GOAL 2.0-3.0: ICD-10-CM

## 2022-05-02 LAB — INR POINT OF CARE: 3.6 (ref 0.9–1.1)

## 2022-05-02 PROCEDURE — 36416 COLLJ CAPILLARY BLOOD SPEC: CPT

## 2022-05-02 PROCEDURE — 85610 PROTHROMBIN TIME: CPT | Mod: QW

## 2022-05-02 RX ORDER — WARFARIN SODIUM 5 MG/1
TABLET ORAL
Qty: 160 TABLET | Refills: 1 | Status: ON HOLD | OUTPATIENT
Start: 2022-05-02 | End: 2022-08-11

## 2022-05-02 NOTE — TELEPHONE ENCOUNTER
Patient Rx request for the following:      Requested Prescriptions   Pending Prescriptions Disp Refills     warfarin ANTICOAGULANT (JANTOVEN ANTICOAGULANT) 5 MG tablet 160 tablet 1     Sig: TAKE 1 AND 1/2 TABLETS(7.5MG) BY MOUTH x 4 days and 10 mg x 3 days/week OR AS DIRECTED BY [facility]       There is no refill protocol information for this order        Patient preferred pharmacy is [id number]  Last Prescription Date:   1/20/22   Last Fill Qty/Refills:         160, R-1    Last Office Visit:              4/11/22   Future Office visit:           None  Nikki Aguilar, RN on 5/2/2022 at 2:13 PM

## 2022-05-02 NOTE — PROGRESS NOTES
ANTICOAGULATION FOLLOW-UP CLINIC VISIT    Patient Name:  Babak Moran  Date:  5/2/2022  Contact Type:  Face to Face    SUBJECTIVE:  Patient Findings     Positives:  Change in medications (Finished doxycycline yesterday.)        Clinical Outcomes     Negatives:  Major bleeding event, Thromboembolic event, Anticoagulation-related hospital admission, Anticoagulation-related ED visit, Anticoagulation-related fatality           OBJECTIVE    Recent labs: (last 7 days)     05/02/22  1402   INR 3.6*       ASSESSMENT / PLAN  INR assessment SUPRA    Recheck INR In: 1 WEEK    INR Location Clinic      Anticoagulation Summary  As of 5/2/2022    INR goal:  2.0-3.0   TTR:  76.2 % (1 y)   INR used for dosing:  3.6 (5/2/2022)   Warfarin maintenance plan:  10 mg (5 mg x 2) every Mon, Wed, Fri; 7.5 mg (5 mg x 1.5) all other days   Full warfarin instructions:  5/2: 5 mg; 5/3: 5 mg; Otherwise 10 mg every Mon, Wed, Fri; 7.5 mg all other days   Weekly warfarin total:  60 mg   Plan last modified:  Shelley Mercado RN (1/20/2022)   Next INR check:  5/9/2022   Priority:  Maintenance   Target end date:  Indefinite    Indications    Unspecified atrial fibrillation (Resolved) [I48.91]  Anticoagulation monitoring  INR range 2-3 [Z79.01]  Atrial fibrillation with RVR (H) [I48.91]             Anticoagulation Episode Summary     INR check location:      Preferred lab:      Send INR reminders to:  [facility]    Comments:  Babak prefers to be closer to 3.0 d/t previous CVA check Q 4 weeks.Declines to reduce dose when slightly over 3.0  Needs to change PCP      Anticoagulation Care Providers     Provider Role Specialty Phone number    Teo Funes MD [facility] Medicine [phone]            See the Encounter Report to view Anticoagulation Flowsheet and Dosing Calendar (Go to Encounters tab in chart review, and find the Anticoagulation Therapy Visit)        Nikki Aguilar RN

## 2022-05-09 ENCOUNTER — ANTICOAGULATION THERAPY VISIT (OUTPATIENT)
Dept: ANTICOAGULATION | Facility: OTHER | Age: 62
End: 2022-05-09
Attending: FAMILY MEDICINE

## 2022-05-09 DIAGNOSIS — I48.91 ATRIAL FIBRILLATION WITH RVR (H): ICD-10-CM

## 2022-05-09 DIAGNOSIS — Z79.01 ANTICOAGULATION MONITORING, INR RANGE 2-3: Primary | ICD-10-CM

## 2022-05-09 LAB — INR POINT OF CARE: 3.3 (ref 0.9–1.1)

## 2022-05-09 PROCEDURE — 85610 PROTHROMBIN TIME: CPT | Mod: QW

## 2022-05-09 PROCEDURE — 36416 COLLJ CAPILLARY BLOOD SPEC: CPT

## 2022-05-09 NOTE — PROGRESS NOTES
ANTICOAGULATION MANAGEMENT     Babak Moran 61 year old male is on warfarin with supratherapeutic INR result. (Goal INR 2.0-3.0)    Recent labs: (last 7 days)     05/09/22  1440   INR 3.3*       ASSESSMENT       Source(s): Chart Review and In person       Warfarin doses taken: Warfarin taken as instructed    Diet: No new diet changes identified    New illness, injury, or hospitalization: No    Medication/supplement changes: None noted    Signs or symptoms of bleeding or clotting: No    Previous INR: Supratherapeutic    Additional findings: None       PLAN     Recommended plan for no diet, medication or health factor changes affecting INR     Dosing Instructions: decrease your warfarin dose (8.3% change) with next INR in 1 week       Summary  As of 5/9/2022    Full warfarin instructions:  10 mg every Fri; 7.5 mg all other days   Next INR check:  5/16/2022             In person    Lab visit scheduled    Education provided: None required    Plan made per [facility] anticoagulation protocol    Nikki Aguilar, RN  Anticoagulation Clinic  5/9/2022    _______________________________________________________________________     Anticoagulation Episode Summary     Current INR goal:  2.0-3.0   TTR:  74.2 % (1 y)   Target end date:  Indefinite   Send INR reminders to:  [facility]    Indications    Unspecified atrial fibrillation (Resolved) [I48.91]  Anticoagulation monitoring  INR range 2-3 [Z79.01]  Atrial fibrillation with RVR (H) [I48.91]           Comments:  Babak prefers to be closer to 3.0 d/t previous CVA check Q 4 weeks.Declines to reduce dose when slightly over 3.0  Needs to change PCP         Anticoagulation Care Providers     Provider Role Specialty Phone number    Teo Funes MD [facility] [phone]

## 2022-05-10 DIAGNOSIS — I48.21 PERMANENT ATRIAL FIBRILLATION (H): ICD-10-CM

## 2022-05-10 DIAGNOSIS — I10 BENIGN ESSENTIAL HYPERTENSION: Primary | ICD-10-CM

## 2022-05-12 NOTE — TELEPHONE ENCOUNTER
[id number] sent Rx request for the following:      METOPROLOL SUCC 200MG ER TAB      Last Prescription Date:   9/29/2020  Last Fill Qty/Refills:         90, R-3    Last Office Visit:              4/11/2022   Future Office visit:           none    Jose Roberto Stubbs RN, BSN  ....................  5/12/2022   2:57 PM

## 2022-05-13 RX ORDER — METOPROLOL SUCCINATE 200 MG/1
200 TABLET, EXTENDED RELEASE ORAL DAILY
Qty: 90 TABLET | Refills: 4 | Status: ON HOLD | OUTPATIENT
Start: 2022-05-13 | End: 2022-08-11

## 2022-05-13 NOTE — TELEPHONE ENCOUNTER
Only future visit is with [facility].  No future visits for medication refills present at this time.      Oneida Malone LPN 5/13/2022 2:37 PM

## 2022-05-16 ENCOUNTER — ANTICOAGULATION THERAPY VISIT (OUTPATIENT)
Dept: ANTICOAGULATION | Facility: OTHER | Age: 62
End: 2022-05-16
Attending: FAMILY MEDICINE

## 2022-05-16 DIAGNOSIS — Z79.01 ANTICOAGULATION MONITORING, INR RANGE 2-3: Primary | ICD-10-CM

## 2022-05-16 DIAGNOSIS — I48.91 ATRIAL FIBRILLATION WITH RVR (H): ICD-10-CM

## 2022-05-16 LAB — INR POINT OF CARE: 3.7 (ref 0.9–1.1)

## 2022-05-16 PROCEDURE — 85610 PROTHROMBIN TIME: CPT | Mod: QW

## 2022-05-16 PROCEDURE — 36416 COLLJ CAPILLARY BLOOD SPEC: CPT

## 2022-05-16 NOTE — PROGRESS NOTES
ANTICOAGULATION MANAGEMENT     Babak Moran 61 year old male is on warfarin with supratherapeutic INR result. (Goal INR 2.0-3.0)    Recent labs: (last 7 days)     05/16/22  0805   INR 3.7*       ASSESSMENT       Source(s): Chart Review and In person       Warfarin doses taken: Warfarin taken as instructed    Diet: No new diet changes identified    New illness, injury, or hospitalization: No    Medication/supplement changes: None noted    Signs or symptoms of bleeding or clotting: No    Previous INR: Supratherapeutic    Additional findings: Increase in activity       PLAN     Recommended plan for no diet, medication or health factor changes affecting INR     Dosing Instructions: decrease your warfarin dose (13.6% change) with next INR in 4 days       Summary  As of 5/16/2022    Full warfarin instructions:  5/16: Hold; Otherwise 5 mg every Mon, Fri; 7.5 mg all other days   Next INR check:  5/20/2022             In person    Lab visit scheduled    Education provided: Please call back if any changes to your diet, medications or how you've been taking warfarin    Plan made per [facility] anticoagulation protocol    Nikki Aguilar RN  Anticoagulation Clinic  5/16/2022    _______________________________________________________________________     Anticoagulation Episode Summary     Current INR goal:  2.0-3.0   TTR:  72.4 % (1 y)   Target end date:  Indefinite   Send INR reminders to:  [facility]    Indications    Unspecified atrial fibrillation (Resolved) [I48.91]  Anticoagulation monitoring  INR range 2-3 [Z79.01]  Atrial fibrillation with RVR (H) [I48.91]           Comments:  Babak prefers to be closer to 3.0 d/t previous CVA check Q 4 weeks.Declines to reduce dose when slightly over 3.0  Needs to change PCP         Anticoagulation Care Providers     Provider Role Specialty Phone number    Teo Funes MD [facility] [phone]

## 2022-05-20 ENCOUNTER — ANTICOAGULATION THERAPY VISIT (OUTPATIENT)
Dept: ANTICOAGULATION | Facility: OTHER | Age: 62
End: 2022-05-20
Attending: FAMILY MEDICINE

## 2022-05-20 DIAGNOSIS — I48.91 ATRIAL FIBRILLATION WITH RVR (H): ICD-10-CM

## 2022-05-20 DIAGNOSIS — Z79.01 ANTICOAGULATION MONITORING, INR RANGE 2-3: Primary | ICD-10-CM

## 2022-05-20 LAB — INR POINT OF CARE: 2.2 (ref 0.9–1.1)

## 2022-05-20 PROCEDURE — 36416 COLLJ CAPILLARY BLOOD SPEC: CPT

## 2022-05-20 PROCEDURE — 85610 PROTHROMBIN TIME: CPT | Mod: QW

## 2022-05-20 NOTE — PROGRESS NOTES
ANTICOAGULATION MANAGEMENT     Babak Moran 61 year old male is on warfarin with therapeutic INR result. (Goal INR 2.0-3.0)    Recent labs: (last 7 days)     05/20/22  0808   INR 2.2*       ASSESSMENT       Source(s): Chart Review and in person       Warfarin doses taken: Warfarin taken as instructed    Diet: No new diet changes identified    New illness, injury, or hospitalization: No    Medication/supplement changes: None noted    Signs or symptoms of bleeding or clotting: No    Previous INR: Supratherapeutic    Additional findings: None       PLAN     Recommended plan for no diet, medication or health factor changes affecting INR     Dosing Instructions: resume previous dose with next INR in 1 week       Summary  As of 5/20/2022    Full warfarin instructions:  7.5 mg every day   Next INR check:  5/27/2022             in person    Patient elected to schedule next visit 5/27/22    Education provided: None required    Plan made per [facility] anticoagulation protocol    Shelley Mercado RN  Anticoagulation Clinic  5/20/2022    _______________________________________________________________________     Anticoagulation Episode Summary     Current INR goal:  2.0-3.0   TTR:  71.9 % (1 y)   Target end date:  Indefinite   Send INR reminders to:  [facility]    Indications    Unspecified atrial fibrillation (Resolved) [I48.91]  Anticoagulation monitoring  INR range 2-3 [Z79.01]  Atrial fibrillation with RVR (H) [I48.91]           Comments:  Babak prefers to be closer to 3.0 d/t previous CVA check Q 4 weeks.Declines to reduce dose when slightly over 3.0  Needs to change PCP         Anticoagulation Care Providers     Provider Role Specialty Phone number    Teo Funes MD [facility] [phone]

## 2022-05-27 ENCOUNTER — ANTICOAGULATION THERAPY VISIT (OUTPATIENT)
Dept: ANTICOAGULATION | Facility: OTHER | Age: 62
End: 2022-05-27
Attending: FAMILY MEDICINE

## 2022-05-27 DIAGNOSIS — I48.91 ATRIAL FIBRILLATION WITH RVR (H): ICD-10-CM

## 2022-05-27 DIAGNOSIS — Z79.01 ANTICOAGULATION MONITORING, INR RANGE 2-3: Primary | ICD-10-CM

## 2022-05-27 LAB — INR POINT OF CARE: 2.6 (ref 0.9–1.1)

## 2022-05-27 PROCEDURE — 85610 PROTHROMBIN TIME: CPT | Mod: QW

## 2022-05-27 PROCEDURE — 36416 COLLJ CAPILLARY BLOOD SPEC: CPT

## 2022-05-27 NOTE — PROGRESS NOTES
ANTICOAGULATION MANAGEMENT     Babak Moran 61 year old male is on warfarin with therapeutic INR result. (Goal INR 2.0-3.0)    Recent labs: (last 7 days)     05/27/22  1316   INR 2.6*       ASSESSMENT       Source(s): Chart Review and In person       Warfarin doses taken: Warfarin taken as instructed    Diet: No new diet changes identified    New illness, injury, or hospitalization: No    Medication/supplement changes: Tylenol PRN    Signs or symptoms of bleeding or clotting: No    Previous INR: Therapeutic last 2(+) visits    Additional findings: None       PLAN     Recommended plan for no diet, medication or health factor changes affecting INR     Dosing Instructions: continue your current warfarin dose with next INR in 2 weeks       Summary  As of 5/27/2022    Full warfarin instructions:  7.5 mg every day   Next INR check:  6/10/2022             In person    Lab visit scheduled    Education provided: Please call back if any changes to your diet, medications or how you've been taking warfarin    Plan made per [facility] anticoagulation protocol    Nikki Aguilar RN  Anticoagulation Clinic  5/27/2022    _______________________________________________________________________     Anticoagulation Episode Summary     Current INR goal:  2.0-3.0   TTR:  71.9 % (1 y)   Target end date:  Indefinite   Send INR reminders to:  [facility]    Indications    Unspecified atrial fibrillation (Resolved) [I48.91]  Anticoagulation monitoring  INR range 2-3 [Z79.01]  Atrial fibrillation with RVR (H) [I48.91]           Comments:  Babak prefers to be closer to 3.0 d/t previous CVA check Q 4 weeks.Declines to reduce dose when slightly over 3.0  Needs to change PCP         Anticoagulation Care Providers     Provider Role Specialty Phone number    Teo Funes MD [facility] [phone]

## 2022-06-10 ENCOUNTER — ANTICOAGULATION THERAPY VISIT (OUTPATIENT)
Dept: ANTICOAGULATION | Facility: OTHER | Age: 62
End: 2022-06-10
Attending: FAMILY MEDICINE

## 2022-06-10 DIAGNOSIS — I48.91 ATRIAL FIBRILLATION WITH RVR (H): ICD-10-CM

## 2022-06-10 DIAGNOSIS — Z79.01 ANTICOAGULATION MONITORING, INR RANGE 2-3: Primary | ICD-10-CM

## 2022-06-10 LAB — INR POINT OF CARE: 3 (ref 0.9–1.1)

## 2022-06-10 PROCEDURE — 85610 PROTHROMBIN TIME: CPT | Mod: QW

## 2022-06-10 PROCEDURE — 36416 COLLJ CAPILLARY BLOOD SPEC: CPT

## 2022-06-10 NOTE — PROGRESS NOTES
ANTICOAGULATION MANAGEMENT     Babak Moran 62 year old male is on warfarin with therapeutic INR result. (Goal INR 2.0-3.0)    Recent labs: (last 7 days)     06/10/22  0915   INR 3.0*       ASSESSMENT       Source(s): Chart Review and In person       Warfarin doses taken: Warfarin taken as instructed    Diet: No new diet changes identified    New illness, injury, or hospitalization: No    Medication/supplement changes: None noted    Signs or symptoms of bleeding or clotting: No    Previous INR: Therapeutic last 2(+) visits    Additional findings: None       PLAN     Recommended plan for no diet, medication or health factor changes affecting INR     Dosing Instructions: continue your current warfarin dose with next INR in 3 weeks       Summary  As of 6/10/2022    Full warfarin instructions:  7.5 mg every day   Next INR check:  7/1/2022             In person    Lab visit scheduled    Education provided: Please call back if any changes to your diet, medications or how you've been taking warfarin    Plan made per [facility] anticoagulation protocol    Nikki Aguilar RN  Anticoagulation Clinic  6/10/2022    _______________________________________________________________________     Anticoagulation Episode Summary     Current INR goal:  2.0-3.0   TTR:  71.9 % (1 y)   Target end date:  Indefinite   Send INR reminders to:  [facility]    Indications    Unspecified atrial fibrillation (Resolved) [I48.91]  Anticoagulation monitoring  INR range 2-3 [Z79.01]  Atrial fibrillation with RVR (H) [I48.91]           Comments:  Babak prefers to be closer to 3.0 d/t previous CVA check Q 4 weeks.Declines to reduce dose when slightly over 3.0  Needs to change PCP         Anticoagulation Care Providers     Provider Role Specialty Phone number    Teo Funes MD [facility] [phone]

## 2022-06-26 ENCOUNTER — HOSPITAL ENCOUNTER (EMERGENCY)
Facility: OTHER | Age: 62
Discharge: HOME | End: 2022-06-26
Attending: STUDENT IN AN ORGANIZED HEALTH CARE EDUCATION/TRAINING PROGRAM | Admitting: STUDENT IN AN ORGANIZED HEALTH CARE EDUCATION/TRAINING PROGRAM
Payer: OTHER MISCELLANEOUS

## 2022-06-26 ENCOUNTER — APPOINTMENT (OUTPATIENT)
Dept: CT IMAGING | Facility: OTHER | Age: 62
End: 2022-06-26
Attending: STUDENT IN AN ORGANIZED HEALTH CARE EDUCATION/TRAINING PROGRAM
Payer: OTHER MISCELLANEOUS

## 2022-06-26 VITALS
TEMPERATURE: 98.2 F | SYSTOLIC BLOOD PRESSURE: 160 MMHG | DIASTOLIC BLOOD PRESSURE: 100 MMHG | HEIGHT: 75 IN | BODY MASS INDEX: 38.54 KG/M2 | RESPIRATION RATE: 16 BRPM | OXYGEN SATURATION: 97 % | WEIGHT: 310 LBS | HEART RATE: 84 BPM

## 2022-06-26 DIAGNOSIS — S09.90XA RECENT HEAD TRAUMA, INITIAL ENCOUNTER: ICD-10-CM
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
## 2022-06-26 DIAGNOSIS — T14.90XA TRAUMA: ICD-10-CM

## 2022-06-26 DIAGNOSIS — S01.01XA SUPERFICIAL LACERATION OF SCALP, INITIAL ENCOUNTER: ICD-10-CM

## 2022-06-26 LAB
ANION GAP SERPL CALCULATED.3IONS-SCNC: 5 MMOL/L (ref 3–14)
BASOPHILS # BLD AUTO: 0.1 10E3/UL (ref 0–0.2)
BASOPHILS NFR BLD AUTO: 1 %
BUN SERPL-MCNC: 23 MG/DL (ref 7–25)
CALCIUM SERPL-MCNC: 9.6 MG/DL (ref 8.6–10.3)
CHLORIDE BLD-SCNC: 104 MMOL/L (ref 98–107)
CO2 SERPL-SCNC: 31 MMOL/L (ref 21–31)
CREAT SERPL-MCNC: 0.79 MG/DL (ref 0.7–1.3)
EOSINOPHIL # BLD AUTO: 0.1 10E3/UL (ref 0–0.7)
EOSINOPHIL NFR BLD AUTO: 1 %
ERYTHROCYTE [DISTWIDTH] IN BLOOD BY AUTOMATED COUNT: 16.4 % (ref 10–15)
GFR SERPL CREATININE-BSD FRML MDRD: >90 ML/MIN/1.73M2
GLUCOSE BLD-MCNC: 82 MG/DL (ref 70–105)
HCT VFR BLD AUTO: 46.7 % (ref 40–53)
HGB BLD-MCNC: 14.6 G/DL (ref 13.3–17.7)
IMM GRANULOCYTES # BLD: 0 10E3/UL
IMM GRANULOCYTES NFR BLD: 0 %
INR PPP: 2.31 (ref 0.85–1.15)
LYMPHOCYTES # BLD AUTO: 1.2 10E3/UL (ref 0.8–5.3)
LYMPHOCYTES NFR BLD AUTO: 15 %
MCH RBC QN AUTO: 28.9 PG (ref 26.5–33)
MCHC RBC AUTO-ENTMCNC: 31.3 G/DL (ref 31.5–36.5)
MCV RBC AUTO: 93 FL (ref 78–100)
MONOCYTES # BLD AUTO: 0.8 10E3/UL (ref 0–1.3)
MONOCYTES NFR BLD AUTO: 10 %
NEUTROPHILS # BLD AUTO: 5.6 10E3/UL (ref 1.6–8.3)
NEUTROPHILS NFR BLD AUTO: 73 %
NRBC # BLD AUTO: 0 10E3/UL
NRBC BLD AUTO-RTO: 0 /100
PLATELET # BLD AUTO: 249 10E3/UL (ref 150–450)
POTASSIUM BLD-SCNC: 3.9 MMOL/L (ref 3.5–5.1)
RBC # BLD AUTO: 5.05 10E6/UL (ref 4.4–5.9)
SODIUM SERPL-SCNC: 140 MMOL/L (ref 134–144)
WBC # BLD AUTO: 7.6 10E3/UL (ref 4–11)

## 2022-06-26 PROCEDURE — 70450 CT HEAD/BRAIN W/O DYE: CPT

## 2022-06-26 PROCEDURE — 80048 BASIC METABOLIC PNL TOTAL CA: CPT | Performed by: STUDENT IN AN ORGANIZED HEALTH CARE EDUCATION/TRAINING PROGRAM

## 2022-06-26 PROCEDURE — 99291 CRITICAL CARE FIRST HOUR: CPT | Performed by: STUDENT IN AN ORGANIZED HEALTH CARE EDUCATION/TRAINING PROGRAM

## 2022-06-26 PROCEDURE — 999N000186 HC STATISTIC TRAUMA - NO PRIOR: Performed by: STUDENT IN AN ORGANIZED HEALTH CARE EDUCATION/TRAINING PROGRAM

## 2022-06-26 PROCEDURE — 99291 CRITICAL CARE FIRST HOUR: CPT | Mod: 25 | Performed by: STUDENT IN AN ORGANIZED HEALTH CARE EDUCATION/TRAINING PROGRAM

## 2022-06-26 PROCEDURE — 85004 AUTOMATED DIFF WBC COUNT: CPT | Performed by: STUDENT IN AN ORGANIZED HEALTH CARE EDUCATION/TRAINING PROGRAM

## 2022-06-26 PROCEDURE — 36415 COLL VENOUS BLD VENIPUNCTURE: CPT | Performed by: STUDENT IN AN ORGANIZED HEALTH CARE EDUCATION/TRAINING PROGRAM

## 2022-06-26 PROCEDURE — 85610 PROTHROMBIN TIME: CPT | Performed by: STUDENT IN AN ORGANIZED HEALTH CARE EDUCATION/TRAINING PROGRAM

## 2022-06-26 NOTE — ED TRIAGE NOTES
"Pt states he fell at approx. 1100.  Pt was at work putting silverware on the table when he fell.  Pt fell backwards hitting the corner of a tv stand.  Pt has a lac on the back of his head and abrasions to both elbows.  Pt denies LOC. Pt reports most of his pain is in \"my ass\"     Triage Assessment     Row Name 06/26/22 1140       Triage Assessment (Adult)    Airway WDL WDL       Respiratory WDL    Respiratory WDL WDL       Skin Circulation/Temperature WDL    Skin Circulation/Temperature WDL WDL       Cardiac WDL    Cardiac WDL WDL       Peripheral/Neurovascular WDL    Peripheral Neurovascular WDL WDL              "

## 2022-06-26 NOTE — PROGRESS NOTES
ANTICOAGULATION FOLLOW-UP CLINIC VISIT    Patient Name:  Babak Moran  Date:  2020  Contact Type:  Face to Face    SUBJECTIVE:  Patient Findings     Positives:   Change in medications (Bactrim x 10 days, started 20)        Clinical Outcomes     Negatives:   Major bleeding event, Thromboembolic event, Anticoagulation-related hospital admission, Anticoagulation-related ED visit, Anticoagulation-related fatality           OBJECTIVE    Recent labs: (last 7 days)     20   INR 2.2*       ASSESSMENT / PLAN  INR assessment THER    Recheck INR In: 10 DAYS    INR Location Clinic      Anticoagulation Summary  As of 2020    INR goal:   2.0-3.0   TTR:   54.4 % (1 y)   INR used for dosin.2 (2020)   Warfarin maintenance plan:   5 mg (5 mg x 1) every Mon; 7.5 mg (5 mg x 1.5) all other days   Full warfarin instructions:   5 mg every Mon; 7.5 mg all other days   Weekly warfarin total:   50 mg   No change documented:   Xochitl Pavon RN   Plan last modified:   Shelley Mercado RN (2020)   Next INR check:   10/8/2020   Priority:   Maintenance   Target end date:   Indefinite    Indications    Unspecified atrial fibrillation (Resolved) [I48.91]  Anticoagulation monitoring  INR range 2-3 [Z79.01]  Atrial fibrillation with RVR (H) [I48.91]             Anticoagulation Episode Summary     INR check location:       Preferred lab:       Send INR reminders to:   [facility]    Comments:   Babak prefers to be closer to 3.0 d/t previous CVA check Q 4 weeks.Declines to reduce dose when slightly over 3.0  Needs to change PCP      Anticoagulation Care Providers     Provider Role Specialty Phone number    Teo Funes MD Referring [facility] [phone]            See the Encounter Report to view Anticoagulation Flowsheet and Dosing Calendar (Go to Encounters tab in chart review, and find the Anticoagulation Therapy Visit)        Xochitl Pavon, DANYELLE                  Aggression

## 2022-06-26 NOTE — ED PROVIDER NOTES
History     Chief Complaint   Patient presents with     Trauma     Fall       Babak Moran is a 62 year old old male presenting with head trauma on anticoagulant therapy.  Patient was at work immediately prior to presentation when he had a mechanical fall and landed on the back of his head. Denies loss of consciousness, light headedness, headache, any pain, shortness of breath, nausea, vomiting, vision change, numbness, weakness.  He was on warfarin for atrial fibrillation.  He was ambulatory at normal baseline prior to arrival and after the fall.     Allergies   Allergen Reactions     Ioxaglate Unknown     Diatrizoate Rash     Levofloxacin Hives and Rash     Metrizamide Rash     (Diagnostic X-Ray materials)     Probenecid Rash       Patient Active Problem List    Diagnosis Date Noted     Need for vaccination 04/10/2022     Priority: Medium     Obstructive sleep apnea syndrome 05/13/2019     Priority: Medium     Osteoarthrosis 01/16/2018     Priority: Medium     Tissue plasminogen activator (t-PA) administered at other facility within 24 hours prior to current admission 01/16/2018     Priority: Medium     Morbid obesity with BMI of 40.0-44.9, adult (H) 02/08/2017     Priority: Medium     Cerebrovascular accident (CVA) due to embolism of right middle cerebral artery (H) 02/03/2017     Priority: Medium     Left hemiparesis (H) 02/03/2017     Priority: Medium     Anticoagulation monitoring, INR range 2-3 07/01/2015     Priority: Medium     Atrial fibrillation with RVR (H) 06/25/2015     Priority: Medium     Long-term (current) use of anticoagulants, INR goal 2.0-3.0 06/25/2015     Priority: Medium     Family history of coronary artery disease 05/19/2014     Priority: Medium     Ascending aorta enlargement (H) 02/13/2014     Priority: Medium     Gout 01/07/2014     Priority: Medium     Overview:   Overview:   after 3 attacks in < a year, tapped and crystal proven 5/2/13;  Allopurinol started then got ? Atypical side  effect, stopped; (short term colchicine prophylaxis)       Pain in joint, ankle and foot 2012     Priority: Medium     Essential hypertension 2009     Priority: Medium     Cutaneous lupus erythematosus 2008     Priority: Medium     Systemic lupus erythematosus (H) 2008     Priority: Medium     Overview:   Dermatitis       Hypoglycemia 2008     Priority: Medium     Overview:   Possible         Past Medical History:   Diagnosis Date     Atrial fibrillation (H) 2017     Cellulitis of left lower extremity 2019     Cerebral infarction (H)      Cerebral infarction due to unspecified occlusion or stenosis of right middle cerebral artery (H) 2017     Chest pain 1997     Disorder of skin or subcutaneous tissue 2011     Essential (primary) hypertension 1997     Fracture of cervical vertebra (H)      Gout      Hemiplegia affecting left nondominant side (H) 2017     Obesity 2017     Other local lupus erythematosus      Sepsis (H) 2019       Past Surgical History:   Procedure Laterality Date     ARTHROSCOPY KNEE      bilateral knees     COLONOSCOPY  2012    Normal; Next due      OTHER SURGICAL HISTORY  2017       Family History   Problem Relation Age of Onset     Unknown/Adopted Paternal Grandfather         Unknown, around age 67.     Other - See Comments Father         Parkinson's disease Alzheimer's     Cancer Father         Cancer     Heart Disease Maternal Grandmother         Heart Disease,MI in her 60's.     Heart Disease Mother 60        Heart Disease,MI     Other - See Comments Mother         rheumatoid arthritis.     Heart Disease Maternal Uncle 69        Heart Disease     Hypertension Sister         Hypertension     Cancer Sister         Cancer,melanoma     Heart Disease Paternal Uncle         Heart Disease, around 69.       Social History     Tobacco Use     Smoking status: Passive Smoke Exposure - Never Smoker      Smokeless tobacco: Never Used   Substance Use Topics     Alcohol use: Yes     Drug use: Never     Comment: Drug use: No       Medications:    amLODIPine (NORVASC) 5 MG tablet  cyanocobalamin (VITAMIN B-12) 1000 MCG tablet  DULoxetine (CYMBALTA) 30 MG capsule  famotidine (PEPCID) 40 MG tablet  furosemide (LASIX) 40 MG tablet  HEMP OIL OR EXTRACT OR OTHER CBD CANNABINOID, NOT MEDICAL CANNABIS,  hydrocortisone (CORTAID) 1 % external cream  metoprolol succinate ER (TOPROL XL) 200 MG 24 hr tablet  sulfamethoxazole-trimethoprim (BACTRIM DS) 800-160 MG tablet  warfarin ANTICOAGULANT (JANTOVEN ANTICOAGULANT) 5 MG tablet        Review of Systems: See HPI for pertinent negatives and positives. All other systems reviewed and found to be negative.    Physical Exam   BP (!) 160/100   Pulse 84   Temp 98.2  F (36.8  C) (Temporal)   Resp 16   Wt 140.6 kg (310 lb)   SpO2 97%   BMI 39.80 kg/m       General: awake, comfortable  HEENT: Hematoma over crown of scalp, sclera clear, no nasal or ear discharge, PERRL  Respiratory: normal effort, clear to auscultation bilaterally  Cardiovascular: Irregular rhythm, regular rate, no murmurs  Abdomen: soft, nondistended, nontender  Extremities: no gross deformities, edema, or tenderness  MSK: no spinal tenderness  Skin: warm, dry, superficial approximately 1 cm laceration over the crown of scalp with approximated edges  Neuro: alert, moves all extremities, CN 2-12 grossly intact, hand and pedal sensation intact bilaterally, upper and lower extremity strength 5/5 bilaterally, gross normal coordination. GCS 15.    ED Course           Critical Care time:  was 30 minutes for this patient excluding procedures.         Trauma Summary Disposition     Patient is trauma admission:  Partial TTA    Intent to transfer: none    Spine  Backboard removal time: Backboard not applied   C-collar and immobilization: not indicated, cleared.  CSpine Clearance: By [email or web address]  Full Primary and Secondary survey with  appropriate immobilization of spine completed in exam section.     Neuro  GCS at arrival:  Motor 6=Obeys commands   Verbal 5=Oriented   Eye Opening 4=Spontaneous   Total: 15     GCS at disposition: unchanged    ED Procedures completed  none      Results for orders placed or performed during the hospital encounter of 06/26/22 (from the past 24 hour(s))   CBC with platelets differential    Narrative    The following orders were created for panel order CBC with platelets differential.  Procedure                               Abnormality         Status                     ---------                               -----------         ------                     CBC with platelets and d...[[id number]]  Abnormal            Final result                 Please view results for these tests on the individual orders.   Basic metabolic panel   Result Value Ref Range    Sodium 140 134 - 144 mmol/L    Potassium 3.9 3.5 - 5.1 mmol/L    Chloride 104 98 - 107 mmol/L    Carbon Dioxide (CO2) 31 21 - 31 mmol/L    Anion Gap 5 3 - 14 mmol/L    Urea Nitrogen 23 7 - 25 mg/dL    Creatinine 0.79 0.70 - 1.30 mg/dL    Calcium 9.6 8.6 - 10.3 mg/dL    Glucose 82 70 - 105 mg/dL    GFR Estimate >90 >60 mL/min/1.73m2   INR   Result Value Ref Range    INR 2.31 (H) 0.85 - 1.15   CBC with platelets and differential   Result Value Ref Range    WBC Count 7.6 4.0 - 11.0 10e3/uL    RBC Count 5.05 4.40 - 5.90 10e6/uL    Hemoglobin 14.6 13.3 - 17.7 g/dL    Hematocrit 46.7 40.0 - 53.0 %    MCV 93 78 - 100 fL    MCH 28.9 26.5 - 33.0 pg    MCHC 31.3 (L) 31.5 - 36.5 g/dL    RDW 16.4 (H) 10.0 - 15.0 %    Platelet Count 249 150 - 450 10e3/uL    % Neutrophils 73 %    % Lymphocytes 15 %    % Monocytes 10 %    % Eosinophils 1 %    % Basophils 1 %    % Immature Granulocytes 0 %    NRBCs per 100 WBC 0 <1 /100    Absolute Neutrophils 5.6 1.6 - 8.3 10e3/uL    Absolute Lymphocytes 1.2 0.8 - 5.3 10e3/uL    Absolute Monocytes 0.8 0.0 - 1.3 10e3/uL    Absolute Eosinophils 0.1  0.0 - 0.7 10e3/uL    Absolute Basophils 0.1 0.0 - 0.2 10e3/uL    Absolute Immature Granulocytes 0.0 <=0.4 10e3/uL    Absolute NRBCs 0.0 10e3/uL   CT Head w/o Contrast    Narrative    Exam: CT HEAD W/O CONTRAST      Exam reason: fall on blood thinner, crown hematoma with superficial  lac    Technique:   -Axial images of the head obtained without contrast. Coronal and  sagittal reformations were generated.    Comparison: 2/3/2017       Findings:      Parenchyma: No acute infarct or intracranial hemorrhage.      There is a remote right frontal infarct, with associated ex vacuo  dilation of the right lateral ventricle. There is patchy  periventricular and deep white matter attenuation which is nonspecific  but likely due to chronic microvascular ischemic change.    No midline shift. The basilar cisterns are patent.    Extra-axial spaces: No extra-axial fluid collection or hemorrhage.     Ventricles: Ex vacuo dilation of the right lateral ventricle from  remote right frontal infarct.  Paranasal sinuses: Scattered mild mucosal thickening.   Mastoid air cells: Clear.    Osseous: No acute findings.  Orbits: Normal.    Soft tissues: There is soft tissue irregularity near the vertex,  possibly a small laceration.      Impression    Impression:  No acute infarct or intracranial hemorrhage.    Remote right frontal infarct and findings compatible with chronic  vascular ischemic change.      KYLE DELGADO MD         SYSTEM ID:  [id number]       Medications - No data to display    Assessments & Plan (with Medical Decision Making)     I have reviewed the nursing notes.    62 year old male evaluated for fall with head trauma on anticoagulant therapy. Partial trauma called. Differential includes syncope, mechanical fall, cerebrovascular accident, intracranial hemorrhage, skull fracture, spinal column fracture, muscular strain. History is consistent with mechanical fall and patient does not require a syncope workup. Head CT  unremarkable for acute process. Patient ambulates without ataxia and feels comfortable and safe at current place of residence and is stable for discharge with further outpatient evaluation and management. Patient given instructions on follow-up and warning signs for which to return to ED. All questions were answered and the patient is comfortable with plan for discharge. The patient was discharged in stable condition.    I have reviewed the findings, diagnosis, plan and any need for follow up with the patient.    Patient instructions:   Reassuring exam and no signs of bleed inside your head on CT scan. Please review attached instructions also for reasons to return to the emergency department.    Cover small laceration over your scalp with bacitracin for the next 3 days.      New Prescriptions    No medications on file       Final diagnoses:   Trauma   Recent head trauma, initial encounter   Fall, initial encounter   Superficial laceration of scalp, initial encounter       6/26/2022   [facility]       Adán Thakur MD  06/26/22 [id number]

## 2022-06-26 NOTE — DISCHARGE INSTRUCTIONS
Reassuring exam and no signs of bleed inside your head on CT scan. Please review attached instructions also for reasons to return to the emergency department.    Cover small laceration over your scalp with bacitracin for the next 3 days.

## 2022-07-01 ENCOUNTER — ANTICOAGULATION THERAPY VISIT (OUTPATIENT)
Dept: ANTICOAGULATION | Facility: OTHER | Age: 62
End: 2022-07-01
Attending: FAMILY MEDICINE

## 2022-07-01 DIAGNOSIS — I48.91 ATRIAL FIBRILLATION WITH RVR (H): ICD-10-CM

## 2022-07-01 DIAGNOSIS — Z79.01 ANTICOAGULATION MONITORING, INR RANGE 2-3: Primary | ICD-10-CM

## 2022-07-01 LAB — INR POINT OF CARE: 2.3 (ref 0.9–1.1)

## 2022-07-01 PROCEDURE — 36416 COLLJ CAPILLARY BLOOD SPEC: CPT

## 2022-07-01 PROCEDURE — 85610 PROTHROMBIN TIME: CPT | Mod: QW

## 2022-07-01 NOTE — PROGRESS NOTES
ANTICOAGULATION FOLLOW-UP CLINIC VISIT    Patient Name:  Babak Moran  Date:  2022  Contact Type:  Face to Face    SUBJECTIVE:  Patient Findings     Positives:  Emergency department visit    Comments:  Was seen in the ED on 22 for head trauma.          Clinical Outcomes     Negatives:  Major bleeding event, Thromboembolic event, Anticoagulation-related hospital admission, Anticoagulation-related ED visit, Anticoagulation-related fatality    Comments:  Was seen in the ED on 22 for head trauma.             OBJECTIVE    Recent labs: (last 7 days)     22  0802   INR 2.3*       ASSESSMENT / PLAN  INR assessment THER    Recheck INR In: 4 WEEKS    INR Location Clinic      Anticoagulation Summary  As of 2022    INR goal:  2.0-3.0   TTR:  71.9 % (1 y)   INR used for dosin.3 (2022)   Warfarin maintenance plan:  7.5 mg (5 mg x 1.5) every day   Full warfarin instructions:  7.5 mg every day   Weekly warfarin total:  52.5 mg   No change documented:  Lillian Maurice RN   Plan last modified:  Shelley Mercado RN (2022)   Next INR check:  2022   Priority:  High   Target end date:  Indefinite    Indications    Unspecified atrial fibrillation (Resolved) [I48.91]  Anticoagulation monitoring  INR range 2-3 [Z79.01]  Atrial fibrillation with RVR (H) [I48.91]             Anticoagulation Episode Summary     INR check location:      Preferred lab:      Send INR reminders to:  [facility]    Comments:  Babak prefers to be closer to 3.0 d/t previous CVA check Q 4 weeks.Declines to reduce dose when slightly over 3.0  Needs to change PCP      Anticoagulation Care Providers     Provider Role Specialty Phone number    Teo Funes MD Referring Family Medicine [phone]            See the Encounter Report to view Anticoagulation Flowsheet and Dosing Calendar (Go to Encounters tab in chart review, and find the Anticoagulation Therapy Visit)      Lillian Maurice, DANYELLE

## 2022-07-17 DIAGNOSIS — I10 BENIGN ESSENTIAL HYPERTENSION: ICD-10-CM

## 2022-07-18 RX ORDER — AMLODIPINE BESYLATE 5 MG/1
5 TABLET ORAL DAILY
Qty: 90 TABLET | Refills: 2 | Status: ON HOLD | OUTPATIENT
Start: 2022-07-18 | End: 2022-08-11

## 2022-07-18 NOTE — TELEPHONE ENCOUNTER
[id number] sent Rx request for the following:      Requested Prescriptions   Pending Prescriptions Disp Refills     amLODIPine (NORVASC) 5 MG tablet [Pharmacy Med Name: AMLODIPINE 5MG TABLET] 90 tablet 0     Sig: TAKE 1 TABLET (5 MG) BY MOUTH DAILY       Calcium Channel Blockers Protocol  Failed - 7/18/2022  3:30 PM        Failed - Blood pressure under 140/90 in past 12 months     BP Readings from Last 3 Encounters:   06/26/22 (!) 160/100   04/11/22 139/88   09/29/20 136/86           Failed - Recent (12 mo) or future (30 days) visit within the authorizing provider's specialty     Last Prescription Date:   3/29/22  Last Fill Qty/Refills:         90, R-0    Last Office Visit:              4/11/22   Future Office visit:           None    Savannah Sifuentes RN .............. 7/18/2022  3:30 PM

## 2022-07-29 ENCOUNTER — ANTICOAGULATION THERAPY VISIT (OUTPATIENT)
Dept: ANTICOAGULATION | Facility: OTHER | Age: 62
End: 2022-07-29
Attending: FAMILY MEDICINE

## 2022-07-29 DIAGNOSIS — I48.91 ATRIAL FIBRILLATION WITH RVR (H): ICD-10-CM

## 2022-07-29 DIAGNOSIS — Z79.01 ANTICOAGULATION MONITORING, INR RANGE 2-3: Primary | ICD-10-CM

## 2022-07-29 LAB — INR POINT OF CARE: 2.1 (ref 0.9–1.1)

## 2022-07-29 PROCEDURE — 85610 PROTHROMBIN TIME: CPT | Mod: QW

## 2022-07-29 PROCEDURE — 36416 COLLJ CAPILLARY BLOOD SPEC: CPT

## 2022-07-29 NOTE — PROGRESS NOTES
ANTICOAGULATION MANAGEMENT     Babak Moran 62 year old male is on warfarin with therapeutic INR result. (Goal INR 2.0-3.0)    Recent labs: (last 7 days)     07/29/22  1245   INR 2.1*       ASSESSMENT       Source(s): Chart Review and In person       Warfarin doses taken: Warfarin taken as instructed    Diet: No new diet changes identified    New illness, injury, or hospitalization: No    Medication/supplement changes: None noted    Signs or symptoms of bleeding or clotting: No    Previous INR: Therapeutic last 2(+) visits    Additional findings: None       PLAN     Recommended plan for no diet, medication or health factor changes affecting INR     Dosing Instructions: Continue your current warfarin dose with next INR in 5 weeks       Summary  As of 7/29/2022    Full warfarin instructions:  7.5 mg every day   Next INR check:  9/2/2022             In person    Lab visit scheduled    Education provided: None required    Plan made per [facility] anticoagulation protocol    Liz Coles RN  Anticoagulation Clinic  7/29/2022    _______________________________________________________________________     Anticoagulation Episode Summary     Current INR goal:  2.0-3.0   TTR:  71.9 % (1 y)   Target end date:  Indefinite   Send INR reminders to:  [facility]    Indications    Unspecified atrial fibrillation (Resolved) [I48.91]  Anticoagulation monitoring  INR range 2-3 [Z79.01]  Atrial fibrillation with RVR (H) [I48.91]           Comments:  Babak prefers to be closer to 3.0 d/t previous CVA check Q 4 weeks.Declines to reduce dose when slightly over 3.0  Needs to change PCP         Anticoagulation Care Providers     Provider Role Specialty Phone number    Teo Funes MD Referring Family Medicine [phone]

## 2022-08-04 ENCOUNTER — OFFICE VISIT (OUTPATIENT)
Dept: FAMILY MEDICINE | Facility: OTHER | Age: 62
End: 2022-08-04
Attending: PHYSICIAN ASSISTANT
Payer: MEDICAID

## 2022-08-04 VITALS
OXYGEN SATURATION: 97 % | RESPIRATION RATE: 16 BRPM | HEART RATE: 104 BPM | BODY MASS INDEX: 36.75 KG/M2 | SYSTOLIC BLOOD PRESSURE: 138 MMHG | DIASTOLIC BLOOD PRESSURE: 80 MMHG | TEMPERATURE: 98.5 F | WEIGHT: 294.05 LBS

## 2022-08-04 DIAGNOSIS — L03.115 CELLULITIS OF RIGHT LOWER EXTREMITY: Primary | ICD-10-CM

## 2022-08-04 PROCEDURE — 99213 OFFICE O/P EST LOW 20 MIN: CPT | Performed by: NURSE PRACTITIONER

## 2022-08-04 RX ORDER — DOXYCYCLINE 100 MG/1
100 CAPSULE ORAL 2 TIMES DAILY
Qty: 20 CAPSULE | Refills: 0 | Status: SHIPPED | OUTPATIENT
Start: 2022-08-04 | End: 2022-08-08

## 2022-08-04 ASSESSMENT — PAIN SCALES - GENERAL: PAINLEVEL: NO PAIN (0)

## 2022-08-04 NOTE — NURSING NOTE
Pt presents to clinic today for rash on right lower leg, patient states rash appeared Monday after mowing lawn on Sunday.       FOOD SECURITY SCREENING QUESTIONS:    The next two questions are to help us understand your food security.  If you are feeling you need any assistance in this area, we have resources available to support you today.    Hunger Vital Signs:  Within the past 12 months we worried whether our food would run out before we got money to buy more. Never  Within the past 12 months the food we bought just didn't last and we didn't have money to get more. Never      Medication Reconciliation: complete  Alyx Pitt LPN,GUERLINE on 8/4/2022 at 12:56 PM

## 2022-08-04 NOTE — PATIENT INSTRUCTIONS
Doxycycline bid x 10 days for cellulitis of right lower extremity again.     INR check Monday or Tuesday is recommended.     Follow up PRN

## 2022-08-04 NOTE — PROGRESS NOTES
ASSESSMENT/PLAN:    I have reviewed the nursing notes.  I have reviewed the findings, diagnosis, plan and need for follow up with the patient.    1. Cellulitis of right lower extremity  - doxycycline hyclate (VIBRAMYCIN) 100 MG capsule; Take 1 capsule (100 mg) by mouth 2 times daily for 10 days  Dispense: 20 capsule; Refill: 0  He has had recurrent cellulitis; he was treated last with doxycycline in April and prior to that, with bactrim in March and April 2022. Stems from an infection of the toe/soft tissue and now is extending up into the leg. I recommend that he follow up in the ED over the weekend in the event that he worsens, high fevers, unable to tolerate liquids or oral medication. He is agreeable to this. At this time, I do not have concerns of sepsis or systemic infection and feel appropriate to trial outpatient treatment. Patient is agreeable to return if worsening condition.     Discussed warning signs/symptoms indicative of need to f/u    Follow up if symptoms persist or worsen or concerns    I explained my diagnostic considerations and recommendations to the patient, who voiced understanding and agreement with the treatment plan. All questions were answered. We discussed potential side effects of any prescribed or recommended therapies, as well as expectations for response to treatments.    Kyara Erazo NP  8/4/2022  1:28 PM    HPI:  Babak Moran is a 62 year old male who presents to [facility] today for concerns of rash on right lower leg, patient states rash appeared Monday after mowing lawn on Sunday which he does not feel is cause of rash. Does not feel any exposures to poison ivy etc.     He has had history of cellulitis before on his left lower extremity that looked very similar. Now he has it on the right lower extremity. It starts at the ankle and goes all the way up to the top of the knee. There is redness, warmth, tenderness to the site. 100.4 temp on Monday night, low grade fever every  day since. No nausea, vomiting. Does not feel generally unwell.     He has a bad toe infection on the right foot, 2nd, 3rd toe are significant.     Takes warfarin. His A1C when checked on 2019 was 6.     Past Medical History:   Diagnosis Date     Atrial fibrillation (H) 02/03/2017    on Warfarin     Cellulitis of left lower extremity 9/5/2019     Cerebral infarction (H)      Cerebral infarction due to unspecified occlusion or stenosis of right middle cerebral artery (H) 02/03/2017    ,Left hemiparesis, left neglect, impaired balance and gait following R MCA stroke with mild hemorrhagic transformation s/p tissue plasminogen activator and mechanical thrombectomy, s/p C7 facet fracture from fall off forklift.     Chest pain 02/1997     Disorder of skin or subcutaneous tissue 07/25/2011     Essential (primary) hypertension 02/1997     Fracture of cervical vertebra (H)     02/03/2017,C7 facet fracture from fall off forklift, nondisplaced     Gout      Hemiplegia affecting left nondominant side (H) 02/03/2017     Obesity 02/03/2017    body mass index of 40     Other local lupus erythematosus      Sepsis (H) 9/6/2019     Past Surgical History:   Procedure Laterality Date     ARTHROSCOPY KNEE      bilateral knees     COLONOSCOPY  01/02/2012    Normal; Next due 2022     OTHER SURGICAL HISTORY  02/03/2017     Social History     Tobacco Use     Smoking status: Passive Smoke Exposure - Never Smoker     Smokeless tobacco: Never Used   Substance Use Topics     Alcohol use: Yes     Current Outpatient Medications   Medication Sig Dispense Refill     amLODIPine (NORVASC) 5 MG tablet TAKE 1 TABLET (5 MG) BY MOUTH DAILY 90 tablet 2     HEMP OIL OR EXTRACT OR OTHER CBD CANNABINOID, NOT MEDICAL CANNABIS, Uses topical on knees and oral as needed       hydrocortisone (CORTAID) 1 % external cream        metoprolol succinate ER (TOPROL XL) 200 MG 24 hr tablet TAKE 1 TABLET (200 MG) BY MOUTH DAILY 90 tablet 4      sulfamethoxazole-trimethoprim (BACTRIM DS) 800-160 MG tablet TAKE 1 TABLET BY MOUTH TWICE A DAY FOR 7 DAYS       warfarin ANTICOAGULANT (JANTOVEN ANTICOAGULANT) 5 MG tablet TAKE 1 AND 1/2 TABLETS(7.5MG) BY MOUTH x 4 days and 10 mg x 3 days/week [facility] tablet 1     Allergies   Allergen Reactions     Ioxaglate Unknown     Diatrizoate Rash     Levofloxacin Hives and Rash     Metrizamide Rash     (Diagnostic X-Ray materials)     Probenecid Rash     Past medical history, past surgical history, current medications and allergies reviewed and accurate to the best of my knowledge.      ROS:  Refer to HPI    /80 (BP Location: Right arm, Patient Position: Sitting, Cuff Size: Adult Large)   Pulse 104   Temp 98.5  F (36.9  C) (Tympanic)   Resp 16   Wt 133.4 kg (294 lb 0.8 oz)   SpO2 97%   BMI 36.75 kg/m      EXAM:  General Appearance: Well appearing 62 year old male, appropriate appearance for age. No acute distress   Respiratory: normal chest wall and respirations.  Normal effort.  Clear to auscultation bilaterally, no wheezing, crackles or rhonchi.  No increased work of breathing.  No cough appreciated.  Cardiac: RRR with no murmurs  Musculoskeletal:  Equal movement of bilateral upper extremities.  Equal movement of bilateral lower extremities.  Normal gait.    Dermatological: RLE: erythema is slight above the knee, but more significant along with edema, warmth, and tenderness to lower extremity. He has ulcers on 2 of his toes on the right foot.   Psychological: normal affect, alert, oriented, and pleasant.

## 2022-08-07 ENCOUNTER — HOSPITAL ENCOUNTER (INPATIENT)
Facility: OTHER | Age: 62
LOS: 3 days | Discharge: HOME | End: 2022-08-11
Attending: PHYSICIAN ASSISTANT | Admitting: INTERNAL MEDICINE
Payer: MEDICAID

## 2022-08-07 DIAGNOSIS — L03.031 CELLULITIS OF THIRD TOE, RIGHT: ICD-10-CM

## 2022-08-07 DIAGNOSIS — L03.115 CELLULITIS OF RIGHT FOOT: ICD-10-CM

## 2022-08-07 DIAGNOSIS — M62.81 MUSCLE WEAKNESS (GENERALIZED): ICD-10-CM

## 2022-08-07 DIAGNOSIS — A41.9 BACTERIAL SEPSIS (H): ICD-10-CM

## 2022-08-07 DIAGNOSIS — Z11.52 ENCOUNTER FOR SCREENING LABORATORY TESTING FOR COVID-19 VIRUS: ICD-10-CM

## 2022-08-07 DIAGNOSIS — I48.21 PERMANENT ATRIAL FIBRILLATION (H): ICD-10-CM

## 2022-08-07 DIAGNOSIS — I10 BENIGN ESSENTIAL HYPERTENSION: ICD-10-CM

## 2022-08-07 DIAGNOSIS — M62.81 GENERALIZED MUSCLE WEAKNESS: ICD-10-CM

## 2022-08-07 DIAGNOSIS — Z79.01 LONG-TERM (CURRENT) USE OF ANTICOAGULANTS, INR GOAL 2.0-3.0: ICD-10-CM

## 2022-08-07 DIAGNOSIS — L03.115 CELLULITIS OF RIGHT KNEE: ICD-10-CM

## 2022-08-07 DIAGNOSIS — I48.91 ATRIAL FIBRILLATION (H): ICD-10-CM

## 2022-08-07 DIAGNOSIS — L03.031 ABSCESS OF FIFTH TOENAIL OF RIGHT FOOT: ICD-10-CM

## 2022-08-07 LAB
ALBUMIN SERPL-MCNC: 3.7 G/DL (ref 3.5–5.7)
ALBUMIN UR-MCNC: NEGATIVE MG/DL
ALP SERPL-CCNC: 70 U/L (ref 34–104)
ALT SERPL W P-5'-P-CCNC: 30 U/L (ref 7–52)
ANION GAP SERPL CALCULATED.3IONS-SCNC: 9 MMOL/L (ref 3–14)
APPEARANCE UR: CLEAR
AST SERPL W P-5'-P-CCNC: 25 U/L (ref 13–39)
BASOPHILS # BLD AUTO: 0 10E3/UL (ref 0–0.2)
BASOPHILS NFR BLD AUTO: 0 %
BILIRUB SERPL-MCNC: 1.3 MG/DL (ref 0.3–1)
BILIRUB UR QL STRIP: NEGATIVE
BUN SERPL-MCNC: 18 MG/DL (ref 7–25)
CALCIUM SERPL-MCNC: 9.6 MG/DL (ref 8.6–10.3)
CHLORIDE BLD-SCNC: 101 MMOL/L (ref 98–107)
CO2 SERPL-SCNC: 27 MMOL/L (ref 21–31)
COLOR UR AUTO: YELLOW
CREAT SERPL-MCNC: 0.93 MG/DL (ref 0.7–1.3)
CRP SERPL-MCNC: 42.6 MG/L
EOSINOPHIL # BLD AUTO: 0 10E3/UL (ref 0–0.7)
EOSINOPHIL NFR BLD AUTO: 0 %
ERYTHROCYTE [DISTWIDTH] IN BLOOD BY AUTOMATED COUNT: 15.1 % (ref 10–15)
ERYTHROCYTE [SEDIMENTATION RATE] IN BLOOD BY WESTERGREN METHOD: 13 MM/HR (ref 0–20)
FLUAV RNA SPEC QL NAA+PROBE: NEGATIVE
FLUBV RNA RESP QL NAA+PROBE: NEGATIVE
GFR SERPL CREATININE-BSD FRML MDRD: >90 ML/MIN/1.73M2
GLUCOSE BLD-MCNC: 103 MG/DL (ref 70–105)
GLUCOSE UR STRIP-MCNC: NEGATIVE MG/DL
HCT VFR BLD AUTO: 47.1 % (ref 40–53)
HGB BLD-MCNC: 15.4 G/DL (ref 13.3–17.7)
HGB UR QL STRIP: ABNORMAL
HYALINE CASTS: 1 /LPF
IMM GRANULOCYTES # BLD: 0.1 10E3/UL
IMM GRANULOCYTES NFR BLD: 1 %
INR PPP: 3.02 (ref 0.85–1.15)
KETONES UR STRIP-MCNC: NEGATIVE MG/DL
LACTATE SERPL-SCNC: 2.7 MMOL/L (ref 0.7–2)
LEUKOCYTE ESTERASE UR QL STRIP: NEGATIVE
LYMPHOCYTES # BLD AUTO: 0.2 10E3/UL (ref 0.8–5.3)
LYMPHOCYTES NFR BLD AUTO: 1 %
MCH RBC QN AUTO: 28.9 PG (ref 26.5–33)
MCHC RBC AUTO-ENTMCNC: 32.7 G/DL (ref 31.5–36.5)
MCV RBC AUTO: 88 FL (ref 78–100)
MONOCYTES # BLD AUTO: 0.2 10E3/UL (ref 0–1.3)
MONOCYTES NFR BLD AUTO: 2 %
MUCOUS THREADS #/AREA URNS LPF: PRESENT /LPF
NEUTROPHILS # BLD AUTO: 14.5 10E3/UL (ref 1.6–8.3)
NEUTROPHILS NFR BLD AUTO: 96 %
NITRATE UR QL: NEGATIVE
NRBC # BLD AUTO: 0 10E3/UL
NRBC BLD AUTO-RTO: 0 /100
NT-PROBNP SERPL-MCNC: 200 PG/ML (ref 0–100)
PH UR STRIP: 5.5 [PH] (ref 5–9)
PLATELET # BLD AUTO: 261 10E3/UL (ref 150–450)
POTASSIUM BLD-SCNC: 3.7 MMOL/L (ref 3.5–5.1)
PROT SERPL-MCNC: 7.5 G/DL (ref 6.4–8.9)
RBC # BLD AUTO: 5.33 10E6/UL (ref 4.4–5.9)
RBC URINE: 1 /HPF
RSV RNA SPEC NAA+PROBE: NEGATIVE
SARS-COV-2 RNA RESP QL NAA+PROBE: NEGATIVE
SODIUM SERPL-SCNC: 137 MMOL/L (ref 134–144)
SP GR UR STRIP: 1.01 (ref 1–1.03)
TROPONIN I SERPL-MCNC: 8.8 PG/ML (ref 0–34)
UROBILINOGEN UR STRIP-MCNC: NORMAL MG/DL
WBC # BLD AUTO: 15 10E3/UL (ref 4–11)
WBC URINE: 1 /HPF

## 2022-08-07 PROCEDURE — 87637 SARSCOV2&INF A&B&RSV AMP PRB: CPT | Performed by: PHYSICIAN ASSISTANT

## 2022-08-07 PROCEDURE — 258N000003 HC RX IP 258 OP 636: Performed by: PHYSICIAN ASSISTANT

## 2022-08-07 PROCEDURE — 80053 COMPREHEN METABOLIC PANEL: CPT | Performed by: PHYSICIAN ASSISTANT

## 2022-08-07 PROCEDURE — 81003 URINALYSIS AUTO W/O SCOPE: CPT | Performed by: PHYSICIAN ASSISTANT

## 2022-08-07 PROCEDURE — 85652 RBC SED RATE AUTOMATED: CPT | Performed by: PHYSICIAN ASSISTANT

## 2022-08-07 PROCEDURE — 87077 CULTURE AEROBIC IDENTIFY: CPT | Performed by: PHYSICIAN ASSISTANT

## 2022-08-07 PROCEDURE — 85610 PROTHROMBIN TIME: CPT | Performed by: PHYSICIAN ASSISTANT

## 2022-08-07 PROCEDURE — 85025 COMPLETE CBC W/AUTO DIFF WBC: CPT | Performed by: PHYSICIAN ASSISTANT

## 2022-08-07 PROCEDURE — 36415 COLL VENOUS BLD VENIPUNCTURE: CPT | Performed by: PHYSICIAN ASSISTANT

## 2022-08-07 PROCEDURE — 83605 ASSAY OF LACTIC ACID: CPT | Performed by: PHYSICIAN ASSISTANT

## 2022-08-07 PROCEDURE — 84484 ASSAY OF TROPONIN QUANT: CPT | Performed by: PHYSICIAN ASSISTANT

## 2022-08-07 PROCEDURE — 86140 C-REACTIVE PROTEIN: CPT | Performed by: PHYSICIAN ASSISTANT

## 2022-08-07 PROCEDURE — 99285 EMERGENCY DEPT VISIT HI MDM: CPT | Mod: 25 | Performed by: PHYSICIAN ASSISTANT

## 2022-08-07 PROCEDURE — 83880 ASSAY OF NATRIURETIC PEPTIDE: CPT | Performed by: PHYSICIAN ASSISTANT

## 2022-08-07 PROCEDURE — 99285 EMERGENCY DEPT VISIT HI MDM: CPT | Performed by: PHYSICIAN ASSISTANT

## 2022-08-07 PROCEDURE — 250N000011 HC RX IP 250 OP 636: Performed by: PHYSICIAN ASSISTANT

## 2022-08-07 PROCEDURE — 96368 THER/DIAG CONCURRENT INF: CPT | Performed by: PHYSICIAN ASSISTANT

## 2022-08-07 PROCEDURE — C9803 HOPD COVID-19 SPEC COLLECT: HCPCS | Performed by: PHYSICIAN ASSISTANT

## 2022-08-07 PROCEDURE — 250N000013 HC RX MED GY IP 250 OP 250 PS 637: Performed by: PHYSICIAN ASSISTANT

## 2022-08-07 PROCEDURE — 96366 THER/PROPH/DIAG IV INF ADDON: CPT | Performed by: PHYSICIAN ASSISTANT

## 2022-08-07 PROCEDURE — 96365 THER/PROPH/DIAG IV INF INIT: CPT | Performed by: PHYSICIAN ASSISTANT

## 2022-08-07 RX ORDER — SODIUM CHLORIDE 9 MG/ML
INJECTION, SOLUTION INTRAVENOUS CONTINUOUS
Status: DISCONTINUED | OUTPATIENT
Start: 2022-08-07 | End: 2022-08-08

## 2022-08-07 RX ORDER — ACETAMINOPHEN 500 MG
1000 TABLET ORAL ONCE
Status: COMPLETED | OUTPATIENT
Start: 2022-08-07 | End: 2022-08-07

## 2022-08-07 RX ADMIN — TAZOBACTAM SODIUM AND PIPERACILLIN SODIUM 3.38 G: 375; 3 INJECTION, SOLUTION INTRAVENOUS at 20:51

## 2022-08-07 RX ADMIN — VANCOMYCIN HYDROCHLORIDE 1500 MG: 10 INJECTION, POWDER, LYOPHILIZED, FOR SOLUTION INTRAVENOUS at 21:46

## 2022-08-07 RX ADMIN — SODIUM CHLORIDE 1000 ML: 9 INJECTION, SOLUTION INTRAVENOUS at 21:31

## 2022-08-07 RX ADMIN — ACETAMINOPHEN 1000 MG: 500 TABLET ORAL at 18:49

## 2022-08-07 ASSESSMENT — ENCOUNTER SYMPTOMS
WEAKNESS: 1
FACIAL ASYMMETRY: 0
ACTIVITY CHANGE: 1
FACIAL SWELLING: 0
AGITATION: 0
BACK PAIN: 0
TREMORS: 0
FLANK PAIN: 0
FATIGUE: 1
CHILLS: 1
CONFUSION: 0
STRIDOR: 0
ABDOMINAL PAIN: 0
EYE PAIN: 0
SEIZURES: 0
FEVER: 1

## 2022-08-07 NOTE — ED TRIAGE NOTES
Pt here via [facility] for back pain, fever and cellulitis.  Wife states that around 1400 the pt started not feeling well, shaking and feverish.  Pt was seen last Monday and diagnosed with cellulitis.  Pt was told to come back if he develops a fever.  Wife states at one point he was shaking uncontrollably.  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  Pt is A/O upon arrival.  Pt diagnosed with covid on 7/19/22.  Wife is concerned about septic shock.     Triage Assessment     Row Name 08/07/22 0335       Triage Assessment (Adult)    Airway WDL WDL       Respiratory WDL    Respiratory WDL WDL       Skin Circulation/Temperature WDL    Skin Circulation/Temperature WDL WDL;temperature       Cardiac WDL    Cardiac WDL X;rhythm    Cardiac Rhythm apical pulse irregular

## 2022-08-07 NOTE — ED PROVIDER NOTES
History     Chief Complaint   Patient presents with     Fever     Cellulitis     Back Pain     HPI  Babak Moran is a 62 year old male who was seen in the clinic on 8/4/2022 and started on doxycycline due to cellulitis of his right lower extremity.  The patient has bilateral lower extremity venous stasis.  Today he has noticed some redness in his knee area as well as some warmth.  His third toe seems redder and warmer than usual as well.  He has been feeling fatigued and weak and today they noticed his temp was 103.2.  He is here for further evaluation at this time.  He comes in via EMS.      Allergies:  Allergies   Allergen Reactions     Ioxaglate Unknown     Diatrizoate Rash     Levofloxacin Hives and Rash     Metrizamide Rash     (Diagnostic X-Ray materials)     Probenecid Rash       Problem List:    Patient Active Problem List    Diagnosis Date Noted     Need for vaccination 04/10/2022     Priority: Medium     Obstructive sleep apnea syndrome 05/13/2019     Priority: Medium     Osteoarthrosis 01/16/2018     Priority: Medium     Tissue plasminogen activator (t-PA) administered at other facility within 24 hours prior to current admission 01/16/2018     Priority: Medium     Morbid obesity with BMI of 40.0-44.9, adult (H) 02/08/2017     Priority: Medium     Cerebrovascular accident (CVA) due to embolism of right middle cerebral artery (H) 02/03/2017     Priority: Medium     Left hemiparesis (H) 02/03/2017     Priority: Medium     Anticoagulation monitoring, INR range 2-3 07/01/2015     Priority: Medium     Atrial fibrillation with RVR (H) 06/25/2015     Priority: Medium     Long-term (current) use of anticoagulants, INR goal 2.0-3.0 06/25/2015     Priority: Medium     Family history of coronary artery disease 05/19/2014     Priority: Medium     Ascending aorta enlargement (H) 02/13/2014     Priority: Medium     Gout 01/07/2014     Priority: Medium     Overview:   Overview:   after 3 attacks in < a year, tapped and  crystal proven 13;  Allopurinol started then got ? Atypical side effect, stopped; (short term colchicine prophylaxis)       Pain in joint, ankle and foot 2012     Priority: Medium     Essential hypertension 2009     Priority: Medium     Cutaneous lupus erythematosus 2008     Priority: Medium     Systemic lupus erythematosus (H) 2008     Priority: Medium     Overview:   Dermatitis       Hypoglycemia 2008     Priority: Medium     Overview:   Possible          Past Medical History:    Past Medical History:   Diagnosis Date     Atrial fibrillation (H) 2017     Cellulitis of left lower extremity 2019     Cerebral infarction (H)      Cerebral infarction due to unspecified occlusion or stenosis of right middle cerebral artery (H) 2017     Chest pain 1997     Disorder of skin or subcutaneous tissue 2011     Essential (primary) hypertension 1997     Fracture of cervical vertebra (H)      Gout      Hemiplegia affecting left nondominant side (H) 2017     Obesity 2017     Other local lupus erythematosus      Sepsis (H) 2019       Past Surgical History:    Past Surgical History:   Procedure Laterality Date     ARTHROSCOPY KNEE      bilateral knees     COLONOSCOPY  2012    Normal; Next due      OTHER SURGICAL HISTORY  2017       Family History:    Family History   Problem Relation Age of Onset     Unknown/Adopted Paternal Grandfather         Unknown, around age 67.     Other - See Comments Father         Parkinson's disease Alzheimer's     Cancer Father         Cancer     Heart Disease Maternal Grandmother         Heart Disease,MI in her 60's.     Heart Disease Mother 60        Heart Disease,MI     Other - See Comments Mother         rheumatoid arthritis.     Heart Disease Maternal Uncle 69        Heart Disease     Hypertension Sister         Hypertension     Cancer Sister         Cancer,melanoma     Heart Disease Paternal Uncle       "   Heart Disease, around 69.       Social History:  Marital Status:   [2]  Social History     Tobacco Use     Smoking status: Passive Smoke Exposure - Never Smoker     Smokeless tobacco: Never Used   Substance Use Topics     Alcohol use: Yes     Drug use: Never     Comment: Drug use: No        Medications:    amLODIPine (NORVASC) 5 MG tablet  doxycycline hyclate (VIBRAMYCIN) 100 MG capsule  HEMP OIL OR EXTRACT OR OTHER CBD CANNABINOID, NOT MEDICAL CANNABIS,  hydrocortisone (CORTAID) 1 % external cream  metoprolol succinate ER (TOPROL XL) 200 MG 24 hr tablet  warfarin ANTICOAGULANT (JANTOVEN ANTICOAGULANT) 5 MG tablet          Review of Systems   Constitutional: Positive for activity change, chills, fatigue and fever.   HENT: Negative for drooling and facial swelling.    Eyes: Negative for pain and visual disturbance.   Respiratory: Negative for stridor.    Cardiovascular: Negative for chest pain.   Gastrointestinal: Negative for abdominal pain.   Genitourinary: Negative for flank pain.   Musculoskeletal: Negative for back pain.        Right knee cellulitis with right third toe cellulitis   Skin: Negative for pallor.   Neurological: Positive for weakness. Negative for tremors, seizures and facial asymmetry.   Psychiatric/Behavioral: Negative for agitation and confusion.   All other systems reviewed and are negative.      Physical Exam   BP: 126/72  Pulse: 78  Temp: (!) 103.2  F (39.6  C)  Resp: 15  Height: 190.5 cm (6' 3\")  Weight: 133.4 kg (294 lb)  SpO2: 94 %      Physical Exam  Vitals and nursing note reviewed.   Constitutional:       General: He is not in acute distress.     Appearance: Normal appearance. He is not ill-appearing or toxic-appearing.   HENT:      Head: Normocephalic. No raccoon eyes, right periorbital erythema or left periorbital erythema.      Right Ear: No drainage or tenderness.      Left Ear: No drainage or tenderness.      Nose: Nose normal.   Eyes:      General: Lids are normal. " Gaze aligned appropriately. No scleral icterus.     Extraocular Movements: Extraocular movements intact.   Neck:      Trachea: No tracheal deviation.   Cardiovascular:      Rate and Rhythm: Normal rate.   Pulmonary:      Effort: Pulmonary effort is normal. No respiratory distress.      Breath sounds: No stridor.   Abdominal:      Tenderness: There is no abdominal tenderness.   Musculoskeletal:         General: No deformity or signs of injury. Normal range of motion.      Cervical back: Normal range of motion. No signs of trauma.      Comments: Right third toe warm with cellulitis this was demarcated with skin marker.  His right knee has a circular erythemic area and this was demarcated with skin marker.  Otherwise he has bilateral venous stasis with whitish skin sloughing.  CMS x4   Skin:     General: Skin is warm and dry.      Coloration: Skin is not jaundiced or pale.   Neurological:      General: No focal deficit present.      Mental Status: He is alert and oriented to person, place, and time.      GCS: GCS eye subscore is 4. GCS verbal subscore is 5. GCS motor subscore is 6.      Motor: No tremor or seizure activity.   Psychiatric:         Attention and Perception: Attention normal.         Mood and Affect: Mood normal.         ED Course       Results for orders placed or performed during the hospital encounter of 08/07/22 (from the past 24 hour(s))   Asymptomatic Influenza A/B & SARS-CoV2 (COVID-19) Virus PCR Multiplex Nose    Specimen: Nose; Swab   Result Value Ref Range    Influenza A PCR Negative Negative    Influenza B PCR Negative Negative    RSV PCR Negative Negative    SARS CoV2 PCR Negative Negative    Narrative    Testing was performed using the Xpert Xpress CoV2/Flu/RSV Assay on the [employer] GeneXpert Instrument. This test should be ordered for the detection of SARS-CoV-2 and influenza viruses in individuals who meet clinical and/or epidemiological criteria. Test performance is unknown in asymptomatic  patients. This test is for in vitro diagnostic use under the FDA EUA for laboratories certified under CLIA to perform high or moderate complexity testing. This test has not been FDA cleared or approved. A negative result does not rule out the presence of PCR inhibitors in the specimen or target RNA in concentration below the limit of detection for the assay. If only one viral target is positive but coinfection with multiple targets is suspected, the sample should be re-tested with another FDA cleared, approved, or authorized test, if coinfection would change clinical management. This test was validated by the [facility]. These laboratories are certified under the Clinical  Laboratory Improvement Amendments of 1988 (CLIA-88) as qualified to perform high complexity laboratory testing.   CBC with platelets differential    Narrative    The following orders were created for panel order CBC with platelets differential.  Procedure                               Abnormality         Status                     ---------                               -----------         ------                     CBC with platelets and d...[[id number]]  Abnormal            Final result                 Please view results for these tests on the individual orders.   Comprehensive metabolic panel   Result Value Ref Range    Sodium 137 134 - 144 mmol/L    Potassium 3.7 3.5 - 5.1 mmol/L    Chloride 101 98 - 107 mmol/L    Carbon Dioxide (CO2) 27 21 - 31 mmol/L    Anion Gap 9 3 - 14 mmol/L    Urea Nitrogen 18 7 - 25 mg/dL    Creatinine 0.93 0.70 - 1.30 mg/dL    Calcium 9.6 8.6 - 10.3 mg/dL    Glucose 103 70 - 105 mg/dL    Alkaline Phosphatase 70 34 - 104 U/L    AST 25 13 - 39 U/L    ALT 30 7 - 52 U/L    Protein Total 7.5 6.4 - 8.9 g/dL    Albumin 3.7 3.5 - 5.7 g/dL    Bilirubin Total 1.3 (H) 0.3 - 1.0 mg/dL    GFR Estimate >90 >60 mL/min/1.73m2   Lactic acid whole blood   Result Value Ref Range    Lactic Acid 2.7 (H) 0.7 - 2.0  mmol/L   Troponin I   Result Value Ref Range    Troponin I 8.8 0.0 - 34.0 pg/mL   Nt probnp inpatient (BNP)   Result Value Ref Range    N terminal Pro BNP Inpatient 200 (H) 0 - 100 pg/mL   CRP inflammation   Result Value Ref Range    CRP Inflammation 42.6 (H) <10.0 mg/L   Erythrocyte sedimentation rate auto   Result Value Ref Range    Erythrocyte Sedimentation Rate 13 0 - 20 mm/hr   INR   Result Value Ref Range    INR 3.02 (H) 0.85 - 1.15   CBC with platelets and differential   Result Value Ref Range    WBC Count 15.0 (H) 4.0 - 11.0 10e3/uL    RBC Count 5.33 4.40 - 5.90 10e6/uL    Hemoglobin 15.4 13.3 - 17.7 g/dL    Hematocrit 47.1 40.0 - 53.0 %    MCV 88 78 - 100 fL    MCH 28.9 26.5 - 33.0 pg    MCHC 32.7 31.5 - 36.5 g/dL    RDW 15.1 (H) 10.0 - 15.0 %    Platelet Count 261 150 - 450 10e3/uL    % Neutrophils 96 %    % Lymphocytes 1 %    % Monocytes 2 %    % Eosinophils 0 %    % Basophils 0 %    % Immature Granulocytes 1 %    NRBCs per 100 WBC 0 <1 /100    Absolute Neutrophils 14.5 (H) 1.6 - 8.3 10e3/uL    Absolute Lymphocytes 0.2 (L) 0.8 - 5.3 10e3/uL    Absolute Monocytes 0.2 0.0 - 1.3 10e3/uL    Absolute Eosinophils 0.0 0.0 - 0.7 10e3/uL    Absolute Basophils 0.0 0.0 - 0.2 10e3/uL    Absolute Immature Granulocytes 0.1 <=0.4 10e3/uL    Absolute NRBCs 0.0 10e3/uL   UA with Microscopic reflex to Culture    Specimen: Urine, Midstream   Result Value Ref Range    Color Urine Yellow Colorless, Straw, Light Yellow, Yellow    Appearance Urine Clear Clear    Glucose Urine Negative Negative mg/dL    Bilirubin Urine Negative Negative    Ketones Urine Negative Negative mg/dL    Specific Gravity Urine 1.010 1.005 - 1.030    Blood Urine Trace (A) Negative    pH Urine 5.5 5.0 - 9.0    Protein Albumin Urine Negative Negative mg/dL    Urobilinogen Urine Normal Normal, 2.0 mg/dL    Nitrite Urine Negative Negative    Leukocyte Esterase Urine Negative Negative    Mucus Urine Present (A) None Seen /LPF    RBC Urine 1 <=2 /HPF    WBC  Urine 1 <=5 /HPF    Hyaline Casts Urine 1 <=2 /LPF    Narrative    Urine Culture not indicated       Medications   vancomycin (VANCOCIN) 1,500 mg in sodium chloride 0.9 % 500 mL intermittent infusion (has no administration in time range)   0.9% sodium chloride BOLUS (1,000 mLs Intravenous New Bag 8/7/22 2131)     Followed by   0.9% sodium chloride BOLUS (has no administration in time range)     Followed by   sodium chloride 0.9% infusion (has no administration in time range)   acetaminophen (TYLENOL) tablet 1,000 mg (1,000 mg Oral Given 8/7/22 1849)   piperacillin-tazobactam (ZOSYN) infusion 3.375 g (3.375 g Intravenous New Bag 8/7/22 2051)       Assessments & Plan (with Medical Decision Making)     I have reviewed the nursing notes.    I have reviewed the findings, diagnosis, plan and need for follow up with the patient.      New Prescriptions    No medications on file       Final diagnoses:   Bacterial sepsis (H)   Cellulitis of right knee   Cellulitis of third toe, right   Generalized muscle weakness     Febrile temp 103.2.  Vital signs stable.  Patient with increasing weakness and fatigue.  Feverish and with rigors today.  Was recently seen 3 days ago for cellulitis and started on doxycycline.   Differential diagnosis for fever includes but is not limited to: Viral, fungal or bacterial infections of the CNS, sinuses, dental, endocarditis, pneumonia, liver, biliary system, GI, peritonitis, abdominal abscess, UTI, prostate, STDs, skin, cellulitis, soft tissues, bones and joints as well as malignancy or lymphoma or leukemia.  Differential diagnosis for weakness includes but is not limited to:  anemia, malnutrition, electrolyte abnormality, endocrine diseases, chronic lung or heart disease, CVA, diabetes mellitus, infections, alcoholism, and hypothyroidism.  IV established and he was started on fluids.  His right knee cellulitis and right third toe cellulitis were demarcated with a skin marker.    Troponin is  normal.  BNP is 200, no previous for comparison.  CBC shows elevated white blood cells at 15 with left shift.  Blood cultures are pending.  Lactic acid returns elevated at 2.7.  Patient's INR 3.02.  CRP returns elevated at 42.6 but ESR is normal.  CMP is unremarkable.  UA shows no signs of hematuria or UTI.  Influenza, RSV and COVID tests are negative.  Sepsis with right knee and right third toe cellulitis.  The patient was started on vancomycin as well as Zosyn IV.  [place] as well as [place] own hospital.  He will be boarded at this time awaiting placement.  My shioft is over therefore patient care will be turned over to  at this time.     NOTE: [facility] is closed today  8/7/2022   [facility] AND [facility]     Jose Maldonado PA-C  08/07/22 4451

## 2022-08-08 ENCOUNTER — TELEPHONE (OUTPATIENT)
Dept: FAMILY MEDICINE | Facility: OTHER | Age: 62
End: 2022-08-08

## 2022-08-08 ENCOUNTER — APPOINTMENT (OUTPATIENT)
Dept: GENERAL RADIOLOGY | Facility: OTHER | Age: 62
End: 2022-08-08
Attending: INTERNAL MEDICINE
Payer: MEDICAID

## 2022-08-08 PROBLEM — M62.81 GENERALIZED MUSCLE WEAKNESS: Status: ACTIVE | Noted: 2022-08-08

## 2022-08-08 PROBLEM — M62.81 GENERALIZED MUSCLE WEAKNESS: Status: RESOLVED | Noted: 2022-08-08 | Resolved: 2022-08-08

## 2022-08-08 PROBLEM — L03.031 CELLULITIS OF THIRD TOE, RIGHT: Status: ACTIVE | Noted: 2022-08-08

## 2022-08-08 PROBLEM — L03.115 CELLULITIS OF RIGHT KNEE: Status: ACTIVE | Noted: 2022-08-08

## 2022-08-08 PROBLEM — A41.9 BACTERIAL SEPSIS (H): Status: ACTIVE | Noted: 2022-08-08

## 2022-08-08 LAB
ANION GAP SERPL CALCULATED.3IONS-SCNC: 8 MMOL/L (ref 3–14)
BUN SERPL-MCNC: 15 MG/DL (ref 7–25)
CALCIUM SERPL-MCNC: 9.3 MG/DL (ref 8.6–10.3)
CHLORIDE BLD-SCNC: 103 MMOL/L (ref 98–107)
CO2 SERPL-SCNC: 28 MMOL/L (ref 21–31)
CREAT SERPL-MCNC: 0.85 MG/DL (ref 0.7–1.3)
ERYTHROCYTE [DISTWIDTH] IN BLOOD BY AUTOMATED COUNT: 15.3 % (ref 10–15)
GFR SERPL CREATININE-BSD FRML MDRD: >90 ML/MIN/1.73M2
GLUCOSE BLD-MCNC: 90 MG/DL (ref 70–105)
HCT VFR BLD AUTO: 45.7 % (ref 40–53)
HGB BLD-MCNC: 14.6 G/DL (ref 13.3–17.7)
INR PPP: 3.39 (ref 0.85–1.15)
LACTATE SERPL-SCNC: 1 MMOL/L (ref 0.7–2)
LACTATE SERPL-SCNC: 1 MMOL/L (ref 0.7–2)
LACTATE SERPL-SCNC: 2.3 MMOL/L (ref 0.7–2)
MAGNESIUM SERPL-MCNC: 1.8 MG/DL (ref 1.9–2.7)
MCH RBC QN AUTO: 28.7 PG (ref 26.5–33)
MCHC RBC AUTO-ENTMCNC: 31.9 G/DL (ref 31.5–36.5)
MCV RBC AUTO: 90 FL (ref 78–100)
PLATELET # BLD AUTO: 259 10E3/UL (ref 150–450)
POTASSIUM BLD-SCNC: 4 MMOL/L (ref 3.5–5.1)
RBC # BLD AUTO: 5.09 10E6/UL (ref 4.4–5.9)
SODIUM SERPL-SCNC: 139 MMOL/L (ref 134–144)
WBC # BLD AUTO: 14.6 10E3/UL (ref 4–11)

## 2022-08-08 PROCEDURE — 85610 PROTHROMBIN TIME: CPT | Performed by: INTERNAL MEDICINE

## 2022-08-08 PROCEDURE — 258N000003 HC RX IP 258 OP 636: Performed by: FAMILY MEDICINE

## 2022-08-08 PROCEDURE — 96366 THER/PROPH/DIAG IV INF ADDON: CPT | Performed by: PHYSICIAN ASSISTANT

## 2022-08-08 PROCEDURE — 83605 ASSAY OF LACTIC ACID: CPT | Mod: 91 | Performed by: INTERNAL MEDICINE

## 2022-08-08 PROCEDURE — 250N000013 HC RX MED GY IP 250 OP 250 PS 637: Performed by: INTERNAL MEDICINE

## 2022-08-08 PROCEDURE — 73620 X-RAY EXAM OF FOOT: CPT | Mod: RT

## 2022-08-08 PROCEDURE — 258N000003 HC RX IP 258 OP 636: Performed by: INTERNAL MEDICINE

## 2022-08-08 PROCEDURE — 250N000011 HC RX IP 250 OP 636: Performed by: FAMILY MEDICINE

## 2022-08-08 PROCEDURE — 120N000001 HC R&B MED SURG/OB

## 2022-08-08 PROCEDURE — 80048 BASIC METABOLIC PNL TOTAL CA: CPT | Performed by: INTERNAL MEDICINE

## 2022-08-08 PROCEDURE — 87040 BLOOD CULTURE FOR BACTERIA: CPT | Performed by: INTERNAL MEDICINE

## 2022-08-08 PROCEDURE — 0J9N00Z DRAINAGE OF RIGHT LOWER LEG SUBCUTANEOUS TISSUE AND FASCIA WITH DRAINAGE DEVICE, OPEN APPROACH: ICD-10-PCS | Performed by: SURGERY

## 2022-08-08 PROCEDURE — 83735 ASSAY OF MAGNESIUM: CPT | Performed by: INTERNAL MEDICINE

## 2022-08-08 PROCEDURE — 99223 1ST HOSP IP/OBS HIGH 75: CPT | Mod: 25 | Performed by: INTERNAL MEDICINE

## 2022-08-08 PROCEDURE — 36415 COLL VENOUS BLD VENIPUNCTURE: CPT | Performed by: INTERNAL MEDICINE

## 2022-08-08 PROCEDURE — 85027 COMPLETE CBC AUTOMATED: CPT | Performed by: INTERNAL MEDICINE

## 2022-08-08 PROCEDURE — 250N000011 HC RX IP 250 OP 636: Performed by: PHYSICIAN ASSISTANT

## 2022-08-08 PROCEDURE — 87205 SMEAR GRAM STAIN: CPT | Performed by: SURGERY

## 2022-08-08 PROCEDURE — 10061 I&D ABSCESS COMP/MULTIPLE: CPT | Performed by: SURGERY

## 2022-08-08 PROCEDURE — 96368 THER/DIAG CONCURRENT INF: CPT | Performed by: PHYSICIAN ASSISTANT

## 2022-08-08 PROCEDURE — 83605 ASSAY OF LACTIC ACID: CPT | Performed by: EMERGENCY MEDICINE

## 2022-08-08 PROCEDURE — 250N000011 HC RX IP 250 OP 636: Performed by: EMERGENCY MEDICINE

## 2022-08-08 PROCEDURE — 36415 COLL VENOUS BLD VENIPUNCTURE: CPT | Performed by: EMERGENCY MEDICINE

## 2022-08-08 PROCEDURE — 258N000003 HC RX IP 258 OP 636: Performed by: PHYSICIAN ASSISTANT

## 2022-08-08 RX ORDER — NALOXONE HYDROCHLORIDE 0.4 MG/ML
0.4 INJECTION, SOLUTION INTRAMUSCULAR; INTRAVENOUS; SUBCUTANEOUS
Status: DISCONTINUED | OUTPATIENT
Start: 2022-08-08 | End: 2022-08-11 | Stop reason: HOSPADM

## 2022-08-08 RX ORDER — ACETAMINOPHEN 650 MG/1
650 SUPPOSITORY RECTAL EVERY 6 HOURS PRN
Status: DISCONTINUED | OUTPATIENT
Start: 2022-08-08 | End: 2022-08-11 | Stop reason: HOSPADM

## 2022-08-08 RX ORDER — ONDANSETRON 2 MG/ML
4 INJECTION INTRAMUSCULAR; INTRAVENOUS EVERY 6 HOURS PRN
Status: DISCONTINUED | OUTPATIENT
Start: 2022-08-08 | End: 2022-08-11 | Stop reason: HOSPADM

## 2022-08-08 RX ORDER — IBUPROFEN 200 MG
800 TABLET ORAL DAILY PRN
Status: ON HOLD | COMMUNITY
End: 2023-01-09

## 2022-08-08 RX ORDER — SODIUM CHLORIDE 9 MG/ML
INJECTION, SOLUTION INTRAVENOUS CONTINUOUS
Status: DISCONTINUED | OUTPATIENT
Start: 2022-08-08 | End: 2022-08-09

## 2022-08-08 RX ORDER — LIDOCAINE 40 MG/G
CREAM TOPICAL
Status: DISCONTINUED | OUTPATIENT
Start: 2022-08-08 | End: 2022-08-11 | Stop reason: HOSPADM

## 2022-08-08 RX ORDER — ACETAMINOPHEN 325 MG/1
650 TABLET ORAL EVERY 6 HOURS PRN
Status: DISCONTINUED | OUTPATIENT
Start: 2022-08-08 | End: 2022-08-11 | Stop reason: HOSPADM

## 2022-08-08 RX ORDER — NALOXONE HYDROCHLORIDE 0.4 MG/ML
0.2 INJECTION, SOLUTION INTRAMUSCULAR; INTRAVENOUS; SUBCUTANEOUS
Status: DISCONTINUED | OUTPATIENT
Start: 2022-08-08 | End: 2022-08-11 | Stop reason: HOSPADM

## 2022-08-08 RX ORDER — ACETAMINOPHEN 500 MG
500-1000 TABLET ORAL EVERY 6 HOURS PRN
Status: DISCONTINUED | OUTPATIENT
Start: 2022-08-08 | End: 2022-08-08 | Stop reason: DRUGHIGH

## 2022-08-08 RX ORDER — AMOXICILLIN 250 MG
2 CAPSULE ORAL 2 TIMES DAILY PRN
Status: DISCONTINUED | OUTPATIENT
Start: 2022-08-08 | End: 2022-08-11 | Stop reason: HOSPADM

## 2022-08-08 RX ORDER — PROCHLORPERAZINE MALEATE 10 MG
10 TABLET ORAL EVERY 6 HOURS PRN
Status: DISCONTINUED | OUTPATIENT
Start: 2022-08-08 | End: 2022-08-11 | Stop reason: HOSPADM

## 2022-08-08 RX ORDER — ONDANSETRON 4 MG/1
4 TABLET, ORALLY DISINTEGRATING ORAL EVERY 6 HOURS PRN
Status: DISCONTINUED | OUTPATIENT
Start: 2022-08-08 | End: 2022-08-11 | Stop reason: HOSPADM

## 2022-08-08 RX ORDER — ACETAMINOPHEN 500 MG
500-1000 TABLET ORAL EVERY 6 HOURS PRN
Status: ON HOLD | COMMUNITY
End: 2023-06-27

## 2022-08-08 RX ORDER — OXYCODONE HYDROCHLORIDE 5 MG/1
5 TABLET ORAL EVERY 4 HOURS PRN
Status: DISCONTINUED | OUTPATIENT
Start: 2022-08-08 | End: 2022-08-11 | Stop reason: HOSPADM

## 2022-08-08 RX ORDER — AMOXICILLIN 250 MG
1 CAPSULE ORAL 2 TIMES DAILY PRN
Status: DISCONTINUED | OUTPATIENT
Start: 2022-08-08 | End: 2022-08-11 | Stop reason: HOSPADM

## 2022-08-08 RX ORDER — METOPROLOL SUCCINATE 100 MG/1
200 TABLET, EXTENDED RELEASE ORAL DAILY
Status: DISCONTINUED | OUTPATIENT
Start: 2022-08-08 | End: 2022-08-09

## 2022-08-08 RX ORDER — PROCHLORPERAZINE 25 MG
25 SUPPOSITORY, RECTAL RECTAL EVERY 12 HOURS PRN
Status: DISCONTINUED | OUTPATIENT
Start: 2022-08-08 | End: 2022-08-11 | Stop reason: HOSPADM

## 2022-08-08 RX ORDER — AMLODIPINE BESYLATE 5 MG/1
5 TABLET ORAL DAILY
Status: DISCONTINUED | OUTPATIENT
Start: 2022-08-08 | End: 2022-08-10

## 2022-08-08 RX ADMIN — SODIUM CHLORIDE 1000 ML: 9 INJECTION, SOLUTION INTRAVENOUS at 15:22

## 2022-08-08 RX ADMIN — TAZOBACTAM SODIUM AND PIPERACILLIN SODIUM 4.5 G: 500; 4 INJECTION, SOLUTION INTRAVENOUS at 16:27

## 2022-08-08 RX ADMIN — VANCOMYCIN HYDROCHLORIDE 1250 MG: 500 INJECTION, POWDER, LYOPHILIZED, FOR SOLUTION INTRAVENOUS at 21:44

## 2022-08-08 RX ADMIN — SODIUM CHLORIDE: 9 INJECTION, SOLUTION INTRAVENOUS at 03:49

## 2022-08-08 RX ADMIN — SODIUM CHLORIDE 1000 ML: 0.9 INJECTION, SOLUTION INTRAVENOUS at 02:59

## 2022-08-08 RX ADMIN — AMLODIPINE BESYLATE 5 MG: 5 TABLET ORAL at 15:11

## 2022-08-08 RX ADMIN — SODIUM CHLORIDE: 9 INJECTION, SOLUTION INTRAVENOUS at 16:26

## 2022-08-08 RX ADMIN — ACETAMINOPHEN 650 MG: 325 TABLET, FILM COATED ORAL at 15:23

## 2022-08-08 RX ADMIN — TAZOBACTAM SODIUM AND PIPERACILLIN SODIUM 4.5 G: 500; 4 INJECTION, SOLUTION INTRAVENOUS at 09:21

## 2022-08-08 RX ADMIN — METOPROLOL SUCCINATE 200 MG: 100 TABLET, EXTENDED RELEASE ORAL at 15:11

## 2022-08-08 RX ADMIN — VANCOMYCIN HYDROCHLORIDE 1250 MG: 500 INJECTION, POWDER, LYOPHILIZED, FOR SOLUTION INTRAVENOUS at 09:56

## 2022-08-08 RX ADMIN — TAZOBACTAM SODIUM AND PIPERACILLIN SODIUM 3.38 G: 375; 3 INJECTION, SOLUTION INTRAVENOUS at 03:49

## 2022-08-08 RX ADMIN — TAZOBACTAM SODIUM AND PIPERACILLIN SODIUM 4.5 G: 500; 4 INJECTION, SOLUTION INTRAVENOUS at 21:04

## 2022-08-08 ASSESSMENT — ACTIVITIES OF DAILY LIVING (ADL)
DRESSING/BATHING_DIFFICULTY: NO
DOING_ERRANDS_INDEPENDENTLY_DIFFICULTY: NO
CHANGE_IN_FUNCTIONAL_STATUS_SINCE_ONSET_OF_CURRENT_ILLNESS/INJURY: YES
DIFFICULTY_EATING/SWALLOWING: NO
DIFFICULTY_COMMUNICATING: NO
ADLS_ACUITY_SCORE: 24
ADLS_ACUITY_SCORE: 24
CONCENTRATING,_REMEMBERING_OR_MAKING_DECISIONS_DIFFICULTY: NO
ADLS_ACUITY_SCORE: 25
HEARING_DIFFICULTY_OR_DEAF: NO
ADLS_ACUITY_SCORE: 35
TRANSFERRING: 1-->ASSISTANCE (EQUIPMENT/PERSON) NEEDED
ADLS_ACUITY_SCORE: 25
TOILETING_ISSUES: NO
FALL_HISTORY_WITHIN_LAST_SIX_MONTHS: YES
WALKING_OR_CLIMBING_STAIRS_DIFFICULTY: YES
WEAR_GLASSES_OR_BLIND: YES
NUMBER_OF_TIMES_PATIENT_HAS_FALLEN_WITHIN_LAST_SIX_MONTHS: 5
EQUIPMENT_CURRENTLY_USED_AT_HOME: CANE, STRAIGHT
WALKING_OR_CLIMBING_STAIRS: STAIR CLIMBING DIFFICULTY, REQUIRES EQUIPMENT
TRANSFERRING: 0-->ASSISTANCE NEEDED (DEVELOPMENTALLY APPROPRIATE)
VISION_MANAGEMENT: GLASSES AT BEDSIDE
ADLS_ACUITY_SCORE: 24

## 2022-08-08 NOTE — PHARMACY-VANCOMYCIN DOSING SERVICE
"Pharmacy Vancomycin Initial Note  Date of Service 2022  Patient's  1960  62 year old, male    Indication: Sepsis/skin and soft tissue infection    Current estimated CrCl = Estimated Creatinine Clearance: 134.4 mL/min (based on SCr of 0.85 mg/dL).    Creatinine for last 3 days  2022:  7:07 PM Creatinine 0.93 mg/dL  2022:  2:16 PM Creatinine 0.85 mg/dL    Recent Vancomycin Level(s) for last 3 days  No results found for requested labs within last 72 hours.      Vancomycin IV Administrations (past 72 hours)                   vancomycin (VANCOCIN) 1,250 mg in sodium chloride 0.9 % 250 mL intermittent infusion (mg) 1,250 mg New Bag 22 0956    vancomycin (VANCOCIN) 1,500 mg in sodium chloride 0.9 % 500 mL intermittent infusion (mg) 1,500 mg New Bag 22 2146                Nephrotoxins and other renal medications (From now, onward)    Start     Dose/Rate Route Frequency Ordered Stop    22 1000  vancomycin (VANCOCIN) 1,250 mg in sodium chloride 0.9 % 250 mL intermittent infusion         1,250 mg  over 90 Minutes Intravenous EVERY 12 HOURS 22 0716      22 1000  piperacillin-tazobactam (ZOSYN) intermittent infusion 4.5 g        Note to Pharmacy: For [facility], [facility] and [facility]: For Zosyn-naive patients, use the \"Zosyn initial dose + extended infusion\" order panel.    4.5 g  200 mL/hr over 30 Minutes Intravenous EVERY 6 HOURS 22 0717            Contrast Orders - past 72 hours (72h ago, onward)    None          [employer] Prediction of Planned Initial Vancomycin Regimen  Loading dose: 1,500 mg x1 (22)  Regimen: 1250 mg IV every 12 hours.  Start time: 16:17 on 2022  Exposure target: AUC24 (range)400-600 mg/L.hr   AUC24,ss: 460 mg/L.hr  Probability of AUC24 > 400: 65 %  Ctrough,ss: 15.1 mg/L  Probability of Ctrough,ss > 20: 27 %  Probability of nephrotoxicity (Lodise SERGIO ): 10 %        Plan:  1. Agree with plan to initiate vancomycin  1,250 mg IV q12h. Will check vanco " level in AM to ensure adequate clearing/acheiving therapeutic levels.   2. Vancomycin monitoring method: AUC  3. Vancomycin therapeutic monitoring goal: 400-600 mg*h/L  4. Pharmacy will check vancomycin levels as appropriate in 1-3 Days.    5. Serum creatinine levels will be ordered daily for the first week of therapy and at least twice weekly for subsequent weeks.      John Harrington RPH

## 2022-08-08 NOTE — TELEPHONE ENCOUNTER
Patient called looking for status of tome sensitive papers he dropped off last Thursday. checked at [address] window nothing up there yet.    Please contact patient to give update of forms.     Brittany Erazo on 8/8/2022 at 12:02 PM

## 2022-08-08 NOTE — PROGRESS NOTES
" [facility] ADMISSION NOTE    Patient admitted to room 353 at approximately 1340 via wheel chair from emergency room. Patient was accompanied by spouse.     Verbal SBAR report received from [place] prior to patient arrival.     Patient ambulated to bed with stand-by assist. Patient alert and oriented X 3. Pain is controlled without any medications.  . Admission vital signs: Blood pressure (!) 148/95, pulse 82, temperature 98.3  F (36.8  C), temperature source Oral, resp. rate 27, height 1.905 m (6' 3\"), weight 133.4 kg (294 lb), SpO2 97 %. Patient and significant other was oriented to plan of care, call light, bed controls, tv, telephone, bathroom and visiting hours.     Risk Assessment    The following safety risks were identified during admission: fall. Yellow risk band applied: YES.     Skin Initial Assessment    This writer admitted this patient and completed a full skin assessment and Darnell score in the Adult PCS flowsheet. Appropriate interventions initiated as needed.      Education    Patient has a [place] to Observation order: No  Observation education completed and documented: N/A      Himanshu Avila RN    "

## 2022-08-08 NOTE — PHARMACY-ANTICOAGULATION SERVICE
Pharmacy Consult- Initial Coumadin Assessment    Babak Moran is a 62 year old male admitted on 8/7/2022 with Bacterial sepsis (H)    Primary Indication(s) for Anticoagulation: Atrial fibrillation    Goal INR: 2-3  (per previous anticoagulation note from 7/29, Babak prefers to be closer to 3.0 d/t previous CVA check Q 4 weeks.Declines to reduce dose when slightly over 3.0)    FYI, patient is followed by the Anticoagulation/Protime Clinic at: [facility]    Patient Active Problem List   Diagnosis     Pain in joint, ankle and foot     Atrial fibrillation with RVR (H)     Cerebrovascular accident (CVA) due to embolism of right middle cerebral artery (H)     Osteoarthrosis     Cutaneous lupus erythematosus     Gout     Ascending aorta enlargement (H)     Family history of coronary artery disease     Essential hypertension     Left hemiparesis (H)     Long-term (current) use of anticoagulants, INR goal 2.0-3.0     Systemic lupus erythematosus (H)     Tissue plasminogen activator (t-PA) administered at other facility within 24 hours prior to current admission     Morbid obesity with BMI of 40.0-44.9, adult (H)     Obstructive sleep apnea syndrome     Need for vaccination     Bacterial sepsis (H)     Cellulitis of right knee     Cellulitis of third toe, right       Patient previously anticoagulated on Coumadin at a dose of 52.5 mg/week; dosed as: 7.5 mg daily    Factors that may increase patient's sensitivity to warfarin (Coumadin) include: Acute illness, IV antibiotics    Factors that may decrease patient's sensitivity to warfarin (Coumadin) include: none noted    Recent Labs   Lab Test 08/08/22  1416 08/07/22  1907 07/29/22  1245 07/01/22  0802 06/26/22  1158 10/09/20  0000 09/29/20  1054   HGB 14.6 15.4  --   --  14.6  --  14.5   INR 3.39* 3.02* 2.1* 2.3* 2.31*   < >  --     261  --   --  249  --  315    < > = values in this interval not displayed.        Plan: HOLD Warfarin x1. Recheck in AM.    Thank You for the  Consult. Will continue to follow.    John Harrington [facility] ....................  8/8/2022   3:10 PM

## 2022-08-08 NOTE — PROCEDURES
Preoperative diagnosis: Right knee abscess   Postoperative diagnosis right knee abscess/previous infected hematoma.      Discussed risks and benefits of incision and drainage infection/fluid. Specifically, I explained the risks of continued infection, bleeding, bruising and recurrence. The patient expressed understanding and wishes toproceed. Verbal informed consent was completed.     Procedure:  The patient was placed in a supine position. The right knee was prepped with chloraprep and draped. Local anesthetic, lidocaine with epinephrine, was infiltratedin the skin and subcutaneous tissue in the area of planned incision. The area of fluctuance was palpated. Skin incision was made with an 11 blade.  A counterincision was made at the inferior apex of the fluctuance.  A Lydia clamp was passed between the 2 and any loculations broken up. There was drainage of purulent fluid. The abscess cavity was explored and no undrained fluid was noted.  Cultures were obtained.  1/4 inch Penrose was used to facilitate drainage.  This was fashioned into a loop with a 3-0 Vicryl to retain it.  Sterile dressing was applied. The patient tolerated the procedure with no immediately apparent complications.    Patient will follow up in 7-10 days. Patient will call with concerns or questions prior to follow up.

## 2022-08-08 NOTE — PHARMACY-VANCOMYCIN DOSING SERVICE
"Pharmacy Vancomycin Initial Note  Date of Service 2022  Patient's  1960  62 year old, male    Indication: Bacteremia and Skin and Soft Tissue Infection    Current estimated CrCl = Estimated Creatinine Clearance: 122.8 mL/min (based on SCr of 0.93 mg/dL).    Creatinine for last 3 days  2022:  7:07 PM Creatinine 0.93 mg/dL    Recent Vancomycin Level(s) for last 3 days  No results found for requested labs within last 72 hours.      Vancomycin IV Administrations (past 72 hours)                   vancomycin (VANCOCIN) 1,500 mg in sodium chloride 0.9 % 500 mL intermittent infusion (mg) 1,500 mg New Bag 22 2146                Nephrotoxins and other renal medications (From now, onward)    Start     Dose/Rate Route Frequency Ordered Stop    22 1000  vancomycin (VANCOCIN) 1,250 mg in sodium chloride 0.9 % 250 mL intermittent infusion         1,250 mg  over 90 Minutes Intravenous EVERY 12 HOURS 22 0716      22 1000  piperacillin-tazobactam (ZOSYN) intermittent infusion 4.5 g        Note to Pharmacy: For [facility], [facility] and [facility]: For Zosyn-naive patients, use the \"Zosyn initial dose + extended infusion\" order panel.    4.5 g  200 mL/hr over 30 Minutes Intravenous EVERY 6 HOURS 22 0717            Contrast Orders - past 72 hours (72h ago, onward)    None          [employer] Prediction of Planned Initial Vancomycin Regimen    Loading dose: 1500 mg IV ONCE on 22 at 2145  Regimen: 1250 mg IV every 12 hours.  Start time: 1000 on 2022  Exposure target: AUC24 (range)400-600 mg/L.hr   AUC24,ss: 455 mg/L.hr  Probability of AUC24 > 400: 64 %  Ctrough,ss: 14.9 mg/L  Probability of Ctrough,ss > 20: 25 %  Probability of nephrotoxicity (Lodise SERGIO ): 10 %          Plan:  1. Start vancomycin  1500 mg IV q12h. GPCs noted in blood cultures.   2. Vancomycin monitoring method: AUC  3. Vancomycin therapeutic monitoring goal: 400-600 mg*h/L  4. Pharmacy will check vancomycin levels as " appropriate in 1-3 Days.    5. Serum creatinine levels will be ordered daily for the first week of therapy and at least twice weekly for subsequent weeks.      Briana Olivo [facility]

## 2022-08-08 NOTE — PROGRESS NOTES
:     It appears that patient does not have insurance coverage. Voicemail left with  to look into this.  will meet with patient in the AM to discuss options.     BHARGAVI Dahl on 8/8/2022 at 4:20 PM

## 2022-08-08 NOTE — PROGRESS NOTES
Weights reviewed per MST screen: has had about 15# wt loss in past 4 months (~4.8% loss, not significant) and BMI still over 30. Will monitor intakes and make changes as needed. Follow up in 1-5 days.   Wt Readings from Last 10 Encounters:   08/07/22 133.4 kg (294 lb)   08/04/22 133.4 kg (294 lb 0.8 oz)   06/26/22 140.6 kg (310 lb)   04/11/22 140.6 kg (310 lb)   09/29/20 142.5 kg (314 lb 3.2 oz)   09/25/20 145.2 kg (320 lb 3.2 oz)   03/19/20 (!) 151 kg (333 lb)   01/08/20 (!) 159 kg (350 lb 8 oz)   12/02/19 (!) 165.1 kg (364 lb)   11/27/19 (!) 165.1 kg (364 lb)   Lydia Ward RD on 8/8/2022 at 2:38 PM

## 2022-08-08 NOTE — PLAN OF CARE
"Patient is alert and orientated. Mild pain with palpation to right knee abscess in which this was drained by surgeon MD Pitt at bedside with NP student. Two incisions were made with looped penrose drain inserted and gauze applied, CDI. Exp wheezes to ELIZABETH, RUL, RML with improvement throughout the shift, dim bases, SOB with talking at times, HARRIS, infrequent productive cough, educated on deep breathing, patient reports he is still recovering from COVID he had couple months ago. Patient reports he has tried CPAP in the past but it did not work for him, has been satting okay on room air during his nap. Heart irregular rhythm, rate controlled, on tele. Teto/red flaking BLE, mild edema, chronic neuropathy, pulses present. Right knee dressing in place, CDI. Chronic venous stasis to BLE, open to air. Right second and third toe with eschar appearance and overgrowth of toenails, X-rays ordered. Tolerating regular diet well, denies n/v. Voiding without issue. Patient and wife expressed concern about unemployment paper that was going to be signed by Dr. Funes during Wednesday (8/10/22) appointment, discussed with wife to bring papers to the hospital and the possibility of inpatient doctor being able to complete them, social work consult in place. Febrile, gave prn tylenol. Hypertensive, scheduled blood pressure medications given upon arrival to [facility] with improvement. SBA with cane. Will continue to monitor.    /65 (BP Location: Left arm, Patient Position: Sitting, Cuff Size: Adult Large)   Pulse 64   Temp 100.2  F (37.9  C) (Tympanic)   Resp 22   Ht 1.905 m (6' 3\")   Wt 133.4 kg (294 lb)   SpO2 95%   BMI 36.75 kg/m      Problem: Plan of Care - These are the overarching goals to be used throughout the patient stay.    Goal: Plan of Care Review/Shift Note  Description: The Plan of Care Review/Shift note should be completed every shift.  The Outcome Evaluation is a brief statement about your assessment that the " "patient is improving, declining, or no change.  This information will be displayed automatically on your shift note.  Outcome: Ongoing, Progressing     Problem: Plan of Care - These are the overarching goals to be used throughout the patient stay.    Goal: Patient-Specific Goal (Individualized)  Description: You can add care plan individualizations to a care plan. Examples of Individualization might be:  \"Parent requests to be called daily at 9am for status\", \"I have a hard time hearing out of my right ear\", or \"Do not touch me to wake me up as it startles me\".  Outcome: Ongoing, Progressing     Problem: Plan of Care - These are the overarching goals to be used throughout the patient stay.    Goal: Absence of Hospital-Acquired Illness or Injury  Outcome: Ongoing, Progressing     Problem: Plan of Care - These are the overarching goals to be used throughout the patient stay.    Goal: Optimal Comfort and Wellbeing  Outcome: Ongoing, Progressing     Problem: Plan of Care - These are the overarching goals to be used throughout the patient stay.    Goal: Readiness for Transition of Care  Outcome: Ongoing, Progressing     Problem: Pain Acute  Goal: Acceptable Pain Control and Functional Ability  Outcome: Ongoing, Progressing     Problem: Skin or Soft Tissue Infection  Goal: Absence of Infection Signs and Symptoms  Outcome: Ongoing, Progressing   Goal Outcome Evaluation:                      "

## 2022-08-08 NOTE — ED PROVIDER NOTES
Patient received in signout from evening provider.  Being treated for lower extremity cellulitis.  We will plan to redose antibiotics overnight.  Waiting for an inpatient bed.      0615-lab called with positive blood culture results.    Signed out to daytime provider.    MD Rachel Tolbert Paul C, MD  08/08/22 0615    CONTINUATION OF CARE    Patient doing well, still somewhat tachycardic but his rigors have improved.  A bed did come available on [facility], patient will be admitted, he has received Zosyn and vancomycin already here in the emergency department.  Patient is agreeable with this plan.  I discussed with Dr. Gonzales and house supervisor.    (A41.9) Bacterial sepsis (H)    (L03.115) Cellulitis of right knee    (L03.031) Cellulitis of third toe, right    (M62.81) Generalized muscle weakness           Edwin Bueno MD  08/08/22 4723

## 2022-08-08 NOTE — PHARMACY
Pharmacy- Body Weight Dose Adjustment    Patient Active Problem List   Diagnosis     Pain in joint, ankle and foot     Anticoagulation monitoring, INR range 2-3     Atrial fibrillation with RVR (H)     Cerebrovascular accident (CVA) due to embolism of right middle cerebral artery (H)     Osteoarthrosis     Cutaneous lupus erythematosus     Gout     Ascending aorta enlargement (H)     Family history of coronary artery disease     Essential hypertension     Left hemiparesis (H)     Long-term (current) use of anticoagulants, INR goal 2.0-3.0     Systemic lupus erythematosus (H)     Hypoglycemia     Tissue plasminogen activator (t-PA) administered at other facility within 24 hours prior to current admission     Morbid obesity with BMI of 40.0-44.9, adult (H)     Obstructive sleep apnea syndrome     Need for vaccination        Relevant Labs:  Recent Labs   Lab Test 08/07/22  1907 06/26/22  1158   WBC 15.0* 7.6   HGB 15.4 14.6    249        CrCl: 122.8 mL/min      Intake/Output Summary (Last 24 hours) at 8/8/2022 0717  Last data filed at 8/8/2022 0602  Gross per 24 hour   Intake --   Output 950 ml   Net -950 ml          Per Body Weight Dose Adjustment Protocol, will adjust:    - zosyn 3.375 g Q6H for SSTI/bacteremia to 4.5 g Q6H for body weight greater than 90 kg    Will continue to follow and make adjustments accordingly. Thank You.    Briana Olivo [facility] ....................  8/8/2022   7:17 AM

## 2022-08-08 NOTE — H&P
"[facility]    History and Physical  Hospitalist       Date of Admission:  8/7/2022    Assessment & Plan   Babak Moran is a 62 year old male who presents with sepsis likely secondary to prepatellar abscess of the right leg and complicated by dry gangrene of his right toes.    Clinically Significant Risk Factors Present on Admission               # Coagulation Defect: home medication list includes an anticoagulant medication      # Obesity: Estimated body mass index is 36.75 kg/m  as calculated from the following:    Height as of this encounter: 1.905 m (6' 3\").    Weight as of this encounter: 133.4 kg (294 lb).       Principal Problem:    Bacterial sepsis (H)    Assessment: Initial criteria met given fever, tachycardia, elevated lactate and source likely secondary to prepatellar abscess of the right leg.  Complicated by dry gangrene of the right second and third toes.  Hemodynamically stable but with ongoing fevers will benefit from time sensitive surgical consultation for I&D.    Plan:   -Vancomycin/Zosyn day 2  -Follow-up initial blood cultures and repeat blood cultures today to ensure clearing  -Trend lactate  -Check fasting lipid panel and hemoglobin A1c in a.m. appreciate general surgery consult  -CTA with runoff of right leg versus ABIs pending surgical consult    Active Problems:    Atrial fibrillation with RVR (H)    Assessment: Stable with rate control    Plan:   -Continue home metoprolol  -Hold Coumadin for any planned surgical interventions      Essential hypertension    Assessment: Mildly hypertensive at this point    Plan:   -Continue home Norvasc      Obstructive sleep apnea syndrome    Assessment: Chronic and stable    Plan:   -Continue home CPAP      DVT Prophylaxis: Warfarin  Code Status: Full Code    Jean Claude Gonzales MD    Primary Care Physician   Teo Funes    Chief Complaint   Sepsis    History is obtained from the patient and chart review.    History of Present Illness "   Babak Moran is a 62 year old male history of atrial fibrillation and hypertension who presents with sepsis likely secondary to prepatellar abscess and surrounding cellulitis complicated by dry gangrene of the right second and third toes.  Patient suffered from black appearing second and third toes for at least 6 months.  Has been stable, he did have some pus draining from a number of months ago and this was managed conservatively at the [facility] clinic as he does not have insurance and cannot afford any follow-up care.  He had also fallen on his right knee while at work and had some pain in the knee with some swelling that completely resolved.  Over the last number of days that swelling has gotten worse and has really started to hurt and throb.  He was seen on 6/26 in the emergency department after also suffering from a fall while at work, underwent CT of his head without pathology.    On 8/4 he was then seen in rapid clinic for a right cellulitis just below his knee, was prescribed doxycycline for 10 days, he has been taking it but redness and tense blister under his kneecap is gotten worse and he presented to the emergency department yesterday.    In ER he was noted to be febrile started on broad-spectrum antibiotics and was boarded given no available hospital beds until admission today.    Past Medical History    I have reviewed this patient's medical history and updated it with pertinent information if needed.   Past Medical History:   Diagnosis Date     Atrial fibrillation (H) 02/03/2017    on Warfarin     Cellulitis of left lower extremity 9/5/2019     Cerebral infarction (H)      Cerebral infarction due to unspecified occlusion or stenosis of right middle cerebral artery (H) 02/03/2017    ,Left hemiparesis, left neglect, impaired balance and gait following R MCA stroke with mild hemorrhagic transformation s/p tissue plasminogen activator and mechanical thrombectomy, s/p C7 facet fracture from fall off  [employer].     Chest pain 02/1997     Disorder of skin or subcutaneous tissue 07/25/2011     Essential (primary) hypertension 02/1997     Fracture of cervical vertebra (H)     02/03/2017,C7 facet fracture from fall off [employer], nondisplaced     Gout      Hemiplegia affecting left nondominant side (H) 02/03/2017     Obesity 02/03/2017    body mass index of 40     Other local lupus erythematosus      Sepsis (H) 9/6/2019       Past Surgical History   I have reviewed this patient's surgical history and updated it with pertinent information if needed.  Past Surgical History:   Procedure Laterality Date     ARTHROSCOPY KNEE      bilateral knees     COLONOSCOPY  01/02/2012    Normal; Next due 2022     OTHER SURGICAL HISTORY  02/03/2017       Prior to Admission Medications   Prior to Admission Medications   Prescriptions Last Dose Informant Patient Reported? Taking?   acetaminophen (TYLENOL) 500 MG tablet Past Week at Unknown time  Yes Yes   Sig: Take 500-1,000 mg by mouth every 6 hours as needed for mild pain   amLODIPine (NORVASC) 5 MG tablet 8/7/2022 at AM  No Yes   Sig: TAKE 1 TABLET (5 MG) BY MOUTH DAILY   ibuprofen (ADVIL/MOTRIN) 200 MG tablet Past Week at Unknown time  Yes Yes   Sig: Take 800 mg by mouth daily as needed for mild pain   metoprolol succinate ER (TOPROL XL) 200 MG 24 hr tablet 8/7/2022 at AM  No Yes   Sig: TAKE 1 TABLET (200 MG) BY MOUTH DAILY   warfarin ANTICOAGULANT (JANTOVEN ANTICOAGULANT) 5 MG tablet 8/7/2022 at took home dose while in the hospital  No Yes   Sig: TAKE 1 AND 1/2 TABLETS(7.5MG) BY MOUTH x 4 days and 10 mg x 3 days/week [facility]      Facility-Administered Medications: None     Allergies   Allergies   Allergen Reactions     Diatrizoate Rash     Ioxaglate Unknown     Levofloxacin Hives and Rash     Metrizamide Rash     (Diagnostic X-Ray materials)     Probenecid Rash       Social History   I have reviewed this patient's social history and updated it with pertinent  information if needed. Babak Moran  reports that he is a non-smoker but has been exposed to tobacco smoke. He has been exposed to 0.00 packs per day. He has never used smokeless tobacco. He reports current alcohol use. He reports that he does not use drugs.    Family History   I have reviewed this patient's family history and updated it with pertinent information if needed.   Family History   Problem Relation Age of Onset     Unknown/Adopted Paternal Grandfather         Unknown, around age 67.     Other - See Comments Father         Parkinson's disease Alzheimer's     Cancer Father         Cancer     Heart Disease Maternal Grandmother         Heart Disease,MI in her 60's.     Heart Disease Mother 60        Heart Disease,MI     Other - See Comments Mother         rheumatoid arthritis.     Heart Disease Maternal Uncle 69        Heart Disease     Hypertension Sister         Hypertension     Cancer Sister         Cancer,melanoma     Heart Disease Paternal Uncle         Heart Disease, around 69.       Review of Systems     Constitutional: normal energy and appetite, no recent sick contacts, previously had COVID but the symptoms have not resolved.  Eyes: no changes in vision  Ears, nose, mouth, throat, and face: no mouth sores, dysphagia, or odynophagia  Respiratory: no shortness of breath, cough, or wheezing.  Cardiovascular: no chest pain, palpitations, orthopnea, increased lower extremity edema, or syncope.   Gastrointestinal: no constipation, diarrhea, nausea, vomiting or abdominal pain.  Genitourinary: no dysuria, hematuria, urgency or frequency.   Hematologic/lymphatic: no unintentional weight loss or night sweats.  Musculoskeletal: no pain to extremities currently although he has been taking about 800 mg of ibuprofen a day and at least 4-6 extra strength Tylenol's a day.  Neurological: no new weakness, tingling, numbness.   Endocrine: not a known diabetic.    Physical Exam   Temp: (!) 100.7  F (38.2  C)  Temp src: Tympanic BP: (!) 173/81 Pulse: 96   Resp: 24 SpO2: 95 % O2 Device: None (Room air)    Vital Signs with Ranges  Temp:  [98.2  F (36.8  C)-103.2  F (39.6  C)] 100.7  F (38.2  C)  Pulse:  [] 96  Resp:  [15-28] 24  BP: (126-187)/() 173/81  SpO2:  [94 %-97 %] 95 %  294 lbs 0 oz    Exam:  GENERAL: Talkative, sitting up in bed, appears slightly flushed, in no apparent distress.  Head: normocephalic and atraumatic  Eyes: anicteric and non-injected sclera  Nose: no rhinorrhea or epistaxis.   Throat: moist mucous membranes with no active oral lesions.  NECK: Supple, jugular venous distension not present.  CARDIOVASCULAR: Irregularly irregular rate and rhythm, no murmurs, rubs, or gallops. Normal S1/S2. No lower extremity edema.   RESPIRATORY: clear to auscultation bilaterally, no wheezes, no crackles.    GI: Obese, soft, non-tender, non-distended, normoactive bowel sounds.  MUSCULOSKELETAL: warm and well perfused, 2+ dorsalis pedis pulses bilaterally.    SKIN: Just inferior to his patella on the right he has a tense approximately golf ball sized superficial appearing fluctuance with surrounding erythema which has been outlined.  No other bullae or vesicles that have appeared around it.  Right second and third toe with clear dry appearing eschar consistent with gangrene, third metatarsal is exposed to air with significant onychomycosis.  No active drainage and no surrounding erythema.  bilateral chronic stasis changes.  NEUROLOGY: AAOx3, follows commands, speech and language without focal deficits.        Data   Data reviewed today:  I personally reviewed no images or EKG's today.  Recent Labs   Lab 08/08/22  1416 08/07/22  1907   WBC 14.6* 15.0*   HGB 14.6 15.4   MCV 90 88    261   INR 3.39* 3.02*    137   POTASSIUM 4.0 3.7   CHLORIDE 103 101   CO2 28 27   BUN 15 18   CR 0.85 0.93   ANIONGAP 8 9   VIRGEN 9.3 9.6   GLC 90 103   ALBUMIN  --  3.7   PROTTOTAL  --  7.5   BILITOTAL  --  1.3*    ALKPHOS  --  70   ALT  --  30   AST  --  25       No results found for this or any previous visit (from the past 24 hour(s)).

## 2022-08-08 NOTE — TELEPHONE ENCOUNTER
Notified patient that needs to be seen to have paperwork completed. Appointment made for Wednesday @10:40. PBI's nurse has paperwork.  Asmita Ballesteros LPN ....................  8/8/2022   1:53 PM

## 2022-08-08 NOTE — PHARMACY-ADMISSION MEDICATION HISTORY
Pharmacy -- Admission Medication Reconciliation    Prior to admission (PTA) medications were reviewed and the patient's PTA medication list was updated.    Sources Consulted: chart review, sure scripts, patient interview    The reliability of this Medication Reconciliation is: Reliability: Reliable    The following significant changes were made:    Removed:    Doxycycline    Hemp oil    Hydrocortisone cream    Silver sulfadiazine cream    Added:    Ibuprofen PRN (note patient states he takes a large amount of ibuprofen once a day)    Acetaminophen PRN    In addition, the patient's allergies were reviewed with the patient and updated as follows:   Allergies: Diatrizoate, Ioxaglate, Levofloxacin, Metrizamide, and Probenecid    The pharmacist has reviewed with the patient that all personal medications should be removed from the building or locked in the belongings safe.  Patient shall only take medications ordered by the physician and administered by the nursing staff.     Medication barriers identified: No insurance- pays out of pocket for his medications. States he is getting signed up for MA. Has tried the [facility] program but said cash pay is cheaper at [facility] using [employer].   Medication adherence concerns: none   Understanding of emergency medications: n/a    Briana Olivo RPH, 8/8/2022,  9:03 AM

## 2022-08-09 LAB
ANION GAP SERPL CALCULATED.3IONS-SCNC: 6 MMOL/L (ref 3–14)
BUN SERPL-MCNC: 15 MG/DL (ref 7–25)
CALCIUM SERPL-MCNC: 8.6 MG/DL (ref 8.6–10.3)
CHLORIDE BLD-SCNC: 104 MMOL/L (ref 98–107)
CHOLEST SERPL-MCNC: 99 MG/DL
CO2 SERPL-SCNC: 26 MMOL/L (ref 21–31)
CREAT SERPL-MCNC: 0.8 MG/DL (ref 0.7–1.3)
ERYTHROCYTE [DISTWIDTH] IN BLOOD BY AUTOMATED COUNT: 15.4 % (ref 10–15)
GFR SERPL CREATININE-BSD FRML MDRD: >90 ML/MIN/1.73M2
GLUCOSE BLD-MCNC: 96 MG/DL (ref 70–105)
GLUCOSE BLDC GLUCOMTR-MCNC: 70 MG/DL (ref 70–99)
HBA1C MFR BLD: 5.8 % (ref 4–6.2)
HCT VFR BLD AUTO: 42.3 % (ref 40–53)
HDLC SERPL-MCNC: 18 MG/DL (ref 23–92)
HGB BLD-MCNC: 13.5 G/DL (ref 13.3–17.7)
INR PPP: 2.89 (ref 0.85–1.15)
LACTATE SERPL-SCNC: 0.7 MMOL/L (ref 0.7–2)
LDLC SERPL CALC-MCNC: 70 MG/DL
MAGNESIUM SERPL-MCNC: 1.8 MG/DL (ref 1.9–2.7)
MCH RBC QN AUTO: 28.2 PG (ref 26.5–33)
MCHC RBC AUTO-ENTMCNC: 31.9 G/DL (ref 31.5–36.5)
MCV RBC AUTO: 89 FL (ref 78–100)
NONHDLC SERPL-MCNC: 81 MG/DL
PLATELET # BLD AUTO: 231 10E3/UL (ref 150–450)
POTASSIUM BLD-SCNC: 3.7 MMOL/L (ref 3.5–5.1)
RBC # BLD AUTO: 4.78 10E6/UL (ref 4.4–5.9)
SODIUM SERPL-SCNC: 136 MMOL/L (ref 134–144)
TRIGL SERPL-MCNC: 55 MG/DL
VANCOMYCIN SERPL-MCNC: 10.6 MG/L
WBC # BLD AUTO: 7.7 10E3/UL (ref 4–11)

## 2022-08-09 PROCEDURE — 36415 COLL VENOUS BLD VENIPUNCTURE: CPT | Performed by: INTERNAL MEDICINE

## 2022-08-09 PROCEDURE — 80202 ASSAY OF VANCOMYCIN: CPT | Performed by: INTERNAL MEDICINE

## 2022-08-09 PROCEDURE — 83036 HEMOGLOBIN GLYCOSYLATED A1C: CPT | Performed by: INTERNAL MEDICINE

## 2022-08-09 PROCEDURE — 80048 BASIC METABOLIC PNL TOTAL CA: CPT | Performed by: INTERNAL MEDICINE

## 2022-08-09 PROCEDURE — 120N000001 HC R&B MED SURG/OB

## 2022-08-09 PROCEDURE — 80061 LIPID PANEL: CPT | Performed by: INTERNAL MEDICINE

## 2022-08-09 PROCEDURE — 258N000003 HC RX IP 258 OP 636: Performed by: INTERNAL MEDICINE

## 2022-08-09 PROCEDURE — 250N000011 HC RX IP 250 OP 636: Performed by: PHYSICIAN ASSISTANT

## 2022-08-09 PROCEDURE — 99233 SBSQ HOSP IP/OBS HIGH 50: CPT | Performed by: INTERNAL MEDICINE

## 2022-08-09 PROCEDURE — 258N000003 HC RX IP 258 OP 636: Performed by: FAMILY MEDICINE

## 2022-08-09 PROCEDURE — 250N000013 HC RX MED GY IP 250 OP 250 PS 637: Performed by: INTERNAL MEDICINE

## 2022-08-09 PROCEDURE — 250N000011 HC RX IP 250 OP 636: Performed by: FAMILY MEDICINE

## 2022-08-09 PROCEDURE — 83605 ASSAY OF LACTIC ACID: CPT | Performed by: INTERNAL MEDICINE

## 2022-08-09 PROCEDURE — 85610 PROTHROMBIN TIME: CPT | Performed by: INTERNAL MEDICINE

## 2022-08-09 PROCEDURE — 83735 ASSAY OF MAGNESIUM: CPT | Performed by: INTERNAL MEDICINE

## 2022-08-09 PROCEDURE — 85027 COMPLETE CBC AUTOMATED: CPT | Performed by: INTERNAL MEDICINE

## 2022-08-09 RX ORDER — IBUPROFEN 400 MG/1
400 TABLET, FILM COATED ORAL EVERY 6 HOURS PRN
Status: DISCONTINUED | OUTPATIENT
Start: 2022-08-09 | End: 2022-08-11 | Stop reason: HOSPADM

## 2022-08-09 RX ORDER — METOPROLOL SUCCINATE 100 MG/1
100 TABLET, EXTENDED RELEASE ORAL DAILY
Status: DISCONTINUED | OUTPATIENT
Start: 2022-08-09 | End: 2022-08-09

## 2022-08-09 RX ORDER — METOPROLOL SUCCINATE 50 MG/1
50 TABLET, EXTENDED RELEASE ORAL DAILY
Status: DISCONTINUED | OUTPATIENT
Start: 2022-08-10 | End: 2022-08-10

## 2022-08-09 RX ADMIN — TAZOBACTAM SODIUM AND PIPERACILLIN SODIUM 4.5 G: 500; 4 INJECTION, SOLUTION INTRAVENOUS at 03:41

## 2022-08-09 RX ADMIN — AMLODIPINE BESYLATE 5 MG: 5 TABLET ORAL at 09:12

## 2022-08-09 RX ADMIN — SODIUM CHLORIDE: 9 INJECTION, SOLUTION INTRAVENOUS at 06:40

## 2022-08-09 RX ADMIN — TAZOBACTAM SODIUM AND PIPERACILLIN SODIUM 4.5 G: 500; 4 INJECTION, SOLUTION INTRAVENOUS at 21:40

## 2022-08-09 RX ADMIN — VANCOMYCIN HYDROCHLORIDE 1250 MG: 500 INJECTION, POWDER, LYOPHILIZED, FOR SOLUTION INTRAVENOUS at 09:47

## 2022-08-09 RX ADMIN — METOPROLOL SUCCINATE 100 MG: 100 TABLET, EXTENDED RELEASE ORAL at 09:12

## 2022-08-09 RX ADMIN — TAZOBACTAM SODIUM AND PIPERACILLIN SODIUM 4.5 G: 500; 4 INJECTION, SOLUTION INTRAVENOUS at 09:09

## 2022-08-09 RX ADMIN — ACETAMINOPHEN 650 MG: 325 TABLET, FILM COATED ORAL at 00:12

## 2022-08-09 RX ADMIN — TAZOBACTAM SODIUM AND PIPERACILLIN SODIUM 4.5 G: 500; 4 INJECTION, SOLUTION INTRAVENOUS at 15:46

## 2022-08-09 RX ADMIN — VANCOMYCIN HYDROCHLORIDE 1250 MG: 500 INJECTION, POWDER, LYOPHILIZED, FOR SOLUTION INTRAVENOUS at 22:49

## 2022-08-09 RX ADMIN — WARFARIN SODIUM 7.5 MG: 5 TABLET ORAL at 18:11

## 2022-08-09 ASSESSMENT — ACTIVITIES OF DAILY LIVING (ADL)
ADLS_ACUITY_SCORE: 25

## 2022-08-09 NOTE — PROGRESS NOTES
"[facility]    Hospitalist Progress Note      Assessment & Plan   Babak Moran is a 62 year old male who was admitted on 8/7/2022.     Clinically Significant Risk Factors Present on Admission                     Principal Problem:    Bacterial sepsis (H)    Assessment: Improved.  Initial criteria met given fever, tachycardia, elevated lactate and source likely secondary to prepatellar abscess of the right leg.  dry gangrene of the right second and third toes ruled out with no bony destruction based on x-ray and has very severe onychomycosis.  H      Plan:   -Vancomycin/Zosyn day 3  -Follow-up initial blood cultures and repeat blood cultures today to ensure clearing  -appreciate general surgery consult I&D on 8/8  -Follow-up wound cultures  -Follow-up in podiatry clinic     Active Problems:    Atrial fibrillation with RVR (H)    Assessment: Significant bradycardia overnight with rates into the 40s, given his lack of insurance question medication adherence with large dose of beta-blocker.  Currently anticoagulated with Coumadin.    Plan:   -Resume home Coumadin  -Decrease metoprolol from 200 to 50 mg daily       Essential hypertension    Assessment: Stable    Plan:   -Continue home Norvasc       Obstructive sleep apnea syndrome    Assessment: Chronic and stable    Plan:   -Continue home CPAP    # Obesity: Estimated body mass index is 38.71 kg/m  as calculated from the following:    Height as of this encounter: 1.905 m (6' 3\").    Weight as of this encounter: 140.5 kg (309 lb 11.2 oz).    Diet: Combination Diet Regular Diet Adult    DVT Prophylaxis: Warfarin  Ramirez Catheter: Not present  Code Status: Full Code           Disposition Plan      Entered: Jean Claude Gonzales MD 08/09/2022, 1:30 PM       The patient's care was discussed with the Bedside Nurse, Patient and Patient's Family.  I also personally filled out patient's state unemployment form.    Jean Claude Gonzales MD  Hospitalist Service  [facility]  Contact information available via [facility] Paging/Directory    ______________________________________________________________________    Interval History   CC: Fever    Overnight no acute events, fever curve improving, pain in his knees significantly better after his I&D yesterday, fatigue but eating normally, no dysuria hematuria urgency frequency abdominal pain shortness of breath or cough, no new complaints.    -Data reviewed today: I reviewed all new labs and imaging results over the last 24 hours.    Physical Exam   Temp: 99.3  F (37.4  C) Temp src: Tympanic BP: (!) 150/92 Pulse: 82   Resp: 20 SpO2: 96 % O2 Device: None (Room air)    Vitals:    08/07/22 1821 08/09/22 0146   Weight: 133.4 kg (294 lb) 140.5 kg (309 lb 11.2 oz)     Vital Signs with Ranges  Temp:  [98.4  F (36.9  C)-101.7  F (38.7  C)] 99.3  F (37.4  C)  Pulse:  [] 82  Resp:  [20-24] 20  BP: (123-187)/() 150/92  SpO2:  [95 %-97 %] 96 %  I/O last 3 completed shifts:  In: 1347 [I.V.:347; IV Piggyback:1000]  Out: 400 [Urine:400]    Exam:  GENERAL: Talkative, sitting up in bed, appears slightly flushed, in no apparent distress.  CARDIOVASCULAR: Irregularly irregular rate and rhythm, no murmurs, rubs, or gallops. Normal S1/S2. No lower extremity edema.   RESPIRATORY: clear to auscultation bilaterally, no wheezes, no crackles.    GI: Obese, soft, non-tender, non-distended, normoactive bowel sounds.  MUSCULOSKELETAL: warm and well perfused, 2+ dorsalis pedis pulses bilaterally.    SKIN:  Erythema previously outlined has regressed and I&D site covered with dry clean dressing, clean dry and intact.  Right second and third toe with clear dry appearing eschar with significant onychomycosis.  No active drainage and no surrounding erythema.  bilateral chronic stasis changes.  NEUROLOGY: AAOx3, follows commands, speech and language without focal deficits.       Medications     - MEDICATION INSTRUCTIONS -         amLODIPine  5 mg Oral Daily      [START ON 8/10/2022] metoprolol succinate ER  50 mg Oral Daily     piperacillin-tazobactam  4.5 g Intravenous Q6H     sodium chloride (PF)  3 mL Intracatheter Q8H     vancomycin  1,250 mg Intravenous Q12H     warfarin ANTICOAGULANT  7.5 mg Oral ONCE at 18:00     Warfarin Therapy Reminder  1 each Oral See Admin Instructions       Data   Recent Labs   Lab 08/09/22  0616 08/09/22  0434 08/08/22  1416 08/07/22  1907   WBC  --  7.7 14.6* 15.0*   HGB  --  13.5 14.6 15.4   MCV  --  89 90 88   PLT  --  231 259 261   INR  --  2.89* 3.39* 3.02*   NA  --  136 139 137   POTASSIUM  --  3.7 4.0 3.7   CHLORIDE  --  104 103 101   CO2  --  26 28 27   BUN  --  15 15 18   CR  --  0.80 0.85 0.93   ANIONGAP  --  6 8 9   VIRGEN  --  8.6 9.3 9.6   GLC 70 96 90 103   ALBUMIN  --   --   --  3.7   PROTTOTAL  --   --   --  7.5   BILITOTAL  --   --   --  1.3*   ALKPHOS  --   --   --  70   ALT  --   --   --  30   AST  --   --   --  25       Recent Results (from the past 24 hour(s))   XR Foot Port Right 2 Views    Narrative    Exam: XR FOOT PORT RIGHT 2 VIEWS     History:Male, age 62 years, bone destruction of 2nd/3rd toes with dry  gangrene?    Comparison:  No relevant prior imaging.    Technique: Two views are submitted    Findings: Bones are normally mineralized. No evidence of acute or  subacute fracture.  No evidence of dislocation.  No apparent bony  destruction.           Impression    Impression:  No evidence of acute or subacute bony abnormality. No apparent bony  destruction.    KATHERIN GODFREY MD         SYSTEM ID:  [id number]

## 2022-08-09 NOTE — PROGRESS NOTES
Patient has been having frequent unifocal PVCs with pauses and low heart rate, patient has been asymptomatic. MD updated and beta blocker decreased. Patient remains on tele. Will continue to monitor.

## 2022-08-09 NOTE — PHARMACY-ANTICOAGULATION SERVICE
Pharmacy Consult- Coumadin Follow-Up    Babak Moran is a 62 year old male admitted on 8/7/2022 with Bacterial sepsis (H)    Primary Indication(s) for Anticoagulation: Atrial fibrillation    Goal INR: 2-3 (per previous anticoagulation note from 7/29, Babak prefers to be closer to 3.0 d/t previous CVA check Q 4 weeks.Declines to reduce dose when slightly over 3.0)    FYI, patient is followed by the Anticoagulation/Protime Clinic at: [facility]    Patient Active Problem List   Diagnosis     Pain in joint, ankle and foot     Atrial fibrillation with RVR (H)     Cerebrovascular accident (CVA) due to embolism of right middle cerebral artery (H)     Osteoarthrosis     Cutaneous lupus erythematosus     Gout     Ascending aorta enlargement (H)     Family history of coronary artery disease     Essential hypertension     Left hemiparesis (H)     Long-term (current) use of anticoagulants, INR goal 2.0-3.0     Systemic lupus erythematosus (H)     Tissue plasminogen activator (t-PA) administered at other facility within 24 hours prior to current admission     Morbid obesity with BMI of 40.0-44.9, adult (H)     Obstructive sleep apnea syndrome     Need for vaccination     Bacterial sepsis (H)     Cellulitis of right knee     Cellulitis of third toe, right       New factors that may increase patient's sensitivity to warfarin (Coumadin) include: current illness, IV antibiotics    New factors that may decrease patient's sensitivity to warfarin (Coumadin) include: HELD dose 8/8    Dietary Intake: Breakfast 8/9 only meal charted- 100%    Recent Labs   Lab Test 08/09/22  0434 08/08/22  1416 08/07/22  1907 07/29/22  1245 07/01/22  0802 06/26/22  1158   HGB 13.5 14.6 15.4  --   --  14.6   INR 2.89* 3.39* 3.02* 2.1*   < > 2.31*    259 261  --   --  249    < > = values in this interval not displayed.        Recent Dosing:    Date INR Dose Given   8/7 3.02 Pt took 7.5 mg   8/8 3.39 Dose HELD   8/9 2.89 See below       Plan: Give normal home  dose of Warfarin 7.5 mg x1. INR now therapeutic, but would expect to drop again tomorrow given that yesterday's dose was held. Repeat INR in AM.    Thank You for the Consult. Will continue to follow.    John Harrington RPH ....................  8/9/2022   12:50 PM

## 2022-08-09 NOTE — PROGRESS NOTES
:     Spoke on the phone with patients , Yisel. She stated that she has been working with this patient. Patient is currently on 100% irving care. Yisel stated that she met with patient and filled out a MA application with patient.     No more needs from   at this time.     BHARGAVI Dahl on 8/9/2022 at 12:23 PM

## 2022-08-09 NOTE — PLAN OF CARE
"Patient is alert and orientated. Denies pain and appears comfortable. Lungs dim in bases, intermittent exp wheezes to upper lobes, educated and encouraged deep breathing, infrequent productive cough, mild HARRIS, on room air. Heart irregularly irregular, on tele, lopressor dose adjusted by MD Gonzales. BLE remain afshin/red with venous stasis, mild ankle/foot edema, elevated as tolerated, chronic neuropathy remains unchanged. Right second and third toes remain unchanged with eschar appearance and overgrown toenails. Right knee with penrose drain, slight erythema and edema, small to moderate drainage, gauze changed x1. SBA with cane. VSS, has been afebrile this shift. Will continue to monitor.    /68 (BP Location: Left arm, Patient Position: Semi-Moralez's, Cuff Size: Adult Large)   Pulse 60   Temp 98.8  F (37.1  C) (Tympanic)   Resp 20   Ht 1.905 m (6' 3\")   Wt 140.5 kg (309 lb 11.2 oz)   SpO2 98%   BMI 38.71 kg/m      Problem: Plan of Care - These are the overarching goals to be used throughout the patient stay.    Goal: Plan of Care Review/Shift Note  Description: The Plan of Care Review/Shift note should be completed every shift.  The Outcome Evaluation is a brief statement about your assessment that the patient is improving, declining, or no change.  This information will be displayed automatically on your shift note.  Outcome: Ongoing, Progressing     Problem: Plan of Care - These are the overarching goals to be used throughout the patient stay.    Goal: Patient-Specific Goal (Individualized)  Description: You can add care plan individualizations to a care plan. Examples of Individualization might be:  \"Parent requests to be called daily at 9am for status\", \"I have a hard time hearing out of my right ear\", or \"Do not touch me to wake me up as it startles me\".  Outcome: Ongoing, Progressing     Problem: Plan of Care - These are the overarching goals to be used throughout the patient stay.    Goal: Absence of " Hospital-Acquired Illness or Injury  Outcome: Ongoing, Progressing     Problem: Plan of Care - These are the overarching goals to be used throughout the patient stay.    Goal: Optimal Comfort and Wellbeing  Outcome: Ongoing, Progressing     Problem: Plan of Care - These are the overarching goals to be used throughout the patient stay.    Goal: Readiness for Transition of Care  Outcome: Ongoing, Progressing     Problem: Pain Acute  Goal: Acceptable Pain Control and Functional Ability  Outcome: Ongoing, Progressing     Problem: Skin or Soft Tissue Infection  Goal: Absence of Infection Signs and Symptoms  Outcome: Ongoing, Progressing   Goal Outcome Evaluation:

## 2022-08-09 NOTE — PHARMACY-VANCOMYCIN DOSING SERVICE
"Pharmacy Vancomycin Note  Date of Service 2022  Patient's  1960   62 year old, male    Indication: Sepsis  Day of Therapy: 3  Current vancomycin regimen:  1,250 mg IV q12h  Current vancomycin monitoring method: AUC  Current vancomycin therapeutic monitoring goal: 400-600 mg*h/L    InsightRX Prediction of Current Vancomycin Regimen  Loading dose: 1,500 mg x1 ( at 2146)  Regimen: 1250 mg IV every 12 hours.  Start time: 09:26 on 2022  Exposure target: AUC24 (range)400-600 mg/L.hr   AUC24,ss: 416 mg/L.hr  Probability of AUC24 > 400: 56 %  Ctrough,ss: 14.2 mg/L  Probability of Ctrough,ss > 20: 14 %  Probability of nephrotoxicity (Lodise SERGIO ): 9 %    Current estimated CrCl = Estimated Creatinine Clearance: 146.6 mL/min (based on SCr of 0.8 mg/dL).    Creatinine for last 3 days  2022:  7:07 PM Creatinine 0.93 mg/dL  2022:  2:16 PM Creatinine 0.85 mg/dL  2022:  4:34 AM Creatinine 0.80 mg/dL    Recent Vancomycin Levels (past 3 days)  2022:  4:34 AM Vancomycin 10.6 mg/L    Vancomycin IV Administrations (past 72 hours)                   vancomycin (VANCOCIN) 1,250 mg in sodium chloride 0.9 % 250 mL intermittent infusion (mg) 1,250 mg New Bag 224     1,250 mg New Bag  0956    vancomycin (VANCOCIN) 1,500 mg in sodium chloride 0.9 % 500 mL intermittent infusion (mg) 1,500 mg New Bag 226                Nephrotoxins and other renal medications (From now, onward)    Start     Dose/Rate Route Frequency Ordered Stop    22 0741  ibuprofen (ADVIL/MOTRIN) tablet 400 mg         400 mg Oral EVERY 6 HOURS PRN 22 0743      22 1000  vancomycin (VANCOCIN) 1,250 mg in sodium chloride 0.9 % 250 mL intermittent infusion         1,250 mg  over 90 Minutes Intravenous EVERY 12 HOURS 22 0716      22 1000  piperacillin-tazobactam (ZOSYN) intermittent infusion 4.5 g        Note to Pharmacy: For [facility], [facility] and [facility]: For Zosyn-naive patients, use the \"Zosyn " "initial dose + extended infusion\" order panel.    4.5 g  200 mL/hr over 30 Minutes Intravenous EVERY 6 HOURS 08/08/22 0717               Contrast Orders - past 72 hours (72h ago, onward)    None          Interpretation of levels and current regimen:  Vancomycin level is reflective of -600    Has serum creatinine changed greater than 50% in last 72 hours: No    Urine output:  unable to determine    Renal Function: Stable    InsightRX Prediction of Planned New Vancomycin Regimen  Loading dose: 1,500 mg x1 (8/7 at 2146)  Regimen: 1250 mg IV every 12 hours.  Start time: 09:26 on 08/09/2022  Exposure target: AUC24 (range)400-600 mg/L.hr   AUC24,ss: 416 mg/L.hr  Probability of AUC24 > 400: 56 %  Ctrough,ss: 14.2 mg/L  Probability of Ctrough,ss > 20: 14 %  Probability of nephrotoxicity (Lodise SERGIO 2009): 9 %    Plan:  1. Continue Current Dose (Vancomycin 1,250 mg Q12H)  2. Vancomycin monitoring method: AUC  3. Vancomycin therapeutic monitoring goal: 400-600 mg*h/L  4. Pharmacy will check vancomycin levels as appropriate in 1-3 Days.  5. Serum creatinine levels will be ordered daily for the first week of therapy and at least twice weekly for subsequent weeks.    John Harrington [facility]  "

## 2022-08-09 NOTE — PROGRESS NOTES
Tele monitor has been alerting that patient's pulse is lower than 55. Patient is wide awake and asymptomatic. Manually counted pulse while also on monitor. Pulse was 77 but monitor was saying 43. Pulse is very irregular with pauses. Will continue to monitor. Vance Islas RN on 8/9/2022 at 5:06 AM

## 2022-08-09 NOTE — PROGRESS NOTES
SAFETY CHECKLIST  ID Bands and Risk clasps correct and in place (DNR, Fall risk, Allergy, Latex, Limb):  Yes  All Lines Reconciled and labeled correctly: Yes  Whiteboard updated:Yes  Environmental interventions (bed/chair alarm on, call light, side rails, restraints, sitter....): Yes  Verify Tele #: [id number]

## 2022-08-09 NOTE — PLAN OF CARE
"Patient is alert and oriented x4. VSS. Has been running a fever. Offered ice packs but he refused. Utilizing PRN APAP to reduce fever. Denies pain, nausea and SOB. Moderate drainage noted on dressing to R knee. Reinforced dressing. Will continue to monitor and update MD as appropriate.     Problem: Plan of Care - These are the overarching goals to be used throughout the patient stay.    Goal: Plan of Care Review/Shift Note  Description: The Plan of Care Review/Shift note should be completed every shift.  The Outcome Evaluation is a brief statement about your assessment that the patient is improving, declining, or no change.  This information will be displayed automatically on your shift note.  Outcome: Ongoing, Not Progressing  Flowsheets (Taken 8/9/2022 0228)  Plan of Care Reviewed With: patient  Overall Patient Progress: no change  Goal: Patient-Specific Goal (Individualized)  Description: You can add care plan individualizations to a care plan. Examples of Individualization might be:  \"Parent requests to be called daily at 9am for status\", \"I have a hard time hearing out of my right ear\", or \"Do not touch me to wake me up as it startles me\".  Outcome: Ongoing, Not Progressing  Flowsheets (Taken 8/9/2022 0228)  Individualized Care Needs: IV abx, wound care, fever management  Patient-Specific Goals (Include Timeframe): `  Goal: Absence of Hospital-Acquired Illness or Injury  Intervention: Identify and Manage Fall Risk  Recent Flowsheet Documentation  Taken 8/9/2022 0011 by Vance Islas, RN  Safety Promotion/Fall Prevention:   activity supervised   safety round/check completed   supervised activity  Taken 8/8/2022 2009 by Vance Islas RN  Safety Promotion/Fall Prevention:   activity supervised   bed alarm on   nonskid shoes/slippers when out of bed   safety round/check completed  Intervention: Prevent and Manage VTE (Venous Thromboembolism) Risk  Recent Flowsheet Documentation  Taken 8/9/2022 0011 " by Vance Islas RN  VTE Prevention/Management:   SCDs (sequential compression devices) off   patient refused intervention  Taken 8/8/2022 2009 by Vance Islas RN  VTE Prevention/Management:   SCDs (sequential compression devices) off   patient refused intervention  Goal: Readiness for Transition of Care  Outcome: Ongoing, Progressing  Flowsheets (Taken 8/9/2022 0228)  Anticipated Changes Related to Illness: none  Transportation Anticipated: family or friend will provide  Concerns to be Addressed: discharge planning  Intervention: Mutually Develop Transition Plan  Recent Flowsheet Documentation  Taken 8/9/2022 0228 by Vance Islas RN  Anticipated Changes Related to Illness: none  Transportation Anticipated: family or friend will provide  Concerns to be Addressed: discharge planning     Problem: Pain Acute  Goal: Acceptable Pain Control and Functional Ability  Intervention: Prevent or Manage Pain  Recent Flowsheet Documentation  Taken 8/9/2022 0011 by Vance Islas, RN  Medication Review/Management: medications reviewed  Taken 8/8/2022 2009 by Vance Islas RN  Medication Review/Management: medications reviewed   Goal Outcome Evaluation:    Plan of Care Reviewed With: patient     Overall Patient Progress: no change

## 2022-08-10 LAB
BACTERIA BLD CULT: ABNORMAL
INR PPP: 2.16 (ref 0.85–1.15)
MAGNESIUM SERPL-MCNC: 1.9 MG/DL (ref 1.9–2.7)
POTASSIUM BLD-SCNC: 3.8 MMOL/L (ref 3.5–5.1)

## 2022-08-10 PROCEDURE — 83735 ASSAY OF MAGNESIUM: CPT | Performed by: INTERNAL MEDICINE

## 2022-08-10 PROCEDURE — 250N000011 HC RX IP 250 OP 636: Performed by: INTERNAL MEDICINE

## 2022-08-10 PROCEDURE — 36415 COLL VENOUS BLD VENIPUNCTURE: CPT | Performed by: INTERNAL MEDICINE

## 2022-08-10 PROCEDURE — 99232 SBSQ HOSP IP/OBS MODERATE 35: CPT | Performed by: INTERNAL MEDICINE

## 2022-08-10 PROCEDURE — 85610 PROTHROMBIN TIME: CPT | Performed by: INTERNAL MEDICINE

## 2022-08-10 PROCEDURE — 120N000001 HC R&B MED SURG/OB

## 2022-08-10 PROCEDURE — 84132 ASSAY OF SERUM POTASSIUM: CPT | Performed by: INTERNAL MEDICINE

## 2022-08-10 PROCEDURE — 250N000011 HC RX IP 250 OP 636: Performed by: PHYSICIAN ASSISTANT

## 2022-08-10 PROCEDURE — 250N000013 HC RX MED GY IP 250 OP 250 PS 637: Performed by: INTERNAL MEDICINE

## 2022-08-10 RX ORDER — WARFARIN SODIUM 4 MG/1
8 TABLET ORAL
Status: COMPLETED | OUTPATIENT
Start: 2022-08-10 | End: 2022-08-10

## 2022-08-10 RX ORDER — CEFAZOLIN SODIUM 2 G/100ML
2 INJECTION, SOLUTION INTRAVENOUS EVERY 8 HOURS
Status: DISCONTINUED | OUTPATIENT
Start: 2022-08-10 | End: 2022-08-11 | Stop reason: HOSPADM

## 2022-08-10 RX ORDER — AMLODIPINE BESYLATE 10 MG/1
10 TABLET ORAL DAILY
Status: DISCONTINUED | OUTPATIENT
Start: 2022-08-11 | End: 2022-08-11 | Stop reason: HOSPADM

## 2022-08-10 RX ADMIN — AMLODIPINE BESYLATE 5 MG: 5 TABLET ORAL at 09:45

## 2022-08-10 RX ADMIN — METOPROLOL SUCCINATE 50 MG: 50 TABLET, EXTENDED RELEASE ORAL at 09:45

## 2022-08-10 RX ADMIN — TAZOBACTAM SODIUM AND PIPERACILLIN SODIUM 4.5 G: 500; 4 INJECTION, SOLUTION INTRAVENOUS at 03:59

## 2022-08-10 RX ADMIN — CEFAZOLIN SODIUM 2 G: 2 INJECTION, SOLUTION INTRAVENOUS at 22:04

## 2022-08-10 RX ADMIN — TAZOBACTAM SODIUM AND PIPERACILLIN SODIUM 4.5 G: 500; 4 INJECTION, SOLUTION INTRAVENOUS at 10:34

## 2022-08-10 RX ADMIN — CEFAZOLIN SODIUM 2 G: 2 INJECTION, SOLUTION INTRAVENOUS at 14:50

## 2022-08-10 RX ADMIN — WARFARIN SODIUM 8 MG: 4 TABLET ORAL at 18:39

## 2022-08-10 ASSESSMENT — ACTIVITIES OF DAILY LIVING (ADL)
ADLS_ACUITY_SCORE: 25

## 2022-08-10 NOTE — PHARMACY-ANTICOAGULATION SERVICE
Pharmacy Consult- Coumadin Follow-Up    Babak Moran is a 62 year old male admitted on 8/7/2022 with Bacterial sepsis (H)    Primary Indication(s) for Anticoagulation: Atrial fibrillation    Goal INR: 2-3 (per previous anticoagulation note from 7/29, Babak prefers to be closer to 3.0 d/t previous CVA check Q 4 weeks.Declines to reduce dose when slightly over 3.0)    FYI, patient is followed by the Anticoagulation/Protime Clinic at: [facility]    Patient Active Problem List   Diagnosis     Pain in joint, ankle and foot     Atrial fibrillation with RVR (H)     Cerebrovascular accident (CVA) due to embolism of right middle cerebral artery (H)     Osteoarthrosis     Cutaneous lupus erythematosus     Gout     Ascending aorta enlargement (H)     Family history of coronary artery disease     Essential hypertension     Left hemiparesis (H)     Long-term (current) use of anticoagulants, INR goal 2.0-3.0     Systemic lupus erythematosus (H)     Tissue plasminogen activator (t-PA) administered at other facility within 24 hours prior to current admission     Morbid obesity with BMI of 40.0-44.9, adult (H)     Obstructive sleep apnea syndrome     Need for vaccination     Bacterial sepsis (H)     Cellulitis of right knee     Cellulitis of third toe, right       New factors that may increase patient's sensitivity to warfarin (Coumadin) include: Sepsis, IV antibiotics     New factors that may decrease patient's sensitivity to warfarin (Coumadin) include: HELD dose on 8/8    Dietary Intake: 100%    Recent Labs   Lab Test 08/10/22  0647 08/09/22  0434 08/08/22  1416 08/07/22  1907 07/01/22  0802 06/26/22  1158   HGB  --  13.5 14.6 15.4  --  14.6   INR 2.16* 2.89* 3.39* 3.02*   < > 2.31*   PLT  --  231 259 261  --  249    < > = values in this interval not displayed.        Recent Dosing:    Date INR Dose Given   8/7 3.02 Pt took 7.5 mg   8/8 3.39 Dose HELD   8/9 2.89 7.5 mg   8/10 2.16 See below       Plan: Give Warfarin 8 mg x1 dose. Repeat  INR in AM.    Thank You for the Consult. Will continue to follow.    John Harrington [facility] ....................  8/10/2022   11:03 AM

## 2022-08-10 NOTE — PROGRESS NOTES
During initial assessment, writer noted warm area on inner part of R knee surrounding dressing. Outlined with marker to continue to monitor. No other warm areas noted. Slight swelling noted to area that is warm. Will continue to monitor and update MD as appropriate. Vance Islas RN on 8/9/2022 at 8:31 PM

## 2022-08-10 NOTE — PLAN OF CARE
"Patient is alert and oriented x4. VSS. Pulse will occasionally run on low side but improved since yesterday. Denies pain, nausea and SOB. No drainage noted to dressing to R knee. Upon initial assessment, warmth and swelling noted to inner R knee, next to dressing. Outlined area to monitor. Will continue to monitor and update MD as appropriate.     Problem: Plan of Care - These are the overarching goals to be used throughout the patient stay.    Goal: Plan of Care Review/Shift Note  Description: The Plan of Care Review/Shift note should be completed every shift.  The Outcome Evaluation is a brief statement about your assessment that the patient is improving, declining, or no change.  This information will be displayed automatically on your shift note.  Outcome: Ongoing, Progressing  Flowsheets (Taken 8/9/2022 2354)  Plan of Care Reviewed With: patient  Overall Patient Progress: improving  Goal: Patient-Specific Goal (Individualized)  Description: You can add care plan individualizations to a care plan. Examples of Individualization might be:  \"Parent requests to be called daily at 9am for status\", \"I have a hard time hearing out of my right ear\", or \"Do not touch me to wake me up as it startles me\".  Outcome: Ongoing, Progressing  Flowsheets (Taken 8/9/2022 2352)  Individualized Care Needs: IV abx, wound care  Goal: Absence of Hospital-Acquired Illness or Injury  Intervention: Identify and Manage Fall Risk  Recent Flowsheet Documentation  Taken 8/9/2022 2011 by Vance Islas RN  Safety Promotion/Fall Prevention:   safety round/check completed   activity supervised   nonskid shoes/slippers when out of bed  Intervention: Prevent and Manage VTE (Venous Thromboembolism) Risk  Recent Flowsheet Documentation  Taken 8/9/2022 2011 by Vance Islas RN  VTE Prevention/Management:   SCDs (sequential compression devices) off   patient refused intervention  Goal: Readiness for Transition of Care  Outcome: Ongoing, " Progressing  Flowsheets (Taken 8/9/2022 2354)  Anticipated Changes Related to Illness: none  Transportation Anticipated: family or friend will provide  Concerns to be Addressed: discharge planning  Intervention: Mutually Develop Transition Plan  Recent Flowsheet Documentation  Taken 8/9/2022 2354 by Vance Islas, RN  Anticipated Changes Related to Illness: none  Transportation Anticipated: family or friend will provide  Concerns to be Addressed: discharge planning     Problem: Pain Acute  Goal: Acceptable Pain Control and Functional Ability  Intervention: Prevent or Manage Pain  Recent Flowsheet Documentation  Taken 8/9/2022 2011 by Vance Islas, RN  Medication Review/Management: medications reviewed   Goal Outcome Evaluation:    Plan of Care Reviewed With: patient     Overall Patient Progress: improving

## 2022-08-10 NOTE — PROGRESS NOTES
"[facility]    Hospitalist Progress Note      Assessment & Plan   Babak Moran is a 62 year old male who was admitted on 8/7/2022.     Clinically Significant Risk Factors Present on Admission                     Principal Problem:    Bacterial sepsis (H)    Assessment: Improved.  Initial criteria met given fever, tachycardia, elevated lactate and source likely secondary to prepatellar abscess of the right leg.  dry gangrene of the right second and third toes ruled out with no bony destruction based on x-ray and has very severe onychomycosis.      Plan:   -Discontinue vancomycin/Zosyn after day 3   -Ancef day 4   -Follow-up initial blood cultures and repeat blood cultures today to ensure clearing  -appreciate general surgery consult I&D on 8/8  -Follow-up wound cultures  -Follow-up in podiatry clinic  -Follow-up general surgery clinic     Active Problems:    Atrial fibrillation with RVR (H)    Assessment: Significant bradycardia overnight with rates into the 40s again today, given his lack of insurance question medication adherence with large dose of beta-blocker.  Currently anticoagulated with Coumadin.    Plan:   -Resume home Coumadin  -Hold metoprolol at this point        Essential hypertension    Assessment: Suboptimal control    Plan:   -Increase Norvasc to 10 daily given holding metoprolol       Obstructive sleep apnea syndrome    Assessment: Chronic and stable    Plan:   -Continue home CPAP    # Obesity: Estimated body mass index is 38.71 kg/m  as calculated from the following:    Height as of this encounter: 1.905 m (6' 3\").    Weight as of this encounter: 140.5 kg (309 lb 11.2 oz).    Diet: Combination Diet Regular Diet Adult    DVT Prophylaxis: Warfarin  Ramirez Catheter: Not present  Code Status: Full Code           Disposition Plan      Entered: Jean Claude Gonzales MD 08/10/2022, 12:35 PM       The patient's care was discussed with the Bedside Nurse, Patient and Patient's Family.  I also " personally filled out patient's state unemployment form.    Jean Claude Gonzales MD  Hospitalist Service  [facility]  Contact information available via [facility] Paging/Directory    ______________________________________________________________________    Interval History   CC: Fever    Afebrile now for 24 hours, pain is improved and his leg redness is swelling is improved, has been up and walking, energy and appetite is improving, no other new complaints.  No new or worsening pain around his I&D site.    -Data reviewed today: I reviewed all new labs and imaging results over the last 24 hours.    Physical Exam   Temp: 97.3  F (36.3  C) Temp src: Tympanic BP: (!) 157/81 Pulse: 74   Resp: 18 SpO2: 96 % O2 Device: None (Room air)    Vitals:    08/07/22 1821 08/09/22 0146 08/10/22 0238   Weight: 133.4 kg (294 lb) 140.5 kg (309 lb 11.2 oz) 140.9 kg (310 lb 11.2 oz)     Vital Signs with Ranges  Temp:  [97.3  F (36.3  C)-99.3  F (37.4  C)] 97.3  F (36.3  C)  Pulse:  [] 74  Resp:  [18-20] 18  BP: (136-161)/(68-95) 157/81  SpO2:  [94 %-98 %] 96 %  I/O last 3 completed shifts:  In: 1000 [P.O.:1000]  Out: 1650 [Urine:1650]    Exam:  GENERAL: Talkative, sitting up in bed, appears slightly flushed, in no apparent distress.  CARDIOVASCULAR: Irregularly irregular rate and rhythm, no murmurs, rubs, or gallops. Normal S1/S2. No lower extremity edema.   RESPIRATORY: clear to auscultation bilaterally, no wheezes, no crackles.    GI: Obese, soft, non-tender, non-distended, normoactive bowel sounds.  MUSCULOSKELETAL: warm and well perfused, 2+ dorsalis pedis pulses bilaterally.    SKIN:  Erythema previously outlined has regressed, I&D site with Penrose drain in place, no further fluctuance and not able to express any fluid from peripheral erythema out of his existing wound, chronic bilateral and symmetric stasis changes.  NEUROLOGY: AAOx3, follows commands, speech and language without focal deficits.       Medications      - MEDICATION INSTRUCTIONS -         [START ON 8/11/2022] amLODIPine  10 mg Oral Daily     piperacillin-tazobactam  4.5 g Intravenous Q6H     sodium chloride (PF)  3 mL Intracatheter Q8H     warfarin ANTICOAGULANT  8 mg Oral ONCE at 18:00     Warfarin Therapy Reminder  1 each Oral See Admin Instructions       Data   Recent Labs   Lab 08/10/22  0709 08/10/22  0647 08/09/22  0616 08/09/22  0434 08/08/22  1416 08/07/22  1907   WBC  --   --   --  7.7 14.6* 15.0*   HGB  --   --   --  13.5 14.6 15.4   MCV  --   --   --  89 90 88   PLT  --   --   --  231 259 261   INR  --  2.16*  --  2.89* 3.39* 3.02*   NA  --   --   --  136 139 137   POTASSIUM 3.8  --   --  3.7 4.0 3.7   CHLORIDE  --   --   --  104 103 101   CO2  --   --   --  26 28 27   BUN  --   --   --  15 15 18   CR  --   --   --  0.80 0.85 0.93   ANIONGAP  --   --   --  6 8 9   VIRGEN  --   --   --  8.6 9.3 9.6   GLC  --   --  70 96 90 103   ALBUMIN  --   --   --   --   --  3.7   PROTTOTAL  --   --   --   --   --  7.5   BILITOTAL  --   --   --   --   --  1.3*   ALKPHOS  --   --   --   --   --  70   ALT  --   --   --   --   --  30   AST  --   --   --   --   --  25       No results found for this or any previous visit (from the past 24 hour(s)).

## 2022-08-10 NOTE — PROVIDER NOTIFICATION
08/08/22 1400   Initial Information   Patient Belongings remains with patient   Patient Belongings Remaining with Patient cane;cell phone/electronics;clothing;dental appliance/dentures;glasses;shoes;other (see comments)  (phone )   Did you bring any home meds/supplements to the hospital?  No     [facility] will make every effort per our policy to help keep your items safe while in the hospital.  If you choose to keep any items at the bedside, we cannot be held responsible for any items that are lost or broken.      List items sent to safe: n/a    I have reviewed my belongings list on admission and verify that it is correct.     Patient signature_______________________________  Date/Time_____________________    2nd Staff person if patient unable to sign __________________________  Date/Time ______________________      I have received all my belongings noted above at discharge.    Patient signature________________________________  Date/Time  __________________________

## 2022-08-11 ENCOUNTER — TELEPHONE (OUTPATIENT)
Dept: FAMILY MEDICINE | Facility: OTHER | Age: 62
End: 2022-08-11

## 2022-08-11 VITALS
TEMPERATURE: 98.6 F | OXYGEN SATURATION: 95 % | WEIGHT: 305 LBS | BODY MASS INDEX: 37.92 KG/M2 | HEART RATE: 63 BPM | DIASTOLIC BLOOD PRESSURE: 82 MMHG | SYSTOLIC BLOOD PRESSURE: 149 MMHG | RESPIRATION RATE: 20 BRPM | HEIGHT: 75 IN

## 2022-08-11 LAB
ANION GAP SERPL CALCULATED.3IONS-SCNC: 8 MMOL/L (ref 3–14)
BACTERIA ABSC ANAEROBE+AEROBE CULT: NO GROWTH
BUN SERPL-MCNC: 12 MG/DL (ref 7–25)
CALCIUM SERPL-MCNC: 8.9 MG/DL (ref 8.6–10.3)
CHLORIDE BLD-SCNC: 102 MMOL/L (ref 98–107)
CO2 SERPL-SCNC: 29 MMOL/L (ref 21–31)
CREAT SERPL-MCNC: 0.77 MG/DL (ref 0.7–1.3)
ERYTHROCYTE [DISTWIDTH] IN BLOOD BY AUTOMATED COUNT: 15.1 % (ref 10–15)
GFR SERPL CREATININE-BSD FRML MDRD: >90 ML/MIN/1.73M2
GLUCOSE BLD-MCNC: 87 MG/DL (ref 70–105)
GRAM STAIN RESULT: NORMAL
GRAM STAIN RESULT: NORMAL
HCT VFR BLD AUTO: 42.7 % (ref 40–53)
HGB BLD-MCNC: 13.8 G/DL (ref 13.3–17.7)
INR PPP: 2.24 (ref 0.85–1.15)
MCH RBC QN AUTO: 28.7 PG (ref 26.5–33)
MCHC RBC AUTO-ENTMCNC: 32.3 G/DL (ref 31.5–36.5)
MCV RBC AUTO: 89 FL (ref 78–100)
PLATELET # BLD AUTO: 267 10E3/UL (ref 150–450)
POTASSIUM BLD-SCNC: 3.7 MMOL/L (ref 3.5–5.1)
RBC # BLD AUTO: 4.81 10E6/UL (ref 4.4–5.9)
SODIUM SERPL-SCNC: 139 MMOL/L (ref 134–144)
WBC # BLD AUTO: 6.7 10E3/UL (ref 4–11)

## 2022-08-11 PROCEDURE — 36415 COLL VENOUS BLD VENIPUNCTURE: CPT | Performed by: INTERNAL MEDICINE

## 2022-08-11 PROCEDURE — 250N000013 HC RX MED GY IP 250 OP 250 PS 637: Performed by: INTERNAL MEDICINE

## 2022-08-11 PROCEDURE — 85027 COMPLETE CBC AUTOMATED: CPT | Performed by: INTERNAL MEDICINE

## 2022-08-11 PROCEDURE — 99239 HOSP IP/OBS DSCHRG MGMT >30: CPT | Performed by: INTERNAL MEDICINE

## 2022-08-11 PROCEDURE — 85610 PROTHROMBIN TIME: CPT | Performed by: INTERNAL MEDICINE

## 2022-08-11 PROCEDURE — 250N000011 HC RX IP 250 OP 636: Performed by: INTERNAL MEDICINE

## 2022-08-11 PROCEDURE — 82310 ASSAY OF CALCIUM: CPT | Mod: ZL | Performed by: INTERNAL MEDICINE

## 2022-08-11 RX ORDER — METOPROLOL SUCCINATE 50 MG/1
50 TABLET, EXTENDED RELEASE ORAL DAILY
Qty: 90 TABLET | Refills: 0 | Status: SHIPPED | OUTPATIENT
Start: 2022-08-11 | End: 2022-08-22

## 2022-08-11 RX ORDER — WARFARIN SODIUM 5 MG/1
7.5 TABLET ORAL DAILY
COMMUNITY
Start: 2022-08-11 | End: 2022-08-11

## 2022-08-11 RX ORDER — WARFARIN SODIUM 5 MG/1
7.5 TABLET ORAL DAILY
COMMUNITY
Start: 2022-08-11 | End: 2022-08-17

## 2022-08-11 RX ORDER — METOPROLOL SUCCINATE 50 MG/1
50 TABLET, EXTENDED RELEASE ORAL DAILY
Status: DISCONTINUED | OUTPATIENT
Start: 2022-08-11 | End: 2022-08-11 | Stop reason: HOSPADM

## 2022-08-11 RX ORDER — METOPROLOL SUCCINATE 50 MG/1
200 TABLET, EXTENDED RELEASE ORAL DAILY
Qty: 90 TABLET | Refills: 0 | Status: SHIPPED | OUTPATIENT
Start: 2022-08-11 | End: 2022-08-11

## 2022-08-11 RX ORDER — AMLODIPINE BESYLATE 5 MG/1
10 TABLET ORAL DAILY
Qty: 90 TABLET | Refills: 2 | Status: SHIPPED | OUTPATIENT
Start: 2022-08-11 | End: 2022-09-14

## 2022-08-11 RX ORDER — CEPHALEXIN 500 MG/1
500 CAPSULE ORAL 3 TIMES DAILY
Qty: 18 CAPSULE | Refills: 0 | Status: SHIPPED | OUTPATIENT
Start: 2022-08-11 | End: 2022-08-22

## 2022-08-11 RX ORDER — CEPHALEXIN 500 MG/1
500 CAPSULE ORAL 2 TIMES DAILY
Qty: 12 CAPSULE | Refills: 0 | Status: SHIPPED | OUTPATIENT
Start: 2022-08-11 | End: 2022-08-11

## 2022-08-11 RX ADMIN — AMLODIPINE BESYLATE 10 MG: 10 TABLET ORAL at 09:12

## 2022-08-11 RX ADMIN — CEFAZOLIN SODIUM 2 G: 2 INJECTION, SOLUTION INTRAVENOUS at 05:59

## 2022-08-11 RX ADMIN — METOPROLOL SUCCINATE 50 MG: 50 TABLET, EXTENDED RELEASE ORAL at 09:13

## 2022-08-11 ASSESSMENT — ACTIVITIES OF DAILY LIVING (ADL)
ADLS_ACUITY_SCORE: 25
ADLS_ACUITY_SCORE: 23

## 2022-08-11 NOTE — PHARMACY-ANTICOAGULATION SERVICE
Pharmacy Consult- Coumadin Follow-Up    Babak Moran is a 62 year old male admitted on 8/7/2022 with Bacterial sepsis (H)    Primary Indication(s) for Anticoagulation: atrial fibrillation    Goal INR: 2.5 (2-3)    FYI, patient is followed by the Anticoagulation/Protime Clinic at: [facility] - no follow up currently scheduled - last appt. 7/29/22    Patient Active Problem List   Diagnosis     Pain in joint, ankle and foot     Atrial fibrillation with RVR (H)     Cerebrovascular accident (CVA) due to embolism of right middle cerebral artery (H)     Osteoarthrosis     Cutaneous lupus erythematosus     Gout     Ascending aorta enlargement (H)     Family history of coronary artery disease     Essential hypertension     Left hemiparesis (H)     Long-term (current) use of anticoagulants, INR goal 2.0-3.0     Systemic lupus erythematosus (H)     Tissue plasminogen activator (t-PA) administered at other facility within 24 hours prior to current admission     Morbid obesity with BMI of 40.0-44.9, adult (H)     Obstructive sleep apnea syndrome     Need for vaccination     Bacterial sepsis (H)     Cellulitis of right knee     Cellulitis of third toe, right     New factors that may increase patient's sensitivity to warfarin (Coumadin) include: antibiotics Day 4, sepsis    New factors that may decrease patient's sensitivity to warfarin (Coumadin) include: warfarin held on 8/8/22    Dietary Intake: consuming 100% of regular diet    Recent Labs   Lab Test 08/11/22  0627 08/10/22  0647 08/09/22  0434 08/08/22  1416 08/07/22  1907   HGB 13.8  --  13.5 14.6 15.4   INR 2.24* 2.16* 2.89* 3.39* 3.02*     --  231 259 261      Anticoagulation Dose History     Recent Dosing and Labs Latest Ref Rng & Units 7/1/2022 7/29/2022 8/7/2022 8/8/2022 8/9/2022 8/10/2022 8/11/2022    ADITHYA IMS TEMPLATE - - - - - 7.5 mg - -    Warfarin 4 mg - - - - - - 8 mg -    INR 0.85 - 1.15 2.3(A) 2.1(A) 3.02(H) 3.39(H) 2.89(H) 2.16(H) 2.24(H)        Plan: Give home  dose of warfarin 7.5 mg today.  Check INR in am.    Thank You for the Consult. Will continue to follow.    Oneyda Hare [facility] ....................  8/11/2022   8:32 AM

## 2022-08-11 NOTE — PROGRESS NOTES
"Pt has requested that from [phone] he is not to be disturbed so he can get a \"good nights rest\". Pt is stable and VSS.   "

## 2022-08-11 NOTE — PLAN OF CARE
"Patient is alert and orientated. Denies pain and appears comfortable. Lungs clear but dim throughout, infrequent productive cough, on room air. Heart irregularly irregular, bradycardic at times, on tele, denies chest pain. Teto/red BLE, chronic neuropathy, mild edema, legs elevated as tolerated. Right knee dressing changed x1, penrose drain in place with moderate drainage, see charting. Independent in room, educated to call if he feels weak, dizzy, or lightheaded. Hypertensive but vitally stable. Will continue to monitor.     BP (!) 162/72 (BP Location: Right arm, Patient Position: Semi-Moralez's, Cuff Size: Adult Large)   Pulse 68   Temp 99.2  F (37.3  C) (Tympanic)   Resp 20   Ht 1.905 m (6' 3\")   Wt 140.9 kg (310 lb 11.2 oz)   SpO2 98%   BMI 38.83 kg/m        Problem: Plan of Care - These are the overarching goals to be used throughout the patient stay.    Goal: Plan of Care Review/Shift Note  Description: The Plan of Care Review/Shift note should be completed every shift.  The Outcome Evaluation is a brief statement about your assessment that the patient is improving, declining, or no change.  This information will be displayed automatically on your shift note.  Outcome: Ongoing, Progressing     Problem: Plan of Care - These are the overarching goals to be used throughout the patient stay.    Goal: Patient-Specific Goal (Individualized)  Description: You can add care plan individualizations to a care plan. Examples of Individualization might be:  \"Parent requests to be called daily at 9am for status\", \"I have a hard time hearing out of my right ear\", or \"Do not touch me to wake me up as it startles me\".  Outcome: Ongoing, Progressing     Problem: Plan of Care - These are the overarching goals to be used throughout the patient stay.    Goal: Absence of Hospital-Acquired Illness or Injury  Outcome: Ongoing, Progressing     Problem: Plan of Care - These are the overarching goals to be used throughout the " patient stay.    Goal: Optimal Comfort and Wellbeing  Outcome: Ongoing, Progressing     Problem: Plan of Care - These are the overarching goals to be used throughout the patient stay.    Goal: Readiness for Transition of Care  Outcome: Ongoing, Progressing     Problem: Pain Acute  Goal: Acceptable Pain Control and Functional Ability  Outcome: Ongoing, Progressing   Goal Outcome Evaluation:

## 2022-08-11 NOTE — PHARMACY - DISCHARGE MEDICATION RECONCILIATION AND EDUCATION
Pharmacy:  Discharge Counseling and Medication Reconciliation    Babak ADAIR Dean     [place] [address]  [phone] (home)   62 year old male  PCP: Teo Funes    Allergies: Diatrizoate, Ioxaglate, Levofloxacin, Metrizamide, and Probenecid    Discharge Counseling:    Pharmacist met with patient (and/or family) today to review the medication portion of the After Visit Summary (with an emphasis on NEW medications) and to address patient's questions/concerns.    Summary of Education: Met with patient to discuss new medication: cephalexin.  We also discussed increased dose of amlodipine and decreased dose of metoprolol and discussed how antibiotic may interact with his warfarin. Patient plans to make an INR appointment for early next week for monitoring    Materials Provided:  MedCounselor sheets printed from Clinical Pharmacology on: cephalexin    Discharge Medication Reconciliation:    It has been determined that the patient has an adequate supply of medications available or which can be obtained from the patient's preferred pharmacy, which HE/SHE has confirmed as: [facility]    Thank you for the consult.    Oneyda Hare RPH........August 11, 2022 9:11 AM

## 2022-08-11 NOTE — PROGRESS NOTES
[facility] DISCHARGE NOTE    Patient discharged to home at 9:30 AM via wheel chair. Accompanied by spouse and staff. Discharge instructions reviewed with patient and spouse, opportunity offered to ask questions. Prescriptions sent to patients preferred pharmacy. All belongings sent with patient.    Magui Parry RN

## 2022-08-11 NOTE — DISCHARGE SUMMARY
[facility]    Discharge Summary  Hospitalist    Date of Admission:  8/7/2022  Date of Discharge:  8/11/2022  9:27 AM  Discharging Provider: Jean Claude Gonzales MD  Date of Service (when I saw the patient): 08/11/22    Discharge Diagnoses   Principal Problem:    Bacterial sepsis (H) (8/8/2022)  Active Problems:    Atrial fibrillation with RVR (H) (6/25/2015)    Essential hypertension (9/8/2009)    Long-term (current) use of anticoagulants, INR goal 2.0-3.0 (6/25/2015)    Obstructive sleep apnea syndrome (5/13/2019)    Cellulitis of right knee (8/8/2022)    Cellulitis of third toe, right (8/8/2022)      History of Present Illness   Babak Moran is an 62 year old male history of atrial fibrillation and hypertension who presents with sepsis likely secondary to prepatellar abscess and surrounding cellulitis.  Patient suffered from black appearing second and third toes for at least 6 months.  Has been stable, he did have some pus draining from them a number of months ago and this was managed conservatively at the [facility] clinic as he does not have insurance and cannot afford any follow-up care.  He had also fallen on his right knee while at work and had some pain in the knee with some swelling that completely resolved.  Over the last number of days that swelling has gotten worse and has really started to hurt and throb.  He was seen on 6/26 in the emergency department after also suffering from a fall while at work, underwent CT of his head without pathology.     On 8/4 he was then seen in rapid clinic for a right cellulitis just below his knee, was prescribed doxycycline for 10 days, he has been taking it but redness and tense blister under his kneecap is gotten worse and he presented to the emergency department yesterday.     In ER he was noted to be febrile started on broad-spectrum antibiotics and was boarded given no available hospital beds until admission today.    Hospital Course   Babak Moran was  "admitted on 8/7/2022.  The following problems were addressed during his hospitalization:     Bacterial sepsis (H): Initial criteria met given fever, tachycardia, elevated lactate and source likely secondary to prepatellar abscess of the right leg.  dry gangrene of the right second and third toes ruled out with no bony destruction based on x-ray and has very severe onychomycosis.  He was initially started on broad-spectrum antibiotics, initial blood cultures with group B strep and he was transition to IV Ancef with ongoing clinical improvement.  He was initially seen and evaluated by general surgery as well and underwent an I&D of the prepatellar abscess with a Penrose drain placed on 8/8.  Fevers defervesced, he otherwise clinically improved and repeat blood cultures remain negative.  He will complete a 10-day course of Keflex, follow-up in general surgery clinic next week for consideration of removal of drain, and follow-up in podiatry clinic for management of his severe onychomycosis.     Chronic atrial fibrillation (H): He was initially restarted on his home metoprolol XL of 200 mg daily.  He suffered significant bradycardias especially while sleeping with rates in the low 40s.  Given his lack of insurance question of medication adherence given such a large dose of beta-blocker.  His dose was decreased to 50 mg daily with improvement in his rates to the 70s.  Going forward he will continue with metoprolol XL 50 mg daily, patient was counseled at length regarding needing a repeat INR in 1 to 3 days given his antibiotic start as above.       Essential hypertension: Suboptimal control and persistently hypertensive.  Given his beta-blocker dose reduction he will increase his Norvasc from 5 to 10 mg daily.    # Obesity: Estimated body mass index is 38.71 kg/m  as calculated from the following:    Height as of this encounter: 1.905 m (6' 3\").    Weight as of this encounter: 140.5 kg (309 lb 11.2 oz).    Jean Claude Gonzales, " MD    Significant Results and Procedures   I&D with general surgery    Pending Results   These results will be followed up by pcp  Unresulted Labs Ordered in the Past 30 Days of this Admission     Date and Time Order Name Status Description    8/8/2022  1:19 PM Blood Culture Arm, Left Preliminary     8/8/2022  1:19 PM Blood Culture Arm, Left Preliminary           Code Status   Full Code       Primary Care Physician   Teo Funes    Physical Exam   Temp: 98.6  F (37  C) Temp src: Tympanic BP: (!) 149/82 Pulse: 63   Resp: 20 SpO2: 95 % O2 Device: None (Room air)    Vitals:    08/09/22 0146 08/10/22 0238 08/11/22 0648   Weight: 140.5 kg (309 lb 11.2 oz) 140.9 kg (310 lb 11.2 oz) 138.3 kg (305 lb)     Vital Signs with Ranges  Temp:  [97.4  F (36.3  C)-99.2  F (37.3  C)] 98.6  F (37  C)  Pulse:  [] 63  Resp:  [20] 20  BP: (138-162)/(72-82) 149/82  Cuff Mean (mmHg):  [106] 106  SpO2:  [95 %-98 %] 95 %  I/O last 3 completed shifts:  In: 400 [P.O.:400]  Out: 450 [Urine:450]    Exam on day of discharge:  GENERAL: Talkative, sitting up in in the recliner, in no apparent distress.  CARDIOVASCULAR: Irregularly irregular rate and rhythm, no murmurs, rubs, or gallops. Normal S1/S2. No lower extremity edema.   RESPIRATORY: clear to auscultation bilaterally, no wheezes, no crackles.    GI: Obese, soft, non-tender, non-distended, normoactive bowel sounds.  MUSCULOSKELETAL: warm and well perfused, 2+ dorsalis pedis pulses bilaterally.    SKIN:  Erythema previously outlined has regressed, I&D site with Penrose drain in place, no further fluctuance, chronic bilateral and symmetric stasis changes.  NEUROLOGY: AAOx3, follows commands, speech and language without focal deficits.        Discharge Disposition   Discharged to home  Condition at discharge: Stable    Consultations This Hospital Stay   PHARMACY TO DOSE VANCO  PHARMACY TO DOSE WARFARIN  SOCIAL WORK IP CONSULT  SURGERY GENERAL IP CONSULT    Time Spent on this  Encounter   I, Jean Claude Gonzales MD, personally saw the patient today and spent greater than 30 minutes discharging this patient.    Discharge Orders      Reason for your hospital stay    Sepsis secondary to an an infected abscess below your knee     Follow-up and recommended labs and tests     Dr Cullen on 8/22 @1020 for routine post hospital follow-up  Podiatry appt with Dr Lemons on 8/25 @ 1000  General surgery next week as scheduled for drain removal     Activity    Your activity upon discharge: activity as tolerated and no kneeling on your knee.  No soaking, keep the knee covered when showering.     When to contact your care team    Call your primary doctor if you have any of the following: temperature greater than 101,  increased shortness of breath, increased drainage, increased swelling, or increased pain.     Discharge Instructions    - Take the antibiotic Keflex until it is gone to clear for infection  -Decrease your metoprolol to 50 mg daily  -Increase your Norvasc to 10 mg daily  -There are no other changes to her medications at this time     Diet    Follow this diet upon discharge: Orders Placed This Encounter      Combination Diet Regular Diet Adult     Discharge Medications   Discharge Medication List as of 8/11/2022  9:15 AM      CONTINUE these medications which have CHANGED    Details   amLODIPine (NORVASC) 5 MG tablet Take 2 tablets (10 mg) by mouth daily, Disp-90 tablet, R-2, E-Prescribe      cephALEXin (KEFLEX) 500 MG capsule Take 1 capsule (500 mg) by mouth 3 times daily, Disp-18 capsule, R-0, E-PrescribeUPDATED PRESCRIPTION - please disregard previous order      metoprolol succinate ER (TOPROL XL) 50 MG 24 hr tablet Take 1 tablet (50 mg) by mouth daily, Disp-90 tablet, R-0, E-Prescribe      warfarin ANTICOAGULANT (COUMADIN) 5 MG tablet Take 1.5 tablets (7.5 mg) by mouth daily OR AS DIRECTED BY [facility], Historical         CONTINUE these medications which have NOT CHANGED    Details    acetaminophen (TYLENOL) 500 MG tablet Take 500-1,000 mg by mouth every 6 hours as needed for mild pain, Historical      ibuprofen (ADVIL/MOTRIN) 200 MG tablet Take 800 mg by mouth daily as needed for mild pain, Historical           Allergies   Allergies   Allergen Reactions     Diatrizoate Rash     Ioxaglate Unknown     Levofloxacin Hives and Rash     Metrizamide Rash     (Diagnostic X-Ray materials)     Probenecid Rash     Data   Most Recent 3 CBC's:  Recent Labs   Lab Test 08/11/22 0627 08/09/22 0434 08/08/22  1416   WBC 6.7 7.7 14.6*   HGB 13.8 13.5 14.6   MCV 89 89 90    231 259      Most Recent 3 BMP's:  Recent Labs   Lab Test 08/11/22  0627 08/10/22  0709 08/09/22  0616 08/09/22 0434 08/08/22  1416     --   --  136 139   POTASSIUM 3.7 3.8  --  3.7 4.0   CHLORIDE 102  --   --  104 103   CO2 29  --   --  26 28   BUN 12  --   --  15 15   CR 0.77  --   --  0.80 0.85   ANIONGAP 8  --   --  6 8   VIRGEN 8.9  --   --  8.6 9.3   GLC 87  --  70 96 90     Most Recent 2 LFT's:  Recent Labs   Lab Test 08/07/22  1907 09/29/20  1054   AST 25 19   ALT 30 16   ALKPHOS 70 60   BILITOTAL 1.3* 0.8     Most Recent INR's and Anticoagulation Dosing History:  Anticoagulation Dose History     Recent Dosing and Labs Latest Ref Rng & Units 7/1/2022 7/29/2022 8/7/2022 8/8/2022 8/9/2022 8/10/2022 8/11/2022    ZZ IMS TEMPLATE - - - - - 7.5 mg - -    Warfarin 4 mg - - - - - - 8 mg -    INR 0.85 - 1.15 2.3(A) 2.1(A) 3.02(H) 3.39(H) 2.89(H) 2.16(H) 2.24(H)        Most Recent 3 Troponin's:No lab results found.  Most Recent Cholesterol Panel:  Recent Labs   Lab Test 08/09/22  0434   CHOL 99   LDL 70   HDL 18*   TRIG 55     Most Recent 6 Bacteria Isolates From Any Culture (See EPIC Reports for Culture Details):  Recent Labs   Lab Test 12/02/19  1557 10/08/19  1034 09/06/19  0830 09/05/19  0918   CULT Light growth  Normal skin dee   Light growth  Enterobacter cloacae  *  Light growth  Staphylococcus intermedius  * No growth  after 6 days No growth after 6 days  No growth after 6 days     Most Recent TSH, T4 and A1c Labs:  Recent Labs   Lab Test 08/09/22  0434   A1C 5.8     Results for orders placed or performed during the hospital encounter of 08/07/22   XR Foot Port Right 2 Views    Narrative    Exam: XR FOOT PORT RIGHT 2 VIEWS     History:Male, age 62 years, bone destruction of 2nd/3rd toes with dry  gangrene?    Comparison:  No relevant prior imaging.    Technique: Two views are submitted    Findings: Bones are normally mineralized. No evidence of acute or  subacute fracture.  No evidence of dislocation.  No apparent bony  destruction.           Impression    Impression:  No evidence of acute or subacute bony abnormality. No apparent bony  destruction.    KATHERIN GODFREY MD         SYSTEM ID:  [id number]

## 2022-08-12 ENCOUNTER — PATIENT OUTREACH (OUTPATIENT)
Dept: CARE COORDINATION | Facility: CLINIC | Age: 62
End: 2022-08-12

## 2022-08-12 NOTE — PROGRESS NOTES
Transitional Care Management Phone Call    Summary of hospitalization:  [facility] discharge summary reviewed    DISCHARGE DIAGNOSIS:   Principal Problem:    Bacterial sepsis (H) (8/8/2022)  Active Problems:    Atrial fibrillation with RVR (H) (6/25/2015)    Essential hypertension (9/8/2009)    Long-term (current) use of anticoagulants, INR goal 2.0-3.0 (6/25/2015)    Obstructive sleep apnea syndrome (5/13/2019)    Cellulitis of right knee (8/8/2022)    Cellulitis of third toe, right (8/8/2022)    DATE OF DISCHARGE: 8/11/2022    Patient denies any pain, fever, redness, pus, swelling, SOB, or drainage. Patient picked up his medications and had no questions regarding his medication after completing the medication review. Went through follow up appointments with patient, he had no questions. Only appointment that was not made was INR. Patient stated that he will call and make the appointment.     Diagnostic Tests/Treatments reviewed.  Follow up needed: Blood Culture results.      Post Discharge Medication Reconciliation: discharge medications reconciled, continue medications without change.    Medications reviewed by: by myself    Problems taking medications regularly:  None    Problems adhering to non-medication therapy:  None    Other Healthcare Providers Involved in Patient s Care:         Specialist appointment - Orthopedics 08/25/22 and Surgical follow-up appointment - 08/17/22    Update since discharge: improved.     Plan of care communicated with patient    Just a friendly reminder that you appointment is   Next 5 appointments (look out 90 days)    Aug 17, 2022  8:30 AM  Return Visit with Leonardo Pitt MD  [facility] ([facility] ) [address]  [place] [address]  [phone]   Aug 22, 2022 10:20 AM  Office Visit with Igor Spence MD  [facility] ([facility] ) [address]  [place] [id number] [phone]      .  We encourage you to keep this appointment.  Please remember to bring all of your pills in their bottles (including any vitamins or over the counter pills) with you to your appointment.   The patient indicates understanding of these issues and agrees with the plan of care.   Yes    Was the patient contacted within the 2 business days or other approved timeframe?  Yes  Was the Medication reconciliation and management done since the patient was discharged? Yes    Lillian Maurice RN  8/12/2022 8:39 AM

## 2022-08-13 LAB
BACTERIA BLD CULT: NO GROWTH
BACTERIA BLD CULT: NO GROWTH

## 2022-08-17 ENCOUNTER — ANTICOAGULATION THERAPY VISIT (OUTPATIENT)
Dept: ANTICOAGULATION | Facility: OTHER | Age: 62
End: 2022-08-17
Attending: FAMILY MEDICINE
Payer: MEDICAID

## 2022-08-17 ENCOUNTER — OFFICE VISIT (OUTPATIENT)
Dept: SURGERY | Facility: OTHER | Age: 62
End: 2022-08-17
Attending: SURGERY
Payer: MEDICAID

## 2022-08-17 VITALS
RESPIRATION RATE: 16 BRPM | DIASTOLIC BLOOD PRESSURE: 84 MMHG | SYSTOLIC BLOOD PRESSURE: 122 MMHG | OXYGEN SATURATION: 96 % | BODY MASS INDEX: 37.57 KG/M2 | HEART RATE: 67 BPM | TEMPERATURE: 98 F | WEIGHT: 300.6 LBS

## 2022-08-17 DIAGNOSIS — I48.91 ATRIAL FIBRILLATION (H): ICD-10-CM

## 2022-08-17 DIAGNOSIS — L02.415 ABSCESS OF KNEE, RIGHT: Primary | ICD-10-CM

## 2022-08-17 DIAGNOSIS — I48.91 ATRIAL FIBRILLATION WITH RVR (H): Primary | ICD-10-CM

## 2022-08-17 DIAGNOSIS — Z79.01 LONG-TERM (CURRENT) USE OF ANTICOAGULANTS, INR GOAL 2.0-3.0: ICD-10-CM

## 2022-08-17 LAB — INR POINT OF CARE: 2.3 (ref 0.9–1.1)

## 2022-08-17 PROCEDURE — 99024 POSTOP FOLLOW-UP VISIT: CPT | Performed by: SURGERY

## 2022-08-17 PROCEDURE — 85610 PROTHROMBIN TIME: CPT | Mod: QW

## 2022-08-17 PROCEDURE — 36416 COLLJ CAPILLARY BLOOD SPEC: CPT

## 2022-08-17 RX ORDER — WARFARIN SODIUM 5 MG/1
7.5 TABLET ORAL DAILY
Qty: 90 TABLET | Refills: 0 | Status: SHIPPED | OUTPATIENT
Start: 2022-08-17 | End: 2022-10-14

## 2022-08-17 ASSESSMENT — PAIN SCALES - GENERAL: PAINLEVEL: NO PAIN (0)

## 2022-08-17 NOTE — PROGRESS NOTES
Patient presents for post surgical visit after I and D of right knee abscess. Patient has done well. No problems with incisions.    /84 (BP Location: Right arm, Patient Position: Sitting, Cuff Size: Adult Large)   Pulse 67   Temp 98  F (36.7  C) (Tympanic)   Resp 16   Wt 136.4 kg (300 lb 9.6 oz)   SpO2 96%   BMI 37.57 kg/m      General: NAD, pleasant and cooperative with exam and interview.  Ext: No drainage. No sign of infection. No pain with palpation.  Psychiatry: awake, alert and oriented. Appropriate affect.    Assessment/Plan:  Drain removed. Strep on blood ctx.  Last day of keflex    Follow-up KRISTY Pitt MD on 8/17/2022 at 8:34 AM

## 2022-08-17 NOTE — PROGRESS NOTES
ANTICOAGULATION MANAGEMENT     Babak Moran 62 year old male is on warfarin with therapeutic INR result. (Goal INR 2.0-3.0)    Recent labs: (last 7 days)     08/17/22  0807   INR 2.3*       ASSESSMENT       Source(s): Chart Review and In person       Warfarin doses taken: Warfarin taken as instructed    Diet: No new diet changes identified    New illness, injury, or hospitalization: Yes: Hospitalized from 8/7-8/11 for cellulitis of right knee    Medication/supplement changes: Completed keflex    Signs or symptoms of bleeding or clotting: No    Previous INR: Therapeutic last visit; previously outside of goal range    Additional findings: None       PLAN     Recommended plan for temporary change(s) affecting INR     Dosing Instructions: Continue your current warfarin dose with next INR in 2 weeks       Summary  As of 8/17/2022    Full warfarin instructions:  7.5 mg every day   Next INR check:  8/31/2022             In person contact    Lab visit scheduled    Education provided: None required    Plan made per [facility] anticoagulation protocol    Liz Coles RN  Anticoagulation Clinic  8/17/2022    _______________________________________________________________________     Anticoagulation Episode Summary     Current INR goal:  2.0-3.0   TTR:  71.1 % (11.8 mo)   Target end date:  Indefinite   Send INR reminders to:  [facility]    Indications    Unspecified atrial fibrillation (Resolved) [I48.91]  Anticoagulation monitoring  INR range 2-3 (Resolved) [Z79.01]  Atrial fibrillation with RVR (H) [I48.91]           Comments:  Babak prefers to be closer to 3.0 d/t previous CVA check Q 4 weeks.Declines to reduce dose when slightly over 3.0  Needs to change PCP         Anticoagulation Care Providers     Provider Role Specialty Phone number    Teo Funes MD Referring Family Medicine [phone]

## 2022-08-17 NOTE — NURSING NOTE
"Chief Complaint   Patient presents with     Post-Op - General Surgery     Here today post-op right knee injury.       Initial /84 (BP Location: Right arm, Patient Position: Sitting, Cuff Size: Adult Large)   Pulse 67   Temp 98  F (36.7  C) (Tympanic)   Resp 16   Wt 136.4 kg (300 lb 9.6 oz)   SpO2 96%   BMI 37.57 kg/m   Estimated body mass index is 37.57 kg/m  as calculated from the following:    Height as of 8/7/22: 1.905 m (6' 3\").    Weight as of this encounter: 136.4 kg (300 lb 9.6 oz).  Medication Reconciliation: complete    Rosalinda Garcia LPN  "

## 2022-08-17 NOTE — TELEPHONE ENCOUNTER
ANTICOAGULATION MANAGEMENT:  Medication Refill    Anticoagulation Summary  As of 8/17/2022    Warfarin maintenance plan:  7.5 mg (5 mg x 1.5) every day   Next INR check:  8/31/2022   Target end date:  Indefinite    Indications    Unspecified atrial fibrillation (Resolved) [I48.91]  Anticoagulation monitoring  INR range 2-3 (Resolved) [Z79.01]  Atrial fibrillation with RVR (H) [I48.91]             Anticoagulation Care Providers     Provider Role Specialty Phone number    Teo Funse MD Referring Family Medicine [phone]          Visit with referring provider/group within last year: No, last visit date: 9/29/20    [facility] referral signed within last year: Yes    Babak does NOT meet all criteria for refill: Visit with referring provider's group was >= 1 year ago. 90 day rosa fill approved; patient notified to schedule with referring provider per [facility] protocol    Liz Coles RN  Anticoagulation Clinic

## 2022-08-19 NOTE — TELEPHONE ENCOUNTER
Patient states he is waiting to get insurance before he schedules annual visit with [facility].    Leslie Andrews on 8/19/2022 at 1:20 PM

## 2022-08-22 ENCOUNTER — OFFICE VISIT (OUTPATIENT)
Dept: FAMILY MEDICINE | Facility: OTHER | Age: 62
End: 2022-08-22
Attending: FAMILY MEDICINE
Payer: MEDICAID

## 2022-08-22 VITALS
RESPIRATION RATE: 20 BRPM | SYSTOLIC BLOOD PRESSURE: 130 MMHG | HEART RATE: 48 BPM | WEIGHT: 306 LBS | DIASTOLIC BLOOD PRESSURE: 80 MMHG | TEMPERATURE: 98.1 F | BODY MASS INDEX: 38.05 KG/M2 | OXYGEN SATURATION: 96 % | HEIGHT: 75 IN

## 2022-08-22 DIAGNOSIS — I48.91 ATRIAL FIBRILLATION WITH RVR (H): ICD-10-CM

## 2022-08-22 DIAGNOSIS — I48.21 PERMANENT ATRIAL FIBRILLATION (H): ICD-10-CM

## 2022-08-22 DIAGNOSIS — I10 BENIGN ESSENTIAL HYPERTENSION: ICD-10-CM

## 2022-08-22 DIAGNOSIS — Z79.01 LONG-TERM (CURRENT) USE OF ANTICOAGULANTS, INR GOAL 2.0-3.0: Chronic | ICD-10-CM

## 2022-08-22 DIAGNOSIS — L03.115 CELLULITIS OF RIGHT KNEE: Primary | ICD-10-CM

## 2022-08-22 DIAGNOSIS — A41.9 BACTERIAL SEPSIS (H): ICD-10-CM

## 2022-08-22 PROCEDURE — 99214 OFFICE O/P EST MOD 30 MIN: CPT | Performed by: FAMILY MEDICINE

## 2022-08-22 RX ORDER — METOPROLOL SUCCINATE 50 MG/1
25 TABLET, EXTENDED RELEASE ORAL DAILY
Qty: 90 TABLET | Refills: 0 | COMMUNITY
Start: 2022-08-22 | End: 2022-10-14

## 2022-08-22 ASSESSMENT — PAIN SCALES - GENERAL: PAINLEVEL: NO PAIN (0)

## 2022-08-22 NOTE — PROGRESS NOTES
"  Assessment & Plan     Cellulitis of right knee  Improving    Long-term (current) use of anticoagulants, INR goal 2.0-3.0      Benign essential hypertension  Patient is pulse quite low in the 40s.  We will decrease metoprolol to 25 from 50 mg a day.  Advised if he continues to remain in the 40s to stop the metoprolol.  - metoprolol succinate ER (TOPROL XL) 50 MG 24 hr tablet; Take 0.5 tablets (25 mg) by mouth daily    Permanent atrial fibrillation (H)  Decrease metoprolol to 25  - metoprolol succinate ER (TOPROL XL) 50 MG 24 hr tablet; Take 0.5 tablets (25 mg) by mouth daily    Bacterial sepsis (H)  Currently doing much better    Atrial fibrillation with RVR (H)    Patient also would like a letter stating that he can return back to work.  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  I advised him I could not do that without having physical therapy evaluate him.  He plans on talking to his PCP.             BMI:   Estimated body mass index is 38.25 kg/m  as calculated from the following:    Height as of this encounter: 1.905 m (6' 3\").    Weight as of this encounter: 138.8 kg (306 lb).           No follow-ups on file.    Igor Spence MD  [facility]    Subjective   Babak is a 62 year old accompanied by his spouse, presenting for the following health issues:  Hospital F/U      History of Present Illness       Hypertension: He presents for follow up of hypertension.  He does not check blood pressure  regularly outside of the clinic. Outpatient blood pressures have not been over 140/90. He follows a low salt diet.           Hospital Follow-up Visit:    Hospital/Nursing Home/IP Rehab Facility: [facility]  Date of Admission: 8/7/22  Date of Discharge: 8/11/22  Reason(s) for Admission: Principal Problem:    Bacterial sepsis (H) (8/8/2022)    Was your hospitalization related to COVID-19? No   Problems taking medications regularly:  " "None  Medication changes since discharge: None  Problems adhering to non-medication therapy:  None    Summary of hospitalization:  [facility] discharge summary reviewed  Diagnostic Tests/Treatments reviewed.  Follow up needed: Blood cultures positive.  Patient currently asymptomatic.  Completed his course of antibiotics 1 week ago.  Other Healthcare Providers Involved in Patient s Care:         None  Update since discharge: improved.   Post Medication Reconciliation Status:        Plan of care communicated with patient and wife           Review of Systems         Objective    /80   Pulse (!) 48   Temp 98.1  F (36.7  C) (Tympanic)   Resp 20   Ht 1.905 m (6' 3\")   Wt 138.8 kg (306 lb)   SpO2 96%   BMI 38.25 kg/m    Body mass index is 38.25 kg/m .  Physical Exam   NECK: no adenopathy, no asymmetry, masses, or scars and thyroid normal to palpation  RESP: lungs clear to auscultation - no rales, rhonchi or wheezes  CV: regular rate and rhythm, normal S1 S2, no S3 or S4, no murmur, click or rub, no peripheral edema and peripheral pulses strong  MS: no gross musculoskeletal defects noted, no edema  Patient does walk with a cane.  Appears unsteady                  .  ..  "

## 2022-08-22 NOTE — NURSING NOTE
"Chief Complaint   Patient presents with     Hospital F/U         Initial /80   Pulse (!) 48   Temp 98.1  F (36.7  C) (Tympanic)   Resp 20   Ht 1.905 m (6' 3\")   Wt 138.8 kg (306 lb)   SpO2 96%   BMI 38.25 kg/m   Estimated body mass index is 38.25 kg/m  as calculated from the following:    Height as of this encounter: 1.905 m (6' 3\").    Weight as of this encounter: 138.8 kg (306 lb).         Norma J. Gosselin, LPN   "

## 2022-08-25 ENCOUNTER — OFFICE VISIT (OUTPATIENT)
Dept: ORTHOPEDICS | Facility: OTHER | Age: 62
End: 2022-08-25
Attending: PODIATRIST
Payer: MEDICAID

## 2022-08-25 ENCOUNTER — OFFICE VISIT (OUTPATIENT)
Dept: INTERNAL MEDICINE | Facility: OTHER | Age: 62
End: 2022-08-25
Attending: NURSE PRACTITIONER

## 2022-08-25 VITALS
OXYGEN SATURATION: 96 % | TEMPERATURE: 96.7 F | HEART RATE: 47 BPM | BODY MASS INDEX: 37.9 KG/M2 | SYSTOLIC BLOOD PRESSURE: 128 MMHG | RESPIRATION RATE: 17 BRPM | HEIGHT: 75 IN | WEIGHT: 304.8 LBS | DIASTOLIC BLOOD PRESSURE: 80 MMHG

## 2022-08-25 DIAGNOSIS — M25.579 PAIN IN JOINT INVOLVING ANKLE AND FOOT, UNSPECIFIED LATERALITY: Primary | ICD-10-CM

## 2022-08-25 DIAGNOSIS — E66.01 MORBID OBESITY WITH BMI OF 40.0-44.9, ADULT (H): ICD-10-CM

## 2022-08-25 DIAGNOSIS — G81.94 LEFT HEMIPARESIS (H): ICD-10-CM

## 2022-08-25 DIAGNOSIS — Z53.9 ERRONEOUS ENCOUNTER--DISREGARD: Primary | ICD-10-CM

## 2022-08-25 DIAGNOSIS — I63.411 CEREBROVASCULAR ACCIDENT (CVA) DUE TO EMBOLISM OF RIGHT MIDDLE CEREBRAL ARTERY (H): ICD-10-CM

## 2022-08-25 DIAGNOSIS — L60.2 ONYCHOGRYPHOSIS: Chronic | ICD-10-CM

## 2022-08-25 DIAGNOSIS — L60.3 NAIL DYSTROPHY: ICD-10-CM

## 2022-08-25 DIAGNOSIS — Z79.01 LONG-TERM (CURRENT) USE OF ANTICOAGULANTS, INR GOAL 2.0-3.0: Chronic | ICD-10-CM

## 2022-08-25 DIAGNOSIS — S91.104A OPEN WOUND OF SECOND TOE, RIGHT, INITIAL ENCOUNTER: ICD-10-CM

## 2022-08-25 DIAGNOSIS — B35.1 ONYCHOMYCOSIS: ICD-10-CM

## 2022-08-25 DIAGNOSIS — L03.031 CELLULITIS OF THIRD TOE, RIGHT: ICD-10-CM

## 2022-08-25 PROCEDURE — G0127 TRIM NAIL(S): HCPCS | Mod: Q8 | Performed by: NURSE PRACTITIONER

## 2022-08-25 RX ORDER — AMLODIPINE BESYLATE 10 MG/1
TABLET ORAL
COMMUNITY
Start: 2022-08-11 | End: 2022-09-14

## 2022-08-25 ASSESSMENT — PAIN SCALES - GENERAL: PAINLEVEL: NO PAIN (0)

## 2022-08-25 NOTE — Clinical Note
JACKY, seen in clinic for foot care. He is seeing you in next couple of weeks and will need follow up on his right toe wounds.

## 2022-08-25 NOTE — NURSING NOTE
"Chief Complaint   Patient presents with     Musculoskeletal Problem     Right foot/nail care     Patient presents to the clinic for right foot/nail care.    FOOD SECURITY SCREENING QUESTIONS:    The next two questions are to help us understand your food security.  If you are feeling you need any assistance in this area, we have resources available to support you today.    Hunger Vital Signs:  Within the past 12 months we worried whether our food would run out before we got money to buy more. Never  Within the past 12 months the food we bought just didn't last and we didn't have money to get more. Never    Advance Care Directive on file? No   Advance Care Directive provided to patient? Declined      Initial /80 (BP Location: Right arm, Patient Position: Sitting, Cuff Size: Adult Large)   Pulse (!) 47   Temp (!) 96.7  F (35.9  C) (Temporal)   Resp 17   Ht 1.905 m (6' 3\")   Wt 138.3 kg (304 lb 12.8 oz)   SpO2 96%   BMI 38.10 kg/m   Estimated body mass index is 38.1 kg/m  as calculated from the following:    Height as of this encounter: 1.905 m (6' 3\").    Weight as of this encounter: 138.3 kg (304 lb 12.8 oz).  Medication Reconciliation: complete        Betzy Manrique  "

## 2022-08-25 NOTE — PROGRESS NOTES
Patient is here for consult on his right foot pain.  Suzanne Benedict LPN .....................8/25/2022 10:13 AM    Patient was not seen today - wanted toenails trimmed and Dr. Lemons does not do toenail care.  Suzanne Benedict LPN .....................8/25/2022 10:28 AM      Dr. Lemons offered toenail removal, but patient declined.   Suzanne Benedict LPN .....................8/25/2022 11:45 AM

## 2022-08-25 NOTE — PROGRESS NOTES
"SUBJECTIVE:   Babak Moran is a 62 year old male who presents for Preventive Visit.    {Split Bill scripting  The purpose of this visit is to discuss your medical history and prevent health problems before you are sick. You may be responsible for a co-pay, coinsurance, or deductible if your visit today includes services such as checking on a sore throat, having an x-ray or lab test, or treating and evaluating a new or existing condition :826433}  {Patient advised of split billing (Optional):523080}  Are you in the first 12 months of your Medicare coverage?  { :823345::\"No\"}    HPI  Do you feel safe in your environment? { :219904}    Have you ever done Advance Care Planning? (For example, a Health Directive, POLST, or a discussion with a medical provider or your loved ones about your wishes): { :053925}    {Hearing Test Done (Optional):170784}   Fall risk  { :793565}  {If any of the above assessments are answered yes, consider ordering appropriate referrals (Optional):958443::\"click delete button to remove this line now\"}  Cognitive Screening { :569495}    {Do you have sleep apnea, excessive snoring or daytime drowsiness? (Optional):487989}    Reviewed and updated as needed this visit by clinical staff                    Reviewed and updated as needed this visit by Provider                   Social History     Tobacco Use     Smoking status: Passive Smoke Exposure - Never Smoker     Smokeless tobacco: Never Used   Substance Use Topics     Alcohol use: Yes     {Rooming Staff- Complete this question if Prescreen response is not shown below for today's visit. If you drink alcohol do you typically have >3 drinks per day or >7 drinks per week? (Optional):097067}    No flowsheet data found.{add AUDIT responses (Optional) (A score of 7 for adult men is an indication of hazardous drinking; a score of 8 or more is an indication of an alcohol use disorder.  A score of 7 or more for adult women is an indication of hazardous " "drinking or an alchohol use disorder):326822}    {Outside tests to abstract? :470417}    {additional problems to add (Optional):514905}    Current providers sharing in care for this patient include: {Rooming staff:  Please update Care Team in Rooming Activity, refresh this note and then delete this statement}  Patient Care Team:  Teo Funes MD as PCP - General (Family Practice)  Arminda Ortiz NP as Assigned PCP    The following health maintenance items are reviewed in Epic and correct as of today:  Health Maintenance Due   Topic Date Due     PREVENTIVE CARE VISIT  Never done     ADVANCE CARE PLANNING  Never done     COVID-19 Vaccine (1) Never done     Pneumococcal Vaccine: Pediatrics (0 to 5 Years) and At-Risk Patients (6 to 64 Years) (1 - PCV) Never done     DTAP/TDAP/TD IMMUNIZATION (2 - Td or Tdap) 2018     COLORECTAL CANCER SCREENING  2022     {Chronicprobdata (optional):144310}  {Decision Support (Optional):830280}        Review of Systems  {ROS COMP (Optional):763758}    OBJECTIVE:   There were no vitals taken for this visit. Estimated body mass index is 38.25 kg/m  as calculated from the following:    Height as of 22: 1.905 m (6' 3\").    Weight as of 22: 138.8 kg (306 lb).  Physical Exam  {Exam (Optional) :162908}    {Diagnostic Test Results (Optional):508748::\"Diagnostic Test Results:\",\"Labs reviewed in Epic\"}    ASSESSMENT / PLAN:   {Diag Picklist:375951}    {Patient advised of split billing (Optional):077560}    COUNSELING:  {Medicare Counselin}    Estimated body mass index is 38.25 kg/m  as calculated from the following:    Height as of 22: 1.905 m (6' 3\").    Weight as of 22: 138.8 kg (306 lb).    {Weight Management Plan (ACO) Complete if BMI is abnormal-  Ages 18-64  BMI >24.9.  Age 65+ with BMI <23 or >30 (Optional):108356}    He reports that he is a non-smoker but has been exposed to tobacco smoke. He has been exposed to 0.00 packs per day. He has " never used smokeless tobacco.      Appropriate preventive services were discussed with this patient, including applicable screening as appropriate for cardiovascular disease, diabetes, osteopenia/osteoporosis, and glaucoma.  As appropriate for age/gender, discussed screening for colorectal cancer, prostate cancer, breast cancer, and cervical cancer. Checklist reviewing preventive services available has been given to the patient.    Reviewed patients plan of care and provided an AVS. The {CarePlan:366840} for Babak meets the Care Plan requirement. This Care Plan has been established and reviewed with the {PATIENT, FAMILY MEMBER, CAREGIVER:457018}.    Counseling Resources:  ATP IV Guidelines  Pooled Cohorts Equation Calculator  Breast Cancer Risk Calculator  Breast Cancer: Medication to Reduce Risk  FRAX Risk Assessment  ICSI Preventive Guidelines  Dietary Guidelines for Americans, 2010  USDA's MyPlate  ASA Prophylaxis  Lung CA Screening    Arminda Ortiz NP  [facility]    Identified Health Risks:

## 2022-08-25 NOTE — PROGRESS NOTES
"Babak Moran  : 1960 Age: 62 year old Sex: male MRN: [id number]    Nursing Notes:   Betzy Manrique  2022 11:31 AM  Signed  Chief Complaint   Patient presents with     Musculoskeletal Problem     Right foot/nail care     Patient presents to the clinic for right foot/nail care.    FOOD SECURITY SCREENING QUESTIONS:    The next two questions are to help us understand your food security.  If you are feeling you need any assistance in this area, we have resources available to support you today.    Hunger Vital Signs:  Within the past 12 months we worried whether our food would run out before we got money to buy more. Never  Within the past 12 months the food we bought just didn't last and we didn't have money to get more. Never    Advance Care Directive on file? No   Advance Care Directive provided to patient? Declined      Initial /80 (BP Location: Right arm, Patient Position: Sitting, Cuff Size: Adult Large)   Pulse (!) 47   Temp (!) 96.7  F (35.9  C) (Temporal)   Resp 17   Ht 1.905 m (6' 3\")   Wt 138.3 kg (304 lb 12.8 oz)   SpO2 96%   BMI 38.10 kg/m   Estimated body mass index is 38.1 kg/m  as calculated from the following:    Height as of this encounter: 1.905 m (6' 3\").    Weight as of this encounter: 138.3 kg (304 lb 12.8 oz).  Medication Reconciliation: complete        Betzy Manrique       Nursing note reviewed with patient.  Accuracy and completeness verified.     Encounter Date:  2022    Patient Name:  Babak Moran  Patient MRN:   [id number]     Last Footcare Appt: 2022    PCP: Teo Funes    Other Providers Seen for Foot/Nail Care (name/location/date): [] Yes [x] No List (if applicable):    ABN Given to patient and signed in clinic today:    [x] Yes [] No    SUBJECTIVE:    Patient Reported Symptoms: RIGHT  LEFT   Neuropathic symptoms [x]  [x]  DESCRIBE:   Pain in foot/toes at rest [x]  [x]  DESCRIBE:   Intermittent claudication []  []  DESCRIBE:   Previous foot " ulcer/injury [x]  [x]  DESCRIBE:   Amputation []  []  DESCRIBE:    OBJECTIVE:    Nurse/Provider Inspection of Feet/Nails: RIGHT  LEFT   Infection [x]  []  DESCRIBE: 2nd toe tip and 3rd toe   Ulcerations [x]  []  DESCRIBE:   Calluses/Corns [x]  [x]  DESCRIBE: bilateral heels   Fissures/Cracks []  []  DESCRIBE:   Nail Disorders/Changes [x]  [x]  DESCRIBE: all ten toenails   Other []      []  DESCRIBE:    VASCULAR TESTING:    Foot Pulses:  RIGHT  LEFT   Dorsalis pedis [x]  [x]    Posterior tibial []  []   Capillary refill of the hallux (<3 seconds): [] []  Digital hair present: [] []  Varicosities (+/- presents): [] []  Edema (pitting vs. non pitting): [] []     DESCRIBE: [] 1+ minimal   [] 2+ moderate   [] 3+ severe  Skin Temperature:  [x] [x]     DESCRIBE: [x] Cold   [] Hot    [] Warm to touch    NEUROLOGIC TESTING:        RIGHT LEFT  Sharp/dull Testing: (the blunt end and sharp end of swab stick) [x] [x]  Proprioception: (moves hallux up/down in space) [] []  Superficial Reflexes: (Babinski sign) [] []    Monofilament Testing: RIGHT  LEFT  RIGHT LEFT    Site 1 []  [x]  Site 6 [x] [x]    Site 2 []  [x]  Site 7 [] [x]    Site 3 []  [x]  Site 8 [] []    Site 4 []  [x]  Site 9 [] []    Site 5 [x]  [x]  Site 10 [] [x]      MUSCULOSKELETAL: RIGHT LEFT  Foot Type: [x] [x]  DESCRIBE: [] Pes cavus (high arch) [x] pes rectus (normal) [] pes planus (flat)  Digital deformity: [x] [x]  DESCRIBE: [] bunion [] claw toe [] hammer toe [] mallet toe [x] hallux limitus [] other  Joint ROM: [x] [x]  DESCRIBE: [x] adequate ROM to MTPH, STJ, and AJ without crepitus  Adequate Muscle Strength: [x] [x]   Other [] []  DESCRIBE:     DERMATOLOGIC:      Nails: RIGHT LEFT  Onychauxis (thickened, long nail): [x] [x]  DESCRIBE: all ten toenails   Onycholysis ( nail): [] []  DESCRIBE:   Onychocryptosis (ingrown toenail): [] []  DESCRIBE:    Onychogryphosis (carmen's horn nail): [x] []  DESCRIBE: right 2nd and 3rd  Onchomycosis (fungal  nail): [x] [x]  DESCRIBE: all ten toenails    Skin: RIGHT LEFT  [x] Hyperkeratotic lesions [] verruca tissue [] foreign body: [x] [x]  DESCRIBE:   [] Mild edema [] erythema [] open lesions [] interdigit maceration: [] []  DESCRIBE:   [] Varicosities [] telangiectasis [] pigmented lesion [] venous stasis:  [] []  DESCRIBE:   Adequate padding to the plantar aspect of the foot: [x] [x]  DESCRIBE:     Footwear Assessment:  Type: slip on shoes   Condition Good [] Fair [] Poor [x]   Fit Good [] Fair [] Poor [x]            Yes No  Does the patient use an ambulatory aid?      [] [x] DESCRIBE:  Does the patient understand the effects of diabetes on foot health? [] [x]  Can the patient identify appropriate foot care practices?    [] [x]  Are the patient's feet adequately cared for?      [] [x]            Yes No  Does the patient have impaired vision?      [x] []  Can the patient reach own feet for safe self care?     [] [x]  Are there other factors influencing ability to safely care for own feet? [x] []     Pertinent Past Medical History:  Patient Active Problem List   Diagnosis     Pain in joint, ankle and foot     Atrial fibrillation with RVR (H)     Cerebrovascular accident (CVA) due to embolism of right middle cerebral artery (H)     Osteoarthrosis     Cutaneous lupus erythematosus     Gout     Ascending aorta enlargement (H)     Family history of coronary artery disease     Essential hypertension     Left hemiparesis (H)     Long-term (current) use of anticoagulants, INR goal 2.0-3.0     Systemic lupus erythematosus (H)     Tissue plasminogen activator (t-PA) administered at other facility within 24 hours prior to current admission     Morbid obesity with BMI of 40.0-44.9, adult (H)     Obstructive sleep apnea syndrome     Need for vaccination     Bacterial sepsis (H)     Cellulitis of right knee     Cellulitis of third toe, right     Past Medical History:   Diagnosis Date     Atrial fibrillation (H) 02/03/2017    on  Warfarin     Cellulitis of left lower extremity 9/5/2019     Cerebral infarction (H)      Cerebral infarction due to unspecified occlusion or stenosis of right middle cerebral artery (H) 02/03/2017    ,Left hemiparesis, left neglect, impaired balance and gait following R MCA stroke with mild hemorrhagic transformation s/p tissue plasminogen activator and mechanical thrombectomy, s/p C7 facet fracture from fall off forklift.     Chest pain 02/1997     Disorder of skin or subcutaneous tissue 07/25/2011     Essential (primary) hypertension 02/1997     Fracture of cervical vertebra (H)     02/03/2017,C7 facet fracture from fall off forklift, nondisplaced     Gout      Hemiplegia affecting left nondominant side (H) 02/03/2017     Obesity 02/03/2017    body mass index of 40     Other local lupus erythematosus      Sepsis (H) 9/6/2019       Diagnostics:  Hemoglobin A1C   Date Value Ref Range Status   08/09/2022 5.8 4.0 - 6.2 % Final   09/06/2019 6.0 4.0 - 6.0 % Final       ASSESSMENT/PLAN:    Pain in joint involving ankle and foot, unspecified laterality  Cellulitis of third toe, right  Long-term (current) use of anticoagulants, INR goal 2.0-3.0  Morbid obesity with BMI of 40.0-44.9, adult (H)  Left hemiparesis (H)  Cerebrovascular accident (CVA) due to embolism of right middle cerebral artery (H)  Onychogryphosis  Nail dystrophy  Onychomycosis  All toenails filed down to manageable thickness with Dremel tool and angled Rhea.  Toenails were then trimmed with clippers.  Lotion applied to bilateral lower extremities.  Patient tolerated well. Time spent doing foot/nail care was 40 minutes.  Wound care completed in clinic today.      [x] Patient is unable to trim own toenails due to mobility limitation  [x] Patient has limited sensation in both feet as a result of neuropathic damage  [x] Patient is on chronic warfarin anticoagulation and chronic use of this medication poses a increased risk of bleeding should patient sustain a  nail/skin injury.  [x] Patient has noted onychomycosis for some time now and has tried various OTC treatments without success. Most recently been using Russ's VapoRub to feet and/or toenails.    Educated Patient On:   [x] Proper healthy diet. Eliminate soda, high fructose corn syrup products, artificial sweeteners, and high sodium, high cholesterol foods. Encouraged more fruits and   vegetables and an increase in daily water intake.  [x] Educated on importance of diet and exercise for weight loss and reduction on symptoms.   [x] Daily exercise for at least 30 minutes is recommended. This can be walking, riding a bike, low impact aerobic activity, or yoga.    [x] Importance of daily skin assessments.   [x] Importance of not walking barefoot. Recommend wearing Crocs in house versus going barefoot.   [x] Importance of good, supportive shoes.   [x] Importance of smoking cessation. Greater than 3 minutes were spent on smoking cessation including encouragement to reduce cigarette use and discussion of modalities to assist with this. Cessation was encouraged in order to improve pain, reduce mortality, improve quality of life, and long term outcomes.     Recommended:      YES NO   Diabetic socks:      [x] [] [] Not applicable   Compression stockings/sleeves:   [x] [] [] Not applicable   Diabetic/supportive shoegear with wide toe box:  [x] [] [] Not applicable   Use of Russ's VapoRub daily to feet and/or toenails: [x] [] [] Not applicable   Smoking Cessation:     [x] [] [] Not applicable    FOLLOW-UP:    Return to clinic: [] One week [] 30 Days (wounds) [x] 63+ Days   [] As directed by Provider    I explained my diagnostic considerations and recommendations to the patient, who voiced understanding and agreement with the treatment plan. All questions were answered. We discussed potential side effects of any prescribed or recommended therapies, as well as expectations for response to treatments.     JAYLEN Lu,  AGNP-C  Internal Medicine  [facility]    08/25/2022 2:27 PM    Total time spent with this patient was 40 minutes which included chart review, procedures, patient education, visualization and interpretation of labs/images, time spent with the patient and documentation.     [x]  TRIMMING DYSTROPHIC NAILS,ANY NUMBER  [] 42245 TRIM HYPERKERATOTIC SKIN LESION, 1  [] 96570 TRIM HYPERKERATOTIC SKIN LESION,2-4  [] 30279 TRIM HYPERKERATOTIC SKIN LESION,>4  [] 70464 TRIM NON DYSTROPHIC NAIL(S)  [] 71036 DEBRIDEMENT OF NAIL(S) 1-5  [] 31275 DEBRIDEMENT OF NAIL(S) 6 OR MORE  [] 22694 REMOVAL NAIL/NAIL BED, PARTIAL OR COMPLETE

## 2022-08-27 PROBLEM — A41.9 BACTERIAL SEPSIS (H): Status: RESOLVED | Noted: 2022-08-08 | Resolved: 2022-08-27

## 2022-08-27 PROBLEM — L60.2 ONYCHOGRYPHOSIS: Chronic | Status: ACTIVE | Noted: 2022-08-27

## 2022-08-31 ENCOUNTER — ANTICOAGULATION THERAPY VISIT (OUTPATIENT)
Dept: ANTICOAGULATION | Facility: OTHER | Age: 62
End: 2022-08-31
Attending: FAMILY MEDICINE
Payer: MEDICAID

## 2022-08-31 VITALS
RESPIRATION RATE: 16 BRPM | OXYGEN SATURATION: 98 % | SYSTOLIC BLOOD PRESSURE: 144 MMHG | HEART RATE: 62 BPM | DIASTOLIC BLOOD PRESSURE: 82 MMHG

## 2022-08-31 DIAGNOSIS — I48.91 ATRIAL FIBRILLATION WITH RVR (H): Primary | ICD-10-CM

## 2022-08-31 LAB — INR POINT OF CARE: 2.6 (ref 0.9–1.1)

## 2022-08-31 PROCEDURE — 85610 PROTHROMBIN TIME: CPT | Mod: QW

## 2022-08-31 PROCEDURE — 36416 COLLJ CAPILLARY BLOOD SPEC: CPT

## 2022-09-14 ENCOUNTER — ANTICOAGULATION THERAPY VISIT (OUTPATIENT)
Dept: ANTICOAGULATION | Facility: OTHER | Age: 62
End: 2022-09-14
Attending: FAMILY MEDICINE
Payer: MEDICAID

## 2022-09-14 ENCOUNTER — OFFICE VISIT (OUTPATIENT)
Dept: FAMILY MEDICINE | Facility: OTHER | Age: 62
End: 2022-09-14
Attending: FAMILY MEDICINE

## 2022-09-14 VITALS
WEIGHT: 302 LBS | BODY MASS INDEX: 37.75 KG/M2 | OXYGEN SATURATION: 100 % | TEMPERATURE: 97.5 F | SYSTOLIC BLOOD PRESSURE: 130 MMHG | HEART RATE: 64 BPM | RESPIRATION RATE: 16 BRPM | DIASTOLIC BLOOD PRESSURE: 72 MMHG

## 2022-09-14 DIAGNOSIS — R00.1 BRADYCARDIA: ICD-10-CM

## 2022-09-14 DIAGNOSIS — I48.91 ATRIAL FIBRILLATION WITH RVR (H): Primary | ICD-10-CM

## 2022-09-14 DIAGNOSIS — L03.119 CELLULITIS AND ABSCESS OF FOOT EXCLUDING TOE: Primary | ICD-10-CM

## 2022-09-14 DIAGNOSIS — I48.21 PERMANENT ATRIAL FIBRILLATION (H): ICD-10-CM

## 2022-09-14 DIAGNOSIS — L02.619 CELLULITIS AND ABSCESS OF FOOT EXCLUDING TOE: Primary | ICD-10-CM

## 2022-09-14 DIAGNOSIS — A41.9 BACTERIAL SEPSIS (H): ICD-10-CM

## 2022-09-14 DIAGNOSIS — I10 BENIGN ESSENTIAL HYPERTENSION: ICD-10-CM

## 2022-09-14 DIAGNOSIS — L03.115 CELLULITIS OF RIGHT KNEE: ICD-10-CM

## 2022-09-14 LAB — INR POINT OF CARE: 2.7 (ref 0.9–1.1)

## 2022-09-14 PROCEDURE — 36416 COLLJ CAPILLARY BLOOD SPEC: CPT

## 2022-09-14 PROCEDURE — 85610 PROTHROMBIN TIME: CPT | Mod: QW

## 2022-09-14 PROCEDURE — 99214 OFFICE O/P EST MOD 30 MIN: CPT | Performed by: FAMILY MEDICINE

## 2022-09-14 RX ORDER — MUPIROCIN 20 MG/G
OINTMENT TOPICAL 2 TIMES DAILY
Qty: 30 G | Refills: 1 | Status: SHIPPED | OUTPATIENT
Start: 2022-09-14 | End: 2022-11-28

## 2022-09-14 RX ORDER — CEPHALEXIN 500 MG/1
500 CAPSULE ORAL 3 TIMES DAILY
Qty: 30 CAPSULE | Refills: 0 | Status: SHIPPED | OUTPATIENT
Start: 2022-09-14 | End: 2022-09-24

## 2022-09-14 RX ORDER — AMLODIPINE BESYLATE 10 MG/1
10 TABLET ORAL DAILY
Qty: 90 TABLET | Refills: 3 | Status: SHIPPED | OUTPATIENT
Start: 2022-09-14 | End: 2023-05-05

## 2022-09-14 ASSESSMENT — PAIN SCALES - GENERAL: PAINLEVEL: EXTREME PAIN (8)

## 2022-09-14 NOTE — PROGRESS NOTES
Assessment & Plan       ICD-10-CM    1. Cellulitis and abscess of foot excluding toe  L03.119 cephALEXin (KEFLEX) 500 MG capsule    L02.619 mupirocin (BACTROBAN) 2 % external ointment   2. Bacterial sepsis (H)  A41.9    3. Cellulitis of right knee  L03.115    4. Benign essential hypertension  I10 amLODIPine (NORVASC) 10 MG tablet   5. Permanent atrial fibrillation (H)  I48.21    6. Bradycardia  R00.1      He reports some increased swelling of the right third toe.  Recently treated for bacterial sepsis and leg cellulitis.  Improved on cephalexin.  In order to prevent repeat severe infection, placed on cephalexin 500 mg 3 times daily for 1 week.  Recommend applying mupirocin to the toe, nail, and the eschar to help soften and help the eschar heal.  I offered debridement today, but he declined.  If not improving, plan to debride the eschar at a follow-up appointment.    Blood pressure stable on current medications.  Amlodipine was increased during hospitalization.  Refilled for 10 mg daily.    No longer bradycardic since stopping metoprolol.  We discussed consequences of ongoing bradycardia with current medications would include pacemaker placement.  He is at risk for A. fib with RVR without any rate controlling medication and could see cardiology to discuss options such as digoxin.  He does not have insurance at the time and prefers to take his chances to see what happens with rate, which is a reasonable decision.  He is aware of symptoms of A. fib with RVR and that he should seek care in the ED for this condition.    30 minutes spent on the date of the encounter doing chart review, patient visit and documentation     Return in about 3 weeks (around 10/5/2022).    Teo Funes MD   [facility]     Benji Hilliard is a 62 year old accompanied by his spouse, presenting for the following health issues:  Clinic Care Coordination - Follow-up (infection)      HPI     Follow up with infection and  bradycardia    Hospitalized a month ago for bacterial sepsis with group B strep.  He has some dry gangrene of the right second and third toes, but no bone destruction on x-ray.  Had I&D of a right prepatellar abscess.  Improved and discharged on Keflex.  He has since followed up with general surgery, primary care, and had nail care is performed.  Concerned as he still has some redness in his third toe.    While hospitalized pulse was in the low 40s with atrial fibrillation.  Metoprolol dose was reduced.  At follow-up appoint with Dr. Spence August 22, it was reduced further due to bradycardia.  He has since stopped metoprolol completely.  Pulse improved to 60 to 70s at home on blood pressure monitor  Oximeter often showing pulse in the 40s, but we discussed this is inaccurate reading      Review of Systems   As above      Objective    /72 (BP Location: Right arm, Patient Position: Sitting, Cuff Size: Adult Large)   Pulse 64   Temp 97.5  F (36.4  C) (Tympanic)   Resp 16   Wt 137 kg (302 lb)   SpO2 100%   BMI 37.75 kg/m    Body mass index is 37.75 kg/m .  Physical Exam   General Appearance: Alert. No acute distress  Psychiatric: Normal affect and mentation  Skin: Slight erythema right third toe, but does not seem consistent with definitive cellulitis.  Eschar at the tip of the toe.  Onychomycosis of the nail.  No erythema of the knee or lower leg.

## 2022-09-14 NOTE — PATIENT INSTRUCTIONS
Pulse looks good  If you have low pulse on your blood pressure monitor in the 40s then a pacemaker may be needed  Without the metoprolol, pulse may go to fast. So if you have fatigue, shortness of breath, racing pulse then present to the ER    Take cephalexin for the toe infection  Also start mupirocin ointment twice daily to the red area on the toe, around the nail and on and around the scab. Continue until next appointment or until the scab comes off

## 2022-09-14 NOTE — PROGRESS NOTES
ANTICOAGULATION MANAGEMENT     Babak Moran 62 year old male is on warfarin with therapeutic INR result. (Goal INR 2.0-3.0)    Recent labs: (last 7 days)     09/14/22  0918   INR 2.7*       ASSESSMENT       Source(s): Chart Review and In person        Warfarin doses taken: Warfarin taken as instructed    Diet: No new diet changes identified    New illness, injury, or hospitalization: No    Medication/supplement changes: None noted    Signs or symptoms of bleeding or clotting: No    Previous INR: Therapeutic last 2(+) visits    Additional findings: None       PLAN     Recommended plan for no diet, medication or health factor changes affecting INR     Dosing Instructions: Continue your current warfarin dose with next INR in 3 weeks       Summary  As of 9/14/2022    Full warfarin instructions:  7.5 mg every day   Next INR check:  10/5/2022             In eprson contact    Lab visit scheduled    Education provided: None required    Plan made per [facility] anticoagulation protocol    Liz Coles RN  Anticoagulation Clinic  9/14/2022    _______________________________________________________________________     Anticoagulation Episode Summary     Current INR goal:  2.0-3.0   TTR:  76.1 % (11.8 mo)   Target end date:  Indefinite   Send INR reminders to:  [facility]    Indications    Unspecified atrial fibrillation (Resolved) [I48.91]  Anticoagulation monitoring  INR range 2-3 (Resolved) [Z79.01]  Atrial fibrillation with RVR (H) [I48.91]           Comments:  Babak prefers to be closer to 3.0 d/t previous CVA check Q 4 weeks.Declines to reduce dose when slightly over 3.0  Needs to change PCP         Anticoagulation Care Providers     Provider Role Specialty Phone number    Teo Funes MD Referring Family Medicine [phone]

## 2022-09-14 NOTE — NURSING NOTE
"Patient presents to the clinic for follow up with infection.    FOOD SECURITY SCREENING QUESTIONS:    The next two questions are to help us understand your food security.  If you are feeling you need any assistance in this area, we have resources available to support you today.    Hunger Vital Signs:  Within the past 12 months we worried whether our food would run out before we got money to buy more. Never  Within the past 12 months the food we bought just didn't last and we didn't have money to get more. Never    Advance Care Directive on file? no  Advance Care Directive provided to patient? Declined.    Chief Complaint   Patient presents with     Clinic Care Coordination - Follow-up     infection       Initial /72 (BP Location: Right arm, Patient Position: Sitting, Cuff Size: Adult Large)   Pulse 64   Temp 97.5  F (36.4  C) (Tympanic)   Resp 16   Wt 137 kg (302 lb)   SpO2 100%   BMI 37.75 kg/m   Estimated body mass index is 37.75 kg/m  as calculated from the following:    Height as of 8/25/22: 1.905 m (6' 3\").    Weight as of this encounter: 137 kg (302 lb).  Medication Reconciliation: complete        Oneida Malone LPN       "

## 2022-10-05 ENCOUNTER — ANTICOAGULATION THERAPY VISIT (OUTPATIENT)
Dept: ANTICOAGULATION | Facility: OTHER | Age: 62
End: 2022-10-05
Attending: FAMILY MEDICINE
Payer: MEDICAID

## 2022-10-05 DIAGNOSIS — I48.91 ATRIAL FIBRILLATION WITH RVR (H): Primary | ICD-10-CM

## 2022-10-05 LAB — INR POINT OF CARE: 2.4 (ref 0.9–1.1)

## 2022-10-05 PROCEDURE — 36416 COLLJ CAPILLARY BLOOD SPEC: CPT

## 2022-10-05 PROCEDURE — 85610 PROTHROMBIN TIME: CPT | Mod: QW

## 2022-10-05 NOTE — PROGRESS NOTES
ANTICOAGULATION MANAGEMENT     Babak Moran 62 year old male is on warfarin with therapeutic INR result. (Goal INR 2.0-3.0)    Recent labs: (last 7 days)     10/05/22  0834   INR 2.4*       ASSESSMENT       Source(s): Chart Review       Warfarin doses taken: Warfarin taken as instructed    Diet: No new diet changes identified    New illness, injury, or hospitalization: Yes: Cellulitis and abscess of foot excluding toe.    Medication/supplement changes: cephALEXin (KEFLEX) 500 MG capsule started on 09/14/22 not expected to affect INR, but may increase risk of bleeding    Signs or symptoms of bleeding or clotting: No    Previous INR: Therapeutic last 2(+) visits    Additional findings: None       PLAN     Recommended plan for temporary change(s) affecting INR     Dosing Instructions: Continue your current warfarin dose with next INR in 2 weeks       Summary  As of 10/5/2022    Full warfarin instructions:  7.5 mg every day   Next INR check:  10/19/2022             In person     Lab visit scheduled    Education provided: Written instructions provided    Plan made per [facility] anticoagulation protocol    Lillian Maurice, RN  Anticoagulation Clinic  10/5/2022    _______________________________________________________________________     Anticoagulation Episode Summary     Current INR goal:  2.0-3.0   TTR:  78.2 % (11.8 mo)   Target end date:  Indefinite   Send INR reminders to:  [facility]    Indications    Unspecified atrial fibrillation (Resolved) [I48.91]  Anticoagulation monitoring  INR range 2-3 (Resolved) [Z79.01]  Atrial fibrillation with RVR (H) [I48.91]           Comments:  Babak prefers to be closer to 3.0 d/t previous CVA check Q 4 weeks.Declines to reduce dose when slightly over 3.0  Needs to change PCP         Anticoagulation Care Providers     Provider Role Specialty Phone number    Teo Funes MD Referring Family Medicine [phone]

## 2022-10-14 ENCOUNTER — OFFICE VISIT (OUTPATIENT)
Dept: FAMILY MEDICINE | Facility: OTHER | Age: 62
End: 2022-10-14
Attending: FAMILY MEDICINE
Payer: MEDICAID

## 2022-10-14 VITALS
SYSTOLIC BLOOD PRESSURE: 117 MMHG | WEIGHT: 315 LBS | OXYGEN SATURATION: 95 % | RESPIRATION RATE: 20 BRPM | TEMPERATURE: 98.1 F | DIASTOLIC BLOOD PRESSURE: 61 MMHG | BODY MASS INDEX: 40.5 KG/M2 | HEART RATE: 66 BPM

## 2022-10-14 DIAGNOSIS — I10 BENIGN ESSENTIAL HYPERTENSION: ICD-10-CM

## 2022-10-14 DIAGNOSIS — L03.119 CELLULITIS AND ABSCESS OF FOOT EXCLUDING TOE: Primary | ICD-10-CM

## 2022-10-14 DIAGNOSIS — I48.21 PERMANENT ATRIAL FIBRILLATION (H): ICD-10-CM

## 2022-10-14 DIAGNOSIS — Z79.01 LONG-TERM (CURRENT) USE OF ANTICOAGULANTS, INR GOAL 2.0-3.0: ICD-10-CM

## 2022-10-14 DIAGNOSIS — L02.619 CELLULITIS AND ABSCESS OF FOOT EXCLUDING TOE: Primary | ICD-10-CM

## 2022-10-14 PROCEDURE — 99213 OFFICE O/P EST LOW 20 MIN: CPT | Performed by: FAMILY MEDICINE

## 2022-10-14 PROCEDURE — G0463 HOSPITAL OUTPT CLINIC VISIT: HCPCS | Performed by: FAMILY MEDICINE

## 2022-10-14 RX ORDER — METOPROLOL SUCCINATE 50 MG/1
25 TABLET, EXTENDED RELEASE ORAL DAILY
Qty: 90 TABLET | Status: CANCELLED | OUTPATIENT
Start: 2022-10-14

## 2022-10-14 RX ORDER — WARFARIN SODIUM 5 MG/1
7.5 TABLET ORAL DAILY
Qty: 90 TABLET | Refills: 0 | Status: SHIPPED | OUTPATIENT
Start: 2022-10-14 | End: 2022-10-19

## 2022-10-14 ASSESSMENT — PAIN SCALES - GENERAL: PAINLEVEL: NO PAIN (0)

## 2022-10-14 NOTE — PROGRESS NOTES
Assessment & Plan       ICD-10-CM    1. Cellulitis and abscess of foot excluding toe  L03.119     L02.619       2. Benign essential hypertension  I10       3. Long-term (current) use of anticoagulants, INR goal 2.0-3.0  Z79.01 warfarin ANTICOAGULANT (COUMADIN) 5 MG tablet      4. Permanent atrial fibrillation (H)  I48.21 warfarin ANTICOAGULANT (COUMADIN) 5 MG tablet        Is recurrent right leg cellulitis.  Last treated 1 month ago with cephalexin.  Likely nidus for infection was a toe eschar on the right third toe.  Treated with Eason ointment.  About 1 week after treatment, the eschar sloughed off.  Since then it has been healing.  Toe looks better than it has in years.  No signs of erythema.  He can discontinue mupirocin, but any signs of infection, should resume.    Due to bradycardia, metoprolol discontinued.  Amlodipine increased during hospitalization a couple months ago.  Blood pressure remains controlled.  Continue same amlodipine dose.    Refilled warfarin for A. Fib    Inquires about disability.  Has significant knee pain, likely arthritis, but lacks insurance and still has not had x-rays.  Is waiting to see if he qualifies for [facility].  Also has some other medical issues, but has not pursued due to lack of insurance.  At this point, there may be some interventions that could help him improve and regain employment.  It is difficult to say he is disabled without further evaluation.  He plans to return if he qualifies for insurance.      Teo Funes MD   [facility]     Benji Hilliard is a 62 year old accompanied by his spouse, presenting for the following health issues:  Clinic Care Coordination - Follow-up (cellulitis)      History of Present Illness       Reason for visit:  Follow up    He eats 0-1 servings of fruits and vegetables daily.He consumes 0 sweetened beverage(s) daily.He exercises with enough effort to increase his heart rate 9 or less minutes per day.  He  exercises with enough effort to increase his heart rate 5 days per week.   He is taking medications regularly.     Toe improved    Blood pressure and pulse are good      Review of Systems   General: Denies general constitutional problems  Cardiovascular: Denies problems  Respiratory: Denies problems       Objective    /61   Pulse 66   Temp 98.1  F (36.7  C) (Tympanic)   Resp 20   Wt 147 kg (324 lb)   SpO2 95%   BMI 40.50 kg/m    Body mass index is 40.5 kg/m .  Physical Exam   General Appearance: Alert. No acute distress  Psychiatric: Normal affect and mentation  Skin: Ulceration on toe resolved.  No erythema.  No leg edema

## 2022-10-14 NOTE — NURSING NOTE
"Patient presents to the clinic for follow up with cellulitis.    FOOD SECURITY SCREENING QUESTIONS:    The next two questions are to help us understand your food security.  If you are feeling you need any assistance in this area, we have resources available to support you today.    Hunger Vital Signs:  Within the past 12 months we worried whether our food would run out before we got money to buy more. Never  Within the past 12 months the food we bought just didn't last and we didn't have money to get more. Never    Advance Care Directive on file? no  Advance Care Directive provided to patient? Declined.    Chief Complaint   Patient presents with     Clinic Care Coordination - Follow-up     cellulitis       Initial /61   Pulse 66   Temp 98.1  F (36.7  C) (Tympanic)   Resp 20   Wt 147 kg (324 lb)   SpO2 95%   BMI 40.50 kg/m   Estimated body mass index is 40.5 kg/m  as calculated from the following:    Height as of 8/25/22: 1.905 m (6' 3\").    Weight as of this encounter: 147 kg (324 lb).  Medication Reconciliation: complete  Lab Results   Component Value Date    A1C 5.8 08/09/2022    A1C 6.0 09/06/2019    ALBUMIN 3.7 08/07/2022    ALBUMIN 4.0 09/29/2020     Previous A1C is at goal of <8  Date of last Foot exam 8-25-22  Eye exam No  Patient is not a Tobacco User  Patient is not on a daily aspirin  Patient is not on a Statin.  Blood pressure today of:     BP Readings from Last 1 Encounters:   10/14/22 117/61      is at the goal of <139/89 for diabetics.    Oneida Malone LPN on 10/14/2022 at 11:34 AM         "

## 2022-10-19 ENCOUNTER — ANTICOAGULATION THERAPY VISIT (OUTPATIENT)
Dept: ANTICOAGULATION | Facility: OTHER | Age: 62
End: 2022-10-19
Attending: FAMILY MEDICINE
Payer: MEDICAID

## 2022-10-19 VITALS — SYSTOLIC BLOOD PRESSURE: 153 MMHG | DIASTOLIC BLOOD PRESSURE: 80 MMHG

## 2022-10-19 DIAGNOSIS — Z79.01 LONG-TERM (CURRENT) USE OF ANTICOAGULANTS, INR GOAL 2.0-3.0: ICD-10-CM

## 2022-10-19 DIAGNOSIS — I48.21 PERMANENT ATRIAL FIBRILLATION (H): ICD-10-CM

## 2022-10-19 DIAGNOSIS — I48.91 ATRIAL FIBRILLATION WITH RVR (H): Primary | ICD-10-CM

## 2022-10-19 LAB — INR POINT OF CARE: 3.2 (ref 0.9–1.1)

## 2022-10-19 PROCEDURE — 85610 PROTHROMBIN TIME: CPT | Mod: ZL,QW

## 2022-10-19 PROCEDURE — 36416 COLLJ CAPILLARY BLOOD SPEC: CPT | Mod: ZL

## 2022-10-19 RX ORDER — WARFARIN SODIUM 5 MG/1
7.5 TABLET ORAL DAILY
Qty: 140 TABLET | Refills: 0 | Status: ON HOLD | OUTPATIENT
Start: 2022-10-19 | End: 2023-01-11

## 2022-10-19 NOTE — PROGRESS NOTES
ANTICOAGULATION MANAGEMENT:  Medication Refill    Anticoagulation Summary  As of 10/19/2022    Warfarin maintenance plan:  7.5 mg (5 mg x 1.5) every day   Next INR check:  11/2/2022   Target end date:  Indefinite    Indications    Unspecified atrial fibrillation (Resolved) [I48.91]  Anticoagulation monitoring  INR range 2-3 (Resolved) [Z79.01]  Atrial fibrillation with RVR (H) [I48.91]             Anticoagulation Care Providers     Provider Role Specialty Phone number    Teo Funes MD Referring Family Medicine [phone]          Visit with referring provider/group within last year: Yes    [facility] referral signed within last year: Yes    Babak meets all criteria for refill (current [facility] referral, office visit with referring provider/group in last year, lab monitoring up to date or not exceeding 2 weeks overdue). Rx instructions and quantity supplied updated to match patient's current dosing plan per [facility] protocol     Liz Coles RN  Anticoagulation Clinic

## 2022-10-19 NOTE — PROGRESS NOTES
ANTICOAGULATION MANAGEMENT     Babak Moran 62 year old male is on warfarin with supratherapeutic INR result. (Goal INR 2.0-3.0)    Recent labs: (last 7 days)     10/19/22  0801   INR 3.2*       ASSESSMENT       Source(s): Chart Review and Patient/Caregiver Call       Warfarin doses taken: Warfarin taken as instructed    Diet: Change in alcohol intake may be affecting INR. Had 2 beers last evening.     New illness, injury, or hospitalization: No    Medication/supplement changes: Stopped Metoprolol    Signs or symptoms of bleeding or clotting: No    Previous INR: Therapeutic last 2(+) visits    Additional findings: None       PLAN     Recommended plan for temporary change(s) affecting INR     Dosing Instructions: Continue your current warfarin dose with next INR in 2 weeks       Summary  As of 10/19/2022    Full warfarin instructions:  7.5 mg every day   Next INR check:  11/2/2022             In person visit  Blood pressure taken 162/92 manually with large cuff, 153/80 on his electronic machine.   Lab visit scheduled    Education provided: Please call back if any changes to your diet, medications or how you've been taking warfarin and Potential interaction between warfarin and alcohol    Plan made per [facility] anticoagulation protocol    Liz Coles RN  Anticoagulation Clinic  10/19/2022    _______________________________________________________________________     Anticoagulation Episode Summary     Current INR goal:  2.0-3.0   TTR:  77.2 % (11.8 mo)   Target end date:  Indefinite   Send INR reminders to:  [facility]    Indications    Unspecified atrial fibrillation (Resolved) [I48.91]  Anticoagulation monitoring  INR range 2-3 (Resolved) [Z79.01]  Atrial fibrillation with RVR (H) [I48.91]           Comments:  Babak prefers to be closer to 3.0 d/t previous CVA check Q 4 weeks.Declines to reduce dose when slightly over 3.0  Needs to change PCP         Anticoagulation Care Providers     Provider Role  Specialty Phone number    Teo Funes MD Referring Family Medicine [phone]

## 2022-11-02 ENCOUNTER — ANTICOAGULATION THERAPY VISIT (OUTPATIENT)
Dept: ANTICOAGULATION | Facility: OTHER | Age: 62
End: 2022-11-02
Attending: FAMILY MEDICINE
Payer: MEDICAID

## 2022-11-02 ENCOUNTER — HOSPITAL ENCOUNTER (OUTPATIENT)
Dept: GENERAL RADIOLOGY | Facility: OTHER | Age: 62
Discharge: HOME | End: 2022-11-02
Attending: FAMILY MEDICINE
Payer: MEDICAID

## 2022-11-02 ENCOUNTER — OFFICE VISIT (OUTPATIENT)
Dept: FAMILY MEDICINE | Facility: OTHER | Age: 62
End: 2022-11-02
Attending: FAMILY MEDICINE
Payer: MEDICAID

## 2022-11-02 VITALS
BODY MASS INDEX: 41.12 KG/M2 | RESPIRATION RATE: 20 BRPM | HEART RATE: 74 BPM | DIASTOLIC BLOOD PRESSURE: 100 MMHG | OXYGEN SATURATION: 96 % | TEMPERATURE: 98.4 F | SYSTOLIC BLOOD PRESSURE: 154 MMHG | WEIGHT: 315 LBS

## 2022-11-02 DIAGNOSIS — E66.01 MORBID OBESITY WITH BMI OF 40.0-44.9, ADULT (H): ICD-10-CM

## 2022-11-02 DIAGNOSIS — I48.91 ATRIAL FIBRILLATION WITH RVR (H): Primary | ICD-10-CM

## 2022-11-02 DIAGNOSIS — M17.0 PRIMARY OSTEOARTHRITIS OF BOTH KNEES: Primary | ICD-10-CM

## 2022-11-02 DIAGNOSIS — M17.0 PRIMARY OSTEOARTHRITIS OF BOTH KNEES: ICD-10-CM

## 2022-11-02 DIAGNOSIS — I10 BENIGN ESSENTIAL HYPERTENSION: ICD-10-CM

## 2022-11-02 LAB — INR POINT OF CARE: 2 (ref 0.9–1.1)

## 2022-11-02 PROCEDURE — 250N000011 HC RX IP 250 OP 636: Performed by: FAMILY MEDICINE

## 2022-11-02 PROCEDURE — 20610 DRAIN/INJ JOINT/BURSA W/O US: CPT | Performed by: FAMILY MEDICINE

## 2022-11-02 PROCEDURE — 85610 PROTHROMBIN TIME: CPT | Mod: ZL,QW

## 2022-11-02 PROCEDURE — G0463 HOSPITAL OUTPT CLINIC VISIT: HCPCS | Mod: 25

## 2022-11-02 PROCEDURE — 36416 COLLJ CAPILLARY BLOOD SPEC: CPT | Mod: ZL

## 2022-11-02 PROCEDURE — 73564 X-RAY EXAM KNEE 4 OR MORE: CPT | Mod: 50

## 2022-11-02 PROCEDURE — 99213 OFFICE O/P EST LOW 20 MIN: CPT | Mod: 25 | Performed by: FAMILY MEDICINE

## 2022-11-02 PROCEDURE — 250N000009 HC RX 250: Performed by: FAMILY MEDICINE

## 2022-11-02 RX ORDER — LIDOCAINE HYDROCHLORIDE 10 MG/ML
5 INJECTION, SOLUTION EPIDURAL; INFILTRATION; INTRACAUDAL; PERINEURAL ONCE
Status: COMPLETED | OUTPATIENT
Start: 2022-11-02 | End: 2022-11-02

## 2022-11-02 RX ORDER — TRIAMCINOLONE ACETONIDE 40 MG/ML
40 INJECTION, SUSPENSION INTRA-ARTICULAR; INTRAMUSCULAR ONCE
Status: COMPLETED | OUTPATIENT
Start: 2022-11-02 | End: 2022-11-02

## 2022-11-02 RX ORDER — WARFARIN SODIUM 2.5 MG/1
TABLET ORAL
Qty: 90 TABLET | Refills: 0 | Status: ON HOLD | OUTPATIENT
Start: 2022-11-02 | End: 2023-01-11

## 2022-11-02 RX ADMIN — LIDOCAINE HYDROCHLORIDE 5 ML: 10 INJECTION, SOLUTION EPIDURAL; INFILTRATION; INTRACAUDAL; PERINEURAL at 10:44

## 2022-11-02 RX ADMIN — TRIAMCINOLONE ACETONIDE 40 MG: 40 INJECTION, SUSPENSION INTRA-ARTICULAR; INTRAMUSCULAR at 10:44

## 2022-11-02 ASSESSMENT — PAIN SCALES - GENERAL: PAINLEVEL: SEVERE PAIN (7)

## 2022-11-02 NOTE — PATIENT INSTRUCTIONS
Schedule with Dr Johnson  Keep track of blood pressure and you may need another pill if it remains elevated

## 2022-11-02 NOTE — PROGRESS NOTES
Assessment & Plan       ICD-10-CM    1. Primary osteoarthritis of both knees  M17.0 XR Knee Standing 2v Bilateral & 2v Bilateral     triamcinolone (KENALOG-40) injection 40 mg     lidocaine (PF) (XYLOCAINE) 1 % injection 5 mL     Orthopedic  Referral     DRAIN/INJECT LARGE JOINT/BURSA      2. Benign essential hypertension  I10         He has severe tricompartmental arthritis.  Qualifies for knee replacement.  He has held off due to lack of insurance, but now has insurance coverage.  Referral placed to orthopedics.  He saw Dr. Johnosn in the past and would like to see Dr. Johnson locally.  We discussed criteria for replacement.  Current BMI is 41, he has gained weight this year.  BMI was 38 earlier this year.  He will work on weight loss of approximately 15 pounds to qualify for joint replacement.  Has been dealing with recurrent cellulitis, but now toe wound is healed.  He is aware that he needs to avoid further infection as he does not want to have an infected joint.    Due to significant discomfort, requesting steroid injection today.  Last performed in 2013.   PROCEDURE: Reviewed risks and benefits of injection. Patient gave verbal informed consent to proceed. Cleansed skin with chorhexadine. Injected 40 mg triamcinolone and 5 mL 1% lidocaine to the left knee joint using an anterior approach on Medial side. Bandage applied. Recommend icing the area afterwards to prevent steroid flare.    Blood pressure is elevated.  Recently though his blood pressures were low.  He reports no changes in medication since appointment earlier this month with me.  Blood pressure was completely normal at that time.  Continue monitoring at home.  He is can work on weight loss, which should help.  If blood pressure remains elevated, then likely start ARB.    Morbid obesity contributes to knee arthritis and hypertension.  Working on weight loss    BMI:   Estimated body mass index is 41.12 kg/m  as calculated from the  "following:    Height as of 8/25/22: 1.905 m (6' 3\").    Weight as of this encounter: 149.2 kg (329 lb).   Weight management plan: Discussed healthy diet and exercise guidelines      Teo Funes MD  [facility]    Benji Hilliard is a 62 year old, presenting for the following health issues:  Clinic Care Coordination - Follow-up (knee's)      History of Present Illness       Reason for visit:  Knees    He eats 0-1 servings of fruits and vegetables daily.He consumes 0 sweetened beverage(s) daily.He exercises with enough effort to increase his heart rate 9 or less minutes per day.  He exercises with enough effort to increase his heart rate 4 days per week.   He is taking medications regularly.       Chronic bilateral knee pain    Blood pressure elevated recently. No changes    Review of Systems   As above      Objective    BP (!) 154/100 (BP Location: Right arm, Patient Position: Sitting, Cuff Size: Adult Large)   Pulse 74   Temp 98.4  F (36.9  C) (Tympanic)   Resp 20   Wt 149.2 kg (329 lb)   SpO2 96%   BMI 41.12 kg/m    Body mass index is 41.12 kg/m .  Physical Exam   General Appearance: Alert. No acute distress  Psychiatric: Normal affect and mentation  Musculoskeletal: Arthritic appearing knees bilaterally    Results for orders placed or performed during the hospital encounter of 11/02/22   XR Knee Standing 2v Bilateral & 2v Bilateral     Status: None    Narrative    PROCEDURE: XR KNEE STANDING 2 VW BILAT AND 2 VW BILAT 11/2/2022 9:52  AM    HISTORY: Primary osteoarthritis of both knees    COMPARISONS: None.    TECHNIQUE: 4 views of each knee.    FINDINGS: There is tricompartmental degenerative change bilaterally.  There is severe narrowing of medial joint spaces with moderately  severe bilateral joint space narrowing. There is patellofemoral  degenerative change, worse on the left. Large osteophytes are seen  bilaterally.    No acute fracture or dislocation is seen. No joint " effusion is seen.  There are bilateral probable loose bodies.    Vascular calcification is seen.         Impression    IMPRESSION: Tricompartmental degenerative change bilaterally.    ISREAL REYNOSO MD         SYSTEM ID:  [id number]   Results for orders placed or performed in visit on 11/02/22   INR POCT     Status: Abnormal   Result Value Ref Range    INR Point of Care 2.0 (A) 0.9 - 1.1

## 2022-11-02 NOTE — PROGRESS NOTES
ANTICOAGULATION MANAGEMENT     Babak Moran 62 year old male is on warfarin with therapeutic INR result. (Goal INR 2.0-3.0)    Recent labs: (last 7 days)     11/02/22  0844   INR 2.0*       ASSESSMENT       Source(s): Chart Review and In person       Warfarin doses taken: Warfarin taken as instructed    Diet: No new diet changes identified    New illness, injury, or hospitalization: No    Medication/supplement changes: None noted    Signs or symptoms of bleeding or clotting: No    Previous INR: Therapeutic last visit; previously outside of goal range    Additional findings: None       PLAN     Recommended plan for no diet, medication or health factor changes affecting INR     Dosing Instructions: Continue your current warfarin dose with next INR in 2 weeks       Summary  As of 11/2/2022    Full warfarin instructions:  11/2: 10 mg; Otherwise 7.5 mg every day; Starting 11/2/2022   Next INR check:  11/17/2022             In person    Lab visit scheduled    Education provided: None required    Plan made per [facility] anticoagulation protocol    Liz Coles RN  Anticoagulation Clinic  11/2/2022    _______________________________________________________________________     Anticoagulation Episode Summary     Current INR goal:  2.0-3.0   TTR:  76.6 % (11.8 mo)   Target end date:  Indefinite   Send INR reminders to:  [facility]    Indications    Unspecified atrial fibrillation (Resolved) [I48.91]  Anticoagulation monitoring  INR range 2-3 (Resolved) [Z79.01]  Atrial fibrillation with RVR (H) [I48.91]           Comments:  Babak prefers to be closer to 3.0 d/t previous CVA check Q 4 weeks.Declines to reduce dose when slightly over 3.0  Needs to change PCP         Anticoagulation Care Providers     Provider Role Specialty Phone number    Teo Funes MD Referring Family Medicine [phone]

## 2022-11-17 ENCOUNTER — ANTICOAGULATION THERAPY VISIT (OUTPATIENT)
Dept: ANTICOAGULATION | Facility: OTHER | Age: 62
End: 2022-11-17
Attending: FAMILY MEDICINE
Payer: MEDICAID

## 2022-11-17 DIAGNOSIS — I48.91 ATRIAL FIBRILLATION WITH RVR (H): Primary | ICD-10-CM

## 2022-11-17 LAB — INR POINT OF CARE: 1.9 (ref 0.9–1.1)

## 2022-11-17 PROCEDURE — 85610 PROTHROMBIN TIME: CPT | Mod: ZL,QW

## 2022-11-17 NOTE — PROGRESS NOTES
ANTICOAGULATION MANAGEMENT     Babak Moran 62 year old male is on warfarin with subtherapeutic INR result. (Goal INR 2.0-3.0)    Recent labs: (last 7 days)     11/17/22  0958   INR 1.9*       ASSESSMENT       Source(s): Chart Review and In person       Warfarin doses taken: Warfarin taken as instructed    Diet: Increased greens/vitamin K in diet; ongoing change    New illness, injury, or hospitalization: No    Medication/supplement changes: None noted    Signs or symptoms of bleeding or clotting: No    Previous INR: Therapeutic last visit; previously outside of goal range    Additional findings: None       PLAN     Recommended plan for ongoing change(s) affecting INR     Dosing Instructions: Increase your warfarin dose (9.5% change) with next INR in 2 weeks       Summary  As of 11/17/2022    Full warfarin instructions:  10 mg every Mon, Thu; 7.5 mg all other days; Starting 11/17/2022   Next INR check:  11/28/2022             In person    Lab visit scheduled    Education provided: Please call back if any changes to your diet, medications or how you've been taking warfarin    Plan made per [facility] anticoagulation protocol    Nikki Aguilar, RN  Anticoagulation Clinic  11/17/2022    _______________________________________________________________________     Anticoagulation Episode Summary     Current INR goal:  2.0-3.0   TTR:  72.4 % (11.8 mo)   Target end date:  Indefinite   Send INR reminders to:  [facility]    Indications    Unspecified atrial fibrillation (Resolved) [I48.91]  Anticoagulation monitoring  INR range 2-3 (Resolved) [Z79.01]  Atrial fibrillation with RVR (H) [I48.91]           Comments:  Babak prefers to be closer to 3.0 d/t previous CVA check Q 4 weeks.Declines to reduce dose when slightly over 3.0  Needs to change PCP         Anticoagulation Care Providers     Provider Role Specialty Phone number    Teo Funes MD Referring [facility] Medicine [phone]

## 2022-11-28 ENCOUNTER — OFFICE VISIT (OUTPATIENT)
Dept: FAMILY MEDICINE | Facility: OTHER | Age: 62
End: 2022-11-28
Attending: ORTHOPAEDIC SURGERY
Payer: MEDICAID

## 2022-11-28 ENCOUNTER — ANTICOAGULATION THERAPY VISIT (OUTPATIENT)
Dept: ANTICOAGULATION | Facility: OTHER | Age: 62
End: 2022-11-28
Attending: FAMILY MEDICINE
Payer: MEDICAID

## 2022-11-28 ENCOUNTER — OFFICE VISIT (OUTPATIENT)
Dept: ORTHOPEDICS | Facility: OTHER | Age: 62
End: 2022-11-28
Attending: ORTHOPAEDIC SURGERY
Payer: MEDICAID

## 2022-11-28 VITALS
HEART RATE: 56 BPM | DIASTOLIC BLOOD PRESSURE: 88 MMHG | WEIGHT: 315 LBS | TEMPERATURE: 98.1 F | OXYGEN SATURATION: 96 % | SYSTOLIC BLOOD PRESSURE: 152 MMHG | BODY MASS INDEX: 40.87 KG/M2 | RESPIRATION RATE: 20 BRPM

## 2022-11-28 VITALS — OXYGEN SATURATION: 97 % | HEART RATE: 71 BPM

## 2022-11-28 DIAGNOSIS — R60.0 BILATERAL LEG EDEMA: ICD-10-CM

## 2022-11-28 DIAGNOSIS — I10 BENIGN ESSENTIAL HYPERTENSION: Primary | ICD-10-CM

## 2022-11-28 DIAGNOSIS — M17.0 PRIMARY OSTEOARTHRITIS OF BOTH KNEES: ICD-10-CM

## 2022-11-28 DIAGNOSIS — I48.91 ATRIAL FIBRILLATION WITH RVR (H): Primary | ICD-10-CM

## 2022-11-28 LAB — INR POINT OF CARE: 2.5 (ref 0.9–1.1)

## 2022-11-28 PROCEDURE — 250N000011 HC RX IP 250 OP 636: Performed by: ORTHOPAEDIC SURGERY

## 2022-11-28 PROCEDURE — 20610 DRAIN/INJ JOINT/BURSA W/O US: CPT | Mod: RT | Performed by: ORTHOPAEDIC SURGERY

## 2022-11-28 PROCEDURE — G0463 HOSPITAL OUTPT CLINIC VISIT: HCPCS | Mod: 25

## 2022-11-28 PROCEDURE — 99213 OFFICE O/P EST LOW 20 MIN: CPT | Performed by: FAMILY MEDICINE

## 2022-11-28 PROCEDURE — 250N000009 HC RX 250: Performed by: ORTHOPAEDIC SURGERY

## 2022-11-28 PROCEDURE — G0463 HOSPITAL OUTPT CLINIC VISIT: HCPCS | Mod: 25,27

## 2022-11-28 PROCEDURE — 36416 COLLJ CAPILLARY BLOOD SPEC: CPT | Mod: ZL

## 2022-11-28 PROCEDURE — 99203 OFFICE O/P NEW LOW 30 MIN: CPT | Mod: 25 | Performed by: ORTHOPAEDIC SURGERY

## 2022-11-28 PROCEDURE — 85610 PROTHROMBIN TIME: CPT | Mod: ZL,QW

## 2022-11-28 PROCEDURE — G0463 HOSPITAL OUTPT CLINIC VISIT: HCPCS

## 2022-11-28 RX ORDER — TRIAMCINOLONE ACETONIDE 40 MG/ML
40 INJECTION, SUSPENSION INTRA-ARTICULAR; INTRAMUSCULAR ONCE
Status: COMPLETED | OUTPATIENT
Start: 2022-11-28 | End: 2022-11-28

## 2022-11-28 RX ORDER — LIDOCAINE HYDROCHLORIDE 10 MG/ML
4 INJECTION, SOLUTION EPIDURAL; INFILTRATION; INTRACAUDAL; PERINEURAL ONCE
Status: COMPLETED | OUTPATIENT
Start: 2022-11-28 | End: 2022-11-28

## 2022-11-28 RX ORDER — HYDROCHLOROTHIAZIDE 12.5 MG/1
12.5 TABLET ORAL DAILY
Qty: 30 TABLET | Refills: 1 | Status: SHIPPED | OUTPATIENT
Start: 2022-11-28 | End: 2023-01-03

## 2022-11-28 RX ADMIN — LIDOCAINE HYDROCHLORIDE 4 ML: 10 INJECTION, SOLUTION INFILTRATION; PERINEURAL at 13:44

## 2022-11-28 RX ADMIN — TRIAMCINOLONE ACETONIDE 40 MG: 40 INJECTION, SUSPENSION INTRA-ARTICULAR; INTRAMUSCULAR at 13:44

## 2022-11-28 ASSESSMENT — PAIN SCALES - GENERAL
PAINLEVEL: SEVERE PAIN (6)
PAINLEVEL: SEVERE PAIN (7)

## 2022-11-28 NOTE — PROGRESS NOTES
Assessment & Plan       ICD-10-CM    1. Benign essential hypertension  I10 hydrochlorothiazide (HYDRODIURIL) 12.5 MG tablet      2. Bilateral leg edema  R60.0       3. Primary osteoarthritis of both knees  M17.0         In the past he dealt with cellulitis in legs, primarily the right due to a toe ulcer, which has now healed. No signs of infection  More likely edema is due to amlodipine and sodium intake in diet  He had a creatinine bump 10 years ago with another provider while on lisinopril and hydrochlorothiazide, so stopping amlodipine and making a significant change to that combination could be problematic  For now, keep amlodipine  Start hydrochlorothiazide 12.5 mg daily  Read labels on products and watch sodium content in diet  Eat potassium-rich foods  He hopes for a left knee replacement in Jan 2023, return for a preop and lab check in about a month      Teo Funes MD  [facility]      Benji Hilliard is a 62 year old, presenting for the following health issues:  Swelling      HPI     Swelling of both legs.    He is hoping for knee replacements  Legs have become swollen  Blood pressure elevated  Was on metoprolol, stopped due to bradycardia  Amlodipine started in August  Not using salt, but eating packaged foods with sodium    Review of Systems   General: Denies general constitutional problems  Cardiovascular: Denies problems  Respiratory: Denies problems       Objective    BP (!) 152/88 (BP Location: Right arm, Patient Position: Sitting, Cuff Size: Adult Large)   Pulse 56   Temp 98.1  F (36.7  C) (Tympanic)   Resp 20   Wt 148.3 kg (327 lb)   SpO2 96%   BMI 40.87 kg/m    Body mass index is 40.87 kg/m .  Physical Exam   General Appearance: Alert. No acute distress  Chest/Respiratory Exam: Clear to auscultation bilaterally  Cardiovascular Exam: Regular rate and rhythm. S1, S2, no murmur, gallop, or rubs.  Extremities: Trace pitting lower extremity edema.  Psychiatric:  Normal affect and mentation  Skin: no signs of cellulitis

## 2022-11-28 NOTE — NURSING NOTE
"Patient presents to the clinic for swelling of both legs.    FOOD SECURITY SCREENING QUESTIONS:    The next two questions are to help us understand your food security.  If you are feeling you need any assistance in this area, we have resources available to support you today.    Hunger Vital Signs:  Within the past 12 months we worried whether our food would run out before we got money to buy more. Never  Within the past 12 months the food we bought just didn't last and we didn't have money to get more. Never    Advance Care Directive on file? no  Advance Care Directive provided to patient? Declined.    Chief Complaint   Patient presents with     Swelling       Initial BP (!) 152/88 (BP Location: Right arm, Patient Position: Sitting, Cuff Size: Adult Large)   Pulse 56   Temp 98.1  F (36.7  C) (Tympanic)   Resp 20   Wt 148.3 kg (327 lb)   SpO2 96%   BMI 40.87 kg/m   Estimated body mass index is 40.87 kg/m  as calculated from the following:    Height as of 8/25/22: 1.905 m (6' 3\").    Weight as of this encounter: 148.3 kg (327 lb).  Medication Reconciliation: complete        Oneida Malone LPN         "

## 2022-11-28 NOTE — PROGRESS NOTES
A steroid injection was performed at R knee using 1% plain Lidocaine and 40 mg of Kenalog. This was well tolerated.

## 2022-11-28 NOTE — PROGRESS NOTES
ANTICOAGULATION MANAGEMENT     Babak Moran 62 year old male is on warfarin with therapeutic INR result. (Goal INR 2.0-3.0)    Recent labs: (last 7 days)     11/28/22  1320   INR 2.5*       ASSESSMENT       Source(s): Chart Review and In person       Warfarin doses taken: Warfarin taken as instructed    Diet: No new diet changes identified    New illness, injury, or hospitalization: No    Medication/supplement changes: None noted    Signs or symptoms of bleeding or clotting: No    Previous INR: Subtherapeutic    Additional findings: None       PLAN     Recommended plan for no diet, medication or health factor changes affecting INR     Dosing Instructions: Continue your current warfarin dose with next INR in 3 weeks       Summary  As of 11/28/2022    Full warfarin instructions:  10 mg every Mon, Thu; 7.5 mg all other days; Starting 11/28/2022   Next INR check:  12/19/2022             In person    Lab visit scheduled    Education provided: Please call back if any changes to your diet, medications or how you've been taking warfarin    Plan made per [facility]agulation protocol    Nikki Aguilar RN  Anticoagulation Clinic  11/28/2022    _______________________________________________________________________     Anticoagulation Episode Summary     Current INR goal:  2.0-3.0   TTR:  71.8 % (11.8 mo)   Target end date:  Indefinite   Send INR reminders to:  [facility]    Indications    Unspecified atrial fibrillation (Resolved) [I48.91]  Anticoagulation monitoring  INR range 2-3 (Resolved) [Z79.01]  Atrial fibrillation with RVR (H) [I48.91]           Comments:  Babak prefers to be closer to 3.0 d/t previous CVA check Q 4 weeks.Declines to reduce dose when slightly over 3.0  Needs to change PCP         Anticoagulation Care Providers     Provider Role Specialty Phone number    Teo Funes MD Referring Family Medicine [phone]

## 2022-11-29 NOTE — PROGRESS NOTES
Visit Date: 11/28/2022    A 62-year-old gentleman with bilateral knee pain, left greater than right.  He has had pain for years at this point.  Has lost over 100 pounds in the past, is still morbidly obese but just bumping on BMI of about 44.  He has had pain for years, worse now than it has ever been.  He had a cortisone injection a few weeks back on the left knee and was interested in having an injection into the right knee today.  His left knee pain has gotten so bad that at this point the injections only last a couple of weeks and then he has to live with the pain until he can get another injection.  He is interested in having that knee replaced at this point.  The right knee is not near as bad as the left one and he has had injections in that one before, but his symptoms are not quite as bad as they are on the left.    PHYSICAL EXAMINATION:  He has deformity of bilateral knees.  He has full extension but has very poor stability with toggling.  Otherwise, neuro and vascularly intact.  Of note, he has significant pitting edema in his bilateral lower extremities.    IMAGING:  X-ray examination shows complete destruction of the cartilage height in both knees with all of the stigmata of end-stage osteoarthritis.    IMPRESSION AND PLAN:  A 62-year-old gentleman with end-stage osteoarthritis in bilateral knees.  He would benefit from a total knee arthroplasty.  All the risks and benefits of surgical intervention were discussed with him.  We are going to go ahead and get him on the schedule sometime after the new year here and we will see him back at the time of surgery.  He is going to get a preoperative evaluation for optimization, not just for a preoperative evaluation but we need to get rid of some of the edema in his lower legs, and I would like to have him optimized prior to surgery as he is not the healthiest gentleman.  Otherwise, I will see him back at the time of surgery.    Elliott Johnson MD        D:  2022   T: 2022   MT: ADDISON    Name:     JENNIFER GALVEZ  MRN:      [id number]        Account:    [id number]   :      1960           Visit Date: 2022     Document: [id number]

## 2022-12-02 ENCOUNTER — TELEPHONE (OUTPATIENT)
Dept: ORTHOPEDICS | Facility: OTHER | Age: 62
End: 2022-12-02

## 2022-12-02 DIAGNOSIS — M17.0 PRIMARY OSTEOARTHRITIS OF BOTH KNEES: Primary | ICD-10-CM

## 2022-12-02 NOTE — TELEPHONE ENCOUNTER
Needs OP PT to start 5-7 days post-surgery.  Alex  1/9/2023   left total knee arthroplasty   bruno.

## 2022-12-19 ENCOUNTER — ANTICOAGULATION THERAPY VISIT (OUTPATIENT)
Dept: ANTICOAGULATION | Facility: OTHER | Age: 62
End: 2022-12-19
Attending: FAMILY MEDICINE
Payer: COMMERCIAL

## 2022-12-19 DIAGNOSIS — I48.91 ATRIAL FIBRILLATION WITH RVR (H): Primary | ICD-10-CM

## 2022-12-19 LAB — INR POINT OF CARE: 3.2 (ref 0.9–1.1)

## 2022-12-19 PROCEDURE — 85610 PROTHROMBIN TIME: CPT | Mod: QW

## 2022-12-19 PROCEDURE — 36416 COLLJ CAPILLARY BLOOD SPEC: CPT

## 2022-12-19 NOTE — PROGRESS NOTES
ANTICOAGULATION MANAGEMENT     Babak Moran 62 year old male is on warfarin with supratherapeutic INR result. (Goal INR 2.0-3.0)    Recent labs: (last 7 days)     12/19/22  1000   INR 3.2*       ASSESSMENT       Source(s): Chart Review and In person       Warfarin doses taken: Warfarin taken as instructed    Diet: No new diet changes identified    New illness, injury, or hospitalization: No    Medication/supplement changes: Hydrochlorothiazide started on 11/28/22 No interaction anticipated    Signs or symptoms of bleeding or clotting: No    Previous INR: Therapeutic last visit; previously outside of goal range    Additional findings: Having need replacement done on 1/9/23-has pre-op visit on 12/23/22 with Dr. wen.        PLAN     Recommended plan for no diet, medication or health factor changes affecting INR     Dosing Instructions: decrease your warfarin dose (4.3% change) with next INR in 2 weeks       Summary  As of 12/19/2022    Full warfarin instructions:  10 mg every Fri; 7.5 mg all other days; Starting 12/19/2022   Next INR check:  1/2/2023             In person    Lab visit scheduled    Education provided: Please call back if any changes to your diet, medications or how you've been taking warfarin    Plan made per [facility]agulation protocol    Nikki Aguilar, RN  Anticoagulation Clinic  12/19/2022    _______________________________________________________________________     Anticoagulation Episode Summary     Current INR goal:  2.0-3.0   TTR:  72.5 % (11.8 mo)   Target end date:  Indefinite   Send INR reminders to:  [facility]    Indications    Unspecified atrial fibrillation (Resolved) [I48.91]  Anticoagulation monitoring  INR range 2-3 (Resolved) [Z79.01]  Atrial fibrillation with RVR (H) [I48.91]           Comments:  Babak prefers to be closer to 3.0 d/t previous CVA check Q 4 weeks.Declines to reduce dose when slightly over 3.0  Needs to change PCP         Anticoagulation Care  Providers     Provider Role Specialty Phone number    Teo Funes MD Referring Family Medicine [phone]

## 2022-12-21 NOTE — H&P (VIEW-ONLY)
[facility]  [address]  [place] [address]  Phone: [phone]  Fax: [phone]  Primary Provider: Teo Funes  Pre-op Performing Provider: TEO FUNES      PREOPERATIVE EVALUATION:  Today's date: 12/23/2022    Babak Moran is a 62 year old male who presents for a preoperative evaluation.    Surgical Information:  Surgery/Procedure: ARTHROPLASTY, KNEE, TOTAL (right)  Surgery Location: [facility]  Surgeon: Dr. Johnson  Surgery Date: 1-9-23  Time of Surgery: unknown  Where patient plans to recover: Other: hospital  Fax number for surgical facility: Note does not need to be faxed, will be available electronically in Epic.    Type of Anesthesia Anticipated: Spinal with Block    Assessment & Plan     The proposed surgical procedure is considered INTERMEDIATE risk.      ICD-10-CM    1. Pre-op exam  Z01.818 EKG 12-lead, tracing only      2. Permanent atrial fibrillation (H)  I48.21 enoxaparin ANTICOAGULANT (LOVENOX) 120 MG/0.8ML syringe      3. Benign essential hypertension  I10 CBC W PLT No Diff     Basic Metabolic Panel     CBC W PLT No Diff     Basic Metabolic Panel      4. Longstanding persistent atrial fibrillation (H)  I48.11       5. History of stroke  Z86.73 Lipid Panel     Lipid Panel      6. Traumatic hematoma of buttock, initial encounter  S30.0XXA       7. Morbid obesity with BMI of 40.0-44.9, adult (H)  E66.01     Z68.41         Has atrial fibrillation on warfarin.  Has a history of stroke.  He is in the intermediate category for stroke risk when warfarin is held for surgery.  Any bridging is a risk benefit analysis.  He is not typically at higher risk for bleeding, but does have a substantial left buttock hematoma at this time.  In the past when he was not taking warfarin consistently for couple weeks, this is when his stroke occurred.  Therefore, we worked out a balanced plan where warfarin would be held 5 days prior to surgery.  After 3 days he will start  enoxaparin injections at near therapeutic dosing of 120 mg twice daily.  Based on weight he would be 150 mg twice daily, but again, some treatment is better than no treatment.  He will take enoxaparin for 2 days on January 6 and 7,  then hold for 24 hours prior to surgery.  If INR is over 3 or under 2, then the plan needs to be changed.    Labs are stable.  Historically his LDL has been under 70, but recent values are higher.  Not on a statin, but what appears to be indicated now based on history of stroke.    Has a hematoma in the left buttock after a fall.  It is not concerning apart from the fact that he plans to have knee surgery on that side.  This may lead to more swelling and impaired healing.  He will check with orthopedic surgery to see if left knee replacement can still proceed or if it could be switched to night knee replacement. I contacted Dr Johnson and the orthopedic nurse about the hematoma.    He is losing weight to get BMI under 40 prior to surgery.       Risks and Recommendations:  The patient has the following additional risks and recommendations for perioperative complications:  Cardiovascular:  Not on any rate controlling medicine for atrial fibrillation, but rate has been controlled.  He was bradycardic with use of a beta-blocker.    Medication Instructions:  Warfarin and bridging as noted above    RECOMMENDATION:  APPROVAL GIVEN to proceed with proposed procedure, without further diagnostic evaluation.    41 minutes spent on the date of the encounter doing chart review, review of test results, patient visit, documentation and contacting orthopedics       Teo Funes MD       Subjective     HPI related to upcoming procedure: 62 year old male with atrial fibrillation here for preop prior to knee replacement.     Fell on left hip area 1 week ago. Developed substantial bruising after. Not yet improving.     Preop Questions 12/23/2022   1. Have you ever had a heart attack or stroke? YES - 2017    2. Have you ever had surgery on your heart or blood vessels, such as a stent placement, a coronary artery bypass, or surgery on an artery in your head, neck, heart, or legs? No   3. Do you have chest pain with activity? No   4. Do you have a history of  heart failure? No   5. Do you currently have a cold, bronchitis or symptoms of other infection? No   6. Do you have a cough, shortness of breath, or wheezing? No   7. Do you or anyone in your family have previous history of blood clots? UNKNOWN -    8. Do you or does anyone in your family have a serious bleeding problem such as prolonged bleeding following surgeries or cuts? UNKNOWN -    9. Have you ever had problems with anemia or been told to take iron pills? No   10. Have you had any abnormal blood loss such as black, tarry or bloody stools? No   11. Have you ever had a blood transfusion? No   12. Are you willing to have a blood transfusion if it is medically needed before, during, or after your surgery? Yes   13. Have you or any of your relatives ever had problems with anesthesia? No   14. Do you have sleep apnea, excessive snoring or daytime drowsiness? No   15. Do you have any artifical heart valves or other implanted medical devices like a pacemaker, defibrillator, or continuous glucose monitor? No   16. Do you have artificial joints? No   17. Are you allergic to latex? No     Health Care Directive:  Patient does not have a Health Care Directive or Living Will    Preoperative Review of :   reviewed - no record of controlled substances prescribed.        Review of Systems  General: Denies general constitutional problems  Cardiovascular: Denies problems  Respiratory: Denies problems     Patient Active Problem List    Diagnosis Date Noted     Onychogryphosis 08/27/2022     Priority: Medium     Cellulitis of right knee 08/08/2022     Priority: Medium     Cellulitis of third toe, right 08/08/2022     Priority: Medium     Need for vaccination 04/10/2022      Priority: Medium     Obstructive sleep apnea syndrome 05/13/2019     Priority: Medium     Osteoarthrosis 01/16/2018     Priority: Medium     Tissue plasminogen activator (t-PA) administered at other facility within 24 hours prior to current admission 01/16/2018     Priority: Medium     Morbid obesity with BMI of 40.0-44.9, adult (H) 02/08/2017     Priority: Medium     Cerebrovascular accident (CVA) due to embolism of right middle cerebral artery (H) 02/03/2017     Priority: Medium     Left hemiparesis (H) 02/03/2017     Priority: Medium     Atrial fibrillation with RVR (H) 06/25/2015     Priority: Medium     Long-term (current) use of anticoagulants, INR goal 2.0-3.0 06/25/2015     Priority: Medium     Family history of coronary artery disease 05/19/2014     Priority: Medium     Ascending aorta enlargement (H) 02/13/2014     Priority: Medium     Gout 01/07/2014     Priority: Medium     Overview:   Overview:   after 3 attacks in < a year, tapped and crystal proven 5/2/13;  Allopurinol started then got ? Atypical side effect, stopped; (short term colchicine prophylaxis)       Overweight 05/02/2013     Priority: Medium     Formatting of this note might be different from the original.  Max weight reported 475 lbs;  5/13 369 lbs       Pain in joint, ankle and foot 05/17/2012     Priority: Medium     Essential hypertension 09/08/2009     Priority: Medium     Cutaneous lupus erythematosus 11/12/2008     Priority: Medium     Systemic lupus erythematosus (H) 09/18/2008     Priority: Medium     Overview:   Dermatitis        Past Medical History:   Diagnosis Date     Atrial fibrillation (H) 02/03/2017    on Warfarin     Bacterial sepsis (H) 8/8/2022     Cellulitis of left lower extremity 9/5/2019     Cerebral infarction (H)      Cerebral infarction due to unspecified occlusion or stenosis of right middle cerebral artery (H) 02/03/2017    ,Left hemiparesis, left neglect, impaired balance and gait following R MCA stroke with  mild hemorrhagic transformation s/p tissue plasminogen activator and mechanical thrombectomy, s/p C7 facet fracture from fall off forklift.     Chest pain 02/1997     Disorder of skin or subcutaneous tissue 07/25/2011     Essential (primary) hypertension 02/1997     Fracture of cervical vertebra (H)     02/03/2017,C7 facet fracture from fall off forklift, nondisplaced     Gout      Hemiplegia affecting left nondominant side (H) 02/03/2017     Obesity 02/03/2017    body mass index of 40     Other local lupus erythematosus      Sepsis (H) 9/6/2019     Past Surgical History:   Procedure Laterality Date     ARTHROSCOPY KNEE      bilateral knees     COLONOSCOPY  01/02/2012    Normal; Next due 2022     OTHER SURGICAL HISTORY  02/03/2017     Current Outpatient Medications   Medication Sig Dispense Refill     acetaminophen (TYLENOL) 500 MG tablet Take 500-1,000 mg by mouth every 6 hours as needed for mild pain       amLODIPine (NORVASC) 10 MG tablet Take 1 tablet (10 mg) by mouth daily 90 tablet 3     hydrochlorothiazide (HYDRODIURIL) 12.5 MG tablet Take 1 tablet (12.5 mg) by mouth daily 30 tablet 1     ibuprofen (ADVIL/MOTRIN) 200 MG tablet Take 800 mg by mouth daily as needed for mild pain       warfarin ANTICOAGULANT (COUMADIN) 2.5 MG tablet Take 1 tab with a 5 mg tab daily or as instructed by the [facility]. 90 tablet 0     warfarin ANTICOAGULANT (COUMADIN) 5 MG tablet Take 1.5 tablets (7.5 mg) by mouth daily OR AS DIRECTED BY [facility] 140 tablet 0       Allergies   Allergen Reactions     Diatrizoate Rash     Ioxaglate Other (See Comments)     Does not recall     Levofloxacin Hives and Rash     Metrizamide Rash     (Diagnostic X-Ray materials)     Probenecid Rash        Social History     Tobacco Use     Smoking status: Never     Passive exposure: Yes     Smokeless tobacco: Never   Substance Use Topics     Alcohol use: Not Currently     Comment: couple of times per year     Family History   Problem Relation  "Age of Onset     Unknown/Adopted Paternal Grandfather         Unknown, around age 67.     Other - See Comments Father         Parkinson's disease Alzheimer's     Cancer Father         Cancer     Heart Disease Maternal Grandmother         Heart Disease,MI in her 60's.     Heart Disease Mother 60        Heart Disease,MI     Other - See Comments Mother         rheumatoid arthritis.     Heart Disease Maternal Uncle 69        Heart Disease     Hypertension Sister         Hypertension     Cancer Sister         Cancer,melanoma     Heart Disease Paternal Uncle         Heart Disease, around 69.     History   Drug Use Unknown         Objective     /85   Pulse 68   Temp 98.3  F (36.8  C) (Tympanic)   Resp 16   Ht 1.867 m (6' 1.5\")   Wt 147.4 kg (325 lb)   SpO2 98%   BMI 42.30 kg/m      Physical Exam  General Appearance: Pleasant, alert, appropriate appearance for age. No acute distress  Ear Exam: Normal TM's bilaterally.   OroPharynx Exam: Dental hygiene adequate. Normal buccal mucosa. Normal pharynx. Mallampati 2/4.  Neck Exam: Supple, no masses or nodes.  Chest/Respiratory Exam: Normal chest wall and respirations. Clear to auscultation.  Cardiovascular Exam: Regular rate and rhythm. S1, S2, no murmur, click, gallop, or rubs.  Extremities: 1 + pedal pulses.  No pitting lower extremity edema. Chronic venous stasis changes.  Neurologic Exam: Nonfocal; symmetric DTRs, normal gross motor movement, tone, and coordination. No tremor.  Skin: Ecchymosis from superior left buttock to mid posterior thigh.  Significant induration in left buttocks consistent with hematoma.   Psychiatric: Normal affect and mentation      Recent Labs   Lab Test 22  1000 22  1320 22  0807 22  0627 08/10/22  0709 08/10/22  0647 22  0434   HGB  --   --   --  13.8  --   --  13.5   PLT  --   --   --  267  --   --  231   INR 3.2* 2.5*   < > 2.24*  --    < > 2.89*   NA  --   --   --  139  --   --  136   POTASSIUM "  --   --   --  3.7 3.8  --  3.7   CR  --   --   --  0.77  --   --  0.80   A1C  --   --   --   --   --   --  5.8    < > = values in this interval not displayed.        Diagnostics:  Results for orders placed or performed in visit on 12/23/22   CBC W PLT No Diff     Status: Normal   Result Value Ref Range    WBC Count 8.3 4.0 - 11.0 10e3/uL    RBC Count 4.74 4.40 - 5.90 10e6/uL    Hemoglobin 13.9 13.3 - 17.7 g/dL    Hematocrit 43.9 40.0 - 53.0 %    MCV 93 78 - 100 fL    MCH 29.3 26.5 - 33.0 pg    MCHC 31.7 31.5 - 36.5 g/dL    RDW 14.5 10.0 - 15.0 %    Platelet Count 297 150 - 450 10e3/uL   Basic Metabolic Panel     Status: Abnormal   Result Value Ref Range    Sodium 139 136 - 145 mmol/L    Potassium 4.4 3.4 - 5.3 mmol/L    Chloride 100 98 - 107 mmol/L    Carbon Dioxide (CO2) 32 (H) 22 - 29 mmol/L    Anion Gap 7 7 - 15 mmol/L    Urea Nitrogen 19.3 8.0 - 23.0 mg/dL    Creatinine 0.76 0.67 - 1.17 mg/dL    Calcium 9.6 8.8 - 10.2 mg/dL    Glucose 100 (H) 70 - 99 mg/dL    GFR Estimate >90 >60 mL/min/1.73m2   Lipid Panel     Status: Abnormal   Result Value Ref Range    Cholesterol 175 <200 mg/dL    Triglycerides 49 <150 mg/dL    Direct Measure HDL 47 >=40 mg/dL    LDL Cholesterol Calculated 118 (H) <=100 mg/dL    Non HDL Cholesterol 128 <130 mg/dL    Narrative    Cholesterol  Desirable:  <200 mg/dL    Triglycerides  Normal:  Less than 150 mg/dL  Borderline High:  150-199 mg/dL  High:  200-499 mg/dL  Very High:  Greater than or equal to 500 mg/dL    Direct Measure HDL  Female:  Greater than or equal to 50 mg/dL   Male:  Greater than or equal to 40 mg/dL    LDL Cholesterol  Desirable:  <100mg/dL  Above Desirable:  100-129 mg/dL   Borderline High:  130-159 mg/dL   High:  160-189 mg/dL   Very High:  >= 190 mg/dL    Non HDL Cholesterol  Desirable:  130 mg/dL  Above Desirable:  130-159 mg/dL  Borderline High:  160-189 mg/dL  High:  190-219 mg/dL  Very High:  Greater than or equal to 220 mg/dL   EKG 12-lead, tracing only      Status: None   Result Value Ref Range    Systolic Blood Pressure  mmHg    Diastolic Blood Pressure  mmHg    Ventricular Rate 85 BPM    Atrial Rate 67 BPM    IN Interval  ms    QRS Duration 104 ms     ms    QTc 452 ms    P Axis  degrees    R AXIS 39 degrees    T Axis -5 degrees    Interpretation ECG       Atrial fibrillation with premature ventricular or aberrantly conducted complexes  Nonspecific ST abnormality  Abnormal ECG  No previous ECGs available  Confirmed by DO GASTON STACY ([id number]) on 12/23/2022 10:19:18 PM             Revised Cardiac Risk Index (RCRI):  The patient has the following serious cardiovascular risks for perioperative complications:   - Cerebrovascular Disease (TIA or CVA) = 1 point     RCRI Interpretation: 1 point: Class II (low risk - 0.9% complication rate)           Signed Electronically by: Teo Funes MD  Copy of this evaluation report is provided to requesting physician.

## 2022-12-21 NOTE — PROGRESS NOTES
[facility]  [address]  [place] [address]  Phone: [phone]  Fax: [phone]  Primary Provider: Teo Funes  Pre-op Performing Provider: TEO FUNES      PREOPERATIVE EVALUATION:  Today's date: 12/23/2022    Babak Moran is a 62 year old male who presents for a preoperative evaluation.    Surgical Information:  Surgery/Procedure: ARTHROPLASTY, KNEE, TOTAL (right)  Surgery Location: [facility]  Surgeon: Dr. Johnson  Surgery Date: 1-9-23  Time of Surgery: unknown  Where patient plans to recover: Other: hospital  Fax number for surgical facility: Note does not need to be faxed, will be available electronically in Epic.    Type of Anesthesia Anticipated: Spinal with Block    Assessment & Plan     The proposed surgical procedure is considered INTERMEDIATE risk.      ICD-10-CM    1. Pre-op exam  Z01.818 EKG 12-lead, tracing only      2. Permanent atrial fibrillation (H)  I48.21 enoxaparin ANTICOAGULANT (LOVENOX) 120 MG/0.8ML syringe      3. Benign essential hypertension  I10 CBC W PLT No Diff     Basic Metabolic Panel     CBC W PLT No Diff     Basic Metabolic Panel      4. Longstanding persistent atrial fibrillation (H)  I48.11       5. History of stroke  Z86.73 Lipid Panel     Lipid Panel      6. Traumatic hematoma of buttock, initial encounter  S30.0XXA       7. Morbid obesity with BMI of 40.0-44.9, adult (H)  E66.01     Z68.41         Has atrial fibrillation on warfarin.  Has a history of stroke.  He is in the intermediate category for stroke risk when warfarin is held for surgery.  Any bridging is a risk benefit analysis.  He is not typically at higher risk for bleeding, but does have a substantial left buttock hematoma at this time.  In the past when he was not taking warfarin consistently for couple weeks, this is when his stroke occurred.  Therefore, we worked out a balanced plan where warfarin would be held 5 days prior to surgery.  After 3 days he will start  enoxaparin injections at near therapeutic dosing of 120 mg twice daily.  Based on weight he would be 150 mg twice daily, but again, some treatment is better than no treatment.  He will take enoxaparin for 2 days on January 6 and 7,  then hold for 24 hours prior to surgery.  If INR is over 3 or under 2, then the plan needs to be changed.    Labs are stable.  Historically his LDL has been under 70, but recent values are higher.  Not on a statin, but what appears to be indicated now based on history of stroke.    Has a hematoma in the left buttock after a fall.  It is not concerning apart from the fact that he plans to have knee surgery on that side.  This may lead to more swelling and impaired healing.  He will check with orthopedic surgery to see if left knee replacement can still proceed or if it could be switched to night knee replacement. I contacted Dr Johnson and the orthopedic nurse about the hematoma.    He is losing weight to get BMI under 40 prior to surgery.       Risks and Recommendations:  The patient has the following additional risks and recommendations for perioperative complications:  Cardiovascular:  Not on any rate controlling medicine for atrial fibrillation, but rate has been controlled.  He was bradycardic with use of a beta-blocker.    Medication Instructions:  Warfarin and bridging as noted above    RECOMMENDATION:  APPROVAL GIVEN to proceed with proposed procedure, without further diagnostic evaluation.    41 minutes spent on the date of the encounter doing chart review, review of test results, patient visit, documentation and contacting orthopedics       Teo Funes MD       Subjective     HPI related to upcoming procedure: 62 year old male with atrial fibrillation here for preop prior to knee replacement.     Fell on left hip area 1 week ago. Developed substantial bruising after. Not yet improving.     Preop Questions 12/23/2022   1. Have you ever had a heart attack or stroke? YES - 2017    2. Have you ever had surgery on your heart or blood vessels, such as a stent placement, a coronary artery bypass, or surgery on an artery in your head, neck, heart, or legs? No   3. Do you have chest pain with activity? No   4. Do you have a history of  heart failure? No   5. Do you currently have a cold, bronchitis or symptoms of other infection? No   6. Do you have a cough, shortness of breath, or wheezing? No   7. Do you or anyone in your family have previous history of blood clots? UNKNOWN -    8. Do you or does anyone in your family have a serious bleeding problem such as prolonged bleeding following surgeries or cuts? UNKNOWN -    9. Have you ever had problems with anemia or been told to take iron pills? No   10. Have you had any abnormal blood loss such as black, tarry or bloody stools? No   11. Have you ever had a blood transfusion? No   12. Are you willing to have a blood transfusion if it is medically needed before, during, or after your surgery? Yes   13. Have you or any of your relatives ever had problems with anesthesia? No   14. Do you have sleep apnea, excessive snoring or daytime drowsiness? No   15. Do you have any artifical heart valves or other implanted medical devices like a pacemaker, defibrillator, or continuous glucose monitor? No   16. Do you have artificial joints? No   17. Are you allergic to latex? No     Health Care Directive:  Patient does not have a Health Care Directive or Living Will    Preoperative Review of :   reviewed - no record of controlled substances prescribed.        Review of Systems  General: Denies general constitutional problems  Cardiovascular: Denies problems  Respiratory: Denies problems     Patient Active Problem List    Diagnosis Date Noted     Onychogryphosis 08/27/2022     Priority: Medium     Cellulitis of right knee 08/08/2022     Priority: Medium     Cellulitis of third toe, right 08/08/2022     Priority: Medium     Need for vaccination 04/10/2022      Priority: Medium     Obstructive sleep apnea syndrome 05/13/2019     Priority: Medium     Osteoarthrosis 01/16/2018     Priority: Medium     Tissue plasminogen activator (t-PA) administered at other facility within 24 hours prior to current admission 01/16/2018     Priority: Medium     Morbid obesity with BMI of 40.0-44.9, adult (H) 02/08/2017     Priority: Medium     Cerebrovascular accident (CVA) due to embolism of right middle cerebral artery (H) 02/03/2017     Priority: Medium     Left hemiparesis (H) 02/03/2017     Priority: Medium     Atrial fibrillation with RVR (H) 06/25/2015     Priority: Medium     Long-term (current) use of anticoagulants, INR goal 2.0-3.0 06/25/2015     Priority: Medium     Family history of coronary artery disease 05/19/2014     Priority: Medium     Ascending aorta enlargement (H) 02/13/2014     Priority: Medium     Gout 01/07/2014     Priority: Medium     Overview:   Overview:   after 3 attacks in < a year, tapped and crystal proven 5/2/13;  Allopurinol started then got ? Atypical side effect, stopped; (short term colchicine prophylaxis)       Overweight 05/02/2013     Priority: Medium     Formatting of this note might be different from the original.  Max weight reported 475 lbs;  5/13 369 lbs       Pain in joint, ankle and foot 05/17/2012     Priority: Medium     Essential hypertension 09/08/2009     Priority: Medium     Cutaneous lupus erythematosus 11/12/2008     Priority: Medium     Systemic lupus erythematosus (H) 09/18/2008     Priority: Medium     Overview:   Dermatitis        Past Medical History:   Diagnosis Date     Atrial fibrillation (H) 02/03/2017    on Warfarin     Bacterial sepsis (H) 8/8/2022     Cellulitis of left lower extremity 9/5/2019     Cerebral infarction (H)      Cerebral infarction due to unspecified occlusion or stenosis of right middle cerebral artery (H) 02/03/2017    ,Left hemiparesis, left neglect, impaired balance and gait following R MCA stroke with  mild hemorrhagic transformation s/p tissue plasminogen activator and mechanical thrombectomy, s/p C7 facet fracture from fall off forklift.     Chest pain 02/1997     Disorder of skin or subcutaneous tissue 07/25/2011     Essential (primary) hypertension 02/1997     Fracture of cervical vertebra (H)     02/03/2017,C7 facet fracture from fall off forklift, nondisplaced     Gout      Hemiplegia affecting left nondominant side (H) 02/03/2017     Obesity 02/03/2017    body mass index of 40     Other local lupus erythematosus      Sepsis (H) 9/6/2019     Past Surgical History:   Procedure Laterality Date     ARTHROSCOPY KNEE      bilateral knees     COLONOSCOPY  01/02/2012    Normal; Next due 2022     OTHER SURGICAL HISTORY  02/03/2017     Current Outpatient Medications   Medication Sig Dispense Refill     acetaminophen (TYLENOL) 500 MG tablet Take 500-1,000 mg by mouth every 6 hours as needed for mild pain       amLODIPine (NORVASC) 10 MG tablet Take 1 tablet (10 mg) by mouth daily 90 tablet 3     hydrochlorothiazide (HYDRODIURIL) 12.5 MG tablet Take 1 tablet (12.5 mg) by mouth daily 30 tablet 1     ibuprofen (ADVIL/MOTRIN) 200 MG tablet Take 800 mg by mouth daily as needed for mild pain       warfarin ANTICOAGULANT (COUMADIN) 2.5 MG tablet Take 1 tab with a 5 mg tab daily or as instructed by the protime clinic. 90 tablet 0     warfarin ANTICOAGULANT (COUMADIN) 5 MG tablet Take 1.5 tablets (7.5 mg) by mouth daily OR AS DIRECTED BY [facility] 140 tablet 0       Allergies   Allergen Reactions     Diatrizoate Rash     Ioxaglate Other (See Comments)     Does not recall     Levofloxacin Hives and Rash     Metrizamide Rash     (Diagnostic X-Ray materials)     Probenecid Rash        Social History     Tobacco Use     Smoking status: Never     Passive exposure: Yes     Smokeless tobacco: Never   Substance Use Topics     Alcohol use: Not Currently     Comment: couple of times per year     Family History   Problem Relation  "Age of Onset     Unknown/Adopted Paternal Grandfather         Unknown, around age 67.     Other - See Comments Father         Parkinson's disease Alzheimer's     Cancer Father         Cancer     Heart Disease Maternal Grandmother         Heart Disease,MI in her 60's.     Heart Disease Mother 60        Heart Disease,MI     Other - See Comments Mother         rheumatoid arthritis.     Heart Disease Maternal Uncle 69        Heart Disease     Hypertension Sister         Hypertension     Cancer Sister         Cancer,melanoma     Heart Disease Paternal Uncle         Heart Disease, around 69.     History   Drug Use Unknown         Objective     /85   Pulse 68   Temp 98.3  F (36.8  C) (Tympanic)   Resp 16   Ht 1.867 m (6' 1.5\")   Wt 147.4 kg (325 lb)   SpO2 98%   BMI 42.30 kg/m      Physical Exam  General Appearance: Pleasant, alert, appropriate appearance for age. No acute distress  Ear Exam: Normal TM's bilaterally.   OroPharynx Exam: Dental hygiene adequate. Normal buccal mucosa. Normal pharynx. Mallampati 2/4.  Neck Exam: Supple, no masses or nodes.  Chest/Respiratory Exam: Normal chest wall and respirations. Clear to auscultation.  Cardiovascular Exam: Regular rate and rhythm. S1, S2, no murmur, click, gallop, or rubs.  Extremities: 1 + pedal pulses.  No pitting lower extremity edema. Chronic venous stasis changes.  Neurologic Exam: Nonfocal; symmetric DTRs, normal gross motor movement, tone, and coordination. No tremor.  Skin: Ecchymosis from superior left buttock to mid posterior thigh.  Significant induration in left buttocks consistent with hematoma.   Psychiatric: Normal affect and mentation      Recent Labs   Lab Test 22  1000 22  1320 22  0807 22  0627 08/10/22  0709 08/10/22  0647 22  0434   HGB  --   --   --  13.8  --   --  13.5   PLT  --   --   --  267  --   --  231   INR 3.2* 2.5*   < > 2.24*  --    < > 2.89*   NA  --   --   --  139  --   --  136   POTASSIUM "  --   --   --  3.7 3.8  --  3.7   CR  --   --   --  0.77  --   --  0.80   A1C  --   --   --   --   --   --  5.8    < > = values in this interval not displayed.        Diagnostics:  Results for orders placed or performed in visit on 12/23/22   CBC W PLT No Diff     Status: Normal   Result Value Ref Range    WBC Count 8.3 4.0 - 11.0 10e3/uL    RBC Count 4.74 4.40 - 5.90 10e6/uL    Hemoglobin 13.9 13.3 - 17.7 g/dL    Hematocrit 43.9 40.0 - 53.0 %    MCV 93 78 - 100 fL    MCH 29.3 26.5 - 33.0 pg    MCHC 31.7 31.5 - 36.5 g/dL    RDW 14.5 10.0 - 15.0 %    Platelet Count 297 150 - 450 10e3/uL   Basic Metabolic Panel     Status: Abnormal   Result Value Ref Range    Sodium 139 136 - 145 mmol/L    Potassium 4.4 3.4 - 5.3 mmol/L    Chloride 100 98 - 107 mmol/L    Carbon Dioxide (CO2) 32 (H) 22 - 29 mmol/L    Anion Gap 7 7 - 15 mmol/L    Urea Nitrogen 19.3 8.0 - 23.0 mg/dL    Creatinine 0.76 0.67 - 1.17 mg/dL    Calcium 9.6 8.8 - 10.2 mg/dL    Glucose 100 (H) 70 - 99 mg/dL    GFR Estimate >90 >60 mL/min/1.73m2   Lipid Panel     Status: Abnormal   Result Value Ref Range    Cholesterol 175 <200 mg/dL    Triglycerides 49 <150 mg/dL    Direct Measure HDL 47 >=40 mg/dL    LDL Cholesterol Calculated 118 (H) <=100 mg/dL    Non HDL Cholesterol 128 <130 mg/dL    Narrative    Cholesterol  Desirable:  <200 mg/dL    Triglycerides  Normal:  Less than 150 mg/dL  Borderline High:  150-199 mg/dL  High:  200-499 mg/dL  Very High:  Greater than or equal to 500 mg/dL    Direct Measure HDL  Female:  Greater than or equal to 50 mg/dL   Male:  Greater than or equal to 40 mg/dL    LDL Cholesterol  Desirable:  <100mg/dL  Above Desirable:  100-129 mg/dL   Borderline High:  130-159 mg/dL   High:  160-189 mg/dL   Very High:  >= 190 mg/dL    Non HDL Cholesterol  Desirable:  130 mg/dL  Above Desirable:  130-159 mg/dL  Borderline High:  160-189 mg/dL  High:  190-219 mg/dL  Very High:  Greater than or equal to 220 mg/dL   EKG 12-lead, tracing only      Status: None   Result Value Ref Range    Systolic Blood Pressure  mmHg    Diastolic Blood Pressure  mmHg    Ventricular Rate 85 BPM    Atrial Rate 67 BPM    MI Interval  ms    QRS Duration 104 ms     ms    QTc 452 ms    P Axis  degrees    R AXIS 39 degrees    T Axis -5 degrees    Interpretation ECG       Atrial fibrillation with premature ventricular or aberrantly conducted complexes  Nonspecific ST abnormality  Abnormal ECG  No previous ECGs available  Confirmed by DO GASTON STACY ([id number]) on 12/23/2022 10:19:18 PM             Revised Cardiac Risk Index (RCRI):  The patient has the following serious cardiovascular risks for perioperative complications:   - Cerebrovascular Disease (TIA or CVA) = 1 point     RCRI Interpretation: 1 point: Class II (low risk - 0.9% complication rate)           Signed Electronically by: Teo Funes MD  Copy of this evaluation report is provided to requesting physician.

## 2022-12-23 ENCOUNTER — OFFICE VISIT (OUTPATIENT)
Dept: FAMILY MEDICINE | Facility: OTHER | Age: 62
End: 2022-12-23
Attending: FAMILY MEDICINE
Payer: COMMERCIAL

## 2022-12-23 VITALS
DIASTOLIC BLOOD PRESSURE: 85 MMHG | OXYGEN SATURATION: 98 % | WEIGHT: 315 LBS | TEMPERATURE: 98.3 F | BODY MASS INDEX: 40.43 KG/M2 | SYSTOLIC BLOOD PRESSURE: 116 MMHG | HEART RATE: 68 BPM | RESPIRATION RATE: 16 BRPM | HEIGHT: 74 IN

## 2022-12-23 DIAGNOSIS — S30.0XXA TRAUMATIC HEMATOMA OF BUTTOCK, INITIAL ENCOUNTER: ICD-10-CM

## 2022-12-23 DIAGNOSIS — I48.11 LONGSTANDING PERSISTENT ATRIAL FIBRILLATION (H): ICD-10-CM

## 2022-12-23 DIAGNOSIS — I48.21 PERMANENT ATRIAL FIBRILLATION (H): ICD-10-CM

## 2022-12-23 DIAGNOSIS — I10 BENIGN ESSENTIAL HYPERTENSION: ICD-10-CM

## 2022-12-23 DIAGNOSIS — E66.01 MORBID OBESITY WITH BMI OF 40.0-44.9, ADULT (H): ICD-10-CM

## 2022-12-23 DIAGNOSIS — Z01.818 PRE-OP EXAM: Primary | ICD-10-CM

## 2022-12-23 DIAGNOSIS — Z86.73 HISTORY OF STROKE: ICD-10-CM

## 2022-12-23 LAB
ANION GAP SERPL CALCULATED.3IONS-SCNC: 7 MMOL/L (ref 7–15)
ATRIAL RATE - MUSE: 67 BPM
BUN SERPL-MCNC: 19.3 MG/DL (ref 8–23)
CALCIUM SERPL-MCNC: 9.6 MG/DL (ref 8.8–10.2)
CHLORIDE SERPL-SCNC: 100 MMOL/L (ref 98–107)
CHOLEST SERPL-MCNC: 175 MG/DL
CREAT SERPL-MCNC: 0.76 MG/DL (ref 0.67–1.17)
DEPRECATED HCO3 PLAS-SCNC: 32 MMOL/L (ref 22–29)
DIASTOLIC BLOOD PRESSURE - MUSE: NORMAL MMHG
ERYTHROCYTE [DISTWIDTH] IN BLOOD BY AUTOMATED COUNT: 14.5 % (ref 10–15)
GFR SERPL CREATININE-BSD FRML MDRD: >90 ML/MIN/1.73M2
GLUCOSE SERPL-MCNC: 100 MG/DL (ref 70–99)
HCT VFR BLD AUTO: 43.9 % (ref 40–53)
HDLC SERPL-MCNC: 47 MG/DL
HGB BLD-MCNC: 13.9 G/DL (ref 13.3–17.7)
INTERPRETATION ECG - MUSE: NORMAL
LDLC SERPL CALC-MCNC: 118 MG/DL
MCH RBC QN AUTO: 29.3 PG (ref 26.5–33)
MCHC RBC AUTO-ENTMCNC: 31.7 G/DL (ref 31.5–36.5)
MCV RBC AUTO: 93 FL (ref 78–100)
NONHDLC SERPL-MCNC: 128 MG/DL
P AXIS - MUSE: NORMAL DEGREES
PLATELET # BLD AUTO: 297 10E3/UL (ref 150–450)
POTASSIUM SERPL-SCNC: 4.4 MMOL/L (ref 3.4–5.3)
PR INTERVAL - MUSE: NORMAL MS
QRS DURATION - MUSE: 104 MS
QT - MUSE: 380 MS
QTC - MUSE: 452 MS
R AXIS - MUSE: 39 DEGREES
RBC # BLD AUTO: 4.74 10E6/UL (ref 4.4–5.9)
SODIUM SERPL-SCNC: 139 MMOL/L (ref 136–145)
SYSTOLIC BLOOD PRESSURE - MUSE: NORMAL MMHG
T AXIS - MUSE: -5 DEGREES
TRIGL SERPL-MCNC: 49 MG/DL
VENTRICULAR RATE- MUSE: 85 BPM
WBC # BLD AUTO: 8.3 10E3/UL (ref 4–11)

## 2022-12-23 PROCEDURE — G0463 HOSPITAL OUTPT CLINIC VISIT: HCPCS | Mod: 25

## 2022-12-23 PROCEDURE — 93010 ELECTROCARDIOGRAM REPORT: CPT | Performed by: INTERNAL MEDICINE

## 2022-12-23 PROCEDURE — G0463 HOSPITAL OUTPT CLINIC VISIT: HCPCS

## 2022-12-23 PROCEDURE — 36415 COLL VENOUS BLD VENIPUNCTURE: CPT | Mod: ZL | Performed by: FAMILY MEDICINE

## 2022-12-23 PROCEDURE — 80048 BASIC METABOLIC PNL TOTAL CA: CPT | Mod: ZL | Performed by: FAMILY MEDICINE

## 2022-12-23 PROCEDURE — 99215 OFFICE O/P EST HI 40 MIN: CPT | Performed by: FAMILY MEDICINE

## 2022-12-23 PROCEDURE — 85014 HEMATOCRIT: CPT | Mod: ZL | Performed by: FAMILY MEDICINE

## 2022-12-23 PROCEDURE — 80061 LIPID PANEL: CPT | Mod: ZL | Performed by: FAMILY MEDICINE

## 2022-12-23 PROCEDURE — 93005 ELECTROCARDIOGRAM TRACING: CPT | Performed by: FAMILY MEDICINE

## 2022-12-23 RX ORDER — ENOXAPARIN SODIUM 150 MG/ML
120 INJECTION SUBCUTANEOUS EVERY 12 HOURS
Qty: 3.2 ML | Refills: 0 | Status: ON HOLD | OUTPATIENT
Start: 2023-01-06 | End: 2023-01-11

## 2022-12-23 ASSESSMENT — PAIN SCALES - GENERAL: PAINLEVEL: SEVERE PAIN (7)

## 2022-12-23 NOTE — LETTER
December 23, 2022      Babak Moran     [facility] [address]        Dear ,    We are writing to inform you of your test results.  Blood counts are normal. Electrolytes and kidney tests are normal.      Cholesterol values are higher than last year, and overall higher than in past years.  Typically we would use a cholesterol medicine (statin) if your LDL (bad cholesterol) is 70 or higher.  Only 2 out of the past 7 times your cholesterol has been checked in the past several years has the LDL been under 70.  5 out of the 7 times it has been over that.  We should discuss cholesterol medicine to offer some potential risk reduction against future stroke.      Resulted Orders   CBC W PLT No Diff   Result Value Ref Range    WBC Count 8.3 4.0 - 11.0 10e3/uL    RBC Count 4.74 4.40 - 5.90 10e6/uL    Hemoglobin 13.9 13.3 - 17.7 g/dL    Hematocrit 43.9 40.0 - 53.0 %    MCV 93 78 - 100 fL    MCH 29.3 26.5 - 33.0 pg    MCHC 31.7 31.5 - 36.5 g/dL    RDW 14.5 10.0 - 15.0 %    Platelet Count 297 150 - 450 10e3/uL   Basic Metabolic Panel   Result Value Ref Range    Sodium 139 136 - 145 mmol/L    Potassium 4.4 3.4 - 5.3 mmol/L    Chloride 100 98 - 107 mmol/L    Carbon Dioxide (CO2) 32 (H) 22 - 29 mmol/L    Anion Gap 7 7 - 15 mmol/L    Urea Nitrogen 19.3 8.0 - 23.0 mg/dL    Creatinine 0.76 0.67 - 1.17 mg/dL    Calcium 9.6 8.8 - 10.2 mg/dL    Glucose 100 (H) 70 - 99 mg/dL    GFR Estimate >90 >60 mL/min/1.73m2      Comment:      Effective December 21, 2021 eGFRcr in adults is calculated using the 2021 CKD-EPI creatinine equation which includes age and gender (Humphrey cleveland al., NEJM, DOI: 10.1056/YCRKfo7625557)   Lipid Panel   Result Value Ref Range    Cholesterol 175 <200 mg/dL    Triglycerides 49 <150 mg/dL    Direct Measure HDL 47 >=40 mg/dL    LDL Cholesterol Calculated 118 (H) <=100 mg/dL    Non HDL Cholesterol 128 <130 mg/dL    Narrative    Cholesterol  Desirable:  <200 mg/dL    Triglycerides  Normal:  Less than 150  mg/dL  Borderline High:  150-199 mg/dL  High:  200-499 mg/dL  Very High:  Greater than or equal to 500 mg/dL    Direct Measure HDL  Female:  Greater than or equal to 50 mg/dL   Male:  Greater than or equal to 40 mg/dL    LDL Cholesterol  Desirable:  <100mg/dL  Above Desirable:  100-129 mg/dL   Borderline High:  130-159 mg/dL   High:  160-189 mg/dL   Very High:  >= 190 mg/dL    Non HDL Cholesterol  Desirable:  130 mg/dL  Above Desirable:  130-159 mg/dL  Borderline High:  160-189 mg/dL  High:  190-219 mg/dL  Very High:  Greater than or equal to 220 mg/dL       If you have any questions or concerns, please call the clinic at the number listed above.       Sincerely,      Teo Funes MD

## 2022-12-23 NOTE — PATIENT INSTRUCTIONS
If INR is 2 to 2.8, stop warfarin Jan 4  Take Lovenox shots Friday and Saturday  No shots Sunday  Surgery Monday Jan 9    Continue amlodipine  No hydrochlorothiazide morning of surgery

## 2022-12-23 NOTE — NURSING NOTE
"Patient presents to the clinic for pre-op exam.    FOOD SECURITY SCREENING QUESTIONS:    The next two questions are to help us understand your food security.  If you are feeling you need any assistance in this area, we have resources available to support you today.    Hunger Vital Signs:  Within the past 12 months we worried whether our food would run out before we got money to buy more. Never  Within the past 12 months the food we bought just didn't last and we didn't have money to get more. Never    Advance Care Directive on file? no  Advance Care Directive provided to patient? Declined.    Chief Complaint   Patient presents with     Pre-Op Exam       Initial BP (!) 150/82 (BP Location: Right arm, Patient Position: Sitting, Cuff Size: Adult Large)   Pulse 68   Temp 98.3  F (36.8  C) (Tympanic)   Resp 16   Ht 1.867 m (6' 1.5\")   Wt 147.4 kg (325 lb)   SpO2 98%   BMI 42.30 kg/m   Estimated body mass index is 42.3 kg/m  as calculated from the following:    Height as of this encounter: 1.867 m (6' 1.5\").    Weight as of this encounter: 147.4 kg (325 lb).  Medication Reconciliation: complete        Oneida Malone LPN       "

## 2022-12-29 ENCOUNTER — TRANSFERRED RECORDS (OUTPATIENT)
Dept: HEALTH INFORMATION MANAGEMENT | Facility: OTHER | Age: 62
End: 2022-12-29

## 2023-01-02 ENCOUNTER — TELEPHONE (OUTPATIENT)
Dept: FAMILY MEDICINE | Facility: OTHER | Age: 63
End: 2023-01-02

## 2023-01-02 ENCOUNTER — ANTICOAGULATION THERAPY VISIT (OUTPATIENT)
Dept: ANTICOAGULATION | Facility: OTHER | Age: 63
End: 2023-01-02
Attending: FAMILY MEDICINE
Payer: COMMERCIAL

## 2023-01-02 DIAGNOSIS — I10 BENIGN ESSENTIAL HYPERTENSION: Primary | ICD-10-CM

## 2023-01-02 DIAGNOSIS — I48.91 ATRIAL FIBRILLATION WITH RVR (H): Primary | ICD-10-CM

## 2023-01-02 LAB — INR POINT OF CARE: 2.1 (ref 0.9–1.1)

## 2023-01-02 PROCEDURE — 85610 PROTHROMBIN TIME: CPT | Mod: ZL,QW

## 2023-01-02 NOTE — PROGRESS NOTES
ANTICOAGULATION MANAGEMENT     Babak Moran 62 year old male is on warfarin with therapeutic INR result. (Goal INR 2.0-3.0)    Recent labs: (last 7 days)     01/02/23  1508   INR 2.1*       ASSESSMENT       Source(s): Chart Review and In person       Warfarin doses taken: Warfarin taken as instructed    Diet: No new diet changes identified    New illness, injury, or hospitalization: No    Medication/supplement changes: Will be holding warfarin and bridging with lovenox    Signs or symptoms of bleeding or clotting: No    Previous INR: Supratherapeutic    Additional findings: Upcoming surgery/procedure 1/9/23       PLAN     Recommended plan for ongoing change(s) affecting INR     Dosing Instructions: Increase your warfarin dose (4.5% change) with next INR in 1 week       Summary  As of 1/2/2023    Full warfarin instructions:  1/4: Hold; 1/5: Hold; 1/6: Hold; 1/7: Hold; 1/8: Hold; 1/9: 15 mg; 1/10: 10 mg; Otherwise 10 mg every Mon, Fri; 7.5 mg all other days   Next INR check:  1/12/2023             In person    Lab visit scheduled    Education provided: Please call back if any changes to your diet, medications or how you've been taking warfarin    Plan made per [facility] anticoagulation protocol    Nikki Aguilar, RN  Anticoagulation Clinic  1/2/2023    _______________________________________________________________________     Anticoagulation Episode Summary     Current INR goal:  2.0-3.0   TTR:  71.8 % (11.9 mo)   Target end date:  Indefinite   Send INR reminders to:  [facility]    Indications    Unspecified atrial fibrillation (Resolved) [I48.91]  Anticoagulation monitoring  INR range 2-3 (Resolved) [Z79.01]  Atrial fibrillation with RVR (H) [I48.91]           Comments:  Babak prefers to be closer to 3.0 d/t previous CVA check Q 4 weeks.Declines to reduce dose when slightly over 3.0  Needs to change PCP         Anticoagulation Care Providers     Provider Role Specialty Phone number    Teo Funes  MD [facility] [phone]

## 2023-01-03 RX ORDER — HYDROCHLOROTHIAZIDE 25 MG/1
12.5 TABLET ORAL DAILY
Qty: 45 TABLET | Refills: 1 | Status: SHIPPED | OUTPATIENT
Start: 2023-01-03 | End: 2023-05-05

## 2023-01-03 NOTE — TELEPHONE ENCOUNTER
Per [facility] rules they do not cover hydrocholorizate 12.5 mg, they will cover 25 mg and pt could cut in half.  Please reach out to Kyara at [facility].      Order pending.    Oneida Malone LPN 1/3/2023 8:46 AM

## 2023-01-06 ENCOUNTER — ANESTHESIA EVENT (OUTPATIENT)
Dept: SURGERY | Facility: OTHER | Age: 63
End: 2023-01-06
Payer: COMMERCIAL

## 2023-01-06 ENCOUNTER — ANTICOAGULATION THERAPY VISIT (OUTPATIENT)
Dept: ANTICOAGULATION | Facility: OTHER | Age: 63
End: 2023-01-06
Attending: FAMILY MEDICINE
Payer: COMMERCIAL

## 2023-01-06 DIAGNOSIS — I48.91 ATRIAL FIBRILLATION WITH RVR (H): Primary | ICD-10-CM

## 2023-01-06 LAB — INR POINT OF CARE: 1.4 (ref 0.9–1.1)

## 2023-01-06 PROCEDURE — 85610 PROTHROMBIN TIME: CPT | Mod: ZL,QW

## 2023-01-06 NOTE — PROGRESS NOTES
"ANTICOAGULATION MANAGEMENT     Babak Moran 62 year old male is on warfarin with subtherapeutic INR result. (Goal INR 2.0-3.0)    Recent labs: (last 7 days)     01/06/23  1103   INR 1.4*       ASSESSMENT       Source(s): Chart Review and In person       Warfarin doses taken: Held for upcoming procedure on 1/9/23- instructed patient o start lovenox today. Patient took one injection while in clinic at 1100 and will take his second one tonight at 2300- and repeat tomorrow.   recently which may be affecting INR    Diet: No new diet changes identified    New illness, injury, or hospitalization: No    Medication/supplement changes: lovenox today and tomorrow    Signs or symptoms of bleeding or clotting: No    Previous INR: Therapeutic last visit; previously outside of goal range  Additional findings: Went and talked to Dr. bruno today to discuss Dr. Funes's note about \"If INR is over 3 or under 2, then the plan needs to be changed.\" on 12/23/22. Dr. bruno verbalized that he means that is after the procedure and not during his warfarin hold and lovenox bridging. Called and spoke with patient to continue his lovenox on the 6th and 7th.       PLAN     Recommended plan for temporary change(s) affecting INR     Dosing Instructions: Continue your current warfarin dose with next INR in 1 week       Summary  As of 1/6/2023    Full warfarin instructions:  1/6: Hold; 1/7: Hold; 1/8: Hold; 1/9: 15 mg; 1/10: 10 mg; Otherwise 10 mg every Mon, Fri; 7.5 mg all other days   Next INR check:  1/12/2023             In person    Lab visit scheduled    Education provided: Please call back if any changes to your diet, medications or how you've been taking warfarin    Plan made per [facility] anticoagulation protocol    Nikki Aguilar, RN  Anticoagulation Clinic  1/6/2023    _______________________________________________________________________     Anticoagulation Episode Summary     Current INR goal:  2.0-3.0   TTR:  70.9 % (11.8 mo) "   Target end date:  Indefinite   Send INR reminders to:  [facility]    Indications    Unspecified atrial fibrillation (Resolved) [I48.91]  Anticoagulation monitoring  INR range 2-3 (Resolved) [Z79.01]  Atrial fibrillation with RVR (H) [I48.91]           Comments:  Babak prefers to be closer to 3.0 d/t previous CVA check Q 4 weeks.Declines to reduce dose when slightly over 3.0  Needs to change PCP         Anticoagulation Care Providers     Provider Role Specialty Phone number    Teo Funes MD Referring Family Medicine [phone]

## 2023-01-09 ENCOUNTER — APPOINTMENT (OUTPATIENT)
Dept: GENERAL RADIOLOGY | Facility: OTHER | Age: 63
End: 2023-01-09
Attending: ORTHOPAEDIC SURGERY
Payer: COMMERCIAL

## 2023-01-09 ENCOUNTER — ANESTHESIA (OUTPATIENT)
Dept: SURGERY | Facility: OTHER | Age: 63
End: 2023-01-09
Payer: COMMERCIAL

## 2023-01-09 ENCOUNTER — HOSPITAL ENCOUNTER (OUTPATIENT)
Facility: OTHER | Age: 63
Discharge: HOME | End: 2023-01-12
Attending: ORTHOPAEDIC SURGERY | Admitting: ORTHOPAEDIC SURGERY
Payer: COMMERCIAL

## 2023-01-09 DIAGNOSIS — M17.12 PRIMARY OSTEOARTHRITIS OF LEFT KNEE: Primary | ICD-10-CM

## 2023-01-09 DIAGNOSIS — Z79.01 LONG-TERM (CURRENT) USE OF ANTICOAGULANTS, INR GOAL 2.0-3.0: Chronic | ICD-10-CM

## 2023-01-09 DIAGNOSIS — I48.91 ATRIAL FIBRILLATION WITH RVR (H): ICD-10-CM

## 2023-01-09 PROBLEM — L03.115 CELLULITIS OF RIGHT KNEE: Status: RESOLVED | Noted: 2022-08-08 | Resolved: 2023-01-09

## 2023-01-09 PROBLEM — Z47.1 AFTERCARE FOLLOWING KNEE JOINT REPLACEMENT SURGERY, UNSPECIFIED LATERALITY: Status: ACTIVE | Noted: 2023-01-09

## 2023-01-09 PROBLEM — Z96.659 AFTERCARE FOLLOWING KNEE JOINT REPLACEMENT SURGERY, UNSPECIFIED LATERALITY: Status: ACTIVE | Noted: 2023-01-09

## 2023-01-09 PROBLEM — L03.031 CELLULITIS OF THIRD TOE, RIGHT: Status: RESOLVED | Noted: 2022-08-08 | Resolved: 2023-01-09

## 2023-01-09 LAB
HOLD SPECIMEN: NORMAL
HOLD SPECIMEN: NORMAL
INR PPP: 0.97 (ref 0.85–1.15)
LACTATE SERPL-SCNC: 2.1 MMOL/L (ref 0.7–2)
LACTATE SERPL-SCNC: 2.4 MMOL/L (ref 0.7–2)

## 2023-01-09 PROCEDURE — 27447 TOTAL KNEE ARTHROPLASTY: CPT | Mod: LT | Performed by: ORTHOPAEDIC SURGERY

## 2023-01-09 PROCEDURE — 250N000009 HC RX 250: Performed by: NURSE ANESTHETIST, CERTIFIED REGISTERED

## 2023-01-09 PROCEDURE — 258N000003 HC RX IP 258 OP 636: Performed by: ORTHOPAEDIC SURGERY

## 2023-01-09 PROCEDURE — 272N000001 HC OR GENERAL SUPPLY STERILE: Performed by: ORTHOPAEDIC SURGERY

## 2023-01-09 PROCEDURE — 360N000077 HC SURGERY LEVEL 4, PER MIN: Performed by: ORTHOPAEDIC SURGERY

## 2023-01-09 PROCEDURE — 370N000017 HC ANESTHESIA TECHNICAL FEE, PER MIN: Performed by: ORTHOPAEDIC SURGERY

## 2023-01-09 PROCEDURE — 258N000001 HC RX 258: Performed by: ORTHOPAEDIC SURGERY

## 2023-01-09 PROCEDURE — C1776 JOINT DEVICE (IMPLANTABLE): HCPCS | Performed by: ORTHOPAEDIC SURGERY

## 2023-01-09 PROCEDURE — 83605 ASSAY OF LACTIC ACID: CPT | Performed by: ORTHOPAEDIC SURGERY

## 2023-01-09 PROCEDURE — 250N000011 HC RX IP 250 OP 636: Performed by: NURSE ANESTHETIST, CERTIFIED REGISTERED

## 2023-01-09 PROCEDURE — 64447 NJX AA&/STRD FEMORAL NRV IMG: CPT | Mod: LT | Performed by: NURSE ANESTHETIST, CERTIFIED REGISTERED

## 2023-01-09 PROCEDURE — 97161 PT EVAL LOW COMPLEX 20 MIN: CPT | Mod: GP

## 2023-01-09 PROCEDURE — 85610 PROTHROMBIN TIME: CPT | Performed by: NURSE ANESTHETIST, CERTIFIED REGISTERED

## 2023-01-09 PROCEDURE — 999N000141 HC STATISTIC PRE-PROCEDURE NURSING ASSESSMENT: Performed by: ORTHOPAEDIC SURGERY

## 2023-01-09 PROCEDURE — 96374 THER/PROPH/DIAG INJ IV PUSH: CPT | Mod: XU

## 2023-01-09 PROCEDURE — 999N000065 XR KNEE PORT LEFT 1/2 VIEWS: Mod: LT

## 2023-01-09 PROCEDURE — 250N000013 HC RX MED GY IP 250 OP 250 PS 637: Performed by: ORTHOPAEDIC SURGERY

## 2023-01-09 PROCEDURE — 250N000011 HC RX IP 250 OP 636: Performed by: ORTHOPAEDIC SURGERY

## 2023-01-09 PROCEDURE — 27447 TOTAL KNEE ARTHROPLASTY: CPT | Performed by: NURSE ANESTHETIST, CERTIFIED REGISTERED

## 2023-01-09 PROCEDURE — 36415 COLL VENOUS BLD VENIPUNCTURE: CPT | Performed by: NURSE ANESTHETIST, CERTIFIED REGISTERED

## 2023-01-09 PROCEDURE — 36415 COLL VENOUS BLD VENIPUNCTURE: CPT | Performed by: ORTHOPAEDIC SURGERY

## 2023-01-09 PROCEDURE — 258N000003 HC RX IP 258 OP 636: Performed by: NURSE ANESTHETIST, CERTIFIED REGISTERED

## 2023-01-09 PROCEDURE — 99213 OFFICE O/P EST LOW 20 MIN: CPT | Performed by: FAMILY MEDICINE

## 2023-01-09 PROCEDURE — 999N000104 HC STATISTIC NO CHARGE

## 2023-01-09 PROCEDURE — 710N000010 HC RECOVERY PHASE 1, LEVEL 2, PER MIN: Performed by: ORTHOPAEDIC SURGERY

## 2023-01-09 DEVICE — IMPLANTABLE DEVICE: Type: IMPLANTABLE DEVICE | Site: KNEE | Status: FUNCTIONAL

## 2023-01-09 DEVICE — TIBIAL BEARING INSERT - CR
Type: IMPLANTABLE DEVICE | Site: KNEE | Status: FUNCTIONAL
Brand: TRIATHLON

## 2023-01-09 RX ORDER — BUPIVACAINE HYDROCHLORIDE 7.5 MG/ML
INJECTION, SOLUTION INTRASPINAL PRN
Status: DISCONTINUED | OUTPATIENT
Start: 2023-01-09 | End: 2023-01-09

## 2023-01-09 RX ORDER — ONDANSETRON 2 MG/ML
4 INJECTION INTRAMUSCULAR; INTRAVENOUS EVERY 6 HOURS PRN
Status: DISCONTINUED | OUTPATIENT
Start: 2023-01-09 | End: 2023-01-12 | Stop reason: HOSPADM

## 2023-01-09 RX ORDER — ENOXAPARIN SODIUM 150 MG/ML
120 INJECTION SUBCUTANEOUS EVERY 12 HOURS
Status: DISCONTINUED | OUTPATIENT
Start: 2023-01-10 | End: 2023-01-10

## 2023-01-09 RX ORDER — ACETAMINOPHEN 325 MG/1
975 TABLET ORAL EVERY 8 HOURS
Status: DISCONTINUED | OUTPATIENT
Start: 2023-01-09 | End: 2023-01-11

## 2023-01-09 RX ORDER — FENTANYL CITRATE 50 UG/ML
25 INJECTION, SOLUTION INTRAMUSCULAR; INTRAVENOUS EVERY 5 MIN PRN
Status: DISCONTINUED | OUTPATIENT
Start: 2023-01-09 | End: 2023-01-09 | Stop reason: HOSPADM

## 2023-01-09 RX ORDER — OXYCODONE HYDROCHLORIDE 5 MG/1
5-10 TABLET ORAL EVERY 4 HOURS PRN
Qty: 26 TABLET | Refills: 0 | Status: SHIPPED | OUTPATIENT
Start: 2023-01-09 | End: 2023-05-05

## 2023-01-09 RX ORDER — LABETALOL HYDROCHLORIDE 5 MG/ML
10 INJECTION, SOLUTION INTRAVENOUS
Status: DISCONTINUED | OUTPATIENT
Start: 2023-01-09 | End: 2023-01-09 | Stop reason: HOSPADM

## 2023-01-09 RX ORDER — KETOROLAC TROMETHAMINE 30 MG/ML
INJECTION, SOLUTION INTRAMUSCULAR; INTRAVENOUS PRN
Status: DISCONTINUED | OUTPATIENT
Start: 2023-01-09 | End: 2023-01-09

## 2023-01-09 RX ORDER — SODIUM CHLORIDE, SODIUM LACTATE, POTASSIUM CHLORIDE, CALCIUM CHLORIDE 600; 310; 30; 20 MG/100ML; MG/100ML; MG/100ML; MG/100ML
INJECTION, SOLUTION INTRAVENOUS CONTINUOUS
Status: DISCONTINUED | OUTPATIENT
Start: 2023-01-09 | End: 2023-01-09

## 2023-01-09 RX ORDER — NALOXONE HYDROCHLORIDE 0.4 MG/ML
0.4 INJECTION, SOLUTION INTRAMUSCULAR; INTRAVENOUS; SUBCUTANEOUS
Status: DISCONTINUED | OUTPATIENT
Start: 2023-01-09 | End: 2023-01-12 | Stop reason: HOSPADM

## 2023-01-09 RX ORDER — TRANEXAMIC ACID 650 MG/1
1950 TABLET ORAL ONCE
Status: COMPLETED | OUTPATIENT
Start: 2023-01-09 | End: 2023-01-09

## 2023-01-09 RX ORDER — ACETAMINOPHEN 325 MG/1
650 TABLET ORAL EVERY 4 HOURS PRN
Status: DISCONTINUED | OUTPATIENT
Start: 2023-01-12 | End: 2023-01-12 | Stop reason: HOSPADM

## 2023-01-09 RX ORDER — AMOXICILLIN 250 MG
1 CAPSULE ORAL 2 TIMES DAILY
Status: DISCONTINUED | OUTPATIENT
Start: 2023-01-09 | End: 2023-01-12 | Stop reason: HOSPADM

## 2023-01-09 RX ORDER — NALOXONE HYDROCHLORIDE 0.4 MG/ML
0.2 INJECTION, SOLUTION INTRAMUSCULAR; INTRAVENOUS; SUBCUTANEOUS
Status: DISCONTINUED | OUTPATIENT
Start: 2023-01-09 | End: 2023-01-12 | Stop reason: HOSPADM

## 2023-01-09 RX ORDER — BISACODYL 10 MG
10 SUPPOSITORY, RECTAL RECTAL DAILY PRN
Status: DISCONTINUED | OUTPATIENT
Start: 2023-01-09 | End: 2023-01-12 | Stop reason: HOSPADM

## 2023-01-09 RX ORDER — ONDANSETRON 2 MG/ML
4 INJECTION INTRAMUSCULAR; INTRAVENOUS EVERY 30 MIN PRN
Status: DISCONTINUED | OUTPATIENT
Start: 2023-01-09 | End: 2023-01-09 | Stop reason: HOSPADM

## 2023-01-09 RX ORDER — CEFAZOLIN SODIUM 2 G/100ML
2 INJECTION, SOLUTION INTRAVENOUS EVERY 8 HOURS
Status: COMPLETED | OUTPATIENT
Start: 2023-01-09 | End: 2023-01-10

## 2023-01-09 RX ORDER — ONDANSETRON 2 MG/ML
INJECTION INTRAMUSCULAR; INTRAVENOUS PRN
Status: DISCONTINUED | OUTPATIENT
Start: 2023-01-09 | End: 2023-01-09

## 2023-01-09 RX ORDER — BUPIVACAINE HYDROCHLORIDE 5 MG/ML
INJECTION, SOLUTION EPIDURAL; INTRACAUDAL PRN
Status: DISCONTINUED | OUTPATIENT
Start: 2023-01-09 | End: 2023-01-09

## 2023-01-09 RX ORDER — POLYETHYLENE GLYCOL 3350 17 G/17G
17 POWDER, FOR SOLUTION ORAL DAILY
Status: DISCONTINUED | OUTPATIENT
Start: 2023-01-10 | End: 2023-01-12 | Stop reason: HOSPADM

## 2023-01-09 RX ORDER — OXYCODONE HYDROCHLORIDE 5 MG/1
5 TABLET ORAL EVERY 4 HOURS PRN
Status: DISCONTINUED | OUTPATIENT
Start: 2023-01-09 | End: 2023-01-12 | Stop reason: HOSPADM

## 2023-01-09 RX ORDER — FENTANYL CITRATE 50 UG/ML
INJECTION, SOLUTION INTRAMUSCULAR; INTRAVENOUS PRN
Status: DISCONTINUED | OUTPATIENT
Start: 2023-01-09 | End: 2023-01-09

## 2023-01-09 RX ORDER — HYDROMORPHONE HYDROCHLORIDE 1 MG/ML
0.2 INJECTION, SOLUTION INTRAMUSCULAR; INTRAVENOUS; SUBCUTANEOUS EVERY 5 MIN PRN
Status: DISCONTINUED | OUTPATIENT
Start: 2023-01-09 | End: 2023-01-09 | Stop reason: HOSPADM

## 2023-01-09 RX ORDER — PROCHLORPERAZINE MALEATE 10 MG
10 TABLET ORAL EVERY 6 HOURS PRN
Status: DISCONTINUED | OUTPATIENT
Start: 2023-01-09 | End: 2023-01-12 | Stop reason: HOSPADM

## 2023-01-09 RX ORDER — CEFAZOLIN SODIUM/WATER 3 G/30 ML
3 SYRINGE (ML) INTRAVENOUS
Status: COMPLETED | OUTPATIENT
Start: 2023-01-09 | End: 2023-01-09

## 2023-01-09 RX ORDER — DIPHENHYDRAMINE HCL 25 MG
25 CAPSULE ORAL EVERY 6 HOURS PRN
Status: DISCONTINUED | OUTPATIENT
Start: 2023-01-09 | End: 2023-01-12 | Stop reason: HOSPADM

## 2023-01-09 RX ORDER — ONDANSETRON 4 MG/1
4 TABLET, ORALLY DISINTEGRATING ORAL EVERY 30 MIN PRN
Status: DISCONTINUED | OUTPATIENT
Start: 2023-01-09 | End: 2023-01-09 | Stop reason: HOSPADM

## 2023-01-09 RX ORDER — HYDROXYZINE PAMOATE 25 MG/1
25 CAPSULE ORAL EVERY 6 HOURS PRN
Status: DISCONTINUED | OUTPATIENT
Start: 2023-01-09 | End: 2023-01-12 | Stop reason: HOSPADM

## 2023-01-09 RX ORDER — OXYCODONE HYDROCHLORIDE 5 MG/1
10 TABLET ORAL EVERY 4 HOURS PRN
Status: DISCONTINUED | OUTPATIENT
Start: 2023-01-09 | End: 2023-01-12 | Stop reason: HOSPADM

## 2023-01-09 RX ORDER — DEXAMETHASONE SODIUM PHOSPHATE 10 MG/ML
INJECTION, SOLUTION INTRAMUSCULAR; INTRAVENOUS PRN
Status: DISCONTINUED | OUTPATIENT
Start: 2023-01-09 | End: 2023-01-09

## 2023-01-09 RX ORDER — HYDROMORPHONE HYDROCHLORIDE 1 MG/ML
0.4 INJECTION, SOLUTION INTRAMUSCULAR; INTRAVENOUS; SUBCUTANEOUS EVERY 5 MIN PRN
Status: DISCONTINUED | OUTPATIENT
Start: 2023-01-09 | End: 2023-01-09 | Stop reason: HOSPADM

## 2023-01-09 RX ORDER — PROPOFOL 10 MG/ML
INJECTION, EMULSION INTRAVENOUS PRN
Status: DISCONTINUED | OUTPATIENT
Start: 2023-01-09 | End: 2023-01-09

## 2023-01-09 RX ORDER — FENTANYL CITRATE 50 UG/ML
50 INJECTION, SOLUTION INTRAMUSCULAR; INTRAVENOUS EVERY 5 MIN PRN
Status: DISCONTINUED | OUTPATIENT
Start: 2023-01-09 | End: 2023-01-09 | Stop reason: HOSPADM

## 2023-01-09 RX ORDER — WARFARIN SODIUM 5 MG/1
15 TABLET ORAL
Status: COMPLETED | OUTPATIENT
Start: 2023-01-09 | End: 2023-01-09

## 2023-01-09 RX ORDER — LIDOCAINE 40 MG/G
CREAM TOPICAL
Status: DISCONTINUED | OUTPATIENT
Start: 2023-01-09 | End: 2023-01-12 | Stop reason: HOSPADM

## 2023-01-09 RX ORDER — HYDROCHLOROTHIAZIDE 25 MG/1
25 TABLET ORAL DAILY
Status: DISCONTINUED | OUTPATIENT
Start: 2023-01-10 | End: 2023-01-10 | Stop reason: DRUGHIGH

## 2023-01-09 RX ORDER — HYDROMORPHONE HCL IN WATER/PF 6 MG/30 ML
0.4 PATIENT CONTROLLED ANALGESIA SYRINGE INTRAVENOUS
Status: DISCONTINUED | OUTPATIENT
Start: 2023-01-09 | End: 2023-01-12 | Stop reason: HOSPADM

## 2023-01-09 RX ORDER — MEPERIDINE HYDROCHLORIDE 50 MG/ML
12.5 INJECTION INTRAMUSCULAR; INTRAVENOUS; SUBCUTANEOUS
Status: DISCONTINUED | OUTPATIENT
Start: 2023-01-09 | End: 2023-01-09 | Stop reason: HOSPADM

## 2023-01-09 RX ORDER — HYDROMORPHONE HCL IN WATER/PF 6 MG/30 ML
0.2 PATIENT CONTROLLED ANALGESIA SYRINGE INTRAVENOUS
Status: DISCONTINUED | OUTPATIENT
Start: 2023-01-09 | End: 2023-01-12 | Stop reason: HOSPADM

## 2023-01-09 RX ORDER — DEXAMETHASONE SODIUM PHOSPHATE 4 MG/ML
INJECTION, SOLUTION INTRA-ARTICULAR; INTRALESIONAL; INTRAMUSCULAR; INTRAVENOUS; SOFT TISSUE PRN
Status: DISCONTINUED | OUTPATIENT
Start: 2023-01-09 | End: 2023-01-09

## 2023-01-09 RX ORDER — AMLODIPINE BESYLATE 10 MG/1
10 TABLET ORAL DAILY
Status: DISCONTINUED | OUTPATIENT
Start: 2023-01-10 | End: 2023-01-12 | Stop reason: HOSPADM

## 2023-01-09 RX ORDER — CEFAZOLIN SODIUM/WATER 3 G/30 ML
3 SYRINGE (ML) INTRAVENOUS SEE ADMIN INSTRUCTIONS
Status: DISCONTINUED | OUTPATIENT
Start: 2023-01-09 | End: 2023-01-09 | Stop reason: HOSPADM

## 2023-01-09 RX ORDER — ENOXAPARIN SODIUM 100 MG/ML
40 INJECTION SUBCUTANEOUS
Status: ON HOLD | COMMUNITY
End: 2023-01-09

## 2023-01-09 RX ORDER — ACETAMINOPHEN 325 MG/1
650 TABLET ORAL EVERY 4 HOURS PRN
Qty: 100 TABLET | Refills: 0 | Status: ON HOLD | OUTPATIENT
Start: 2023-01-09 | End: 2023-06-26

## 2023-01-09 RX ORDER — LIDOCAINE 40 MG/G
CREAM TOPICAL
Status: DISCONTINUED | OUTPATIENT
Start: 2023-01-09 | End: 2023-01-09 | Stop reason: HOSPADM

## 2023-01-09 RX ORDER — SODIUM CHLORIDE, SODIUM LACTATE, POTASSIUM CHLORIDE, CALCIUM CHLORIDE 600; 310; 30; 20 MG/100ML; MG/100ML; MG/100ML; MG/100ML
INJECTION, SOLUTION INTRAVENOUS CONTINUOUS
Status: DISCONTINUED | OUTPATIENT
Start: 2023-01-09 | End: 2023-01-09 | Stop reason: HOSPADM

## 2023-01-09 RX ORDER — PROPOFOL 10 MG/ML
INJECTION, EMULSION INTRAVENOUS CONTINUOUS PRN
Status: DISCONTINUED | OUTPATIENT
Start: 2023-01-09 | End: 2023-01-09

## 2023-01-09 RX ORDER — LIDOCAINE HYDROCHLORIDE 20 MG/ML
INJECTION, SOLUTION INFILTRATION; PERINEURAL PRN
Status: DISCONTINUED | OUTPATIENT
Start: 2023-01-09 | End: 2023-01-09

## 2023-01-09 RX ORDER — ONDANSETRON 4 MG/1
4 TABLET, ORALLY DISINTEGRATING ORAL EVERY 6 HOURS PRN
Status: DISCONTINUED | OUTPATIENT
Start: 2023-01-09 | End: 2023-01-12 | Stop reason: HOSPADM

## 2023-01-09 RX ADMIN — SODIUM CHLORIDE, POTASSIUM CHLORIDE, SODIUM LACTATE AND CALCIUM CHLORIDE 500 ML: 600; 310; 30; 20 INJECTION, SOLUTION INTRAVENOUS at 18:52

## 2023-01-09 RX ADMIN — LIDOCAINE HYDROCHLORIDE 60 MG: 20 INJECTION, SOLUTION INFILTRATION; PERINEURAL at 11:21

## 2023-01-09 RX ADMIN — PROPOFOL 50 MG: 10 INJECTION, EMULSION INTRAVENOUS at 11:21

## 2023-01-09 RX ADMIN — ACETAMINOPHEN 975 MG: 325 TABLET ORAL at 21:18

## 2023-01-09 RX ADMIN — CEFAZOLIN SODIUM 2 G: 2 INJECTION, SOLUTION INTRAVENOUS at 17:59

## 2023-01-09 RX ADMIN — SENNOSIDES AND DOCUSATE SODIUM 1 TABLET: 50; 8.6 TABLET ORAL at 21:18

## 2023-01-09 RX ADMIN — BUPIVACAINE HYDROCHLORIDE 2 ML: 7.5 INJECTION, SOLUTION INTRASPINAL at 11:19

## 2023-01-09 RX ADMIN — PROPOFOL 100 MCG/KG/MIN: 10 INJECTION, EMULSION INTRAVENOUS at 11:21

## 2023-01-09 RX ADMIN — ONDANSETRON HYDROCHLORIDE 4 MG: 2 SOLUTION INTRAMUSCULAR; INTRAVENOUS at 11:14

## 2023-01-09 RX ADMIN — DEXAMETHASONE SODIUM PHOSPHATE 6 MG: 10 INJECTION, SOLUTION INTRAMUSCULAR; INTRAVENOUS at 10:31

## 2023-01-09 RX ADMIN — KETOROLAC TROMETHAMINE 30 MG: 30 INJECTION, SOLUTION INTRAMUSCULAR at 13:09

## 2023-01-09 RX ADMIN — Medication 3 G: at 10:34

## 2023-01-09 RX ADMIN — WARFARIN SODIUM 15 MG: 5 TABLET ORAL at 18:00

## 2023-01-09 RX ADMIN — BUPIVACAINE HYDROCHLORIDE 10 ML: 5 INJECTION, SOLUTION EPIDURAL; INTRACAUDAL; PERINEURAL at 10:22

## 2023-01-09 RX ADMIN — DEXAMETHASONE SODIUM PHOSPHATE 4 MG: 10 INJECTION, SOLUTION INTRAMUSCULAR; INTRAVENOUS at 10:22

## 2023-01-09 RX ADMIN — BUPIVACAINE HYDROCHLORIDE 20 ML: 5 INJECTION, SOLUTION EPIDURAL; INTRACAUDAL; PERINEURAL at 10:31

## 2023-01-09 RX ADMIN — MIDAZOLAM HYDROCHLORIDE 2 MG: 1 INJECTION, SOLUTION INTRAMUSCULAR; INTRAVENOUS at 11:13

## 2023-01-09 RX ADMIN — TRANEXAMIC ACID 1950 MG: 650 TABLET ORAL at 07:38

## 2023-01-09 RX ADMIN — SODIUM CHLORIDE, SODIUM LACTATE, POTASSIUM CHLORIDE, AND CALCIUM CHLORIDE 100 ML/HR: 600; 310; 30; 20 INJECTION, SOLUTION INTRAVENOUS at 08:40

## 2023-01-09 RX ADMIN — MIDAZOLAM 2 MG: 1 INJECTION INTRAMUSCULAR; INTRAVENOUS at 10:17

## 2023-01-09 RX ADMIN — FENTANYL CITRATE 50 MCG: 50 INJECTION, SOLUTION INTRAMUSCULAR; INTRAVENOUS at 11:13

## 2023-01-09 RX ADMIN — ACETAMINOPHEN 975 MG: 325 TABLET ORAL at 14:49

## 2023-01-09 RX ADMIN — DEXAMETHASONE SODIUM PHOSPHATE 4 MG: 4 INJECTION, SOLUTION INTRA-ARTICULAR; INTRALESIONAL; INTRAMUSCULAR; INTRAVENOUS; SOFT TISSUE at 11:24

## 2023-01-09 RX ADMIN — SODIUM CHLORIDE, SODIUM LACTATE, POTASSIUM CHLORIDE, AND CALCIUM CHLORIDE: 600; 310; 30; 20 INJECTION, SOLUTION INTRAVENOUS at 12:13

## 2023-01-09 ASSESSMENT — ACTIVITIES OF DAILY LIVING (ADL)
ADLS_ACUITY_SCORE: 24
ADLS_ACUITY_SCORE: 37
ADLS_ACUITY_SCORE: 24
ADLS_ACUITY_SCORE: 24
ADLS_ACUITY_SCORE: 37
ADLS_ACUITY_SCORE: 23
ADLS_ACUITY_SCORE: 37
ADLS_ACUITY_SCORE: 24

## 2023-01-09 ASSESSMENT — ENCOUNTER SYMPTOMS: DYSRHYTHMIAS: 1

## 2023-01-09 NOTE — ANESTHESIA PREPROCEDURE EVALUATION
Anesthesia Pre-Procedure Evaluation    Patient: Babak Moran   MRN: [id number] : 1960        Procedure : Procedure(s):  ARTHROPLASTY, KNEE, TOTAL          Past Medical History:   Diagnosis Date     Atrial fibrillation (H) 2017    on Warfarin     Bacterial sepsis (H) 2022     Cellulitis of left lower extremity 2019     Cerebral infarction (H)      Cerebral infarction due to unspecified occlusion or stenosis of right middle cerebral artery (H) 2017    ,Left hemiparesis, left neglect, impaired balance and gait following R MCA stroke with mild hemorrhagic transformation s/p tissue plasminogen activator and mechanical thrombectomy, s/p C7 facet fracture from fall off forklift.     Chest pain 1997     Disorder of skin or subcutaneous tissue 2011     Essential (primary) hypertension 1997     Fracture of cervical vertebra (H)     2017,C7 facet fracture from fall off forklift, nondisplaced     Gout      Hemiplegia affecting left nondominant side (H) 2017     Obesity 2017    body mass index of 40     Other local lupus erythematosus      Sepsis (H) 2019      Past Surgical History:   Procedure Laterality Date     ARTHROSCOPY KNEE      bilateral knees     COLONOSCOPY  2012    Normal; Next due      ORIF WRIST FRACTURE        Allergies   Allergen Reactions     Diatrizoate Rash     Ioxaglate Other (See Comments)     Does not recall     Levofloxacin Hives and Rash     Metrizamide Rash     (Diagnostic X-Ray materials)     Probenecid Rash      Social History     Tobacco Use     Smoking status: Never     Passive exposure: Yes     Smokeless tobacco: Never   Substance Use Topics     Alcohol use: Not Currently     Comment: couple of times per year      Wt Readings from Last 1 Encounters:   23 147.4 kg (325 lb)        Anesthesia Evaluation   Pt has had prior anesthetic.     No history of anesthetic complications       ROS/MED HX  ENT/Pulmonary:     (+) sleep  apnea, doesn't use CPAP,     Neurologic:     (+) CVA, date: 2017, with deficits, - Left foot drop.  Left sided weakness.     Cardiovascular:     (+) hypertension-----Taking blood thinners Instructions Given to patient: Stopped lovenox 1/7. dysrhythmias, a-fib, Previous cardiac testing   Echo: Date: Results:    Stress Test: Date: Results:    ECG Reviewed: Date: 12/23/22 Results:  Atrial fibrillation with premature ventricular or aberrantly conducted complexes   Nonspecific ST abnormality   Abnormal ECG   No previous ECGs available   Confirmed by DO GASTON STACY ([id number]) on 12/23/2022 10:19:18 PM   Cath: Date: Results:      METS/Exercise Tolerance: 3 - Able to walk 1-2 blocks without stopping    Hematologic:     (+) History of blood clots,     Musculoskeletal:  - neg musculoskeletal ROS     GI/Hepatic:  - neg GI/hepatic ROS     Renal/Genitourinary:  - neg Renal ROS     Endo:     (+) Obesity,     Psychiatric/Substance Use:  - neg psychiatric ROS     Infectious Disease:  - neg infectious disease ROS     Malignancy:  - neg malignancy ROS     Other:  - neg other ROS          Physical Exam    Airway        Mallampati: III   TM distance: > 3 FB   Neck ROM: full   Mouth opening: > 3 cm    Respiratory Devices and Support         Dental       (+) upper dentures and partials      Cardiovascular          Rhythm and rate: irregular and normal     Pulmonary   pulmonary exam normal        breath sounds clear to auscultation           OUTSIDE LABS:  CBC:   Lab Results   Component Value Date    WBC 8.3 12/23/2022    WBC 6.7 08/11/2022    HGB 13.9 12/23/2022    HGB 13.8 08/11/2022    HCT 43.9 12/23/2022    HCT 42.7 08/11/2022     12/23/2022     08/11/2022     BMP:   Lab Results   Component Value Date     12/23/2022     08/11/2022    POTASSIUM 4.4 12/23/2022    POTASSIUM 3.7 08/11/2022    CHLORIDE 100 12/23/2022    CHLORIDE 102 08/11/2022    CO2 32 (H) 12/23/2022    CO2 29 08/11/2022    BUN 19.3 12/23/2022     BUN 12 08/11/2022    CR 0.76 12/23/2022    CR 0.77 08/11/2022     (H) 12/23/2022    GLC 87 08/11/2022     COAGS:   Lab Results   Component Value Date    PTT 48 (H) 09/05/2019    INR 0.97 01/09/2023     POC: No results found for: BGM, HCG, HCGS  HEPATIC:   Lab Results   Component Value Date    ALBUMIN 3.7 08/07/2022    PROTTOTAL 7.5 08/07/2022    ALT 30 08/07/2022    AST 25 08/07/2022    ALKPHOS 70 08/07/2022    BILITOTAL 1.3 (H) 08/07/2022     OTHER:   Lab Results   Component Value Date    LACT 0.7 08/09/2022    A1C 5.8 08/09/2022    VIRGEN 9.6 12/23/2022    MAG 1.9 08/10/2022    CRP 42.6 (H) 08/07/2022    SED 13 08/07/2022       Anesthesia Plan    ASA Status:  3   NPO Status:  NPO Appropriate    Anesthesia Type: Spinal (INR 0.97 this am).              Consents    Anesthesia Plan(s) and associated risks, benefits, and realistic alternatives discussed. Questions answered and patient/representative(s) expressed understanding.     - Discussed: Risks, Benefits and Alternatives for BOTH SEDATION and the PROCEDURE were discussed     - Discussed with:  Patient      - Extended Intubation/Ventilatory Support Discussed: No.      - Patient is DNR/DNI Status: No    Use of blood products discussed: No .     Postoperative Care    Pain management: Peripheral nerve block (Single Shot).   PONV prophylaxis: Ondansetron (or other 5HT-3)     Comments:                JAYLEN HARDING CRNA

## 2023-01-09 NOTE — ANESTHESIA PROCEDURE NOTES
"Intrathecal injection Procedure Note    Pre-Procedure   Staff -        CRNA: Randall Almaguer APRN CRNA       Performed By: CRNA       Procedure Start/Stop Times: 1/9/2023 11:16 AM and 1/9/2023 11:19 AM       Pre-Anesthestic Checklist: patient identified, IV checked, risks and benefits discussed, informed consent, monitors and equipment checked, pre-op evaluation, at physician/surgeon's request and post-op pain management  Timeout:       Correct Patient: Yes        Correct Procedure: Yes        Correct Site: Yes        Correct Position: Yes   Procedure Documentation  Procedure: intrathecal injection       Diagnosis: Knee Pain       Patient Position: sitting       Patient Prep/Sterile Barriers: sterile gloves, mask       Skin prep: Chloraprep       Insertion Site: L3-4. (midline approach).       Needle Gauge: 25.        Needle Length (Inches): 5        Spinal Needle Type: Pencan       Introducer used       Introducer: 20 G       # of attempts: 1 and  # of redirects:  2    Assessment/Narrative         Paresthesias: No.       CSF fluid: clear.       Opening pressure was cmH2O while  Sitting.      Medication(s) Administered   Medication Administration Time: 1/9/2023 11:16 AM      FOR [facility] ([facility]) ONLY:   Pain Team Contact information: please page the Pain Team Via [email or web address]. Search \"Pain\". During daytime hours, please page the attending first. At night please page the resident first.    "

## 2023-01-09 NOTE — OP NOTE
Procedure Date: 01/09/2023    PREOPERATIVE DIAGNOSIS:  Left knee osteoarthritis.    POSTOPERATIVE DIAGNOSIS:  Left knee osteoarthritis.    OPERATION:  Left total knee arthroplasty.    SURGEON:  Elliott Johnson MD.    ASSISTANT:  Chery Hu PA-C.    ANESTHESIA:  Spinal with MAC.    INDICATIONS FOR PROCEDURE:  Babak Moran is a 62-year-old gentleman with end-stage osteoarthritis of his left knee.  He can no longer tolerate his knee in this fashion and wished to have his knee replaced.  All of the risks and benefits of surgical intervention were discussed with him and he wished to proceed.    DESCRIPTION OF PROCEDURE:  The patient was taken to the operating room.  After adequate induction of anesthesia, he was positioned, prepped and draped in the usual sterile fashion on the operative table.  A universal protocol timeout was initiated.  Once all in the room were in agreement.  The knee was exsanguinated and tourniquet raised to 300 mmHg and the incision was then made over the anterior of the knee.  This was carried down through subcutaneous tissues down to the extensor mechanism.  A median parapatellar incision was then made in the extensor mechanism and the retinaculum was raised off the proximal tibia medially in a triangular fashion.  The infrapatellar fat pad was excised and the intramedullary canal of the left femur was then entered with a drill.  The external guide was then placed and we took 9 mm of bone off of the distal femur.  Once this was accomplished, we measured the epicondylar access and trialed to a size 7, which looked like it would fit the knee just fine.  We then applied the cutting jig and cut the femur.  All of the osteophytes were removed as well.  We then paid special attention to the tibia.  The rigor jig was applied to the tibia.  We took 4 mm off of the medial, flat across the proximal tibia and removed the osteotomized portion.  The menisci were then removed and all of the other extraneous  soft tissues were removed from the joint space.  The wound was copiously irrigated.  The knee was brought into extension.  The patella was then everted and measured to 24 mm.  This was osteotomized, and prepared for a 40 mm patellar component.  We then trialed the femoral component, as well as the tibial components.  We sized this all the way upsized to a size 11 and then subsequently a 13 polyethylene liner.  The knee was quite stable and came out into full extension.  The knee was then brought into flexion.  We drilled the lugs for the femur and prepared the tibia to accept a size 8 tibial component.  Once this was accomplished, we then copiously irrigated the knee and impacted the tibial component to the tibia.  This fit quite nicely.  The femoral component was then impacted into place, as well as the 13 mm polyethylene liner.  The knee was brought into extension and a 40 mm asymmetric press-fit patellar component was then pressed into the patella.  This fit quite nicely.  The knee was then brought into extension, copiously irrigated yet one more time and #1 Vicryl was used to repair the median parapatellar incision, #1 Vicryl was used in deep subcutaneous fat, 2-0 Vicryl was used in subcutaneous skin.  Staples were used to close the skin of the knee, and an Aquacel dressing was applied.  He was taken in stable condition to postanesthesia care unit.    Elliott Johnson MD        D: 2023   T: 2023   MT: LUZMA    Name:     JENNIFER GALVEZ  MRN:      [id number]        Account:        [id number]   :      1960           Procedure Date: 2023     Document: [id number]

## 2023-01-09 NOTE — PROGRESS NOTES
" NSG ADMISSION NOTE    Patient admitted to room 315 at approximately 1415 via bed from surgery. Patient was accompanied by nurse.     Verbal SBAR report received from Marianne prior to patient arrival.     Patient trasferred to bed via air vicky. Patient alert and oriented X 3. The patient is not having any pain.  . Admission vital signs: Blood pressure 122/68, pulse 65, temperature 97.5  F (36.4  C), temperature source Tympanic, resp. rate 14, height 1.854 m (6' 1\"), weight 147.4 kg (325 lb), SpO2 94 %. Patient was oriented to plan of care, call light, bed controls, tv, telephone, bathroom and visiting hours.     Risk Assessment    The following safety risks were identified during admission: fall. Yellow risk band applied: YES.     Skin Initial Assessment    This writer admitted this patient and completed a full skin assessment and Darnell score in the Adult PCS flowsheet. Appropriate interventions initiated as needed.     Darnell Risk Assessment  Sensory Perception: 3-->slightly limited  Moisture: 3-->occasionally moist  Activity: 3-->walks occasionally  Mobility: 3-->slightly limited  Nutrition: 3-->adequate  Friction and Shear: 3-->no apparent problem  Darnell Score: 18  Moisture Interventions: Encourage regular toileting, Incontinence pad  Mattress: Standard gel/foam mattress (IsoFlex, Atmos Air, etc.)  Bed Frame: Standard width and length    Education    Patient has a Jones to Observation order: No  Observation education completed and documented: N/A      Himanshu Avila RN    "

## 2023-01-09 NOTE — PHARMACY-ANTICOAGULATION SERVICE
Pharmacy -- Admission Medication Reconciliation    Prior to admission (PTA) medications were reviewed and the patient's PTA medication list was updated.    Sources Consulted: patient, [employer]s, chart review    The reliability of this Medication Reconciliation is: Reliability: Reliable    The following significant changes were made:  Removed completed Lovenox  Updated last doses    Clarified recent dosing of Lovenox/warfarin    1/4/23: HOLD warfarin  1/5/23: HOLD warfarin  1/6/23: HOLD warfarin (started Lovenox twice daily dosing)  1/7/23: HOLD warfarin (continued Lovenox twice daily dosing)  1/8/23: HOLD warfarin  1/9/23: planned 15 mg warfarin once   1/10/23: planned 10 mg warfarinonce  Then planned resuming warfarin dosing as 10 mg on Monday / Friday then 7.5 mg rest of the week    Patient reports inappropriate use of Tylenol at home. Taking  8685-7030 mg at a time. Advised patient of appropriate dosing of Tylenol, will alert hospitalist that patient may need help with appropriate pain regimen at home.    In addition, the patient's allergies were reviewed with the patient and updated as follows:   Allergies: Diatrizoate, Ioxaglate, Levofloxacin, Metrizamide, and Probenecid    The pharmacist has reviewed with the patient that all personal medications should be removed from the building or locked in the belongings safe.  Patient shall only take medications ordered by the physician and administered by the nursing staff.     Medication barriers identified: none noted    Medication adherence concerns: see above    Understanding of emergency medications: CLINT Milner [facility], 1/9/2023,  5:08 PM

## 2023-01-09 NOTE — ANESTHESIA CARE TRANSFER NOTE
Patient: Babak Moran    Procedure: Procedure(s):  ARTHROPLASTY, KNEE, TOTAL       Diagnosis: DJD (degenerative joint disease) [M19.90]  Diagnosis Additional Information: No value filed.    Anesthesia Type:   Spinal     Note:    Oropharynx: oropharynx clear of all foreign objects  Level of Consciousness: awake  Oxygen Supplementation: nasal cannula  Level of Supplemental Oxygen (L/min / FiO2): 2  Independent Airway: airway patency satisfactory and stable  Dentition: dentition unchanged  Vital Signs Stable: post-procedure vital signs reviewed and stable  Report to RN Given: handoff report given  Patient transferred to: PACU    Handoff Report: Identifed the Patient, Identified the Reponsible Provider, Reviewed the pertinent medical history, Discussed the surgical course, Reviewed Intra-OP anesthesia mangement and issues during anesthesia, Set expectations for post-procedure period and Allowed opportunity for questions and acknowledgement of understanding      Vitals:  Vitals Value Taken Time   BP     Temp     Pulse     Resp     SpO2         Electronically Signed By: JAYLEN Ontiveros CRNA  January 9, 2023  1:16 PM

## 2023-01-09 NOTE — ANESTHESIA PROCEDURE NOTES
Adductor canal Procedure Note    Pre-Procedure   Staff -        CRNA: Sha Burden APRN CRNA       Performed By: CRNA       Location: pre-op       Procedure Start/Stop Times: 1/9/2023 10:23 AM and 1/9/2023 10:31 AM       Pre-Anesthestic Checklist: patient identified, IV checked, site marked, risks and benefits discussed, informed consent, monitors and equipment checked, pre-op evaluation, at physician/surgeon's request and post-op pain management  Timeout:       Correct Patient: Yes        Correct Procedure: Yes        Correct Site: Yes        Correct Position: Yes        Correct Laterality: Yes        Site Marked: Yes  Procedure Documentation  Procedure: Adductor canal       Diagnosis: POST-OP PAIN CONTROL       Laterality: left       Patient Position: supine       Patient Prep/Sterile Barriers: sterile gloves, mask       Skin prep: Chloraprep       Needle Type: insulated       Needle Gauge: 20.        Needle Length (Inches): 6           Catheter threaded easily.             Ultrasound guided       1. Ultrasound was used to identify targeted nerve, plexus, vascular marker, or fascial plane and place a needle adjacent to it in real-time.       2. Ultrasound was used to visualize the spread of anesthetic in close proximity to the above referenced structure.       3. A permanent image is entered into the patient's record.       4. The visualized anatomic structures appeared normal.       5. There were no apparent abnormal pathologic findings.    Assessment/Narrative         The placement was positive for bleeding at site.       The placement was negative for: blood aspirated and painful injection       Paresthesias: No.       Bolus given via needle. no blood aspirated via catheter.        Secured via.        Insertion/Infusion Method: Single Shot       Complications: none    Medication(s) Administered   Medication Administration Time: 1/9/2023 10:23 AM      FOR [facility] ([facility] ONLY:   Pain Team Contact  "information: please page the Pain Team Via [facility]. Search \"Pain\". During daytime hours, please page the attending first. At night please page the resident first.    "

## 2023-01-09 NOTE — ANESTHESIA POSTPROCEDURE EVALUATION
Patient: Babak Moran    Procedure: Procedure(s):  ARTHROPLASTY, KNEE, TOTAL       Anesthesia Type:  Spinal    Note:  Disposition: Admission   Postop Pain Control: Uneventful            Sign Out: Well controlled pain   PONV: No   Neuro/Psych: Uneventful            Sign Out: Acceptable/Baseline neuro status   Airway/Respiratory: Uneventful            Sign Out: Acceptable/Baseline resp. status   CV/Hemodynamics: Uneventful            Sign Out: Acceptable CV status; No obvious hypovolemia; No obvious fluid overload   Other NRE: NONE   DID A NON-ROUTINE EVENT OCCUR? No           Last vitals:  Vitals Value Taken Time   /106 01/09/23 1355   Temp 96.8  F (36  C) 01/09/23 1345   Pulse 64 01/09/23 1355   Resp 16 01/09/23 1355   SpO2 96 % 01/09/23 1355   Vitals shown include unvalidated device data.    Electronically Signed By: JAYLEN Ontiveros CRNA  January 9, 2023  2:31 PM

## 2023-01-09 NOTE — OR NURSING
PACU Transfer Note    Babak Moran was transferred to [facility] via bed.  Equipment used for transport:  bed.  Accompanied by:  DANYELLE Lopes  Prescriptions were: e-prescribed    PACU Respiratory Event Documentation     1) Episodes of Apnea greater than or equal to 10 seconds: no    2) Bradypnea - less than 8 breaths per minute: no    3) Pain score on 0 to 10 scale: 0/10    4) Pain-sedation mismatch (yes or no): no    5) Repeated 02 desaturation less than 90% (yes or no): no    Anesthesia notified? (yes or no): no    Any of the above events occuring repeatedly in separate 30 minute intervals may be considered recurrent PACU respiratory events.    Patient stable and meets phase 1 discharge criteria for transport from PACU.    Report given to DANYELLE Downs

## 2023-01-09 NOTE — ANESTHESIA PROCEDURE NOTES
"Other (IPACK block) Procedure Note    Pre-Procedure   Staff -        CRNA: Sha Burden APRN CRNA       Performed By: CRNA       Location: pre-op       Procedure Start/Stop Times: 1/9/2023 10:17 AM and 1/9/2023 10:22 AM       Pre-Anesthestic Checklist: patient identified, IV checked, site marked, risks and benefits discussed, informed consent, monitors and equipment checked, pre-op evaluation, at physician/surgeon's request and post-op pain management  Timeout:       Correct Patient: Yes        Correct Procedure: Yes        Correct Site: Yes        Correct Position: Yes        Correct Laterality: Yes        Site Marked: Yes  Procedure Documentation  Procedure: Other (IPACK block)       Diagnosis: POST-OP PAIN CONTROL       Laterality: left       Patient Position: supine       Patient Prep/Sterile Barriers: sterile gloves, mask       Skin prep: Chloraprep       Needle Type: insulated       Needle Gauge: 20.        Needle Length (Inches): 6        Ultrasound guided       1. Ultrasound was used to identify targeted nerve, plexus, vascular marker, or fascial plane and place a needle adjacent to it in real-time.       2. Ultrasound was used to visualize the spread of anesthetic in close proximity to the above referenced structure.       3. A permanent image is entered into the patient's record.       4. The visualized anatomic structures appeared normal.       5. There were no apparent abnormal pathologic findings.    Assessment/Narrative         The placement was negative for: blood aspirated, painful injection and site bleeding       Paresthesias: No.       Bolus given via needle. no blood aspirated via catheter.        Secured via.        Insertion/Infusion Method: Single Shot       Complications: none    Medication(s) Administered   Medication Administration Time: 1/9/2023 10:17 AM      FOR [facility] ([facility] [place]) ONLY:   Pain Team Contact information: please page the Pain Team Via [employer]. Search \"Pain\". During " daytime hours, please page the attending first. At night please page the resident first.

## 2023-01-09 NOTE — PLAN OF CARE
"Patient is alert and orientated. Denies pain as spinal block is still in effect, DP and PT pulses 2+ and warm throughout, afshin appearance. L dermatome at thigh, R at foot. Trace BLE edema, elevated on pillows as tolerated. Heart irregular, known Afib, denies chest pain. Lungs clear throughout, denies SOB or cough, IS use instructed, on room air and cont pulse ox. Hypoactive bowel sounds, tolerating regular diet well, denies n/v, not passing flatus yet. Due to void, urinal at bedside. Blanchable redness to sacrum, educated to self repo q2h. Has not been oob yet. VSS.     ]BP (!) 149/76 (BP Location: Left arm, Patient Position: Semi-Moralez's)   Pulse 68   Temp 97.6  F (36.4  C) (Tympanic)   Resp 14   Ht 1.854 m (6' 1\")   Wt 147.4 kg (325 lb)   SpO2 95%   BMI 42.88 kg/m        Problem: Plan of Care - These are the overarching goals to be used throughout the patient stay.    Goal: Plan of Care Review  Description: The Plan of Care Review/Shift note should be completed every shift.  The Outcome Evaluation is a brief statement about your assessment that the patient is improving, declining, or no change.  This information will be displayed automatically on your shift note.  Outcome: Progressing  Goal: Patient-Specific Goal (Individualized)  Description: You can add care plan individualizations to a care plan. Examples of Individualization might be:  \"Parent requests to be called daily at 9am for status\", \"I have a hard time hearing out of my right ear\", or \"Do not touch me to wake me up as it startles me\".  Outcome: Progressing  Goal: Absence of Hospital-Acquired Illness or Injury  Outcome: Progressing  Intervention: Identify and Manage Fall Risk  Recent Flowsheet Documentation  Taken 1/9/2023 1420 by Himanshu Avila RN  Safety Promotion/Fall Prevention:    activity supervised    bed alarm on    clutter free environment maintained    fall prevention program maintained    lighting adjusted    nonskid shoes/slippers " when out of bed    patient and family education    room door open    room organization consistent    safety round/check completed    supervised activity  Intervention: Prevent and Manage VTE (Venous Thromboembolism) Risk  Recent Flowsheet Documentation  Taken 1/9/2023 1420 by Himanshu Avila, RN  VTE Prevention/Management: SCDs (sequential compression devices) on  Goal: Optimal Comfort and Wellbeing  Outcome: Progressing  Goal: Readiness for Transition of Care  Outcome: Progressing  Intervention: Mutually Develop Transition Plan  Recent Flowsheet Documentation  Taken 1/9/2023 1400 by Himanshu Avila, RN  Patient/Family Anticipated Services at Transition: none  Patient/Family Anticipates Transition to: home with family  Equipment Currently Used at Home: cane, straight   Goal Outcome Evaluation:

## 2023-01-09 NOTE — INTERVAL H&P NOTE
"I have reviewed the surgical (or preoperative) H&P that is linked to this encounter, and examined the patient. There are no significant changes    Clinical Conditions Present on Arrival:  Clinically Significant Risk Factors Present on Admission                  # Drug Induced Coagulation Defect: home medication list includes an anticoagulant medication   # Severe Obesity: Estimated body mass index is 42.3 kg/m  as calculated from the following:    Height as of 12/23/22: 1.867 m (6' 1.5\").    Weight as of 12/23/22: 147.4 kg (325 lb).       "

## 2023-01-09 NOTE — PHARMACY-ANTICOAGULATION SERVICE
Pharmacy Consult- Initial Coumadin Assessment    Babak Moran is a 62 year old male admitted on 1/9/2023 with <principal problem not specified>    Primary Indication(s) for Anticoagulation: A fib    Goal INR:2.5 (2-3); patient prefers to be closer to 3 due to h/o CVA    FYI, patient is followed by the Anticoagulation/Protime Clinic at: [facility]    Patient Active Problem List   Diagnosis     Pain in joint, ankle and foot     Atrial fibrillation with RVR (H)     Cerebrovascular accident (CVA) due to embolism of right middle cerebral artery (H)     Osteoarthrosis     Cutaneous lupus erythematosus     Gout     Ascending aorta enlargement (H)     Family history of coronary artery disease     Essential hypertension     Left hemiparesis (H)     Long-term (current) use of anticoagulants, INR goal 2.0-3.0     Systemic lupus erythematosus (H)     Tissue plasminogen activator (t-PA) administered at other facility within 24 hours prior to current admission     Morbid obesity with BMI of 40.0-44.9, adult (H)     Obstructive sleep apnea syndrome     Need for vaccination     Cellulitis of right knee     Cellulitis of third toe, right     Onychogryphosis     Overweight       Patient previously anticoagulated on Coumadin at a dose of 57.5mg/week; dosed as: 10 mg on Monday/Friday and 7.5 mg daily the rest of the week    Factors that may increase patient's sensitivity to warfarin (Coumadin) include: surgery, therapeutic lovenox    Factors that may decrease patient's sensitivity to warfarin (Coumadin) include: held doses since 1/4    Recent Labs   Lab Test 01/09/23  0749 01/06/23  1103 01/02/23  1508 12/23/22  1353 12/19/22  1000 08/17/22  0807 08/11/22  0627 08/10/22  0647 08/09/22  0434 08/08/22  1416   HGB  --   --   --  13.9  --   --  13.8  --  13.5 14.6   INR 0.97 1.4* 2.1*  --  3.2*   < > 2.24*   < > 2.89* 3.39*   PLT  --   --   --  297  --   --  267  --  231 259    < > = values in this interval not displayed.        Plan: Per  anticoagulation note/visit, patient is to resume warfarin 15 mg x 1 the day of surgery.  Continue bridge with lovenox, INR with am labs.    Thank You for the Consult. Will continue to follow.    Alicia Livingston RPH ....................  1/9/2023   2:33 PM

## 2023-01-10 ENCOUNTER — APPOINTMENT (OUTPATIENT)
Dept: PHYSICAL THERAPY | Facility: OTHER | Age: 63
End: 2023-01-10
Attending: FAMILY MEDICINE
Payer: COMMERCIAL

## 2023-01-10 ENCOUNTER — APPOINTMENT (OUTPATIENT)
Dept: OCCUPATIONAL THERAPY | Facility: OTHER | Age: 63
End: 2023-01-10
Attending: ORTHOPAEDIC SURGERY
Payer: COMMERCIAL

## 2023-01-10 LAB
GLUCOSE BLDC GLUCOMTR-MCNC: 130 MG/DL (ref 70–99)
HGB BLD-MCNC: 14 G/DL (ref 13.3–17.7)
HGB BLD-MCNC: 14.4 G/DL (ref 13.3–17.7)
HOLD SPECIMEN: NORMAL
INR PPP: 1.07 (ref 0.85–1.15)

## 2023-01-10 PROCEDURE — 85018 HEMOGLOBIN: CPT | Performed by: FAMILY MEDICINE

## 2023-01-10 PROCEDURE — 99213 OFFICE O/P EST LOW 20 MIN: CPT | Performed by: FAMILY MEDICINE

## 2023-01-10 PROCEDURE — 97530 THERAPEUTIC ACTIVITIES: CPT | Mod: GP

## 2023-01-10 PROCEDURE — 250N000011 HC RX IP 250 OP 636: Performed by: FAMILY MEDICINE

## 2023-01-10 PROCEDURE — 85018 HEMOGLOBIN: CPT | Performed by: ORTHOPAEDIC SURGERY

## 2023-01-10 PROCEDURE — 36415 COLL VENOUS BLD VENIPUNCTURE: CPT | Performed by: ORTHOPAEDIC SURGERY

## 2023-01-10 PROCEDURE — 36415 COLL VENOUS BLD VENIPUNCTURE: CPT | Performed by: FAMILY MEDICINE

## 2023-01-10 PROCEDURE — 250N000013 HC RX MED GY IP 250 OP 250 PS 637: Performed by: ORTHOPAEDIC SURGERY

## 2023-01-10 PROCEDURE — 97530 THERAPEUTIC ACTIVITIES: CPT | Mod: GO | Performed by: OCCUPATIONAL THERAPIST

## 2023-01-10 PROCEDURE — 97116 GAIT TRAINING THERAPY: CPT | Mod: GP

## 2023-01-10 PROCEDURE — 82962 GLUCOSE BLOOD TEST: CPT

## 2023-01-10 PROCEDURE — 97161 PT EVAL LOW COMPLEX 20 MIN: CPT | Mod: GP

## 2023-01-10 PROCEDURE — 97165 OT EVAL LOW COMPLEX 30 MIN: CPT | Mod: GO | Performed by: OCCUPATIONAL THERAPIST

## 2023-01-10 PROCEDURE — 96376 TX/PRO/DX INJ SAME DRUG ADON: CPT

## 2023-01-10 PROCEDURE — 85610 PROTHROMBIN TIME: CPT | Performed by: ORTHOPAEDIC SURGERY

## 2023-01-10 PROCEDURE — 250N000011 HC RX IP 250 OP 636: Performed by: ORTHOPAEDIC SURGERY

## 2023-01-10 PROCEDURE — 999N000104 HC STATISTIC NO CHARGE

## 2023-01-10 PROCEDURE — 250N000013 HC RX MED GY IP 250 OP 250 PS 637: Performed by: FAMILY MEDICINE

## 2023-01-10 RX ORDER — WARFARIN SODIUM 5 MG/1
10 TABLET ORAL
Status: DISCONTINUED | OUTPATIENT
Start: 2023-01-10 | End: 2023-01-10

## 2023-01-10 RX ORDER — HYDROCHLOROTHIAZIDE 12.5 MG/1
12.5 CAPSULE ORAL DAILY
Status: DISCONTINUED | OUTPATIENT
Start: 2023-01-10 | End: 2023-01-12 | Stop reason: HOSPADM

## 2023-01-10 RX ORDER — CEFAZOLIN SODIUM 2 G/100ML
2 INJECTION, SOLUTION INTRAVENOUS ONCE
Status: COMPLETED | OUTPATIENT
Start: 2023-01-10 | End: 2023-01-10

## 2023-01-10 RX ORDER — FENTANYL CITRATE 50 UG/ML
25 INJECTION, SOLUTION INTRAMUSCULAR; INTRAVENOUS
Status: DISCONTINUED | OUTPATIENT
Start: 2023-01-10 | End: 2023-01-12 | Stop reason: HOSPADM

## 2023-01-10 RX ADMIN — SENNOSIDES AND DOCUSATE SODIUM 1 TABLET: 50; 8.6 TABLET ORAL at 22:03

## 2023-01-10 RX ADMIN — AMLODIPINE BESYLATE 10 MG: 10 TABLET ORAL at 10:04

## 2023-01-10 RX ADMIN — SENNOSIDES AND DOCUSATE SODIUM 1 TABLET: 50; 8.6 TABLET ORAL at 10:04

## 2023-01-10 RX ADMIN — WARFARIN SODIUM 7.5 MG: 5 TABLET ORAL at 17:44

## 2023-01-10 RX ADMIN — HYDROCHLOROTHIAZIDE 12.5 MG: 12.5 CAPSULE, GELATIN COATED ORAL at 10:16

## 2023-01-10 RX ADMIN — ACETAMINOPHEN 975 MG: 325 TABLET ORAL at 05:55

## 2023-01-10 RX ADMIN — ACETAMINOPHEN 975 MG: 325 TABLET ORAL at 14:45

## 2023-01-10 RX ADMIN — CEFAZOLIN SODIUM 2 G: 2 INJECTION, SOLUTION INTRAVENOUS at 10:05

## 2023-01-10 RX ADMIN — ACETAMINOPHEN 975 MG: 325 TABLET ORAL at 22:03

## 2023-01-10 RX ADMIN — CEFAZOLIN SODIUM 2 G: 2 INJECTION, SOLUTION INTRAVENOUS at 02:18

## 2023-01-10 ASSESSMENT — ACTIVITIES OF DAILY LIVING (ADL)
ADLS_ACUITY_SCORE: 23

## 2023-01-10 NOTE — UTILIZATION REVIEW
Admission Status; Secondary Review Determination for Procedure      Under the authority of the Utilization Management Committee, the utilization review process indicated a secondary review on the above patient. The review outcome is based on review of the medical records, discussions with staff, and applying clinical experience noted on the date of the review.     (x) Outpatient Status with extended recovery is appropriate - This patient does not meet hospital inpatient criteria. If this patient's primary payer is Medicare and was admitted as an inpatient, Condition Code 44 should be used and patient status changed to outpatient recovery.    RATIONALE FOR DETERMINATION   62 year old male who underwent a left TKA. Patient was admitted to the hospital after the procedure.  Pain is controlled with tylenol.  Dressing completely saturated with blood morning 1/10.  Holding lovenox, give additional ancef dose and resume lower dose of warfarin tonight.  Hgb 14.0.  Plan to recheck hgb again in the morning.  Patient is staying another midnight.  Probable discharge tomorrow.     Patient has Medicare and the procedure is not on the CMS inpatient list. No documented complications or unexpected recovery. Patient can be safely  monitored for bleeding and recover in outpatient/extended recovery setting.   The severity of illness, intensity of service provided, expected LOS and risk for adverse outcome doesn't meet inpatient hospital admission.     This document was produced using voice recognition software.     The information on this document is developed by the utilization review team in order for the business office to ensure compliance. This only denotes the appropriateness of proper admission status and does not reflect the quality of care rendered.   The definitions of Inpatient Status and Observation Status used in making the determination above are those provided in the CMS Coverage Manual, Chapter 1 and Chapter 6, section  70.4.     Sincerely,   Farheen Miller MD   Physican Advisor  Utilization Management   [facility].

## 2023-01-10 NOTE — PROGRESS NOTES
01/10/23 1003   Vital Signs   Temp 97.6  F (36.4  C)   Temp src Tympanic   Resp 18   Oximeter Heart Rate 89 bpm   /70   Oxygen Therapy   SpO2 96 %   O2 Device None (Room air)     Patient was working with PT and after ambulating to chair it was noted that aquacel dressing was saturated with blood.  Notified Dr. Díaz who discussed with Dr. Johnson.  Per MD, replaced aquacel and apply pressure with ace bandage.  Hemoglobin check, orders to restart antibiotic and discontinue lovenox. Patient denies any pain and has leg elevated in recliner.  CMS intact, +2 pedal pulse and when aquacel dressing removed there was still small amount of active bleeding.  Orders completed as above and will continue to monitor closely.

## 2023-01-10 NOTE — PROGRESS NOTES
01/09/23 1633   Appointment Info   Signing Clinician's Name / Credentials (PT) James Caba MPT   Interventions   Interventions Quick Adds Therapeutic Procedure   Therapeutic Procedure/Exercise   Treatment Detail/Skilled Intervention PT assist with patient voiding attempts while sitting at edge of bed; trial of sit to stand for attempt to void also tried without success; affects of spinal anesthesia remain

## 2023-01-10 NOTE — PROGRESS NOTES
01/10/23 0800   Appointment Info   Signing Clinician's Name / Credentials (PT) James Kwonpedronghia MPT   Living Environment   People in Home spouse   Current Living Arrangements house   Home Accessibility no concerns;stairs to enter home   Number of Stairs, Main Entrance 1   Stair Railings, Main Entrance other (see comments)   Transportation Anticipated family or friend will provide;car, drives self   Self-Care   Usual Activity Tolerance moderate   Current Activity Tolerance fair   Equipment Currently Used at Home cane, straight   Fall history within last six months no   General Information   Referring Physician Arjun   Patient/Family Therapy Goals Statement (PT) return home   Existing Precautions/Restrictions fall  (s/p left TKA)   Weight-Bearing Status - LLE weight-bearing as tolerated   Weight-Bearing Status - RLE full weight-bearing   Cognition   Affect/Mental Status (Cognition) WFL   Orientation Status (Cognition) oriented x 4   Follows Commands (Cognition) WFL   Pain Assessment   Patient Currently in Pain Yes, see Vital Sign flowsheet   Integumentary/Edema   Integumentary/Edema Comments surgical incision left knee with notable blood saturation of dressing   Posture    Posture Not impaired   Range of Motion (ROM)   Range of Motion ROM is WFL   ROM Comment left knee 5-95 degrees   Strength (Manual Muscle Testing)   Strength (Manual Muscle Testing) strength is WFL   Strength Comments left knee not tested post-op   Bed Mobility   Impairments Contributing to Impaired Bed Mobility pain;decreased strength   Comment, (Bed Mobility) minimal assist for left LE support   Transfers   Impairments Contributing to Impaired Transfers decreased strength   Comment, (Transfers) sit to stand form higher level with CGA; from lower surface moderate assist is required; pivot transfers with CGA and use of Fww   Gait/Stairs (Locomotion)   [place] Level (Gait) contact guard   Assistive Device (Gait) walker, front-wheeled   Maintains  Weight-bearing Status (Gait) able to maintain   Balance   Balance Comments good with Fww   Sensory Examination   Sensory Perception WFL   Coordination   Coordination no deficits were identified   Muscle Tone   Muscle Tone no deficits were identified   Clinical Impression   Criteria for Skilled Therapeutic Intervention Yes, treatment indicated   PT Diagnosis (PT) impaired mobility   Influenced by the following impairments pain and fatigue   Functional limitations due to impairments activity/gait tolerance and stabiltiy   Clinical Presentation (PT Evaluation Complexity) Evolving/Changing   Clinical Decision Making (Complexity) low complexity   Planned Therapy Interventions (PT) bed mobility training;gait training;home exercise program;transfer training   Anticipated Equipment Needs at Discharge (PT) walker, rolling   Risk & Benefits of therapy have been explained risks/benefits reviewed;patient;spouse/significant other   PT Total Evaluation Time   PT Eval, Low Complexity Minutes (90303) 15   Plan of Care Review   Plan of Care Reviewed With spouse;patient   Physical Therapy Goals   PT Frequency Daily   PT Predicted Duration/Target Date for Goal Attainment 01/12/23   PT Goals Bed Mobility;Transfers;Gait   PT: Bed Mobility Supervision/stand-by assist   PT: Transfers Supervision/stand-by assist;Assistive device   PT: Gait Supervision/stand-by assist;Rolling walker;150 feet   PT Discharge Planning   PT Plan continue PT   PT Discharge Recommendation (DC Rec) home with assist;home with outpatient physical therapy   PT Rationale for DC Rec to promote strength, stability and safe mobility   PT Brief overview of current status increased left knee dressing saturation noted; increased assist for sit to stand from lower surface level; stable gait   Total Session Time   Total Session Time (sum of timed and untimed services) 15

## 2023-01-10 NOTE — PLAN OF CARE
Patient dressing continues to stay within marked borders, no advancement of shadowing or continued bleeding.  Patient has ambulated in room with no increase in pain or discomfort.  Given tylenol for pain 3/10 and ice packs, patient declines any further interventions or stronger medication.  CMS intact.  VSS, afebrile.

## 2023-01-10 NOTE — PHARMACY-ADMISSION MEDICATION HISTORY
Pharmacy -- Admission Medication Reconciliation    Prior to admission (PTA) medications were reviewed and the patient's PTA medication list was updated.    Sources Consulted: patient, [employer], chart review    The reliability of this Medication Reconciliation is: Reliability: Reliable    The following significant changes were made:  Removed completed Lovenox  Updated last doses    Clarified recent dosing of Lovenox/warfarin    1/4/23: HOLD warfarin  1/5/23: HOLD warfarin  1/6/23: HOLD warfarin (started Lovenox twice daily dosing)  1/7/23: HOLD warfarin (continued Lovenox twice daily dosing)  1/8/23: HOLD warfarin  1/9/23: planned 15 mg warfarin once   1/10/23: planned 10 mg warfarinonce  Then planned resuming warfarin dosing as 10 mg on Monday / Friday then 7.5 mg rest of the week    Patient reports inappropriate use of Tylenol at home. Taking  [id number] mg at a time. Advised patient of appropriate dosing of Tylenol, will alert hospitalist that patient may need help with appropriate pain regimen at home.    Patient states he is taking one whole tablet of hydrochlorothiazide - which he states is 12.5 mg. Advised that patient should double check his strength when he returns home - his last prescription was filled for the 25 mg tablets with the directions to break them in half.     In addition, the patient's allergies were reviewed with the patient and updated as follows:   Allergies: Diatrizoate, Ioxaglate, Levofloxacin, Metrizamide, and Probenecid    The pharmacist has reviewed with the patient that all personal medications should be removed from the building or locked in the belongings safe.  Patient shall only take medications ordered by the physician and administered by the nursing staff.     Medication barriers identified: none noted    Medication adherence concerns: see above    Understanding of emergency medications: CLINT Milner [facility], 1/9/2023,  5:08 PM

## 2023-01-10 NOTE — PROGRESS NOTES
SAFETY CHECKLIST  ID Bands and Risk clasps correct and in place (DNR, Fall risk, Allergy, Latex, Limb):  Yes  All Lines Reconciled and labeled correctly: Yes  Whiteboard updated:Yes  Environmental interventions: Yes

## 2023-01-10 NOTE — PLAN OF CARE
Patient denies pain. Dressing to LLE is CDI. CMS intact. Patient reports full sensation in lower extremities. Voiding without difficulty. Up at bedside with assist of 1.

## 2023-01-10 NOTE — PROGRESS NOTES
Pt was up with PT and tried using urinal. Pt had minimal success. Pt was dangling at the foot of the bed and tolerated activity well.

## 2023-01-10 NOTE — PROGRESS NOTES
"   01/10/23 1138   Vital Signs   Oximeter Heart Rate 66 bpm   BP (!) 157/81       Aquacel dressing has golf ball size area of shadowing that has not advanced in last hour.  Bleeding seems to have stopped.  ACE wrap and ice pack in place.  Patient resting in recliner.  Continues to deny any pain, has not taken anything for pain besides tylenol and declines any other interventions.  Will continue to monitor.   PRIMARY DIAGNOSIS: \"GENERIC\" NURSING  OUTPATIENT/OBSERVATION GOALS TO BE MET BEFORE DISCHARGE:  ADLs back to baseline: No    Activity and level of assistance: up with assist x 1, gait belt and walker    Pain status: Improved-controlled with oral pain medications.    Return to near baseline physical activity: No     Discharge Planner Nurse   Safe discharge environment identified: Yes  Barriers to discharge: Yes       Entered by: Chery Finley RN 01/10/2023 3:38 PM     Please review provider order for any additional goals.   Nurse to notify provider when observation goals have been met and patient is ready for discharge.  "

## 2023-01-10 NOTE — PROGRESS NOTES
"[facility]    Medicine Progress Note - Hospitalist Service    Date of Admission:  1/9/2023    Assessment & Plan   L knee OA s/p L TKA with Dr. Johnson. POD#1. Pain well controlled. Only needing tylenol.  -pain control, wound care, PT/OT, diet per surgery    Postoperative blood loss. Dressing completely saturated this AM. Spoke with Dr. Johnson  -hold lovenox  -additional dose of ancef  -lower dose of warfarin tonight  -ACE wrap, reinforce dressing, monitor closely  -repeated Hb now and in AM    Hx of afib and lupus anticoag, on chronic anticoag with warfarin. On hold for last week prior to surgery.   -per discussion with PCP, plan is for 120mg bid of lovenox to bridge to therapeutic INR on coumadin    HTN. BP stable.   -ok to resume norvasc and hydrochlorothiazide (12.5mg daily)       Diet: Advance Diet as Tolerated: Regular Diet Adult  Discharge Instruction - Regular Diet Adult    DVT Prophylaxis: lovenox and coumadin  Ramirez Catheter: Not present  Lines: None     Cardiac Monitoring: None  Code Status: Full Code      Clinically Significant Risk Factors Present on Admission               # Drug Induced Coagulation Defect: home medication list includes an anticoagulant medication         # Severe Obesity: Estimated body mass index is 42.88 kg/m  as calculated from the following:    Height as of this encounter: 1.854 m (6' 1\").    Weight as of this encounter: 147.4 kg (325 lb).           Disposition Plan       home in 1-2 days when bleeding controlled.     Raina Díaz DO  Hospitalist Service  [facility]  Securely message with [employer] (more info)  Text page via [employer] Paging/Directory   ______________________________________________________________________    Interval History   Significant bleeding of dressing this AM. Otherwise doing well. Pain well controlled.     Physical Exam   Vital Signs: Temp: 97.8  F (36.6  C) Temp src: Tympanic BP: (!) 155/84 Pulse: 72   Resp: 18 " SpO2: 95 % O2 Device: None (Room air) Oxygen Delivery: 2 LPM  Weight: 325 lbs 0 oz    General Appearance: AAO, morbidly obese. NAD  Respiratory: CTA B/L   Cardiovascular: IRR  Skin: warm, dry.   Other: Bandage of L knee replaced with knew dressing and ACE wrap.     Medical Decision Making   30 MINUTES SPENT BY ME on the date of service doing chart review, history, exam, documentation & further activities per the note.  MANAGEMENT DISCUSSED with the following over the past 24 hours: Dr. Johnson   NOTE(S)/MEDICAL RECORDS REVIEWED over the past 24 hours: H andP  Tests REVIEWED in the past 24 hours:      Data     I have personally reviewed the following data over the past 24 hrs:    N/A  \   14.0   / N/A     N/A N/A N/A /  130 (H)   N/A N/A N/A \       Procal: N/A CRP: N/A Lactic Acid: 2.1 (H)       INR:  1.07 PTT:  N/A   D-dimer:  N/A Fibrinogen:  N/A       Imaging results reviewed over the past 24 hrs:   Recent Results (from the past 24 hour(s))   XR Knee Port Left 1/2 Views    Narrative    PROCEDURE: XR KNEE PORT LEFT 1/2 VIEWS 1/9/2023 1:33 PM    HISTORY: Post-Op Total Knee    COMPARISONS: 11/2/2022.    TECHNIQUE: 2 views.    FINDINGS: There is a left knee arthroplasty without acute  complication. Skin staples are seen anteriorly and there is soft  tissue gas consistent with postoperative state.         Impression    IMPRESSION: Interval left knee arthroplasty.    ISREAL REYNOSO MD         SYSTEM ID:  [id number]

## 2023-01-10 NOTE — PROGRESS NOTES
01/10/23 0900   Living Environment   People in Home spouse   Current Living Arrangements house   Home Accessibility no concerns;stairs to enter home   Number of Stairs, Main Entrance 1   Stair Railings, Main Entrance other (see comments)   Transportation Anticipated family or friend will provide;car, drives self   Self-Care   Usual Activity Tolerance moderate   Current Activity Tolerance fair   Equipment Currently Used at Home cane, straight   Fall history within last six months no   General Information   Patient/Family Therapy Goal Statement (OT) previous hx of CVA with residual left sided weakness   Cognitive Status Examination   Orientation Status orientation to person, place and time   Pain Assessment   Patient Currently in Pain Yes, see Vital Sign flowsheet   Range of Motion Comprehensive   General Range of Motion bilateral upper extremity ROM WFL   Transfers   Transfers bed-chair transfer;sit-stand transfer   Transfer Skill: Bed to Chair/Chair to Bed   Bed-Chair [place] (Transfers) contact guard   Assistive Device (Bed-Chair Transfers) rolling walker   Sit-Stand Transfer   Sit-Stand [place] (Transfers) contact guard   Activities of Daily Living   BADL Assessment/Intervention grooming  (pt offered shower, pt declined.  wants wife here to assist with self cares)   Grooming Assessment/Training   [place] Level (Grooming) set up;supervision   Clinical Impression   Criteria for Skilled Therapeutic Interventions Met (OT) Yes, treatment indicated   OT Diagnosis impaired self cares, mobility   Assessment of Occupational Performance 1-3 Performance Deficits   Identified Performance Deficits self cares, mobility   Planned Therapy Interventions (OT) ADL retraining   Clinical Decision Making Complexity (OT) low complexity   Risk & Benefits of therapy have been explained patient   OT Goals   Therapy Frequency (OT) Daily   OT Predicted Duration/Target Date for Goal Attainment 01/12/23   OT Goals Lower Body  Dressing;Transfers;Toilet Transfer/Toileting   OT: Lower Body Dressing Minimal assist   OT: Transfer Supervision/stand-by assist   OT: Toilet Transfer/Toileting Supervision/stand-by assist   Interventions   Interventions Quick Adds Therapeutic Activity   Therapeutic Activities   Therapeutic Activity Minutes (55648) 15   Symptoms noted during/after treatment other (see comments)  (increased saturation of Aquacell following short ambulation in room.  OT assisted nursing with supporting leg while applying new Aquacell and wrapping with ACE compression from toes to above dressing.)   Treatment Detail/Skilled Intervention pt ambulated around bed to chair with FWW and CGA; needed cues to have controlled sit into chair.   OT Discharge Planning   OT Plan Continue OT   OT Discharge Recommendation (DC Rec) home with assist   OT Rationale for DC Rec pts wife will be able to assist him at home with basic self cares as needed until he regains full independence   OT Brief overview of current status self cares to be further assessed but pt likely close to baseline ability; mobility with FWW and CGA

## 2023-01-10 NOTE — PHARMACY-ANTICOAGULATION SERVICE
Pharmacy Consult- Coumadin Follow-Up    Babak Moran is a 62 year old male admitted on 1/9/2023 with Aftercare following knee joint replacement surgery, unspecified laterality    Primary Indication(s) for Anticoagulation: A fib    Goal INR: 2.5 (2-3), patient prefers to be closer to 3 due to h/o CVA    FYI, patient is followed by the Anticoagulation/Protime Clinic at: [facility]    Patient Active Problem List   Diagnosis     Pain in joint, ankle and foot     Atrial fibrillation with RVR (H)     Cerebrovascular accident (CVA) due to embolism of right middle cerebral artery (H)     Osteoarthrosis     Cutaneous lupus erythematosus     Gout     Ascending aorta enlargement (H)     Family history of coronary artery disease     Essential hypertension     Left hemiparesis (H)     Long-term (current) use of anticoagulants, INR goal 2.0-3.0     Systemic lupus erythematosus (H)     Tissue plasminogen activator (t-PA) administered at other facility within 24 hours prior to current admission     Morbid obesity with BMI of 40.0-44.9, adult (H)     Obstructive sleep apnea syndrome     Need for vaccination     Onychogryphosis     Overweight     Aftercare following knee joint replacement surgery, unspecified laterality       New factors that may increase patient's sensitivity to warfarin (Coumadin) include: surgery, therapeutic lovenox    New factors that may decrease patient's sensitivity to warfarin (Coumadin) include: restarted 1/9 after held doses since 1/4      Recent Labs   Lab Test 01/10/23  0604 01/09/23  0749 01/06/23  1103 01/02/23  1508 12/23/22  1353 08/17/22  0807 08/11/22  0627 08/10/22  0647 08/09/22  0434 08/08/22  1416   HGB 14.4  --   --   --  13.9  --  13.8  --  13.5 14.6   INR 1.07 0.97 1.4* 2.1*  --    < > 2.24*   < > 2.89* 3.39*   PLT  --   --   --   --  297  --  267  --  231 259    < > = values in this interval not displayed.        Recent Dosing:    Date INR Dose Given   1/9 0.97 15 mg per anticoag note       Plan:  Per anticoagulation note/visit, patient is to receive warfarin 10 mg today. However, per MD, patient is bleeding.  MD would like patient to STOP lovenox bridge and receive warfarin 7.5 mg today. INR with am labs.    Thank You for the Consult. Will continue to follow.    Alicia Livingston [facility] ....................  1/10/2023   8:43 AM

## 2023-01-11 ENCOUNTER — APPOINTMENT (OUTPATIENT)
Dept: GENERAL RADIOLOGY | Facility: OTHER | Age: 63
End: 2023-01-11
Attending: FAMILY MEDICINE
Payer: COMMERCIAL

## 2023-01-11 ENCOUNTER — APPOINTMENT (OUTPATIENT)
Dept: PHYSICAL THERAPY | Facility: OTHER | Age: 63
End: 2023-01-11
Attending: ORTHOPAEDIC SURGERY
Payer: COMMERCIAL

## 2023-01-11 ENCOUNTER — APPOINTMENT (OUTPATIENT)
Dept: OCCUPATIONAL THERAPY | Facility: OTHER | Age: 63
End: 2023-01-11
Attending: ORTHOPAEDIC SURGERY
Payer: COMMERCIAL

## 2023-01-11 LAB
ALBUMIN UR-MCNC: NEGATIVE MG/DL
APPEARANCE UR: CLEAR
BILIRUB UR QL STRIP: NEGATIVE
COLOR UR AUTO: NORMAL
GLUCOSE BLDC GLUCOMTR-MCNC: 103 MG/DL (ref 70–99)
GLUCOSE BLDC GLUCOMTR-MCNC: 110 MG/DL (ref 70–99)
GLUCOSE UR STRIP-MCNC: NEGATIVE MG/DL
HGB BLD-MCNC: 13.8 G/DL (ref 13.3–17.7)
HGB UR QL STRIP: NEGATIVE
HOLD SPECIMEN: NORMAL
HOLD SPECIMEN: NORMAL
INR PPP: 1.34 (ref 0.85–1.15)
KETONES UR STRIP-MCNC: NEGATIVE MG/DL
LACTATE SERPL-SCNC: 1.4 MMOL/L (ref 0.7–2)
LEUKOCYTE ESTERASE UR QL STRIP: NEGATIVE
NITRATE UR QL: NEGATIVE
PH UR STRIP: 7 [PH] (ref 5–9)
SP GR UR STRIP: 1.02 (ref 1–1.03)
UROBILINOGEN UR STRIP-MCNC: NORMAL MG/DL

## 2023-01-11 PROCEDURE — 99212 OFFICE O/P EST SF 10 MIN: CPT | Performed by: FAMILY MEDICINE

## 2023-01-11 PROCEDURE — 36415 COLL VENOUS BLD VENIPUNCTURE: CPT | Performed by: FAMILY MEDICINE

## 2023-01-11 PROCEDURE — 85018 HEMOGLOBIN: CPT | Performed by: ORTHOPAEDIC SURGERY

## 2023-01-11 PROCEDURE — 81003 URINALYSIS AUTO W/O SCOPE: CPT | Performed by: FAMILY MEDICINE

## 2023-01-11 PROCEDURE — 82962 GLUCOSE BLOOD TEST: CPT

## 2023-01-11 PROCEDURE — 258N000003 HC RX IP 258 OP 636: Performed by: FAMILY MEDICINE

## 2023-01-11 PROCEDURE — 97530 THERAPEUTIC ACTIVITIES: CPT | Mod: GO | Performed by: OCCUPATIONAL THERAPIST

## 2023-01-11 PROCEDURE — 71045 X-RAY EXAM CHEST 1 VIEW: CPT

## 2023-01-11 PROCEDURE — 250N000013 HC RX MED GY IP 250 OP 250 PS 637: Performed by: ORTHOPAEDIC SURGERY

## 2023-01-11 PROCEDURE — 83605 ASSAY OF LACTIC ACID: CPT | Performed by: FAMILY MEDICINE

## 2023-01-11 PROCEDURE — 85610 PROTHROMBIN TIME: CPT | Performed by: ORTHOPAEDIC SURGERY

## 2023-01-11 PROCEDURE — 36415 COLL VENOUS BLD VENIPUNCTURE: CPT | Performed by: ORTHOPAEDIC SURGERY

## 2023-01-11 PROCEDURE — 250N000013 HC RX MED GY IP 250 OP 250 PS 637: Performed by: FAMILY MEDICINE

## 2023-01-11 RX ORDER — WARFARIN SODIUM 5 MG/1
5 TABLET ORAL DAILY
Status: ON HOLD | COMMUNITY
End: 2023-01-12

## 2023-01-11 RX ORDER — ACETAMINOPHEN 325 MG/1
975 TABLET ORAL EVERY 6 HOURS
Status: DISCONTINUED | OUTPATIENT
Start: 2023-01-11 | End: 2023-01-12 | Stop reason: HOSPADM

## 2023-01-11 RX ORDER — WARFARIN SODIUM 2.5 MG/1
TABLET ORAL
Status: ON HOLD | COMMUNITY
End: 2023-06-27

## 2023-01-11 RX ADMIN — WARFARIN SODIUM 7.5 MG: 5 TABLET ORAL at 17:43

## 2023-01-11 RX ADMIN — AMLODIPINE BESYLATE 10 MG: 10 TABLET ORAL at 09:38

## 2023-01-11 RX ADMIN — ACETAMINOPHEN 975 MG: 325 TABLET ORAL at 20:08

## 2023-01-11 RX ADMIN — ACETAMINOPHEN 975 MG: 325 TABLET ORAL at 14:11

## 2023-01-11 RX ADMIN — ACETAMINOPHEN 975 MG: 325 TABLET ORAL at 05:17

## 2023-01-11 RX ADMIN — HYDROCHLOROTHIAZIDE 12.5 MG: 12.5 CAPSULE, GELATIN COATED ORAL at 09:38

## 2023-01-11 RX ADMIN — SODIUM CHLORIDE 1000 ML: 9 INJECTION, SOLUTION INTRAVENOUS at 14:04

## 2023-01-11 ASSESSMENT — ACTIVITIES OF DAILY LIVING (ADL)
ADLS_ACUITY_SCORE: 23

## 2023-01-11 NOTE — DISCHARGE SUMMARY
[facility]  Hospitalist Discharge Summary      Date of Admission:  1/9/2023  Date of Discharge:  1/11/2023  Discharging Provider: Raina Díaz DO  Discharge Service: Hospitalist Service    Discharge Diagnoses   L knee TKA  Postoperative bleeding.   Postop fever.     Follow-ups Needed After Discharge   Follow-up Appointments     Follow Up Care      Follow-up with your Surgeon Team in 2 weeks for wound check.         Physical Therapy Instructions      Begin physical therapy within two weeks following surgery.             Unresulted Labs Ordered in the Past 30 Days of this Admission     No orders found from 12/10/2022 to 1/10/2023.      These results will be followed up by ortho    Discharge Disposition   Discharged to home  Condition at discharge: Stable    Hospital Course     L knee OA s/p L TKA with Dr. Johnson.  Postoperative course significant  for bleeding from the wound.  That was resolved at time of discharge.  Otherwise pain was well controlled he was tolerating regular diet and discharged home with outpatient physical therapy.    Postoperative blood loss from wound.  Lovenox was held.  He was given additional dose of Ancef.  Lower dose of home warfarin  Bleeding stopped with Ace wrap.  Dressing was reinforced.  Okay to discharge home today.  Continue home dosing of warfarin has an INR check scheduled for Thursday.  Close follow-up with his surgeon as needed.    Hx of afib and lupus anticoag, on chronic anticoag with warfarin. On hold for last week prior to surgery.  lovenox initially held, okay to resume as bleeding of knee has stopped. Continue until therapeutic INR. Needs INR on Fri or Sat.  Okay to resume warfarin.    HTN. BP stable.  Resume home medicatio had an episode of orthostasis.  Improved at time of discharge.     Postoperative fever.  Urinalysis negative.  Chest x-ray showed mild atelectasis.  No wound infection. On chronic anticoagulation. Follow up with ortho, return if  "fevers return.       Consultations This Hospital Stay   HOSPITALIST IP CONSULT  PHYSICAL THERAPY ADULT IP CONSULT  OCCUPATIONAL THERAPY ADULT IP CONSULT  PHARMACY TO DOSE WARFARIN  PHYSICAL THERAPY ADULT IP CONSULT    Code Status   Full Code    Time Spent on this Encounter   I, Raina Díaz DO, personally saw the patient today and spent greater than 30 minutes discharging this patient.       Raina Díaz DO  [facility]  [address]   [place] [address]  Phone: [phone]  Fax: [phone]  ______________________________________________________________________    Physical Exam   Vital Signs: Temp: 99.3  F (37.4  C) Temp src: Tympanic BP: 122/80 Pulse: 68   Resp: 16 SpO2: 93 % O2 Device: None (Room air)    Weight: 325 lbs 0 oz  General Appearance: Awake, alert and oriented.  Bright and cooperative.  No acute distress.  Obese.  Respiratory: Clear to auscultation bilaterally.  No wheezing rhonchi or rales  Cardiovascular: Regular rate.  No murmur  GI: Abdomen soft, nontender, nondistended  Skin: Left knee dressing  Other:         Primary Care Physician   Teo Funes    Discharge Orders      Home Care Referral      Reason for your hospital stay    Left TKA     When to call - Contact Surgeon Team    You may experience symptoms that require follow-up before your scheduled appointment. Refer to the \"Stoplight Tool\" for instructions on when to contact your Surgeon Team if you are concerned about pain control, blood clots, constipation, or if you are unable to urinate.     When to call - Reach out to Urgent Care    If you are not able to reach your Surgeon Team and you need immediate care, go to the Orthopedic Walk-in Clinic or Urgent Care at your Surgeon's office.  Do NOT go to the Emergency Room unless you have shortness of breath, chest pain, or other signs of a medical emergency.     When to call - Reasons to Call 911    Call 911 immediately if you experience " sudden-onset chest pain, arm weakness/numbness, slurred speech, or shortness of breath     Discharge Instruction - Breathing exercises    Perform breathing exercises using your Incentive Spirometer 10 times per hour while awake for 2 weeks.     Symptoms - Fever Management    A low grade fever can be expected after surgery.  Use acetaminophen (TYLENOL) as needed for fever management.  Contact your Surgeon Team if you have a fever greater than 101.5 F, chills, and/or night sweats.     Symptoms - Constipation management    Constipation (hard, dry bowel movements) is expected after surgery due to the combination of being less active, the anesthetic, and the opioid pain medication.  You can do the following to help reduce constipation:  ~  FLUIDS:  Drink clear liquids (water or Gatorade), or juice (apple/prune).  ~  DIET:  Eat a fiber rich diet.    ~  ACTIVITY:  Get up and move around several times a day.  Increase your activity as you are able.  MEDICATIONS:  Reduce the risk of constipation by starting medications before you are constipated.  You can take Miralax   (1 packet as directed) and/or a stool softener (Senokot 1-2 tablets 1-2 times a day).  If you already have constipation and these medications are not working, you can get magnesium citrate and use as directed.  If you continue to have constipation you can try an over the counter suppository or enema.  Call your Surgeon Team if it has been greater than 3 days since your last bowel movement.     Symptoms - Reduced Urine Output    Changes in the amount of fluids you drank before and after surgery may result in problems urinating.  It is important to stay well-hydrated after surgery and drink plenty of water. If it has been greater than 8 hours since you have urinated despite drinking plenty of water, call your Surgeon Team.     Activity - Exercises to prevent blood clots    Unless otherwise directed by your Surgeon team, perform the following exercises at least  three times per day for the first four weeks after surgery to prevent blood clots in your legs: 1) Point and flex your feet (Ankle Pumps), 2) Move your ankle around in big circles, 3) Wiggle your toes, 4) Walk, even for short distances, several times a day, will help decrease the risk of blood clots.     Comfort and Pain Management - Pain after Surgery    Pain after surgery is normal and expected.  You will have some amount of pain for several weeks after surgery.  Your pain will improve with time.  There are several things you can do to help reduce your pain including: rest, ice, elevation, and using pain medications as needed. Contact your Surgeon Team if you have pain that persists or worsens after surgery despite rest, ice, elevation, and taking your medication(s) as prescribed. Contact your Surgeon Team if you have new numbness, tingling, or weakness in your operative extremity.     Comfort and Pain Management - Swelling after Surgery    Swelling and/or bruising of the surgical extremity is common and may persist for several months after surgery. In addition to frequent icing and elevation, gentle compressive support with an ACE wrap or tubigrip may help with swelling. Apply compression regularly, removing at least twice daily to perform skin checks. Contact your Surgeon Team if your swelling increases and is NOT associated with an increase in your activity level, or if your swelling increases and is associated with redness and pain.     Comfort and Pain Management - Cold therapy    Ice can be used to control swelling and discomfort after surgery. Place a thin towel over your operative site and apply the ice pack overtop. Leave ice pack in place for 20 minutes, then remove for 20 minutes. Repeat this 20 minutes on/20 minutes off routine as often as tolerated.     Medication Instructions - Acetaminophen (TYLENOL) Instructions    You were discharged with acetaminophen (TYLENOL) for pain management after surgery.  Acetaminophen most effectively manages pain symptoms when it is taken on a schedule without missing doses (every four, six, or eight hours). Your Provider will prescribe a safe daily dose between 3000 - 4000 mg.  Do NOT exceed this daily dose. Most patients use acetaminophen for pain control for the first four weeks after surgery.  You can wean from this medication as your pain decreases.     Medication Instructions - NSAID Instructions    You were discharged with an anti-inflammatory medication for pain management to use in combination with acetaminophen (TYLENOL) and the narcotic pain medication.  Take this medication exactly as directed.  You should only take one anti-inflammatory at a time.  Some common anti-inflammatories include: ibuprofen (ADVIL, MOTRIN), naproxen (ALEVE, NAPROSYN), celecoxib (CELEBREX), meloxicam (MOBIC), ketorolac (TORADOL).  Take this medication with food and water.     Medication Instructions - Opioids - Tapering Instructions    In the first three days following surgery, your symptoms may warrant use of the narcotic pain medication every four to six hours as prescribed. This is normal. As your pain symptoms improve, focus your efforts on decreasing (tapering) use of narcotic medications. The most successful tapering strategy is to first, decrease the number of tablets you take every 4-6 hours to the minimum prescribed. Then, increase the amount of time between doses.  For example:  First, taper to   or 1 tablet every 4-6 hours.  Then, taper to   or 1 tablet every 6-8 hours.  Then, taper to   or 1 tablet every 8-10 hours.  Then, taper to   or 1 tablet every 10-12 hours.  Then, taper to   or 1 tablet at bedtime.  The bedtime dose can help with comfort during sleep and is typically the last dose to be discontinued after surgery.     Follow Up Care    Follow-up with your Surgeon Team in 2 weeks for wound check.     Medication instructions -  Anticoagulation - aspirin    Take the aspirin as  "prescribed for a total of four weeks after surgery.  This is given to help minimize your risk of blood clot.     Comfort and Pain Management - LOWER Extremity Elevation    Swelling is expected for several months after surgery. This type of swelling is usually associated with gravity and activity, and can be improved with elevation.   The best way to do this is to get your \"toes above your nose\" by laying down and placing several pillows lengthwise under your calf and heel. When elevating your leg keep your knee completely straight. Perform this elevation as often as possible especially for the first two weeks after surgery.     Medication Instructions - Opioid Instructions (Less than 65 years)    You were discharged with an opioid medication (hydromorphone, oxycodone, hydrocodone, or tramadol). This medication should only be taken for breakthrough pain that is not controlled with acetaminophen (TYLENOL). If you rate your pain less than 3 you do not need this medication.  Pain rating 0-3:  You do not need this medication.  Pain rating 4-6:  Take 1 tablet every 4-6 hours as needed  Pain rating 7-10:  Take 2 tablets every 4-6 hours as needed.  Do not exceed 6 tablets per day     Physical Therapy Instructions    Begin physical therapy within two weeks following surgery.     Activity - Total Knee Arthroplasty     Return to Driving    Return to driving - Driving is NOT permitted until directed by your provider. Under no circumstance are you permitted to drive while using narcotic pain medications.     Dressing / Wound Care - Wound    You have a clean dressing on your surgical wound. Dressing change instructions as follows: dressing will be removed at your follow-up appointment. Contact your Surgeon Team if you have increased redness, warmth around the surgical wound, and/or drainage from the surgical wound.     Dressing / Wound Care - NO Tub Bathing    Tub bathing, swimming, or any other activities that will cause your " incision to be submerged in water should be avoided until allowed by your Surgeon.     NO Precautions    No precautions directed by your Provider.  You may perform range of motion activities as tolerated.     Weight bearing as tolerated    Weight bearing as tolerated on your operative extremity.     Dressing Wound Care - Shower with wound/dressing NOT covered    You do not need to cover your Aquacel dressing in the shower, you may allow water and soap to run over top of the surgical dressing. You may shower 3 days after surgery.  You are strictly prohibited from soaking or submerging the surgical wound underwater.     Crutches DME    DME Documentation: Describe the reason for need to support medical necessity: Impaired gait status post knee surgery. I, the undersigned, certify that the above prescribed supplies are medically necessary for this patient and is both reasonable and necessary in reference to accepted standards of medical practice in the treatment of this patient's condition and is not prescribed as a convenience.     Cane DME    DME Documentation: Describe the reason for need to support medical necessity: Impaired gait status post knee surgery. I, the undersigned, certify that the above prescribed supplies are medically necessary for this patient and is both reasonable and necessary in reference to accepted standards of medical practice in the treatment of this patient's condition and is not prescribed as a convenience.     Walker DME    DME Documentation: Describe the reason for need to support medical necessity: Impaired gait status post knee surgery. I, the undersigned, certify that the above prescribed supplies are medically necessary for this patient and is both reasonable and necessary in reference to accepted standards of medical practice in the treatment of this patient's condition and is not prescribed as a convenience.     Discharge Instruction - Regular Diet Adult    Return to your pre-surgery  diet unless instructed otherwise       Significant Results and Procedures   Most Recent 3 Hemoglobins:  Recent Labs   Lab Test 01/12/23  0539 01/11/23  0617 01/10/23  0937   HGB 13.5 13.8 14.0   ,   Results for orders placed or performed during the hospital encounter of 01/09/23   XR Knee Port Left 1/2 Views    Narrative    PROCEDURE: XR KNEE PORT LEFT 1/2 VIEWS 1/9/2023 1:33 PM    HISTORY: Post-Op Total Knee    COMPARISONS: 11/2/2022.    TECHNIQUE: 2 views.    FINDINGS: There is a left knee arthroplasty without acute  complication. Skin staples are seen anteriorly and there is soft  tissue gas consistent with postoperative state.         Impression    IMPRESSION: Interval left knee arthroplasty.    ISREAL REYNOSO MD         SYSTEM ID:  [id number]       Discharge Medications   Current Discharge Medication List      START taking these medications    Details   !! acetaminophen (TYLENOL) 325 MG tablet Take 2 tablets (650 mg) by mouth every 4 hours as needed for other (mild pain)  Qty: 100 tablet, Refills: 0    Associated Diagnoses: Primary osteoarthritis of left knee      enoxaparin ANTICOAGULANT (LOVENOX) 100 MG/ML syringe Inject 1.2 mLs (120 mg) Subcutaneous 2 times daily for 4 days  Qty: 9.6 mL, Refills: 0    Associated Diagnoses: Long-term (current) use of anticoagulants, INR goal 2.0-3.0      oxyCODONE (ROXICODONE) 5 MG tablet Take 1-2 tablets (5-10 mg) by mouth every 4 hours as needed for moderate to severe pain  Qty: 26 tablet, Refills: 0    Associated Diagnoses: Primary osteoarthritis of left knee       !! - Potential duplicate medications found. Please discuss with provider.      CONTINUE these medications which have NOT CHANGED    Details   !! acetaminophen (TYLENOL) 500 MG tablet Take 500-1,000 mg by mouth every 6 hours as needed for mild pain      amLODIPine (NORVASC) 10 MG tablet Take 1 tablet (10 mg) by mouth daily  Qty: 90 tablet, Refills: 3    Associated Diagnoses: Benign essential hypertension       hydrochlorothiazide (HYDRODIURIL) 25 MG tablet Take 0.5 tablets (12.5 mg) by mouth daily  Qty: 45 tablet, Refills: 1    Associated Diagnoses: Benign essential hypertension      !! warfarin ANTICOAGULANT (COUMADIN) 2.5 MG tablet Take 2.5 mg by mouth daily With 5 mg tablet on Tuesday, Wednesday, Thursday, Saturday and Sunday      !! warfarin ANTICOAGULANT (COUMADIN) 5 MG tablet Take 5 mg by mouth daily On Tuedsay, Wednesday, Thursday, Saturday and Sunday  And two tablets (10 mg) on Monday/Friday       !! - Potential duplicate medications found. Please discuss with provider.      STOP taking these medications       enoxaparin ANTICOAGULANT (LOVENOX) 120 MG/0.8ML syringe Comments:   Reason for Stopping:             Allergies   Allergies   Allergen Reactions     Diatrizoate Rash     Ioxaglate Other (See Comments)     Does not recall     Levofloxacin Hives and Rash     Metrizamide Rash     (Diagnostic X-Ray materials)     Probenecid Rash

## 2023-01-11 NOTE — PROVIDER NOTIFICATION
"   01/11/23 1323 01/11/23 1415   Vital Signs   Temp  --  (!) 100.6  F (38.1  C)   Temp src  --  Tympanic   Resp  --  16   Pulse  --  97   Pulse Rate Source  --  Monitor   BP  --  131/87   BP Location  --  Left arm   Patient Position  --  Semi-Moralez's   Cuff Size  --  Adult Large   Lying Orthostatic BP   Lying Orthostatic /64  --    Lying Orthostatic Pulse 81 bpm  --    Sitting Orthostatic BP   Sitting Orthostatic /77  --    Sitting Orthostatic Pulse 61 bpm  --    Standing Orthostatic BP   Standing Orthostatic BP 86/59  --    Standing Orthostatic Pulse 54 bpm  --    Oxygen Therapy   SpO2  --  95 %   O2 Device  --  None (Room air)     Notified Dr. Díaz of positive orthostatics.  Patient denies feeling lightheaded when standing although states, \" I feel like I would if I started walking\".  Assisted patient back to bed.  Also notified MD of patient's low grade temp.  Patient denies any pain.  Left knee dressing drainage remains within marked borders, CMS intact.  Orders for NS bolus, UA, and chest XR.   Will continue to monitor.   "

## 2023-01-11 NOTE — PHARMACY-ANTICOAGULATION SERVICE
Pharmacy Consult- Coumadin Follow-Up    Babak Moran is a 62 year old male admitted on 1/9/2023 with Aftercare following knee joint replacement surgery, unspecified laterality    Primary Indication(s) for Anticoagulation: A fib    Goal INR: 2.5 (2-3), patient prefers to be closer to 3    FYI, patient is followed by the Anticoagulation[facility] Clinic at: [facility]    Patient Active Problem List   Diagnosis     Pain in joint, ankle and foot     Atrial fibrillation with RVR (H)     Cerebrovascular accident (CVA) due to embolism of right middle cerebral artery (H)     Osteoarthrosis     Cutaneous lupus erythematosus     Gout     Ascending aorta enlargement (H)     Family history of coronary artery disease     Essential hypertension     Left hemiparesis (H)     Long-term (current) use of anticoagulants, INR goal 2.0-3.0     Systemic lupus erythematosus (H)     Tissue plasminogen activator (t-PA) administered at other facility within 24 hours prior to current admission     Morbid obesity with BMI of 40.0-44.9, adult (H)     Obstructive sleep apnea syndrome     Need for vaccination     Onychogryphosis     Overweight     Aftercare following knee joint replacement surgery, unspecified laterality       New factors that may increase patient's sensitivity to warfarin (Coumadin) include: surgery, therapeutic Lovenox (stopped 1/10)    New factors that may decrease patient's sensitivity to warfarin (Coumadin) include: restarted warfarin 1/9 after doses held since 1/4      Recent Labs   Lab Test 01/11/23  0617 01/10/23  0937 01/10/23  0604 01/09/23  0749 01/06/23  1103 01/02/23  1508 12/23/22  1353 08/17/22  0807 08/11/22  0627 08/10/22  0647 08/09/22  0434 08/08/22  1416   HGB 13.8 14.0 14.4  --   --   --  13.9  --  13.8  --  13.5 14.6   INR 1.34*  --  1.07 0.97 1.4*   < >  --    < > 2.24*   < > 2.89* 3.39*   PLT  --   --   --   --   --   --  297  --  267  --  231 259    < > = values in this interval not displayed.        Recent  Dosing:    Date INR Dose Given   1/9 0.97 15 mg per anticoag note   1/10 1.07 7.5 mg per MD       Plan: Bleeding has stopped.  Lovenox has been discontinued per MD.  Will give warfarin 7.5 mg x 1 per anticoagulation discharge instructions.  INR with am labs.    Thank You for the Consult. Will continue to follow.    Alicia Livingston RPH ....................  1/11/2023   1:43 PM

## 2023-01-11 NOTE — PROGRESS NOTES
01/11/23 1369   Appointment Info   Signing Clinician's Name / Credentials (OT) Savannah Santana, OTR/L   Therapeutic Activities   Therapeutic Activity Minutes (64324) 25   Symptoms noted during/after treatment fatigue;dizziness   Treatment Detail/Skilled Intervention Pt stood and transfered from chair to bed with min assist of 2, pt complained of some dizziness when sitting upright and standing, assisted pt to lay down with moderate assist overall to assist with left L/E. Pt also needed assist to boost up in bed. Pt had episode of dizziness with PT earlier, MD and RN aware and assessing.   OT Discharge Planning   OT Plan Continue OT   OT Discharge Recommendation (DC Rec) home with assist   OT Rationale for DC Rec pts wife will be able to assist him at home with basic self cares as needed until he regains full independence   OT Brief overview of current status see above note for details   Total Session Time   Timed Code Treatment Minutes 25   Total Session Time (sum of timed and untimed services) 25

## 2023-01-11 NOTE — PLAN OF CARE
Patient denies pain. Intermittent cold therapy applied to reduce swelling. Dried drainage within marked borders to dressing on left knee. CMS intact. Voiding spontaneously without difficulty. VSS.

## 2023-01-11 NOTE — PROGRESS NOTES
"[facility]    Medicine Progress Note - Hospitalist Service    Date of Admission:  1/9/2023    Assessment & Plan   L knee OA s/p L TKA with Dr. Johnson. POD#2. Pain well controlled. Only needing tylenol.  -pain control, wound care, PT/OT, diet per surgery    Postoperative blood loss. resolved  -hold lovenox  -additional dose of ancef given yesterday.   -resume warfarin    Hx of afib and lupus anticoag, on chronic anticoag with warfarin.  -coumadin only due to acute postop blood loss    HTN. Orthostatic this AM with dizziness.   -1L NS  -monitor  -resume home meds if BP stable       Diet: Advance Diet as Tolerated: Regular Diet Adult  Discharge Instruction - Regular Diet Adult    DVT Prophylaxis: lovenox and coumadin  Ramirez Catheter: Not present  Lines: None     Cardiac Monitoring: None  Code Status: Full Code      Clinically Significant Risk Factors Present on Admission               # Drug Induced Coagulation Defect: home medication list includes an anticoagulant medication         # Severe Obesity: Estimated body mass index is 42.88 kg/m  as calculated from the following:    Height as of this encounter: 1.854 m (6' 1\").    Weight as of this encounter: 147.4 kg (325 lb).           Disposition Plan      tomorrow if BP stable    Raina Díaz DO  Hospitalist Service  [facility]  Securely message with bright box (more info)  Text page via [employer] Paging/Directory   ______________________________________________________________________    Interval History   Had dizzy episode with PT this AM. Was orthostatic on check. Doing well now. Was planning to discharge today, will need to hold off until tomorrow. No chest pain, dizziness, shortness of breath. Bleeding from surgical site well controlled last 24 hours.     Physical Exam   Vital Signs: Temp: 100.2  F (37.9  C) Temp src: Tympanic BP: 126/63 Pulse: 69   Resp: 18 SpO2: 93 % O2 Device: None (Room air)    Weight: 325 lbs 0 " oz    General Appearance: AAO, morbidly obese. NAD  Respiratory: CTA B/L   Cardiovascular: IRR  Skin: warm, dry. Small amount of drainage.   Other:     Medical Decision Making   30 MINUTES SPENT BY ME on the date of service doing chart review, history, exam, documentation & further activities per the note.  MANAGEMENT DISCUSSED with the following over the past 24 hours: Dr. Johnson   NOTE(S)/MEDICAL RECORDS REVIEWED over the past 24 hours: H andP  Tests REVIEWED in the past 24 hours:      Data     I have personally reviewed the following data over the past 24 hrs:    N/A  \   13.8   / N/A     N/A N/A N/A /  103 (H)   N/A N/A N/A \       Procal: N/A CRP: N/A Lactic Acid: 1.4       INR:  1.34 (H) PTT:  N/A   D-dimer:  N/A Fibrinogen:  N/A       Imaging results reviewed over the past 24 hrs:   No results found for this or any previous visit (from the past 24 hour(s)).

## 2023-01-11 NOTE — PROGRESS NOTES
01/11/23 1050   Vital Signs   Temp 98.9  F (37.2  C)   Temp src Tympanic   Resp 20   Pulse 59   BP (!) 158/82   Oxygen Therapy   SpO2 94 %   O2 Device None (Room air)       Patient was getting up to work with PT and suddenly became lightheaded and diaphoretic.  He was immediately assisted back to chair.  Patient denies feeling any palpitations, chest pain or any other symptoms  Dr. Díaz was notified and at bedside immediately as she happened to be outside room at time.  Patient vitals afterwards as noted above.  Patient now denies any symptoms.  No new orders at this time, will continue to monitor, patient is alert and oriented, waits for assistance before getting out of bed.

## 2023-01-11 NOTE — PLAN OF CARE
No change to patient condition. Denies pain. Drainage on dressing within marked borders.

## 2023-01-12 ENCOUNTER — APPOINTMENT (OUTPATIENT)
Dept: OCCUPATIONAL THERAPY | Facility: OTHER | Age: 63
End: 2023-01-12
Attending: ORTHOPAEDIC SURGERY
Payer: COMMERCIAL

## 2023-01-12 ENCOUNTER — APPOINTMENT (OUTPATIENT)
Dept: PHYSICAL THERAPY | Facility: OTHER | Age: 63
End: 2023-01-12
Attending: ORTHOPAEDIC SURGERY
Payer: COMMERCIAL

## 2023-01-12 VITALS
HEIGHT: 73 IN | WEIGHT: 315 LBS | RESPIRATION RATE: 18 BRPM | BODY MASS INDEX: 41.75 KG/M2 | TEMPERATURE: 100.2 F | HEART RATE: 88 BPM | DIASTOLIC BLOOD PRESSURE: 71 MMHG | OXYGEN SATURATION: 97 % | SYSTOLIC BLOOD PRESSURE: 118 MMHG

## 2023-01-12 LAB
ANION GAP SERPL CALCULATED.3IONS-SCNC: 9 MMOL/L (ref 7–15)
BUN SERPL-MCNC: 21.1 MG/DL (ref 8–23)
CALCIUM SERPL-MCNC: 8.6 MG/DL (ref 8.8–10.2)
CHLORIDE SERPL-SCNC: 101 MMOL/L (ref 98–107)
CREAT SERPL-MCNC: 0.67 MG/DL (ref 0.67–1.17)
DEPRECATED HCO3 PLAS-SCNC: 28 MMOL/L (ref 22–29)
ERYTHROCYTE [DISTWIDTH] IN BLOOD BY AUTOMATED COUNT: 15.7 % (ref 10–15)
GFR SERPL CREATININE-BSD FRML MDRD: >90 ML/MIN/1.73M2
GLUCOSE SERPL-MCNC: 94 MG/DL (ref 70–99)
HCT VFR BLD AUTO: 42 % (ref 40–53)
HGB BLD-MCNC: 13.5 G/DL (ref 13.3–17.7)
HOLD SPECIMEN: NORMAL
INR PPP: 1.39 (ref 0.85–1.15)
MCH RBC QN AUTO: 29.9 PG (ref 26.5–33)
MCHC RBC AUTO-ENTMCNC: 32.1 G/DL (ref 31.5–36.5)
MCV RBC AUTO: 93 FL (ref 78–100)
PLATELET # BLD AUTO: 227 10E3/UL (ref 150–450)
POTASSIUM SERPL-SCNC: 4 MMOL/L (ref 3.4–5.3)
RBC # BLD AUTO: 4.51 10E6/UL (ref 4.4–5.9)
SODIUM SERPL-SCNC: 138 MMOL/L (ref 136–145)
WBC # BLD AUTO: 12.1 10E3/UL (ref 4–11)

## 2023-01-12 PROCEDURE — 85610 PROTHROMBIN TIME: CPT | Performed by: ORTHOPAEDIC SURGERY

## 2023-01-12 PROCEDURE — 97530 THERAPEUTIC ACTIVITIES: CPT | Mod: GP

## 2023-01-12 PROCEDURE — 999N000104 HC STATISTIC NO CHARGE

## 2023-01-12 PROCEDURE — 97530 THERAPEUTIC ACTIVITIES: CPT | Mod: GO | Performed by: OCCUPATIONAL THERAPIST

## 2023-01-12 PROCEDURE — 36415 COLL VENOUS BLD VENIPUNCTURE: CPT | Performed by: ORTHOPAEDIC SURGERY

## 2023-01-12 PROCEDURE — 80048 BASIC METABOLIC PNL TOTAL CA: CPT | Performed by: FAMILY MEDICINE

## 2023-01-12 PROCEDURE — 250N000013 HC RX MED GY IP 250 OP 250 PS 637: Performed by: FAMILY MEDICINE

## 2023-01-12 PROCEDURE — 250N000013 HC RX MED GY IP 250 OP 250 PS 637: Performed by: ORTHOPAEDIC SURGERY

## 2023-01-12 PROCEDURE — 85027 COMPLETE CBC AUTOMATED: CPT | Performed by: FAMILY MEDICINE

## 2023-01-12 PROCEDURE — 97110 THERAPEUTIC EXERCISES: CPT | Mod: GP

## 2023-01-12 PROCEDURE — 97116 GAIT TRAINING THERAPY: CPT | Mod: GP

## 2023-01-12 RX ORDER — ENOXAPARIN SODIUM 100 MG/ML
120 INJECTION SUBCUTANEOUS 2 TIMES DAILY
Qty: 9.6 ML | Refills: 0 | Status: SHIPPED | OUTPATIENT
Start: 2023-01-12 | End: 2023-01-16

## 2023-01-12 RX ORDER — WARFARIN SODIUM 5 MG/1
5 TABLET ORAL DAILY
Qty: 30 TABLET | Refills: 0 | Status: SHIPPED | OUTPATIENT
Start: 2023-01-12 | End: 2023-01-30

## 2023-01-12 RX ORDER — WARFARIN SODIUM 5 MG/1
5 TABLET ORAL DAILY
Qty: 30 TABLET | Refills: 0 | Status: SHIPPED | OUTPATIENT
Start: 2023-01-12 | End: 2023-01-12

## 2023-01-12 RX ORDER — WARFARIN SODIUM 5 MG/1
10 TABLET ORAL
Status: DISCONTINUED | OUTPATIENT
Start: 2023-01-12 | End: 2023-01-12 | Stop reason: HOSPADM

## 2023-01-12 RX ADMIN — AMLODIPINE BESYLATE 10 MG: 10 TABLET ORAL at 09:54

## 2023-01-12 RX ADMIN — ACETAMINOPHEN 975 MG: 325 TABLET ORAL at 07:18

## 2023-01-12 RX ADMIN — HYDROCHLOROTHIAZIDE 12.5 MG: 12.5 CAPSULE, GELATIN COATED ORAL at 09:54

## 2023-01-12 RX ADMIN — ACETAMINOPHEN 975 MG: 325 TABLET ORAL at 13:58

## 2023-01-12 RX ADMIN — ACETAMINOPHEN 975 MG: 325 TABLET ORAL at 02:29

## 2023-01-12 ASSESSMENT — ACTIVITIES OF DAILY LIVING (ADL)
ADLS_ACUITY_SCORE: 33
ADLS_ACUITY_SCORE: 23

## 2023-01-12 NOTE — PLAN OF CARE
Goal Outcome Evaluation:      Plan of Care Reviewed With: patient, spouse    Patient's orthostatic blood pressures negative this morning, denies dizziness and lightheadedness. Worked with Pt. Requested wife to help with shower, staff helped with getting patient to shower and wife was able to help him with the rest. Patient was offered the possibility of short term rehab at [facility] and declined, wife in agreement with plan. Patient had dried drainage within marked borders, some redness and warmth on the right side of dressing. CMS in tact, good pedal pulses. Denied any pain. Only taking scheduled Tylenol every 6 hours and intermittent cold backs. Patient has had low grade temps with highest this shift 100.2, Dr. Díaz is aware. Patient and wife are in agreement to discharge home.

## 2023-01-12 NOTE — PLAN OF CARE
POD #3 left TKA. Denies pain. No changes to drainage on dressing. Swelling present. Patient not out of bed overnight. Denies weakness or nausea.     Temp: 99.9  F (37.7  C) Temp src: Tympanic BP: 131/84 Pulse: 105   Resp: 16 SpO2: 95 % O2 Device: None (Room air)

## 2023-01-12 NOTE — PLAN OF CARE
Alert and oriented, VSS. Temp of 101.1, provider notified as patient only has scheduled tylenol. Scheduled tylenol frequency increased. Temp came down to 99.7. Shadowing to L knee dressing remains within marked borders. Denies pain. Voiding adequately.     Shasta Lange RN on 1/11/2023 at 10:53 PM

## 2023-01-12 NOTE — PLAN OF CARE
Physical Therapy Discharge Summary    Reason for therapy discharge:    Discharged to home with outpatient therapy.    Progress towards therapy goal(s). See goals on Care Plan in [facility] electronic health record for goal details.  Goals partially met.  Barriers to achieving goals:   discharge from facility.    Therapy recommendation(s):    Continued therapy is recommended.  Rationale/Recommendations:  to promote strength, stability and safe, functional mobility.    Goal Outcome Evaluation:

## 2023-01-12 NOTE — PROGRESS NOTES
01/12/23 1240   Appointment Info   Signing Clinician's Name / Credentials (OT) Savannah Santana, OTR/L   Therapeutic Activities   Therapeutic Activity Minutes (75528) 60   Symptoms noted during/after treatment fatigue;increased pain   Treatment Detail/Skilled Intervention Pt had no complaints of dizziness upon initial sitting up and transfer to the chair, BP's were checked and WNL, Pt moving with CGA for safety with FWW in room and hallway today, reports he feels better today and more confident about D/C.   OT Discharge Planning   OT Plan D/C home today   OT Discharge Recommendation (DC Rec) home with assist   OT Rationale for DC Rec pts wife will be able to assist him at home with basic self cares as needed until he regains full independence   OT Brief overview of current status Pt has progressed slowly over course of stay, no symptoms with functional mobility today and pt has no concerns about completing ADL's at home with assist from wife.   Total Session Time   Timed Code Treatment Minutes 60   Total Session Time (sum of timed and untimed services) 60

## 2023-01-12 NOTE — PROGRESS NOTES
01/11/23 9519   Appointment Info   Signing Clinician's Name / Credentials (PT) James Caba MPT   Gait/Stairs (Locomotion)   Distance in Feet (Gait) 15   Interventions   Interventions Quick Adds Gait Training;Therapeutic Activity   Therapeutic Procedure/Exercise   Treatment Detail/Skilled Intervention review of home exercise program   Therapeutic Activity   Treatment Detail/Skilled Intervention supine<>sit with minimal assist for left LE; sit to stand from low surface with moderate assist to weight shift forward; sit to stand from higher surface with CGA; pivot transfers with Fww and CGA   Gait Training   Symptoms Noted During/After Treatment (Gait Training) dizziness;fatigue   Treatment Detail/Skilled Intervention short ambulation within patient's room   [place] Level (Gait Training) contact guard   Physical Assistance Level (Gait Training) 1 person assist   Weight Bearing (Gait Training) weight-bearing as tolerated   Assistive Device (Gait Training) rolling walker   Pattern Analysis (Gait Training) 3-point gait   Impairments (Gait Analysis/Training) balance impaired;pain;strength decreased   PT Discharge Planning   PT Plan continue PT   PT Discharge Recommendation (DC Rec) home with assist;home with outpatient physical therapy   PT Rationale for DC Rec to promote strength, stability and safe mobility   PT Brief overview of current status patient demonstrating good left knee mobility; good pain control; however, patient did experience an episode of significant lightheadedness requiring moderate assist to stabilize patient and then seat him in a chair preventing him from being lowered to the floor; patient noted to be positive for orthostatic hypotension; MD and nursing involved in patient assessment and plan

## 2023-01-12 NOTE — PLAN OF CARE
Goal Outcome Evaluation:      Plan of Care Reviewed With: patient, spouse    Overall Patient Progress: declining    Outcome Evaluation: Patient has positive orthostatic blood pressures and low grade fever.      Patient is alert and oriented, calm and cooperative. Patient has positive orthostatic blood pressures and is not able to stand up without assistance due to dropping blood pressure. Low grade fever of 100.2 to 100.6 developed this shift., patient is receiving scheduled tylenol. MD was made aware, see previous nurse's note. Patient denied any pain, bandage is clean and intact, no further bleeding noted. CMS remains intact.

## 2023-01-12 NOTE — PROGRESS NOTES
:     Patient was planning to do outpatient therapy but now concerned to leave home.  [facility] unable to take a referral due to staffing.  Spoke with Viv at [facility].  They are able to screen referral and accept patient's insurance.  They are checking to see if they can admit and accommodate for INR draw tomorrow.  Update to pharmacist.  Awaiting call back from [facility].    JASON Griffith on 1/12/2023 at 1:09 PM    Addendum:    [facility] unable to accept referral due to current staffing.  [facility] unable to accept due to current staffing.   Left a message for [facility], but doubtful they serve [place].     Spoke with Colin at [facility] to see if they could possibly serve patient.  Awaiting return call.    JASON Griffith on 1/12/2023 at 1:46 PM

## 2023-01-12 NOTE — PHARMACY - DISCHARGE MEDICATION RECONCILIATION AND EDUCATION
Pharmacy:  Discharge Counseling and Medication Reconciliation    Babak Moran     [place] [address]  [phone] (home)   62 year old male  PCP: Teo Funes    Allergies: Diatrizoate, Ioxaglate, Levofloxacin, Metrizamide, and Probenecid    Discharge Counseling:    Pharmacist met with patient (and/or family) today to review the medication portion of the After Visit Summary (with an emphasis on NEW medications) and to address patient's questions/concerns.    Summary of Education: met with patient and spouse regarding discharge instructions.  Discussed administration, duration and side effects of new medications.  Discussed warfarin and lovenox in depth with patient, social work, and Dr Tong.  Patient told to resume lovenox upon discharge twice daily for bridge.  Warfarin to be taken as follows: 10 mg tonight, 10 mg tomorrow night, than home dosing until INR recheck.  Social work working on getting home care for patient to nursing/PT/INR checks.  Patient is unable to come in in the next few days to check INR.  Soonest INR check would be Monday at this point.  Discussed with Dr Tong, ok to continue lovenox bridge until Monday INR check.  Patient has enough syringes until Monday morning and than would potentially need more for possible further dosing.  Patient in agreement with plan.  INR check pending social work consult.     Update: INR appt made at [facility] for Monday.  Patient and spouse ok with this as last resort if home care can not come to his house.    Materials Provided:  MedCounselor sheets printed from Clinical Pharmacology on: oxycodone    Discharge Medication Reconciliation:    It has been determined that the patient has an adequate supply of medications available or which can be obtained from the patient's preferred pharmacy, which HE/SHE has confirmed as: [facility].  Thank you for the consult.    Alicia Livingston [facility]........January 12, 2023 2:39 PM

## 2023-01-12 NOTE — PROGRESS NOTES
[facility] DISCHARGE NOTE    Patient discharged to home at 4:13 PM via wheel chair. Accompanied by spouse and staff. Discharge instructions reviewed with patient and spouse, opportunity offered to ask questions. Prescriptions sent to patients preferred pharmacy. All belongings sent with patient.    Saadia Lopez RN

## 2023-01-12 NOTE — PHARMACY-ANTICOAGULATION SERVICE
Pharmacy Consult- Coumadin Follow-Up    Babak Moran is a 62 year old male admitted on 1/9/2023 with Aftercare following knee joint replacement surgery, unspecified laterality    Primary Indication(s) for Anticoagulation: A fib    Goal INR: 2.5 (2-3)    FYI, patient is followed by the Anticoagulation/Protime Clinic at: [facility]    Patient Active Problem List   Diagnosis     Pain in joint, ankle and foot     Atrial fibrillation with RVR (H)     Cerebrovascular accident (CVA) due to embolism of right middle cerebral artery (H)     Osteoarthrosis     Cutaneous lupus erythematosus     Gout     Ascending aorta enlargement (H)     Family history of coronary artery disease     Essential hypertension     Left hemiparesis (H)     Long-term (current) use of anticoagulants, INR goal 2.0-3.0     Systemic lupus erythematosus (H)     Tissue plasminogen activator (t-PA) administered at other facility within 24 hours prior to current admission     Morbid obesity with BMI of 40.0-44.9, adult (H)     Obstructive sleep apnea syndrome     Need for vaccination     Onychogryphosis     Overweight     Aftercare following knee joint replacement surgery, unspecified laterality       New factors that may increase patient's sensitivity to warfarin (Coumadin) include: surgery    New factors that may decrease patient's sensitivity to warfarin (Coumadin) include: held doses, restarting warfarin after held doses      Recent Labs   Lab Test 01/12/23  0539 01/11/23  0617 01/10/23  0937 01/10/23  0604 01/09/23  0749 01/02/23  1508 12/23/22  1353 08/17/22  0807 08/11/22  0627 08/10/22  0647 08/09/22  0434   HGB 13.5 13.8 14.0 14.4  --   --  13.9  --  13.8  --  13.5   INR 1.39* 1.34*  --  1.07 0.97   < >  --    < > 2.24*   < > 2.89*     --   --   --   --   --  297  --  267  --  231    < > = values in this interval not displayed.        Recent Dosing:    Date INR Dose Given   1/9 0.97 15 mg per anticoag   1/10 1.07 7.5 mg per MD   1/11 1.37 7.5 mg        Plan: INR with slight increase overnight.  Will increase recommended dose from anticoagulation clinic and give warfarin 10 mg x 1.  Per MD, no further bleeding has been noted.  INR with am labs.    Thank You for the Consult. Will continue to follow.    Alicia Livingston RP ....................  1/12/2023   8:55 AM

## 2023-01-13 ENCOUNTER — PATIENT OUTREACH (OUTPATIENT)
Dept: FAMILY MEDICINE | Facility: OTHER | Age: 63
End: 2023-01-13

## 2023-01-13 NOTE — PROGRESS NOTES
:     Phone call with Colin at [facility], due to current staffing they are unable to take on referral.     Phone call with patient Babak.  Updated that there are no home care options currently available -declined by all local options due to staffing ([facility], [facility], and [facility]).  He stated he would keep his outpatient pro-time clinic appt and outpatient therapy.     No further needs at this time.     JASON Griffith on 1/13/2023 at 10:47 AM

## 2023-01-13 NOTE — PROGRESS NOTES
01/12/23 1249   Appointment Info   Signing Clinician's Name / Credentials (PT) James Caba MPT   Gait/Stairs (Locomotion)   Distance in Feet (Gait) 125   Therapeutic Procedure/Exercise   Symptoms Noted During/After Treatment fatigue;increased pain   Treatment Detail/Skilled Intervention review of home exercise program; poor left quadricept contraction today; left knee ROM (5-90 degrees) remains good   Therapeutic Activity   Symptoms Noted During/After Treatment Fatigue;Increased pain   Treatment Detail/Skilled Intervention minimal assist to SBA for left LE support   Gait Training   Symptoms Noted During/After Treatment (Gait Training) fatigue   Treatment Detail/Skilled Intervention ambulation in hallway   [place] Level (Gait Training) contact guard   Physical Assistance Level (Gait Training) 1 person assist   Weight Bearing (Gait Training) weight-bearing as tolerated   Assistive Device (Gait Training) rolling walker   Gait Analysis Deviations decreased guerda;decreased velocity of limb motion;decreased step length   Impairments (Gait Analysis/Training) balance impaired;pain;strength decreased   PT Discharge Planning   PT Plan patient to return home   PT Discharge Recommendation (DC Rec) home with assist;home with home care physical therapy;home with outpatient physical therapy   PT Rationale for DC Rec to promote strength, stability and safe mobility   PT Brief overview of current status improved gait tolerance today without c/o lightheadedness

## 2023-01-16 ENCOUNTER — TELEPHONE (OUTPATIENT)
Dept: FAMILY MEDICINE | Facility: OTHER | Age: 63
End: 2023-01-16

## 2023-01-16 DIAGNOSIS — Z79.01 LONG-TERM (CURRENT) USE OF ANTICOAGULANTS, INR GOAL 2.0-3.0: Chronic | ICD-10-CM

## 2023-01-16 RX ORDER — ENOXAPARIN SODIUM 100 MG/ML
120 INJECTION SUBCUTANEOUS 2 TIMES DAILY
Qty: 3.6 ML | Refills: 1 | Status: SHIPPED | OUTPATIENT
Start: 2023-01-16 | End: 2023-01-17

## 2023-01-16 NOTE — TELEPHONE ENCOUNTER
Patient called and verbalized that he wasn't able to get into the clinic today. Patient is bridging with Lovenox till therapeutic after his TK procedure. Patient was able to get his last shot in this morning. Since he will not be able to to make it to the clinic today, would you like patient to continue to bridge with his lovenox till he can get into the clinic tomorrow? Patient verbalized that his wife would  the lovenox prescription. I have the medication sarkis'd up and attached if you concur with patient to continue to bridge with his lovenox till his INR check tomorrow. Thank you.

## 2023-01-16 NOTE — PROGRESS NOTES
:    Received a call from patient. He stated that he canceled his outpatient therapy due to him not being able to bend his leg to get into his car. He stated that he had another appointment this afternoon and asked about transportation. Gave patient the number to [facility].     JASON Clayton on 1/16/2023 at 8:46 AM

## 2023-01-17 ENCOUNTER — ANTICOAGULATION THERAPY VISIT (OUTPATIENT)
Dept: ANTICOAGULATION | Facility: OTHER | Age: 63
End: 2023-01-17
Attending: FAMILY MEDICINE
Payer: COMMERCIAL

## 2023-01-17 DIAGNOSIS — I48.91 ATRIAL FIBRILLATION WITH RVR (H): Primary | ICD-10-CM

## 2023-01-17 DIAGNOSIS — Z79.01 LONG-TERM (CURRENT) USE OF ANTICOAGULANTS, INR GOAL 2.0-3.0: Chronic | ICD-10-CM

## 2023-01-17 LAB — INR POINT OF CARE: 1.6 (ref 0.9–1.1)

## 2023-01-17 PROCEDURE — 85610 PROTHROMBIN TIME: CPT | Mod: ZL,QW

## 2023-01-17 RX ORDER — ENOXAPARIN SODIUM 100 MG/ML
120 INJECTION SUBCUTANEOUS 2 TIMES DAILY
Qty: 9.6 ML | Refills: 1 | Status: SHIPPED | OUTPATIENT
Start: 2023-01-17 | End: 2023-01-27

## 2023-01-17 NOTE — TELEPHONE ENCOUNTER
Patient subtherapeutic today and will need to continue with bridging with lovenox till therapeutic. Patient will need more lovenox to get him through the weekend due to patient not being able to come back to clinic on a couple of days due to transportation issues. I have attached another lovenox order with 1 refill. Thank you. Nikki Aguilar RN on 1/17/2023 at 3:09 PM

## 2023-01-17 NOTE — PROGRESS NOTES
ANTICOAGULATION MANAGEMENT     Babak Moran 62 year old male is on warfarin with subtherapeutic INR result. (Goal INR 2.0-3.0)    Recent labs: (last 7 days)     01/17/23  1440   INR 1.6*       ASSESSMENT       Source(s): Chart Review and In person       Warfarin doses taken: Warfarin taken as instructed    Diet: No new diet changes identified    New illness, injury, or hospitalization: No    Medication/supplement changes: continuing with lovenox till therapeutic    Signs or symptoms of bleeding or clotting: No    Previous INR: Therapeutic last visit; previously outside of goal range    Additional findings: Bridging with Enoxaparin until INR >= 2.0       PLAN     Recommended plan for temporary change(s) affecting INR     Dosing Instructions: Continue your current warfarin dose with next INR in 1 week       Summary  As of 1/17/2023    Full warfarin instructions:  1/17: 10 mg; Otherwise 10 mg every Mon, Fri; 7.5 mg all other days   Next INR check:  1/23/2023             In person    Lab visit scheduled    Education provided: None required    Plan made per [facility] anticoagulation protocol    Nikki Aguilar RN  Anticoagulation Clinic  1/17/2023    _______________________________________________________________________     Anticoagulation Episode Summary     Current INR goal:  2.0-3.0   TTR:  67.8 % (11.9 mo)   Target end date:  Indefinite   Send INR reminders to:  [facility]    Indications    Unspecified atrial fibrillation (Resolved) [I48.91]  Anticoagulation monitoring  INR range 2-3 (Resolved) [Z79.01]  Atrial fibrillation with RVR (H) [I48.91]           Comments:  Babak prefers to be closer to 3.0 d/t previous CVA check Q 4 weeks.Declines to reduce dose when slightly over 3.0  Needs to change PCP         Anticoagulation Care Providers     Provider Role Specialty Phone number    Teo Funes MD Referring Family Medicine [phone]

## 2023-01-23 ENCOUNTER — OFFICE VISIT (OUTPATIENT)
Dept: ORTHOPEDICS | Facility: OTHER | Age: 63
End: 2023-01-23
Attending: ORTHOPAEDIC SURGERY
Payer: COMMERCIAL

## 2023-01-23 ENCOUNTER — HOSPITAL ENCOUNTER (OUTPATIENT)
Dept: PHYSICAL THERAPY | Facility: OTHER | Age: 63
Setting detail: THERAPIES SERIES
Discharge: HOME | End: 2023-01-23
Attending: ORTHOPAEDIC SURGERY
Payer: COMMERCIAL

## 2023-01-23 ENCOUNTER — ANTICOAGULATION THERAPY VISIT (OUTPATIENT)
Dept: ANTICOAGULATION | Facility: OTHER | Age: 63
End: 2023-01-23
Attending: FAMILY MEDICINE
Payer: COMMERCIAL

## 2023-01-23 DIAGNOSIS — M17.0 PRIMARY OSTEOARTHRITIS OF BOTH KNEES: ICD-10-CM

## 2023-01-23 DIAGNOSIS — I48.91 ATRIAL FIBRILLATION WITH RVR (H): Primary | ICD-10-CM

## 2023-01-23 DIAGNOSIS — M17.0 PRIMARY OSTEOARTHRITIS OF BOTH KNEES: Primary | ICD-10-CM

## 2023-01-23 LAB — INR POINT OF CARE: 1.8 (ref 0.9–1.1)

## 2023-01-23 PROCEDURE — G0463 HOSPITAL OUTPT CLINIC VISIT: HCPCS

## 2023-01-23 PROCEDURE — 97110 THERAPEUTIC EXERCISES: CPT | Mod: GP,PO

## 2023-01-23 PROCEDURE — 97161 PT EVAL LOW COMPLEX 20 MIN: CPT | Mod: GP,PO

## 2023-01-23 PROCEDURE — 85610 PROTHROMBIN TIME: CPT | Mod: ZL,QW

## 2023-01-23 PROCEDURE — 99024 POSTOP FOLLOW-UP VISIT: CPT | Performed by: ORTHOPAEDIC SURGERY

## 2023-01-23 NOTE — PROGRESS NOTES
ANTICOAGULATION MANAGEMENT     Babak Moran 62 year old male is on warfarin with subtherapeutic INR result. (Goal INR 2.0-3.0)    Recent labs: (last 7 days)     01/23/23  1231   INR 1.8*       ASSESSMENT       Source(s): Chart Review and In person       Warfarin doses taken: Warfarin taken as instructed    Diet: No new diet changes identified    New illness, injury, or hospitalization: Yes: Recent left knee replacement    Medication/supplement changes: None noted    Signs or symptoms of bleeding or clotting: No    Previous INR: Subtherapeutic    Additional findings: None       PLAN     Recommended plan for no diet, medication or health factor changes affecting INR     Dosing Instructions: booster dose then continue your current warfarin dose with next INR in 3 days       Summary  As of 1/23/2023    Full warfarin instructions:  1/24: 10 mg; Otherwise 10 mg every Mon, Fri; 7.5 mg all other days   Next INR check:  1/25/2023             In perons contact    Lab visit scheduled    Education provided: Please call back if any changes to your diet, medications or how you've been taking warfarin, Monitoring for clotting signs and symptoms and Lovenox/Heparin education provided: prescribed dose and frequency and monitoring for signs and symptoms of clotting     Plan made per [facility] anticoagulation protocol    Liz Coles, RN  Anticoagulation Clinic  1/23/2023    _______________________________________________________________________     Anticoagulation Episode Summary     Current INR goal:  2.0-3.0   TTR:  66.1 % (11.8 mo)   Target end date:  Indefinite   Send INR reminders to:  [facility]    Indications    Unspecified atrial fibrillation (Resolved) [I48.91]  Anticoagulation monitoring  INR range 2-3 (Resolved) [Z79.01]  Atrial fibrillation with RVR (H) [I48.91]           Comments:  Babak prefers to be closer to 3.0 d/t previous CVA check Q 4 weeks.Declines to reduce dose when slightly over 3.0  Needs to change  PCP         Anticoagulation Care Providers     Provider Role Specialty Phone number    Teo Funes MD Referring Family Medicine [phone]

## 2023-01-23 NOTE — PROGRESS NOTES
"   01/23/23 1000   General Information   Type of Visit Initial OP Ortho PT Evaluation   Start of Care Date 01/23/23   Referring Physician Dr. Elliott Johnson   Patient/Family Goals Statement To be able to walk w/o pain   Orders Evaluate and Treat   Date of Order 12/05/22   Certification Required? Yes   Medical Diagnosis left total knee arthroplasty   Surgical/Medical history reviewed Yes   Precautions/Limitations no known precautions/limitations   Weight-Bearing Status - LLE weight-bearing as tolerated   Special Instructions None   Body Part(s)   Body Part(s) Knee   Presentation and Etiology   Pertinent history of current problem (include personal factors and/or comorbidities that impact the POC) Patient is a 61 y/o male whom presents to PT 10 days S/P L TKA. Reports he has had minimal pain since surgery. Reports his knee was \"very stiff \" prior to surgery. Reports he has not been ble to try any stairs. He was not able to get any agency to provide home care.   Impairments A. Pain;C. Swelling;D. Decreased ROM;E. Decreased flexibility;F. Decreased strength and endurance;G. Impaired balance;H. Impaired gait   Functional Limitations perform activities of daily living;perform desired leisure / sports activities   Symptom Location Left knee   How/Where did it occur From Degenerative Joint Disease   Onset date of current episode/exacerbation 12/09/22   Chronicity New   Pain rating (0-10 point scale) Best (/10);Worst (/10)   Best (/10) 0-1   Worst (/10) 2-3   Pain quality B. Dull;C. Aching   Frequency of pain/symptoms C. With activity   Pain/symptoms are: Worse during the day   Pain/symptoms exacerbated by B. Walking;G. Certain positions;I. Bending   Pain/symptoms eased by C. Rest;H. Cold;I. OTC medication(s);E. Changing positions   Progression of symptoms since onset: Improved   Current / Previous Interventions   Diagnostic Tests: X-ray   X-ray Results Results   X-ray results Advanced OA  left knee   Prior Level of Function "   Prior Level of Function-Mobility Limited due to chronic knee pain   Prior Level of Function-ADLs Functional   Current Level of Function   Current Community Support Family/friend caregiver   Patient role/employment history F. Retired;G. Disabled   Living environment House/townhome   Home/community accessibility Currently needs assist for transportation   Current equipment-Gait/Locomotion Front wheeled walker   Fall Risk Screen   Fall screen completed by PT   Have you fallen 2 or more times in the past year? No   Have you fallen and had an injury in the past year? No   Timed Up and Go score (seconds) 16   Is patient a fall risk? Yes   Fall screen comments Due to current use of a walker and difficult mobility   Abuse Screen (yes response referral indicated)   Feels Unsafe at Home or Work/School no   Feels Threatened by Someone no   Does Anyone Try to Keep You From Having Contact with Others or Doing Things Outside Your Home? no   Physical Signs of Abuse Present no   Knee Objective Findings   Gait/Locomotion Ambulating with a front wheel walker with decreased stance time and increased step length on the left side   Balance/Proprioception (Single Leg Stance) Not tested left fair plus right   Foot Position In Standing Slightly abducted   Knee ROM Comment Limited by soft tissue tightness with minimal discomfort   Palpation 1-2/4 tenderness with palpation of both medial and lateral aspects of the left knee   Observation Patient is able ambulate with front wheel walker with minimal to moderate gait impairment primarily with increased stance time and reduced step length on the left side.   Integumentary  Post surgical incision site is well-healed.  The staples remain in place   Posture Patient is quite obese and posture is difficult to evaluate   Side (if bilateral, select both right and left) Left   Knee Special Test Comments Knee special tests not indicated as this is a postsurgical diagnosis   Left Knee Extension AROM +8  degree   Left Knee Extension PROM +4 degrees   Left Knee Flexion AROM 80 degrees   Left Knee Flexion PROM 92 degrees   Left Knee Flexion Strength 3 -   Left Knee Extension Strength 3 -   Left Hip Abduction Strength 4   Left Quad Set Strength 4   L VMO Strength 4   Left Gastrocnemius Flexibility -8 degrees left compared to right   Left Hamstring Flexibility -10 degrees left compared to right   Left Hip Flexor Flexibility Not tested   Left Quadricep Flexibility -25 degrees left compared to right   Left ITB Flexibility Not tested   Planned Therapy Interventions   Planned Therapy Interventions joint mobilization;manual therapy;ROM;strengthening;stretching   Planned Modality Interventions   Planned Modality Interventions Hot packs;Cryotherapy   Planned Modality Interventions Comments Modalities will be used as an adjunct to the intended course of exercise based and manual physical therapy treatment   Clinical Impression   Criteria for Skilled Therapeutic Interventions Met yes, treatment indicated   PT Diagnosis Impaired mobility of the left lower extremity due to recent L TKA   Influenced by the following impairments limited mobility, weakness, poor balance and pain   Functional limitations due to impairments Patient has difficulty with sit to stand transfers, supine to sit transfers, gait for distances of more than 100 feet.  Patient is unable to complete tasks at home requiring standing and/or walking.  Patient is unable to drive at this time.  Patient has difficulty getting in and out of his automobile.   Clinical Presentation Stable/Uncomplicated   Clinical Decision Making (Complexity) Moderate complexity  (Due to patient significant obesity)   Therapy Frequency 2 times/Week   Predicted Duration of Therapy Intervention (days/wks) 12 weeks   Risk & Benefits of therapy have been explained Yes   Patient, Family & other staff in agreement with plan of care Yes   Clinical Impression Comments Patient has significant  mobility limitations due to obesity and bilateral lower extremity weakness due to past CVA   Education Assessment   Preferred Learning Style Listening;Reading;Demonstration;Pictures/video   Barriers to Learning No barriers   ORTHO GOALS   PT Ortho Eval Goals 1;2;3   Ortho Goal 1   Goal Identifier Pain   Goal Description Patient will report he is able to walk as necessary throughout his home and in the community for shopping for periods of time up to 20 to 30 minutes without limitation due to the left knee pain in excess of 2/10 and 12 weeks   Target Date 04/23/23   Ortho Goal 2   Goal Identifier Strength   Goal Description Patient will demonstrate improvement in left lower extremity strength to at least grade 4+/5 through the hip knee and ankle muscle group.  Increased strength will provide the support necessary to allow normal ambulation without use of assistive device and also allow patient to ambulate up and down stairs on a daily basis without limitation in 12 weeks   Target Date 04/23/23   Ortho Goal 3   Goal Identifier Mobility   Goal Description Patient will demonstrate active range of motion of the left knee to at least 115 degrees.  This will provide the flexibility in the left knee to allow all normal tasks in the home such as reaching into lower cupboards, cleaning and home maintenance tasks as necessary.  He will be able to get up and down from chairs and in and out of his automobile on a daily basis as needed without limitation in 12 weeks   Target Date 04/23/23   Total Evaluation Time   PT Eval, Low Complexity Minutes (22005) 35   Therapy Certification   Certification date from 01/23/23   Certification date to 04/23/23   Medical Diagnosis left total knee arthroplasty

## 2023-01-23 NOTE — PROGRESS NOTES
Visit Date: 2023    He is 2 weeks status post left total knee arthroplasty for osteoarthritis, doing very well at this point.  His wound is healing nicely.  Staples were removed.  Steri-Strips were applied.  He shows no signs of infection.  No drainage.  He is neuro and vascularly intact.  We are going to see him back in approximately 4 weeks.  He is to continue his physical therapy.    Elliott Johnson MD        D: 2023   T: 2023   MT: LUZMA    Name:     JENNIFER GALVEZ  MRN:      [id number]        Account:    [id number]   :      1960           Visit Date: 2023     Document: [id number]

## 2023-01-23 NOTE — PROGRESS NOTES
Patient is here for follow up on his left total knee.  Suzanne Benedict LPN .....................1/23/2023 12:40 PM

## 2023-01-25 ENCOUNTER — ANTICOAGULATION THERAPY VISIT (OUTPATIENT)
Dept: ANTICOAGULATION | Facility: OTHER | Age: 63
End: 2023-01-25
Attending: FAMILY MEDICINE
Payer: COMMERCIAL

## 2023-01-25 ENCOUNTER — HOSPITAL ENCOUNTER (OUTPATIENT)
Dept: PHYSICAL THERAPY | Facility: OTHER | Age: 63
Setting detail: THERAPIES SERIES
Discharge: HOME | End: 2023-01-25
Attending: ORTHOPAEDIC SURGERY
Payer: COMMERCIAL

## 2023-01-25 DIAGNOSIS — I48.91 ATRIAL FIBRILLATION WITH RVR (H): Primary | ICD-10-CM

## 2023-01-25 LAB — INR POINT OF CARE: 1.8 (ref 0.9–1.1)

## 2023-01-25 PROCEDURE — 97110 THERAPEUTIC EXERCISES: CPT | Mod: GP,PO

## 2023-01-25 PROCEDURE — 85610 PROTHROMBIN TIME: CPT | Mod: ZL,QW

## 2023-01-25 PROCEDURE — 97140 MANUAL THERAPY 1/> REGIONS: CPT | Mod: GP,PO

## 2023-01-25 NOTE — PROGRESS NOTES
ANTICOAGULATION MANAGEMENT     Babak Moran 62 year old male is on warfarin with subtherapeutic INR result. (Goal INR 2.0-3.0)    Recent labs: (last 7 days)     01/25/23  1413   INR 1.8*       ASSESSMENT       Source(s): Chart Review and In person       Warfarin doses taken: Warfarin taken as instructed    Diet: No new diet changes identified    New illness, injury, or hospitalization: No    Medication/supplement changes: None noted    Signs or symptoms of bleeding or clotting: No    Previous INR: Subtherapeutic    Additional findings: Bridging with Enoxaparin until INR >= 2.0       PLAN     Recommended plan for temporary change(s) affecting INR     Dosing Instructions: booster dose then continue your current warfarin dose with next INR in 3 days       Summary  As of 1/25/2023    Full warfarin instructions:  1/25: 10 mg; Otherwise 10 mg every Mon, Fri; 7.5 mg all other days   Next INR check:  1/27/2023             In person    Lab visit scheduled    Education provided: Please call back if any changes to your diet, medications or how you've been taking warfarin    Plan made per [facility] anticoagulation protocol    Nikki Aguilar, RN  Anticoagulation Clinic  1/25/2023    _______________________________________________________________________     Anticoagulation Episode Summary     Current INR goal:  2.0-3.0   TTR:  65.5 % (11.9 mo)   Target end date:  Indefinite   Send INR reminders to:  [facility]    Indications    Unspecified atrial fibrillation (Resolved) [I48.91]  Anticoagulation monitoring  INR range 2-3 (Resolved) [Z79.01]  Atrial fibrillation with RVR (H) [I48.91]           Comments:  Babak prefers to be closer to 3.0 d/t previous CVA check Q 4 weeks.Declines to reduce dose when slightly over 3.0  Needs to change PCP         Anticoagulation Care Providers     Provider Role Specialty Phone number    Teo Funes MD Referring [facility] [phone]

## 2023-01-27 ENCOUNTER — ANTICOAGULATION THERAPY VISIT (OUTPATIENT)
Dept: ANTICOAGULATION | Facility: OTHER | Age: 63
End: 2023-01-27
Attending: FAMILY MEDICINE
Payer: COMMERCIAL

## 2023-01-27 ENCOUNTER — TELEPHONE (OUTPATIENT)
Dept: FAMILY MEDICINE | Facility: OTHER | Age: 63
End: 2023-01-27

## 2023-01-27 DIAGNOSIS — Z53.9 ERRONEOUS ENCOUNTER--DISREGARD: Primary | ICD-10-CM

## 2023-01-27 DIAGNOSIS — Z79.01 LONG-TERM (CURRENT) USE OF ANTICOAGULANTS, INR GOAL 2.0-3.0: Chronic | ICD-10-CM

## 2023-01-27 DIAGNOSIS — I48.91 ATRIAL FIBRILLATION WITH RVR (H): Primary | ICD-10-CM

## 2023-01-27 LAB — INR POINT OF CARE: 1.7 (ref 0.9–1.1)

## 2023-01-27 PROCEDURE — 36416 COLLJ CAPILLARY BLOOD SPEC: CPT | Mod: ZL

## 2023-01-27 PROCEDURE — 85610 PROTHROMBIN TIME: CPT | Mod: ZL,QW

## 2023-01-27 RX ORDER — ENOXAPARIN SODIUM 100 MG/ML
120 INJECTION SUBCUTANEOUS 2 TIMES DAILY
Qty: 9.6 ML | Refills: 1 | Status: SHIPPED | OUTPATIENT
Start: 2023-01-27 | End: 2023-05-05

## 2023-01-27 NOTE — PROGRESS NOTES
Patient requires additional Lovenox as his INR is not in therapeutic range after surgery.   [id number] of [place] sent Rx request for the following:      Requested Prescriptions   Pending Prescriptions Disp Refills     enoxaparin ANTICOAGULANT (LOVENOX) 100 MG/ML syringe 9.6 mL 1     Sig: Inject 1.2 mLs (120 mg) Subcutaneous 2 times daily       There is no refill protocol information for this order          Last Prescription Date:  1/17/23  Last Fill Qty/Refills:         9.6, R-1    Last Office Visit:              12/23/23   Future Office visit:          2/23/23    Meets criteria for refill. Liz Coles RN on 1/27/2023 at 2:20 PM

## 2023-01-27 NOTE — PROGRESS NOTES
ANTICOAGULATION MANAGEMENT     Babak Moran 62 year old male is on warfarin with subtherapeutic INR result. (Goal INR 2.0-3.0)    Recent labs: (last 7 days)     01/27/23  1401   INR 1.7*       ASSESSMENT       Source(s): Chart Review and In person contact       Warfarin doses taken: Warfarin taken as instructed    Diet: No new diet changes identified    New illness, injury, or hospitalization: No    Medication/supplement changes: None noted    Signs or symptoms of bleeding or clotting: No    Previous INR: Subtherapeutic    Additional findings: Bridging with Enoxaparin until INR >= 2.0       PLAN     Recommended plan for no diet, medication or health factor changes affecting INR     Dosing Instructions: Increase your warfarin dose (8.7% change) with next INR in 5 days       Summary  As of 1/27/2023    Full warfarin instructions:  7.5 mg every Mon, Wed, Fri; 10 mg all other days   Next INR check:  1/30/2023             In person contact    Lab visit scheduled    Education provided: Please call back if any changes to your diet, medications or how you've been taking warfarin    Plan made per [facility] anticoagulation protocol    Liz Coles RN  Anticoagulation Clinic  1/27/2023    _______________________________________________________________________     Anticoagulation Episode Summary     Current INR goal:  2.0-3.0   TTR:  64.7 % (11.9 mo)   Target end date:  Indefinite   Send INR reminders to:  [facility]    Indications    Unspecified atrial fibrillation (Resolved) [I48.91]  Anticoagulation monitoring  INR range 2-3 (Resolved) [Z79.01]  Atrial fibrillation with RVR (H) [I48.91]           Comments:  Babak prefers to be closer to 3.0 d/t previous CVA check Q 4 weeks.Declines to reduce dose when slightly over 3.0  Needs to change PCP         Anticoagulation Care Providers     Provider Role Specialty Phone number    Teo Funes MD Referring Family Medicine [phone]        '

## 2023-01-30 ENCOUNTER — ANTICOAGULATION THERAPY VISIT (OUTPATIENT)
Dept: ANTICOAGULATION | Facility: OTHER | Age: 63
End: 2023-01-30
Attending: FAMILY MEDICINE
Payer: COMMERCIAL

## 2023-01-30 ENCOUNTER — HOSPITAL ENCOUNTER (OUTPATIENT)
Dept: PHYSICAL THERAPY | Facility: OTHER | Age: 63
Setting detail: THERAPIES SERIES
Discharge: HOME | End: 2023-01-30
Attending: ORTHOPAEDIC SURGERY
Payer: COMMERCIAL

## 2023-01-30 DIAGNOSIS — I48.91 ATRIAL FIBRILLATION WITH RVR (H): Primary | ICD-10-CM

## 2023-01-30 DIAGNOSIS — I48.91 ATRIAL FIBRILLATION WITH RVR (H): ICD-10-CM

## 2023-01-30 LAB — INR POINT OF CARE: 2 (ref 0.9–1.1)

## 2023-01-30 PROCEDURE — 97140 MANUAL THERAPY 1/> REGIONS: CPT | Mod: GP,PO

## 2023-01-30 PROCEDURE — 36416 COLLJ CAPILLARY BLOOD SPEC: CPT | Mod: ZL

## 2023-01-30 PROCEDURE — 97110 THERAPEUTIC EXERCISES: CPT | Mod: GP,PO

## 2023-01-30 PROCEDURE — 85610 PROTHROMBIN TIME: CPT | Mod: ZL,QW

## 2023-01-30 RX ORDER — WARFARIN SODIUM 5 MG/1
5 TABLET ORAL DAILY
Qty: 160 TABLET | Refills: 0 | Status: SHIPPED | OUTPATIENT
Start: 2023-01-30 | End: 2023-03-03

## 2023-01-30 NOTE — PROGRESS NOTES
ANTICOAGULATION MANAGEMENT     Babak Moran 62 year old male is on warfarin with therapeutic INR result. (Goal INR 2.0-3.0)    Recent labs: (last 7 days)     01/30/23  1411   INR 2.0*       ASSESSMENT       Source(s): Chart Review and In person       Warfarin doses taken: Warfarin taken as instructed    Diet: No new diet changes identified    New illness, injury, or hospitalization: No    Medication/supplement changes: None noted    Signs or symptoms of bleeding or clotting: No    Previous INR: Subtherapeutic    Additional findings: Bridging with Enoxaparin until INR >= 2.0 Okay to stop lovenox       PLAN     Recommended plan for ongoing change(s) affecting INR     Dosing Instructions: Increase your warfarin dose (4% change) with next INR in 1 week       Summary  As of 1/30/2023    Full warfarin instructions:  7.5 mg every Tue, Fri; 10 mg all other days   Next INR check:  2/6/2023             In person    Lab visit scheduled    Education provided: Please call back if any changes to your diet, medications or how you've been taking warfarin    Plan made per [facility] anticoagulation protocol    Nikki Aguilar, RN  Anticoagulation Clinic  1/30/2023    _______________________________________________________________________     Anticoagulation Episode Summary     Current INR goal:  2.0-3.0   TTR:  63.9 % (11.9 mo)   Target end date:  Indefinite   Send INR reminders to:  [facility]    Indications    Unspecified atrial fibrillation (Resolved) [I48.91]  Anticoagulation monitoring  INR range 2-3 (Resolved) [Z79.01]  Atrial fibrillation with RVR (H) [I48.91]           Comments:  Babak prefers to be closer to 3.0 d/t previous CVA check Q 4 weeks.Declines to reduce dose when slightly over 3.0  Needs to change PCP  Takes a long time get up to therapeutic after holding warfarin.         Anticoagulation Care Providers     Provider Role Specialty Phone number    Teo Funes MD Referring Family Medicine  [phone]

## 2023-01-30 NOTE — TELEPHONE ENCOUNTER
Patient Rx request for the following:      Requested Prescriptions   Pending Prescriptions Disp Refills     warfarin ANTICOAGULANT (COUMADIN) 5 MG tablet 30 tablet 0     Sig: Take 1 tablet (5 mg) by mouth daily On Tuedsay, Wednesday, Thursday, Saturday and Sunday And two tablets (10 mg) on Monday/Friday TAKE 10 mg once on Thursday 1/12, 10 mg on Friday than resume home dosing.  INR to be checked as soon as possible, Monday at the latest.       There is no refill protocol information for this order          Last Prescription Date:  1/12/23   Last Fill Qty/Refills:         30, R-0    Last Office Visit:              12/23/22  Future Office visit:           2/23/23  Nikki Aguilar RN on 1/30/2023 at 2:23 PM

## 2023-02-06 ENCOUNTER — ANTICOAGULATION THERAPY VISIT (OUTPATIENT)
Dept: ANTICOAGULATION | Facility: OTHER | Age: 63
End: 2023-02-06
Attending: FAMILY MEDICINE
Payer: COMMERCIAL

## 2023-02-06 DIAGNOSIS — I48.91 ATRIAL FIBRILLATION WITH RVR (H): Primary | ICD-10-CM

## 2023-02-06 LAB — INR POINT OF CARE: 2 (ref 0.9–1.1)

## 2023-02-06 PROCEDURE — 85610 PROTHROMBIN TIME: CPT | Mod: ZL,QW

## 2023-02-06 PROCEDURE — 36416 COLLJ CAPILLARY BLOOD SPEC: CPT | Mod: ZL

## 2023-02-06 NOTE — PROGRESS NOTES
ANTICOAGULATION MANAGEMENT     Babak Moran 62 year old male is on warfarin with therapeutic INR result. (Goal INR 2.0-3.0)    Recent labs: (last 7 days)     02/06/23  1355   INR 2.0*       ASSESSMENT       Source(s): Chart Review and In person       Warfarin doses taken: Warfarin taken as instructed    Diet: No new diet changes identified    New illness, injury, or hospitalization: No    Medication/supplement changes: None noted    Signs or symptoms of bleeding or clotting: No    Previous INR: Therapeutic last visit; previously outside of goal range    Additional findings: None       PLAN     Recommended plan for no diet, medication or health factor changes affecting INR     Dosing Instructions: Continue your current warfarin dose with next INR in 2 weeks       Summary  As of 2/6/2023    Full warfarin instructions:  7.5 mg every Tue, Fri; 10 mg all other days   Next INR check:  2/21/2023             In person    Lab visit scheduled    Education provided: Please call back if any changes to your diet, medications or how you've been taking warfarin    Plan made per [facility] anticoagulation protocol    Nikki Aguilar, RN  Anticoagulation Clinic  2/6/2023    _______________________________________________________________________     Anticoagulation Episode Summary     Current INR goal:  2.0-3.0   TTR:  64.0 % (11.8 mo)   Target end date:  Indefinite   Send INR reminders to:  [facility]    Indications    Unspecified atrial fibrillation (Resolved) [I48.91]  Anticoagulation monitoring  INR range 2-3 (Resolved) [Z79.01]  Atrial fibrillation with RVR (H) [I48.91]           Comments:  Babak prefers to be closer to 3.0 d/t previous CVA check Q 4 weeks.Declines to reduce dose when slightly over 3.0  Needs to change PCP  Takes a long time get up to therapeutic after holding warfarin.         Anticoagulation Care Providers     Provider Role Specialty Phone number    Teo Funes MD Referring Family Medicine  [phone]

## 2023-02-08 ENCOUNTER — HOSPITAL ENCOUNTER (OUTPATIENT)
Dept: PHYSICAL THERAPY | Facility: OTHER | Age: 63
Setting detail: THERAPIES SERIES
Discharge: HOME | End: 2023-02-08
Attending: ORTHOPAEDIC SURGERY
Payer: COMMERCIAL

## 2023-02-08 PROCEDURE — 97110 THERAPEUTIC EXERCISES: CPT | Mod: GP

## 2023-02-08 PROCEDURE — 97140 MANUAL THERAPY 1/> REGIONS: CPT | Mod: GP

## 2023-02-13 ENCOUNTER — HOSPITAL ENCOUNTER (OUTPATIENT)
Dept: PHYSICAL THERAPY | Facility: OTHER | Age: 63
Setting detail: THERAPIES SERIES
Discharge: HOME | End: 2023-02-13
Attending: ORTHOPAEDIC SURGERY
Payer: COMMERCIAL

## 2023-02-13 PROCEDURE — 97140 MANUAL THERAPY 1/> REGIONS: CPT | Mod: GP

## 2023-02-13 PROCEDURE — 97110 THERAPEUTIC EXERCISES: CPT | Mod: GP

## 2023-02-14 DIAGNOSIS — Z96.652 STATUS POST TOTAL LEFT KNEE REPLACEMENT: Primary | ICD-10-CM

## 2023-02-15 ENCOUNTER — HOSPITAL ENCOUNTER (OUTPATIENT)
Dept: PHYSICAL THERAPY | Facility: OTHER | Age: 63
Setting detail: THERAPIES SERIES
Discharge: HOME | End: 2023-02-15
Attending: ORTHOPAEDIC SURGERY
Payer: COMMERCIAL

## 2023-02-15 PROCEDURE — 97110 THERAPEUTIC EXERCISES: CPT | Mod: GP

## 2023-02-15 PROCEDURE — 97140 MANUAL THERAPY 1/> REGIONS: CPT | Mod: GP

## 2023-02-21 ENCOUNTER — HOSPITAL ENCOUNTER (OUTPATIENT)
Dept: PHYSICAL THERAPY | Facility: OTHER | Age: 63
Setting detail: THERAPIES SERIES
Discharge: HOME | End: 2023-02-21
Attending: ORTHOPAEDIC SURGERY
Payer: COMMERCIAL

## 2023-02-21 ENCOUNTER — ANTICOAGULATION THERAPY VISIT (OUTPATIENT)
Dept: ANTICOAGULATION | Facility: OTHER | Age: 63
End: 2023-02-21
Attending: FAMILY MEDICINE
Payer: COMMERCIAL

## 2023-02-21 DIAGNOSIS — I48.91 ATRIAL FIBRILLATION WITH RVR (H): Primary | ICD-10-CM

## 2023-02-21 LAB — INR POINT OF CARE: 2.4 (ref 0.9–1.1)

## 2023-02-21 PROCEDURE — 85610 PROTHROMBIN TIME: CPT | Mod: ZL,QW

## 2023-02-21 PROCEDURE — 97140 MANUAL THERAPY 1/> REGIONS: CPT | Mod: GP

## 2023-02-21 PROCEDURE — 97110 THERAPEUTIC EXERCISES: CPT | Mod: GP

## 2023-02-21 NOTE — PROGRESS NOTES
ANTICOAGULATION MANAGEMENT     Babak Moran 62 year old male is on warfarin with therapeutic INR result. (Goal INR 2.0-3.0)    Recent labs: (last 7 days)     02/21/23  1245   INR 2.4*       ASSESSMENT       Source(s): Chart Review and In person       Warfarin doses taken: Warfarin taken as instructed    Diet: No new diet changes identified    New illness, injury, or hospitalization: No    Medication/supplement changes: None noted    Signs or symptoms of bleeding or clotting: No    Previous INR: Therapeutic last 2(+) visits    Additional findings: None       PLAN     Recommended plan for no diet, medication or health factor changes affecting INR     Dosing Instructions: Continue your current warfarin dose with next INR in 4 weeks       Summary  As of 2/21/2023    Full warfarin instructions:  7.5 mg every Tue, Fri; 10 mg all other days   Next INR check:  3/21/2023             In person    Lab visit scheduled    Education provided: Please call back if any changes to your diet, medications or how you've been taking warfarin    Plan made per [facility] anticoagulation protocol    Nikki Aguilar RN  Anticoagulation Clinic  2/21/2023    _______________________________________________________________________     Anticoagulation Episode Summary     Current INR goal:  2.0-3.0   TTR:  64.0 % (11.8 mo)   Target end date:  Indefinite   Send INR reminders to:  [facility]    Indications    Unspecified atrial fibrillation (Resolved) [I48.91]  Anticoagulation monitoring  INR range 2-3 (Resolved) [Z79.01]  Atrial fibrillation with RVR (H) [I48.91]           Comments:  Babak prefers to be closer to 3.0 d/t previous CVA check Q 4 weeks.Declines to reduce dose when slightly over 3.0  Needs to change PCP  Takes a long time get up to therapeutic after holding warfarin.         Anticoagulation Care Providers     Provider Role Specialty Phone number    Teo Funes MD Referring Family Medicine [phone]

## 2023-02-23 ENCOUNTER — OFFICE VISIT (OUTPATIENT)
Dept: INTERNAL MEDICINE | Facility: OTHER | Age: 63
End: 2023-02-23
Attending: NURSE PRACTITIONER
Payer: COMMERCIAL

## 2023-02-23 VITALS
SYSTOLIC BLOOD PRESSURE: 128 MMHG | HEART RATE: 87 BPM | WEIGHT: 315 LBS | BODY MASS INDEX: 44.38 KG/M2 | DIASTOLIC BLOOD PRESSURE: 78 MMHG | RESPIRATION RATE: 16 BRPM | TEMPERATURE: 97.1 F | OXYGEN SATURATION: 95 %

## 2023-02-23 DIAGNOSIS — M25.579 PAIN IN JOINT INVOLVING ANKLE AND FOOT, UNSPECIFIED LATERALITY: ICD-10-CM

## 2023-02-23 DIAGNOSIS — Z47.1 AFTERCARE FOLLOWING KNEE JOINT REPLACEMENT SURGERY, UNSPECIFIED LATERALITY: ICD-10-CM

## 2023-02-23 DIAGNOSIS — L60.3 NAIL DYSTROPHY: ICD-10-CM

## 2023-02-23 DIAGNOSIS — L84 CALLUS OF FOOT: ICD-10-CM

## 2023-02-23 DIAGNOSIS — G81.94 LEFT HEMIPARESIS (H): ICD-10-CM

## 2023-02-23 DIAGNOSIS — I63.411 CEREBROVASCULAR ACCIDENT (CVA) DUE TO EMBOLISM OF RIGHT MIDDLE CEREBRAL ARTERY (H): Primary | ICD-10-CM

## 2023-02-23 DIAGNOSIS — L60.2 ONYCHOGRYPHOSIS: Chronic | ICD-10-CM

## 2023-02-23 DIAGNOSIS — Z79.01 LONG-TERM (CURRENT) USE OF ANTICOAGULANTS, INR GOAL 2.0-3.0: Chronic | ICD-10-CM

## 2023-02-23 DIAGNOSIS — B35.1 ONYCHOMYCOSIS: ICD-10-CM

## 2023-02-23 DIAGNOSIS — Z96.659 AFTERCARE FOLLOWING KNEE JOINT REPLACEMENT SURGERY, UNSPECIFIED LATERALITY: ICD-10-CM

## 2023-02-23 PROCEDURE — G0463 HOSPITAL OUTPT CLINIC VISIT: HCPCS

## 2023-02-23 PROCEDURE — G0127 TRIM NAIL(S): HCPCS | Mod: Q8,GA | Performed by: NURSE PRACTITIONER

## 2023-02-23 PROCEDURE — 17110 DESTRUCTION B9 LES UP TO 14: CPT | Performed by: NURSE PRACTITIONER

## 2023-02-23 RX ORDER — METOPROLOL SUCCINATE 200 MG/1
1 TABLET, EXTENDED RELEASE ORAL
COMMUNITY
Start: 2022-08-11 | End: 2023-05-05

## 2023-02-23 RX ORDER — ENOXAPARIN SODIUM 150 MG/ML
INJECTION SUBCUTANEOUS
COMMUNITY
Start: 2023-01-27 | End: 2023-05-05

## 2023-02-23 ASSESSMENT — PAIN SCALES - GENERAL: PAINLEVEL: NO PAIN (0)

## 2023-02-23 NOTE — NURSING NOTE
"Chief Complaint   Patient presents with     Musculoskeletal Problem     F/N Cares       Initial /78 (BP Location: Right arm, Patient Position: Sitting, Cuff Size: Adult Regular)   Pulse 87   Temp 97.1  F (36.2  C) (Temporal)   Resp 16   Wt (!) 152.6 kg (336 lb 6.4 oz)   SpO2 95%   BMI 44.38 kg/m   Estimated body mass index is 44.38 kg/m  as calculated from the following:    Height as of 1/9/23: 1.854 m (6' 1\").    Weight as of this encounter: 152.6 kg (336 lb 6.4 oz).     Medication Reconciliation: complete      FOOD SECURITY SCREENING QUESTIONS:    The next two questions are to help us understand your food security.  If you are feeling you need any assistance in this area, we have resources available to support you today.    Hunger Vital Signs:  Within the past 12 months we worried whether our food would run out before we got money to buy more. Never  Within the past 12 months the food we bought just didn't last and we didn't have money to get more. Never        Advance care plan reviewed      Rena Sousa LPN on 2/23/2023 at 8:55 AM      "

## 2023-02-27 ENCOUNTER — HOSPITAL ENCOUNTER (OUTPATIENT)
Dept: GENERAL RADIOLOGY | Facility: OTHER | Age: 63
Discharge: HOME | End: 2023-02-27
Attending: ORTHOPAEDIC SURGERY
Payer: COMMERCIAL

## 2023-02-27 ENCOUNTER — OFFICE VISIT (OUTPATIENT)
Dept: ORTHOPEDICS | Facility: OTHER | Age: 63
End: 2023-02-27
Attending: ORTHOPAEDIC SURGERY
Payer: COMMERCIAL

## 2023-02-27 DIAGNOSIS — Z96.652 STATUS POST TOTAL LEFT KNEE REPLACEMENT: ICD-10-CM

## 2023-02-27 DIAGNOSIS — Z96.652 STATUS POST TOTAL LEFT KNEE REPLACEMENT: Primary | ICD-10-CM

## 2023-02-27 PROCEDURE — G0463 HOSPITAL OUTPT CLINIC VISIT: HCPCS | Mod: 25

## 2023-02-27 PROCEDURE — 73562 X-RAY EXAM OF KNEE 3: CPT | Mod: LT

## 2023-02-27 PROCEDURE — 99024 POSTOP FOLLOW-UP VISIT: CPT | Performed by: ORTHOPAEDIC SURGERY

## 2023-02-27 PROCEDURE — G0463 HOSPITAL OUTPT CLINIC VISIT: HCPCS

## 2023-02-27 NOTE — PROGRESS NOTES
Patient is here for follow up on his left total knee.  Suzanne Benedict LPN .....................2/27/2023 2:33 PM

## 2023-02-28 ENCOUNTER — HOSPITAL ENCOUNTER (OUTPATIENT)
Dept: PHYSICAL THERAPY | Facility: OTHER | Age: 63
Setting detail: THERAPIES SERIES
Discharge: HOME | End: 2023-02-28
Attending: ORTHOPAEDIC SURGERY
Payer: COMMERCIAL

## 2023-02-28 PROCEDURE — 97110 THERAPEUTIC EXERCISES: CPT | Mod: GP,PN

## 2023-02-28 PROCEDURE — 97140 MANUAL THERAPY 1/> REGIONS: CPT | Mod: GP,PN

## 2023-03-02 ENCOUNTER — HOSPITAL ENCOUNTER (OUTPATIENT)
Dept: PHYSICAL THERAPY | Facility: OTHER | Age: 63
Setting detail: THERAPIES SERIES
Discharge: HOME | End: 2023-03-02
Attending: ORTHOPAEDIC SURGERY
Payer: COMMERCIAL

## 2023-03-02 PROCEDURE — 97140 MANUAL THERAPY 1/> REGIONS: CPT | Mod: GP,CQ

## 2023-03-02 PROCEDURE — 97110 THERAPEUTIC EXERCISES: CPT | Mod: GP,CQ

## 2023-03-03 DIAGNOSIS — I48.91 ATRIAL FIBRILLATION WITH RVR (H): ICD-10-CM

## 2023-03-03 RX ORDER — WARFARIN SODIUM 5 MG/1
5 TABLET ORAL DAILY
Qty: 160 TABLET | Refills: 1 | Status: ON HOLD | OUTPATIENT
Start: 2023-03-03 | End: 2023-06-27

## 2023-03-03 NOTE — TELEPHONE ENCOUNTER
ANTICOAGULATION MANAGEMENT:  Medication Refill    Anticoagulation Summary  As of 2/21/2023    Warfarin maintenance plan:  7.5 mg (5 mg x 1.5) every Tue, Fri; 10 mg (5 mg x 2) all other days   Next INR check:  3/21/2023   Target end date:  Indefinite    Indications    Unspecified atrial fibrillation (Resolved) [I48.91]  Anticoagulation monitoring  INR range 2-3 (Resolved) [Z79.01]  Atrial fibrillation with RVR (H) [I48.91]             Anticoagulation Care Providers     Provider Role Specialty Phone number    Teo Funes MD Referring Family Medicine [phone]          Visit with referring provider/group within last year: Yes    [facility] referral signed within last year: Yes    Babak meets all criteria for refill (current [facility] referral, office visit with referring provider/group in last year, lab monitoring up to date or not exceeding 2 weeks overdue). Rx instructions and quantity supplied updated to match patient's current dosing plan per [facility] protocol     Liz Coles RN  Anticoagulation Clinic

## 2023-03-07 ENCOUNTER — HOSPITAL ENCOUNTER (OUTPATIENT)
Dept: PHYSICAL THERAPY | Facility: OTHER | Age: 63
Setting detail: THERAPIES SERIES
Discharge: HOME | End: 2023-03-07
Attending: ORTHOPAEDIC SURGERY
Payer: COMMERCIAL

## 2023-03-07 PROCEDURE — 97140 MANUAL THERAPY 1/> REGIONS: CPT | Mod: GP,PN

## 2023-03-07 PROCEDURE — 97110 THERAPEUTIC EXERCISES: CPT | Mod: GP,PN

## 2023-03-07 NOTE — PROGRESS NOTES
"[facility] Rehabilitation Service    Outpatient Physical Therapy Progress Note  Patient: Babak Moran  : 1960    Beginning/End Dates of Reporting Period:  3/7/2023    Referring Provider: Dr. Elliott Johnson    Therapy Diagnosis: S/P L TKA     Client Self Report: Babak is using his walker ouitside for safety. Reports increased \"stiffness\" in the knee today    Objective Measurements:  Objective Measure: ROM  Details: AROM 0-104, PROM 0-110  Objective Measure: Edema  Details: Minimal left knee edema  Objective Measure: strength  Details: 4/5 knee ext, 4+ knee flex. Hip flexion 4-/5, hip abd 4/5                            Goals:  Goal Identifier Pain   Goal Description Patient will report he is able to walk as necessary throughout his home and in the community for shopping for periods of time up to 20 to 30 minutes without limitation due to the left knee pain in excess of 2/10 and 12 weeks   Target Date 23   Date Met      Progress (detail required for progress note): Minimal pain. He continues to have difficulty with standing for more than 10' He has been unable to get out to do his own shopping.     Goal Identifier Strength   Goal Description Patient will demonstrate improvement in left lower extremity strength to at least grade 4+/5 through the hip knee and ankle muscle group.  Increased strength will provide the support necessary to allow normal ambulation without use of assistive device and also allow patient to ambulate up and down stairs on a daily basis without limitation in 12 weeks   Target Date 23   Date Met      Progress (detail required for progress note): Continues to have difficulty getting up from chairs. He cannot get up outof standard height chairs. He has just started to ambulate up and down a flight of 15 steps.     Goal Identifier Mobility   Goal Description Patient will demonstrate active range of motion " of the left knee to at least 115 degrees.  This will provide the flexibility in the left knee to allow all normal tasks in the home such as reaching into lower cupboards, cleaning and home maintenance tasks as necessary.  He will be able to get up and down from chairs and in and out of his automobile on a daily basis as needed without limitation in 12 weeks   Target Date 04/23/23   Date Met      Progress (detail required for progress note): AROM 0-104, PROM 0-110. Continues to have difficulty with sit to stand from standard height chairs. He is able to get in and out of his auto and drive safely.     Plan:  Continue therapy per current plan of care.    Discharge:  No

## 2023-03-09 ENCOUNTER — HOSPITAL ENCOUNTER (OUTPATIENT)
Dept: PHYSICAL THERAPY | Facility: OTHER | Age: 63
Setting detail: THERAPIES SERIES
Discharge: HOME | End: 2023-03-09
Attending: ORTHOPAEDIC SURGERY
Payer: COMMERCIAL

## 2023-03-09 PROCEDURE — 97110 THERAPEUTIC EXERCISES: CPT | Mod: GP,PN

## 2023-03-09 PROCEDURE — 97140 MANUAL THERAPY 1/> REGIONS: CPT | Mod: GP,PN

## 2023-03-13 ENCOUNTER — TELEPHONE (OUTPATIENT)
Dept: FAMILY MEDICINE | Facility: OTHER | Age: 63
End: 2023-03-13
Payer: COMMERCIAL

## 2023-03-13 NOTE — TELEPHONE ENCOUNTER
[employer] Disability form was dropped off for Teo Funes MD completion.  No previous forms completed on file.    Oneida Malone LPN 3/13/2023 10:29 AM

## 2023-03-14 ENCOUNTER — ANTICOAGULATION THERAPY VISIT (OUTPATIENT)
Dept: ANTICOAGULATION | Facility: OTHER | Age: 63
End: 2023-03-14
Attending: FAMILY MEDICINE
Payer: COMMERCIAL

## 2023-03-14 DIAGNOSIS — I48.91 ATRIAL FIBRILLATION WITH RVR (H): Primary | ICD-10-CM

## 2023-03-14 LAB — INR POINT OF CARE: 3.4 (ref 0.9–1.1)

## 2023-03-14 PROCEDURE — 85610 PROTHROMBIN TIME: CPT | Mod: ZL,QW

## 2023-03-14 PROCEDURE — 36416 COLLJ CAPILLARY BLOOD SPEC: CPT | Mod: ZL

## 2023-03-14 NOTE — TELEPHONE ENCOUNTER
Patient notified of Dr. Johnson completing this form.  Fax number provided from patient [phone].         Oneida Malone LPN 3/14/2023 2:39 PM

## 2023-03-14 NOTE — PROGRESS NOTES
ANTICOAGULATION MANAGEMENT     Babak Moran 62 year old male is on warfarin with supratherapeutic INR result. (Goal INR 2.0-3.0)    Recent labs: (last 7 days)     03/14/23  1338   INR 3.4*       ASSESSMENT       Source(s): Chart Review and In person       Warfarin doses taken: Warfarin taken as instructed    Diet: No new diet changes identified    New illness, injury, or hospitalization: No    Medication/supplement changes: None noted    Signs or symptoms of bleeding or clotting: No    Previous INR: Therapeutic last 2(+) visits    Additional findings: None       PLAN     Recommended plan for no diet, medication or health factor changes affecting INR     Dosing Instructions: Continue your current warfarin dose with next INR in 3 weeks       Summary  As of 3/14/2023    Full warfarin instructions:  3/14: 5 mg; Otherwise 7.5 mg every Tue, Fri; 10 mg all other days   Next INR check:  4/4/2023             In person    Lab visit scheduled    Education provided: Please call back if any changes to your diet, medications or how you've been taking warfarin    Plan made per [facility] anticoagulation protocol    Nikki Aguilar, RN  Anticoagulation Clinic  3/14/2023    _______________________________________________________________________     Anticoagulation Episode Summary     Current INR goal:  2.0-3.0   TTR:  63.1 % (11.9 mo)   Target end date:  Indefinite   Send INR reminders to:  [facility]    Indications    Unspecified atrial fibrillation (Resolved) [I48.91]  Anticoagulation monitoring  INR range 2-3 (Resolved) [Z79.01]  Atrial fibrillation with RVR (H) [I48.91]           Comments:  Babak prefers to be closer to 3.0 d/t previous CVA check Q 4 weeks.Declines to reduce dose when slightly over 3.0  Needs to change PCP  Takes a long time get up to therapeutic after holding warfarin.         Anticoagulation Care Providers     Provider Role Specialty Phone number    Teo Funes MD Referring Family Medicine  [phone]

## 2023-03-14 NOTE — TELEPHONE ENCOUNTER
I did not give him any specific restrictions. Those would be from Dr Johnson. He needs an appointment if he wants me to complete. Or maybe orthopedics will complete for him.

## 2023-03-31 ENCOUNTER — HOSPITAL ENCOUNTER (OUTPATIENT)
Dept: PHYSICAL THERAPY | Facility: OTHER | Age: 63
Setting detail: THERAPIES SERIES
Discharge: HOME | End: 2023-03-31
Attending: ORTHOPAEDIC SURGERY
Payer: COMMERCIAL

## 2023-03-31 PROCEDURE — 97140 MANUAL THERAPY 1/> REGIONS: CPT | Mod: GP,PO

## 2023-03-31 PROCEDURE — 97110 THERAPEUTIC EXERCISES: CPT | Mod: GP,PO

## 2023-04-03 ENCOUNTER — ANTICOAGULATION THERAPY VISIT (OUTPATIENT)
Dept: ANTICOAGULATION | Facility: OTHER | Age: 63
End: 2023-04-03
Attending: FAMILY MEDICINE
Payer: COMMERCIAL

## 2023-04-03 ENCOUNTER — HOSPITAL ENCOUNTER (OUTPATIENT)
Dept: PHYSICAL THERAPY | Facility: OTHER | Age: 63
Setting detail: THERAPIES SERIES
Discharge: HOME | End: 2023-04-03
Attending: ORTHOPAEDIC SURGERY
Payer: COMMERCIAL

## 2023-04-03 ENCOUNTER — OFFICE VISIT (OUTPATIENT)
Dept: ORTHOPEDICS | Facility: OTHER | Age: 63
End: 2023-04-03
Attending: ORTHOPAEDIC SURGERY
Payer: COMMERCIAL

## 2023-04-03 DIAGNOSIS — Z96.652 STATUS POST TOTAL LEFT KNEE REPLACEMENT: Primary | ICD-10-CM

## 2023-04-03 DIAGNOSIS — I48.91 ATRIAL FIBRILLATION WITH RVR (H): Primary | ICD-10-CM

## 2023-04-03 LAB — INR POINT OF CARE: 3.1 (ref 0.9–1.1)

## 2023-04-03 PROCEDURE — 97110 THERAPEUTIC EXERCISES: CPT | Mod: GP,PO

## 2023-04-03 PROCEDURE — 85610 PROTHROMBIN TIME: CPT | Mod: ZL,QW

## 2023-04-03 PROCEDURE — 97140 MANUAL THERAPY 1/> REGIONS: CPT | Mod: GP,PO

## 2023-04-03 PROCEDURE — G0463 HOSPITAL OUTPT CLINIC VISIT: HCPCS

## 2023-04-03 PROCEDURE — 99024 POSTOP FOLLOW-UP VISIT: CPT | Performed by: ORTHOPAEDIC SURGERY

## 2023-04-03 NOTE — PROGRESS NOTES
ANTICOAGULATION MANAGEMENT     Babak Moran 62 year old male is on warfarin with supratherapeutic INR result. (Goal INR 2.0-3.0)    Recent labs: (last 7 days)     04/03/23  1333   INR 3.1*       ASSESSMENT       Source(s): Chart Review       Warfarin doses taken: Warfarin taken as instructed    Diet: No new diet changes identified    New illness, injury, or hospitalization: No    Medication/supplement changes: None noted    Signs or symptoms of bleeding or clotting: No    Previous INR: Supratherapeutic    Additional findings: Will have right knee replaced some time in the near future     Patient likes to be on the higher end of his range       PLAN     Recommended plan for no diet, medication or health factor changes affecting INR     Dosing Instructions: Continue your current warfarin dose with next INR in 4 weeks       Summary  As of 4/3/2023    Full warfarin instructions:  7.5 mg every Tue, Fri; 10 mg all other days   Next INR check:  5/1/2023             In person    Lab visit scheduled    Education provided: Please call back if any changes to your diet, medications or how you've been taking warfarin    Plan made per [facility] anticoagulation protocol    Nikki Aguilar RN  Anticoagulation Clinic  4/3/2023    _______________________________________________________________________     Anticoagulation Episode Summary     Current INR goal:  2.0-3.0   TTR:  63.1 % (11.9 mo)   Target end date:  Indefinite   Send INR reminders to:  [facility]    Indications    Unspecified atrial fibrillation (Resolved) [I48.91]  Anticoagulation monitoring  INR range 2-3 (Resolved) [Z79.01]  Atrial fibrillation with RVR (H) [I48.91]           Comments:  Babak prefers to be closer to 3.0 d/t previous CVA check Q 4 weeks.Declines to reduce dose when slightly over 3.0  Needs to change PCP  Takes a long time get up to therapeutic after holding warfarin.         Anticoagulation Care Providers     Provider Role Specialty Phone  number    Teo Funes MD [facility] Medicine [phone]

## 2023-04-03 NOTE — PROGRESS NOTES
Visit Date: 2023    He is now 12 weeks status post a left total knee arthroplasty.  He is doing really well at this point.  No complaints with his knee whatsoever.  He says he is walking up and down steps at this point, and getting in and out of his truck without any difficulty.  He is extremely happy with his knee.    He would like to proceed forward with planning his right total knee arthroplasty.  We will go ahead and get him on the schedule at his convenience.    Elliott Johnson MD        D: 2023   T: 2023   MT: LUZMA    Name:     JENNIFER GALVEZAvril  MRN:      [id number]        Account:    [id number]   :      1960           Visit Date: 2023     Document: [id number]

## 2023-04-03 NOTE — PROGRESS NOTES
Patient is here for follow up on his left total knee.  Suzanne Benedict LPN .....................4/3/2023 1:05 PM

## 2023-04-05 ENCOUNTER — HOSPITAL ENCOUNTER (OUTPATIENT)
Dept: PHYSICAL THERAPY | Facility: OTHER | Age: 63
Setting detail: THERAPIES SERIES
Discharge: HOME | End: 2023-04-05
Attending: ORTHOPAEDIC SURGERY
Payer: COMMERCIAL

## 2023-04-05 PROCEDURE — 97140 MANUAL THERAPY 1/> REGIONS: CPT | Mod: GP,PO

## 2023-04-05 PROCEDURE — 97110 THERAPEUTIC EXERCISES: CPT | Mod: GP,PO

## 2023-04-10 ENCOUNTER — HOSPITAL ENCOUNTER (OUTPATIENT)
Dept: PHYSICAL THERAPY | Facility: OTHER | Age: 63
Setting detail: THERAPIES SERIES
Discharge: HOME | End: 2023-04-10
Attending: ORTHOPAEDIC SURGERY
Payer: COMMERCIAL

## 2023-04-10 PROCEDURE — 97110 THERAPEUTIC EXERCISES: CPT | Mod: GP,PO

## 2023-04-10 PROCEDURE — 97140 MANUAL THERAPY 1/> REGIONS: CPT | Mod: GP,PO

## 2023-04-12 ENCOUNTER — TELEPHONE (OUTPATIENT)
Dept: ORTHOPEDICS | Facility: OTHER | Age: 63
End: 2023-04-12
Payer: COMMERCIAL

## 2023-04-12 ENCOUNTER — HOSPITAL ENCOUNTER (OUTPATIENT)
Dept: PHYSICAL THERAPY | Facility: OTHER | Age: 63
Setting detail: THERAPIES SERIES
Discharge: HOME | End: 2023-04-12
Attending: ORTHOPAEDIC SURGERY
Payer: COMMERCIAL

## 2023-04-12 DIAGNOSIS — M17.11 PRIMARY OSTEOARTHRITIS OF RIGHT KNEE: Primary | ICD-10-CM

## 2023-04-12 PROCEDURE — 97110 THERAPEUTIC EXERCISES: CPT | Mod: GP,PO

## 2023-04-12 PROCEDURE — 97140 MANUAL THERAPY 1/> REGIONS: CPT | Mod: GP,PO

## 2023-04-17 ENCOUNTER — HOSPITAL ENCOUNTER (OUTPATIENT)
Dept: PHYSICAL THERAPY | Facility: OTHER | Age: 63
Setting detail: THERAPIES SERIES
Discharge: HOME | End: 2023-04-17
Attending: ORTHOPAEDIC SURGERY
Payer: COMMERCIAL

## 2023-04-17 PROCEDURE — 97110 THERAPEUTIC EXERCISES: CPT | Mod: GP,PO

## 2023-04-17 PROCEDURE — 97140 MANUAL THERAPY 1/> REGIONS: CPT | Mod: GP,PO

## 2023-04-19 ENCOUNTER — HOSPITAL ENCOUNTER (OUTPATIENT)
Dept: PHYSICAL THERAPY | Facility: OTHER | Age: 63
Setting detail: THERAPIES SERIES
Discharge: HOME | End: 2023-04-19
Attending: ORTHOPAEDIC SURGERY
Payer: COMMERCIAL

## 2023-04-19 PROCEDURE — 97110 THERAPEUTIC EXERCISES: CPT | Mod: GP,PN

## 2023-04-19 PROCEDURE — 97140 MANUAL THERAPY 1/> REGIONS: CPT | Mod: GP,PN

## 2023-04-20 NOTE — PROGRESS NOTES
[facility] Rehabilitation Service    Outpatient Physical Therapy Discharge Note  Patient: Babak Moran  : 1960    End Dates of Reporting Period:  2023      Referring Provider: Dr. Elliott Johnson    Therapy Diagnosis: S/P L TKA     Client Self Report: Babak feels he will be able to continue the strengthening program for both LE/Knees independently now until his right TKA expected in .    Objective Measurements:  Objective Measure: ROM  Details: 23  AROM 0-110, PROM 0-124  Objective Measure: Edema  Details: NT  Objective Measure: strength  Details: 23 knee ext, 4+/5  knee flex 5/5 B,  Hip flexion 4-/5, hip abd 4/5  Objective Measure: Observation  Details: Patient demos improved independent function with sit to stand transfers. He continues to have some difficulty with transfer from supine to sit.                      Goals:  Goal Identifier Pain   Goal Description Patient will report he is able to walk as necessary throughout his home and in the community for shopping for periods of time up to 20 to 30 minutes without limitation due to the left knee pain in excess of 2/10 and 12 weeks   Target Date 23   Date Met  23   Progress (detail required for progress note): No c/o left knee pain. His function is now more limited by his right knee that is scheduled for replacement in      Goal Identifier Strength   Goal Description Patient will demonstrate improvement in left lower extremity strength to at least grade 4+/5 through the hip knee and ankle muscle group.  Increased strength will provide the support necessary to allow normal ambulation without use of assistive device and also allow patient to ambulate up and down stairs on a daily basis without limitation in 12 weeks   Target Date 23   Date Met  23   Progress (detail required for progress note): Babak is now able to get up from chairs  functionally when using good technique.He is able to ambulate up and down stairs safely     Goal Identifier Mobility   Goal Description Patient will demonstrate active range of motion of the left knee to at least 115 degrees.  This will provide the flexibility in the left knee to allow all normal tasks in the home such as reaching into lower cupboards, cleaning and home maintenance tasks as necessary.  He will be able to get up and down from chairs and in and out of his automobile on a daily basis as needed without limitation in 12 weeks   Target Date 04/23/23   Date Met  04/19/23   Progress (detail required for progress note): Goal met           Plan:  Discharge from therapy.    Discharge:    Reason for Discharge: Patient has met all goals.    Equipment Issued: None    Discharge Plan: Patient to continue home program.

## 2023-05-01 ENCOUNTER — ANTICOAGULATION THERAPY VISIT (OUTPATIENT)
Dept: ANTICOAGULATION | Facility: OTHER | Age: 63
End: 2023-05-01
Attending: FAMILY MEDICINE
Payer: COMMERCIAL

## 2023-05-01 DIAGNOSIS — I48.91 ATRIAL FIBRILLATION WITH RVR (H): Primary | ICD-10-CM

## 2023-05-01 LAB — INR POINT OF CARE: 3 (ref 0.9–1.1)

## 2023-05-01 PROCEDURE — 36416 COLLJ CAPILLARY BLOOD SPEC: CPT | Mod: ZL

## 2023-05-01 PROCEDURE — 85610 PROTHROMBIN TIME: CPT | Mod: ZL,QW

## 2023-05-01 NOTE — PROGRESS NOTES
ANTICOAGULATION MANAGEMENT     Babak Moran 62 year old male is on warfarin with therapeutic INR result. (Goal INR 2.0-3.0)    Recent labs: (last 7 days)     05/01/23  1317   INR 3.0*       ASSESSMENT       Source(s): Chart Review and In person       Warfarin doses taken: Warfarin taken as instructed    Diet: No new diet changes identified    New illness, injury, or hospitalization: No    Medication/supplement changes: None noted    Signs or symptoms of bleeding or clotting: No    Previous INR: Supratherapeutic    Additional findings: Upcoming surgery/procedure 6/26/23 knee replacement has pre-op on 6/16/23       PLAN     Recommended plan for no diet, medication or health factor changes affecting INR     Dosing Instructions: Continue your current warfarin dose with next INR in 4 weeks       Summary  As of 5/1/2023    Full warfarin instructions:  7.5 mg every Tue, Fri; 10 mg all other days   Next INR check:  5/29/2023             In person    Lab visit scheduled    Education provided: Please call back if any changes to your diet, medications or how you've been taking warfarin    Plan made per [facility] anticoagulation protocol    Nikki Aguilar, RN  Anticoagulation Clinic  5/1/2023    _______________________________________________________________________     Anticoagulation Episode Summary     Current INR goal:  2.0-3.0   TTR:  62.7 % (11.8 mo)   Target end date:  Indefinite   Send INR reminders to:  [facility]    Indications    Unspecified atrial fibrillation (Resolved) [I48.91]  Anticoagulation monitoring  INR range 2-3 (Resolved) [Z79.01]  Atrial fibrillation with RVR (H) [I48.91]           Comments:  Babak prefers to be closer to 3.0 d/t previous CVA check Q 4 weeks.Declines to reduce dose when slightly over 3.0  Needs to change PCP  Takes a long time get up to therapeutic after holding warfarin.         Anticoagulation Care Providers     Provider Role Specialty Phone number    Teo Funes MD  [facility] Family Medicine [phone]

## 2023-05-05 ENCOUNTER — OFFICE VISIT (OUTPATIENT)
Dept: FAMILY MEDICINE | Facility: OTHER | Age: 63
End: 2023-05-05
Attending: FAMILY MEDICINE
Payer: COMMERCIAL

## 2023-05-05 VITALS
SYSTOLIC BLOOD PRESSURE: 134 MMHG | HEART RATE: 72 BPM | OXYGEN SATURATION: 96 % | RESPIRATION RATE: 20 BRPM | WEIGHT: 315 LBS | DIASTOLIC BLOOD PRESSURE: 78 MMHG | TEMPERATURE: 98.4 F | BODY MASS INDEX: 45.39 KG/M2

## 2023-05-05 DIAGNOSIS — M17.0 PRIMARY OSTEOARTHRITIS OF BOTH KNEES: ICD-10-CM

## 2023-05-05 DIAGNOSIS — L02.619 CELLULITIS AND ABSCESS OF FOOT EXCLUDING TOE: Primary | ICD-10-CM

## 2023-05-05 DIAGNOSIS — Z86.73 HISTORY OF STROKE: ICD-10-CM

## 2023-05-05 DIAGNOSIS — I10 BENIGN ESSENTIAL HYPERTENSION: ICD-10-CM

## 2023-05-05 DIAGNOSIS — L03.119 CELLULITIS AND ABSCESS OF FOOT EXCLUDING TOE: Primary | ICD-10-CM

## 2023-05-05 DIAGNOSIS — I69.354 HEMIPARESIS OF LEFT NONDOMINANT SIDE AS LATE EFFECT OF CEREBRAL INFARCTION (H): ICD-10-CM

## 2023-05-05 PROCEDURE — G0463 HOSPITAL OUTPT CLINIC VISIT: HCPCS

## 2023-05-05 PROCEDURE — 99214 OFFICE O/P EST MOD 30 MIN: CPT | Performed by: FAMILY MEDICINE

## 2023-05-05 PROCEDURE — G0463 HOSPITAL OUTPT CLINIC VISIT: HCPCS | Mod: 25

## 2023-05-05 RX ORDER — AMLODIPINE BESYLATE 5 MG/1
5 TABLET ORAL DAILY
Qty: 90 TABLET | Refills: 0 | Status: SHIPPED | OUTPATIENT
Start: 2023-05-05 | End: 2023-06-16

## 2023-05-05 RX ORDER — HYDROCHLOROTHIAZIDE 25 MG/1
25 TABLET ORAL DAILY
Qty: 90 TABLET | Refills: 1 | Status: SHIPPED | OUTPATIENT
Start: 2023-05-05 | End: 2023-06-16

## 2023-05-05 RX ORDER — MUPIROCIN 20 MG/G
OINTMENT TOPICAL 2 TIMES DAILY
Qty: 30 G | Refills: 0 | Status: SHIPPED | OUTPATIENT
Start: 2023-05-05 | End: 2023-05-15

## 2023-05-05 ASSESSMENT — PAIN SCALES - GENERAL: PAINLEVEL: NO PAIN (0)

## 2023-05-05 NOTE — PROGRESS NOTES
Assessment & Plan       ICD-10-CM    1. Cellulitis and abscess of foot excluding toe  L03.119 mupirocin (BACTROBAN) 2 % external ointment    L02.619       2. Benign essential hypertension  I10 amLODIPine (NORVASC) 5 MG tablet     hydrochlorothiazide (HYDRODIURIL) 25 MG tablet      3. History of stroke  Z86.73       4. Hemiparesis of left nondominant side as late effect of cerebral infarction (H)  I69.354       5. Primary osteoarthritis of both knees  M17.0         Left lower leg with some dusky erythema.  It does not appear to be an obvious cellulitis.  However, he has a history of recurrent cellulitis, felt to be due to leg edema.  Has noticed some increase in leg edema recently which could make the redness worse, or could be an early cellulitis which is causing more edema.  He responded well to mupirocin ointment in the past.  Use this twice daily for 1 week.  If worse despite this, return to consider oral antibiotics.    We discussed how amlodipine can contribute to leg edema.  Considered adjusting this medication prior to his last knee replacement, but given the close amount of time to surgery, elected against changes that would impact electrolytes.  He has over 6 weeks until scheduled right TKA.  In order to help control edema, reduce amlodipine from 10 down to 5 mg daily and increase hydrochlorothiazide from 12.5 up to 25 mg daily.  Plan to check labs at preop.  Discussed increased potassium intake.    He has mild hemiparesis on the left side with a history of stroke years ago.  This, in combination with severe bilateral knee arthritis has made mobility difficult.  He is utilizing a cane.  He fell while working and was deemed a fall risk, so is unable to work.  He was given a release to be off of work by Dr. Johnson when recovering from knee arthroplasty, but was cleared for full work duties on 4/3/2023.  With ongoing family paresis, right knee arthritis and cane use, he is not able to return to work.  He is  pursuing permanent disability.  It is possible if his knees see significant improvement after replacement that he may be able to return to some form of work, so I completed paperwork for short-term disability with estimated return around October 1.  Workability can be reevaluated closer to that time.    32 minutes spent by me on the date of the encounter doing chart review, patient visit and documentation       Teo Funes MD  [facility]    Subjective   Babak is a 62 year old, presenting for the following health issues:  Clinic Care Coordination - Follow-up        5/5/2023    12:50 PM   Additional Questions   Roomed by loren fermin lpn   Accompanied by spouse         5/5/2023    12:50 PM   Patient Reported Additional Medications   Patient reports taking the following new medications -     History of Present Illness       Reason for visit:  Follow up    He eats 0-1 servings of fruits and vegetables daily.He consumes 0 sweetened beverage(s) daily.He exercises with enough effort to increase his heart rate 10 to 19 minutes per day.  He exercises with enough effort to increase his heart rate 4 days per week.   He is taking medications regularly.       More lower left leg redness  History of cellulitis    Recovering from left TKA  Planning knee replacement on right in June  Unable to work, using a cane still    Review of Systems   As above      Objective    /78 (BP Location: Right arm, Patient Position: Sitting, Cuff Size: Adult Large)   Pulse 72   Temp 98.4  F (36.9  C) (Tympanic)   Resp 20   Wt (!) 156 kg (344 lb)   SpO2 96%   BMI 45.39 kg/m    Body mass index is 45.39 kg/m .  Physical Exam   General Appearance: Alert. No acute distress  Psychiatric: Normal affect and mentation  Musculoskeletal: obviously arthritic right knee. Using cane for ambulation  Skin: Dusky erythema in left lower leg.  No tenderness or warmth.  Anterior left shin with small scab

## 2023-05-05 NOTE — PATIENT INSTRUCTIONS
Reduce amlodipine to 5 mg daily  Increase hydrochlorothiazide to 25 mg daily  Apply mupirocin to your leg twice daily to help resolve the sore

## 2023-05-05 NOTE — NURSING NOTE
"Patient presents to the clinic for follow up.    FOOD SECURITY SCREENING QUESTIONS:    The next two questions are to help us understand your food security.  If you are feeling you need any assistance in this area, we have resources available to support you today.    Hunger Vital Signs:  Within the past 12 months we worried whether our food would run out before we got money to buy more. Never  Within the past 12 months the food we bought just didn't last and we didn't have money to get more. Never    Advance Care Directive on file? no  Advance Care Directive provided to patient? Declined.    Chief Complaint   Patient presents with     Clinic Care Coordination - Follow-up       Initial /78 (BP Location: Right arm, Patient Position: Sitting, Cuff Size: Adult Large)   Pulse 72   Temp 98.4  F (36.9  C) (Tympanic)   Resp 20   Wt (!) 156 kg (344 lb)   SpO2 96%   BMI 45.39 kg/m   Estimated body mass index is 45.39 kg/m  as calculated from the following:    Height as of 1/9/23: 1.854 m (6' 1\").    Weight as of this encounter: 156 kg (344 lb).  Medication Reconciliation: complete        Oneida Malone LPN       "

## 2023-05-15 DIAGNOSIS — L02.619 CELLULITIS AND ABSCESS OF FOOT EXCLUDING TOE: ICD-10-CM

## 2023-05-15 DIAGNOSIS — L03.119 CELLULITIS AND ABSCESS OF FOOT EXCLUDING TOE: ICD-10-CM

## 2023-05-17 RX ORDER — MUPIROCIN 20 MG/G
OINTMENT TOPICAL 2 TIMES DAILY
Qty: 30 G | Refills: 0 | OUTPATIENT
Start: 2023-05-17 | End: 2023-05-27

## 2023-05-17 NOTE — TELEPHONE ENCOUNTER
[id number] of [place] sent Rx request for the following:      Requested Prescriptions     mupirocin (BACTROBAN) 2 % external ointment 30 g 0 5/5/2023 5/15/2023 No   Sig - Route: Apply topically 2 times daily for 10 days - Topical   Cellulitis and abscess of foot excluding toe [L03.119, L02.619]  - Primary     Last Office Visit:              5/5/23  Future Office visit:             Next 5 appointments (look out 90 days)    Jun 16, 2023 11:00 AM  Pre-Op physical with Teo Funes MD  [facility] ([facility] [address]  [place] [address]  [phone]   Jul 03, 2023  1:00 PM  Return Visit with Elliott Johnson MD  [facility] [address]  [place] [address]  [phone]        Per LOV note:  He responded well to mupirocin ointment in the past.  Use this twice daily for 1 week.  If worse despite this, return to consider oral antibiotics.    Refused- needs appointment. Savannah Sifuentes RN .............. 5/17/2023  11:16 AM

## 2023-05-25 ENCOUNTER — APPOINTMENT (OUTPATIENT)
Dept: CT IMAGING | Facility: OTHER | Age: 63
End: 2023-05-25
Attending: STUDENT IN AN ORGANIZED HEALTH CARE EDUCATION/TRAINING PROGRAM
Payer: COMMERCIAL

## 2023-05-25 ENCOUNTER — HOSPITAL ENCOUNTER (EMERGENCY)
Facility: OTHER | Age: 63
Discharge: HOME | End: 2023-05-26
Attending: STUDENT IN AN ORGANIZED HEALTH CARE EDUCATION/TRAINING PROGRAM | Admitting: STUDENT IN AN ORGANIZED HEALTH CARE EDUCATION/TRAINING PROGRAM
Payer: COMMERCIAL

## 2023-05-25 ENCOUNTER — APPOINTMENT (OUTPATIENT)
Dept: GENERAL RADIOLOGY | Facility: OTHER | Age: 63
End: 2023-05-25
Attending: STUDENT IN AN ORGANIZED HEALTH CARE EDUCATION/TRAINING PROGRAM
Payer: COMMERCIAL

## 2023-05-25 VITALS
TEMPERATURE: 98 F | BODY MASS INDEX: 39.17 KG/M2 | WEIGHT: 315 LBS | HEIGHT: 75 IN | SYSTOLIC BLOOD PRESSURE: 175 MMHG | HEART RATE: 54 BPM | OXYGEN SATURATION: 95 % | RESPIRATION RATE: 16 BRPM | DIASTOLIC BLOOD PRESSURE: 97 MMHG
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
## 2023-05-25 DIAGNOSIS — S00.83XA CONTUSION OF FOREHEAD, INITIAL ENCOUNTER: ICD-10-CM

## 2023-05-25 DIAGNOSIS — S20.212A CHEST WALL CONTUSION, LEFT, INITIAL ENCOUNTER: ICD-10-CM

## 2023-05-25 DIAGNOSIS — S80.01XA CONTUSION OF RIGHT KNEE, INITIAL ENCOUNTER: ICD-10-CM

## 2023-05-25 LAB
ALBUMIN SERPL BCG-MCNC: 4.4 G/DL (ref 3.5–5.2)
ALP SERPL-CCNC: 71 U/L (ref 40–129)
ALT SERPL W P-5'-P-CCNC: 25 U/L (ref 10–50)
ANION GAP SERPL CALCULATED.3IONS-SCNC: 11 MMOL/L (ref 7–15)
AST SERPL W P-5'-P-CCNC: 25 U/L (ref 10–50)
BASOPHILS # BLD AUTO: 0.1 10E3/UL (ref 0–0.2)
BASOPHILS NFR BLD AUTO: 1 %
BILIRUB SERPL-MCNC: 0.7 MG/DL
BUN SERPL-MCNC: 16.3 MG/DL (ref 8–23)
CALCIUM SERPL-MCNC: 9.6 MG/DL (ref 8.8–10.2)
CHLORIDE SERPL-SCNC: 102 MMOL/L (ref 98–107)
CREAT SERPL-MCNC: 0.76 MG/DL (ref 0.67–1.17)
DEPRECATED HCO3 PLAS-SCNC: 27 MMOL/L (ref 22–29)
EOSINOPHIL # BLD AUTO: 0 10E3/UL (ref 0–0.7)
EOSINOPHIL NFR BLD AUTO: 0 %
ERYTHROCYTE [DISTWIDTH] IN BLOOD BY AUTOMATED COUNT: 18.1 % (ref 10–15)
GFR SERPL CREATININE-BSD FRML MDRD: >90 ML/MIN/1.73M2
GLUCOSE SERPL-MCNC: 94 MG/DL (ref 70–99)
HCT VFR BLD AUTO: 49.4 % (ref 40–53)
HGB BLD-MCNC: 15.5 G/DL (ref 13.3–17.7)
HOLD SPECIMEN: NORMAL
HOLD SPECIMEN: NORMAL
IMM GRANULOCYTES # BLD: 0 10E3/UL
IMM GRANULOCYTES NFR BLD: 0 %
INR PPP: 2.6 (ref 0.85–1.15)
LIPASE SERPL-CCNC: 28 U/L (ref 13–60)
LYMPHOCYTES # BLD AUTO: 1.3 10E3/UL (ref 0.8–5.3)
LYMPHOCYTES NFR BLD AUTO: 13 %
MCH RBC QN AUTO: 26.7 PG (ref 26.5–33)
MCHC RBC AUTO-ENTMCNC: 31.4 G/DL (ref 31.5–36.5)
MCV RBC AUTO: 85 FL (ref 78–100)
MONOCYTES # BLD AUTO: 0.8 10E3/UL (ref 0–1.3)
MONOCYTES NFR BLD AUTO: 8 %
NEUTROPHILS # BLD AUTO: 8 10E3/UL (ref 1.6–8.3)
NEUTROPHILS NFR BLD AUTO: 78 %
NRBC # BLD AUTO: 0 10E3/UL
NRBC BLD AUTO-RTO: 0 /100
PLATELET # BLD AUTO: 282 10E3/UL (ref 150–450)
POTASSIUM SERPL-SCNC: 4 MMOL/L (ref 3.4–5.3)
PROT SERPL-MCNC: 7.9 G/DL (ref 6.4–8.3)
RBC # BLD AUTO: 5.81 10E6/UL (ref 4.4–5.9)
SODIUM SERPL-SCNC: 140 MMOL/L (ref 136–145)
WBC # BLD AUTO: 10.2 10E3/UL (ref 4–11)

## 2023-05-25 PROCEDURE — 73562 X-RAY EXAM OF KNEE 3: CPT | Mod: TC,RT

## 2023-05-25 PROCEDURE — 73560 X-RAY EXAM OF KNEE 1 OR 2: CPT | Mod: TC,RT

## 2023-05-25 PROCEDURE — 36415 COLL VENOUS BLD VENIPUNCTURE: CPT | Performed by: STUDENT IN AN ORGANIZED HEALTH CARE EDUCATION/TRAINING PROGRAM

## 2023-05-25 PROCEDURE — 99283 EMERGENCY DEPT VISIT LOW MDM: CPT | Performed by: STUDENT IN AN ORGANIZED HEALTH CARE EDUCATION/TRAINING PROGRAM

## 2023-05-25 PROCEDURE — 71045 X-RAY EXAM CHEST 1 VIEW: CPT | Mod: TC

## 2023-05-25 PROCEDURE — 70450 CT HEAD/BRAIN W/O DYE: CPT | Mod: TC

## 2023-05-25 PROCEDURE — 99285 EMERGENCY DEPT VISIT HI MDM: CPT | Mod: 25 | Performed by: STUDENT IN AN ORGANIZED HEALTH CARE EDUCATION/TRAINING PROGRAM

## 2023-05-25 PROCEDURE — 80053 COMPREHEN METABOLIC PANEL: CPT | Performed by: STUDENT IN AN ORGANIZED HEALTH CARE EDUCATION/TRAINING PROGRAM

## 2023-05-25 PROCEDURE — 250N000013 HC RX MED GY IP 250 OP 250 PS 637: Performed by: STUDENT IN AN ORGANIZED HEALTH CARE EDUCATION/TRAINING PROGRAM

## 2023-05-25 PROCEDURE — 85610 PROTHROMBIN TIME: CPT | Performed by: STUDENT IN AN ORGANIZED HEALTH CARE EDUCATION/TRAINING PROGRAM

## 2023-05-25 PROCEDURE — 85025 COMPLETE CBC W/AUTO DIFF WBC: CPT | Performed by: STUDENT IN AN ORGANIZED HEALTH CARE EDUCATION/TRAINING PROGRAM

## 2023-05-25 PROCEDURE — 83690 ASSAY OF LIPASE: CPT | Performed by: STUDENT IN AN ORGANIZED HEALTH CARE EDUCATION/TRAINING PROGRAM

## 2023-05-25 RX ORDER — ACETAMINOPHEN 325 MG/1
650 TABLET ORAL ONCE
Status: COMPLETED | OUTPATIENT
Start: 2023-05-25 | End: 2023-05-25

## 2023-05-25 RX ADMIN — ACETAMINOPHEN 650 MG: 325 TABLET, FILM COATED ORAL at 21:55

## 2023-05-25 ASSESSMENT — ACTIVITIES OF DAILY LIVING (ADL)
ADLS_ACUITY_SCORE: 33
ADLS_ACUITY_SCORE: 35

## 2023-05-26 NOTE — ED TRIAGE NOTES
"Pt is here today due to a fall.  Pt was at [employer] today.  He was getting out of his trunk.  He became unsteady on his feet and lost his balance.   Pt fell onto the concrete on his left side.  Pt did hit his head, denies LOC. He is on [place].  Has a contusion on his left forehead.   Pt has left sided chronic deficits from a stroke in 2017.    BP (!) 175/97   Pulse 54   Temp 98  F (36.7  C) (Temporal)   Resp 16   Ht 1.905 m (6' 3\")   Wt (!) 156 kg (344 lb)   SpO2 95%   BMI 43.00 kg/m     Pt did not take his BP medication today.  Brigette Smith RN on 5/25/2023 at 8:50 PM     Triage Assessment     Row Name 05/25/23 2046       Triage Assessment (Adult)    Airway WDL WDL       Respiratory WDL    Respiratory WDL WDL       Skin Circulation/Temperature WDL    Skin Circulation/Temperature WDL X  Several abrashions and contussions       Cardiac WDL    Cardiac WDL X  175/97       Peripheral/Neurovascular WDL    Peripheral Neurovascular WDL WDL       Cognitive/Neuro/Behavioral WDL    Cognitive/Neuro/Behavioral WDL WDL              "

## 2023-05-26 NOTE — ED PROVIDER NOTES
"ED PROVIDER NOTE  Patient Name: Babak Moran  MRN: [id number]    CC:    Chief Complaint   Patient presents with     Trauma     Eval           MDM  62 year old male presenting with fall.    In the ED, BP (!) 175/97   Pulse 54   Temp 98  F (36.7  C) (Temporal)   Resp 16   Ht 1.905 m (6' 3\")   Wt (!) 156 kg (344 lb)   SpO2 95%   BMI 43.00 kg/m      Physical exam revealed clear auscultation bilaterally.  Benign abdominal exam.  Grossly neurologically intact.  In no acute distress, no accessory muscle use.  Overall does not look critically ill or toxic.  He has a superficial contusion of the left side of her forehead, superficial abrasion of the right knee.  He also has ecchymosis of the left chest wall with no flail segments.  No significant tenderness with palpation, this is not suggestive of rib fracture.  Certainly no flail segments appreciated but exam limited by habitus.    I have independently reviewed and interpreted the patients test results which I have ordered this visit which are the following:  Labs were remarkable for white blood cell count of 10.2.  He 115.5.  INR 2.6.  Lipase 28.  Sodium 140.  Potassium 4.0.  Creatinine 0.76.  AST 25, ALT 25    Patient was given CT head was unremarkable.  X-ray of the right knee reveals arthritis with no signs of acute fracture or dislocation.  X-ray chest reveals no signs of obvious rib fractures    Symptoms most consistent with mechanical fall with a contusion of the forehead and abrasion of the right knee.  As well as contusion of the left chest wall.  Conservative management, avoid strenuous activity.  All questions were answered, patient was sent home with family member.      Plan  -Tylenol 650 mg p.o.  - The patient was advised to follow up with PCP in 2-3 days and to return to the ED if symptoms worsened, or if there were any other urgent or life-threatening concerns.  - Following counseling on the work-up, results, diagnosis, and treatment plan, the patient " Amada 1827   Neurology- INITIAL CLINIC VISIT  2018, 9:00 AM     Kaylee Castillo Patient Status:  No patient class for patient encounter    12/15/1969 MRN [id number]   Location [address] 0.571 - 2.630 mg/dL 0.810   [facility] RATIO      0.26 - 1.65 0.87       Component      Latest Ref Rng & Units 10/22/2018   Vitamin B12      193 - 986 pg/mL 403   FOLATE (FOLIC ACID), SERUM      >=5.9 ng/mL 67.1     Component      Latest Ref Rng & Un was amenable to discharge after all questions were answered. The patient expressed agreement and understanding to the plan.      Clinical impression  Contusion of the left chest wall  Mechanical fall  Right knee pain    Disposition  Home      HPI    Babak Moran is a 62 year old male with PMH of lupus, anticoagulants, A-fib with RVR, CVA with left-sided hemiparesis, cutaneous lupus erythematosus, ascending aortic enlargement, hypertension, left hemiparesis, elevated BMI, BARRIE who presents with fall.     History of obtained by: Patient and family member    Patient was getting out of his truck when he had suddenly fell and slipped.  He remains a full event, denies any loss of consciousness.  He does suffer a contusion of his left head and an abrasion of his right knee.  He also has left-sided chest wall pain.  He does take blood thinners, he takes them as prescribed.  Denies any prodromal illness prior he was in good health.  He states that he has a history of left-sided hemiparesis, and he states that that made it difficult to get out of the vehicle today he denies any blurry vision, nausea, vomiting, cough, fevers, chills,  or abdominal pain.  He.     PMH and SH reviewed.    10 point ROS reviewed and negative except as stated in HPI.    Past medical history:  Past Medical History:   Diagnosis Date     Atrial fibrillation (H) 02/03/2017    on Warfarin     Bacterial sepsis (H) 8/8/2022     Cellulitis of left lower extremity 9/5/2019     Cerebral infarction (H)      Cerebral infarction due to unspecified occlusion or stenosis of right middle cerebral artery (H) 02/03/2017    ,Left hemiparesis, left neglect, impaired balance and gait following R MCA stroke with mild hemorrhagic transformation s/p tissue plasminogen activator and mechanical thrombectomy, s/p C7 facet fracture from fall off forklift.     Chest pain 02/1997     Disorder of skin or subcutaneous tissue 07/25/2011     Essential (primary) hypertension 02/1997      Fracture of cervical vertebra (H)     02/03/2017,C7 facet fracture from fall off forklift, nondisplaced     Gout      Hemiplegia affecting left nondominant side (H) 02/03/2017     Obesity 02/03/2017    body mass index of 40     Other local lupus erythematosus      Sepsis (H) 9/6/2019       Social history:  Social Connections: Not on file     Social History     Socioeconomic History     Marital status:      Spouse name: Erika   Tobacco Use     Smoking status: Never     Passive exposure: Yes     Smokeless tobacco: Never   Vaping Use     Vaping status: Never Used   Substance and Sexual Activity     Alcohol use: Not Currently     Comment: couple of times per year     Drug use: Never     Sexual activity: Yes     Partners: Female   Social History Narrative    Lives with his wife and sons.    Enjoy's deer hunting.    No tobacco use.   works at [employer] living, and was just fired more weeks ago.  Now unemployed, wife is on disability, not able to afford their utilities or health insurance.         I have reviewed the patients prescribed medications:  No current facility-administered medications for this encounter.    Current Outpatient Medications:      acetaminophen (TYLENOL) 325 MG tablet, Take 2 tablets (650 mg) by mouth every 4 hours as needed for other (mild pain), Disp: 100 tablet, Rfl: 0     acetaminophen (TYLENOL) 500 MG tablet, Take 500-1,000 mg by mouth every 6 hours as needed for mild pain, Disp: , Rfl:      amLODIPine (NORVASC) 5 MG tablet, Take 1 tablet (5 mg) by mouth daily, Disp: 90 tablet, Rfl: 0     hydrochlorothiazide (HYDRODIURIL) 25 MG tablet, Take 1 tablet (25 mg) by mouth daily, Disp: 90 tablet, Rfl: 1     warfarin ANTICOAGULANT (COUMADIN) 2.5 MG tablet, Take 2.5 mg by mouth daily With 5 mg tablet on Tuesday, Wednesday, Thursday, Saturday and Sunday, Disp: , Rfl:      warfarin ANTICOAGULANT (COUMADIN) 5 MG tablet, Take 1 tablet (5 mg) by mouth daily 7.5 mg (1.5 tablets) every  Rfl: 3  •  methotrexate 2.5 MG Oral Tab, Take 8 tablets (20 mg total) by mouth every 7 days. , Disp: 40 tablet, Rfl: 3  •  folic acid 1 MG Oral Tab, Take 3 tablets (3 mg total) by mouth daily. , Disp: 90 tablet, Rfl: 11  •  Ondansetron HCl (ZOFRAN) 4 mg tabl "Tue, Fri; 10 mg (2 tablets) all other days, Disp: 160 tablet, Rfl: 1    Allergies:  Allergies   Allergen Reactions     Diatrizoate Rash     Ioxaglate Other (See Comments)     Does not recall     Levofloxacin Hives and Rash     Metrizamide Rash     (Diagnostic X-Ray materials)     Probenecid Rash         Vitals:    05/25/23 2044 05/25/23 2047   BP:  (!) 175/97   Pulse: 54    Resp: 16    Temp: 98  F (36.7  C)    TempSrc: Temporal    SpO2: 95%    Weight: (!) 156 kg (344 lb)    Height: 1.905 m (6' 3\")        Physical Exam  Vitals: BP (!) 175/97   Pulse 54   Temp 98  F (36.7  C) (Temporal)   Resp 16   Ht 1.905 m (6' 3\")   Wt (!) 156 kg (344 lb)   SpO2 95%   BMI 43.00 kg/m    Constitutional: awake, NAD  Eyes: No conjunctival injection and normal lids, PERRL, EOMI  ENT: external nose and ears atraumatic  Neck: Symmetric, trachea midline, Supple  CV: RRR, no murmurs appreciated.  Pulm: Unlabored respiratory effort, good air movement, CTAB, no w/c/r appreciated.  GI: Soft, nontender, nondistended.  No rebound or guarding  MSK: No deformities.  No cyanosis.  -Tenderness along the chest walls laterally or anteriorly, specifically no significant tenderness in the area of the ecchymosis of the left sided chest wall, mid axillary.  No flail segment.    - Superficial abrasion of the right knee, with possible effusion of the right knee joint.No pain with passive motion, no external erythema, no violation of the skin.   - o: AOx4, normal speech, following commands, grossly symmetric strength in all extremities.  Cranial nerves III-XII grossly intact.    Skin: warm & dry, ecchymosis over the left-sided chest wall  Psych: Appropriate mood & affect    Past medical history, past surgical history, problem list, family history, social history, medication list, and allergies were reviewed as documented in epic snapshot.  Relevant review of systems are documented within the HPI above.    Complexity of the Problems Addressed  5 (High) " muscle bulk and tone. No atrophy or fasciculations.  Manual muscle testing revealed MRC grade 4-5 strength throughout all muscles, diffusely, effort limited due to pain and stiffness, however HF when laying down 5-/5  Deep tendon reflexes were 2 at the leisa - 1 or more chronic illnesses with severe exacerbation, progression, or side effects of treatment or 1 acute chronic illness or injury that poses threat to live or bodily function    Complexity of Data  I have reviewed the following pertinent historical labs, diagnoses, tests, and/or imaging which contribute to complexity of this patient's care.    4 - (Moderate) - (1 of three) I have reviewed at least 3 of the 4; external notes, review results of unique test, ordering of unique test, assessment requiring independent history. OR independent interpretation of tests done by other physician. OR discussion with specialist (including SW, PT, etc)      Complication Risk  Based on my interpretation of the current labs, historical tests and/or external tests. Patient does have a risk level as stated below of morbidity, threat to life, and bodily function, and severe exacerbation if not treated. I did consider the patients unique social determinants of care.  4 - Moderate    ED Events, Labs and Imaging:  ED Course as of 05/26/23 0150   Thu May 25, 2023   2154 WBC: 10.2   2154 Hemoglobin: 15.5   2154 Platelet Count: 282   2221 INR(!): 2.60   2221 Lipase: 28   2221 Sodium: 140   2221 Potassium: 4.0   2221 Urea Nitrogen: 16.3   2221 AST: 25   2221 ALT: 25   2221 Albumin: 4.4   2221 Protein Total: 7.9   2221 Albumin: 4.4   2222 Protein Total: 7.9       Ar Ogden MD  Emergency Medicine    Dictation Disclaimer: Some notes are completed with voice-recognition dictation software. Errors are generally corrected in real time. Please contact me via Epic staff message if you note any errors requiring clarification.     Cb Ogden MD  05/26/23 0229     [facility] Requested Prescriptions      No prescriptions requested or ordered in this encounter          We discussed in depth regarding the diagnosis, prognosis, treatment. The patient was given ample opportunity to ask questions.  All questions and con

## 2023-05-26 NOTE — DISCHARGE INSTRUCTIONS
Continue taking Tylenol as needed for mild pain, warm packs and ice packs are sometimes also helpful.  Follow-up with your primary care doctor in the coming days.  If you have any worsening or concerning symptoms please return to the emergency department call 911.

## 2023-05-31 ENCOUNTER — TELEPHONE (OUTPATIENT)
Dept: FAMILY MEDICINE | Facility: OTHER | Age: 63
End: 2023-05-31
Payer: COMMERCIAL

## 2023-05-31 DIAGNOSIS — I48.91 ATRIAL FIBRILLATION WITH RVR (H): Primary | ICD-10-CM

## 2023-05-31 NOTE — TELEPHONE ENCOUNTER
Patient called about last INR in the ED, 2.6, Patient instructed to continue same dose and to recheck on 6/16 when he has his pre-op visit. Nikki Aguilar RN on 5/31/2023 at 12:41 PM    ANTICOAGULATION MANAGEMENT     Babak Moran 62 year old male is on warfarin with therapeutic INR result. (Goal INR )    Recent labs: (last 7 days)     05/25/23 2133   INR 2.60*       ASSESSMENT       Source(s): Chart Review and Patient/Caregiver Call       Warfarin doses taken: Warfarin taken as instructed    Diet: No new diet changes identified    New illness, injury, or hospitalization: Yes: ED for fall on 5/25/23, no bleeding concerns at this time    Medication/supplement changes: None noted    Signs or symptoms of bleeding or clotting: No    Previous INR: Therapeutic last visit; previously outside of goal range    Additional findings: Upcoming surgery/procedure 6/26/23       PLAN     Recommended plan for no diet, medication or health factor changes affecting INR     Dosing Instructions: Continue your current warfarin dose with next INR in 4 weeks       Summary  As of 5/31/2023    Full warfarin instructions:  7.5 mg every Tue, Fri; 10 mg all other days   Next INR check:  6/16/2023             Telephone call with Babak who verbalizes understanding and agrees to plan    Patient offered & declined to schedule next visit    Education provided: Please call back if any changes to your diet, medications or how you've been taking warfarin    Plan made per [facility] anticoagulation protocol    Nikki Aguilar RN  Anticoagulation Clinic  5/31/2023    _______________________________________________________________________     Anticoagulation Episode Summary     Current INR goal:  2.0-3.0   TTR:  67.0 % (11.7 mo)   Target end date:  Indefinite   Send INR reminders to:  [facility]    Indications    Unspecified atrial fibrillation (Resolved) [I48.91]  Anticoagulation monitoring  INR range 2-3 (Resolved) [Z79.01]  Atrial  fibrillation with RVR (H) [I48.91]           Comments:  Babak prefers to be closer to 3.0 d/t previous CVA check Q 4 weeks.Declines to reduce dose when slightly over 3.0  Needs to change PCP  Takes a long time get up to therapeutic after holding warfarin.         Anticoagulation Care Providers     Provider Role Specialty Phone number    Teo Funes MD Referring Family Medicine [phone]

## 2023-06-02 ENCOUNTER — HOSPITAL ENCOUNTER (EMERGENCY)
Facility: OTHER | Age: 63
Discharge: HOME | End: 2023-06-02
Attending: FAMILY MEDICINE | Admitting: FAMILY MEDICINE
Payer: COMMERCIAL

## 2023-06-02 ENCOUNTER — APPOINTMENT (OUTPATIENT)
Dept: GENERAL RADIOLOGY | Facility: OTHER | Age: 63
End: 2023-06-02
Attending: FAMILY MEDICINE
Payer: COMMERCIAL

## 2023-06-02 VITALS
WEIGHT: 315 LBS | OXYGEN SATURATION: 96 % | SYSTOLIC BLOOD PRESSURE: 136 MMHG | HEART RATE: 94 BPM | DIASTOLIC BLOOD PRESSURE: 84 MMHG | HEIGHT: 74 IN | RESPIRATION RATE: 18 BRPM | TEMPERATURE: 99.3 F | BODY MASS INDEX: 40.43 KG/M2

## 2023-06-02 DIAGNOSIS — R07.89 ANTERIOR CHEST WALL PAIN: ICD-10-CM
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
## 2023-06-02 PROCEDURE — 99283 EMERGENCY DEPT VISIT LOW MDM: CPT | Mod: 25 | Performed by: FAMILY MEDICINE

## 2023-06-02 PROCEDURE — 99282 EMERGENCY DEPT VISIT SF MDM: CPT | Performed by: FAMILY MEDICINE

## 2023-06-02 PROCEDURE — 71045 X-RAY EXAM CHEST 1 VIEW: CPT

## 2023-06-02 ASSESSMENT — ACTIVITIES OF DAILY LIVING (ADL): ADLS_ACUITY_SCORE: 35

## 2023-06-02 NOTE — ED TRIAGE NOTES
Pt comes into the ER today reporting that he fell in sand around 1630. He has a weak left leg. He landed on his chest and may have hit his head, his glasses are messed up. No LOC. No headache, neck pain. Reports chest pain, 4/10. He has not taken anything for the pain. He is on coumadin.  He is ambulatory in triage with a cane     Triage Assessment     Row Name 06/02/23 1800       Triage Assessment (Adult)    Airway WDL WDL       Respiratory WDL    Respiratory WDL WDL       Skin Circulation/Temperature WDL    Skin Circulation/Temperature WDL WDL       Cardiac WDL    Cardiac WDL chest pain       Chest Pain Assessment    Chest Pain Location anterior chest, left;anterior chest, right    Character aching

## 2023-06-02 NOTE — ED PROVIDER NOTES
History     Chief Complaint   Patient presents with     Fall     trauma eval     The history is provided by the patient.     Babak Moran is a 63 year old male here after a fall. He was walking in his christiano's yard and tripped in the sand. He has had a stroke and has left sided weakness at baseline. He hit the ground on his chest and has anterior chest wall pain. No other injury.     Allergies:  Allergies   Allergen Reactions     Diatrizoate Rash     Ioxaglate Other (See Comments)     Does not recall     Levofloxacin Hives and Rash     Metrizamide Rash     (Diagnostic X-Ray materials)     Probenecid Rash       Problem List:    Patient Active Problem List    Diagnosis Date Noted     Nail dystrophy 02/23/2023     Priority: Medium     Onychomycosis 02/23/2023     Priority: Medium     Aftercare following knee joint replacement surgery, unspecified laterality 01/09/2023     Priority: Medium     Onychogryphosis 08/27/2022     Priority: Medium     Need for vaccination 04/10/2022     Priority: Medium     Obstructive sleep apnea syndrome 05/13/2019     Priority: Medium     Osteoarthrosis 01/16/2018     Priority: Medium     Tissue plasminogen activator (t-PA) administered at other facility within 24 hours prior to current admission 01/16/2018     Priority: Medium     Morbid obesity with BMI of 40.0-44.9, adult (H) 02/08/2017     Priority: Medium     Cerebrovascular accident (CVA) due to embolism of right middle cerebral artery (H) 02/03/2017     Priority: Medium     Left hemiparesis (H) 02/03/2017     Priority: Medium     Atrial fibrillation with RVR (H) 06/25/2015     Priority: Medium     Long-term (current) use of anticoagulants, INR goal 2.0-3.0 06/25/2015     Priority: Medium     Family history of coronary artery disease 05/19/2014     Priority: Medium     Ascending aorta enlargement (H) 02/13/2014     Priority: Medium     Gout 01/07/2014     Priority: Medium     Overview:   Overview:   after 3 attacks in < a year,  tapped and crystal proven 13;  Allopurinol started then got ? Atypical side effect, stopped; (short term colchicine prophylaxis)       Overweight 2013     Priority: Medium     Formatting of this note might be different from the original.  Max weight reported 475 lbs;   369 lbs       Pain in joint, ankle and foot 2012     Priority: Medium     Essential hypertension 2009     Priority: Medium     Cutaneous lupus erythematosus 2008     Priority: Medium     Systemic lupus erythematosus (H) 2008     Priority: Medium     Overview:   Dermatitis          Past Medical History:    Past Medical History:   Diagnosis Date     Atrial fibrillation (H) 2017     Bacterial sepsis (H) 2022     Cellulitis of left lower extremity 2019     Cerebral infarction (H)      Cerebral infarction due to unspecified occlusion or stenosis of right middle cerebral artery (H) 2017     Chest pain 1997     Disorder of skin or subcutaneous tissue 2011     Essential (primary) hypertension 1997     Fracture of cervical vertebra (H)      Gout      Hemiplegia affecting left nondominant side (H) 2017     Obesity 2017     Other local lupus erythematosus      Sepsis (H) 2019       Past Surgical History:    Past Surgical History:   Procedure Laterality Date     ARTHROPLASTY KNEE Left 2023    Procedure: ARTHROPLASTY, KNEE, TOTAL;  Surgeon: Elliott Johnson MD;  Location: [facility]     ARTHROSCOPY KNEE      bilateral knees     COLONOSCOPY  2012    Normal; Next due      ORIF WRIST FRACTURE         Family History:    Family History   Problem Relation Age of Onset     Unknown/Adopted Paternal Grandfather         Unknown, around age 67.     Other - See Comments Father         Parkinson's disease Alzheimer's     Cancer Father         Cancer     Heart Disease Maternal Grandmother         Heart Disease,MI in her 60's.     Heart Disease Mother 60        Heart Disease,MI  "    Other - See Comments Mother         rheumatoid arthritis.     Heart Disease Maternal Uncle 69        Heart Disease     Hypertension Sister         Hypertension     Cancer Sister         Cancer,melanoma     Heart Disease Paternal Uncle         Heart Disease, around 69.       Social History:  Marital Status:   [2]  Social History     Tobacco Use     Smoking status: Never     Passive exposure: Yes     Smokeless tobacco: Never   Vaping Use     Vaping status: Never Used   Substance Use Topics     Alcohol use: Not Currently     Comment: couple of times per year     Drug use: Never        Medications:    acetaminophen (TYLENOL) 325 MG tablet  acetaminophen (TYLENOL) 500 MG tablet  amLODIPine (NORVASC) 5 MG tablet  hydrochlorothiazide (HYDRODIURIL) 25 MG tablet  warfarin ANTICOAGULANT (COUMADIN) 2.5 MG tablet  warfarin ANTICOAGULANT (COUMADIN) 5 MG tablet      Review of Systems   Musculoskeletal:        Anterior chest wall pain   All other systems reviewed and are negative.      Physical Exam   BP: (!) 141/85  Pulse: 98  Temp: 99.4  F (37.4  C)  Resp: 18  Height: 188 cm (6' 2\")  Weight: (!) 154.2 kg (340 lb)  SpO2: 95 %      Physical Exam  Vitals and nursing note reviewed.   Constitutional:       General: He is not in acute distress.     Appearance: Normal appearance. He is obese. He is not ill-appearing, toxic-appearing or diaphoretic.   Cardiovascular:      Rate and Rhythm: Normal rate and regular rhythm.      Pulses: Normal pulses.      Heart sounds: Normal heart sounds.   Pulmonary:      Effort: Pulmonary effort is normal.      Breath sounds: Normal breath sounds.      Comments: He has anterior chest wall tenderness  Chest:      Chest wall: Tenderness present.   Skin:     General: Skin is warm and dry.      Findings: No bruising or erythema.   Neurological:      General: No focal deficit present.      Mental Status: He is alert and oriented to person, place, and time.   Psychiatric:         Mood and " "Affect: Mood normal.         Behavior: Behavior normal.         Results for orders placed or performed during the hospital encounter of 06/02/23 (from the past 24 hour(s))   XR Chest Port 1 View    Narrative    Procedure:XR CHEST PORT 1 VIEW    Clinical history:Male, 63 years, anterior chest wall pain after a fall    Technique: Single view was obtained.    Comparison: 5/25/2023    Findings: The cardiac silhouette is enlarged and unchanged. The  pulmonary vasculature is normal.    The lungs demonstrate linear atelectasis/scarring at the left lung  base. There is no distinct evidence of pneumothorax. No apparent rib  fracture. Bony structures are unremarkable.      Impression    Impression:   Minimal atelectasis the left lung base without evidence of an acute  rib fracture or pneumothorax.     Old left rib fractures are similar appearance compared to an earlier  study dated 1/11/2023.    KATHERIN GODFREY MD         SYSTEM ID:  [id number]           Assessments & Plan (with Medical Decision Making)  Babak Moran is a 63 year old male here after a fall. He was walking in his christiano's yard and tripped in the sand. He has had a stroke and has left sided weakness at baseline. He hit the ground on his chest and has anterior chest wall pain. No other injury. VS in the ED BP (!) 141/85   Pulse 98   Temp 99.4  F (37.4  C) (Tympanic)   Resp 18   Ht 1.88 m (6' 2\")   Wt (!) 154.2 kg (340 lb)   SpO2 95%   BMI 43.65 kg/m    Exam shows anterior chest wall pain and tenderness, remainder of exam normal. Chest xray negative.       I have reviewed the nursing notes.    I have reviewed the findings, diagnosis, plan and need for follow up with the patient.     Medical Decision Making  The patient's presentation was of low complexity (an acute and uncomplicated illness or injury).    The patient's evaluation involved:  an assessment requiring an independent historian (see separate area of note for details)  ordering and/or review of 1 " test(s) in this encounter (see separate area of note for details)    The patient's management necessitated only low risk treatment.      Final diagnoses:   Fall, initial encounter   Anterior chest wall pain       6/2/2023   [facility], Rajiv Teran MD  06/02/23 1914

## 2023-06-03 NOTE — DISCHARGE INSTRUCTIONS
Babak    The xrays look okay.  I think this will get better with time.     Thank you for choosing our Emergency Department for your care.     You may receive a phone call or letter for a survey about your care in our ED.  Please complete this as this is how we improve care for our patients.     If you have any questions after leaving the ED you can call or text me on my cell phone at [phone] and I will get back to you at some point. This does not mean that I am on call and if you are not doing well please return to the ED.     Sincerely,    Dr Artur Ann M.D.

## 2023-06-16 ENCOUNTER — OFFICE VISIT (OUTPATIENT)
Dept: FAMILY MEDICINE | Facility: OTHER | Age: 63
End: 2023-06-16
Attending: FAMILY MEDICINE
Payer: COMMERCIAL

## 2023-06-16 ENCOUNTER — ANTICOAGULATION THERAPY VISIT (OUTPATIENT)
Dept: ANTICOAGULATION | Facility: OTHER | Age: 63
End: 2023-06-16
Attending: FAMILY MEDICINE
Payer: COMMERCIAL

## 2023-06-16 VITALS
WEIGHT: 315 LBS | SYSTOLIC BLOOD PRESSURE: 144 MMHG | BODY MASS INDEX: 40.43 KG/M2 | RESPIRATION RATE: 20 BRPM | HEART RATE: 88 BPM | OXYGEN SATURATION: 96 % | TEMPERATURE: 98.4 F | DIASTOLIC BLOOD PRESSURE: 82 MMHG | HEIGHT: 74 IN

## 2023-06-16 DIAGNOSIS — M17.0 PRIMARY OSTEOARTHRITIS OF BOTH KNEES: ICD-10-CM

## 2023-06-16 DIAGNOSIS — I69.354 HEMIPARESIS OF LEFT NONDOMINANT SIDE AS LATE EFFECT OF CEREBRAL INFARCTION (H): ICD-10-CM

## 2023-06-16 DIAGNOSIS — Z01.818 PRE-OP EXAM: Primary | ICD-10-CM

## 2023-06-16 DIAGNOSIS — I48.91 ATRIAL FIBRILLATION WITH RVR (H): Primary | ICD-10-CM

## 2023-06-16 DIAGNOSIS — E66.01 MORBID OBESITY WITH BMI OF 40.0-44.9, ADULT (H): ICD-10-CM

## 2023-06-16 DIAGNOSIS — I48.21 PERMANENT ATRIAL FIBRILLATION (H): ICD-10-CM

## 2023-06-16 DIAGNOSIS — I10 BENIGN ESSENTIAL HYPERTENSION: ICD-10-CM

## 2023-06-16 DIAGNOSIS — Z86.73 HISTORY OF STROKE: ICD-10-CM

## 2023-06-16 DIAGNOSIS — Z79.01 ANTICOAGULATION MONITORING, INR RANGE 2-3: ICD-10-CM

## 2023-06-16 DIAGNOSIS — Z79.01 LONG-TERM (CURRENT) USE OF ANTICOAGULANTS, INR GOAL 2.0-3.0: Chronic | ICD-10-CM

## 2023-06-16 LAB
ATRIAL RATE - MUSE: 96 BPM
DIASTOLIC BLOOD PRESSURE - MUSE: NORMAL MMHG
INR POINT OF CARE: 3.5 (ref 0.9–1.1)
INTERPRETATION ECG - MUSE: NORMAL
P AXIS - MUSE: NORMAL DEGREES
PR INTERVAL - MUSE: NORMAL MS
QRS DURATION - MUSE: 104 MS
QT - MUSE: 390 MS
QTC - MUSE: 477 MS
R AXIS - MUSE: 27 DEGREES
SYSTOLIC BLOOD PRESSURE - MUSE: NORMAL MMHG
T AXIS - MUSE: 8 DEGREES
VENTRICULAR RATE- MUSE: 90 BPM

## 2023-06-16 PROCEDURE — 90471 IMMUNIZATION ADMIN: CPT

## 2023-06-16 PROCEDURE — 36416 COLLJ CAPILLARY BLOOD SPEC: CPT | Mod: ZL

## 2023-06-16 PROCEDURE — 90715 TDAP VACCINE 7 YRS/> IM: CPT

## 2023-06-16 PROCEDURE — G0463 HOSPITAL OUTPT CLINIC VISIT: HCPCS | Mod: 25

## 2023-06-16 PROCEDURE — 93005 ELECTROCARDIOGRAM TRACING: CPT | Performed by: FAMILY MEDICINE

## 2023-06-16 PROCEDURE — 93010 ELECTROCARDIOGRAM REPORT: CPT | Performed by: INTERNAL MEDICINE

## 2023-06-16 PROCEDURE — 99214 OFFICE O/P EST MOD 30 MIN: CPT | Performed by: FAMILY MEDICINE

## 2023-06-16 PROCEDURE — 85610 PROTHROMBIN TIME: CPT | Mod: ZL,QW

## 2023-06-16 RX ORDER — ENOXAPARIN SODIUM 100 MG/ML
120 INJECTION SUBCUTANEOUS 2 TIMES DAILY
Qty: 4.8 ML | Refills: 0 | Status: ON HOLD | OUTPATIENT
Start: 2023-06-23 | End: 2023-06-27

## 2023-06-16 RX ORDER — AMLODIPINE BESYLATE 5 MG/1
5 TABLET ORAL DAILY
Qty: 90 TABLET | Refills: 4 | Status: SHIPPED | OUTPATIENT
Start: 2023-06-16 | End: 2023-09-13

## 2023-06-16 RX ORDER — HYDROCHLOROTHIAZIDE 25 MG/1
25 TABLET ORAL DAILY
Qty: 90 TABLET | Refills: 4 | Status: SHIPPED | OUTPATIENT
Start: 2023-06-16 | End: 2024-08-27

## 2023-06-16 ASSESSMENT — PAIN SCALES - GENERAL: PAINLEVEL: MILD PAIN (3)

## 2023-06-16 NOTE — H&P (VIEW-ONLY)
[facility]  [address]  [place] [address]  Phone: [phone]  Fax: [phone]  Primary Provider: Teo Funes  Pre-op Performing Provider: TEO FUNES      PREOPERATIVE EVALUATION:  Today's date: 6/16/2023    Babak Moran is a 63 year old male who presents for a preoperative evaluation.      5/5/2023    12:50 PM   Additional Questions   Roomed by loren fermin lpn   Accompanied by spouse     Surgical Information:  Surgery/Procedure: Right TKA  Surgery Location: [facility]  Surgeon: Dr. Johnson  Surgery Date: 6-26-23  Time of Surgery: TBD  Where patient plans to recover: At home with family  Fax number for surgical facility: Note does not need to be faxed, will be available electronically in Epic.    Assessment & Plan     The proposed surgical procedure is considered INTERMEDIATE risk.      ICD-10-CM    1. Pre-op exam  Z01.818 EKG 12-lead, tracing only      2. Permanent atrial fibrillation (H)  I48.21       3. Hemiparesis of left nondominant side as late effect of cerebral infarction (H)  I69.354       4. History of stroke  Z86.73       5. Long-term (current) use of anticoagulants, INR goal 2.0-3.0  Z79.01 enoxaparin ANTICOAGULANT (LOVENOX) 100 MG/ML syringe      6. Benign essential hypertension  I10 hydrochlorothiazide (HYDRODIURIL) 25 MG tablet     amLODIPine (NORVASC) 5 MG tablet      7. Primary osteoarthritis of both knees  M17.0       8. Morbid obesity with BMI of 40.0-44.9, adult (H)  E66.01     Z68.41              Risks and Recommendations:  The patient has the following additional risks and recommendations for perioperative complications:   - Morbid obesity (BMI >40)  Cardiovascular:  Not on any rate controlling medicine for atrial fibrillation, but rate has been controlled.  He was bradycardic with use of a beta-blocker.    Antiplatelet or Anticoagulation Medication Instructions:   - warfarin: Thromboembolic risk is moderate (4-10%/year). HOLD warfarin 5 days.  Thromboembolic risk is moderate (4-10%/year). HOLD warfarin 5 days.   - Bridging therapy ordered   Has atrial fibrillation on warfarin.  Has a history of stroke.  He is in the intermediate category for stroke risk when warfarin is held for surgery.  Any bridging is a risk benefit analysis. In the past when he was not taking warfarin consistently for couple weeks, this is when his stroke occurred.  Therefore, we worked out a balanced plan where warfarin would be held 5 days prior to surgery.  After 3 days he will start enoxaparin injections at near therapeutic dosing of 120 mg twice daily. Take for 2 days, hold 1 full day prior to surgery.  Same plan as prior to knee replacement in January 2023.    Additional Medication Instructions:  Hold hydrochlorothiazide morning of surgery    RECOMMENDATION:  APPROVAL GIVEN to proceed with proposed procedure, without further diagnostic evaluation.    Teo Funes MD         Subjective       HPI related to upcoming procedure: 62 year old male with atrial fibrillation and history of stroke here for preop prior to knee replacement.   He did well with left knee replacement Jan 2023    Reduced amlodipine and started hydrochlorothiazide last appointment.  He had labs when he was in the ED a couple weeks ago with a normal BMP.  2 recent falls, no LOC.  Both were tripping incidents.  He has some left hemiparesis due to previous stroke and uses a cane.  Evaluated in the ED, no fractures or head bleed.  At baseline status now.            6/16/2023    10:46 AM   Preop Questions   1. Have you ever had a heart attack or stroke? YES - 2017   2. Have you ever had surgery on your heart or blood vessels, such as a stent placement, a coronary artery bypass, or surgery on an artery in your head, neck, heart, or legs? No   3. Do you have chest pain with activity? No   4. Do you have a history of  heart failure? No   5. Do you currently have a cold, bronchitis or symptoms of other infection? No    6. Do you have a cough, shortness of breath, or wheezing? No   7. Do you or anyone in your family have previous history of blood clots? No   8. Do you or does anyone in your family have a serious bleeding problem such as prolonged bleeding following surgeries or cuts? UNKNOWN -    9. Have you ever had problems with anemia or been told to take iron pills? UNKNOWN -    10. Have you had any abnormal blood loss such as black, tarry or bloody stools? No   11. Have you ever had a blood transfusion? No   12. Are you willing to have a blood transfusion if it is medically needed before, during, or after your surgery? Yes   13. Have you or any of your relatives ever had problems with anesthesia? No   14. Do you have sleep apnea, excessive snoring or daytime drowsiness? No   15. Do you have any artifical heart valves or other implanted medical devices like a pacemaker, defibrillator, or continuous glucose monitor? No   16. Do you have artificial joints? YES - left knee   17. Are you allergic to latex? No     Health Care Directive:  Patient does not have a Health Care Directive or Living Will:     Preoperative Review of :   reviewed - no record of controlled substances prescribed.      Review of Systems  General: Denies general constitutional problems  Cardiovascular: Denies problems  Respiratory: Denies problems     Patient Active Problem List    Diagnosis Date Noted     Anticoagulation monitoring, INR range 2-3 06/16/2023     Priority: Medium     Nail dystrophy 02/23/2023     Priority: Medium     Onychomycosis 02/23/2023     Priority: Medium     Aftercare following knee joint replacement surgery, unspecified laterality 01/09/2023     Priority: Medium     Onychogryphosis 08/27/2022     Priority: Medium     Need for vaccination 04/10/2022     Priority: Medium     Obstructive sleep apnea syndrome 05/13/2019     Priority: Medium     Osteoarthrosis 01/16/2018     Priority: Medium     Tissue plasminogen activator (t-PA)  administered at other facility within 24 hours prior to current admission 01/16/2018     Priority: Medium     Morbid obesity with BMI of 40.0-44.9, adult (H) 02/08/2017     Priority: Medium     Cerebrovascular accident (CVA) due to embolism of right middle cerebral artery (H) 02/03/2017     Priority: Medium     Left hemiparesis (H) 02/03/2017     Priority: Medium     Atrial fibrillation with RVR (H) 06/25/2015     Priority: Medium     Long-term (current) use of anticoagulants, INR goal 2.0-3.0 06/25/2015     Priority: Medium     Family history of coronary artery disease 05/19/2014     Priority: Medium     Ascending aorta enlargement (H) 02/13/2014     Priority: Medium     Gout 01/07/2014     Priority: Medium     Overview:   Overview:   after 3 attacks in < a year, tapped and crystal proven 5/2/13;  Allopurinol started then got ? Atypical side effect, stopped; (short term colchicine prophylaxis)       Overweight 05/02/2013     Priority: Medium     Formatting of this note might be different from the original.  Max weight reported 475 lbs;  5/13 369 lbs       Pain in joint, ankle and foot 05/17/2012     Priority: Medium     Essential hypertension 09/08/2009     Priority: Medium     Cutaneous lupus erythematosus 11/12/2008     Priority: Medium     Systemic lupus erythematosus (H) 09/18/2008     Priority: Medium     Overview:   Dermatitis        Past Medical History:   Diagnosis Date     Atrial fibrillation (H) 02/03/2017    on Warfarin     Bacterial sepsis (H) 8/8/2022     Cellulitis of left lower extremity 9/5/2019     Cerebral infarction (H)      Cerebral infarction due to unspecified occlusion or stenosis of right middle cerebral artery (H) 02/03/2017    ,Left hemiparesis, left neglect, impaired balance and gait following R MCA stroke with mild hemorrhagic transformation s/p tissue plasminogen activator and mechanical thrombectomy, s/p C7 facet fracture from fall off forklift.     Chest pain 02/1997     Disorder of  skin or subcutaneous tissue 07/25/2011     Essential (primary) hypertension 02/1997     Fracture of cervical vertebra (H)     02/03/2017,C7 facet fracture from fall off forklift, nondisplaced     Gout      Hemiplegia affecting left nondominant side (H) 02/03/2017     Obesity 02/03/2017    body mass index of 40     Other local lupus erythematosus      Sepsis (H) 9/6/2019     Past Surgical History:   Procedure Laterality Date     ARTHROPLASTY KNEE Left 1/9/2023    Procedure: ARTHROPLASTY, KNEE, TOTAL;  Surgeon: Elliott Johnson MD;  Location: [facility] OR     ARTHROSCOPY KNEE      bilateral knees     COLONOSCOPY  01/02/2012    Normal; Next due 2022     ORIF WRIST FRACTURE  2019     Current Outpatient Medications   Medication Sig Dispense Refill     acetaminophen (TYLENOL) 325 MG tablet Take 2 tablets (650 mg) by mouth every 4 hours as needed for other (mild pain) 100 tablet 0     acetaminophen (TYLENOL) 500 MG tablet Take 500-1,000 mg by mouth every 6 hours as needed for mild pain       amLODIPine (NORVASC) 5 MG tablet Take 1 tablet (5 mg) by mouth daily 90 tablet 0     hydrochlorothiazide (HYDRODIURIL) 25 MG tablet Take 1 tablet (25 mg) by mouth daily 90 tablet 1     warfarin ANTICOAGULANT (COUMADIN) 2.5 MG tablet Take 2.5 mg by mouth daily With 5 mg tablet on Tuesday, Wednesday, Thursday, Saturday and Sunday       warfarin ANTICOAGULANT (COUMADIN) 5 MG tablet Take 1 tablet (5 mg) by mouth daily 7.5 mg (1.5 tablets) every Tue, Fri; 10 mg (2 tablets) all other days 160 tablet 1       Allergies   Allergen Reactions     Diatrizoate Rash     Ioxaglate Other (See Comments)     Does not recall     Levofloxacin Hives and Rash     Metrizamide Rash     (Diagnostic X-Ray materials)     Probenecid Rash        Social History     Tobacco Use     Smoking status: Never     Passive exposure: Yes     Smokeless tobacco: Never   Vaping Use     Vaping status: Never Used   Substance Use Topics     Alcohol use: Not Currently     Comment: pam  "of times per year     Family History   Problem Relation Age of Onset     Unknown/Adopted Paternal Grandfather         Unknown, around age 67.     Other - See Comments Father         Parkinson's disease Alzheimer's     Cancer Father         Cancer     Heart Disease Maternal Grandmother         Heart Disease,MI in her 60's.     Heart Disease Mother 60        Heart Disease,MI     Other - See Comments Mother         rheumatoid arthritis.     Heart Disease Maternal Uncle 69        Heart Disease     Hypertension Sister         Hypertension     Cancer Sister         Cancer,melanoma     Heart Disease Paternal Uncle         Heart Disease, around 69.     History   Drug Use Unknown         Objective     BP (!) 144/82   Pulse 88   Temp 98.4  F (36.9  C) (Tympanic)   Resp 20   Ht 1.88 m (6' 2\")   Wt (!) 155.6 kg (343 lb)   SpO2 96%   BMI 44.04 kg/m      Physical Exam  General Appearance: Pleasant, alert, appropriate appearance for age. No acute distress  Ear Exam: Normal TM's bilaterally.   OroPharynx Exam: Dental hygiene adequate. Normal buccal mucosa. Normal pharynx. Mallampati 2/4.  Neck Exam: Supple, no masses or nodes.  Chest/Respiratory Exam: Normal chest wall and respirations. Clear to auscultation.  Cardiovascular Exam: Regular rate and rhythm. S1, S2, no murmur, click, gallop, or rubs.  Extremities: 2 + pedal pulses.  No lower extremity edema.  Neurologic Exam: Nonfocal; symmetric DTRs, normal gross motor movement, tone, and coordination. No tremor.   Psychiatric: Normal affect and mentation      Recent Labs   Lab Test 23  2133 23  1317 23  1440 23  0539 08/10/22  0647 22  0434   HGB 15.5  --   --  13.5   < > 13.5     --   --  227   < > 231   INR 2.60* 3.0*   < > 1.39*   < > 2.89*     --   --  138   < > 136   POTASSIUM 4.0  --   --  4.0   < > 3.7   CR 0.76  --   --  0.67   < > 0.80   A1C  --   --   --   --   --  5.8    < > = values in this interval not displayed. "        Diagnostics:  Results for orders placed or performed in visit on 06/16/23   EKG 12-lead, tracing only     Status: None (Preliminary result)   Result Value Ref Range    Systolic Blood Pressure  mmHg    Diastolic Blood Pressure  mmHg    Ventricular Rate 90 BPM    Atrial Rate 96 BPM    IL Interval  ms    QRS Duration 104 ms     ms    QTc 477 ms    P Axis  degrees    R AXIS 27 degrees    T Axis 8 degrees    Interpretation ECG       Atrial fibrillation with premature ventricular or aberrantly conducted complexes  Nonspecific ST abnormality  Abnormal ECG  When compared with ECG of 23-DEC-2022 13:14,  No significant change was found     Results for orders placed or performed in visit on 06/16/23   INR POCT     Status: Abnormal   Result Value Ref Range    INR Point of Care 3.5 (A) 0.9 - 1.1        Revised Cardiac Risk Index (RCRI):  The patient has the following serious cardiovascular risks for perioperative complications:   - Cerebrovascular Disease (TIA or CVA) = 1 point     RCRI Interpretation: 1 point: Class II (low risk - 0.9% complication rate)           Signed Electronically by: Teo Funes MD  Copy of this evaluation report is provided to requesting physician.

## 2023-06-16 NOTE — PROGRESS NOTES
[facility] AND [facility]  [address]  [place] [address]  Phone: [phone]  Fax: [phone]  Primary Provider: Teo Funes  Pre-op Performing Provider: TEO FUNES      PREOPERATIVE EVALUATION:  Today's date: 6/16/2023    Babak Moran is a 63 year old male who presents for a preoperative evaluation.      5/5/2023    12:50 PM   Additional Questions   Roomed by loren fermin lpn   Accompanied by spouse     Surgical Information:  Surgery/Procedure: Right TKA  Surgery Location: [facility]  Surgeon: Dr. Johnson  Surgery Date: 6-26-23  Time of Surgery: TBD  Where patient plans to recover: At home with family  Fax number for surgical facility: Note does not need to be faxed, will be available electronically in Epic.    Assessment & Plan     The proposed surgical procedure is considered INTERMEDIATE risk.      ICD-10-CM    1. Pre-op exam  Z01.818 EKG 12-lead, tracing only      2. Permanent atrial fibrillation (H)  I48.21       3. Hemiparesis of left nondominant side as late effect of cerebral infarction (H)  I69.354       4. History of stroke  Z86.73       5. Long-term (current) use of anticoagulants, INR goal 2.0-3.0  Z79.01 enoxaparin ANTICOAGULANT (LOVENOX) 100 MG/ML syringe      6. Benign essential hypertension  I10 hydrochlorothiazide (HYDRODIURIL) 25 MG tablet     amLODIPine (NORVASC) 5 MG tablet      7. Primary osteoarthritis of both knees  M17.0       8. Morbid obesity with BMI of 40.0-44.9, adult (H)  E66.01     Z68.41              Risks and Recommendations:  The patient has the following additional risks and recommendations for perioperative complications:   - Morbid obesity (BMI >40)  Cardiovascular:  Not on any rate controlling medicine for atrial fibrillation, but rate has been controlled.  He was bradycardic with use of a beta-blocker.    Antiplatelet or Anticoagulation Medication Instructions:   - warfarin: Thromboembolic risk is moderate (4-10%/year). HOLD warfarin 5 days.  Thromboembolic risk is moderate (4-10%/year). HOLD warfarin 5 days.   - Bridging therapy ordered   Has atrial fibrillation on warfarin.  Has a history of stroke.  He is in the intermediate category for stroke risk when warfarin is held for surgery.  Any bridging is a risk benefit analysis. In the past when he was not taking warfarin consistently for couple weeks, this is when his stroke occurred.  Therefore, we worked out a balanced plan where warfarin would be held 5 days prior to surgery.  After 3 days he will start enoxaparin injections at near therapeutic dosing of 120 mg twice daily. Take for 2 days, hold 1 full day prior to surgery.  Same plan as prior to knee replacement in January 2023.    Additional Medication Instructions:  Hold hydrochlorothiazide morning of surgery    RECOMMENDATION:  APPROVAL GIVEN to proceed with proposed procedure, without further diagnostic evaluation.    Teo Funes MD         Subjective       HPI related to upcoming procedure: 62 year old male with atrial fibrillation and history of stroke here for preop prior to knee replacement.   He did well with left knee replacement Jan 2023    Reduced amlodipine and started hydrochlorothiazide last appointment.  He had labs when he was in the ED a couple weeks ago with a normal BMP.  2 recent falls, no LOC.  Both were tripping incidents.  He has some left hemiparesis due to previous stroke and uses a cane.  Evaluated in the ED, no fractures or head bleed.  At baseline status now.            6/16/2023    10:46 AM   Preop Questions   1. Have you ever had a heart attack or stroke? YES - 2017   2. Have you ever had surgery on your heart or blood vessels, such as a stent placement, a coronary artery bypass, or surgery on an artery in your head, neck, heart, or legs? No   3. Do you have chest pain with activity? No   4. Do you have a history of  heart failure? No   5. Do you currently have a cold, bronchitis or symptoms of other infection? No    6. Do you have a cough, shortness of breath, or wheezing? No   7. Do you or anyone in your family have previous history of blood clots? No   8. Do you or does anyone in your family have a serious bleeding problem such as prolonged bleeding following surgeries or cuts? UNKNOWN -    9. Have you ever had problems with anemia or been told to take iron pills? UNKNOWN -    10. Have you had any abnormal blood loss such as black, tarry or bloody stools? No   11. Have you ever had a blood transfusion? No   12. Are you willing to have a blood transfusion if it is medically needed before, during, or after your surgery? Yes   13. Have you or any of your relatives ever had problems with anesthesia? No   14. Do you have sleep apnea, excessive snoring or daytime drowsiness? No   15. Do you have any artifical heart valves or other implanted medical devices like a pacemaker, defibrillator, or continuous glucose monitor? No   16. Do you have artificial joints? YES - left knee   17. Are you allergic to latex? No     Health Care Directive:  Patient does not have a Health Care Directive or Living Will:     Preoperative Review of :   reviewed - no record of controlled substances prescribed.      Review of Systems  General: Denies general constitutional problems  Cardiovascular: Denies problems  Respiratory: Denies problems     Patient Active Problem List    Diagnosis Date Noted     Anticoagulation monitoring, INR range 2-3 06/16/2023     Priority: Medium     Nail dystrophy 02/23/2023     Priority: Medium     Onychomycosis 02/23/2023     Priority: Medium     Aftercare following knee joint replacement surgery, unspecified laterality 01/09/2023     Priority: Medium     Onychogryphosis 08/27/2022     Priority: Medium     Need for vaccination 04/10/2022     Priority: Medium     Obstructive sleep apnea syndrome 05/13/2019     Priority: Medium     Osteoarthrosis 01/16/2018     Priority: Medium     Tissue plasminogen activator (t-PA)  administered at other facility within 24 hours prior to current admission 01/16/2018     Priority: Medium     Morbid obesity with BMI of 40.0-44.9, adult (H) 02/08/2017     Priority: Medium     Cerebrovascular accident (CVA) due to embolism of right middle cerebral artery (H) 02/03/2017     Priority: Medium     Left hemiparesis (H) 02/03/2017     Priority: Medium     Atrial fibrillation with RVR (H) 06/25/2015     Priority: Medium     Long-term (current) use of anticoagulants, INR goal 2.0-3.0 06/25/2015     Priority: Medium     Family history of coronary artery disease 05/19/2014     Priority: Medium     Ascending aorta enlargement (H) 02/13/2014     Priority: Medium     Gout 01/07/2014     Priority: Medium     Overview:   Overview:   after 3 attacks in < a year, tapped and crystal proven 5/2/13;  Allopurinol started then got ? Atypical side effect, stopped; (short term colchicine prophylaxis)       Overweight 05/02/2013     Priority: Medium     Formatting of this note might be different from the original.  Max weight reported 475 lbs;  5/13 369 lbs       Pain in joint, ankle and foot 05/17/2012     Priority: Medium     Essential hypertension 09/08/2009     Priority: Medium     Cutaneous lupus erythematosus 11/12/2008     Priority: Medium     Systemic lupus erythematosus (H) 09/18/2008     Priority: Medium     Overview:   Dermatitis        Past Medical History:   Diagnosis Date     Atrial fibrillation (H) 02/03/2017    on Warfarin     Bacterial sepsis (H) 8/8/2022     Cellulitis of left lower extremity 9/5/2019     Cerebral infarction (H)      Cerebral infarction due to unspecified occlusion or stenosis of right middle cerebral artery (H) 02/03/2017    ,Left hemiparesis, left neglect, impaired balance and gait following R MCA stroke with mild hemorrhagic transformation s/p tissue plasminogen activator and mechanical thrombectomy, s/p C7 facet fracture from fall off forklift.     Chest pain 02/1997     Disorder of  skin or subcutaneous tissue 07/25/2011     Essential (primary) hypertension 02/1997     Fracture of cervical vertebra (H)     02/03/2017,C7 facet fracture from fall off forklift, nondisplaced     Gout      Hemiplegia affecting left nondominant side (H) 02/03/2017     Obesity 02/03/2017    body mass index of 40     Other local lupus erythematosus      Sepsis (H) 9/6/2019     Past Surgical History:   Procedure Laterality Date     ARTHROPLASTY KNEE Left 1/9/2023    Procedure: ARTHROPLASTY, KNEE, TOTAL;  Surgeon: Elliott Johnson MD;  Location: [facility] OR     ARTHROSCOPY KNEE      bilateral knees     COLONOSCOPY  01/02/2012    Normal; Next due 2022     ORIF WRIST FRACTURE  2019     Current Outpatient Medications   Medication Sig Dispense Refill     acetaminophen (TYLENOL) 325 MG tablet Take 2 tablets (650 mg) by mouth every 4 hours as needed for other (mild pain) 100 tablet 0     acetaminophen (TYLENOL) 500 MG tablet Take 500-1,000 mg by mouth every 6 hours as needed for mild pain       amLODIPine (NORVASC) 5 MG tablet Take 1 tablet (5 mg) by mouth daily 90 tablet 0     hydrochlorothiazide (HYDRODIURIL) 25 MG tablet Take 1 tablet (25 mg) by mouth daily 90 tablet 1     warfarin ANTICOAGULANT (COUMADIN) 2.5 MG tablet Take 2.5 mg by mouth daily With 5 mg tablet on Tuesday, Wednesday, Thursday, Saturday and Sunday       warfarin ANTICOAGULANT (COUMADIN) 5 MG tablet Take 1 tablet (5 mg) by mouth daily 7.5 mg (1.5 tablets) every Tue, Fri; 10 mg (2 tablets) all other days 160 tablet 1       Allergies   Allergen Reactions     Diatrizoate Rash     Ioxaglate Other (See Comments)     Does not recall     Levofloxacin Hives and Rash     Metrizamide Rash     (Diagnostic X-Ray materials)     Probenecid Rash        Social History     Tobacco Use     Smoking status: Never     Passive exposure: Yes     Smokeless tobacco: Never   Vaping Use     Vaping status: Never Used   Substance Use Topics     Alcohol use: Not Currently     Comment: pam  "of times per year     Family History   Problem Relation Age of Onset     Unknown/Adopted Paternal Grandfather         Unknown, around age 67.     Other - See Comments Father         Parkinson's disease Alzheimer's     Cancer Father         Cancer     Heart Disease Maternal Grandmother         Heart Disease,MI in her 60's.     Heart Disease Mother 60        Heart Disease,MI     Other - See Comments Mother         rheumatoid arthritis.     Heart Disease Maternal Uncle 69        Heart Disease     Hypertension Sister         Hypertension     Cancer Sister         Cancer,melanoma     Heart Disease Paternal Uncle         Heart Disease, around 69.     History   Drug Use Unknown         Objective     BP (!) 144/82   Pulse 88   Temp 98.4  F (36.9  C) (Tympanic)   Resp 20   Ht 1.88 m (6' 2\")   Wt (!) 155.6 kg (343 lb)   SpO2 96%   BMI 44.04 kg/m      Physical Exam  General Appearance: Pleasant, alert, appropriate appearance for age. No acute distress  Ear Exam: Normal TM's bilaterally.   OroPharynx Exam: Dental hygiene adequate. Normal buccal mucosa. Normal pharynx. Mallampati 2/4.  Neck Exam: Supple, no masses or nodes.  Chest/Respiratory Exam: Normal chest wall and respirations. Clear to auscultation.  Cardiovascular Exam: Regular rate and rhythm. S1, S2, no murmur, click, gallop, or rubs.  Extremities: 2 + pedal pulses.  No lower extremity edema.  Neurologic Exam: Nonfocal; symmetric DTRs, normal gross motor movement, tone, and coordination. No tremor.   Psychiatric: Normal affect and mentation      Recent Labs   Lab Test 23  2133 23  1317 23  1440 23  0539 08/10/22  0647 22  0434   HGB 15.5  --   --  13.5   < > 13.5     --   --  227   < > 231   INR 2.60* 3.0*   < > 1.39*   < > 2.89*     --   --  138   < > 136   POTASSIUM 4.0  --   --  4.0   < > 3.7   CR 0.76  --   --  0.67   < > 0.80   A1C  --   --   --   --   --  5.8    < > = values in this interval not displayed. "        Diagnostics:  Results for orders placed or performed in visit on 06/16/23   EKG 12-lead, tracing only     Status: None (Preliminary result)   Result Value Ref Range    Systolic Blood Pressure  mmHg    Diastolic Blood Pressure  mmHg    Ventricular Rate 90 BPM    Atrial Rate 96 BPM    AK Interval  ms    QRS Duration 104 ms     ms    QTc 477 ms    P Axis  degrees    R AXIS 27 degrees    T Axis 8 degrees    Interpretation ECG       Atrial fibrillation with premature ventricular or aberrantly conducted complexes  Nonspecific ST abnormality  Abnormal ECG  When compared with ECG of 23-DEC-2022 13:14,  No significant change was found     Results for orders placed or performed in visit on 06/16/23   INR POCT     Status: Abnormal   Result Value Ref Range    INR Point of Care 3.5 (A) 0.9 - 1.1        Revised Cardiac Risk Index (RCRI):  The patient has the following serious cardiovascular risks for perioperative complications:   - Cerebrovascular Disease (TIA or CVA) = 1 point     RCRI Interpretation: 1 point: Class II (low risk - 0.9% complication rate)           Signed Electronically by: Teo Funes MD  Copy of this evaluation report is provided to requesting physician.

## 2023-06-16 NOTE — NURSING NOTE
"Patient presents to the clinic for pre-op.    FOOD SECURITY SCREENING QUESTIONS:    The next two questions are to help us understand your food security.  If you are feeling you need any assistance in this area, we have resources available to support you today.    Hunger Vital Signs:  Within the past 12 months we worried whether our food would run out before we got money to buy more. Never  Within the past 12 months the food we bought just didn't last and we didn't have money to get more. Never    Advance Care Directive on file? no  Advance Care Directive provided to patient? Declined.      Chief Complaint   Patient presents with     Pre-Op Exam       Initial BP (!) 144/82   Pulse 88   Temp 98.4  F (36.9  C) (Tympanic)   Resp 20   Ht 1.88 m (6' 2\")   Wt (!) 155.6 kg (343 lb)   SpO2 96%   BMI 44.04 kg/m   Estimated body mass index is 44.04 kg/m  as calculated from the following:    Height as of this encounter: 1.88 m (6' 2\").    Weight as of this encounter: 155.6 kg (343 lb).  Medication Reconciliation: complete        Oneida Malone LPN       " 08-Oct-2021 09:00

## 2023-06-16 NOTE — PATIENT INSTRUCTIONS
Stop warfarin June 21  INR check June 23 and if elevated over 2.8 nurse will need to come up with a different plan.   Take Lovenox shots Friday and Saturday  No shots Sunday  Surgery Monday June 26    Continue amlodipine  No hydrochlorothiazide morning of surgery

## 2023-06-16 NOTE — PROGRESS NOTES
ANTICOAGULATION MANAGEMENT     Babak Moran 63 year old male is on warfarin with supratherapeutic INR result. (Goal INR 2.0-3.0)    Recent labs: (last 7 days)     06/16/23  1033   INR 3.5*       ASSESSMENT       Source(s): Chart Review and Patient/Caregiver Call       Warfarin doses taken: Warfarin taken as instructed    Diet: No new diet changes identified    Medication/supplement changes: taking tylenol    New illness, injury, or hospitalization: Yes: Ed for falls, 5/25 and 6/2    Signs or symptoms of bleeding or clotting: No    Previous result: Therapeutic last 2(+) visits    Additional findings: Upcoming surgery/procedure having knee surgery on 6/26 preop visit today will discuss holding warfarin and bridging         PLAN     Recommended plan for temporary change(s) affecting INR     Dosing Instructions: partial hold then continue your current warfarin dose with next INR in 1 week       Summary  As of 6/16/2023    Full warfarin instructions:  6/16: 5 mg; 6/21: Hold; 6/22: Hold; 6/23: Hold; 6/24: Hold; 6/25: Hold; Otherwise 7.5 mg every Tue, Fri; 10 mg all other days   Next INR check:  6/23/2023             in person    Patient elected to schedule next visit 6/23    Education provided:     Written instructions provided    Plan made per [facility] anticoagulation protocol    Shelley Mercado RN  Anticoagulation Clinic  6/16/2023    _______________________________________________________________________     Anticoagulation Episode Summary     Current INR goal:  2.0-3.0   TTR:  64.2 % (11.8 mo)   Target end date:  Indefinite   Send INR reminders to:  [facility]    Indications    Unspecified atrial fibrillation (Resolved) [I48.91]  Anticoagulation monitoring  INR range 2-3 (Resolved) [Z79.01]  Atrial fibrillation with RVR (H) [I48.91]  Anticoagulation monitoring  INR range 2-3 [Z79.01]           Comments:  Babak prefers to be closer to 3.0 d/t previous CVA check Q 4 weeks.Declines to reduce dose when slightly over  3.0  Needs to change PCP  Takes a long time get up to therapeutic after holding warfarin.         Anticoagulation Care Providers     Provider Role Specialty Phone number    Teo Funes MD Referring Family Medicine [phone]

## 2023-06-23 ENCOUNTER — ANESTHESIA EVENT (OUTPATIENT)
Dept: SURGERY | Facility: OTHER | Age: 63
End: 2023-06-23
Payer: COMMERCIAL

## 2023-06-23 ENCOUNTER — ANTICOAGULATION THERAPY VISIT (OUTPATIENT)
Dept: ANTICOAGULATION | Facility: OTHER | Age: 63
End: 2023-06-23
Attending: FAMILY MEDICINE
Payer: COMMERCIAL

## 2023-06-23 DIAGNOSIS — Z79.01 ANTICOAGULATION MONITORING, INR RANGE 2-3: ICD-10-CM

## 2023-06-23 DIAGNOSIS — I48.91 ATRIAL FIBRILLATION WITH RVR (H): Primary | ICD-10-CM

## 2023-06-23 LAB — INR POINT OF CARE: 1.8 (ref 0.9–1.1)

## 2023-06-23 PROCEDURE — 85610 PROTHROMBIN TIME: CPT | Mod: ZL,QW

## 2023-06-23 PROCEDURE — 36416 COLLJ CAPILLARY BLOOD SPEC: CPT | Mod: ZL

## 2023-06-23 NOTE — PROGRESS NOTES
ANTICOAGULATION MANAGEMENT     Babak Moran 63 year old male is on warfarin with subtherapeutic INR result. (Goal INR 2.0-3.0)    Recent labs: (last 7 days)     06/23/23  1242   INR 1.8*       ASSESSMENT       Source(s): Chart Review and In person       Warfarin doses taken: Held for knee replacement on 6/26/23- holding warfarin X 5 days with lovenox bridging.   recently which may be affecting INR    Diet: No new diet changes identified    New illness, injury, or hospitalization: No    Medication/supplement changes: None noted    Signs or symptoms of bleeding or clotting: No    Previous INR: Supratherapeutic    Additional findings: Upcoming surgery/procedure 6/26/23- Patient will be inpatient for 1 night then go to [facility] for 2 weeks.        PLAN     Recommended plan for no diet, medication or health factor changes affecting INR     Dosing Instructions: Continue your current warfarin dose with next INR in 2 weeks       Summary  As of 6/23/2023    Full warfarin instructions:  6/23: Hold; 6/24: Hold; 6/25: Hold; Otherwise 7.5 mg every Tue, Fri; 10 mg all other days   Next INR check:  7/3/2023             Should be done at [facility] for the 2 weeks he is there    Lab visit scheduled    Education provided: Please call back if any changes to your diet, medications or how you've been taking warfarin    Plan made per [facility] anticoagulation protocol    Nikki Aguilar, RN  Anticoagulation Clinic  6/23/2023    _______________________________________________________________________     Anticoagulation Episode Summary     Current INR goal:  2.0-3.0   TTR:  63.4 % (11.8 mo)   Target end date:  Indefinite   Send INR reminders to:  [facility]    Indications    Unspecified atrial fibrillation (Resolved) [I48.91]  Anticoagulation monitoring  INR range 2-3 (Resolved) [Z79.01]  Atrial fibrillation with RVR (H) [I48.91]  Anticoagulation monitoring  INR range 2-3 [Z79.01]           Comments:  Babak prefers to  be closer to 3.0 d/t previous CVA check Q 4 weeks.Declines to reduce dose when slightly over 3.0  Needs to change PCP  Takes a long time get up to therapeutic after holding warfarin.         Anticoagulation Care Providers     Provider Role Specialty Phone number    Teo Funes MD Referring Family Medicine [phone]

## 2023-06-26 ENCOUNTER — ANESTHESIA (OUTPATIENT)
Dept: SURGERY | Facility: OTHER | Age: 63
End: 2023-06-26
Payer: COMMERCIAL

## 2023-06-26 ENCOUNTER — APPOINTMENT (OUTPATIENT)
Dept: PHYSICAL THERAPY | Facility: OTHER | Age: 63
End: 2023-06-26
Attending: ORTHOPAEDIC SURGERY
Payer: COMMERCIAL

## 2023-06-26 ENCOUNTER — APPOINTMENT (OUTPATIENT)
Dept: GENERAL RADIOLOGY | Facility: OTHER | Age: 63
End: 2023-06-26
Attending: ORTHOPAEDIC SURGERY
Payer: COMMERCIAL

## 2023-06-26 ENCOUNTER — HOSPITAL ENCOUNTER (OUTPATIENT)
Facility: OTHER | Age: 63
Discharge: NURSING, REHAB OR LONG-TERM CARE | End: 2023-06-27
Attending: ORTHOPAEDIC SURGERY | Admitting: ORTHOPAEDIC SURGERY
Payer: COMMERCIAL

## 2023-06-26 DIAGNOSIS — I48.91 ATRIAL FIBRILLATION WITH RVR (H): ICD-10-CM

## 2023-06-26 DIAGNOSIS — Z79.01 LONG-TERM (CURRENT) USE OF ANTICOAGULANTS, INR GOAL 2.0-3.0: Chronic | ICD-10-CM

## 2023-06-26 DIAGNOSIS — Z96.651 S/P TOTAL KNEE ARTHROPLASTY, RIGHT: Primary | ICD-10-CM

## 2023-06-26 PROBLEM — Z96.659 S/P TOTAL KNEE ARTHROPLASTY: Status: ACTIVE | Noted: 2023-06-26

## 2023-06-26 PROBLEM — I48.21 PERMANENT ATRIAL FIBRILLATION (H): Status: ACTIVE | Noted: 2018-02-11

## 2023-06-26 LAB
GLUCOSE BLDC GLUCOMTR-MCNC: 99 MG/DL (ref 70–99)
HOLD SPECIMEN: NORMAL
HOLD SPECIMEN: NORMAL
INR PPP: 1.15 (ref 0.85–1.15)

## 2023-06-26 PROCEDURE — 250N000011 HC RX IP 250 OP 636: Mod: JZ | Performed by: NURSE ANESTHETIST, CERTIFIED REGISTERED

## 2023-06-26 PROCEDURE — C1776 JOINT DEVICE (IMPLANTABLE): HCPCS | Performed by: ORTHOPAEDIC SURGERY

## 2023-06-26 PROCEDURE — 250N000013 HC RX MED GY IP 250 OP 250 PS 637: Performed by: ORTHOPAEDIC SURGERY

## 2023-06-26 PROCEDURE — 250N000011 HC RX IP 250 OP 636: Mod: JZ | Performed by: ORTHOPAEDIC SURGERY

## 2023-06-26 PROCEDURE — 97161 PT EVAL LOW COMPLEX 20 MIN: CPT | Mod: GP

## 2023-06-26 PROCEDURE — 360N000077 HC SURGERY LEVEL 4, PER MIN: Performed by: ORTHOPAEDIC SURGERY

## 2023-06-26 PROCEDURE — 272N000001 HC OR GENERAL SUPPLY STERILE: Performed by: ORTHOPAEDIC SURGERY

## 2023-06-26 PROCEDURE — 96374 THER/PROPH/DIAG INJ IV PUSH: CPT | Mod: XU

## 2023-06-26 PROCEDURE — 250N000011 HC RX IP 250 OP 636: Mod: JZ

## 2023-06-26 PROCEDURE — 250N000009 HC RX 250: Performed by: NURSE ANESTHETIST, CERTIFIED REGISTERED

## 2023-06-26 PROCEDURE — 36415 COLL VENOUS BLD VENIPUNCTURE: CPT

## 2023-06-26 PROCEDURE — 710N000010 HC RECOVERY PHASE 1, LEVEL 2, PER MIN: Performed by: ORTHOPAEDIC SURGERY

## 2023-06-26 PROCEDURE — 370N000017 HC ANESTHESIA TECHNICAL FEE, PER MIN: Performed by: ORTHOPAEDIC SURGERY

## 2023-06-26 PROCEDURE — 27447 TOTAL KNEE ARTHROPLASTY: CPT | Mod: RT | Performed by: ORTHOPAEDIC SURGERY

## 2023-06-26 PROCEDURE — 999N000065 XR KNEE PORT RIGHT 1/2 VIEWS: Mod: RT

## 2023-06-26 PROCEDURE — 250N000026 HC DESFLURANE, PER MIN: Performed by: ORTHOPAEDIC SURGERY

## 2023-06-26 PROCEDURE — 250N000025 HC SEVOFLURANE, PER MIN: Performed by: ORTHOPAEDIC SURGERY

## 2023-06-26 PROCEDURE — 97116 GAIT TRAINING THERAPY: CPT | Mod: GP

## 2023-06-26 PROCEDURE — 64447 NJX AA&/STRD FEMORAL NRV IMG: CPT | Mod: RT

## 2023-06-26 PROCEDURE — 97530 THERAPEUTIC ACTIVITIES: CPT | Mod: GP

## 2023-06-26 PROCEDURE — 258N000003 HC RX IP 258 OP 636: Performed by: NURSE ANESTHETIST, CERTIFIED REGISTERED

## 2023-06-26 PROCEDURE — 258N000001 HC RX 258: Performed by: ORTHOPAEDIC SURGERY

## 2023-06-26 PROCEDURE — 64450 NJX AA&/STRD OTHER PN/BRANCH: CPT | Mod: RT

## 2023-06-26 PROCEDURE — 27447 TOTAL KNEE ARTHROPLASTY: CPT | Performed by: NURSE ANESTHETIST, CERTIFIED REGISTERED

## 2023-06-26 PROCEDURE — 250N000011 HC RX IP 250 OP 636

## 2023-06-26 PROCEDURE — 250N000009 HC RX 250

## 2023-06-26 PROCEDURE — 82962 GLUCOSE BLOOD TEST: CPT

## 2023-06-26 PROCEDURE — 99213 OFFICE O/P EST LOW 20 MIN: CPT | Mod: 25 | Performed by: FAMILY MEDICINE

## 2023-06-26 PROCEDURE — 999N000141 HC STATISTIC PRE-PROCEDURE NURSING ASSESSMENT: Performed by: ORTHOPAEDIC SURGERY

## 2023-06-26 PROCEDURE — 250N000011 HC RX IP 250 OP 636: Performed by: NURSE ANESTHETIST, CERTIFIED REGISTERED

## 2023-06-26 PROCEDURE — 258N000003 HC RX IP 258 OP 636: Performed by: ORTHOPAEDIC SURGERY

## 2023-06-26 PROCEDURE — 85610 PROTHROMBIN TIME: CPT

## 2023-06-26 DEVICE — TIBIAL COMPONENT
Type: IMPLANTABLE DEVICE | Site: KNEE | Status: FUNCTIONAL
Brand: TRIATHLON

## 2023-06-26 DEVICE — CRUCIATE RETAINING FEMORAL
Type: IMPLANTABLE DEVICE | Site: KNEE | Status: FUNCTIONAL
Brand: TRIATHLON

## 2023-06-26 DEVICE — TIBIAL BEARING INSERT - CS
Type: IMPLANTABLE DEVICE | Site: KNEE | Status: FUNCTIONAL
Brand: TRIATHLON

## 2023-06-26 RX ORDER — BISACODYL 10 MG
10 SUPPOSITORY, RECTAL RECTAL DAILY PRN
Status: DISCONTINUED | OUTPATIENT
Start: 2023-06-26 | End: 2023-06-27 | Stop reason: HOSPADM

## 2023-06-26 RX ORDER — LIDOCAINE HYDROCHLORIDE 20 MG/ML
INJECTION, SOLUTION INFILTRATION; PERINEURAL PRN
Status: DISCONTINUED | OUTPATIENT
Start: 2023-06-26 | End: 2023-06-26

## 2023-06-26 RX ORDER — BUPIVACAINE HYDROCHLORIDE 5 MG/ML
INJECTION, SOLUTION EPIDURAL; INTRACAUDAL PRN
Status: DISCONTINUED | OUTPATIENT
Start: 2023-06-26 | End: 2023-06-26

## 2023-06-26 RX ORDER — FENTANYL CITRATE 50 UG/ML
INJECTION, SOLUTION INTRAMUSCULAR; INTRAVENOUS PRN
Status: DISCONTINUED | OUTPATIENT
Start: 2023-06-26 | End: 2023-06-26

## 2023-06-26 RX ORDER — SODIUM CHLORIDE, SODIUM LACTATE, POTASSIUM CHLORIDE, CALCIUM CHLORIDE 600; 310; 30; 20 MG/100ML; MG/100ML; MG/100ML; MG/100ML
INJECTION, SOLUTION INTRAVENOUS CONTINUOUS
Status: DISCONTINUED | OUTPATIENT
Start: 2023-06-26 | End: 2023-06-26 | Stop reason: HOSPADM

## 2023-06-26 RX ORDER — ONDANSETRON 2 MG/ML
4 INJECTION INTRAMUSCULAR; INTRAVENOUS EVERY 6 HOURS PRN
Status: DISCONTINUED | OUTPATIENT
Start: 2023-06-26 | End: 2023-06-27 | Stop reason: HOSPADM

## 2023-06-26 RX ORDER — NALOXONE HYDROCHLORIDE 0.4 MG/ML
0.4 INJECTION, SOLUTION INTRAMUSCULAR; INTRAVENOUS; SUBCUTANEOUS
Status: DISCONTINUED | OUTPATIENT
Start: 2023-06-26 | End: 2023-06-27 | Stop reason: HOSPADM

## 2023-06-26 RX ORDER — PROPOFOL 10 MG/ML
INJECTION, EMULSION INTRAVENOUS PRN
Status: DISCONTINUED | OUTPATIENT
Start: 2023-06-26 | End: 2023-06-26

## 2023-06-26 RX ORDER — DEXMEDETOMIDINE HYDROCHLORIDE 4 UG/ML
INJECTION, SOLUTION INTRAVENOUS PRN
Status: DISCONTINUED | OUTPATIENT
Start: 2023-06-26 | End: 2023-06-26

## 2023-06-26 RX ORDER — DEXAMETHASONE SODIUM PHOSPHATE 10 MG/ML
INJECTION, SOLUTION INTRAMUSCULAR; INTRAVENOUS PRN
Status: DISCONTINUED | OUTPATIENT
Start: 2023-06-26 | End: 2023-06-26

## 2023-06-26 RX ORDER — HYDROMORPHONE HCL IN WATER/PF 6 MG/30 ML
0.2 PATIENT CONTROLLED ANALGESIA SYRINGE INTRAVENOUS
Status: DISCONTINUED | OUTPATIENT
Start: 2023-06-26 | End: 2023-06-27 | Stop reason: HOSPADM

## 2023-06-26 RX ORDER — IBUPROFEN 600 MG/1
600 TABLET, FILM COATED ORAL EVERY 6 HOURS PRN
Status: DISCONTINUED | OUTPATIENT
Start: 2023-06-26 | End: 2023-06-27 | Stop reason: HOSPADM

## 2023-06-26 RX ORDER — OXYCODONE HYDROCHLORIDE 5 MG/1
10 TABLET ORAL EVERY 4 HOURS PRN
Status: DISCONTINUED | OUTPATIENT
Start: 2023-06-26 | End: 2023-06-27 | Stop reason: HOSPADM

## 2023-06-26 RX ORDER — HYDROCHLOROTHIAZIDE 25 MG/1
25 TABLET ORAL DAILY
Status: DISCONTINUED | OUTPATIENT
Start: 2023-06-26 | End: 2023-06-27 | Stop reason: HOSPADM

## 2023-06-26 RX ORDER — ACETAMINOPHEN 325 MG/1
650 TABLET ORAL EVERY 4 HOURS PRN
Qty: 100 TABLET | Refills: 0 | Status: SHIPPED | OUTPATIENT
Start: 2023-06-26 | End: 2024-09-23

## 2023-06-26 RX ORDER — CEFAZOLIN SODIUM 2 G/100ML
2 INJECTION, SOLUTION INTRAVENOUS EVERY 8 HOURS
Status: COMPLETED | OUTPATIENT
Start: 2023-06-26 | End: 2023-06-27

## 2023-06-26 RX ORDER — PROCHLORPERAZINE MALEATE 10 MG
10 TABLET ORAL EVERY 6 HOURS PRN
Status: DISCONTINUED | OUTPATIENT
Start: 2023-06-26 | End: 2023-06-27 | Stop reason: HOSPADM

## 2023-06-26 RX ORDER — HYDROMORPHONE HCL IN WATER/PF 6 MG/30 ML
0.4 PATIENT CONTROLLED ANALGESIA SYRINGE INTRAVENOUS EVERY 5 MIN PRN
Status: DISCONTINUED | OUTPATIENT
Start: 2023-06-26 | End: 2023-06-26 | Stop reason: HOSPADM

## 2023-06-26 RX ORDER — AMLODIPINE BESYLATE 5 MG/1
5 TABLET ORAL DAILY
Status: DISCONTINUED | OUTPATIENT
Start: 2023-06-27 | End: 2023-06-27 | Stop reason: HOSPADM

## 2023-06-26 RX ORDER — ENOXAPARIN SODIUM 100 MG/ML
120 INJECTION SUBCUTANEOUS 2 TIMES DAILY
Status: DISCONTINUED | OUTPATIENT
Start: 2023-06-27 | End: 2023-06-27 | Stop reason: HOSPADM

## 2023-06-26 RX ORDER — ENOXAPARIN SODIUM 100 MG/ML
80 INJECTION SUBCUTANEOUS
Status: ON HOLD | COMMUNITY
End: 2023-06-26

## 2023-06-26 RX ORDER — AMOXICILLIN 250 MG
1 CAPSULE ORAL 2 TIMES DAILY
Status: DISCONTINUED | OUTPATIENT
Start: 2023-06-26 | End: 2023-06-27 | Stop reason: HOSPADM

## 2023-06-26 RX ORDER — ONDANSETRON 2 MG/ML
INJECTION INTRAMUSCULAR; INTRAVENOUS PRN
Status: DISCONTINUED | OUTPATIENT
Start: 2023-06-26 | End: 2023-06-26

## 2023-06-26 RX ORDER — FENTANYL CITRATE 50 UG/ML
25 INJECTION, SOLUTION INTRAMUSCULAR; INTRAVENOUS EVERY 5 MIN PRN
Status: DISCONTINUED | OUTPATIENT
Start: 2023-06-26 | End: 2023-06-26 | Stop reason: HOSPADM

## 2023-06-26 RX ORDER — WARFARIN SODIUM 5 MG/1
15 TABLET ORAL
Status: COMPLETED | OUTPATIENT
Start: 2023-06-26 | End: 2023-06-26

## 2023-06-26 RX ORDER — ONDANSETRON 4 MG/1
4 TABLET, ORALLY DISINTEGRATING ORAL EVERY 30 MIN PRN
Status: DISCONTINUED | OUTPATIENT
Start: 2023-06-26 | End: 2023-06-26 | Stop reason: HOSPADM

## 2023-06-26 RX ORDER — KETOROLAC TROMETHAMINE 30 MG/ML
INJECTION, SOLUTION INTRAMUSCULAR; INTRAVENOUS PRN
Status: DISCONTINUED | OUTPATIENT
Start: 2023-06-26 | End: 2023-06-26

## 2023-06-26 RX ORDER — OXYCODONE HYDROCHLORIDE 5 MG/1
5 TABLET ORAL EVERY 4 HOURS PRN
Status: DISCONTINUED | OUTPATIENT
Start: 2023-06-26 | End: 2023-06-27 | Stop reason: HOSPADM

## 2023-06-26 RX ORDER — LIDOCAINE 40 MG/G
CREAM TOPICAL
Status: DISCONTINUED | OUTPATIENT
Start: 2023-06-26 | End: 2023-06-26 | Stop reason: HOSPADM

## 2023-06-26 RX ORDER — HYDROMORPHONE HCL IN WATER/PF 6 MG/30 ML
0.2 PATIENT CONTROLLED ANALGESIA SYRINGE INTRAVENOUS EVERY 5 MIN PRN
Status: DISCONTINUED | OUTPATIENT
Start: 2023-06-26 | End: 2023-06-26 | Stop reason: HOSPADM

## 2023-06-26 RX ORDER — SODIUM CHLORIDE, SODIUM LACTATE, POTASSIUM CHLORIDE, CALCIUM CHLORIDE 600; 310; 30; 20 MG/100ML; MG/100ML; MG/100ML; MG/100ML
INJECTION, SOLUTION INTRAVENOUS CONTINUOUS
Status: DISCONTINUED | OUTPATIENT
Start: 2023-06-26 | End: 2023-06-27

## 2023-06-26 RX ORDER — CEFAZOLIN SODIUM/WATER 3 G/30 ML
3 SYRINGE (ML) INTRAVENOUS SEE ADMIN INSTRUCTIONS
Status: DISCONTINUED | OUTPATIENT
Start: 2023-06-26 | End: 2023-06-26 | Stop reason: HOSPADM

## 2023-06-26 RX ORDER — CEFAZOLIN SODIUM/WATER 3 G/30 ML
3 SYRINGE (ML) INTRAVENOUS
Status: COMPLETED | OUTPATIENT
Start: 2023-06-26 | End: 2023-06-26

## 2023-06-26 RX ORDER — WARFARIN SODIUM 2.5 MG/1
2.5 TABLET ORAL DAILY
Status: DISCONTINUED | OUTPATIENT
Start: 2023-06-26 | End: 2023-06-26 | Stop reason: DRUGHIGH

## 2023-06-26 RX ORDER — LIDOCAINE 40 MG/G
CREAM TOPICAL
Status: DISCONTINUED | OUTPATIENT
Start: 2023-06-26 | End: 2023-06-27 | Stop reason: HOSPADM

## 2023-06-26 RX ORDER — ONDANSETRON 4 MG/1
4 TABLET, ORALLY DISINTEGRATING ORAL EVERY 6 HOURS PRN
Status: DISCONTINUED | OUTPATIENT
Start: 2023-06-26 | End: 2023-06-27 | Stop reason: HOSPADM

## 2023-06-26 RX ORDER — HYDROCODONE BITARTRATE AND ACETAMINOPHEN 5; 325 MG/1; MG/1
1-2 TABLET ORAL EVERY 4 HOURS PRN
Qty: 40 TABLET | Refills: 0 | Status: SHIPPED | OUTPATIENT
Start: 2023-06-26 | End: 2023-07-31

## 2023-06-26 RX ORDER — HYDROMORPHONE HCL IN WATER/PF 6 MG/30 ML
0.4 PATIENT CONTROLLED ANALGESIA SYRINGE INTRAVENOUS
Status: DISCONTINUED | OUTPATIENT
Start: 2023-06-26 | End: 2023-06-27 | Stop reason: HOSPADM

## 2023-06-26 RX ORDER — ONDANSETRON 2 MG/ML
4 INJECTION INTRAMUSCULAR; INTRAVENOUS EVERY 30 MIN PRN
Status: DISCONTINUED | OUTPATIENT
Start: 2023-06-26 | End: 2023-06-26 | Stop reason: HOSPADM

## 2023-06-26 RX ORDER — NALOXONE HYDROCHLORIDE 0.4 MG/ML
0.2 INJECTION, SOLUTION INTRAMUSCULAR; INTRAVENOUS; SUBCUTANEOUS
Status: DISCONTINUED | OUTPATIENT
Start: 2023-06-26 | End: 2023-06-27 | Stop reason: HOSPADM

## 2023-06-26 RX ORDER — FENTANYL CITRATE 50 UG/ML
50 INJECTION, SOLUTION INTRAMUSCULAR; INTRAVENOUS EVERY 5 MIN PRN
Status: DISCONTINUED | OUTPATIENT
Start: 2023-06-26 | End: 2023-06-26 | Stop reason: HOSPADM

## 2023-06-26 RX ORDER — POLYETHYLENE GLYCOL 3350 17 G/17G
17 POWDER, FOR SOLUTION ORAL DAILY
Status: DISCONTINUED | OUTPATIENT
Start: 2023-06-27 | End: 2023-06-27 | Stop reason: HOSPADM

## 2023-06-26 RX ORDER — DEXAMETHASONE SODIUM PHOSPHATE 4 MG/ML
INJECTION, SOLUTION INTRA-ARTICULAR; INTRALESIONAL; INTRAMUSCULAR; INTRAVENOUS; SOFT TISSUE PRN
Status: DISCONTINUED | OUTPATIENT
Start: 2023-06-26 | End: 2023-06-26

## 2023-06-26 RX ORDER — ENOXAPARIN SODIUM 100 MG/ML
80 INJECTION SUBCUTANEOUS DAILY
Status: DISCONTINUED | OUTPATIENT
Start: 2023-06-26 | End: 2023-06-26 | Stop reason: DRUGHIGH

## 2023-06-26 RX ORDER — WARFARIN SODIUM 5 MG/1
5 TABLET ORAL DAILY
Status: DISCONTINUED | OUTPATIENT
Start: 2023-06-26 | End: 2023-06-26 | Stop reason: DRUGHIGH

## 2023-06-26 RX ORDER — ACETAMINOPHEN 10 MG/ML
INJECTION, SOLUTION INTRAVENOUS PRN
Status: DISCONTINUED | OUTPATIENT
Start: 2023-06-26 | End: 2023-06-26

## 2023-06-26 RX ORDER — GLYCINE 1.5 G/100ML
SOLUTION IRRIGATION PRN
Status: DISCONTINUED | OUTPATIENT
Start: 2023-06-26 | End: 2023-06-26 | Stop reason: HOSPADM

## 2023-06-26 RX ADMIN — ONDANSETRON HYDROCHLORIDE 4 MG: 2 SOLUTION INTRAMUSCULAR; INTRAVENOUS at 10:46

## 2023-06-26 RX ADMIN — MIDAZOLAM HYDROCHLORIDE 2 MG: 1 INJECTION, SOLUTION INTRAMUSCULAR; INTRAVENOUS at 10:10

## 2023-06-26 RX ADMIN — KETOROLAC TROMETHAMINE 30 MG: 30 INJECTION, SOLUTION INTRAMUSCULAR at 12:09

## 2023-06-26 RX ADMIN — BUPIVACAINE HYDROCHLORIDE 22 ML: 5 INJECTION, SOLUTION EPIDURAL; INTRACAUDAL; PERINEURAL at 09:55

## 2023-06-26 RX ADMIN — DEXMEDETOMIDINE HYDROCHLORIDE 20 MCG: 4 INJECTION, SOLUTION INTRAVENOUS at 10:54

## 2023-06-26 RX ADMIN — ACETAMINOPHEN 1000 MG: 10 INJECTION, SOLUTION INTRAVENOUS at 10:54

## 2023-06-26 RX ADMIN — FENTANYL CITRATE 50 MCG: 50 INJECTION, SOLUTION INTRAMUSCULAR; INTRAVENOUS at 12:01

## 2023-06-26 RX ADMIN — FENTANYL CITRATE 50 MCG: 50 INJECTION, SOLUTION INTRAMUSCULAR; INTRAVENOUS at 10:54

## 2023-06-26 RX ADMIN — FENTANYL CITRATE 50 MCG: 50 INJECTION, SOLUTION INTRAMUSCULAR; INTRAVENOUS at 12:31

## 2023-06-26 RX ADMIN — FENTANYL CITRATE 50 MCG: 50 INJECTION, SOLUTION INTRAMUSCULAR; INTRAVENOUS at 11:10

## 2023-06-26 RX ADMIN — DEXAMETHASONE SODIUM PHOSPHATE 6 MG: 10 INJECTION, SOLUTION INTRAMUSCULAR; INTRAVENOUS at 09:55

## 2023-06-26 RX ADMIN — HYDROCHLOROTHIAZIDE 25 MG: 25 TABLET ORAL at 15:57

## 2023-06-26 RX ADMIN — LIDOCAINE HYDROCHLORIDE 40 MG: 20 INJECTION, SOLUTION INFILTRATION; PERINEURAL at 10:41

## 2023-06-26 RX ADMIN — SODIUM CHLORIDE, SODIUM LACTATE, POTASSIUM CHLORIDE, AND CALCIUM CHLORIDE: 600; 310; 30; 20 INJECTION, SOLUTION INTRAVENOUS at 11:12

## 2023-06-26 RX ADMIN — WARFARIN SODIUM 15 MG: 5 TABLET ORAL at 17:41

## 2023-06-26 RX ADMIN — FENTANYL CITRATE 100 MCG: 50 INJECTION, SOLUTION INTRAMUSCULAR; INTRAVENOUS at 12:14

## 2023-06-26 RX ADMIN — Medication 3 G: at 09:55

## 2023-06-26 RX ADMIN — FENTANYL CITRATE 50 MCG: 50 INJECTION, SOLUTION INTRAMUSCULAR; INTRAVENOUS at 12:41

## 2023-06-26 RX ADMIN — FENTANYL CITRATE 50 MCG: 50 INJECTION, SOLUTION INTRAMUSCULAR; INTRAVENOUS at 11:33

## 2023-06-26 RX ADMIN — BUPIVACAINE HYDROCHLORIDE 8 ML: 5 INJECTION, SOLUTION EPIDURAL; INTRACAUDAL; PERINEURAL at 09:50

## 2023-06-26 RX ADMIN — SODIUM CHLORIDE, POTASSIUM CHLORIDE, SODIUM LACTATE AND CALCIUM CHLORIDE: 600; 310; 30; 20 INJECTION, SOLUTION INTRAVENOUS at 14:18

## 2023-06-26 RX ADMIN — MIDAZOLAM HYDROCHLORIDE 2 MG: 1 INJECTION, SOLUTION INTRAMUSCULAR; INTRAVENOUS at 10:42

## 2023-06-26 RX ADMIN — DEXAMETHASONE SODIUM PHOSPHATE 4 MG: 4 INJECTION, SOLUTION INTRA-ARTICULAR; INTRALESIONAL; INTRAMUSCULAR; INTRAVENOUS; SOFT TISSUE at 10:46

## 2023-06-26 RX ADMIN — CEFAZOLIN SODIUM 2 G: 2 INJECTION, SOLUTION INTRAVENOUS at 17:41

## 2023-06-26 RX ADMIN — DEXAMETHASONE SODIUM PHOSPHATE 4 MG: 10 INJECTION, SOLUTION INTRAMUSCULAR; INTRAVENOUS at 09:50

## 2023-06-26 RX ADMIN — MIDAZOLAM 2 MG: 1 INJECTION INTRAMUSCULAR; INTRAVENOUS at 09:40

## 2023-06-26 RX ADMIN — PROPOFOL 240 MG: 10 INJECTION, EMULSION INTRAVENOUS at 10:42

## 2023-06-26 RX ADMIN — SODIUM CHLORIDE, SODIUM LACTATE, POTASSIUM CHLORIDE, AND CALCIUM CHLORIDE 100 ML/HR: 600; 310; 30; 20 INJECTION, SOLUTION INTRAVENOUS at 08:42

## 2023-06-26 ASSESSMENT — ACTIVITIES OF DAILY LIVING (ADL)
ADLS_ACUITY_SCORE: 33
ADLS_ACUITY_SCORE: 35
ADLS_ACUITY_SCORE: 33

## 2023-06-26 NOTE — ASSESSMENT & PLAN NOTE
Right total knee arthroplasty performed today, June 26 by Dr. Johnson  Plan for routine postoperative care  Due to left-sided weakness, looking for short-term rehab placement  6/27/2023-doing well, POD #1, planning to discharge today to [facility] for rehab

## 2023-06-26 NOTE — CONSULTS
"[facility]  Consult Note - Hospitalist Service  Date of Admission:  6/26/2023  Consult Requested by: Dr. Johnson  Reason for Consult: Medical management after elective right total knee arthroplasty    Assessment & Plan   Babak Moran is a 63 year old male admitted on 6/26/2023. He underwent an elective right total knee arthroplasty today by Dr. Johnson.  He has had persistent pain in that knee leading to this surgery.  Medical history significant for chronic atrial fibrillation with previous stroke and resultant left hemiparesis.  The plan after today's surgery is to restart his home Coumadin, titrating toward therapeutic INR.  Due to weakness on the left side, we will be looking for short-term rehab placement get stronger before going home.  Will have PT/OT evaluation while he is here    S/P total knee arthroplasty  Right total knee arthroplasty performed today, June 26 by Dr. Johnson  Plan for routine postoperative care  Due to left-sided weakness, looking for short-term rehab placement    Essential hypertension  Taking Norvasc and hydrochlorothiazide  Continue home dosing    Left hemiparesis (H)  History of previous stroke related to chronic A-fib  Restart Coumadin per plan    Permanent atrial fibrillation (H)  Chronic, stable, not on rate control meds  Taking Coumadin for stroke prophylaxis  Restart Coumadin per plan         Clinically Significant Risk Factors Present on Admission               # Drug Induced Coagulation Defect: home medication list includes an anticoagulant medication    # Hypertension: Noted on problem list      # Severe Obesity: Estimated body mass index is 44.04 kg/m  as calculated from the following:    Height as of this encounter: 1.88 m (6' 2\").    Weight as of this encounter: 155.6 kg (343 lb).            Leo Tong MD  Hospitalist Service  Securely message with [employer] (more info)  Text page via [facility] Paging/Directory "   ______________________________________________________________________    Chief Complaint   63-year-old male who underwent elective right total knee arthroplasty today    History is obtained from the patient and electronic health record    History of Present Illness   Babak Moran is a 63 year old male who underwent elective right total knee arthroplasty today per Dr. Johnson.  She had persistent pain leading to today's surgery and does have history of a successful left total knee arthroplasty some months back.  History significant for chronic A-fib, taking Coumadin daily for stroke prophylaxis but does have a previous history of stroke with left hemiparesis resultant.  Due to the weakness he is looking for nursing home placement for short-term to get stronger before going home.  He is currently on bridging Lovenox plan to restart Coumadin when okayed by surgery.      Past Medical History    Past Medical History:   Diagnosis Date     Atrial fibrillation (H) 02/03/2017    on Warfarin     Bacterial sepsis (H) 8/8/2022     Cellulitis of left lower extremity 9/5/2019     Cerebral infarction (H)      Cerebral infarction due to unspecified occlusion or stenosis of right middle cerebral artery (H) 02/03/2017    ,Left hemiparesis, left neglect, impaired balance and gait following R MCA stroke with mild hemorrhagic transformation s/p tissue plasminogen activator and mechanical thrombectomy, s/p C7 facet fracture from fall off forklift.     Chest pain 02/1997     Disorder of skin or subcutaneous tissue 07/25/2011     Essential (primary) hypertension 02/1997     Fracture of cervical vertebra (H)     02/03/2017,C7 facet fracture from fall off forklift, nondisplaced     Gout      Hemiplegia affecting left nondominant side (H) 02/03/2017     Obesity 02/03/2017    body mass index of 40     Other local lupus erythematosus      Sepsis (H) 9/6/2019       Past Surgical History   Past Surgical History:   Procedure Laterality Date      ARTHROPLASTY KNEE Left 2023    Procedure: ARTHROPLASTY, KNEE, TOTAL;  Surgeon: Elliott Johnson MD;  Location: [facility] OR     ARTHROSCOPY KNEE      bilateral knees     COLONOSCOPY  2012    Normal; Next due      ORIF WRIST FRACTURE         Medications   Medications Prior to Admission   Medication Sig Dispense Refill Last Dose     acetaminophen (TYLENOL) 325 MG tablet Take 2 tablets (650 mg) by mouth every 4 hours as needed for other (mild pain) 100 tablet 0 Past Month     acetaminophen (TYLENOL) 500 MG tablet Take 500-1,000 mg by mouth every 6 hours as needed for mild pain   Past Month     amLODIPine (NORVASC) 5 MG tablet Take 1 tablet (5 mg) by mouth daily 90 tablet 4 2023 at 0630     enoxaparin ANTICOAGULANT (LOVENOX) 80 MG/0.8ML syringe Inject 80 mg Subcutaneous   2023 at 0600     hydrochlorothiazide (HYDRODIURIL) 25 MG tablet Take 1 tablet (25 mg) by mouth daily 90 tablet 4 2023 at 0800     [] enoxaparin ANTICOAGULANT (LOVENOX) 100 MG/ML syringe Inject 1.2 mLs (120 mg) Subcutaneous 2 times daily for 2 days 4.8 mL 0      warfarin ANTICOAGULANT (COUMADIN) 2.5 MG tablet Take 2.5 mg by mouth daily With 5 mg tablet on Tuesday, Wednesday, Thursday, Saturday and 2023     warfarin ANTICOAGULANT (COUMADIN) 5 MG tablet Take 1 tablet (5 mg) by mouth daily 7.5 mg (1.5 tablets) every Tue, Fri; 10 mg (2 tablets) all other days 160 tablet 1 2023             Physical Exam   Vital Signs: Temp: 96.8  F (36  C) Temp src: Tympanic BP: (!) 154/88 Pulse: 87   Resp: 20 SpO2: 95 % O2 Device: Nasal cannula Oxygen Delivery: 2 LPM  Weight: 343 lbs 0 oz    General Appearance: Somewhat overweight male, lying in bed, no obvious distress  Respiratory: Lungs are clear  Cardiovascular: Irregular, regular rate and rhythm, no murmur heard  GI: Belly soft, nondistended, nontender  Skin: No lesions or rashes  Other: Right leg and postsurgical dressings    Medical Decision Making       40 MINUTES  SPENT BY ME on the date of service doing chart review, history, exam, documentation & further activities per the note.      Data     I have personally reviewed the following data over the past 24 hrs:    INR:  1.15 PTT:  N/A   D-dimer:  N/A Fibrinogen:  N/A       Imaging results reviewed over the past 24 hrs:   Recent Results (from the past 24 hour(s))   XR Knee Port Right 1/2 Views    Narrative    PROCEDURE:  XR KNEE PORT RIGHT 1/2 VIEWS    HISTORY: Post-Op Total Knee    COMPARISON:  Radiograph 5/25/2023, 2/27/2023    TECHNIQUE:  XR KNEE PORT RIGHT 2 VIEWS    FINDINGS:   Postoperative changes of right total knee arthroplasty. Cutaneous  staple line. Expected postsurgical subcutaneous emphysema, edema and  air in the joint. No evidence of hardware fracture. Joint is properly  aligned. No acute osseous abnormality. Arteriovascular calcifications.      No foreign body is seen.       Impression    IMPRESSION:   Right total knee arthroplasty without complication.    YAZMIN ALAS MD         SYSTEM ID:  [id number]

## 2023-06-26 NOTE — OR NURSING
"PACU Transfer Note    Babak Moran was transferred to Med/Surg via bed.  Equipment used for transport:  Sat monitor.  Accompanied by:  RNx2  Prescriptions were: none    PACU Respiratory Event Documentation     1) Episodes of Apnea greater than or equal to 10 seconds: no    2) Bradypnea - less than 8 breaths per minute: 12-15    3) Pain score on 0 to 10 scale: \"tolerable\"    4) Pain-sedation mismatch (yes or no): no    5) Repeated 02 desaturation less than 90% (yes or no): no    Anesthesia notified? (yes or no): no    Any of the above events occuring repeatedly in separate 30 minute intervals may be considered recurrent PACU respiratory events.    Patient stable and meets phase 1 discharge criteria for transport from PACU.  Report given to DANYELLE Carl prior to transport.  Rosa Manley RN on 6/26/2023 at 1:30 PM      "

## 2023-06-26 NOTE — OP NOTE
Preop Dx: Right knee osteoarthritis    Postop Dx: Right knee osteoarthritis    Procedure: Right total knee arthroplasty    Surgeon: Elliott Johnson MD    Assistant: Mannie Zapata PA-C    Anesthesia: Regional with MAC    Indications: Patient is a 63-year-old gentleman with bilateral severe knee osteoarthritis.  He can no longer tolerate his knees in this fashion he already had his left knee replaced and had very good results and wished to have his right knee replaced as well.  All of the risks and benefits of surgical intervention were discussed with him and he wished to proceed.    Description: Patient was taken to the operating room for adequate duction anesthesia was positioned, prepped and draped in the usual sterile fashion the operative table.  Universal protocol was initiated once all in the room in agreement an incision was made in the anterior of the knee extending all the way down to the tibial tubercle.  This was carried down through the subcutaneous tissues and down to the extensor mechanism.  A medial parapatellar incision was then made and the extensor retinaculum was then raised off of the tibia in a triangular fashion.  Infrapatellar fat pad and anterior half of the menisci were excised.  Intramedullary canal of the femur was then broached from the outside utilizing a drill.  The intramedullary guide was then placed and 9 mm of bone was taken off of the distal femur and approximately 5 degrees of valgus.  Posterior condylar axis was approximated and the epicondylar axis was then placed in 3 degrees of external rotation from the posterior condyles.  Size 7 cutting block was then applied comparable to the left side.  Anterior posterior and chamfer cuts were then made.  External tibial jig was then applied to the tibia and we took 2 mm off of the medial side this time compared to 4 that we took off of the left side.  The menisci were excised and the femoral and tibial components were then trialed.  13  mm trial as compared to the left was too tight and an 11 mm trial was then applied and he had excellent extension and was very stable.  The femoral lugs were drilled the tibia was then instrumented to take a keel..  The knee was then brought into extension and the undersurface of the patella was then milled to except a 40 mm asymmetric patella.  The wound was copiously irrigated the knee was completely irrigated.  Knee was brought into extreme flexion with a PCL retractor placed posteriorly and the retractors placed medial and lateral.  The tibial component was then impacted into place.  The femoral component was then impacted into place and the 11 mm liner was then impacted into place.  His knee was brought into extension and a 40 mm asymmetric patella was then pressed into place.  The wound was copiously irrigated with both Pulsavac as well as Irrisept and the medial parapatellar incision was then closed utilizing #1 Vicryl in a figure-of-eight fashion.  #1 Vicryl was used in subcutaneous fat 2-0 Vicryl is used in subcutaneous skin and staples were used to close the skin.  An Aquacel dressing was applied and he was taken in stable condition postanesthesia care unit    Final implants:  [employer] triathlon press-fit size 7 femoral component  Abiodun triathlon press-fit size 8 tibial component  8 x 11 tibial insert  40 mm asymmetric patellar component, press-fit

## 2023-06-26 NOTE — ANESTHESIA CARE TRANSFER NOTE
Patient: Babak Moran    Procedure: Procedure(s):  Arthroplasty knee       Diagnosis: DJD (degenerative joint disease) [M19.90]  Diagnosis Additional Information: No value filed.    Anesthesia Type:   Spinal     Note:    Oropharynx: oropharynx clear of all foreign objects  Level of Consciousness: awake  Oxygen Supplementation: face mask  Level of Supplemental Oxygen (L/min / FiO2): 8  Independent Airway: airway patency satisfactory and stable  Dentition: dentition unchanged  Vital Signs Stable: post-procedure vital signs reviewed and stable  Report to RN Given: handoff report given  Patient transferred to: PACU    Handoff Report: Identifed the Patient, Identified the Reponsible Provider, Reviewed the pertinent medical history, Discussed the surgical course, Reviewed Intra-OP anesthesia mangement and issues during anesthesia, Set expectations for post-procedure period and Allowed opportunity for questions and acknowledgement of understanding      Vitals:  Vitals Value Taken Time   BP     Temp     Pulse     Resp     SpO2         Electronically Signed By: JAYLEN Ontiveros CRNA  June 26, 2023  12:18 PM

## 2023-06-26 NOTE — ASSESSMENT & PLAN NOTE
Chronic, stable, not on rate control meds  Taking Coumadin for stroke prophylaxis  Restart Coumadin per plan

## 2023-06-26 NOTE — ANESTHESIA POSTPROCEDURE EVALUATION
Patient: Babak Moran    Procedure: Procedure(s):  Arthroplasty knee       Anesthesia Type:  Spinal    Note:  Disposition: Inpatient   Postop Pain Control: Uneventful            Sign Out: Well controlled pain   PONV: No   Neuro/Psych: Uneventful            Sign Out: Acceptable/Baseline neuro status   Airway/Respiratory: Uneventful            Sign Out: Acceptable/Baseline resp. status   CV/Hemodynamics: Uneventful            Sign Out: Acceptable CV status; No obvious hypovolemia; No obvious fluid overload   Other NRE: NONE   DID A NON-ROUTINE EVENT OCCUR? No           Last vitals:  Vitals Value Taken Time   /91 06/26/23 1255   Temp 96.9  F (36.1  C) 06/26/23 1300   Pulse 73 06/26/23 1300   Resp 15 06/26/23 1300   SpO2 95 % 06/26/23 1300       Electronically Signed By: JAYLEN Torres CRNA  June 26, 2023  1:20 PM

## 2023-06-26 NOTE — PLAN OF CARE
"Patient here from home with spouse POD #0.  He is alert, oriented, and pleasant.  He did get up to the chair with PT.  He requires an assist of 2+ to pivot for safety reasons.  Patient will be going to  for short term rehab upon discharge, he was screened today.  VSS, will follow.    Problem: Plan of Care - These are the overarching goals to be used throughout the patient stay.    Goal: Patient-Specific Goal (Individualized)  Description: You can add care plan individualizations to a care plan. Examples of Individualization might be:  \"Parent requests to be called daily at 9am for status\", \"I have a hard time hearing out of my right ear\", or \"Do not touch me to wake me up as it startles me\".  Flowsheets (Taken 6/26/2023 1757)  Individualized Care Needs: up with max assist of 2 to pivot  Goal: Absence of Hospital-Acquired Illness or Injury  Intervention: Prevent and Manage VTE (Venous Thromboembolism) Risk  Recent Flowsheet Documentation  Taken 6/26/2023 1500 by Meseret Kim, RN  VTE Prevention/Management: SCDs (sequential compression devices) on   Goal Outcome Evaluation:                        "

## 2023-06-26 NOTE — BRIEF OP NOTE
[facility]    Brief Operative Note    Pre-operative diagnosis: DJD (degenerative joint disease) [M19.90]  Post-operative diagnosis Same as pre-operative diagnosis    Procedure: Procedure(s):  Arthroplasty knee  Surgeon: Surgeon(s) and Role:     * Elliott Johnson MD - Primary     * Albert Zapata PA - Assisting  Anesthesia: General with Block   Estimated Blood Loss: Minimal    Drains: None  Specimens: * No specimens in log *  Findings:   None.  Complications: None.  Implants:   Implant Name Type Inv. Item Serial No.  Lot No. LRB No. Used Action   IMP COMP FEM STRK TRIATHLN CR BD W/PA RT 7 5517-F-702 - [id number] Total Joint Component/Insert IMP COMP FEM STRK TRIATHLN CR BD W/PA RT 7 5517-F-702  [employer] [id number] Right 1 Implanted   ASYMMETRIC PATELLA    ELEANOR [id number] Right 1 Implanted   TIBIAL BEARING INSERT    ELEANOR [id number] Right 1 Implanted   IMP BASEPLATE TIBIAL STRK TRIATHLN KNEE SZ 8 5536-B-800 - [id number] Total Joint Component/Insert IMP BASEPLATE TIBIAL STRK TRIATHLN KNEE SZ 8 5536-B-377  [employer] [id number] Right 1 Implanted

## 2023-06-26 NOTE — ANESTHESIA PREPROCEDURE EVALUATION
Anesthesia Pre-Procedure Evaluation    Patient: Babak Moran   MRN: [id number] : 1960        Procedure : Procedure(s):  Arthroplasty knee          Past Medical History:   Diagnosis Date     Atrial fibrillation (H) 2017    on Warfarin     Bacterial sepsis (H) 2022     Cellulitis of left lower extremity 2019     Cerebral infarction (H)      Cerebral infarction due to unspecified occlusion or stenosis of right middle cerebral artery (H) 2017    ,Left hemiparesis, left neglect, impaired balance and gait following R MCA stroke with mild hemorrhagic transformation s/p tissue plasminogen activator and mechanical thrombectomy, s/p C7 facet fracture from fall off forklift.     Chest pain 1997     Disorder of skin or subcutaneous tissue 2011     Essential (primary) hypertension 1997     Fracture of cervical vertebra (H)     2017,C7 facet fracture from fall off forklift, nondisplaced     Gout      Hemiplegia affecting left nondominant side (H) 2017     Obesity 2017    body mass index of 40     Other local lupus erythematosus      Sepsis (H) 2019      Past Surgical History:   Procedure Laterality Date     ARTHROPLASTY KNEE Left 2023    Procedure: ARTHROPLASTY, KNEE, TOTAL;  Surgeon: Elliott Johnson MD;  Location: [facility] OR     ARTHROSCOPY KNEE      bilateral knees     COLONOSCOPY  2012    Normal; Next due      ORIF WRIST FRACTURE        Allergies   Allergen Reactions     Diatrizoate Rash     Ioxaglate Other (See Comments)     Does not recall     Levofloxacin Hives and Rash     Metrizamide Rash     (Diagnostic X-Ray materials)     Probenecid Rash      Social History     Tobacco Use     Smoking status: Never     Passive exposure: Yes     Smokeless tobacco: Never   Substance Use Topics     Alcohol use: Not Currently     Comment: couple of times per year      Wt Readings from Last 1 Encounters:   23 (!) 155.6 kg (343 lb)        Anesthesia Evaluation    Pt has had prior anesthetic. Type: General and MAC.    No history of anesthetic complications       ROS/MED HX  ENT/Pulmonary:     (+) sleep apnea, moderate, doesn't use CPAP,     Neurologic:     (+) CVA (2017), date: 2017, with deficits, - Left sided weakness and left foot drop.     Cardiovascular:     (+) hypertension-----Taking blood thinners Pt has received instructions: Instructions Given to patient: Bridged to lovenox, stopped Lovenox 6/25/2023. Irregular Heartbeat/Palpitations, Previous cardiac testing (6/23/2023)   Echo: Date: Results:    Stress Test: Date: Results:    ECG Reviewed: Date: 6/16/2023 Results:  Atrial fibulation with PVCs rate of 90 bpm.  Cath: Date: Results:      METS/Exercise Tolerance: 3 - Able to walk 1-2 blocks without stopping Comment: History of Ascending aorta enlargement, currently monitoring but patient denies any recent cardiac changes.    Hematologic:     (+) History of blood clots, pt is anticoagulated,     Musculoskeletal: Comment: Systemic lupus erythematosus.       GI/Hepatic:  - neg GI/hepatic ROS     Renal/Genitourinary:  - neg Renal ROS     Endo:  - neg endo ROS   (+) Obesity,     Psychiatric/Substance Use:  - neg psychiatric ROS     Infectious Disease:  - neg infectious disease ROS     Malignancy:  - neg malignancy ROS     Other:  - neg other ROS   (-) Any chance pregnant       Physical Exam    Airway        Mallampati: III   TM distance: > 3 FB   Neck ROM: full   Mouth opening: > 3 cm    Respiratory Devices and Support         Dental     Comment: Patient with upper denture plate and lower partial. Poor dentition.    (+) Removable bridges or other hardware      Cardiovascular       Comment: History of Atrial Fibulation   Rhythm and rate: irregular and normal     Pulmonary   pulmonary exam normal        breath sounds clear to auscultation           OUTSIDE LABS:  CBC:   Lab Results   Component Value Date    WBC 10.2 05/25/2023    WBC 12.1 (H) 01/12/2023    HGB 15.5 05/25/2023     HGB 13.5 01/12/2023    HCT 49.4 05/25/2023    HCT 42.0 01/12/2023     05/25/2023     01/12/2023     BMP:   Lab Results   Component Value Date     05/25/2023     01/12/2023    POTASSIUM 4.0 05/25/2023    POTASSIUM 4.0 01/12/2023    CHLORIDE 102 05/25/2023    CHLORIDE 101 01/12/2023    CO2 27 05/25/2023    CO2 28 01/12/2023    BUN 16.3 05/25/2023    BUN 21.1 01/12/2023    CR 0.76 05/25/2023    CR 0.67 01/12/2023    GLC 99 06/26/2023    GLC 94 05/25/2023     COAGS:   Lab Results   Component Value Date    PTT 48 (H) 09/05/2019    INR 1.8 (A) 06/23/2023     POC: No results found for: BGM, HCG, HCGS  HEPATIC:   Lab Results   Component Value Date    ALBUMIN 4.4 05/25/2023    PROTTOTAL 7.9 05/25/2023    ALT 25 05/25/2023    AST 25 05/25/2023    ALKPHOS 71 05/25/2023    BILITOTAL 0.7 05/25/2023     OTHER:   Lab Results   Component Value Date    LACT 1.4 01/11/2023    A1C 5.8 08/09/2022    VIRGEN 9.6 05/25/2023    MAG 1.9 08/10/2022    LIPASE 28 05/25/2023    CRP 42.6 (H) 08/07/2022    SED 13 08/07/2022       Anesthesia Plan    ASA Status:  3   NPO Status:  NPO Appropriate    Anesthesia Type: Spinal ((INR 1.15- 6/26/2023) Consented for GA for failed spinal).   Induction: Intravenous.   Maintenance: TIVA.        Consents    Anesthesia Plan(s) and associated risks, benefits, and realistic alternatives discussed. Questions answered and patient/representative(s) expressed understanding.     - Discussed: Risks, Benefits and Alternatives for BOTH SEDATION and the PROCEDURE were discussed     - Discussed with:  Patient, Spouse      - Extended Intubation/Ventilatory Support Discussed: No.      - Patient is DNR/DNI Status: No    Use of blood products discussed: No .     Postoperative Care    Pain management: IV analgesics, Peripheral nerve block (Single Shot) (Adductor canal and IPACK blocks under ultrasound guidance. ).   PONV prophylaxis: Dexamethasone or Solumedrol     Comments:                Higinio  JAYLEN Craig CRNA

## 2023-06-26 NOTE — PHARMACY-ANTICOAGULATION SERVICE
Pharmacy Consult- Coumadin Follow-Up    Babak Moran is a 63 year old male admitted on 6/26/2023 with S/P total knee arthroplasty    Primary Indication(s) for Anticoagulation: Atrial fibrillation with RVR    Goal INR:2 - 3 (patient prefers to be closer to 3)    FYI, patient is followed by the Anticoagulation/Protime Clinic at: [facility]    Patient Active Problem List   Diagnosis     Pain in joint, ankle and foot     Atrial fibrillation with RVR (H)     Cerebrovascular accident (CVA) due to embolism of right middle cerebral artery (H)     Osteoarthrosis     Cutaneous lupus erythematosus     Gout     Ascending aorta enlargement (H)     Family history of coronary artery disease     Essential hypertension     Left hemiparesis (H)     Long-term (current) use of anticoagulants, INR goal 2.0-3.0     Systemic lupus erythematosus (H)     Tissue plasminogen activator (t-PA) administered at other facility within 24 hours prior to current admission     Morbid obesity with BMI of 40.0-44.9, adult (H)     Permanent atrial fibrillation (H)     Obstructive sleep apnea syndrome     Need for vaccination     Onychogryphosis     Overweight     Aftercare following knee joint replacement surgery, unspecified laterality     Nail dystrophy     Onychomycosis     Anticoagulation monitoring, INR range 2-3     S/P total knee arthroplasty     Patient previously anticoagulated on Coumadin at a dose of 65 mg/week; dosed as: 7.5 mg every Tuesday and Friday - 10 mg rest of week    New factors that may increase patient's sensitivity to warfarin (Coumadin) include: surgery, Therapeutic Lovenox    New factors that may decrease patient's sensitivity to warfarin (Coumadin) include: none noted      Recent Labs   Lab Test 06/26/23  0810 06/23/23  1242 06/16/23  1033 05/25/23  2133 01/17/23  1440 01/12/23  0539 01/11/23  0617 01/10/23  0937 01/02/23  1508 12/23/22  1353 08/17/22  0807 08/11/22  0627   HGB  --   --   --  15.5  --  13.5 13.8 14.0   < > 13.9  --   13.8   INR 1.15 1.8* 3.5* 2.60*   < > 1.39* 1.34*  --    < >  --    < > 2.24*   PLT  --   --   --  282  --  227  --   --   --  297  --  267    < > = values in this interval not displayed.        Recent Dosin23: 7.5 mg  23: HOLD  23: HOLD  23: HOLD - administered Lovenox 120 mg x 2 doses (AM and PM)  23: HOLD - administered Lovenox 120 mg x 2 doses (AM and PM)  23: HOLD - administered Lovenox 120 mg x 1 dose (AM)  23: see below    Plan: Give warfarin 15 mg by mouth one time for one dose, then likely resume home dose pending INR. Recheck INR 23.     Thank You for the Consult. Will continue to follow.    Charissa Milner, [facility] ....................  2023   4:21 PM

## 2023-06-26 NOTE — INTERVAL H&P NOTE
"I have reviewed the surgical (or preoperative) H&P that is linked to this encounter, and examined the patient. There are no significant changes    Clinical Conditions Present on Arrival:  Clinically Significant Risk Factors Present on Admission                # Drug Induced Coagulation Defect: home medication list includes an anticoagulant medication   # Severe Obesity: Estimated body mass index is 44.04 kg/m  as calculated from the following:    Height as of this encounter: 1.88 m (6' 2\").    Weight as of this encounter: 155.6 kg (343 lb).       "

## 2023-06-26 NOTE — ANESTHESIA PROCEDURE NOTES
Adductor canal Procedure Note    Pre-Procedure   Staff -        CRNA: Higinio Craig APRN CRNA       Other Anesthesia Staff: Sha Burden APRN CRNA       Performed By: CRNA and with CRNAs       Location: pre-op       Procedure Start/Stop Times: 6/26/2023 9:51 AM and 6/26/2023 9:56 AM       Pre-Anesthestic Checklist: patient identified, IV checked, site marked, risks and benefits discussed, informed consent, monitors and equipment checked, pre-op evaluation, at physician/surgeon's request and post-op pain management  Timeout:       Correct Patient: Yes        Correct Procedure: Yes        Correct Site: Yes        Correct Position: Yes        Correct Laterality: Yes        Site Marked: Yes  Procedure Documentation  Procedure: Adductor canal       Diagnosis: POST OPERATIVE PAIN MANAGMENT       Laterality: right       Patient Position: supine       Skin prep: Chloraprep       Needle Type: insulated and short bevel       Needle Gauge: 20.        Needle Length (Inches): 6        Ultrasound guided       1. Ultrasound was used to identify targeted nerve, plexus, vascular marker, or fascial plane and place a needle adjacent to it in real-time.       2. Ultrasound was used to visualize the spread of anesthetic in close proximity to the above referenced structure.       3. A permanent image is entered into the patient's record.       4. The visualized anatomic structures appeared normal.       5. There were no apparent abnormal pathologic findings.    Assessment/Narrative         The placement was negative for: blood aspirated, painful injection and site bleeding       Paresthesias: No.       Test dose of mL at.         Test dose negative, 3 minutes after injection, for signs of intravascular, subdural, or intrathecal injection.       Bolus given via needle..        Secured via.        Insertion/Infusion Method: Single Shot       Complications: none       Injection made incrementally with aspirations every 5  "mL.    Medication(s) Administered   Medication Administration Time: 6/26/2023 9:51 AM      FOR [facility] ONLY:   Pain Team Contact information: please page the Pain Team Via [employer]gent[employer]. Search \"Pain\". During daytime hours, please page the attending first. At night please page the resident first.      "

## 2023-06-26 NOTE — PROGRESS NOTES
:    Spoke with patient about SNF. Patient is agreeable to go. Referrals sent to [facility] and [facility].      will continue to follow.    AFUA SANDHU on 6/26/2023 at 2:38 PM    Pt/family was given the Medicare Compare list for SNF, with associated star ratings to assist with choice for referrals/discharge planning Yes    Education was given to pt/family that star ratings are updated/maintained by Medicare and can be reviewed by visiting www.medicare.gov Yes    JASON Clayton on 6/26/2023 at 2:39 PM

## 2023-06-26 NOTE — ANESTHESIA PROCEDURE NOTES
IPACK Procedure Note    Pre-Procedure   Staff -        CRNA: Higinio Craig APRN CRNA       Other Anesthesia Staff: Sha Burden APRN CRNA       Performed By: CRNA and with CRNAs       Location: post-op       Procedure Start/Stop Times: 6/26/2023 9:40 AM and 6/26/2023 9:50 AM       Pre-Anesthestic Checklist: patient identified, IV checked, site marked, risks and benefits discussed, informed consent, monitors and equipment checked, pre-op evaluation, at physician/surgeon's request and post-op pain management  Timeout:       Correct Patient: Yes        Correct Procedure: Yes        Correct Site: Yes        Correct Position: Yes        Correct Laterality: Yes        Site Marked: Yes  Procedure Documentation  Procedure: IPACK       Diagnosis: POST OPERATIVE PAIN MANAGEMENT       Laterality: right       Patient Position: lateral       Skin prep: Chloraprep       Needle Type: insulated and short bevel       Needle Gauge: 20.        Needle Length (Inches): 6        Ultrasound guided       1. Ultrasound was used to identify targeted nerve, plexus, vascular marker, or fascial plane and place a needle adjacent to it in real-time.       2. Ultrasound was used to visualize the spread of anesthetic in close proximity to the above referenced structure.       3. A permanent image is entered into the patient's record.       4. The visualized anatomic structures appeared normal.       5. There were no apparent abnormal pathologic findings.    Assessment/Narrative         The placement was negative for: painful injection and site bleeding       Paresthesias: No.       Test dose of mL at.         Test dose negative, 3 minutes after injection, for signs of intravascular, subdural, or intrathecal injection.       Bolus given via needle..        Secured via.        Insertion/Infusion Method: Single Shot       Complications: none       Injection made incrementally with aspirations every 5 mL.    Medication(s) Administered  "  Medication Administration Time: 6/26/2023 9:40 AM      FOR [facility] ONLY:   Pain Team Contact information: please page the Pain Team Via [employer]. Search \"Pain\". During daytime hours, please page the attending first. At night please page the resident first.      "

## 2023-06-26 NOTE — PHARMACY-ADMISSION MEDICATION HISTORY
Pharmacist Admission Medication History    Admission medication history is complete. The information provided in this note is only as accurate as the sources available at the time of the update.    Medication reconciliation/reorder completed by provider prior to medication history? Yes    Information Source(s): Patient, Clinic records and CareEverywhere/SureScripts via in-person    Pertinent Information: Patient very easily discussed home medication regimen.     Per patient report he has followed all advice on home warfarin dose:  6/20/23: 7.5 mg  6/21/23: HOLD  6/22/23: HOLD  6/23/23: HOLD - administered Lovenox 120 mg x 2 doses (AM and PM)  6/24/23: HOLD - administered Lovenox 120 mg x 2 doses (AM and PM)  6/25/23: HOLD - administered Lovenox 120 mg x 1 dose (AM)  6/26/23: date of surgery    Changes made to PTA medication list:    Added: None    Deleted: None    Changed: None    Medication Affordability: no issues noted per patient     Allergies reviewed with patient and updates made in EHR: yes    Medication History Completed By: Charissa Milner [facility] 6/26/2023 2:58 PM    Prior to Admission medications    Medication Sig Last Dose Taking? Auth Provider Long Term End Date   acetaminophen (TYLENOL) 325 MG tablet Take 2 tablets (650 mg) by mouth every 4 hours as needed for other (mild pain)  Yes Elliott Johnson MD     acetaminophen (TYLENOL) 500 MG tablet Take 500-1,000 mg by mouth every 6 hours as needed for mild pain Past Month at na Yes Unknown, Entered By History     amLODIPine (NORVASC) 5 MG tablet Take 1 tablet (5 mg) by mouth daily 6/26/2023 at 0630 Yes Teo Funes MD Yes    enoxaparin ANTICOAGULANT (LOVENOX) 80 MG/0.8ML syringe Inject 80 mg Subcutaneous 6/25/2023 at 0600 Yes Reported, Patient No    hydrochlorothiazide (HYDRODIURIL) 25 MG tablet Take 1 tablet (25 mg) by mouth daily 6/25/2023 at 0800 Yes Teo Funes MD Yes    HYDROcodone-acetaminophen (NORCO) 5-325 MG tablet Take 1-2 tablets by  mouth every 4 hours as needed for moderate to severe pain  Yes Elliott Johnson MD     warfarin ANTICOAGULANT (COUMADIN) 2.5 MG tablet Take 2.5 mg by mouth daily With 5 mg tablet on Tuesday, Wednesday, Thursday, Saturday and Sunday 6/20/2023 at pm 7.5 mg  Unknown, Entered By History     warfarin ANTICOAGULANT (COUMADIN) 5 MG tablet Take 1 tablet (5 mg) by mouth daily 7.5 mg (1.5 tablets) every Tue, Fri; 10 mg (2 tablets) all other days 6/21/2023 at pm 7.5 mg  Teo Funes MD

## 2023-06-26 NOTE — ANESTHESIA PROCEDURE NOTES
Airway       Patient location during procedure: OR       Procedure Start/Stop Times: 6/26/2023 10:40 AM and 6/26/2023 10:43 AM  Staff -        CRNA: Randall Almaguer APRN CRNA       Performed By: CRNA  Consent for Airway        Urgency: elective  Indications and Patient Condition       Indications for airway management: dinh-procedural         Mask difficulty assessment: 1 - vent by mask    Final Airway Details       Final airway type: supraglottic airway    Supraglottic Airway Details        Type: LMA       Brand: I-Gel       LMA size: 4    Post intubation assessment        Placement verified by: capnometry, equal breath sounds and chest rise        Number of attempts at approach: 1       Number of other approaches attempted: 0       Ease of procedure: easy       Dentition: Intact and Unchanged    Medication(s) Administered   Medication Administration Time: 6/26/2023 10:40 AM

## 2023-06-27 VITALS
HEIGHT: 74 IN | RESPIRATION RATE: 16 BRPM | WEIGHT: 315 LBS | DIASTOLIC BLOOD PRESSURE: 67 MMHG | SYSTOLIC BLOOD PRESSURE: 154 MMHG | BODY MASS INDEX: 40.43 KG/M2 | HEART RATE: 91 BPM | TEMPERATURE: 97.8 F | OXYGEN SATURATION: 95 %

## 2023-06-27 LAB
HGB BLD-MCNC: 12.8 G/DL (ref 13.3–17.7)
HOLD SPECIMEN: NORMAL
INR PPP: 1.23 (ref 0.85–1.15)

## 2023-06-27 PROCEDURE — 96372 THER/PROPH/DIAG INJ SC/IM: CPT | Performed by: ORTHOPAEDIC SURGERY

## 2023-06-27 PROCEDURE — 999N000104 HC STATISTIC NO CHARGE

## 2023-06-27 PROCEDURE — 250N000013 HC RX MED GY IP 250 OP 250 PS 637: Performed by: ORTHOPAEDIC SURGERY

## 2023-06-27 PROCEDURE — 250N000011 HC RX IP 250 OP 636: Performed by: ORTHOPAEDIC SURGERY

## 2023-06-27 PROCEDURE — 97535 SELF CARE MNGMENT TRAINING: CPT | Mod: GO | Performed by: OCCUPATIONAL THERAPIST

## 2023-06-27 PROCEDURE — 96376 TX/PRO/DX INJ SAME DRUG ADON: CPT

## 2023-06-27 PROCEDURE — 97165 OT EVAL LOW COMPLEX 30 MIN: CPT | Mod: GO | Performed by: OCCUPATIONAL THERAPIST

## 2023-06-27 PROCEDURE — 97110 THERAPEUTIC EXERCISES: CPT | Mod: GP

## 2023-06-27 PROCEDURE — 85610 PROTHROMBIN TIME: CPT | Performed by: ORTHOPAEDIC SURGERY

## 2023-06-27 PROCEDURE — 85018 HEMOGLOBIN: CPT | Performed by: ORTHOPAEDIC SURGERY

## 2023-06-27 PROCEDURE — 36415 COLL VENOUS BLD VENIPUNCTURE: CPT | Performed by: ORTHOPAEDIC SURGERY

## 2023-06-27 PROCEDURE — 99212 OFFICE O/P EST SF 10 MIN: CPT | Mod: 24 | Performed by: FAMILY MEDICINE

## 2023-06-27 PROCEDURE — 97116 GAIT TRAINING THERAPY: CPT | Mod: GP

## 2023-06-27 RX ORDER — ENOXAPARIN SODIUM 150 MG/ML
120 INJECTION SUBCUTANEOUS EVERY 12 HOURS
Qty: 6.4 ML | Refills: 0 | Status: SHIPPED | OUTPATIENT
Start: 2023-06-27 | End: 2023-07-01

## 2023-06-27 RX ORDER — ENOXAPARIN SODIUM 100 MG/ML
120 INJECTION SUBCUTANEOUS 2 TIMES DAILY
Qty: 4.8 ML | Refills: 0 | Status: SHIPPED | OUTPATIENT
Start: 2023-06-27 | End: 2023-06-27

## 2023-06-27 RX ORDER — WARFARIN SODIUM 5 MG/1
TABLET ORAL
Qty: 160 TABLET | Refills: 1 | Status: SHIPPED | OUTPATIENT
Start: 2023-06-27 | End: 2023-07-24

## 2023-06-27 RX ADMIN — CEFAZOLIN SODIUM 2 G: 2 INJECTION, SOLUTION INTRAVENOUS at 00:29

## 2023-06-27 RX ADMIN — HYDROCHLOROTHIAZIDE 25 MG: 25 TABLET ORAL at 09:26

## 2023-06-27 RX ADMIN — ENOXAPARIN SODIUM 120 MG: 100 INJECTION SUBCUTANEOUS at 07:37

## 2023-06-27 RX ADMIN — AMLODIPINE BESYLATE 5 MG: 5 TABLET ORAL at 09:26

## 2023-06-27 ASSESSMENT — ACTIVITIES OF DAILY LIVING (ADL)
ADLS_ACUITY_SCORE: 33

## 2023-06-27 NOTE — PLAN OF CARE
Goal Outcome Evaluation:    Problem: Plan of Care - These are the overarching goals to be used throughout the patient stay.    Goal: Absence of Hospital-Acquired Illness or Injury  Intervention: Identify and Manage Fall Risk  Recent Flowsheet Documentation  Taken 6/27/2023 0736 by Vance Islas RN  Safety Promotion/Fall Prevention:   activity supervised   clutter free environment maintained   nonskid shoes/slippers when out of bed   safety round/check completed   supervised activity  Intervention: Prevent and Manage VTE (Venous Thromboembolism) Risk  Recent Flowsheet Documentation  Taken 6/27/2023 0736 by Vance Islas RN  VTE Prevention/Management: SCDs (sequential compression devices) on     Problem: Knee Arthroplasty  Goal: Optimal Functional Ability  Intervention: Promote Optimal Functional Status  Recent Flowsheet Documentation  Taken 6/27/2023 0736 by Vance Islas, RN  Activity Management:   activity encouraged   ambulated in room   up in chair  Goal: Anesthesia/Sedation Recovery  Intervention: Optimize Anesthesia Recovery  Recent Flowsheet Documentation  Taken 6/27/2023 0736 by Vance Islas RN  Safety Promotion/Fall Prevention:   activity supervised   clutter free environment maintained   nonskid shoes/slippers when out of bed   safety round/check completed   supervised activity

## 2023-06-27 NOTE — DISCHARGE SUMMARY
"[facility]  Hospitalist Discharge Summary      Date of Admission:  6/26/2023  Date of Discharge:  6/27/2023  Discharging Provider: Leo Tong MD  Discharge Service: Hospitalist Service    Discharge Diagnoses   Principal Problem:    S/P total knee arthroplasty  Active Problems:    Essential hypertension    Left hemiparesis (H)    Permanent atrial fibrillation (H)        Clinically Significant Risk Factors     # Severe Obesity: Estimated body mass index is 44.04 kg/m  as calculated from the following:    Height as of this encounter: 1.88 m (6' 2\").    Weight as of this encounter: 155.6 kg (343 lb).       Follow-ups Needed After Discharge   Follow-up Appointments     Follow Up Care      Follow-up with your Surgeon Team in 2 weeks for wound check.        Follow Up and recommended labs and tests      Follow up with Dr. Johnson in 2 weeks .  No follow up labs or test are   needed.            Unresulted Labs Ordered in the Past 30 Days of this Admission     No orders found for last 31 day(s).      These results will be followed up by Dr. Block    Discharge Disposition   Discharged to short-term care facility  Condition at discharge: Good    Hospital Course   S/P total knee arthroplasty  Right total knee arthroplasty performed today, June 26 by Dr. Johnson  Plan for routine postoperative care  Due to left-sided weakness, looking for short-term rehab placement  6/27/2023-doing well, POD #1, planning to discharge today to [facility] for rehab    Essential hypertension  Taking Norvasc and hydrochlorothiazide  Continue home dosing    Left hemiparesis (H)  History of previous stroke related to chronic A-fib  Restart Coumadin per plan    Permanent atrial fibrillation (H)  Chronic, stable, not on rate control meds  Taking Coumadin for stroke prophylaxis  Restart Coumadin per plan        Consultations This Hospital Stay   HOSPITALIST IP CONSULT  PHYSICAL THERAPY ADULT IP CONSULT  OCCUPATIONAL " "THERAPY ADULT IP CONSULT  PHARMACY TO DOSE WARFARIN  SOCIAL WORK IP CONSULT  PHYSICAL THERAPY ADULT IP CONSULT  OCCUPATIONAL THERAPY ADULT IP CONSULT    Code Status   Full Code    Time Spent on this Encounter   I, Leo Tong MD, personally saw the patient today and spent greater than 30 minutes discharging this patient.       Leo Tong MD  [facility]  [address]  [place] [address]  Phone: [phone]  Fax: [phone]  ______________________________________________________________________    Physical Exam   Vital Signs: Temp: 97.8  F (36.6  C) Temp src: Tympanic BP: (!) 154/67 Pulse: 91   Resp: 16 SpO2: 95 % O2 Device: None (Room air) Oxygen Delivery: 1/2 LPM  Weight: 343 lbs 0 oz  Constitutional: awake, alert, cooperative, no apparent distress, and appears stated age  Respiratory: No increased work of breathing, good air exchange, clear to auscultation bilaterally, no crackles or wheezing  Cardiovascular: irregularly irregular rhythm  Musculoskeletal: right knee with intact dressing, no signs of bleeding, minimal swelling, no redness       Primary Care Physician   Teo Funes    Discharge Orders      INR     Reason for your hospital stay    R TKA     When to call - Contact Surgeon Team    You may experience symptoms that require follow-up before your scheduled appointment. Refer to the \"Stoplight Tool\" for instructions on when to contact your Surgeon Team if you are concerned about pain control, blood clots, constipation, or if you are unable to urinate.     When to call - Reach out to Urgent Care    If you are not able to reach your Surgeon Team and you need immediate care, go to the Orthopedic Walk-in Clinic or Urgent Care at your Surgeon's office.  Do NOT go to the Emergency Room unless you have shortness of breath, chest pain, or other signs of a medical emergency.     When to call - Reasons to Call 911    Call 911 immediately if you " experience sudden-onset chest pain, arm weakness/numbness, slurred speech, or shortness of breath     Discharge Instruction - Breathing exercises    Perform breathing exercises using your Incentive Spirometer 10 times per hour while awake for 2 weeks.     Symptoms - Fever Management    A low grade fever can be expected after surgery.  Use acetaminophen (TYLENOL) as needed for fever management.  Contact your Surgeon Team if you have a fever greater than 101.5 F, chills, and/or night sweats.     Symptoms - Constipation management    Constipation (hard, dry bowel movements) is expected after surgery due to the combination of being less active, the anesthetic, and the opioid pain medication.  You can do the following to help reduce constipation:  ~  FLUIDS:  Drink clear liquids (water or Gatorade), or juice (apple/prune).  ~  DIET:  Eat a fiber rich diet.    ~  ACTIVITY:  Get up and move around several times a day.  Increase your activity as you are able.  MEDICATIONS:  Reduce the risk of constipation by starting medications before you are constipated.  You can take Miralax   (1 packet as directed) and/or a stool softener (Senokot 1-2 tablets 1-2 times a day).  If you already have constipation and these medications are not working, you can get magnesium citrate and use as directed.  If you continue to have constipation you can try an over the counter suppository or enema.  Call your Surgeon Team if it has been greater than 3 days since your last bowel movement.     Symptoms - Reduced Urine Output    Changes in the amount of fluids you drank before and after surgery may result in problems urinating.  It is important to stay well-hydrated after surgery and drink plenty of water. If it has been greater than 8 hours since you have urinated despite drinking plenty of water, call your Surgeon Team.     Activity - Exercises to prevent blood clots    Unless otherwise directed by your Surgeon team, perform the following  exercises at least three times per day for the first four weeks after surgery to prevent blood clots in your legs: 1) Point and flex your feet (Ankle Pumps), 2) Move your ankle around in big circles, 3) Wiggle your toes, 4) Walk, even for short distances, several times a day, will help decrease the risk of blood clots.     Comfort and Pain Management - Pain after Surgery    Pain after surgery is normal and expected.  You will have some amount of pain for several weeks after surgery.  Your pain will improve with time.  There are several things you can do to help reduce your pain including: rest, ice, elevation, and using pain medications as needed. Contact your Surgeon Team if you have pain that persists or worsens after surgery despite rest, ice, elevation, and taking your medication(s) as prescribed. Contact your Surgeon Team if you have new numbness, tingling, or weakness in your operative extremity.     Comfort and Pain Management - Swelling after Surgery    Swelling and/or bruising of the surgical extremity is common and may persist for several months after surgery. In addition to frequent icing and elevation, gentle compressive support with an ACE wrap or tubigrip may help with swelling. Apply compression regularly, removing at least twice daily to perform skin checks. Contact your Surgeon Team if your swelling increases and is NOT associated with an increase in your activity level, or if your swelling increases and is associated with redness and pain.     Comfort and Pain Management - Cold therapy    Ice can be used to control swelling and discomfort after surgery. Place a thin towel over your operative site and apply the ice pack overtop. Leave ice pack in place for 20 minutes, then remove for 20 minutes. Repeat this 20 minutes on/20 minutes off routine as often as tolerated.     Medication Instructions - Acetaminophen (TYLENOL) Instructions    You were discharged with acetaminophen (TYLENOL) for pain  management after surgery. Acetaminophen most effectively manages pain symptoms when it is taken on a schedule without missing doses (every four, six, or eight hours). Your Provider will prescribe a safe daily dose between 3000 - 4000 mg.  Do NOT exceed this daily dose. Most patients use acetaminophen for pain control for the first four weeks after surgery.  You can wean from this medication as your pain decreases.     Medication Instructions - NSAID Instructions    You were discharged with an anti-inflammatory medication for pain management to use in combination with acetaminophen (TYLENOL) and the narcotic pain medication.  Take this medication exactly as directed.  You should only take one anti-inflammatory at a time.  Some common anti-inflammatories include: ibuprofen (ADVIL, MOTRIN), naproxen (ALEVE, NAPROSYN), celecoxib (CELEBREX), meloxicam (MOBIC), ketorolac (TORADOL).  Take this medication with food and water.     Medication Instructions - Opioids - Tapering Instructions    In the first three days following surgery, your symptoms may warrant use of the narcotic pain medication every four to six hours as prescribed. This is normal. As your pain symptoms improve, focus your efforts on decreasing (tapering) use of narcotic medications. The most successful tapering strategy is to first, decrease the number of tablets you take every 4-6 hours to the minimum prescribed. Then, increase the amount of time between doses.  For example:  First, taper to   or 1 tablet every 4-6 hours.  Then, taper to   or 1 tablet every 6-8 hours.  Then, taper to   or 1 tablet every 8-10 hours.  Then, taper to   or 1 tablet every 10-12 hours.  Then, taper to   or 1 tablet at bedtime.  The bedtime dose can help with comfort during sleep and is typically the last dose to be discontinued after surgery.     Follow Up Care    Follow-up with your Surgeon Team in 2 weeks for wound check.     Medication instructions - Anticoagulation - other     Continue to take your Coumadin as prescribed it will double as an anticoagulation treatment to prevent DVT as well     Medication Instructions - Opioid Instructions (Less than 65 years)    You were discharged with an opioid medication (hydromorphone, oxycodone, hydrocodone, or tramadol). This medication should only be taken for breakthrough pain that is not controlled with acetaminophen (TYLENOL). If you rate your pain less than 3 you do not need this medication.  Pain rating 0-3:  You do not need this medication.  Pain rating 4-6:  Take 1 tablet every 4-6 hours as needed  Pain rating 7-10:  Take 2 tablets every 4-6 hours as needed.  Do not exceed 6 tablets per day     Activity - Total Knee Arthroplasty     Return to Driving    Return to driving - Driving is NOT permitted until directed by your provider. Under no circumstance are you permitted to drive while using narcotic pain medications.     Dressing / Wound Care - Wound    You have a clean dressing on your surgical wound. Dressing change instructions as follows: dressing will be removed at your follow-up appointment. Contact your Surgeon Team if you have increased redness, warmth around the surgical wound, and/or drainage from the surgical wound.     Dressing / Wound Care - NO Tub Bathing    Tub bathing, swimming, or any other activities that will cause your incision to be submerged in water should be avoided until allowed by your Surgeon.     NO Precautions    No precautions directed by your Provider.  You may perform range of motion activities as tolerated.     Weight bearing as tolerated    Weight bearing as tolerated on your operative extremity.     Dressing Wound Care - Shower with wound/dressing NOT covered    You do not need to cover your dressing in the shower, you may allow water and soap to run over top of the surgical dressing. You may shower 2 days after surgery.  You are strictly prohibited from soaking or submerging the surgical wound underwater.  "    No VTE Prophylaxis    Pt is already on Coumadin for A-fib     Comfort and Pain Management - LOWER Extremity Elevation    Swelling is expected for several months after surgery. This type of swelling is usually associated with gravity and activity, and can be improved with elevation.   The best way to do this is to get your \"toes above your nose\" by laying down and placing several pillows lengthwise under your calf and heel. When elevating your leg keep your knee completely straight. Perform this elevation as often as possible especially for the first two weeks after surgery.     General info for SNF    Length of Stay Estimate: Short Term Care: Estimated # of Days <30  Condition at Discharge: Improving  Level of care:skilled   Rehabilitation Potential: Excellent  Admission H&P remains valid and up-to-date: Yes  Recent Chemotherapy: N/A  Use Nursing Home Standing Orders: Yes     Mantoux instructions    Give two-step Mantoux (PPD) Per Facility Policy Yes     Follow Up and recommended labs and tests    Follow up with Dr. Johnson in 2 weeks .  No follow up labs or test are needed.     Reason for your hospital stay    Had an elective right total knee arthroplasty on 6/26/2023     Activity - Up ad anitha     Wound care (specify)    Site:   right knee  Instructions:  leave dressing in place until orthopedic follow-up     Physical Therapy Adult Consult    Evaluate and treat as clinically indicated.    Reason:  therapy post knee replacement surgery     Occupational Therapy Adult Consult    Evaluate and treat as clinically indicated.    Reason:  therapy post knee replacement surgery     Crutches DME    DME Documentation: Describe the reason for need to support medical necessity: Impaired gait status post knee surgery. I, the undersigned, certify that the above prescribed supplies are medically necessary for this patient and is both reasonable and necessary in reference to accepted standards of medical practice in the treatment " of this patient's condition and is not prescribed as a convenience.     Cane DME    DME Documentation: Describe the reason for need to support medical necessity: Impaired gait status post knee surgery. I, the undersigned, certify that the above prescribed supplies are medically necessary for this patient and is both reasonable and necessary in reference to accepted standards of medical practice in the treatment of this patient's condition and is not prescribed as a convenience.     Walker DME    DME Documentation: Describe the reason for need to support medical necessity: Impaired gait status post knee surgery. I, the undersigned, certify that the above prescribed supplies are medically necessary for this patient and is both reasonable and necessary in reference to accepted standards of medical practice in the treatment of this patient's condition and is not prescribed as a convenience.     Discharge Instruction - Regular Diet Adult    Return to your pre-surgery diet unless instructed otherwise     Diet    Follow this diet upon discharge: Orders Placed This Encounter      Advance Diet as Tolerated: Regular Diet Adult      Discharge Instruction - Regular Diet Adult       Significant Results and Procedures   Most Recent 3 Hemoglobins:  Recent Labs   Lab Test 06/27/23  0610 05/25/23  2133 01/12/23  0539   HGB 12.8* 15.5 13.5   ,   Results for orders placed or performed during the hospital encounter of 06/26/23   XR Knee Port Right 1/2 Views    Narrative    PROCEDURE:  XR KNEE PORT RIGHT 1/2 VIEWS    HISTORY: Post-Op Total Knee    COMPARISON:  Radiograph 5/25/2023, 2/27/2023    TECHNIQUE:  XR KNEE PORT RIGHT 2 VIEWS    FINDINGS:   Postoperative changes of right total knee arthroplasty. Cutaneous  staple line. Expected postsurgical subcutaneous emphysema, edema and  air in the joint. No evidence of hardware fracture. Joint is properly  aligned. No acute osseous abnormality. Arteriovascular calcifications.      No foreign  body is seen.       Impression    IMPRESSION:   Right total knee arthroplasty without complication.    YAZMIN ALAS MD         SYSTEM ID:  [id number]       Discharge Medications   Current Discharge Medication List      START taking these medications    Details   enoxaparin ANTICOAGULANT (LOVENOX) 120 MG/0.8ML syringe Inject 0.8 mLs (120 mg) Subcutaneous every 12 hours for 4 days  Qty: 6.4 mL, Refills: 0    Associated Diagnoses: S/P total knee arthroplasty, right; Atrial fibrillation with RVR (H)      HYDROcodone-acetaminophen (NORCO) 5-325 MG tablet Take 1-2 tablets by mouth every 4 hours as needed for moderate to severe pain  Qty: 40 tablet, Refills: 0    Associated Diagnoses: S/P total knee arthroplasty, right         CONTINUE these medications which have CHANGED    Details   acetaminophen (TYLENOL) 325 MG tablet Take 2 tablets (650 mg) by mouth every 4 hours as needed for other (mild pain)  Qty: 100 tablet, Refills: 0    Associated Diagnoses: S/P total knee arthroplasty, right      warfarin ANTICOAGULANT (COUMADIN) 5 MG tablet Take 10 mg by mouth on 6/27, 6/28, and 6/29. Recheck INR on 6/30. Further dosing instructions per anticoagulation clinic.  Qty: 160 tablet, Refills: 1    Associated Diagnoses: Atrial fibrillation with RVR (H)         CONTINUE these medications which have NOT CHANGED    Details   amLODIPine (NORVASC) 5 MG tablet Take 1 tablet (5 mg) by mouth daily  Qty: 90 tablet, Refills: 4    Associated Diagnoses: Benign essential hypertension      hydrochlorothiazide (HYDRODIURIL) 25 MG tablet Take 1 tablet (25 mg) by mouth daily  Qty: 90 tablet, Refills: 4    Associated Diagnoses: Benign essential hypertension         STOP taking these medications       enoxaparin ANTICOAGULANT (LOVENOX) 100 MG/ML syringe Comments:   Reason for Stopping:             Allergies   Allergies   Allergen Reactions     Diatrizoate Rash     Ioxaglate Other (See Comments)     Does not recall     Levofloxacin Hives and Rash      Metrizamide Rash     (Diagnostic X-Ray materials)     Probenecid Rash

## 2023-06-27 NOTE — PROGRESS NOTES
06/27/23 6553   Appointment Info   Signing Clinician's Name / Credentials (PT) James Caba MPT   Gait/Stairs (Locomotion)   Distance in Feet (Gait) 5-6   Interventions   Interventions Quick Adds Gait Training;Therapeutic Activity;Therapeutic Procedure   Therapeutic Procedure/Exercise   Symptoms Noted During/After Treatment fatigue;increased pain   Treatment Detail/Skilled Intervention TKA exercises with fair quad contraction   Therapeutic Activity   Symptoms Noted During/After Treatment Fatigue;Increased pain   Treatment Detail/Skilled Intervention moderate to minimal assist for bed mobilities; sit to stand with moderate to minimal assist of 1;stand pivot with minimal assist and use of Fww   Gait Training   Symptoms Noted During/After Treatment (Gait Training) fatigue   Treatment Detail/Skilled Intervention a few steps with bed<>recliner transfer   [place]ettinger Level (Gait Training) minimum assist (75% patient effort)   Physical Assistance Level (Gait Training) 1 person + 1 person to manage equipment   Weight Bearing (Gait Training) weight-bearing as tolerated   Assistive Device (Gait Training) rolling walker   Pattern Analysis (Gait Training) 3-point gait   Gait Analysis Deviations decreased guerda;increased time in double stance;decreased velocity of limb motion;decreased step length   Impairments (Gait Analysis/Training) balance impaired;pain;strength decreased   PT Discharge Planning   PT Plan patient discharging   PT Discharge Recommendation (DC Rec) Transitional Care Facility   PT Rationale for DC Rec to promote strength, stability and safe mobility allowing patient to return home   PT Brief overview of current status assist necessary for all mobilities; he will benefit from continued PT in short term rehab

## 2023-06-27 NOTE — PLAN OF CARE
Physical Therapy Discharge Summary    Reason for therapy discharge:    Discharged to transitional care facility.    Progress towards therapy goal(s). See goals on Care Plan in [facility] electronic health record for goal details.  Goals not met.  Barriers to achieving goals:   discharge from facility.    Therapy recommendation(s):    Continued therapy is recommended.  Rationale/Recommendations:  to promote strength, stability and safe mobility allowing patient to return home.    Goal Outcome Evaluation:    Plan of Care Reviewed With: patient, spouse

## 2023-06-27 NOTE — PHARMACY-ANTICOAGULATION SERVICE
Pharmacy Consult- Coumadin Follow-Up    Babak Moran is a 63 year old male admitted on 6/26/2023 with S/P total knee arthroplasty    Primary Indication(s) for Anticoagulation: Atrial fibrillation with RVR    Goal INR: 2 - 3 (patient prefers to be closer to 3)    FYI, patient is followed by the Anticoagulation/Protime Clinic at: [facility]    Patient Active Problem List   Diagnosis     Pain in joint, ankle and foot     Atrial fibrillation with RVR (H)     Cerebrovascular accident (CVA) due to embolism of right middle cerebral artery (H)     Osteoarthrosis     Cutaneous lupus erythematosus     Gout     Ascending aorta enlargement (H)     Family history of coronary artery disease     Essential hypertension     Left hemiparesis (H)     Long-term (current) use of anticoagulants, INR goal 2.0-3.0     Systemic lupus erythematosus (H)     Tissue plasminogen activator (t-PA) administered at other facility within 24 hours prior to current admission     Morbid obesity with BMI of 40.0-44.9, adult (H)     Permanent atrial fibrillation (H)     Obstructive sleep apnea syndrome     Need for vaccination     Onychogryphosis     Overweight     Aftercare following knee joint replacement surgery, unspecified laterality     Nail dystrophy     Onychomycosis     Anticoagulation monitoring, INR range 2-3     S/P total knee arthroplasty       New factors that may increase patient's sensitivity to warfarin (Coumadin) include: surgery, therapeutic lovenox    New factors that may decrease patient's sensitivity to warfarin (Coumadin) include: recent held doses in preparation for surgery      Dietary Intake: 100% (only one meal charted since admission)    Recent Labs   Lab Test 06/27/23  0610 06/26/23  0810 06/23/23  1242 06/16/23  1033 05/25/23  2133 01/17/23  1440 01/12/23  0539 01/11/23  0617 01/02/23  1508 12/23/22  1353 08/17/22  0807 08/11/22  0627   HGB 12.8*  --   --   --  15.5  --  13.5 13.8   < > 13.9  --  13.8   INR 1.23* 1.15 1.8* 3.5*  2.60*   < > 1.39* 1.34*   < >  --    < > 2.24*   PLT  --   --   --   --  282  --  227  --   --  297  --  267    < > = values in this interval not displayed.        Recent Dosing:    Date INR Dose Given   6/26 1.15 15 mg   6/27 1.23 See below       Plan: Patient discharging to [facility] for short-term rehab. Will plan for patient to take 10 mg daily starting this evening with INR recheck on Friday. Patient will continue to receive Enoxaparin 120 mg Q12H as a bridge until INR therapeutic.    Thank You for the Consult. Will continue to follow.    John Harrington [facility] ....................  6/27/2023   10:40 AM

## 2023-06-27 NOTE — PHARMACY
[facility]  Part of [facility]  [address] [place] [address]    June 27, 2023    Dear Pharmacist,    Your customer, Babak Moran, born on 1960, was recently discharged from [facility].  We have updated his medication list and want to alert you to the following:       Review of your medicines      START taking      Dose / Directions   enoxaparin ANTICOAGULANT 120 MG/0.8ML syringe  Commonly known as: LOVENOX  Indication: Atrial Fibrillation  Used for: S/P total knee arthroplasty, right, Atrial fibrillation with RVR (H)  Replaces: enoxaparin ANTICOAGULANT 100 MG/ML syringe      Dose: 120 mg  Inject 0.8 mLs (120 mg) Subcutaneous every 12 hours for 4 days  Quantity: 6.4 mL  Refills: 0     HYDROcodone-acetaminophen 5-325 MG tablet  Commonly known as: NORCO  Used for: S/P total knee arthroplasty, right      Dose: 1-2 tablet  Take 1-2 tablets by mouth every 4 hours as needed for moderate to severe pain  Quantity: 40 tablet  Refills: 0        CONTINUE these medicines which may have CHANGED, or have new prescriptions. If we are uncertain of the size of tablets/capsules you have at home, strength may be listed as something that might have changed.      Dose / Directions   acetaminophen 325 MG tablet  Commonly known as: TYLENOL  This may have changed:     medication strength    how much to take    when to take this    reasons to take this  Used for: S/P total knee arthroplasty, right      Dose: 650 mg  Take 2 tablets (650 mg) by mouth every 4 hours as needed for other (mild pain)  Quantity: 100 tablet  Refills: 0     warfarin ANTICOAGULANT 5 MG tablet  Commonly known as: COUMADIN  Indication: Atrial Fibrillation  This may have changed:     how much to take    how to take this    when to take this    additional instructions    Another medication with the same name was removed. Continue taking this medication, and follow the directions you see here.  Used for:  Atrial fibrillation with RVR (H)      Take as directed. If you are unsure how to take this medication, talk to your nurse or doctor.  Original instructions: Take 10 mg by mouth on 6/27, 6/28, and 6/29. Recheck INR on 6/30. Further dosing instructions per anticoagulation clinic.  Quantity: 160 tablet  Refills: 1        CONTINUE these medicines which have NOT CHANGED      Dose / Directions   amLODIPine 5 MG tablet  Commonly known as: NORVASC  Indication: High Blood Pressure Disorder  Used for: Benign essential hypertension      Dose: 5 mg  Take 1 tablet (5 mg) by mouth daily  Quantity: 90 tablet  Refills: 4     hydrochlorothiazide 25 MG tablet  Commonly known as: HYDRODIURIL  Indication: High Blood Pressure Disorder  Used for: Benign essential hypertension      Dose: 25 mg  Take 1 tablet (25 mg) by mouth daily  Quantity: 90 tablet  Refills: 4        STOP taking    enoxaparin ANTICOAGULANT 100 MG/ML syringe  Commonly known as: LOVENOX  Replaced by: enoxaparin ANTICOAGULANT 120 MG/0.8ML syringe              Where to get your medicines      These medications were sent to [id number] - [place], [place] - [address]  [address] [address]    Phone: [phone]     acetaminophen 325 MG tablet    enoxaparin ANTICOAGULANT 120 MG/0.8ML syringe    HYDROcodone-acetaminophen 5-325 MG tablet    warfarin ANTICOAGULANT 5 MG tablet         We also reviewed Babak Moran's allergy list and updated it as needed:  Allergies: Diatrizoate, Ioxaglate, Levofloxacin, Metrizamide, and Probenecid    Thank you for continuing to care for Babak Moran.  We look forward to working together with you in the future.    Sincerely,  John Harrington, [facility]

## 2023-06-27 NOTE — PROGRESS NOTES
06/26/23 1650   Appointment Info   Signing Clinician's Name / Credentials (PT) James Caba MPT   Living Environment   People in Home spouse   Current Living Arrangements house   Home Accessibility no concerns;stairs to enter home   Number of Stairs, Main Entrance 1   Stair Railings, Main Entrance none   Transportation Anticipated family or friend will provide   Self-Care   Usual Activity Tolerance good   Current Activity Tolerance fair   Equipment Currently Used at Home walker, rolling   Fall history within last six months yes   Number of times patient has fallen within last six months 2   General Information   Referring Physician Alex   Patient/Family Therapy Goals Statement (PT) short term rehab prior to returning home   Pertinent History of Current Problem (include personal factors and/or comorbidities that impact the POC) s/p right TKA 6/26/23   Existing Precautions/Restrictions fall  (s/p right TKA)   Weight-Bearing Status - LLE full weight-bearing   Weight-Bearing Status - RLE weight-bearing as tolerated   Cognition   Affect/Mental Status (Cognition) WFL   Orientation Status (Cognition) oriented x 4   Follows Commands (Cognition) WFL   Pain Assessment   Patient Currently in Pain Yes, see Vital Sign flowsheet   Integumentary/Edema   Integumentary/Edema Comments surgical wound right knee   Posture    Posture Not impaired   Range of Motion (ROM)   Range of Motion ROM is WFL   ROM Comment right knee not tested post-op   Strength (Manual Muscle Testing)   Strength (Manual Muscle Testing) strength is WFL   Strength Comments right LE not tested post-op   Bed Mobility   Impairments Contributing to Impaired Bed Mobility pain;decreased strength   Comment, (Bed Mobility) moderate to minimal assist of 1 supine<>sit   Transfers   Impairments Contributing to Impaired Transfers pain;decreased strength   Comment, (Transfers) moderate assist of 2 for sit to stand; minimal assist of 2 for pivot transfer using a Fww    Gait/Stairs (Locomotion)   [place] Level (Gait) minimum assist (75% patient effort)   Assistive Device (Gait) walker, front-wheeled   Distance in Feet 5-6   Pattern (Gait) step-to   Balance   Balance Comments good with Fww   Sensory Examination   Sensory Perception WFL   Coordination   Coordination no deficits were identified   Muscle Tone   Muscle Tone no deficits were identified   Clinical Impression   Criteria for Skilled Therapeutic Intervention Yes, treatment indicated   PT Diagnosis (PT) impaired mobility   Influenced by the following impairments pain, fatigue and weakness   Functional limitations due to impairments activity/gait tolerance and stability   Clinical Presentation (PT Evaluation Complexity) Stable/Uncomplicated   Clinical Decision Making (Complexity) low complexity   Planned Therapy Interventions (PT) bed mobility training;gait training;transfer training   Anticipated Equipment Needs at Discharge (PT) walker, rolling   Risk & Benefits of therapy have been explained evaluation/treatment results reviewed;patient   PT Total Evaluation Time   PT Selam Low Complexity Minutes (18763) 15   Plan of Care Review   Plan of Care Reviewed With patient;spouse   Physical Therapy Goals   PT Frequency Daily   PT Predicted Duration/Target Date for Goal Attainment 06/27/23   PT Goals Bed Mobility;Transfers;Gait   PT: Bed Mobility Supervision/stand-by assist   PT: Transfers Supervision/stand-by assist;Assistive device   PT: Gait Supervision/stand-by assist;Rolling walker;50 feet   PT Discharge Planning   PT Plan continue PT   PT Discharge Recommendation (DC Rec) Transitional Care Facility   PT Rationale for DC Rec to promote strength, stability and safe mobility allowing patient to return home   PT Brief overview of current status assist necessary for all mobilities; he will benefit from continued PT in short term rehab

## 2023-06-27 NOTE — PROGRESS NOTES
:     Cathy from [facility] states that they are able to accept patient. They will need a DHS 3543A form on file before patient is admitted to their facility.     Provided patient with the DHS 3543A form.     JASON Clayton on 6/27/2023 at 8:44 AM    Sent form to  and they are able to accept patient.  can provide transportation at 11:00. Updated PT/OT and patient on discharge planning.     PAS Completed: [id number]    No further needs from  at this time.     JASON Clayton on 6/27/2023 at 9:38 AM

## 2023-06-27 NOTE — PROGRESS NOTES
Discharge Note      Data:  Babak Moran discharged to acute rehab at 1105 via wheel chair. Accompanied by staff.    Action:  Written discharge/follow-up instructions were provided to patient and caregiver. Prescriptions sent to patients preferred pharmacy. All belongings sent with patient.    Response:  Babak and [facility] staff verbalized understanding of discharge instructions, reason for discharge, and necessary follow-up appointments.

## 2023-06-27 NOTE — PHARMACY - DISCHARGE MEDICATION RECONCILIATION AND EDUCATION
Pharmacy:  Discharge Counseling and Medication Reconciliation    Babak MANA Moran     [place] [address]  [phone] (home)   63 year old male  PCP: Teo Funes    Allergies: Diatrizoate, Ioxaglate, Levofloxacin, Metrizamide, and Probenecid    Discharge Counseling:    Pharmacist met with patient today to review the medication portion of the After Visit Summary (with an emphasis on NEW medications) and to address patient's questions/concerns.    Summary of Education: Discussed plan in regard to medications with patient- patient is going to [facility] for short-term rehab. Patient expressed some concern over his low INR- I counseled him that it was 1.23 today which is subtherapeutic, but is to be expected as he has been holding doses prior to surgery. He is also receiving Lovenox injections twice daily to bridge INR until therapeutic. I advised him to let [facility] staff know if he has any calf pain, chest pain, or severe headaches. Patient is agreeable to this plan.    Materials Provided:  MedCounselor sheets printed from Clinical Pharmacology on: no med info sheets provided as patient is going to [facility] where facility staff will administer medications.    Pink warfarin dosing card was provided in patient's folder to be sent with for INR and dosing reference.    Discharge Medication Reconciliation:    It has been determined that the patient has an adequate supply of medications available or which can be obtained from [facility]'s preferred pharmacy, which has been confirmed as: [id number] [An updated medication list will be faxed to the patient's pharmacy.]    Thank you for the consult.    John Harrington [facility]........June 27, 2023 11:19 AM

## 2023-06-27 NOTE — PLAN OF CARE
Goal Outcome Evaluation:    Pt admitted last evening following RTKA. Denies pain. States he took no pain meds with previous left knee surgery. Declined senna at HS. Using urinal at bedside. Left sided weakness present from prior CVA. Warfarin anticoagulated for Afib. Transferred from chair to bed with A-2 at HS, did not get out of bed the rest of the shift.       Plan of Care Reviewed With: patient    Overall Patient Progress: improvingOverall Patient Progress: improving

## 2023-06-28 ENCOUNTER — NURSING HOME VISIT (OUTPATIENT)
Dept: GERIATRICS | Facility: OTHER | Age: 63
End: 2023-06-28
Attending: NURSE PRACTITIONER
Payer: COMMERCIAL

## 2023-06-28 ENCOUNTER — PATIENT OUTREACH (OUTPATIENT)
Dept: FAMILY MEDICINE | Facility: OTHER | Age: 63
End: 2023-06-28
Payer: COMMERCIAL

## 2023-06-28 DIAGNOSIS — Z79.01 ANTICOAGULATION MONITORING, INR RANGE 2-3: ICD-10-CM

## 2023-06-28 DIAGNOSIS — Z79.01 LONG-TERM (CURRENT) USE OF ANTICOAGULANTS, INR GOAL 2.0-3.0: Chronic | ICD-10-CM

## 2023-06-28 DIAGNOSIS — I87.2 CHRONIC VENOUS STASIS DERMATITIS OF LEFT LOWER EXTREMITY: ICD-10-CM

## 2023-06-28 DIAGNOSIS — G81.94 LEFT HEMIPARESIS (H): ICD-10-CM

## 2023-06-28 DIAGNOSIS — I10 ESSENTIAL HYPERTENSION: ICD-10-CM

## 2023-06-28 DIAGNOSIS — I48.21 PERMANENT ATRIAL FIBRILLATION (H): ICD-10-CM

## 2023-06-28 DIAGNOSIS — Z96.651 STATUS POST TOTAL RIGHT KNEE REPLACEMENT: Primary | ICD-10-CM

## 2023-06-28 PROCEDURE — 99310 SBSQ NF CARE HIGH MDM 45: CPT | Performed by: NURSE PRACTITIONER

## 2023-06-28 NOTE — TELEPHONE ENCOUNTER
Patient has orthopedic surgery follow-up only.  No TCM call required per policy.    Corinne R Thayer, RN on 6/28/2023 at 8:37 AM

## 2023-06-28 NOTE — PROGRESS NOTES
06/27/23 1111   Appointment Info   Signing Clinician's Name / Credentials (OT) Savannah Santana OTR/L   Living Environment   People in Home spouse   Current Living Arrangements house   Home Accessibility no concerns;stairs to enter home   Number of Stairs, Main Entrance 1   Stair Railings, Main Entrance none   Transportation Anticipated family or friend will provide   Self-Care   Usual Activity Tolerance good   Current Activity Tolerance fair   Equipment Currently Used at Home walker, rolling   Fall history within last six months yes   Number of times patient has fallen within last six months 2   Cognitive Status Examination   Orientation Status orientation to person, place and time   Visual Perception   Visual Impairment/Limitations WFL   Pain Assessment   Patient Currently in Pain Yes, see Vital Sign flowsheet   Range of Motion Comprehensive   General Range of Motion no range of motion deficits identified   Bed Mobility   Bed Mobility sit-supine   Sit-Supine [place] (Bed Mobility) minimum assist (75% patient effort);1 person to manage equipment   Transfers   Transfers bed-chair transfer   Transfer Skill: Bed to Chair/Chair to Bed   Bed-Chair [place] (Transfers) minimum assist (75% patient effort);2 person assist   Assistive Device (Bed-Chair Transfers) rolling walker   Activities of Daily Living   BADL Assessment/Intervention upper body dressing;lower body dressing   Upper Body Dressing Assessment/Training   [place] Level (Upper Body Dressing) independent   Lower Body Dressing Assessment/Training   [place] Level (Lower Body Dressing) moderate assist (50% patient effort)   Clinical Impression   Criteria for Skilled Therapeutic Interventions Met (OT) Evaluation only   OT Diagnosis right TKA   Influenced by the following impairments pain, post surgical precautions   OT Problem List-Impairments impacting ADL post-surgical precautions;activity tolerance impaired   Assessment of Occupational  Performance 1-3 Performance Deficits   Identified Performance Deficits mobility and self care   Planned Therapy Interventions (OT)   (eval only)   Clinical Decision Making Complexity (OT) low complexity   Risk & Benefits of therapy have been explained risks/benefits reviewed   OT Total Evaluation Time   OT Eval, Low Complexity Minutes (24310) 15   OT Goals   Therapy Frequency (OT) One time eval and treatment   Interventions   Interventions Quick Adds Self-Care/Home Management   Self-Care/Home Management   Self-Care/Home Mgmt/ADL, Compensatory, Meal Prep Minutes (24422) 30   Symptoms Noted During/After Treatment (Meal Preparation/Planning Training) fatigue;increased pain   [place] Level (Upper Body Dressing Training) stand-by assist   Assistance (Upper Body Dressing Training) supervision   Lower Body Dressing Training Assistance moderate assist (50% patient effort)   OT Discharge Planning   OT Plan d/c to STR today   OT Discharge Recommendation (DC Rec) Transitional Care Facility   OT Rationale for DC Rec Pt needs more independence/strength before returning home with spouse as previous to be safe   OT Brief overview of current status See above for details, pt progressing slowly, will greatly benefit from continued therapies at STR   Total Session Time   Timed Code Treatment Minutes 30   Total Session Time (sum of timed and untimed services) 45

## 2023-06-28 NOTE — PROGRESS NOTES
[facility]  Hospital follow up/Initial Assessment    Patient Name: Babak Moran   : 1960  MRN: [id number]    Place of Service: [facility]  DOS: 2023    CC: Hospital follow up/Initial Assessment    HPI:  Babak Moran is a 63 year old male with PMH of multiple chronic medical concerns, who is seen today regarding pt was hospitalized - at [facility] for elective right total knee arthroplasty by Dr Johnson. Pt had his left knee done in  and recovered well. He reports having very little pain and has only taken the tylenol for pain. He does have norco PRN but no use.   Hypertension: BPs running a littler higher end 140-160s but seems pain related. Nurse rechecked just after visit today and was in the 120s. Currently taking hydrochlorothiazide and amlodipine. He reports he had been taking toprol but was weaned off this as of last October.   Afib: currently on coumadin bridging with lovenox until INR 2-3. HRs controlled in 70-80s.    Hx CVA with left hemiparesis: on coumadin, Pt reports stroke was due to afib. He is able to move left arm and leg with 4/5 strength.     Multidisciplinary notes, laboratory values, medications, vital signs, weight and orders arereviewed from nursing home records. I have reviewed the patient s medical history and updated the computerized patient record.     PMH:  Past Medical History:   Diagnosis Date     Atrial fibrillation (H) 2017    on Warfarin     Bacterial sepsis (H) 2022     Cellulitis of left lower extremity 2019     Cerebral infarction (H)      Cerebral infarction due to unspecified occlusion or stenosis of right middle cerebral artery (H) 2017    ,Left hemiparesis, left neglect, impaired balance and gait following R MCA stroke with mild hemorrhagic transformation s/p tissue plasminogen activator and mechanical thrombectomy, s/p C7 facet fracture from fall off forklift.     Chest pain 1997     Disorder  of skin or subcutaneous tissue 07/25/2011     Essential (primary) hypertension 02/1997     Fracture of cervical vertebra (H)     02/03/2017,C7 facet fracture from fall off forklift, nondisplaced     Gout      Hemiplegia affecting left nondominant side (H) 02/03/2017     Obesity 02/03/2017    body mass index of 40     Other local lupus erythematosus      Sepsis (H) 9/6/2019       Medications:  Current Outpatient Medications   Medication Sig Dispense Refill     acetaminophen (TYLENOL) 325 MG tablet Take 2 tablets (650 mg) by mouth every 4 hours as needed for other (mild pain) 100 tablet 0     amLODIPine (NORVASC) 5 MG tablet Take 1 tablet (5 mg) by mouth daily 90 tablet 4     enoxaparin ANTICOAGULANT (LOVENOX) 120 MG/0.8ML syringe Inject 0.8 mLs (120 mg) Subcutaneous every 12 hours for 4 days 6.4 mL 0     hydrochlorothiazide (HYDRODIURIL) 25 MG tablet Take 1 tablet (25 mg) by mouth daily 90 tablet 4     HYDROcodone-acetaminophen (NORCO) 5-325 MG tablet Take 1-2 tablets by mouth every 4 hours as needed for moderate to severe pain 40 tablet 0     warfarin ANTICOAGULANT (COUMADIN) 5 MG tablet Take 10 mg by mouth on 6/27, 6/28, and 6/29. Recheck INR on 6/30. Further dosing instructions per anticoagulation clinic. 160 tablet 1     Medication reconciliation complete between [facility] record and [facility].     Allergies:  Allergies   Allergen Reactions     Diatrizoate Rash     Ioxaglate Other (See Comments)     Does not recall     Levofloxacin Hives and Rash     Metrizamide Rash     (Diagnostic X-Ray materials)     Probenecid Rash       Review of Systems:  See HPI    Vital Signs:  Temp 97.7   Pulse 84   Respirations 16   /73   Oxygen Sats 93%   Weight 334#    Physical Exam:     Pleasant and alert without distress.    Sclera nonicteric, conjunctiva non-inflamed.  Skin color pink.   Lungs clear to auscultation throughout. No wheezes or rales noted.   Cardiovascular irregular No murmur noted. Controlled rate  Abdomen large soft  and without tenderness   Extremities with sock line edema worse on surgical side (right). DPPT    Left lower extremity with darkened, rough, cracking skin.   Right knee aquacell dressing intact  Gait is stable.   Able to move upper and lower extremities       New Labs/Diagnostics:  Hemoglobin   Date Value Ref Range Status   06/27/2023 12.8 (L) 13.3 - 17.7 g/dL Final   09/29/2020 14.5 13.3 - 17.7 g/dL Final     Last Comprehensive Metabolic Panel:  Sodium   Date Value Ref Range Status   05/25/2023 140 136 - 145 mmol/L Final   09/29/2020 138 134 - 144 mmol/L Final     Potassium   Date Value Ref Range Status   05/25/2023 4.0 3.4 - 5.3 mmol/L Final   08/11/2022 3.7 3.5 - 5.1 mmol/L Final   09/29/2020 4.3 3.5 - 5.1 mmol/L Final     Chloride   Date Value Ref Range Status   05/25/2023 102 98 - 107 mmol/L Final   08/11/2022 102 98 - 107 mmol/L Final   09/29/2020 101 98 - 107 mmol/L Final     Carbon Dioxide   Date Value Ref Range Status   09/29/2020 33 (H) 21 - 31 mmol/L Final     Carbon Dioxide (CO2)   Date Value Ref Range Status   05/25/2023 27 22 - 29 mmol/L Final   08/11/2022 29 21 - 31 mmol/L Final     Anion Gap   Date Value Ref Range Status   05/25/2023 11 7 - 15 mmol/L Final   08/11/2022 8 3 - 14 mmol/L Final   09/29/2020 4 3 - 14 mmol/L Final     Glucose   Date Value Ref Range Status   08/11/2022 87 70 - 105 mg/dL Final   09/29/2020 98 70 - 105 mg/dL Final     GLUCOSE BY METER POCT   Date Value Ref Range Status   06/26/2023 99 70 - 99 mg/dL Final     Urea Nitrogen   Date Value Ref Range Status   05/25/2023 16.3 8.0 - 23.0 mg/dL Final   08/11/2022 12 7 - 25 mg/dL Final   09/29/2020 14 7 - 25 mg/dL Final     Creatinine   Date Value Ref Range Status   05/25/2023 0.76 0.67 - 1.17 mg/dL Final   09/29/2020 0.99 0.70 - 1.30 mg/dL Final     GFR Estimate   Date Value Ref Range Status   05/25/2023 >90 >60 mL/min/1.73m2 Final     Comment:     eGFR calculated using 2021 CKD-EPI equation.   09/29/2020 77 >60 mL/min/[1.73_m2] Final      Calcium   Date Value Ref Range Status   05/25/2023 9.6 8.8 - 10.2 mg/dL Final   09/29/2020 9.7 8.6 - 10.3 mg/dL Final     Bilirubin Total   Date Value Ref Range Status   05/25/2023 0.7 <=1.2 mg/dL Final   09/29/2020 0.8 0.3 - 1.0 mg/dL Final     Alkaline Phosphatase   Date Value Ref Range Status   05/25/2023 71 40 - 129 U/L Final   09/29/2020 60 34 - 104 U/L Final     ALT   Date Value Ref Range Status   05/25/2023 25 10 - 50 U/L Final   09/29/2020 16 7 - 52 U/L Final     AST   Date Value Ref Range Status   05/25/2023 25 10 - 50 U/L Final   09/29/2020 19 13 - 39 U/L Final         Assessment:  (Z96.651) Status post total right knee replacement  (primary encounter diagnosis)  (G81.94) Left hemiparesis (H)  (I48.21) Permanent atrial fibrillation (H)  (I10) Essential hypertension  (Z79.01) Long-term (current) use of anticoagulants, INR goal 2.0-3.0  (Z79.01) Anticoagulation monitoring, INR range 2-3  (I87.2) Chronic venous stasis dermatitis of left lower extremity    Plan:   Cont bridging with lovenox until INR 2-3, cont coumadin and follow with coumadin clinic  Monitor BPs and HRs, weights and update NP if SBP remaining over 140, HR over 100, or weights trending up. Cont hydrochlorothiazide and amlodipine.   Cont with PT/OT  Check BMP, Hgb next week s/p TKA, anemia  Follow up with ortho July 11      A total of 45 minutes was spent with this patient, of which more than 50% was spent in counseling and/or coordination of care.     JAYLEN Riley, CNP ....................  6/28/2023   11:24 AM

## 2023-06-30 ENCOUNTER — TRANSFERRED RECORDS (OUTPATIENT)
Dept: HEALTH INFORMATION MANAGEMENT | Facility: OTHER | Age: 63
End: 2023-06-30

## 2023-06-30 ENCOUNTER — ANTICOAGULATION THERAPY VISIT (OUTPATIENT)
Dept: ANTICOAGULATION | Facility: OTHER | Age: 63
End: 2023-06-30
Attending: FAMILY MEDICINE
Payer: COMMERCIAL

## 2023-06-30 DIAGNOSIS — Z79.01 ANTICOAGULATION MONITORING, INR RANGE 2-3: ICD-10-CM

## 2023-06-30 DIAGNOSIS — I48.91 ATRIAL FIBRILLATION WITH RVR (H): ICD-10-CM

## 2023-06-30 DIAGNOSIS — I48.21 PERMANENT ATRIAL FIBRILLATION (H): Primary | ICD-10-CM

## 2023-06-30 LAB — INR (EXTERNAL): 1.7 (ref 0.9–1.1)

## 2023-06-30 NOTE — PROGRESS NOTES
ANTICOAGULATION MANAGEMENT     Babak Moran 63 year old male is on warfarin with subtherapeutic INR result. (Goal INR 2.0-3.0)    Recent labs: (last 7 days)     06/30/23  1353   INR 1.7*       ASSESSMENT       Source(s): Chart Review and Home Care/Facility Nurse       Warfarin doses taken: Held for knee surgery  recently which may be affecting INR    Diet: No new diet changes identified    Medication/supplement changes: None noted    New illness, injury, or hospitalization: Yes: hospital stay after knee surgery    Signs or symptoms of bleeding or clotting: No    Previous result: Subtherapeutic    Additional findings: Bridging with Enoxaparin until INR >= 2.0         PLAN     Recommended plan for temporary change(s) affecting INR     Dosing Instructions: booster dose then continue your current warfarin dose Continue bridging with Enoxaparin with next INR in 3 days       Summary  As of 6/30/2023    Full warfarin instructions:  6/30: 15 mg; Otherwise 7.5 mg every Tue, Fri; 10 mg all other days   Next INR check:  7/3/2023             Faxed dosing and follow up instructions to [facility]    Orders given to  Homecare nurse/facility to recheck    Patient not here, [employer]heet with screening questions and current INR received via FAX from outside agency. Results reviewed, Warfarin dosing per protocol, and recommended follow-up appointment made. Paperwork FAXED back to facility.       Plan made per [facility] anticoagulation protocol    Shelley Mercado, RN  Anticoagulation Clinic  6/30/2023    _______________________________________________________________________     Anticoagulation Episode Summary     Current INR goal:  2.0-3.0   TTR:  61.5 % (11.9 mo)   Target end date:  Indefinite   Send INR reminders to:  [facility]    Indications    Permanent atrial fibrillation (H) [I48.21]  Anticoagulation monitoring  INR range 2-3 (Resolved) [Z79.01]  Atrial fibrillation with RVR (H) [I48.91]  Anticoagulation  monitoring  INR range 2-3 [Z79.01]           Comments:  Babak prefers to be closer to 3.0 d/t previous CVA check Q 4 weeks.Declines to reduce dose when slightly over 3.0  Needs to change PCP  Takes a long time get up to therapeutic after holding warfarin.         Anticoagulation Care Providers     Provider Role Specialty Phone number    Teo Funes MD Referring Family Medicine [phone]

## 2023-07-03 ENCOUNTER — LAB REQUISITION (OUTPATIENT)
Dept: LAB | Facility: OTHER | Age: 63
End: 2023-07-03
Payer: COMMERCIAL

## 2023-07-03 ENCOUNTER — TRANSFERRED RECORDS (OUTPATIENT)
Dept: HEALTH INFORMATION MANAGEMENT | Facility: OTHER | Age: 63
End: 2023-07-03

## 2023-07-03 ENCOUNTER — ANTICOAGULATION THERAPY VISIT (OUTPATIENT)
Dept: ANTICOAGULATION | Facility: OTHER | Age: 63
End: 2023-07-03
Attending: FAMILY MEDICINE
Payer: COMMERCIAL

## 2023-07-03 DIAGNOSIS — I48.21 PERMANENT ATRIAL FIBRILLATION (H): Primary | ICD-10-CM

## 2023-07-03 DIAGNOSIS — Z96.651 PRESENCE OF RIGHT ARTIFICIAL KNEE JOINT: ICD-10-CM

## 2023-07-03 DIAGNOSIS — Z79.01 ANTICOAGULATION MONITORING, INR RANGE 2-3: ICD-10-CM

## 2023-07-03 DIAGNOSIS — I48.91 ATRIAL FIBRILLATION WITH RVR (H): ICD-10-CM

## 2023-07-03 DIAGNOSIS — D64.9 ANEMIA, UNSPECIFIED: ICD-10-CM

## 2023-07-03 LAB
ANION GAP SERPL CALCULATED.3IONS-SCNC: 12 MMOL/L (ref 7–15)
BUN SERPL-MCNC: 19.6 MG/DL (ref 8–23)
CALCIUM SERPL-MCNC: 8.7 MG/DL (ref 8.8–10.2)
CHLORIDE SERPL-SCNC: 98 MMOL/L (ref 98–107)
CREAT SERPL-MCNC: 0.71 MG/DL (ref 0.67–1.17)
DEPRECATED HCO3 PLAS-SCNC: 26 MMOL/L (ref 22–29)
GFR SERPL CREATININE-BSD FRML MDRD: >90 ML/MIN/1.73M2
GLUCOSE SERPL-MCNC: 146 MG/DL (ref 70–99)
HGB BLD-MCNC: 11.9 G/DL (ref 13.3–17.7)
INR PPP: 2.41 (ref 0.85–1.15)
POTASSIUM SERPL-SCNC: 4.2 MMOL/L (ref 3.4–5.3)
SODIUM SERPL-SCNC: 136 MMOL/L (ref 136–145)

## 2023-07-03 PROCEDURE — 85018 HEMOGLOBIN: CPT | Performed by: NURSE PRACTITIONER

## 2023-07-03 PROCEDURE — 85610 PROTHROMBIN TIME: CPT | Performed by: NURSE PRACTITIONER

## 2023-07-03 PROCEDURE — 80048 BASIC METABOLIC PNL TOTAL CA: CPT | Performed by: NURSE PRACTITIONER

## 2023-07-03 NOTE — PROGRESS NOTES
ANTICOAGULATION MANAGEMENT     Babak Moran 63 year old male is on warfarin with therapeutic INR result. (Goal INR 2.0-3.0)    Recent labs: (last 7 days)     07/03/23  1000   INR 2.41*       ASSESSMENT       Source(s): Chart Review, Home Care/Facility Nurse and Template       Warfarin doses taken: Warfarin taken as instructed    Diet: No new diet changes identified    New illness, injury, or hospitalization: No    Medication/supplement changes: None noted    Signs or symptoms of bleeding or clotting: No    Previous INR: Subtherapeutic    Additional findings: Bridging with Enoxaparin until INR >= 2.0-okay to stop lovenox       PLAN     Recommended plan for no diet, medication or health factor changes affecting INR     Dosing Instructions: Continue your current warfarin dose with next INR in 4 days       Summary  As of 7/3/2023    Full warfarin instructions:  7.5 mg every Tue, Fri; 10 mg all other days   Next INR check:  7/7/2023             Faxed dosing and follow up instructions to [facility]    Orders given to  Homecare nurse/facility to recheck    Education provided: Please call back if any changes to your diet, medications or how you've been taking warfarin    Plan made per [facility] anticoagulation protocol    Nikki Aguilar, RN  Anticoagulation Clinic  7/3/2023    _______________________________________________________________________     Anticoagulation Episode Summary     Current INR goal:  2.0-3.0   TTR:  61.1 % (11.8 mo)   Target end date:  Indefinite   Send INR reminders to:  [facility]    Indications    Permanent atrial fibrillation (H) [I48.21]  Anticoagulation monitoring  INR range 2-3 (Resolved) [Z79.01]  Atrial fibrillation with RVR (H) [I48.91]  Anticoagulation monitoring  INR range 2-3 [Z79.01]           Comments:  Babak prefers to be closer to 3.0 d/t previous CVA check Q 4 weeks.Declines to reduce dose when slightly over 3.0  Needs to change PCP  Takes a long time get up to  therapeutic after holding warfarin.         Anticoagulation Care Providers     Provider Role Specialty Phone number    Teo Funes MD Referring Family Medicine [phone]

## 2023-07-07 ENCOUNTER — TRANSFERRED RECORDS (OUTPATIENT)
Dept: HEALTH INFORMATION MANAGEMENT | Facility: OTHER | Age: 63
End: 2023-07-07

## 2023-07-07 ENCOUNTER — ANTICOAGULATION THERAPY VISIT (OUTPATIENT)
Dept: ANTICOAGULATION | Facility: OTHER | Age: 63
End: 2023-07-07
Attending: FAMILY MEDICINE
Payer: COMMERCIAL

## 2023-07-07 DIAGNOSIS — I48.91 ATRIAL FIBRILLATION WITH RVR (H): ICD-10-CM

## 2023-07-07 DIAGNOSIS — I48.21 PERMANENT ATRIAL FIBRILLATION (H): Primary | ICD-10-CM

## 2023-07-07 DIAGNOSIS — Z79.01 ANTICOAGULATION MONITORING, INR RANGE 2-3: ICD-10-CM

## 2023-07-07 LAB — INR (EXTERNAL): 3.6 (ref 0.9–1.1)

## 2023-07-07 NOTE — PROGRESS NOTES
ANTICOAGULATION MANAGEMENT     Babak Moran 63 year old male is on warfarin with supratherapeutic INR result. (Goal INR 2.0-3.0)    Recent labs: (last 7 days)     07/07/23  1331   INR 3.6*       ASSESSMENT       Source(s): Chart Review, Home Care/Facility Nurse and Template       Warfarin doses taken: Warfarin taken as instructed    Diet: No new diet changes identified    New illness, injury, or hospitalization: No    Medication/supplement changes: None noted    Signs or symptoms of bleeding or clotting: No    Previous INR: Therapeutic last visit; previously outside of goal range    Additional findings: None       PLAN     Recommended plan for no diet, medication or health factor changes affecting INR     Dosing Instructions: partial hold then continue your current warfarin dose with next INR in 3 days       Summary  As of 7/7/2023    Full warfarin instructions:  7/7: 5 mg; Otherwise 7.5 mg every Tue, Fri; 10 mg all other days   Next INR check:  7/10/2023             Faxed dosing and follow up instructions to [facility]    Lab visit scheduled    Education provided: Please call back if any changes to your diet, medications or how you've been taking warfarin    Plan made per [facility] anticoagulation protocol    Nikki Aguilar, RN  Anticoagulation Clinic  7/7/2023    _______________________________________________________________________     Anticoagulation Episode Summary     Current INR goal:  2.0-3.0   TTR:  60.5 % (11.9 mo)   Target end date:  Indefinite   Send INR reminders to:  [facility]    Indications    Permanent atrial fibrillation (H) [I48.21]  Anticoagulation monitoring  INR range 2-3 (Resolved) [Z79.01]  Atrial fibrillation with RVR (H) [I48.91]  Anticoagulation monitoring  INR range 2-3 [Z79.01]           Comments:  Babak prefers to be closer to 3.0 d/t previous CVA check Q 4 weeks.Declines to reduce dose when slightly over 3.0  Needs to change PCP  Takes a long time get up to therapeutic  after holding warfarin.         Anticoagulation Care Providers     Provider Role Specialty Phone number    Teo Funes MD Referring Family Medicine [phone]

## 2023-07-10 ENCOUNTER — TRANSFERRED RECORDS (OUTPATIENT)
Dept: HEALTH INFORMATION MANAGEMENT | Facility: OTHER | Age: 63
End: 2023-07-10

## 2023-07-10 ENCOUNTER — ANTICOAGULATION THERAPY VISIT (OUTPATIENT)
Dept: ANTICOAGULATION | Facility: OTHER | Age: 63
End: 2023-07-10
Attending: FAMILY MEDICINE
Payer: COMMERCIAL

## 2023-07-10 DIAGNOSIS — I48.21 PERMANENT ATRIAL FIBRILLATION (H): Primary | ICD-10-CM

## 2023-07-10 DIAGNOSIS — I48.91 ATRIAL FIBRILLATION WITH RVR (H): ICD-10-CM

## 2023-07-10 DIAGNOSIS — Z79.01 ANTICOAGULATION MONITORING, INR RANGE 2-3: ICD-10-CM

## 2023-07-10 LAB — INR (EXTERNAL): 3.1 (ref 0.9–1.1)

## 2023-07-10 NOTE — PROGRESS NOTES
ANTICOAGULATION MANAGEMENT     Babak Moran 63 year old male is on warfarin with therapeutic INR result. (Goal INR 2.0-3.0)    Recent labs: (last 7 days)     07/10/23  0940   INR 3.1*       ASSESSMENT       Source(s): Chart Review and Home Care/Facility Nurse       Warfarin doses taken: Warfarin taken as instructed    Diet: No new diet changes identified    Medication/supplement changes: None noted    New illness, injury, or hospitalization: No    Signs or symptoms of bleeding or clotting: No    Previous result: Supratherapeutic    Additional findings: None         PLAN     Recommended plan for no diet, medication or health factor changes affecting INR     Dosing Instructions: Continue your current warfarin dose with next INR in 4 days       Summary  As of 7/10/2023    Full warfarin instructions:  7.5 mg every Tue, Fri; 10 mg all other days   Next INR check:  7/13/2023             Faxed dosing and follow up instructions to [facility]    Orders given to  Homecare nurse/facility to recheck    Patient not here, [employer] Communication sheet with screening questions and current INR received via FAX from outside agency. Results reviewed, Warfarin dosing per protocol, and recommended follow-up appointment made. Paperwork FAXED back to facility.       Plan made per [facility] anticoagulation protocol    Shelley Mercado RN  Anticoagulation Clinic  7/10/2023    _______________________________________________________________________     Anticoagulation Episode Summary     Current INR goal:  2.0-3.0   TTR:  59.7 % (11.8 mo)   Target end date:  Indefinite   Send INR reminders to:  [facility]    Indications    Permanent atrial fibrillation (H) [I48.21]  Anticoagulation monitoring  INR range 2-3 (Resolved) [Z79.01]  Atrial fibrillation with RVR (H) [I48.91]  Anticoagulation monitoring  INR range 2-3 [Z79.01]           Comments:  Babak prefers to be closer to 3.0 d/t previous CVA check Q 4 weeks.Declines to reduce dose when  Informed pt's daughter of urine culture results. Informed daughter pt is to do a repeat urine sample and can come in at anytime to give the sample. Daughter verbalized understanding and states she will try to bring pt in tonight. All questions and concerns addressed at this time.    slightly over 3.0  Needs to change PCP  Takes a long time get up to therapeutic after holding warfarin.         Anticoagulation Care Providers     Provider Role Specialty Phone number    Teo Funes MD Referring Family Medicine [phone]

## 2023-07-11 ENCOUNTER — OFFICE VISIT (OUTPATIENT)
Dept: ORTHOPEDICS | Facility: OTHER | Age: 63
End: 2023-07-11
Payer: COMMERCIAL

## 2023-07-11 DIAGNOSIS — M17.11 PRIMARY OSTEOARTHRITIS OF RIGHT KNEE: Primary | ICD-10-CM

## 2023-07-11 DIAGNOSIS — Z96.651 STATUS POST TOTAL RIGHT KNEE REPLACEMENT: ICD-10-CM

## 2023-07-11 PROCEDURE — G0463 HOSPITAL OUTPT CLINIC VISIT: HCPCS

## 2023-07-11 NOTE — PROGRESS NOTES
Patient presents to clinic at 's request for staple removal from his right total knee replacement done two weeks ago. Incision is clean, dry and intact. No redness or drainage. Staples were removed without difficulty. Steri strips applied. Patient will follow up as scheduled. Sooner if any problems occur.  Suzanne Benedict LPN .....................7/11/2023 9:19 AM

## 2023-07-13 ENCOUNTER — TRANSFERRED RECORDS (OUTPATIENT)
Dept: HEALTH INFORMATION MANAGEMENT | Facility: OTHER | Age: 63
End: 2023-07-13

## 2023-07-13 ENCOUNTER — ANTICOAGULATION THERAPY VISIT (OUTPATIENT)
Dept: ANTICOAGULATION | Facility: OTHER | Age: 63
End: 2023-07-13
Attending: FAMILY MEDICINE
Payer: COMMERCIAL

## 2023-07-13 DIAGNOSIS — I48.21 PERMANENT ATRIAL FIBRILLATION (H): Primary | ICD-10-CM

## 2023-07-13 DIAGNOSIS — I48.91 ATRIAL FIBRILLATION WITH RVR (H): ICD-10-CM

## 2023-07-13 DIAGNOSIS — Z79.01 ANTICOAGULATION MONITORING, INR RANGE 2-3: ICD-10-CM

## 2023-07-13 LAB — INR (EXTERNAL): 3.4 (ref 0.9–1.1)

## 2023-07-13 NOTE — PROGRESS NOTES
ANTICOAGULATION MANAGEMENT     Babak Moran 63 year old male is on warfarin with supratherapeutic INR result. (Goal INR 2.0-3.0)    Recent labs: (last 7 days)     07/13/23  1318   INR 3.4*       ASSESSMENT       Source(s): Chart Review, Home Care/Facility Nurse and Template       Warfarin doses taken: Warfarin taken as instructed    Diet: No new diet changes identified    New illness, injury, or hospitalization: No    Medication/supplement changes: Called the nurse on the rehab unit at [facility]-patient has been taking tylenol twice a day the past week or so-has norco on board but looks like patient has not been taking that. Nurse said he's been taking 650 mg twice a day this last week. none today so far though     Signs or symptoms of bleeding or clotting: No    Previous INR: Supratherapeutic    Additional findings: None       PLAN     Recommended plan for temporary change(s) affecting INR     Dosing Instructions: partial hold then continue your current warfarin dose with next INR in 4 days       Summary  As of 7/13/2023    Full warfarin instructions:  7/13: 5 mg; Otherwise 7.5 mg every Tue, Fri; 10 mg all other days   Next INR check:  7/17/2023             Faxed dosing and follow up instructions to [facility] rehab    Orders given to  Homecare nurse/facility to recheck    Education provided: Please call back if any changes to your diet, medications or how you've been taking warfarin    Plan made per [facility] anticoagulation protocol    Nikki Aguilar, RN  Anticoagulation Clinic  7/13/2023    _______________________________________________________________________     Anticoagulation Episode Summary     Current INR goal:  2.0-3.0   TTR:  58.8 % (11.9 mo)   Target end date:  Indefinite   Send INR reminders to:  [facility]    Indications    Permanent atrial fibrillation (H) [I48.21]  Anticoagulation monitoring  INR range 2-3 (Resolved) [Z79.01]  Atrial fibrillation with RVR (H)  [I48.91]  Anticoagulation monitoring  INR range 2-3 [Z79.01]           Comments:  Babak prefers to be closer to 3.0 d/t previous CVA check Q 4 weeks.Declines to reduce dose when slightly over 3.0  Needs to change PCP  Takes a long time get up to therapeutic after holding warfarin.         Anticoagulation Care Providers     Provider Role Specialty Phone number    Teo Funes MD Referring Family Medicine [phone]

## 2023-07-17 ENCOUNTER — TRANSFERRED RECORDS (OUTPATIENT)
Dept: HEALTH INFORMATION MANAGEMENT | Facility: OTHER | Age: 63
End: 2023-07-17

## 2023-07-17 ENCOUNTER — ANTICOAGULATION THERAPY VISIT (OUTPATIENT)
Dept: ANTICOAGULATION | Facility: OTHER | Age: 63
End: 2023-07-17
Attending: FAMILY MEDICINE
Payer: COMMERCIAL

## 2023-07-17 DIAGNOSIS — Z79.01 ANTICOAGULATION MONITORING, INR RANGE 2-3: ICD-10-CM

## 2023-07-17 DIAGNOSIS — I48.91 ATRIAL FIBRILLATION WITH RVR (H): ICD-10-CM

## 2023-07-17 DIAGNOSIS — I48.21 PERMANENT ATRIAL FIBRILLATION (H): Primary | ICD-10-CM

## 2023-07-17 LAB — INR (EXTERNAL): 2.9 (ref 0.9–1.1)

## 2023-07-17 NOTE — PROGRESS NOTES
ANTICOAGULATION MANAGEMENT     Babak Moran 63 year old male is on warfarin with therapeutic INR result. (Goal INR 2.0-3.0)    Recent labs: (last 7 days)     07/17/23  1443   INR 2.9*       ASSESSMENT       Source(s): Chart Review and Patient/Caregiver Call       Warfarin doses taken: Warfarin taken as instructed    Diet: No new diet changes identified    New illness, injury, or hospitalization: No    Medication/supplement changes: None noted    Signs or symptoms of bleeding or clotting: No    Previous INR: Supratherapeutic    Additional findings: None       PLAN     Recommended plan for no diet, medication or health factor changes affecting INR     Dosing Instructions: decrease your warfarin dose (11.5% change) with next INR in 3 days       Summary  As of 7/17/2023    Full warfarin instructions:  10 mg every Mon, Thu; 7.5 mg all other days   Next INR check:  7/20/2023             Faxed dosing and follow up instructions to [place] Patient not here, [employer]heet with screening questions and current INR received via FAX from outside agency. Results reviewed, Warfarin dosing per protocol, and recommended follow-up appointment made. Paperwork FAXED back to facility.       Orders given to  Homecare nurse/facility to recheck    Education provided: None required    Plan made per [facility] anticoagulation protocol    Liz Coles RN  Anticoagulation Clinic  7/17/2023    _______________________________________________________________________     Anticoagulation Episode Summary     Current INR goal:  2.0-3.0   TTR:  57.9 % (11.9 mo)   Target end date:  Indefinite   Send INR reminders to:  [facility]    Indications    Permanent atrial fibrillation (H) [I48.21]  Anticoagulation monitoring  INR range 2-3 (Resolved) [Z79.01]  Atrial fibrillation with RVR (H) [I48.91]  Anticoagulation monitoring  INR range 2-3 [Z79.01]           Comments:  Babak prefers to be closer to 3.0 d/t previous CVA check Q 4  weeks.Declines to reduce dose when slightly over 3.0  Needs to change PCP  Takes a long time get up to therapeutic after holding warfarin.         Anticoagulation Care Providers     Provider Role Specialty Phone number    Teo Funes MD Referring Family Medicine [phone]

## 2023-07-20 ENCOUNTER — TRANSFERRED RECORDS (OUTPATIENT)
Dept: HEALTH INFORMATION MANAGEMENT | Facility: OTHER | Age: 63
End: 2023-07-20

## 2023-07-20 ENCOUNTER — ANTICOAGULATION THERAPY VISIT (OUTPATIENT)
Dept: ANTICOAGULATION | Facility: OTHER | Age: 63
End: 2023-07-20
Attending: FAMILY MEDICINE
Payer: COMMERCIAL

## 2023-07-20 DIAGNOSIS — I48.91 ATRIAL FIBRILLATION WITH RVR (H): ICD-10-CM

## 2023-07-20 DIAGNOSIS — Z79.01 ANTICOAGULATION MONITORING, INR RANGE 2-3: ICD-10-CM

## 2023-07-20 DIAGNOSIS — I48.21 PERMANENT ATRIAL FIBRILLATION (H): Primary | ICD-10-CM

## 2023-07-20 LAB — INR (EXTERNAL): 3.6 (ref 0.9–1.1)

## 2023-07-20 NOTE — PROGRESS NOTES
ANTICOAGULATION MANAGEMENT     Babak Moran 63 year old male is on warfarin with supratherapeutic INR result. (Goal INR 2.0-3.0)    Recent labs: (last 7 days)     07/20/23  1355   INR 3.6*       ASSESSMENT       Source(s): Chart Review and Patient/Caregiver Call       Warfarin doses taken: Warfarin taken as instructed    Diet: No new diet changes identified    New illness, injury, or hospitalization: No    Medication/supplement changes: None noted    Signs or symptoms of bleeding or clotting: No    Previous INR: Therapeutic last visit; previously outside of goal range    Additional findings: None       PLAN     Recommended plan for no diet, medication or health factor changes affecting INR     Dosing Instructions: Continue your current warfarin dose with next INR in 3 days       Summary  As of 7/20/2023    Full warfarin instructions:  10 mg every Mon; 7.5 mg all other days   Next INR check:  7/24/2023             Faxed dosing and follow up instructions to [facility] RN Patient not here, [employer]heet with screening questions and current INR received via FAX from outside agency. Results reviewed, Warfarin dosing per protocol, and recommended follow-up appointment made. Paperwork FAXED back to facility.       Lab visit scheduled    Education provided: None required    Plan made per [facility] anticoagulation protocol    Liz Coles RN  Anticoagulation Clinic  7/20/2023    _______________________________________________________________________     Anticoagulation Episode Summary     Current INR goal:  2.0-3.0   TTR:  57.2 % (11.9 mo)   Target end date:  Indefinite   Send INR reminders to:  [facility]    Indications    Permanent atrial fibrillation (H) [I48.21]  Anticoagulation monitoring  INR range 2-3 (Resolved) [Z79.01]  Atrial fibrillation with RVR (H) [I48.91]  Anticoagulation monitoring  INR range 2-3 [Z79.01]           Comments:  Babak prefers to be closer to 3.0 d/t previous CVA  check Q 4 weeks.Declines to reduce dose when slightly over 3.0  Needs to change PCP  Takes a long time get up to therapeutic after holding warfarin.         Anticoagulation Care Providers     Provider Role Specialty Phone number    Teo Funes MD Referring Family Medicine [phone]

## 2023-07-21 ENCOUNTER — MEDICAL CORRESPONDENCE (OUTPATIENT)
Dept: HEALTH INFORMATION MANAGEMENT | Facility: OTHER | Age: 63
End: 2023-07-21
Payer: COMMERCIAL

## 2023-07-24 ENCOUNTER — ANTICOAGULATION THERAPY VISIT (OUTPATIENT)
Dept: ANTICOAGULATION | Facility: OTHER | Age: 63
End: 2023-07-24
Attending: FAMILY MEDICINE
Payer: COMMERCIAL

## 2023-07-24 DIAGNOSIS — Z79.01 ANTICOAGULATION MONITORING, INR RANGE 2-3: ICD-10-CM

## 2023-07-24 DIAGNOSIS — I48.21 PERMANENT ATRIAL FIBRILLATION (H): Primary | ICD-10-CM

## 2023-07-24 DIAGNOSIS — I48.91 ATRIAL FIBRILLATION WITH RVR (H): ICD-10-CM

## 2023-07-24 LAB — INR POINT OF CARE: 3.4 (ref 0.9–1.1)

## 2023-07-24 PROCEDURE — 85610 PROTHROMBIN TIME: CPT | Mod: ZL,QW

## 2023-07-24 RX ORDER — WARFARIN SODIUM 5 MG/1
TABLET ORAL
Qty: 160 TABLET | Refills: 1 | Status: SHIPPED | OUTPATIENT
Start: 2023-07-24 | End: 2023-09-13

## 2023-07-24 NOTE — PROGRESS NOTES
ANTICOAGULATION MANAGEMENT     Babak Moran 63 year old male is on warfarin with supratherapeutic INR result. (Goal INR 2.0-3.0)    Recent labs: (last 7 days)     07/24/23  1347   INR 3.4*       ASSESSMENT     Source(s): Chart Review and In person      Warfarin doses taken: Warfarin taken as instructed  Diet: No new diet changes identified  New illness, injury, or hospitalization: No  Medication/supplement changes: None noted  Signs or symptoms of bleeding or clotting: No  Previous INR: Supratherapeutic  Additional findings: None     PLAN     Recommended plan for no diet, medication or health factor changes affecting INR     Dosing Instructions: Continue your current warfarin dose with next INR in 1 week       Summary  As of 7/24/2023      Full warfarin instructions:  7.5 mg every day   Next INR check:  7/31/2023               In person    Lab visit scheduled    Education provided: Please call back if any changes to your diet, medications or how you've been taking warfarin    Plan made per [facility] anticoagulation protocol      Nikki Aguilar RN  Anticoagulation Clinic  7/24/2023    _______________________________________________________________________     Anticoagulation Episode Summary       Current INR goal:  2.0-3.0   TTR:  56.1 % (11.9 mo)   Target end date:  Indefinite   Send INR reminders to:  [facility]    Indications    Permanent atrial fibrillation (H) [I48.21]  Anticoagulation monitoring  INR range 2-3 (Resolved) [Z79.01]  Atrial fibrillation with RVR (H) [I48.91]  Anticoagulation monitoring  INR range 2-3 [Z79.01]             Comments:  Babak prefers to be closer to 3.0 d/t previous CVA check Q 4 weeks.Declines to reduce dose when slightly over 3.0  Needs to change PCP  Takes a long time get up to therapeutic after holding warfarin.             Anticoagulation Care Providers       Provider Role Specialty Phone number    Teo Funes MD Referring Family Medicine [phone]

## 2023-07-24 NOTE — TELEPHONE ENCOUNTER
Patient Rx request for the following:      Requested Prescriptions   Pending Prescriptions Disp Refills    warfarin ANTICOAGULANT (COUMADIN) 5 MG tablet 160 tablet 1     Sig: Take 10 mg by mouth on 6/27, 6/28, and 6/29. Recheck INR on 6/30. Further dosing instructions per anticoagulation clinic.       There is no refill protocol information for this order          Last Prescription Date:   6/27/23  Last Fill Qty/Refills:         160, R-1    Last Office Visit:              6/6/23  Future Office visit:           none  Nikki Aguilar RN on 7/24/2023 at 3:03 PM

## 2023-07-27 ENCOUNTER — THERAPY VISIT (OUTPATIENT)
Dept: PHYSICAL THERAPY | Facility: OTHER | Age: 63
End: 2023-07-27
Attending: ORTHOPAEDIC SURGERY
Payer: COMMERCIAL

## 2023-07-27 DIAGNOSIS — M17.11 PRIMARY OSTEOARTHRITIS OF RIGHT KNEE: ICD-10-CM

## 2023-07-27 PROCEDURE — 97110 THERAPEUTIC EXERCISES: CPT | Mod: GP

## 2023-07-27 PROCEDURE — 97161 PT EVAL LOW COMPLEX 20 MIN: CPT | Mod: GP

## 2023-07-27 NOTE — PROGRESS NOTES
PHYSICAL THERAPY EVALUATION  Type of Visit: Evaluation  See electronic medical record for Abuse and Falls Screening details.    Subjective       Presenting condition or subjective complaint: Recent R TKA  Date of onset:      Relevant medical history: Stroke; Significant weakness   Dates & types of surgery: L TKA 1/9/23    Prior diagnostic imaging/testing results: X-ray     Prior therapy history for the same diagnosis, illness or injury: No      Prior Level of Function  Transfers: Assistive equipment  Ambulation: Assistive equipment  ADL: Assistive equipment  IADL:     Living Environment  Social support: With a significant other or spouse   Type of home: House   Stairs to enter the home: Yes 1 Is there a railing: Yes   Ramp: Yes   Stairs inside the home: Yes 15 Is there a railing: Yes   Help at home: None  Equipment owned: Straight Cane; Walker with wheels; Standard wheelchair     Employment: No    Hobbies/Interests: Get out in the yard, hunting related activities    Patient goals for therapy: Walk as desired on all surfaces    Pain assessment: No c/o pain     Objective   KNEE EVALUATION  PAIN:No c/o pain  INTEGUMENTARY (edema, incisions):  No Pain  POSTURE: WFL  GAIT:  Weightbearing Status: WBAT  Assistive Device(s): Cane (single end) FWW and four wheel walkker  Gait Deviations: Antalgic  Stance time decreased  Stride length decreased  Huma decreased  Drop foot L  BALANCE/PROPRIOCEPTION: Poor.  Due to B recent TKA  WEIGHTBEARING ALIGNMENT: Functional  NON-WEIGHTBEARING ALIGNMENT:   ROM: Knee flexion 96 deg, extension +5  STRENGTH: Right knee flexion and extension are 3-/5 due to limited AROM. Right hip strengh is 3+/5 for hip abd  B hip ext is 3-?5 B.   FLEXIBILITY:   SPECIAL TESTS: Not indicated due to the post surgical nature of this diagnosis.  FUNCTIONAL TESTS: Not able to complete  PALPATION: Mild to mod tenderness of the right medial knee region  JOINT MOBILITY: See AROM measures    Assessment & Plan    CLINICAL IMPRESSIONS  Medical Diagnosis: S/P R TKA    Treatment Diagnosis: Impaired mobility of the right lower extremity due to recent TKA   Impression/Assessment: Patient is a 63 year old male  whom presents to PT 4 weeks SP. R TKA. He has a recent histort of a LTKA in January of 2023. He also has significant history of stroke and left sided weakness. He resided in an SNF for 3 weeks S/P surgery. He has experienced intermittent significant functional difficulty due to B LE weakness since returning home. At the time of this eval he was unable to transfer up out of his chair in the waiting room w/o an significant assist of 2 persons.  The following significant findings have been identified: Decreased ROM/flexibility, Decreased strength, Impaired balance, Inflammation, Edema, Impaired gait, and Decreased activity tolerance. These impairments interfere with their ability to perform self care tasks, recreational activities, household chores, driving , household mobility, and community mobility as compared to previous level of function.       Clinical Decision Making (Complexity):  Clinical Presentation: Stable/Uncomplicated  Clinical Presentation Rationale: based on medical and personal factors listed in PT evaluation  Clinical Decision Making (Complexity): Low complexity    PLAN OF CARE  Treatment Interventions:  Modalities: Cryotherapy  Interventions: Gait Training, Manual Therapy, Therapeutic Activity, Therapeutic Exercise    Long Term Goals     PT Goal 1  Goal Identifier: Pain  Goal Description: Patient will report a reduction to no higher than 1-2/10 pain in the right knee with functional walking within the home distances of up to 100 feet on a daily basis.  He will be able to ambulate up and down stairs as required to have access to his entire home completing stairs on an as-needed basis daily.  He will accomplish this goal within 12 weeks  Target Date: 10/27/23  PT Goal 2  Goal Identifier: Strength  Goal  Description: Patient will demonstrate right lower extremity strength of at least grade 4+ to 5/5 MMT in the entire right hip knee and ankle muscle groups.  This will provide the support for the right knee and lower extremity in general allowing normal gait up and down stairs, within his home and on uneven and wooded trails so that he may return to his desired outdoor activities including hunting etc.  He will accomplish this goal within 12 weeks.  Rationale: to maximize safety and independence within the home;to maximize safety and independence within the community  Target Date: 10/27/23  PT Goal 3  Goal Identifier: Mobility  Goal Description: Patient will demonstrate right knee flexion to at least 115 degrees actively.  Right knee extension will be within 5 degrees of normal allowing for normal gait without permanent.  Restored mobility in the right knee will also allow patient to sit comfortably in all chairs and be able to position his knees advantageously so that he is able to get up and down from chairs with less difficulty.  Improved knee range of motion also will allow improved gait function on stairs both up and down which she will be completing on a daily basis.  He will accomplish these goals within 12 weeks.  Rationale: to maximize safety and independence with performance of ADLs and functional tasks;to maximize safety and independence within the home;to maximize safety and independence within the community  Target Date: 10/27/23      Frequency of Treatment:    Duration of Treatment:      Recommended Referrals to Other Professionals:  No additional referral required at this time.   Education Assessment:   Learner/Method: Patient;Significant Other    Risks and benefits of evaluation/treatment have been explained.   Patient/Family/caregiver agrees with Plan of Care.     Evaluation Time:     PT Eval, Low Complexity Minutes (80724): 35       Signing Clinician: Jamil Mcmillan PT

## 2023-07-31 ENCOUNTER — HOSPITAL ENCOUNTER (INPATIENT)
Facility: OTHER | Age: 63
LOS: 5 days | Discharge: NURSING, REHAB OR LONG-TERM CARE | End: 2023-08-05
Attending: EMERGENCY MEDICINE | Admitting: INTERNAL MEDICINE
Payer: COMMERCIAL

## 2023-07-31 ENCOUNTER — APPOINTMENT (OUTPATIENT)
Dept: GENERAL RADIOLOGY | Facility: OTHER | Age: 63
End: 2023-07-31
Attending: EMERGENCY MEDICINE
Payer: COMMERCIAL

## 2023-07-31 DIAGNOSIS — N39.0 SEPSIS DUE TO URINARY TRACT INFECTION (H): ICD-10-CM

## 2023-07-31 DIAGNOSIS — A41.9 SEPSIS DUE TO URINARY TRACT INFECTION (H): ICD-10-CM

## 2023-07-31 DIAGNOSIS — N39.0 COMPLICATED UTI (URINARY TRACT INFECTION): ICD-10-CM

## 2023-07-31 DIAGNOSIS — Z16.12 INFECTION DUE TO ESBL-PRODUCING ESCHERICHIA COLI: ICD-10-CM

## 2023-07-31 DIAGNOSIS — I48.91 ATRIAL FIBRILLATION WITH RVR (H): ICD-10-CM

## 2023-07-31 DIAGNOSIS — A49.8 INFECTION DUE TO ESBL-PRODUCING ESCHERICHIA COLI: ICD-10-CM

## 2023-07-31 DIAGNOSIS — A41.9 SEPSIS, DUE TO UNSPECIFIED ORGANISM, UNSPECIFIED WHETHER ACUTE ORGAN DYSFUNCTION PRESENT (H): ICD-10-CM

## 2023-07-31 DIAGNOSIS — Z11.52 ENCOUNTER FOR SCREENING LABORATORY TESTING FOR COVID-19 VIRUS: Primary | ICD-10-CM

## 2023-07-31 LAB
ALBUMIN SERPL BCG-MCNC: 3.2 G/DL (ref 3.5–5.2)
ALBUMIN UR-MCNC: 200 MG/DL
ALP SERPL-CCNC: 90 U/L (ref 40–129)
ALT SERPL W P-5'-P-CCNC: 25 U/L (ref 0–70)
ANION GAP SERPL CALCULATED.3IONS-SCNC: 10 MMOL/L (ref 7–15)
APPEARANCE UR: ABNORMAL
APTT PPP: 63 SECONDS (ref 22–38)
AST SERPL W P-5'-P-CCNC: 24 U/L (ref 0–45)
BACTERIA #/AREA URNS HPF: ABNORMAL /HPF
BASE EXCESS BLDV CALC-SCNC: 4.5 MMOL/L (ref -7.7–1.9)
BASOPHILS # BLD AUTO: 0 10E3/UL (ref 0–0.2)
BASOPHILS NFR BLD AUTO: 0 %
BILIRUB SERPL-MCNC: 0.7 MG/DL
BILIRUB UR QL STRIP: NEGATIVE
BUN SERPL-MCNC: 15.1 MG/DL (ref 8–23)
CALCIUM SERPL-MCNC: 8.8 MG/DL (ref 8.8–10.2)
CHLORIDE SERPL-SCNC: 101 MMOL/L (ref 98–107)
COLOR UR AUTO: YELLOW
CREAT SERPL-MCNC: 1.02 MG/DL (ref 0.67–1.17)
DEPRECATED HCO3 PLAS-SCNC: 28 MMOL/L (ref 22–29)
EOSINOPHIL # BLD AUTO: 0 10E3/UL (ref 0–0.7)
EOSINOPHIL NFR BLD AUTO: 0 %
ERYTHROCYTE [DISTWIDTH] IN BLOOD BY AUTOMATED COUNT: 17.2 % (ref 10–15)
FLUAV RNA SPEC QL NAA+PROBE: NEGATIVE
FLUBV RNA RESP QL NAA+PROBE: NEGATIVE
GFR SERPL CREATININE-BSD FRML MDRD: 83 ML/MIN/1.73M2
GLUCOSE SERPL-MCNC: 97 MG/DL (ref 70–99)
GLUCOSE UR STRIP-MCNC: NEGATIVE MG/DL
HCO3 BLDV-SCNC: 31 MMOL/L (ref 21–28)
HCT VFR BLD AUTO: 40.6 % (ref 40–53)
HGB BLD-MCNC: 12.5 G/DL (ref 13.3–17.7)
HGB UR QL STRIP: ABNORMAL
HOLD SPECIMEN: NORMAL
HYALINE CASTS: 10 /LPF
IMM GRANULOCYTES # BLD: 0.1 10E3/UL
IMM GRANULOCYTES NFR BLD: 1 %
INR PPP: 3.27 (ref 0.85–1.15)
KETONES UR STRIP-MCNC: NEGATIVE MG/DL
LACTATE SERPL-SCNC: 2 MMOL/L (ref 0.7–2)
LEUKOCYTE ESTERASE UR QL STRIP: ABNORMAL
LYMPHOCYTES # BLD AUTO: 0.9 10E3/UL (ref 0.8–5.3)
LYMPHOCYTES NFR BLD AUTO: 6 %
MCH RBC QN AUTO: 26.9 PG (ref 26.5–33)
MCHC RBC AUTO-ENTMCNC: 30.8 G/DL (ref 31.5–36.5)
MCV RBC AUTO: 87 FL (ref 78–100)
MONOCYTES # BLD AUTO: 2.1 10E3/UL (ref 0–1.3)
MONOCYTES NFR BLD AUTO: 13 %
MUCOUS THREADS #/AREA URNS LPF: PRESENT /LPF
NEUTROPHILS # BLD AUTO: 12.5 10E3/UL (ref 1.6–8.3)
NEUTROPHILS NFR BLD AUTO: 80 %
NITRATE UR QL: NEGATIVE
NRBC # BLD AUTO: 0 10E3/UL
NRBC BLD AUTO-RTO: 0 /100
O2/TOTAL GAS SETTING VFR VENT: 0 %
OXYHGB MFR BLDV: 56 % (ref 70–75)
PCO2 BLDV: 55 MM HG (ref 40–50)
PH BLDV: 7.36 [PH] (ref 7.32–7.43)
PH UR STRIP: 6 [PH] (ref 5–9)
PLATELET # BLD AUTO: 295 10E3/UL (ref 150–450)
PO2 BLDV: 33 MM HG (ref 25–47)
POTASSIUM SERPL-SCNC: 4 MMOL/L (ref 3.4–5.3)
PROCALCITONIN SERPL IA-MCNC: 0.21 NG/ML
PROT SERPL-MCNC: 6.8 G/DL (ref 6.4–8.3)
RBC # BLD AUTO: 4.65 10E6/UL (ref 4.4–5.9)
RBC URINE: >182 /HPF
RSV RNA SPEC NAA+PROBE: NEGATIVE
SARS-COV-2 RNA RESP QL NAA+PROBE: NEGATIVE
SODIUM SERPL-SCNC: 139 MMOL/L (ref 136–145)
SP GR UR STRIP: 1.02 (ref 1–1.03)
UROBILINOGEN UR STRIP-MCNC: NORMAL MG/DL
WBC # BLD AUTO: 15.6 10E3/UL (ref 4–11)
WBC CLUMPS #/AREA URNS HPF: PRESENT /HPF
WBC URINE: >182 /HPF

## 2023-07-31 PROCEDURE — 87186 SC STD MICRODIL/AGAR DIL: CPT | Performed by: EMERGENCY MEDICINE

## 2023-07-31 PROCEDURE — C9803 HOPD COVID-19 SPEC COLLECT: HCPCS | Performed by: EMERGENCY MEDICINE

## 2023-07-31 PROCEDURE — 84145 PROCALCITONIN (PCT): CPT | Performed by: EMERGENCY MEDICINE

## 2023-07-31 PROCEDURE — 80053 COMPREHEN METABOLIC PANEL: CPT | Performed by: EMERGENCY MEDICINE

## 2023-07-31 PROCEDURE — 120N000001 HC R&B MED SURG/OB

## 2023-07-31 PROCEDURE — 250N000011 HC RX IP 250 OP 636: Mod: JZ | Performed by: EMERGENCY MEDICINE

## 2023-07-31 PROCEDURE — 36415 COLL VENOUS BLD VENIPUNCTURE: CPT | Performed by: EMERGENCY MEDICINE

## 2023-07-31 PROCEDURE — 99285 EMERGENCY DEPT VISIT HI MDM: CPT | Mod: 25 | Performed by: EMERGENCY MEDICINE

## 2023-07-31 PROCEDURE — 87040 BLOOD CULTURE FOR BACTERIA: CPT | Performed by: EMERGENCY MEDICINE

## 2023-07-31 PROCEDURE — 96374 THER/PROPH/DIAG INJ IV PUSH: CPT | Performed by: EMERGENCY MEDICINE

## 2023-07-31 PROCEDURE — 85730 THROMBOPLASTIN TIME PARTIAL: CPT | Performed by: EMERGENCY MEDICINE

## 2023-07-31 PROCEDURE — 71046 X-RAY EXAM CHEST 2 VIEWS: CPT

## 2023-07-31 PROCEDURE — 82805 BLOOD GASES W/O2 SATURATION: CPT | Performed by: EMERGENCY MEDICINE

## 2023-07-31 PROCEDURE — 258N000003 HC RX IP 258 OP 636: Performed by: INTERNAL MEDICINE

## 2023-07-31 PROCEDURE — 87637 SARSCOV2&INF A&B&RSV AMP PRB: CPT | Performed by: EMERGENCY MEDICINE

## 2023-07-31 PROCEDURE — 258N000003 HC RX IP 258 OP 636: Performed by: EMERGENCY MEDICINE

## 2023-07-31 PROCEDURE — 81001 URINALYSIS AUTO W/SCOPE: CPT | Performed by: EMERGENCY MEDICINE

## 2023-07-31 PROCEDURE — 83605 ASSAY OF LACTIC ACID: CPT | Performed by: EMERGENCY MEDICINE

## 2023-07-31 PROCEDURE — 99285 EMERGENCY DEPT VISIT HI MDM: CPT | Performed by: EMERGENCY MEDICINE

## 2023-07-31 PROCEDURE — 250N000013 HC RX MED GY IP 250 OP 250 PS 637: Performed by: INTERNAL MEDICINE

## 2023-07-31 PROCEDURE — 250N000013 HC RX MED GY IP 250 OP 250 PS 637: Performed by: EMERGENCY MEDICINE

## 2023-07-31 PROCEDURE — 85610 PROTHROMBIN TIME: CPT | Performed by: EMERGENCY MEDICINE

## 2023-07-31 PROCEDURE — 85025 COMPLETE CBC W/AUTO DIFF WBC: CPT | Performed by: EMERGENCY MEDICINE

## 2023-07-31 PROCEDURE — 99223 1ST HOSP IP/OBS HIGH 75: CPT | Mod: AI | Performed by: INTERNAL MEDICINE

## 2023-07-31 RX ORDER — SODIUM CHLORIDE 9 MG/ML
INJECTION, SOLUTION INTRAVENOUS CONTINUOUS
Status: DISCONTINUED | OUTPATIENT
Start: 2023-07-31 | End: 2023-08-02

## 2023-07-31 RX ORDER — ACETAMINOPHEN 500 MG
1000 TABLET ORAL ONCE
Status: COMPLETED | OUTPATIENT
Start: 2023-07-31 | End: 2023-07-31

## 2023-07-31 RX ORDER — CEFTRIAXONE 1 G/1
1 INJECTION, POWDER, FOR SOLUTION INTRAMUSCULAR; INTRAVENOUS EVERY 24 HOURS
Status: DISCONTINUED | OUTPATIENT
Start: 2023-08-01 | End: 2023-08-01

## 2023-07-31 RX ORDER — BISACODYL 10 MG
10 SUPPOSITORY, RECTAL RECTAL DAILY PRN
Status: DISCONTINUED | OUTPATIENT
Start: 2023-07-31 | End: 2023-08-05 | Stop reason: HOSPADM

## 2023-07-31 RX ORDER — AMOXICILLIN 250 MG
2 CAPSULE ORAL 2 TIMES DAILY PRN
Status: DISCONTINUED | OUTPATIENT
Start: 2023-07-31 | End: 2023-08-05 | Stop reason: HOSPADM

## 2023-07-31 RX ORDER — ACETAMINOPHEN 325 MG/1
975 TABLET ORAL EVERY 6 HOURS PRN
Status: DISCONTINUED | OUTPATIENT
Start: 2023-07-31 | End: 2023-08-05 | Stop reason: HOSPADM

## 2023-07-31 RX ORDER — LIDOCAINE 40 MG/G
CREAM TOPICAL
Status: DISCONTINUED | OUTPATIENT
Start: 2023-07-31 | End: 2023-08-05 | Stop reason: HOSPADM

## 2023-07-31 RX ORDER — POLYETHYLENE GLYCOL 3350 17 G/17G
17 POWDER, FOR SOLUTION ORAL DAILY PRN
Status: DISCONTINUED | OUTPATIENT
Start: 2023-07-31 | End: 2023-08-05 | Stop reason: HOSPADM

## 2023-07-31 RX ORDER — ONDANSETRON 4 MG/1
4 TABLET, ORALLY DISINTEGRATING ORAL EVERY 6 HOURS PRN
Status: DISCONTINUED | OUTPATIENT
Start: 2023-07-31 | End: 2023-08-05 | Stop reason: HOSPADM

## 2023-07-31 RX ORDER — HYDROCHLOROTHIAZIDE 25 MG/1
25 TABLET ORAL DAILY
Status: DISCONTINUED | OUTPATIENT
Start: 2023-08-01 | End: 2023-08-05 | Stop reason: HOSPADM

## 2023-07-31 RX ORDER — AMOXICILLIN 250 MG
1 CAPSULE ORAL 2 TIMES DAILY PRN
Status: DISCONTINUED | OUTPATIENT
Start: 2023-07-31 | End: 2023-08-05 | Stop reason: HOSPADM

## 2023-07-31 RX ORDER — CEFTRIAXONE 2 G/1
2 INJECTION, POWDER, FOR SOLUTION INTRAMUSCULAR; INTRAVENOUS ONCE
Status: COMPLETED | OUTPATIENT
Start: 2023-07-31 | End: 2023-07-31

## 2023-07-31 RX ORDER — AMLODIPINE BESYLATE 5 MG/1
5 TABLET ORAL DAILY
Status: DISCONTINUED | OUTPATIENT
Start: 2023-08-01 | End: 2023-08-02

## 2023-07-31 RX ORDER — ONDANSETRON 2 MG/ML
4 INJECTION INTRAMUSCULAR; INTRAVENOUS EVERY 6 HOURS PRN
Status: DISCONTINUED | OUTPATIENT
Start: 2023-07-31 | End: 2023-08-05 | Stop reason: HOSPADM

## 2023-07-31 RX ORDER — WARFARIN SODIUM 5 MG/1
5 TABLET ORAL
Status: COMPLETED | OUTPATIENT
Start: 2023-07-31 | End: 2023-07-31

## 2023-07-31 RX ADMIN — CEFTRIAXONE 2 G: 2 INJECTION, POWDER, FOR SOLUTION INTRAMUSCULAR; INTRAVENOUS at 17:19

## 2023-07-31 RX ADMIN — WARFARIN SODIUM 5 MG: 5 TABLET ORAL at 21:22

## 2023-07-31 RX ADMIN — SODIUM CHLORIDE 1000 ML: 9 INJECTION, SOLUTION INTRAVENOUS at 17:19

## 2023-07-31 RX ADMIN — SODIUM CHLORIDE: 9 INJECTION, SOLUTION INTRAVENOUS at 21:19

## 2023-07-31 RX ADMIN — ACETAMINOPHEN 1000 MG: 500 TABLET ORAL at 15:58

## 2023-07-31 ASSESSMENT — ACTIVITIES OF DAILY LIVING (ADL)
ADLS_ACUITY_SCORE: 37
ADLS_ACUITY_SCORE: 26
ADLS_ACUITY_SCORE: 37
ADLS_ACUITY_SCORE: 26

## 2023-07-31 NOTE — ED TRIAGE NOTES
Pt presents for generalized weakness, syncope, some blood in the urine as well. States to discharge last week following a knee replacement and then a rehab stay at [facility], has been home since last week. States since that time he has noted a gradual decline in his mobility/activity tolerance, then 3 days ago did have a syncopal episode without injury. Today pt was not able to get up without assistance. Denies new pain. Some new bruising noted to the midsection that the pt associates with transfer belt use.    /73   Pulse 100   Temp (!) 101.3  F (38.5  C) (Tympanic)   Resp 22   Wt (!) 155.6 kg (343 lb)   SpO2 93%   BMI 44.04 kg/m          Triage Assessment       Row Name 07/31/23 1592       Triage Assessment (Adult)    Airway WDL WDL       Respiratory WDL    Respiratory WDL WDL       Skin Circulation/Temperature WDL    Skin Circulation/Temperature WDL WDL       Cardiac WDL    Cardiac WDL WDL       Peripheral/Neurovascular WDL    Peripheral Neurovascular WDL WDL       Cognitive/Neuro/Behavioral WDL    Cognitive/Neuro/Behavioral WDL WDL

## 2023-07-31 NOTE — ED PROVIDER NOTES
[facility]  Emergency Department Visit Note    Generalized Weakness and Fall      History of Present Illness     HPI:  Babak Moran is a 63 year old male presenting to the Emergency Department today for evaluation of weakness and hematuria with back pain and nausea without vomiting.  Patient is status post right total knee arthroplasty on June 26.  He did recover in a TCU and was discharged 1 week ago.  He had been doing fairly well and went camping over the weekend.  He did notice that he was having more weakness and difficult time getting around but felt okay this morning.  At home he was unable to push himself up out of the car to get into his house.  He felt very worn out.  Was unable to get himself out of the chair.  Upon arrival he was noted to be febrile.  Does think he was having fevers last week.  He has no cough, shortness of breath,  chest pain.  He has not had a history of frequent urinary tract infections in the past.     Medications:  Prior to Admission medications    Medication Sig Last Dose Taking? Auth Provider Long Term End Date   acetaminophen (TYLENOL) 325 MG tablet Take 2 tablets (650 mg) by mouth every 4 hours as needed for other (mild pain)   Elliott Johnson MD     amLODIPine (NORVASC) 5 MG tablet Take 1 tablet (5 mg) by mouth daily   Teo Funes MD Yes    hydrochlorothiazide (HYDRODIURIL) 25 MG tablet Take 1 tablet (25 mg) by mouth daily   Teo Funes MD Yes    warfarin ANTICOAGULANT (COUMADIN) 5 MG tablet 7.5 mg every day or as directed by the [facility] clinic   Teo Funes MD         Allergies:  Allergies   Allergen Reactions    Diatrizoate Rash    Ioxaglate Other (See Comments)     Does not recall    Levofloxacin Hives and Rash    Metrizamide Rash     (Diagnostic X-Ray materials)    Probenecid Rash       Problem List:  Patient Active Problem List   Diagnosis    Pain in joint, ankle and foot    Atrial fibrillation with RVR (H)    Cerebrovascular  accident (CVA) due to embolism of right middle cerebral artery (H)    Osteoarthrosis    Cutaneous lupus erythematosus    Gout    Ascending aorta enlargement (H)    Family history of coronary artery disease    Essential hypertension    Left hemiparesis (H)    Long-term (current) use of anticoagulants, INR goal 2.0-3.0    Systemic lupus erythematosus (H)    Tissue plasminogen activator (t-PA) administered at other facility within 24 hours prior to current admission    Morbid obesity with BMI of 40.0-44.9, adult (H)    Permanent atrial fibrillation (H)    Obstructive sleep apnea syndrome    Need for vaccination    Onychogryphosis    Overweight    Aftercare following knee joint replacement surgery, unspecified laterality    Nail dystrophy    Onychomycosis    Anticoagulation monitoring, INR range 2-3    Status post total right knee replacement    Chronic venous stasis dermatitis of left lower extremity    Sepsis due to urinary tract infection (H)       Past Medical History:  Past Medical History:   Diagnosis Date    Atrial fibrillation (H) 02/03/2017    on Warfarin    Bacterial sepsis (H) 8/8/2022    Cellulitis of left lower extremity 9/5/2019    Cerebral infarction (H)     Cerebral infarction due to unspecified occlusion or stenosis of right middle cerebral artery (H) 02/03/2017    ,Left hemiparesis, left neglect, impaired balance and gait following R MCA stroke with mild hemorrhagic transformation s/p tissue plasminogen activator and mechanical thrombectomy, s/p C7 facet fracture from fall off forklift.    Chest pain 02/1997    Disorder of skin or subcutaneous tissue 07/25/2011    Essential (primary) hypertension 02/1997    Fracture of cervical vertebra (H)     02/03/2017,C7 facet fracture from fall off forklift, nondisplaced    Gout     Hemiplegia affecting left nondominant side (H) 02/03/2017    Obesity 02/03/2017    body mass index of 40    Other local lupus erythematosus     Sepsis (H) 9/6/2019       Past Surgical  History:  Past Surgical History:   Procedure Laterality Date    ARTHROPLASTY KNEE Left 1/9/2023    Procedure: ARTHROPLASTY, KNEE, TOTAL;  Surgeon: Elliott Johnson MD;  Location: [facility] OR    ARTHROPLASTY KNEE Right 6/26/2023    Procedure: Arthroplasty knee;  Surgeon: Elliott Johnson MD;  Location: [facility] OR    ARTHROSCOPY KNEE      bilateral knees    COLONOSCOPY  01/02/2012    Normal; Next due 2022    ORIF WRIST FRACTURE  2019       Social History:  Social History     Tobacco Use    Smoking status: Never     Passive exposure: Yes    Smokeless tobacco: Never   Vaping Use    Vaping Use: Never used   Substance Use Topics    Alcohol use: Not Currently     Comment: couple of times per year    Drug use: Never       Review of Systems:  10 point review of systems obtained and pertinent positive and negative findings noted in HPI. Review of systems otherwise negative.      Physical Exam     Vital signs: /62   Pulse 108   Temp (!) 101.3  F (38.5  C) (Tympanic)   Resp 22   Wt (!) 155.6 kg (343 lb)   SpO2 96%   BMI 44.04 kg/m      Physical Exam:    General: awake, alert, weak appearing  HEENT: no scleral injection, no nasal discharge, neck supple  Chest: non labored respirations, symmetric chest rise, no accessory muscle use  Cardiovascular: regular rate, regular rhythm, distally capillary refill intact  Abdomen: soft, nontender, no rebound or guarding, nondistended  Back: no costovertebral angle tenderness  Extremities: Bilateral knees without effusions, warmth, erythema  Skin: warm, dry, no rashes  Neuro: alert, moving extremities x 4, unable to sit up in bed without assistance      Medical Decision Making & ED Course     Patient presents to the Emergency Department for evaluation of urinary symptoms with fever and weakness differential includes urinary tract infection, hemorrhagic cystitis, pyelonephritis, urolithiasis, infected urolithiasis, . Urinalysis suggests infection in the urinary tract which, with associated  symptoms, suggests pyelonephritis and sepsis. Given the patient's concerning symptoms, he requires intravenous antibiotics and observation admission for safe and optimal treatment of this severe pyelonephritis. Initiating antibiotics with Ceftin in the emergency department.  The patient requires admission to the hospital for further evaluation and management. The case discussed with the hospitalist on call who agrees to accept this admission.    I have reviewed the patients ECG, laboratory studies, imaging, and medical records.    ED Course as of 07/31/23 1809 Mon Jul 31, 2023   1647 WBC(!): 15.6   1647 Absolute Neutrophils(!): 12.5   1647 XR Chest 2 Views  HISTORY: Fever, .     COMPARISON:  6/2/2023     FINDINGS: Habitus limits penetration.  The cardiomediastinal contours are stable. The trachea is midline.  No focal consolidation, effusion or pneumothorax.    Old left rib fractures are redemonstrated. No subdiaphragmatic free  air.                                                                      IMPRESSION:       No discrete consolidation.        1714 Procalcitonin(!): 0.21   1714 WBC Clumps(!): Present   1714 Bacteria Urine(!): Moderate   1714 Leukocyte Esterase Urine(!): Large       .    Diagnosis & Disposition     Diagnosis:  1. Sepsis, due to unspecified organism, unspecified whether acute organ dysfunction present (H)    2. Complicated UTI (urinary tract infection)        Disposition:  Admit    MD Chelsie Armendariz Theresa M, MD  07/31/23 1809

## 2023-08-01 ENCOUNTER — APPOINTMENT (OUTPATIENT)
Dept: OCCUPATIONAL THERAPY | Facility: OTHER | Age: 63
End: 2023-08-01
Attending: INTERNAL MEDICINE
Payer: COMMERCIAL

## 2023-08-01 ENCOUNTER — APPOINTMENT (OUTPATIENT)
Dept: PHYSICAL THERAPY | Facility: OTHER | Age: 63
End: 2023-08-01
Attending: INTERNAL MEDICINE
Payer: COMMERCIAL

## 2023-08-01 LAB
ANION GAP SERPL CALCULATED.3IONS-SCNC: 7 MMOL/L (ref 7–15)
BUN SERPL-MCNC: 16.8 MG/DL (ref 8–23)
CALCIUM SERPL-MCNC: 8.1 MG/DL (ref 8.8–10.2)
CHLORIDE SERPL-SCNC: 105 MMOL/L (ref 98–107)
CREAT SERPL-MCNC: 0.7 MG/DL (ref 0.67–1.17)
DEPRECATED HCO3 PLAS-SCNC: 27 MMOL/L (ref 22–29)
ERYTHROCYTE [DISTWIDTH] IN BLOOD BY AUTOMATED COUNT: 17.2 % (ref 10–15)
GFR SERPL CREATININE-BSD FRML MDRD: >90 ML/MIN/1.73M2
GLUCOSE SERPL-MCNC: 109 MG/DL (ref 70–99)
HCT VFR BLD AUTO: 35.2 % (ref 40–53)
HGB BLD-MCNC: 11 G/DL (ref 13.3–17.7)
HOLD SPECIMEN: NORMAL
HOLD SPECIMEN: NORMAL
INR PPP: 3.53 (ref 0.85–1.15)
LACTATE SERPL-SCNC: 1.6 MMOL/L (ref 0.7–2)
MAGNESIUM SERPL-MCNC: 2 MG/DL (ref 1.7–2.3)
MCH RBC QN AUTO: 27.2 PG (ref 26.5–33)
MCHC RBC AUTO-ENTMCNC: 31.3 G/DL (ref 31.5–36.5)
MCV RBC AUTO: 87 FL (ref 78–100)
PLATELET # BLD AUTO: 278 10E3/UL (ref 150–450)
POTASSIUM SERPL-SCNC: 3.5 MMOL/L (ref 3.4–5.3)
RBC # BLD AUTO: 4.04 10E6/UL (ref 4.4–5.9)
SODIUM SERPL-SCNC: 139 MMOL/L (ref 136–145)
WBC # BLD AUTO: 14.8 10E3/UL (ref 4–11)

## 2023-08-01 PROCEDURE — 83605 ASSAY OF LACTIC ACID: CPT | Performed by: INTERNAL MEDICINE

## 2023-08-01 PROCEDURE — 250N000013 HC RX MED GY IP 250 OP 250 PS 637: Performed by: INTERNAL MEDICINE

## 2023-08-01 PROCEDURE — 97530 THERAPEUTIC ACTIVITIES: CPT | Mod: GP

## 2023-08-01 PROCEDURE — 97116 GAIT TRAINING THERAPY: CPT | Mod: GP

## 2023-08-01 PROCEDURE — 97161 PT EVAL LOW COMPLEX 20 MIN: CPT | Mod: GP

## 2023-08-01 PROCEDURE — 36415 COLL VENOUS BLD VENIPUNCTURE: CPT | Performed by: INTERNAL MEDICINE

## 2023-08-01 PROCEDURE — 97530 THERAPEUTIC ACTIVITIES: CPT | Mod: GO | Performed by: OCCUPATIONAL THERAPIST

## 2023-08-01 PROCEDURE — 97165 OT EVAL LOW COMPLEX 30 MIN: CPT | Mod: GO | Performed by: OCCUPATIONAL THERAPIST

## 2023-08-01 PROCEDURE — 80048 BASIC METABOLIC PNL TOTAL CA: CPT | Performed by: INTERNAL MEDICINE

## 2023-08-01 PROCEDURE — 99233 SBSQ HOSP IP/OBS HIGH 50: CPT | Mod: 24 | Performed by: INTERNAL MEDICINE

## 2023-08-01 PROCEDURE — 83735 ASSAY OF MAGNESIUM: CPT | Performed by: INTERNAL MEDICINE

## 2023-08-01 PROCEDURE — 258N000003 HC RX IP 258 OP 636: Performed by: INTERNAL MEDICINE

## 2023-08-01 PROCEDURE — 250N000011 HC RX IP 250 OP 636: Mod: JZ | Performed by: INTERNAL MEDICINE

## 2023-08-01 PROCEDURE — 120N000001 HC R&B MED SURG/OB

## 2023-08-01 PROCEDURE — 85027 COMPLETE CBC AUTOMATED: CPT | Performed by: INTERNAL MEDICINE

## 2023-08-01 PROCEDURE — 85610 PROTHROMBIN TIME: CPT | Performed by: INTERNAL MEDICINE

## 2023-08-01 RX ORDER — CEFTRIAXONE 2 G/1
2 INJECTION, POWDER, FOR SOLUTION INTRAMUSCULAR; INTRAVENOUS EVERY 24 HOURS
Status: DISCONTINUED | OUTPATIENT
Start: 2023-08-01 | End: 2023-08-02

## 2023-08-01 RX ADMIN — CEFTRIAXONE 2 G: 2 INJECTION, POWDER, FOR SOLUTION INTRAMUSCULAR; INTRAVENOUS at 16:04

## 2023-08-01 RX ADMIN — HYDROCHLOROTHIAZIDE 25 MG: 25 TABLET ORAL at 10:42

## 2023-08-01 RX ADMIN — ACETAMINOPHEN 975 MG: 325 TABLET, FILM COATED ORAL at 02:38

## 2023-08-01 RX ADMIN — AMLODIPINE BESYLATE 5 MG: 5 TABLET ORAL at 10:41

## 2023-08-01 RX ADMIN — SODIUM CHLORIDE: 9 INJECTION, SOLUTION INTRAVENOUS at 07:21

## 2023-08-01 RX ADMIN — SODIUM CHLORIDE: 9 INJECTION, SOLUTION INTRAVENOUS at 17:20

## 2023-08-01 RX ADMIN — ACETAMINOPHEN 975 MG: 325 TABLET, FILM COATED ORAL at 13:59

## 2023-08-01 ASSESSMENT — ACTIVITIES OF DAILY LIVING (ADL)
ADLS_ACUITY_SCORE: 26

## 2023-08-01 NOTE — H&P
[facility]    History and Physical - Hospitalist Service       Date of Admission:  7/31/2023    Assessment & Plan      Babak Moran is a 63 year old male admitted on 7/31/2023. He presents with generalized weakness, hematuria and back pain.    Principal Problem:    Sepsis due to urinary tract infection (H)  Assessment: Present on admission.  Lactate 2.0, white blood cell count 15.6, urinalysis shows pyuria and bacteriuria.  Temperature 101.3.  He had a right total knee replacement 1 month ago.  He is a has bilateral lower extremity edema but nothing on exam that appears hot or red in the surgical site region.  He is complaining of no new knee pain.  Plan: Admit to the hospital  IV fluids  Blood cultures and urine cultures  Empiric ceftriaxone    Active Problems:    Cerebrovascular accident (CVA) due to embolism of right middle cerebral artery (H)  Assessment: Prior history with residual left-sided weakness      Essential hypertension  Assessment: Chronic      Systemic lupus erythematosus (H)      Morbid obesity with BMI of 40.0-44.9, adult (H)      Permanent atrial fibrillation (H)  Assessment: Chronic, rate controlled and on warfarin  Plan: Continue warfarin      Obstructive sleep apnea syndrome  Assessment: Chronic      Status post total right knee replacement  Assessment: June 2023     Diet:  regular  DVT Prophylaxis: Warfarin  Ramirez Catheter: Not present  Lines: None     Cardiac Monitoring: None  Code Status:  full    Clinically Significant Risk Factors Present on Admission              # Hypoalbuminemia: Lowest albumin = 3.2 g/dL at 7/31/2023  3:57 PM, will monitor as appropriate  # Drug Induced Coagulation Defect: home medication list includes an anticoagulant medication    # Hypertension: Noted on problem list               Disposition Plan      Expected Discharge Date: 08/02/2023                  Adam Espinoza MD  Hospitalist Service  [facility]  Securely  message with Jud[employer] (more info)  Text page via [facility] Paging/Directory     ______________________________________________________________________    Chief Complaint   Weakness    History is obtained from the patient    History of Present Illness   Babak Moran is a 63 year old male who has a history of stroke and a right total knee arthroplasty on June 26, 2023 who presents to the emergency department from home with malaise, weakness and fever.  He is also noticed some back pain and hematuria.  He was discharged from [facility] on July 21 and was doing well, ambulatory and was even able to go camping.  Over the past 5 days he has had more and more difficulty getting around due to being very tired. he has had chills and sweats.  He has noticed foul-smelling urine and frequency of urination.    In the emergency department he is febrile to 101.3.  He is hemodynamically stable.  Urinalysis shows pyuria and bacteria, he has a lactate of 2.0 and white blood cell count of 15.6.  He was admitted to the hospital for sepsis due to urinary tract infection.      Past Medical History    Past Medical History:   Diagnosis Date    Atrial fibrillation (H) 02/03/2017    on Warfarin    Bacterial sepsis (H) 8/8/2022    Cellulitis of left lower extremity 9/5/2019    Cerebral infarction (H)     Cerebral infarction due to unspecified occlusion or stenosis of right middle cerebral artery (H) 02/03/2017    ,Left hemiparesis, left neglect, impaired balance and gait following R MCA stroke with mild hemorrhagic transformation s/p tissue plasminogen activator and mechanical thrombectomy, s/p C7 facet fracture from fall off forklift.    Chest pain 02/1997    Disorder of skin or subcutaneous tissue 07/25/2011    Essential (primary) hypertension 02/1997    Fracture of cervical vertebra (H)     02/03/2017,C7 facet fracture from fall off forklift, nondisplaced    Gout     Hemiplegia affecting left nondominant side (H)  2017    Obesity 2017    body mass index of 40    Other local lupus erythematosus     Sepsis (H) 2019       Past Surgical History   Past Surgical History:   Procedure Laterality Date    ARTHROPLASTY KNEE Left 2023    Procedure: ARTHROPLASTY, KNEE, TOTAL;  Surgeon: Elliott Johnson MD;  Location: [facility] OR    ARTHROPLASTY KNEE Right 2023    Procedure: Arthroplasty knee;  Surgeon: Elliott Johnson MD;  Location: [facility] OR    ARTHROSCOPY KNEE      bilateral knees    COLONOSCOPY  2012    Normal; Next due     ORIF WRIST FRACTURE         Prior to Admission Medications   Prior to Admission Medications   Prescriptions Last Dose Informant Patient Reported? Taking?   acetaminophen (TYLENOL) 325 MG tablet   No No   Sig: Take 2 tablets (650 mg) by mouth every 4 hours as needed for other (mild pain)   amLODIPine (NORVASC) 5 MG tablet  Self No No   Sig: Take 1 tablet (5 mg) by mouth daily   hydrochlorothiazide (HYDRODIURIL) 25 MG tablet  Self No No   Sig: Take 1 tablet (25 mg) by mouth daily   warfarin ANTICOAGULANT (COUMADIN) 5 MG tablet   No No   Si.5 mg every day or as directed by the [facility] clinic      Facility-Administered Medications: None        Review of Systems    CONSTITUTIONAL:POSITIVE  for chills and fever   INTEGUMENTARY/SKIN: NEGATIVE for worrisome rashes, moles or lesions  EYES: NEGATIVE for vision changes or irritation  ENT/MOUTH: NEGATIVE for ear, mouth and throat problems  RESP: NEGATIVE for significant cough or SOB  CV: NEGATIVE for chest pain, palpitations or peripheral edema  GI: NEGATIVE for nausea, abdominal pain, heartburn, or change in bowel habits   male :positive for, dysuria, frequency, and hematuria  MUSCULOSKELETAL: NEGATIVE for significant arthralgias or myalgia  NEURO: POSITIVE for weakness generalized  ENDOCRINE: NEGATIVE for temperature intolerance, skin/hair changes  HEME: NEGATIVE for bleeding problems  PSYCHIATRIC: NEGATIVE for changes in mood or  affect    Social History   I have reviewed this patient's social history and updated it with pertinent information if needed.  Social History     Tobacco Use    Smoking status: Never     Passive exposure: Yes    Smokeless tobacco: Never   Vaping Use    Vaping Use: Never used   Substance Use Topics    Alcohol use: Not Currently     Comment: couple of times per year    Drug use: Never         Family History   I have reviewed this patient's family history and updated it with pertinent information if needed.  Family History   Problem Relation Age of Onset    Unknown/Adopted Paternal Grandfather         Unknown, around age 67.    Other - See Comments Father         Parkinson's disease Alzheimer's    Cancer Father         Cancer    Heart Disease Maternal Grandmother         Heart Disease,MI in her 60's.    Heart Disease Mother 60        Heart Disease,MI    Other - See Comments Mother         rheumatoid arthritis.    Heart Disease Maternal Uncle 69        Heart Disease    Hypertension Sister         Hypertension    Cancer Sister         Cancer,melanoma    Heart Disease Paternal Uncle         Heart Disease, around 69.         Allergies   Allergies   Allergen Reactions    Diatrizoate Rash    Ioxaglate Other (See Comments)     Does not recall    Levofloxacin Hives and Rash    Metrizamide Rash     (Diagnostic X-Ray materials)    Probenecid Rash        Physical Exam   Vital Signs: Temp: 98.8  F (37.1  C) Temp src: Tympanic BP: 112/66 Pulse: 74   Resp: 22 SpO2: 96 % O2 Device: None (Room air)    Weight: 343 lbs 0 oz    Constitutional: In no apparent distress  Eyes: pupils reactive, extraocular movements intact. Anicteric sclera.   HEENT: Oropharynx nonerythematous. Neck supple, no JVD.  Respiratory: no crackles noted, no wheezes.  Cardiovascular: regular, no murmur. 1+ lower extremity edema.  GI: soft, non-tender, bowel sounds present.  Lymph/Hematologic: no cervical or supraclavicular LAD.  Genitourinary:  deferred  Skin: no erythema of right knee  Musculoskeletal: bilateral knee swelling  Neurologic: cranial nerves symmetric. Neuro exam nonfocal  Psychiatric: alert and oriented x3. Interactive.       Medical Decision Making       75 MINUTES SPENT BY ME on the date of service doing chart review, history, exam, documentation & further activities per the note.      Data     I have personally reviewed the following data over the past 24 hrs:    15.6 (H)  \   12.5 (L)   / 295     139 101 15.1 /  97   4.0 28 1.02 \     ALT: 25 AST: 24 AP: 90 TBILI: 0.7   ALB: 3.2 (L) TOT PROTEIN: 6.8 LIPASE: N/A     Procal: 0.21 (H) CRP: N/A Lactic Acid: 2.0       INR:  3.27 (H) PTT:  63 (H)   D-dimer:  N/A Fibrinogen:  N/A       Imaging results reviewed over the past 24 hrs:   Recent Results (from the past 24 hour(s))   XR Chest 2 Views    Narrative    PROCEDURE:  XR CHEST 2 VIEWS    HISTORY: Fever, .    COMPARISON:  6/2/2023    FINDINGS: Habitus limits penetration.  The cardiomediastinal contours are stable. The trachea is midline.  No focal consolidation, effusion or pneumothorax.    Old left rib fractures are redemonstrated. No subdiaphragmatic free  air.      Impression    IMPRESSION:      No discrete consolidation.    PRAVIN BARRAGAN MD         SYSTEM ID:  [id number]

## 2023-08-01 NOTE — PHARMACY
Pharmacy- Automatic Medication Dose Adjustment    Patient Active Problem List   Diagnosis    Pain in joint, ankle and foot    Atrial fibrillation with RVR (H)    Cerebrovascular accident (CVA) due to embolism of right middle cerebral artery (H)    Osteoarthrosis    Cutaneous lupus erythematosus    Gout    Ascending aorta enlargement (H)    Family history of coronary artery disease    Essential hypertension    Left hemiparesis (H)    Long-term (current) use of anticoagulants, INR goal 2.0-3.0    Systemic lupus erythematosus (H)    Tissue plasminogen activator (t-PA) administered at other facility within 24 hours prior to current admission    Morbid obesity with BMI of 40.0-44.9, adult (H)    Permanent atrial fibrillation (H)    Obstructive sleep apnea syndrome    Need for vaccination    Onychogryphosis    Overweight    Aftercare following knee joint replacement surgery, unspecified laterality    Nail dystrophy    Onychomycosis    Anticoagulation monitoring, INR range 2-3    Status post total right knee replacement    Chronic venous stasis dermatitis of left lower extremity    Sepsis due to urinary tract infection (H)        Relevant Labs:  Recent Labs   Lab Test 08/01/23  0603 07/31/23  1557   WBC 14.8* 15.6*   HGB 11.0* 12.5*    295          Intake/Output Summary (Last 24 hours) at 8/1/2023 0810  Last data filed at 8/1/2023 0227  Gross per 24 hour   Intake --   Output 400 ml   Net -400 ml          Per Automatic Medication Dose Adjustment Policy, will adjust:  -Ceftriaxone to 2 g Q24H (from 1 g Q24H) due to pt weight > 90 kg.      Will continue to follow and make adjustments accordingly. Thank You.    John Harrington [facility] ....................  8/1/2023   8:10 AM

## 2023-08-01 NOTE — PROGRESS NOTES
"   08/01/23 3129   Appointment Info   Signing Clinician's Name / Credentials (OT) Savannah Santana, OTR/L   Living Environment   People in Home spouse   Current Living Arrangements house   Home Accessibility no concerns   Transportation Anticipated family or friend will provide   Self-Care   Usual Activity Tolerance moderate   Current Activity Tolerance poor   Equipment Currently Used at Home walker, rolling;shower chair   Cognitive Status Examination   Orientation Status orientation to person, place and time   Affect/Mental Status (Cognitive) WFL   Follows Commands WFL   Visual Perception   Visual Impairment/Limitations WFL   Pain Assessment   Patient Currently in Pain No   Range of Motion Comprehensive   General Range of Motion no range of motion deficits identified   Coordination   Upper Extremity Coordination No deficits were identified   Bed Mobility   Bed Mobility supine-sit   Supine-Sit [place] (Bed Mobility)   (mod assist of 2)   Transfers   Transfers sit-stand transfer   Sit-Stand Transfer   Sit-Stand [place] (Transfers)   (pt was not able to move from sit to stand at EOB with FWW due to stating his legs were just \"too weak\")   Activities of Daily Living   BADL Assessment/Intervention   (TBA in further detail tomorrow)   Clinical Impression   Criteria for Skilled Therapeutic Interventions Met (OT) Yes, treatment indicated   OT Diagnosis UTI/Weakness   Influenced by the following impairments weakness   OT Problem List-Impairments impacting ADL activity tolerance impaired;strength   Assessment of Occupational Performance 1-3 Performance Deficits   Identified Performance Deficits mobility and self care   Clinical Decision Making Complexity (OT) low complexity   Anticipated Equipment Needs Upon Discharge (OT)   (has all necessary equipment)   Risk & Benefits of therapy have been explained risks/benefits reviewed   OT Total Evaluation Time   OT Selam, Low Complexity Minutes (06729) 15   OT Goals   Therapy " Frequency (OT) Daily   OT Predicted Duration/Target Date for Goal Attainment 08/03/23   OT Goals Transfers;Toilet Transfer/Toileting;Hygiene/Grooming;Upper Body Dressing   OT: Hygiene/Grooming supervision/stand-by assist   OT: Upper Body Dressing Supervision/stand-by assist   OT: Transfer Moderate assist   OT: Toilet Transfer/Toileting Moderate assist   Interventions   Interventions Quick Adds Therapeutic Activity   Therapeutic Activities   Therapeutic Activity Minutes (50457) 45   Symptoms noted during/after treatment fatigue   Treatment Detail/Skilled Intervention see above for details   OT Discharge Planning   OT Plan cont OT   OT Discharge Recommendation (DC Rec) Transitional Care Facility   OT Rationale for DC Rec Pt is unable to return home at this time as family cannot care for him and he ispresetnly unalb e to safely care for himself   OT Brief overview of current status see above for details, pt presents very weak and unable to fully stand at time of eval, will attempt to  progress mobility and ADL's next session.   Total Session Time   Timed Code Treatment Minutes 45   Total Session Time (sum of timed and untimed services) 60

## 2023-08-01 NOTE — PLAN OF CARE
"Patient is alert and orientated. Denies pain and appears comfortable. Lungs clear throughout, dim to RML, RLL, LLL, denies SOB or HARRIS, on room air. Heart irregular rhythm, mildy tachycardic at times, remains on tele, denies chest pain. Teto BLE with chronic neuropathy, weak pedal and PT pulses, trace BLE edema, elevated as tolerated. Abdomen soft and non tender throughout, active bowel sounds, tolerating regular diet well, denies n/v, had formed BM today. Small sheared open area of skin on coccyx with scant bleeding with cares, applied barrier cream and reinforced patient to shift weight every two hours while awake. Was not oob this shift, stated he was too weak when PT/OT tried earlier. Febrile, prn tylenol given with improvement, otherwise vitally stable. Sepsis fired, lactic returned normal.     /73 (BP Location: Left arm, Patient Position: Semi-Moralez's, Cuff Size: Adult Large)   Pulse 104   Temp 99.4  F (37.4  C) (Tympanic)   Resp 20   Wt (!) 155.6 kg (343 lb)   SpO2 97%   BMI 44.04 kg/m      Problem: Plan of Care - These are the overarching goals to be used throughout the patient stay.    Goal: Plan of Care Review  Description: The Plan of Care Review/Shift note should be completed every shift.  The Outcome Evaluation is a brief statement about your assessment that the patient is improving, declining, or no change.  This information will be displayed automatically on your shift note.  Outcome: Progressing  Goal: Patient-Specific Goal (Individualized)  Description: You can add care plan individualizations to a care plan. Examples of Individualization might be:  \"Parent requests to be called daily at 9am for status\", \"I have a hard time hearing out of my right ear\", or \"Do not touch me to wake me up as it startles me\".  Outcome: Progressing  Goal: Absence of Hospital-Acquired Illness or Injury  Outcome: Progressing  Intervention: Identify and Manage Fall Risk  Recent Flowsheet Documentation  Taken " 8/1/2023 1844 by Himanshu Avila, RN  Safety Promotion/Fall Prevention:   activity supervised   clutter free environment maintained   lighting adjusted   nonskid shoes/slippers when out of bed   patient and family education   room door open   room organization consistent   safety round/check completed   supervised activity  Intervention: Prevent and Manage VTE (Venous Thromboembolism) Risk  Recent Flowsheet Documentation  Taken 8/1/2023 1844 by Himanshu Avila, RN  VTE Prevention/Management:   SCDs (sequential compression devices) off   patient refused intervention  Goal: Optimal Comfort and Wellbeing  Outcome: Progressing  Goal: Readiness for Transition of Care  Outcome: Progressing     Problem: Hyperglycemia  Goal: Blood Glucose Level Within Targeted Range  Outcome: Adequate for Care Transition   Goal Outcome Evaluation:

## 2023-08-01 NOTE — PROGRESS NOTES
Interdisciplinary Discharge Planning Note    Anticipated Discharge Date: TBD    Anticipated Discharge Location: Home v SNF    Clinical Needs Before Discharge:  adequate comfort achieved and stable functional status    Treatment Needs After Discharge:  rehab (PT, OT, ST)    Potential Barriers to Discharge: Patient wants SNF.    JASON Clayton  8/1/2023,  1:13 PM

## 2023-08-01 NOTE — PLAN OF CARE
Patient is alert and orientated. VSS. Lung sounds clear. History of stroke, left side deficits. Normal saline running at 100 ml/hr. Patient uses urinal independently. Patient has not gotten out of bed during shift. Patient is an assist of two with walker. Patient was running a fever. PRN Tylenol and ice packs were given and patients temp came down.       Goal Outcome Evaluation:      Plan of Care Reviewed With: patient    Overall Patient Progress: improvingOverall Patient Progress: improving

## 2023-08-01 NOTE — PLAN OF CARE
Goal Outcome Evaluation:  Cellulitis left lower extremity above the ankle.  Patient reports it has been seeping off and on for 2 years. Currently leaking serosanguineous fluid.

## 2023-08-01 NOTE — PLAN OF CARE
Problem: Plan of Care - These are the overarching goals to be used throughout the patient stay.    Goal: Absence of Hospital-Acquired Illness or Injury  Intervention: Prevent Skin Injury  Recent Flowsheet Documentation  Taken 8/1/2023 0900 by Valencia Bains RN  Body Position: sitting up in bed     Problem: Plan of Care - These are the overarching goals to be used throughout the patient stay.    Goal: Absence of Hospital-Acquired Illness or Injury  Intervention: Prevent Skin Injury  Recent Flowsheet Documentation  Taken 8/1/2023 0900 by Valencia Bains, RN  Body Position: sitting up in bed    Problem: Infection  Goal: Absence of Infection Signs and Symptoms  Outcome: Progressing      Goal Outcome Evaluation: Cellulitis left lower extremity above the ankle.  Patient reports it has been seeping off and on for 2 years. Currently leaking serosanguineous fluid.

## 2023-08-01 NOTE — PROGRESS NOTES
SAFETY CHECKLIST  ID Bands and Risk clasps correct and in place (DNR, Fall risk, Allergy, Latex, Limb):  Yes  All Lines Reconciled and labeled correctly: Yes  Whiteboard updated:Yes  Environmental interventions: Yes  Verify Tele #:

## 2023-08-01 NOTE — PHARMACY-ADMISSION MEDICATION HISTORY
Pharmacist Admission Medication History    Admission medication history is complete. The information provided in this note is only as accurate as the sources available at the time of the update.    Medication reconciliation/reorder completed by provider prior to medication history? Yes    Information Source(s): Patient and CareEverywhere/SureScripts via in-person    Pertinent Information: Patient cannot recall reactions to several contrast medications noted on allergy list    Changes made to PTA medication list:  Added: None  Deleted: None  Changed: None    Medication Affordability:  Not including over the counter (OTC) medications, was there a time in the past 3 months when you did not take your medications as prescribed because of cost?: No    Allergies reviewed with patient and updates made in EHR: yes    Medication History Completed By: Oneyda Hare [facility] 7/31/2023 7:10 PM    Prior to Admission medications    Medication Sig Last Dose Taking? Auth Provider Long Term End Date   acetaminophen (TYLENOL) 325 MG tablet Take 2 tablets (650 mg) by mouth every 4 hours as needed for other (mild pain) Past Week at N/A Yes Elliott Johnson MD     amLODIPine (NORVASC) 5 MG tablet Take 1 tablet (5 mg) by mouth daily 7/31/2023 at AM Yes Teo Funes MD Yes    hydrochlorothiazide (HYDRODIURIL) 25 MG tablet Take 1 tablet (25 mg) by mouth daily 7/31/2023 at AM Yes Teo Fuens MD Yes    warfarin ANTICOAGULANT (COUMADIN) 5 MG tablet 7.5 mg every day or as directed by the [facility] clinic 7/30/2023 at PM Yes Teo Funes MD

## 2023-08-01 NOTE — PROGRESS NOTES
Admission Note    Data:  Babak Moran admitted to 359 from emergency room at 2030.      Action:  Dr. Espinoza has been notified of admission. Pt oriented to unit, call light in reach.     Response:  Patient tolerated transfer well .

## 2023-08-01 NOTE — PHARMACY-ANTICOAGULATION SERVICE
Clinical Pharmacy - Warfarin Dosing Consult     Pharmacy has been consulted to manage this patient s warfarin therapy.  Indication: Atrial Fibrillation  Therapy Goal: INR 2-3  OP Anticoag Clinic: [facility]  Warfarin Prior to Admission: Yes  Warfarin PTA Regimen: 7.5 mg daily  Recent documented change in oral intake/nutrition: Unknown  Dose Comments: Babak prefers to be closer to 3.0 d/t previous CVA check Q 4 weeks.Declines to reduce dose when slightly over 3.0    INR   Date Value Ref Range Status   07/31/2023 3.27 (H) 0.85 - 1.15 Final     INR Point of Care   Date Value Ref Range Status   07/24/2023 3.4 (A) 0.9 - 1.1 Final     Patient has been slightly supratherapeutic multiple times over the last month, with ongoing very small reductions to his total weekly dosage each time.    Recommend a slight reduction in dose tonight x1 with warfarin 5 mg today.  Pharmacy will monitor Babak Moran daily and order warfarin doses to achieve specified goal.      Please contact pharmacy as soon as possible if the warfarin needs to be held for a procedure or if the warfarin goals change.

## 2023-08-01 NOTE — PROGRESS NOTES
08/01/23 1549   Appointment Info   Signing Clinician's Name / Credentials (PT) James Kwonpedronghia MPT   Living Environment   People in Home spouse   Current Living Arrangements house   Home Accessibility no concerns;stairs to enter home   Number of Stairs, Main Entrance 1   Stair Railings, Main Entrance none   Transportation Anticipated family or friend will provide   Self-Care   Usual Activity Tolerance good   Current Activity Tolerance poor   Equipment Currently Used at Home walker, rolling   Fall history within last six months yes   Number of times patient has fallen within last six months   (multiple)   General Information   Referring Physician Olga   Patient/Family Therapy Goals Statement (PT) short term rehab prior to returning home   Existing Precautions/Restrictions fall   Weight-Bearing Status - LLE full weight-bearing   Weight-Bearing Status - RLE full weight-bearing   Cognition   Affect/Mental Status (Cognition) WFL   Orientation Status (Cognition) oriented x 4   Follows Commands (Cognition) WFL   Pain Assessment   Patient Currently in Pain No   Integumentary/Edema   Integumentary/Edema Comments healed bilateral knee surgical incisions   Posture    Posture Comments erect seated posture   Range of Motion (ROM)   Range of Motion ROM is WFL   Strength (Manual Muscle Testing)   Strength Comments LEs grossly 2+/5   Bed Mobility   Impairments Contributing to Impaired Bed Mobility decreased strength   Assistive Device (Bed Mobility) bed rails   Comment, (Bed Mobility) moderate assist for supine<>sit   Transfers   Comment, (Transfers) patient presently unable to perform stand for transfers; mechanical lift to be used should he need to transfer out of bed   Gait/Stairs (Locomotion)   Comment, (Gait/Stairs) patient presently unable to functionally ambulate   Balance   Balance Comments patient demonstrating good seated balance; unable to assess standing balance at intial PT eval   Sensory Examination   Sensory  Perception WFL   Coordination   Coordination no deficits were identified   Muscle Tone   Muscle Tone no deficits were identified   Clinical Impression   Criteria for Skilled Therapeutic Intervention Yes, treatment indicated   PT Diagnosis (PT) impaired mobility   Influenced by the following impairments fatigue and weakness   Functional limitations due to impairments activity/gait tolerance and stability   Clinical Presentation (PT Evaluation Complexity) Evolving/Changing   Clinical Decision Making (Complexity) low complexity   Planned Therapy Interventions (PT) bed mobility training;gait training;transfer training   Anticipated Equipment Needs at Discharge (PT) walker, rolling   Risk & Benefits of therapy have been explained evaluation/treatment results reviewed;patient   PT Total Evaluation Time   PT Selam Low Complexity Minutes (11348) 15   Physical Therapy Goals   PT Frequency Daily   PT Predicted Duration/Target Date for Goal Attainment 08/07/23   PT Goals Bed Mobility;Transfers;Gait   PT: Bed Mobility Minimal assist   PT: Transfers Minimal assist;Assistive device   PT: Gait Minimal assist;Rolling walker;25 feet   PT Discharge Planning   PT Plan continue PT   PT Discharge Recommendation (DC Rec) Transitional Care Facility   PT Rationale for DC Rec to promote strength, stability and return to safe, independent mobility   PT Brief overview of current status pleasant patient well known to this PT as he is s/p both right and left TKA here at [facility], presently requiring; moderate assist of 2 for bed mobilities; patient  unable to perform sit to stand due to report of bilateral LE weakness; patient will certainly benefit from continued  PT in short term rehab prior to returning home with family   Total Session Time   Total Session Time (sum of timed and untimed services) 15

## 2023-08-01 NOTE — PROVIDER NOTIFICATION
08/01/23 1008   Valuables   Patient Belongings remains with patient   Patient Belongings Remaining with Patient cell phone/electronics;glasses;clothing   Did you bring any home meds/supplements to the hospital?  No     [facility] will make every effort per our policy to help keep your items safe while in the hospital.  If you choose to keep any items at the bedside, we cannot be held responsible for any items that are lost or broken.      List items sent to safe:none    I have reviewed my belongings list on admission and verify that it is correct.     Patient signature_______________________________  Date/Time_____________________    2nd Staff person if patient unable to sign __________________________  Date/Time ______________________      I have received all my belongings noted above at discharge.    Patient signature________________________________  Date/Time  __________________________

## 2023-08-01 NOTE — PHARMACY-ANTICOAGULATION SERVICE
Pharmacy Consult- Coumadin Follow-Up    Babak Moran is a 63 year old male admitted on 7/31/2023 with Sepsis due to urinary tract infection (H)    Primary Indication(s) for Anticoagulation: Atrial fibrillation    Goal INR: 2-3    FYI, patient is followed by the Anticoagulation/Protime Clinic at: [facility]    Patient Active Problem List   Diagnosis    Pain in joint, ankle and foot    Atrial fibrillation with RVR (H)    Cerebrovascular accident (CVA) due to embolism of right middle cerebral artery (H)    Osteoarthrosis    Cutaneous lupus erythematosus    Gout    Ascending aorta enlargement (H)    Family history of coronary artery disease    Essential hypertension    Left hemiparesis (H)    Long-term (current) use of anticoagulants, INR goal 2.0-3.0    Systemic lupus erythematosus (H)    Tissue plasminogen activator (t-PA) administered at other facility within 24 hours prior to current admission    Morbid obesity with BMI of 40.0-44.9, adult (H)    Permanent atrial fibrillation (H)    Obstructive sleep apnea syndrome    Need for vaccination    Onychogryphosis    Overweight    Aftercare following knee joint replacement surgery, unspecified laterality    Nail dystrophy    Onychomycosis    Anticoagulation monitoring, INR range 2-3    Status post total right knee replacement    Chronic venous stasis dermatitis of left lower extremity    Sepsis due to urinary tract infection (H)       New factors that may increase patient's sensitivity to warfarin (Coumadin) include: IV antibiotics, infection    New factors that may decrease patient's sensitivity to warfarin (Coumadin) include: none noted    Dietary Intake: no dietary intake charted    Recent Labs   Lab Test 08/01/23  0603 07/31/23  1557 07/24/23  1347 07/20/23  1355 07/07/23  1331 07/03/23  1000 06/30/23  1353 06/27/23  0610 06/16/23  1033 05/25/23  2133 01/17/23  1440 01/12/23  0539   HGB 11.0* 12.5*  --   --   --  11.9*  --  12.8*  --  15.5  --  13.5   INR 3.53* 3.27* 3.4*  3.6*   < > 2.41*   < > 1.23*   < > 2.60*   < > 1.39*    295  --   --   --   --   --   --   --  282  --  227    < > = values in this interval not displayed.        Recent Dosing:    Date INR Dose Given   7/31 3.27 5 mg   8/1 3.53 See below       Plan: HOLD Warfarin x1 (supratherapeutic INR). Recheck INR in AM.    Thank You for the Consult. Will continue to follow.    John Harrington [facility] ....................  8/1/2023   8:00 AM

## 2023-08-01 NOTE — PROGRESS NOTES
[facility]    Medicine Progress Note - Hospitalist Service    Date of Admission:  7/31/2023    Assessment & Plan      Babak Moran is a 63 year old male admitted on 7/31/2023. He presents with generalized weakness, hematuria and back pain.    Principal Problem:    Sepsis due to urinary tract infection (H)  Assessment: Present on admission.  Lactate 2.0, white blood cell count 15.6, urinalysis shows pyuria and bacteriuria.  Temperature 101.3.  He had a right total knee replacement 1 month ago.  He is a has bilateral lower extremity edema but nothing on exam that appears hot or red in the surgical site region.  He is complaining of no new knee pain. Urine culture growing gram negative rods  Plan:   IV fluids  Await blood cultures and urine culture results  Empiric ceftriaxone day 2    Active Problems:    Cerebrovascular accident (CVA) due to embolism of right middle cerebral artery (H)  Assessment: Prior history with residual left-sided weakness      Essential hypertension  Assessment: Chronic      Systemic lupus erythematosus (H)      Morbid obesity with BMI of 40.0-44.9, adult (H)      Permanent atrial fibrillation (H)  Assessment: Chronic, rate controlled and on warfarin  Plan: Continue warfarin      Obstructive sleep apnea syndrome  Assessment: Chronic      Status post total right knee replacement  Assessment: June 2023    Syncope  Assessment: present on admission, 7/29. Likely hypovolemia from sepsis  Plan: intravenous fluids  Tele     Diet: Combination Diet Regular Diet Adult    DVT Prophylaxis: Warfarin  Armirez Catheter: Not present  Lines: None     Cardiac Monitoring: ACTIVE order. Indication: syncope  Code Status: Full Code      Clinically Significant Risk Factors Present on Admission          # Hypocalcemia: Lowest Ca = 8.1 mg/dL in last 2 days, will monitor and replace as appropriate     # Hypoalbuminemia: Lowest albumin = 3.2 g/dL at 7/31/2023  3:57 PM, will monitor as appropriate  #  Drug Induced Coagulation Defect: home medication list includes an anticoagulant medication    # Hypertension: Noted on problem list               Disposition Plan     Expected Discharge Date: 08/02/2023                  Adam Espinoza MD  Hospitalist Service  [facility]  Securely message with [employer] (more info)  Text page via [facility] Paging/Directory   ______________________________________________________________________    Interval History   Still feels weak. No dyspnea. No nausea, vomiting. Reports a syncopal episode on 7/29 prior to coming in.     Physical Exam   Vital Signs: Temp: 99.3  F (37.4  C) Temp src: Tympanic BP: 118/73 Pulse: 88   Resp: 18 SpO2: 97 % O2 Device: None (Room air)    Weight: 343 lbs 0 oz    GENERAL: Comfortable, talkative, in no apparent distress.  HEENT: Anicteric, non-injected sclera, mouth moist.   NECK: No JVD.  CARDIOVASCULAR: regular rate and rhythm, no murmur. 1+ lower extremity edema   RESPIRATORY: Clear to auscultation bilaterally, no wheezes, no crackles.  GI: Non-distended, normal bowel sounds, soft, non-tender.  SKIN: swelling in both knees and bilateral lower extremity edema. No redness or warmth  NEUROLOGY: Alert and oriented x3, follows commands, speech and language normal.       Medical Decision Making       60 MINUTES SPENT BY ME on the date of service doing chart review, history, exam, documentation & further activities per the note.      Data     I have personally reviewed the following data over the past 24 hrs:    14.8 (H)  \   11.0 (L)   / 278     139 105 16.8 /  109 (H)   3.5 27 0.70 \     ALT: 25 AST: 24 AP: 90 TBILI: 0.7   ALB: 3.2 (L) TOT PROTEIN: 6.8 LIPASE: N/A     Procal: 0.21 (H) CRP: N/A Lactic Acid: 2.0       INR:  3.53 (H) PTT:  63 (H)   D-dimer:  N/A Fibrinogen:  N/A       Imaging results reviewed over the past 24 hrs:   Recent Results (from the past 24 hour(s))   XR Chest 2 Views    Narrative    PROCEDURE:  XR CHEST 2  VIEWS    HISTORY: Fever, .    COMPARISON:  6/2/2023    FINDINGS: Habitus limits penetration.  The cardiomediastinal contours are stable. The trachea is midline.  No focal consolidation, effusion or pneumothorax.    Old left rib fractures are redemonstrated. No subdiaphragmatic free  air.      Impression    IMPRESSION:      No discrete consolidation.    PRAVIN BARRAGAN MD         SYSTEM ID:  [id number]

## 2023-08-02 ENCOUNTER — APPOINTMENT (OUTPATIENT)
Dept: OCCUPATIONAL THERAPY | Facility: OTHER | Age: 63
End: 2023-08-02
Payer: COMMERCIAL

## 2023-08-02 ENCOUNTER — APPOINTMENT (OUTPATIENT)
Dept: PHYSICAL THERAPY | Facility: OTHER | Age: 63
End: 2023-08-02
Payer: COMMERCIAL

## 2023-08-02 LAB
ANION GAP SERPL CALCULATED.3IONS-SCNC: 8 MMOL/L (ref 7–15)
BACTERIA UR CULT: ABNORMAL
BUN SERPL-MCNC: 12.6 MG/DL (ref 8–23)
CALCIUM SERPL-MCNC: 8.4 MG/DL (ref 8.8–10.2)
CHLORIDE SERPL-SCNC: 103 MMOL/L (ref 98–107)
CREAT SERPL-MCNC: 0.58 MG/DL (ref 0.67–1.17)
DEPRECATED HCO3 PLAS-SCNC: 28 MMOL/L (ref 22–29)
ERYTHROCYTE [DISTWIDTH] IN BLOOD BY AUTOMATED COUNT: 17 % (ref 10–15)
GFR SERPL CREATININE-BSD FRML MDRD: >90 ML/MIN/1.73M2
GLUCOSE SERPL-MCNC: 125 MG/DL (ref 70–99)
HCT VFR BLD AUTO: 35.2 % (ref 40–53)
HGB BLD-MCNC: 10.8 G/DL (ref 13.3–17.7)
HOLD SPECIMEN: NORMAL
INR PPP: 2.86 (ref 0.85–1.15)
LACTATE SERPL-SCNC: 1.2 MMOL/L (ref 0.7–2)
MAGNESIUM SERPL-MCNC: 2 MG/DL (ref 1.7–2.3)
MCH RBC QN AUTO: 26.7 PG (ref 26.5–33)
MCHC RBC AUTO-ENTMCNC: 30.7 G/DL (ref 31.5–36.5)
MCV RBC AUTO: 87 FL (ref 78–100)
PLATELET # BLD AUTO: 320 10E3/UL (ref 150–450)
POTASSIUM SERPL-SCNC: 3.2 MMOL/L (ref 3.4–5.3)
POTASSIUM SERPL-SCNC: 3.7 MMOL/L (ref 3.4–5.3)
RBC # BLD AUTO: 4.05 10E6/UL (ref 4.4–5.9)
SODIUM SERPL-SCNC: 139 MMOL/L (ref 136–145)
WBC # BLD AUTO: 9.9 10E3/UL (ref 4–11)

## 2023-08-02 PROCEDURE — 36415 COLL VENOUS BLD VENIPUNCTURE: CPT | Performed by: INTERNAL MEDICINE

## 2023-08-02 PROCEDURE — 99233 SBSQ HOSP IP/OBS HIGH 50: CPT | Mod: 24 | Performed by: INTERNAL MEDICINE

## 2023-08-02 PROCEDURE — 97535 SELF CARE MNGMENT TRAINING: CPT | Mod: GO | Performed by: OCCUPATIONAL THERAPIST

## 2023-08-02 PROCEDURE — 85027 COMPLETE CBC AUTOMATED: CPT | Performed by: INTERNAL MEDICINE

## 2023-08-02 PROCEDURE — 82310 ASSAY OF CALCIUM: CPT | Performed by: INTERNAL MEDICINE

## 2023-08-02 PROCEDURE — 85610 PROTHROMBIN TIME: CPT | Performed by: INTERNAL MEDICINE

## 2023-08-02 PROCEDURE — 120N000001 HC R&B MED SURG/OB

## 2023-08-02 PROCEDURE — 97530 THERAPEUTIC ACTIVITIES: CPT | Mod: GP

## 2023-08-02 PROCEDURE — 250N000011 HC RX IP 250 OP 636: Mod: JZ | Performed by: INTERNAL MEDICINE

## 2023-08-02 PROCEDURE — 97530 THERAPEUTIC ACTIVITIES: CPT | Mod: GO | Performed by: OCCUPATIONAL THERAPIST

## 2023-08-02 PROCEDURE — 83605 ASSAY OF LACTIC ACID: CPT | Performed by: INTERNAL MEDICINE

## 2023-08-02 PROCEDURE — 250N000013 HC RX MED GY IP 250 OP 250 PS 637: Performed by: INTERNAL MEDICINE

## 2023-08-02 PROCEDURE — 83735 ASSAY OF MAGNESIUM: CPT | Performed by: INTERNAL MEDICINE

## 2023-08-02 PROCEDURE — 258N000003 HC RX IP 258 OP 636: Performed by: INTERNAL MEDICINE

## 2023-08-02 PROCEDURE — 84132 ASSAY OF SERUM POTASSIUM: CPT | Performed by: INTERNAL MEDICINE

## 2023-08-02 RX ORDER — FUROSEMIDE 10 MG/ML
40 INJECTION INTRAMUSCULAR; INTRAVENOUS ONCE
Status: COMPLETED | OUTPATIENT
Start: 2023-08-02 | End: 2023-08-02

## 2023-08-02 RX ORDER — POTASSIUM CHLORIDE 1500 MG/1
40 TABLET, EXTENDED RELEASE ORAL ONCE
Status: COMPLETED | OUTPATIENT
Start: 2023-08-02 | End: 2023-08-02

## 2023-08-02 RX ORDER — AMLODIPINE BESYLATE 5 MG/1
5 TABLET ORAL DAILY
Status: DISCONTINUED | OUTPATIENT
Start: 2023-08-02 | End: 2023-08-05 | Stop reason: HOSPADM

## 2023-08-02 RX ORDER — ERTAPENEM 1 G/1
1 INJECTION, POWDER, LYOPHILIZED, FOR SOLUTION INTRAMUSCULAR; INTRAVENOUS EVERY 24 HOURS
Status: DISCONTINUED | OUTPATIENT
Start: 2023-08-02 | End: 2023-08-05 | Stop reason: HOSPADM

## 2023-08-02 RX ADMIN — POTASSIUM CHLORIDE 40 MEQ: 1500 TABLET, EXTENDED RELEASE ORAL at 07:37

## 2023-08-02 RX ADMIN — HYDROCHLOROTHIAZIDE 25 MG: 25 TABLET ORAL at 09:42

## 2023-08-02 RX ADMIN — ACETAMINOPHEN 975 MG: 325 TABLET, FILM COATED ORAL at 00:46

## 2023-08-02 RX ADMIN — ACETAMINOPHEN 975 MG: 325 TABLET, FILM COATED ORAL at 22:24

## 2023-08-02 RX ADMIN — ERTAPENEM SODIUM 1 G: 1 INJECTION, POWDER, LYOPHILIZED, FOR SOLUTION INTRAMUSCULAR; INTRAVENOUS at 11:14

## 2023-08-02 RX ADMIN — AMLODIPINE BESYLATE 5 MG: 5 TABLET ORAL at 09:42

## 2023-08-02 RX ADMIN — FUROSEMIDE 40 MG: 10 INJECTION, SOLUTION INTRAMUSCULAR; INTRAVENOUS at 11:13

## 2023-08-02 RX ADMIN — SODIUM CHLORIDE: 9 INJECTION, SOLUTION INTRAVENOUS at 03:16

## 2023-08-02 RX ADMIN — WARFARIN SODIUM 7.5 MG: 5 TABLET ORAL at 17:53

## 2023-08-02 ASSESSMENT — ACTIVITIES OF DAILY LIVING (ADL)
ADLS_ACUITY_SCORE: 26

## 2023-08-02 NOTE — PROGRESS NOTES
SAFETY CHECKLIST  ID Bands and Risk clasps correct and in place (DNR, Fall risk, Allergy, Latex, Limb):  Yes  All Lines Reconciled and labeled correctly: Yes  Whiteboard updated:Yes  Environmental interventions: Yes  Verify Tele #:  4

## 2023-08-02 NOTE — PHARMACY-ANTICOAGULATION SERVICE
Pharmacy Consult- Coumadin Follow-Up    Babak Moran is a 63 year old male admitted on 7/31/2023 with Sepsis due to urinary tract infection (H)    Primary Indication(s) for Anticoagulation: Atrial fibrillation    Goal INR: 2-3    FYI, patient is followed by the Anticoagulation/Protime Clinic at: [facility]    Patient Active Problem List   Diagnosis    Pain in joint, ankle and foot    Atrial fibrillation with RVR (H)    Cerebrovascular accident (CVA) due to embolism of right middle cerebral artery (H)    Osteoarthrosis    Cutaneous lupus erythematosus    Gout    Ascending aorta enlargement (H)    Family history of coronary artery disease    Essential hypertension    Left hemiparesis (H)    Long-term (current) use of anticoagulants, INR goal 2.0-3.0    Systemic lupus erythematosus (H)    Tissue plasminogen activator (t-PA) administered at other facility within 24 hours prior to current admission    Morbid obesity with BMI of 40.0-44.9, adult (H)    Permanent atrial fibrillation (H)    Obstructive sleep apnea syndrome    Need for vaccination    Onychogryphosis    Overweight    Aftercare following knee joint replacement surgery, unspecified laterality    Nail dystrophy    Onychomycosis    Anticoagulation monitoring, INR range 2-3    Status post total right knee replacement    Chronic venous stasis dermatitis of left lower extremity    Sepsis due to urinary tract infection (H)       New factors that may increase patient's sensitivity to warfarin (Coumadin) include: IV antibiotics, infection    New factors that may decrease patient's sensitivity to warfarin (Coumadin) include: none noted    Dietary Intake: 100% per nutrition flowsheet    Recent Labs   Lab Test 08/02/23  0515 08/01/23  0603 07/31/23  1557 07/24/23  1347 07/07/23  1331 07/03/23  1000 06/16/23  1033 05/25/23  2133   HGB 10.8* 11.0* 12.5*  --   --  11.9*   < > 15.5   INR 2.86* 3.53* 3.27* 3.4*   < > 2.41*   < > 2.60*    278 295  --   --   --   --  282    < >  = values in this interval not displayed.        Recent Dosing:    Date INR Dose Given   7/31 3.27 5 mg   8/1 3.53 Dose HELD   8/2 2.86 See below       Plan: Give normal dose of Warfarin 7.5 mg x1. Repeat INR in AM. INR now therapeutic, down from 3.53 yesterday. Would expect another drop in INR tomorrow due to held dose on 8/1.    Thank You for the Consult. Will continue to follow.    John Harrington [facility] ....................  8/2/2023   7:50 AM

## 2023-08-02 NOTE — PLAN OF CARE
Problem: Plan of Care - These are the overarching goals to be used throughout the patient stay.    Goal: Plan of Care Review  Description: The Plan of Care Review/Shift note should be completed every shift.  The Outcome Evaluation is a brief statement about your assessment that the patient is improving, declining, or no change.  This information will be displayed automatically on your shift note.  Outcome: Progressing  Flowsheets (Taken 8/2/2023 0546)  Outcome Evaluation: A/O, not OOB this shift. HR irreg and tachy at times. Tele shows A-fib. Temp 100.2, received prn Tylenol. Voiding without difficulty, using urinal. Continues to receive IV antibiotics.  Plan of Care Reviewed With: patient  Overall Patient Progress: improving  Goal: Absence of Hospital-Acquired Illness or Injury  Intervention: Identify and Manage Fall Risk  Recent Flowsheet Documentation  Taken 8/2/2023 0041 by Norma Navarro RN  Safety Promotion/Fall Prevention:   activity supervised   clutter free environment maintained   nonskid shoes/slippers when out of bed   patient and family education   room organization consistent   safety round/check completed  Intervention: Prevent Skin Injury  Recent Flowsheet Documentation  Taken 8/2/2023 0041 by Norma Navarro RN  Body Position: weight shifting  Intervention: Prevent and Manage VTE (Venous Thromboembolism) Risk  Recent Flowsheet Documentation  Taken 8/2/2023 0041 by Norma Navarro RN  VTE Prevention/Management:   SCDs (sequential compression devices) off   patient refused intervention   Goal Outcome Evaluation:      Plan of Care Reviewed With: patient    Overall Patient Progress: improvingOverall Patient Progress: improving    Outcome Evaluation: A/O, not OOB this shift. HR irreg and tachy at times. Tele shows A-fib. Temp 100.2, received prn Tylenol. Voiding without difficulty, using urinal. Continues to receive IV antibiotics.

## 2023-08-02 NOTE — PROGRESS NOTES
[facility]    Medicine Progress Note - Hospitalist Service    Date of Admission:  7/31/2023    Assessment & Plan   Babak Moran is a 63 year old male admitted on 7/31/2023. He presents with generalized weakness, hematuria and back pain.    Principal Problem:    Sepsis due to urinary tract infection (H)  Assessment: Present on admission.  Lactate 2.0, white blood cell count 15.6, urinalysis shows pyuria and bacteriuria.  Temperature 101.3.  He had a right total knee replacement 1 month ago.  He is a has bilateral lower extremity edema but nothing on exam that appears hot or red in the surgical site region.  He is complaining of no new knee pain. Urine culture growing ESBL E coli. Will plan to switch to ertapenem from ceftriaxone (allergy to flouroquinilones)  Plan:   IV fluids  Await blood cultures and urine culture results  Empiric ceftriaxone day 2, switched to ertapenem day 1    Active Problems:    Cerebrovascular accident (CVA) due to embolism of right middle cerebral artery (H)  Assessment: Prior history with residual left-sided weakness      Essential hypertension  Assessment: Chronic      Systemic lupus erythematosus (H)      Morbid obesity with BMI of 40.0-44.9, adult (H)      Permanent atrial fibrillation (H)  Assessment: Chronic, rate controlled and on warfarin  Plan: Continue warfarin      Obstructive sleep apnea syndrome  Assessment: Chronic      Status post total right knee replacement  Assessment: June 2023  Plan: spoke with physical and occupational therapy about a CPM machine.    Syncope  Assessment: present on admission, 7/29. Likely hypovolemia from sepsis  Plan: intravenous fluids  Tele       Diet: Combination Diet Regular Diet Adult    DVT Prophylaxis: Warfarin  Ramirez Catheter: Not present  Lines: None     Cardiac Monitoring: ACTIVE order. Indication: syncope  Code Status: Full Code      Clinically Significant Risk Factors        # Hypokalemia: Lowest K = 3.2 mmol/L in last 2  days, will replace as needed   # Hypocalcemia: Lowest Ca = 8.1 mg/dL in last 2 days, will monitor and replace as appropriate     # Hypoalbuminemia: Lowest albumin = 3.2 g/dL at 7/31/2023  3:57 PM, will monitor as appropriate     # Hypertension: Noted on problem list                 Disposition Plan     Expected Discharge Date: 08/02/2023                  Adam Espinoza MD  Hospitalist Service  [facility]  Securely message with [employer] (more info)  Text page via [facility] Paging/Directory   ______________________________________________________________________    Interval History   Still feels very weak. Fever overnight. Knees are stiff.     Physical Exam   Vital Signs: Temp: (!) 100.5  F (38.1  C) Temp src: Tympanic BP: 138/72 Pulse: 87   Resp: 18 SpO2: 95 % O2 Device: None (Room air)    Weight: 341 lbs 4.8 oz    GENERAL: Comfortable, talkative, in no apparent distress.  HEENT: Anicteric, non-injected sclera, mouth moist.   NECK: No JVD.  CARDIOVASCULAR: regular rate and rhythm, no murmur. 2+ lower extremity edema   RESPIRATORY: Clear to auscultation bilaterally, no wheezes, no crackles.  GI: Non-distended, normal bowel sounds, soft, non-tender.  SKIN: left ankle ulcer 1 cm, present on admission. Nondraining today. No warmth to knees, bilateral swelling.   NEUROLOGY: Alert and oriented x3, follows commands, speech and language normal. '    Medical Decision Making       50 MINUTES SPENT BY ME on the date of service doing chart review, history, exam, documentation & further activities per the note.      Data     I have personally reviewed the following data over the past 24 hrs:    Urine culture ESBL  Ecoli

## 2023-08-02 NOTE — PROGRESS NOTES
:     Patient is from home with spouse. Met with patient in room to discuss discharge planning needs. Patient stated that he would like to go to [facility] for STR. A referral has been sent to [facility] and Cathy has been made aware of referral.     Anticipated discharge is a few days.     Pt/family was given the Medicare Compare list for SNF, with associated star ratings to assist with choice for referrals/discharge planning Yes    Education was given to pt/family that star ratings are updated/maintained by Medicare and can be reviewed by visiting www.medicare.gov Yes     will continue to follow for discharge planning needs.     BHARGAVI Dahl on 8/2/2023 at 11:53 AM     :     Spoke with Cathy at [facility] and she stated that she can accept patient when he is ready for discharge.     BHARGAVI Dahl on 8/2/2023 at 3:48 PM

## 2023-08-02 NOTE — PROGRESS NOTES
Interdisciplinary Discharge Planning Note    Anticipated Discharge Date:8/3-8/4    Anticipated Discharge Location: SNF    Clinical Needs Before Discharge:   Medical Stability- infection improvement    Treatment Needs After Discharge:  rehab (PT, OT, ST)    Potential Barriers to Discharge: Patient is currently being screened by [facility]- has not yet been accepted.     BHARGAVI Dahl  8/2/2023,  1:15 PM

## 2023-08-02 NOTE — PROGRESS NOTES
08/02/23 1510   Appointment Info   Signing Clinician's Name / Credentials (OT) Savannah Santana, OTR/L   Interventions   Interventions Quick Adds Self-Care/Home Management   Self-Care/Home Management   Self-Care/Home Mgmt/ADL, Compensatory, Meal Prep Minutes (83830) 30   Treatment Detail/Skilled Intervention seated sponge bath   [place] Level (Grooming Training) stand-by assist   [place] Level (Bathing Training) stand-by assist   [place] Level (Upper Body Dressing Training) stand-by assist   Lower Body Dressing Training Assistance maximum assist (25% patient effort)   Therapeutic Activities   Therapeutic Activity Minutes (84323) 15   Treatment Detail/Skilled Intervention used overhead lift to get pt up to recliner with assist of 2 people, pt tolerated transfer well   OT Discharge Planning   OT Plan cont OT   OT Discharge Recommendation (DC Rec) Transitional Care Facility   OT Rationale for DC Rec Pt is unable to return home at this time as family cannot care for him and he ispresetnly unalb e to safely care for himself   OT Brief overview of current status Pt progressing slowly, able to get up to chair today with use of pa lift and completed self cares while seated   Total Session Time   Timed Code Treatment Minutes 45   Total Session Time (sum of timed and untimed services) 45

## 2023-08-02 NOTE — PROGRESS NOTES
08/02/23 1600   Appointment Info   Signing Clinician's Name / Credentials (PT) James Caba MPT   Interventions   Interventions Quick Adds Gait Training;Therapeutic Activity   Therapeutic Activity   Symptoms Noted During/After Treatment Fatigue   Treatment Detail/Skilled Intervention rolling left and right in bed with minimal assist; use of total mechanical ceiling lift for bed to recliner transfer   PT Discharge Planning   PT Plan continue PT   PT Discharge Recommendation (DC Rec) Transitional Care Facility   PT Rationale for DC Rec to promote strength, stability and return to safe, independent mobility   PT Brief overview of current status pleasant patient well known to this PT as he is s/p both right and left TKA here at [facility], presently requiring; moderate assist of 2 for supine<>sit; and use of total mechanical lift for transfers (patient  unable to perform sit to stand due to bilateral LE weakness); patient will certainly benefit from continued  PT in short term rehab prior to returning home with family

## 2023-08-02 NOTE — PROGRESS NOTES
SAFETY CHECKLIST  ID Bands and Risk clasps correct and in place (DNR, Fall risk, Allergy, Latex, Limb):  Yes  All Lines Reconciled and labeled correctly: Yes  Whiteboard updated:Yes  Environmental interventions: Yes  Verify Tele #: 4    Melva Yan RN on 8/2/2023 at 7:33 AM

## 2023-08-03 ENCOUNTER — APPOINTMENT (OUTPATIENT)
Dept: PHYSICAL THERAPY | Facility: OTHER | Age: 63
End: 2023-08-03
Payer: COMMERCIAL

## 2023-08-03 ENCOUNTER — APPOINTMENT (OUTPATIENT)
Dept: OCCUPATIONAL THERAPY | Facility: OTHER | Age: 63
End: 2023-08-03
Payer: COMMERCIAL

## 2023-08-03 LAB
ANION GAP SERPL CALCULATED.3IONS-SCNC: 6 MMOL/L (ref 7–15)
BUN SERPL-MCNC: 14.3 MG/DL (ref 8–23)
CALCIUM SERPL-MCNC: 8.8 MG/DL (ref 8.8–10.2)
CHLORIDE SERPL-SCNC: 101 MMOL/L (ref 98–107)
CREAT SERPL-MCNC: 0.65 MG/DL (ref 0.67–1.17)
DEPRECATED HCO3 PLAS-SCNC: 32 MMOL/L (ref 22–29)
ERYTHROCYTE [DISTWIDTH] IN BLOOD BY AUTOMATED COUNT: 17 % (ref 10–15)
GFR SERPL CREATININE-BSD FRML MDRD: >90 ML/MIN/1.73M2
GLUCOSE SERPL-MCNC: 85 MG/DL (ref 70–99)
HCT VFR BLD AUTO: 35.6 % (ref 40–53)
HGB BLD-MCNC: 11 G/DL (ref 13.3–17.7)
INR PPP: 2.32 (ref 0.85–1.15)
MAGNESIUM SERPL-MCNC: 2 MG/DL (ref 1.7–2.3)
MCH RBC QN AUTO: 26.8 PG (ref 26.5–33)
MCHC RBC AUTO-ENTMCNC: 30.9 G/DL (ref 31.5–36.5)
MCV RBC AUTO: 87 FL (ref 78–100)
PLATELET # BLD AUTO: 413 10E3/UL (ref 150–450)
POTASSIUM SERPL-SCNC: 3.8 MMOL/L (ref 3.4–5.3)
RBC # BLD AUTO: 4.1 10E6/UL (ref 4.4–5.9)
SODIUM SERPL-SCNC: 139 MMOL/L (ref 136–145)
WBC # BLD AUTO: 8.9 10E3/UL (ref 4–11)

## 2023-08-03 PROCEDURE — 36415 COLL VENOUS BLD VENIPUNCTURE: CPT | Performed by: INTERNAL MEDICINE

## 2023-08-03 PROCEDURE — 93010 ELECTROCARDIOGRAM REPORT: CPT | Performed by: INTERNAL MEDICINE

## 2023-08-03 PROCEDURE — 80048 BASIC METABOLIC PNL TOTAL CA: CPT | Performed by: INTERNAL MEDICINE

## 2023-08-03 PROCEDURE — 97530 THERAPEUTIC ACTIVITIES: CPT | Mod: GP

## 2023-08-03 PROCEDURE — 250N000013 HC RX MED GY IP 250 OP 250 PS 637: Performed by: INTERNAL MEDICINE

## 2023-08-03 PROCEDURE — 99232 SBSQ HOSP IP/OBS MODERATE 35: CPT | Mod: 24 | Performed by: INTERNAL MEDICINE

## 2023-08-03 PROCEDURE — 97535 SELF CARE MNGMENT TRAINING: CPT | Mod: GO | Performed by: OCCUPATIONAL THERAPIST

## 2023-08-03 PROCEDURE — 250N000013 HC RX MED GY IP 250 OP 250 PS 637: Performed by: STUDENT IN AN ORGANIZED HEALTH CARE EDUCATION/TRAINING PROGRAM

## 2023-08-03 PROCEDURE — 250N000011 HC RX IP 250 OP 636: Mod: JZ | Performed by: INTERNAL MEDICINE

## 2023-08-03 PROCEDURE — 97110 THERAPEUTIC EXERCISES: CPT | Mod: GP

## 2023-08-03 PROCEDURE — 85610 PROTHROMBIN TIME: CPT | Performed by: INTERNAL MEDICINE

## 2023-08-03 PROCEDURE — 120N000001 HC R&B MED SURG/OB

## 2023-08-03 PROCEDURE — 85027 COMPLETE CBC AUTOMATED: CPT | Performed by: INTERNAL MEDICINE

## 2023-08-03 PROCEDURE — 83735 ASSAY OF MAGNESIUM: CPT | Performed by: INTERNAL MEDICINE

## 2023-08-03 RX ORDER — FUROSEMIDE 10 MG/ML
40 INJECTION INTRAMUSCULAR; INTRAVENOUS ONCE
Status: COMPLETED | OUTPATIENT
Start: 2023-08-03 | End: 2023-08-03

## 2023-08-03 RX ORDER — METOPROLOL TARTRATE 25 MG/1
25 TABLET, FILM COATED ORAL 2 TIMES DAILY
Status: DISCONTINUED | OUTPATIENT
Start: 2023-08-03 | End: 2023-08-04

## 2023-08-03 RX ADMIN — ACETAMINOPHEN 975 MG: 325 TABLET, FILM COATED ORAL at 20:22

## 2023-08-03 RX ADMIN — ERTAPENEM SODIUM 1 G: 1 INJECTION, POWDER, LYOPHILIZED, FOR SOLUTION INTRAMUSCULAR; INTRAVENOUS at 09:13

## 2023-08-03 RX ADMIN — ACETAMINOPHEN 975 MG: 325 TABLET, FILM COATED ORAL at 09:10

## 2023-08-03 RX ADMIN — AMLODIPINE BESYLATE 5 MG: 5 TABLET ORAL at 09:11

## 2023-08-03 RX ADMIN — HYDROCHLOROTHIAZIDE 25 MG: 25 TABLET ORAL at 09:11

## 2023-08-03 RX ADMIN — WARFARIN SODIUM 7.5 MG: 5 TABLET ORAL at 17:19

## 2023-08-03 RX ADMIN — METOPROLOL TARTRATE 25 MG: 25 TABLET, FILM COATED ORAL at 18:45

## 2023-08-03 RX ADMIN — FUROSEMIDE 40 MG: 10 INJECTION, SOLUTION INTRAMUSCULAR; INTRAVENOUS at 14:11

## 2023-08-03 ASSESSMENT — ACTIVITIES OF DAILY LIVING (ADL)
ADLS_ACUITY_SCORE: 32
ADLS_ACUITY_SCORE: 26
ADLS_ACUITY_SCORE: 32
ADLS_ACUITY_SCORE: 28
ADLS_ACUITY_SCORE: 32
ADLS_ACUITY_SCORE: 26
ADLS_ACUITY_SCORE: 32
ADLS_ACUITY_SCORE: 26
ADLS_ACUITY_SCORE: 32
ADLS_ACUITY_SCORE: 28
ADLS_ACUITY_SCORE: 28
ADLS_ACUITY_SCORE: 26

## 2023-08-03 NOTE — PROGRESS NOTES
[facility]    Medicine Progress Note - Hospitalist Service    Date of Admission:  7/31/2023    Assessment & Plan   Babak Moran is a 63 year old male admitted on 7/31/2023. He presents with generalized weakness, hematuria and back pain.    Principal Problem:    Sepsis due to urinary tract infection (H)  Assessment: Present on admission.  Lactate 2.0, white blood cell count 15.6, urinalysis shows pyuria and bacteriuria.  Temperature 101.3.  He had a right total knee replacement 1 month ago.  He is a has bilateral lower extremity edema but nothing on exam that appears hot or red in the surgical site region.  He is complaining of no new knee pain. Urine culture growing ESBL E coli.   Plan:   IV fluids  Await blood cultures and urine culture results  Empiric ceftriaxone day 2, switched to ertapenem day 2. Plan 4 days ertapenem and fosfomycin dose for day 5-7    Active Problems:    Cerebrovascular accident (CVA) due to embolism of right middle cerebral artery (H)  Assessment: Prior history with residual left-sided weakness      Essential hypertension  Assessment: Chronic      Systemic lupus erythematosus (H)      Morbid obesity with BMI of 40.0-44.9, adult (H)      Permanent atrial fibrillation (H)  Assessment: Chronic, rate controlled and on warfarin  Plan: Continue warfarin      Obstructive sleep apnea syndrome  Assessment: Chronic      Status post total right knee replacement  Assessment: June 2023  Plan: spoke with physical and occupational therapy about a CPM machine.    Syncope  Assessment: present on admission, 7/29. Likely hypovolemia from sepsis  Plan: intravenous fluids  Tele       Diet: Combination Diet Regular Diet Adult    DVT Prophylaxis: Warfarin  Ramirez Catheter: Not present  Lines: None     Cardiac Monitoring: ACTIVE order. Indication: syncope  Code Status: Full Code      Clinically Significant Risk Factors        # Hypokalemia: Lowest K = 3.2 mmol/L in last 2 days, will replace as  needed       # Hypoalbuminemia: Lowest albumin = 3.2 g/dL at 7/31/2023  3:57 PM, will monitor as appropriate     # Hypertension: Noted on problem list                 Disposition Plan           Adam Espinoza MD  Hospitalist Service  [facility]  Securely message with [employer] (more info)  Text page via [facility] Paging/Directory   ______________________________________________________________________    Interval History   Feeling better. Less weakness. No dyspnea. No nausea, vomiting     Physical Exam   Vital Signs: Temp: 98.2  F (36.8  C) (different thermometer) Temp src: Tympanic BP: (!) 143/80 Pulse: 89   Resp: 20 SpO2: 96 % O2 Device: None (Room air)    Weight: 341 lbs 4.8 oz    GENERAL: Comfortable, no apparent distress.  CARDIOVASCULAR: regular rate and rhythm, no murmur. No lower extremity edema   RESPIRATORY: Clear to auscultation bilaterally, no wheezes or crackles.  GI: non-tender, non-distended, normal bowel sounds.   SKIN: warm periphery, no rashes      Medical Decision Making       40 MINUTES SPENT BY ME on the date of service doing chart review, history, exam, documentation & further activities per the note.      Data     I have personally reviewed the following data over the past 24 hrs:    8.9  \   11.0 (L)   / 413     139 101 14.3 /  85   3.8 32 (H) 0.65 (L) \     Procal: N/A CRP: N/A Lactic Acid: 1.2       INR:  2.32 (H) PTT:  N/A   D-dimer:  N/A Fibrinogen:  N/A

## 2023-08-03 NOTE — PROGRESS NOTES
Interdisciplinary Discharge Planning Note    Anticipated Discharge Date:8/5/2023    Anticipated Discharge Location: [facility]    Clinical Needs Before Discharge:   Medical Stability- infection improvement    Treatment Needs After Discharge:  rehab (PT, OT, ST)    Potential Barriers to Discharge: None identified. Patient has been accepted to [facility] once he is medically cleared.     BHARGAVI Patel  8/3/2023,  2:20 PM

## 2023-08-03 NOTE — PROGRESS NOTES
08/03/23 1534   Appointment Info   Signing Clinician's Name / Credentials (PT) James Caba MPT   Interventions   Interventions Quick Adds Therapeutic Procedure   Therapeutic Procedure/Exercise   Symptoms Noted During/After Treatment fatigue   Treatment Detail/Skilled Intervention seated bilateral LE exercises with red T-band   Therapeutic Activity   Symptoms Noted During/After Treatment Fatigue   Treatment Detail/Skilled Intervention multiple press ups to simulate initiation of sit to stand transition; total mechanical lift again used for transfers in/out of bed due to patient fatigue   PT Discharge Planning   PT Plan continue PT   PT Discharge Recommendation (DC Rec) Transitional Care Facility   PT Rationale for DC Rec to promote strength, stability and return to safe, independent mobility   PT Brief overview of current status pleasant patient well known to this PT as he is s/p both right and left TKA here at [facility], presently requiring; moderate assist of 2 for supine<>sit; and use of total mechanical lift for transfers (patient  unable to perform sit to stand due to bilateral LE weakness); patient will certainly benefit from continued  PT in short term rehab prior to returning home with family

## 2023-08-03 NOTE — PLAN OF CARE
Patient denies pain. Voiding without difficulty. Output is clear/yellow. Scattered bruising noted. Open area to crease of buttocks. Skin is pink, red, and moist; Allevyn placed. Patient repositioning independently. Not out of bed; activity encouraged. BLE afshin. Tylenol administered per patient request for temperature of 99.9; effectively reduced temperature to 97.9. VSS.

## 2023-08-03 NOTE — PROGRESS NOTES
08/03/23 1520   Appointment Info   Signing Clinician's Name / Credentials (OT) Roro Zapata OTR/L   Interventions   Interventions Quick Adds Self-Care/Home Management   Self-Care/Home Management   Self-Care/Home Mgmt/ADL, Compensatory, Meal Prep Minutes (12669) 25   Symptoms Noted During/After Treatment (Meal Preparation/Planning Training) none   Treatment Detail/Skilled Intervention seated in recliner chair for light sponge bath and grooming   [place] Level (Grooming Training) stand-by assist   [place] Level (Bathing Training) stand-by assist  (upper body SBA; did not bathe LB)   Assistance (Bathing Training) 1 person assist   [place] Level (Upper Body Dressing Training) stand-by assist   [place] Level (Toilet Training)   (pt using urinal)

## 2023-08-03 NOTE — PROGRESS NOTES
:    Called Cathy from [facility] to provide an update that patients anticipated discharge date is Saturday 8/5/23.  will continue to follow.     BHARGAVI Patel on 8/3/2023 at 9:43 AM

## 2023-08-03 NOTE — PROGRESS NOTES
Contacted by nursing for Atrial fibrillation with RVR, HR> 110s. Known. On anticoagulation. BP ok. Started patient on PO metoprolol 25mg BID. E- normal.    Carlos Medellin MD on 8/3/2023 at 6:00 PM

## 2023-08-03 NOTE — PHARMACY-ANTICOAGULATION SERVICE
Pharmacy Consult- Coumadin Follow-Up    Babak Moran is a 63 year old male admitted on 7/31/2023 with Sepsis due to urinary tract infection (H)    Primary Indication(s) for Anticoagulation: Atrial fibrillation    Goal INR: 2-3    FYI, patient is followed by the Anticoagulation/Protime Clinic at: [facility]    Patient Active Problem List   Diagnosis    Pain in joint, ankle and foot    Atrial fibrillation with RVR (H)    Cerebrovascular accident (CVA) due to embolism of right middle cerebral artery (H)    Osteoarthrosis    Cutaneous lupus erythematosus    Gout    Ascending aorta enlargement (H)    Family history of coronary artery disease    Essential hypertension    Left hemiparesis (H)    Long-term (current) use of anticoagulants, INR goal 2.0-3.0    Systemic lupus erythematosus (H)    Tissue plasminogen activator (t-PA) administered at other facility within 24 hours prior to current admission    Morbid obesity with BMI of 40.0-44.9, adult (H)    Permanent atrial fibrillation (H)    Obstructive sleep apnea syndrome    Need for vaccination    Onychogryphosis    Overweight    Aftercare following knee joint replacement surgery, unspecified laterality    Nail dystrophy    Onychomycosis    Anticoagulation monitoring, INR range 2-3    Status post total right knee replacement    Chronic venous stasis dermatitis of left lower extremity    Sepsis due to urinary tract infection (H)       New factors that may increase patient's sensitivity to warfarin (Coumadin) include: infection, IV antibiotics    New factors that may decrease patient's sensitivity to warfarin (Coumadin) include: none noted    Dietary Intake: 100% per nutrition flowsheet    Recent Labs   Lab Test 08/03/23  0528 08/02/23  0515 08/01/23  0603 07/31/23  1557   HGB 11.0* 10.8* 11.0* 12.5*   INR 2.32* 2.86* 3.53* 3.27*    320 278 295        Recent Dosing:    Date INR Dose Given   7/31 3.27 5 mg   8/1 3.53 Dose HELD   8/2 2.86 7.5 mg   8/3 2.32 See below        Plan: Give Warfarin 7.5 mg x1 (normal home dose). INR continues to be therapeutic, although decreased from yesterday, which is to be expected given held dose on 8/1.    Thank You for the Consult. Will continue to follow.    John Harrington JOANA ....................  8/3/2023   7:46 AM

## 2023-08-03 NOTE — PLAN OF CARE
Goal Outcome Evaluation:       Patient tolerating IV abx. Consistent output d/t IV diuresis. Patient needs lab monitoring & PT/OT. Patient is significantly deconditioned and is currently requiring a pa lift for transfers. VSS. Afebrile this shift.

## 2023-08-03 NOTE — PLAN OF CARE
Patient is alert and orientated. VSS. Lung sounds are diminished. Patient denies pain. Trace edema in LE bilaterally. Patient has ESBL. Patient transfers with pa, uses urinal independently.     Goal Outcome Evaluation:      Plan of Care Reviewed With: patient    Overall Patient Progress: improvingOverall Patient Progress: improving

## 2023-08-04 ENCOUNTER — APPOINTMENT (OUTPATIENT)
Dept: OCCUPATIONAL THERAPY | Facility: OTHER | Age: 63
End: 2023-08-04
Payer: COMMERCIAL

## 2023-08-04 ENCOUNTER — APPOINTMENT (OUTPATIENT)
Dept: PHYSICAL THERAPY | Facility: OTHER | Age: 63
End: 2023-08-04
Payer: COMMERCIAL

## 2023-08-04 LAB
ANION GAP SERPL CALCULATED.3IONS-SCNC: 7 MMOL/L (ref 7–15)
BUN SERPL-MCNC: 15.2 MG/DL (ref 8–23)
CALCIUM SERPL-MCNC: 9.2 MG/DL (ref 8.8–10.2)
CHLORIDE SERPL-SCNC: 98 MMOL/L (ref 98–107)
CREAT SERPL-MCNC: 0.65 MG/DL (ref 0.67–1.17)
DEPRECATED HCO3 PLAS-SCNC: 33 MMOL/L (ref 22–29)
ERYTHROCYTE [DISTWIDTH] IN BLOOD BY AUTOMATED COUNT: 17 % (ref 10–15)
GFR SERPL CREATININE-BSD FRML MDRD: >90 ML/MIN/1.73M2
GLUCOSE SERPL-MCNC: 86 MG/DL (ref 70–99)
HCT VFR BLD AUTO: 36.2 % (ref 40–53)
HGB BLD-MCNC: 11.3 G/DL (ref 13.3–17.7)
INR PPP: 2.19 (ref 0.85–1.15)
MAGNESIUM SERPL-MCNC: 2 MG/DL (ref 1.7–2.3)
MCH RBC QN AUTO: 26.8 PG (ref 26.5–33)
MCHC RBC AUTO-ENTMCNC: 31.2 G/DL (ref 31.5–36.5)
MCV RBC AUTO: 86 FL (ref 78–100)
PLATELET # BLD AUTO: 476 10E3/UL (ref 150–450)
POTASSIUM SERPL-SCNC: 4.3 MMOL/L (ref 3.4–5.3)
POTASSIUM SERPL-SCNC: 4.3 MMOL/L (ref 3.4–5.3)
RBC # BLD AUTO: 4.22 10E6/UL (ref 4.4–5.9)
SODIUM SERPL-SCNC: 138 MMOL/L (ref 136–145)
WBC # BLD AUTO: 8.8 10E3/UL (ref 4–11)

## 2023-08-04 PROCEDURE — 97530 THERAPEUTIC ACTIVITIES: CPT | Mod: GP

## 2023-08-04 PROCEDURE — 99232 SBSQ HOSP IP/OBS MODERATE 35: CPT | Mod: 24 | Performed by: FAMILY MEDICINE

## 2023-08-04 PROCEDURE — 97535 SELF CARE MNGMENT TRAINING: CPT | Mod: GO | Performed by: OCCUPATIONAL THERAPIST

## 2023-08-04 PROCEDURE — 250N000013 HC RX MED GY IP 250 OP 250 PS 637: Performed by: INTERNAL MEDICINE

## 2023-08-04 PROCEDURE — 83735 ASSAY OF MAGNESIUM: CPT | Performed by: INTERNAL MEDICINE

## 2023-08-04 PROCEDURE — 250N000013 HC RX MED GY IP 250 OP 250 PS 637: Performed by: STUDENT IN AN ORGANIZED HEALTH CARE EDUCATION/TRAINING PROGRAM

## 2023-08-04 PROCEDURE — 250N000011 HC RX IP 250 OP 636: Mod: JZ | Performed by: INTERNAL MEDICINE

## 2023-08-04 PROCEDURE — 120N000001 HC R&B MED SURG/OB

## 2023-08-04 PROCEDURE — 250N000013 HC RX MED GY IP 250 OP 250 PS 637: Performed by: FAMILY MEDICINE

## 2023-08-04 PROCEDURE — 80048 BASIC METABOLIC PNL TOTAL CA: CPT | Performed by: INTERNAL MEDICINE

## 2023-08-04 PROCEDURE — 36415 COLL VENOUS BLD VENIPUNCTURE: CPT | Performed by: INTERNAL MEDICINE

## 2023-08-04 PROCEDURE — 97110 THERAPEUTIC EXERCISES: CPT | Mod: GP

## 2023-08-04 PROCEDURE — 85610 PROTHROMBIN TIME: CPT | Performed by: INTERNAL MEDICINE

## 2023-08-04 PROCEDURE — 97530 THERAPEUTIC ACTIVITIES: CPT | Mod: GO | Performed by: OCCUPATIONAL THERAPIST

## 2023-08-04 PROCEDURE — 85027 COMPLETE CBC AUTOMATED: CPT | Performed by: INTERNAL MEDICINE

## 2023-08-04 PROCEDURE — 97116 GAIT TRAINING THERAPY: CPT | Mod: GP

## 2023-08-04 RX ORDER — METOPROLOL SUCCINATE 25 MG/1
25 TABLET, EXTENDED RELEASE ORAL DAILY
Status: DISCONTINUED | OUTPATIENT
Start: 2023-08-05 | End: 2023-08-05 | Stop reason: HOSPADM

## 2023-08-04 RX ORDER — METOPROLOL TARTRATE 25 MG/1
25 TABLET, FILM COATED ORAL 2 TIMES DAILY
Status: COMPLETED | OUTPATIENT
Start: 2023-08-04 | End: 2023-08-04

## 2023-08-04 RX ORDER — METOPROLOL SUCCINATE 25 MG/1
25 TABLET, EXTENDED RELEASE ORAL DAILY
Start: 2023-08-05 | End: 2024-09-23

## 2023-08-04 RX ORDER — GRANULES FOR ORAL 3 G/1
3 POWDER ORAL ONCE
Qty: 1 PACKET | Refills: 0 | Status: SHIPPED | OUTPATIENT
Start: 2023-08-06 | End: 2023-08-05

## 2023-08-04 RX ORDER — WARFARIN SODIUM 5 MG/1
10 TABLET ORAL
Status: COMPLETED | OUTPATIENT
Start: 2023-08-04 | End: 2023-08-04

## 2023-08-04 RX ADMIN — METOPROLOL TARTRATE 25 MG: 25 TABLET, FILM COATED ORAL at 09:07

## 2023-08-04 RX ADMIN — HYDROCHLOROTHIAZIDE 25 MG: 25 TABLET ORAL at 09:07

## 2023-08-04 RX ADMIN — WARFARIN SODIUM 10 MG: 5 TABLET ORAL at 17:27

## 2023-08-04 RX ADMIN — ERTAPENEM SODIUM 1 G: 1 INJECTION, POWDER, LYOPHILIZED, FOR SOLUTION INTRAMUSCULAR; INTRAVENOUS at 09:33

## 2023-08-04 RX ADMIN — AMLODIPINE BESYLATE 5 MG: 5 TABLET ORAL at 09:07

## 2023-08-04 RX ADMIN — METOPROLOL TARTRATE 25 MG: 25 TABLET, FILM COATED ORAL at 21:27

## 2023-08-04 ASSESSMENT — ACTIVITIES OF DAILY LIVING (ADL)
ADLS_ACUITY_SCORE: 32

## 2023-08-04 NOTE — PLAN OF CARE
"Pt is alert and oriented x4. He uses call light appropriately. VSS except elevated pulse and low grade temp. PRN APAP given for temp, which was effective. Denies pain, nausea, and SOB. Pt requires goyo for transfers. Mepilex remains in place to buttock. Staff are encouraging patient to reposition Q2hr and PRN. Tele in place. +1 edema noted in bilateral ankles.     Goal Outcome Evaluation:      Plan of Care Reviewed With: patient    Overall Patient Progress: improvingOverall Patient Progress: improving    Problem: Plan of Care - These are the overarching goals to be used throughout the patient stay.    Goal: Plan of Care Review  Description: The Plan of Care Review/Shift note should be completed every shift.  The Outcome Evaluation is a brief statement about your assessment that the patient is improving, declining, or no change.  This information will be displayed automatically on your shift note.  Outcome: Progressing  Flowsheets (Taken 8/3/2023 2239)  Plan of Care Reviewed With: patient  Overall Patient Progress: improving  Goal: Patient-Specific Goal (Individualized)  Description: You can add care plan individualizations to a care plan. Examples of Individualization might be:  \"Parent requests to be called daily at 9am for status\", \"I have a hard time hearing out of my right ear\", or \"Do not touch me to wake me up as it startles me\".  Outcome: Progressing  Flowsheets (Taken 8/3/2023 2239)  Individualized Care Needs: Goyo transfers, ABX  Goal: Absence of Hospital-Acquired Illness or Injury  Intervention: Identify and Manage Fall Risk  Recent Flowsheet Documentation  Taken 8/3/2023 2014 by Vance Islas RN  Safety Promotion/Fall Prevention:   activity supervised   clutter free environment maintained   nonskid shoes/slippers when out of bed   safety round/check completed   supervised activity  Intervention: Prevent and Manage VTE (Venous Thromboembolism) Risk  Recent Flowsheet Documentation  Taken 8/3/2023 " 2014 by Vance Islas, RN  VTE Prevention/Management:   SCDs (sequential compression devices) off   patient refused intervention  Goal: Readiness for Transition of Care  Outcome: Progressing  Flowsheets (Taken 8/3/2023 2239)  Anticipated Changes Related to Illness: inability to care for self  Transportation Anticipated: family or friend will provide  Concerns to be Addressed: discharge planning  Intervention: Mutually Develop Transition Plan  Recent Flowsheet Documentation  Taken 8/3/2023 2239 by Vance Islas, RN  Anticipated Changes Related to Illness: inability to care for self  Transportation Anticipated: family or friend will provide  Concerns to be Addressed: discharge planning

## 2023-08-04 NOTE — PROGRESS NOTES
08/04/23 1118   Appointment Info   Signing Clinician's Name / Credentials (PT) James Caba MPT   Therapeutic Procedure/Exercise   Symptoms Noted During/After Treatment none   Treatment Detail/Skilled Intervention CPM to right knee x 1 hour to promote ROM and decrease knee edema   Therapeutic Activity   Symptoms Noted During/After Treatment Fatigue   Treatment Detail/Skilled Intervention multiple press ups to simulate initiation of sit to stand transition; total mechanical lift again used for transfers in/out of bed due to patient fatigue; patient able to sit unsupported at edge of bed; moderate assist of 1 for supine to sit   PT Discharge Planning   PT Plan continue PT   PT Discharge Recommendation (DC Rec) Transitional Care Facility   PT Rationale for DC Rec to promote strength, stability and return to safe, independent mobility   PT Brief overview of current status pleasant patient well known to this PT as he is s/p both right and left TKA here at [facility], presently requiring; moderate assist of 2 for supine<>sit; and use of total mechanical lift for transfers (patient  unable to perform sit to stand due to bilateral LE weakness); patient will certainly benefit from continued  PT in short term rehab prior to returning home with family

## 2023-08-04 NOTE — PROGRESS NOTES
[facility]    Medicine Progress Note - Hospitalist Service    Date of Admission:  7/31/2023    Assessment & Plan   Sepsis due to urinary tract infection (H)  Present on admission.  Lactate 2.0, white blood cell count 15.6, urinalysis shows pyuria and bacteriuria.  Temperature 101.3.   Improved with IVF and antibiotics.  Negative blood cultures  Given ceftriaxone for 2 days until cultures returned with ESBL E coli.   Switched to ertapenem, day 3. Plan 4 days ertapenem, then discharge to SNF tomorrow for fosfomycin dose 8/6 covering days 5-7     Active Problems:    Cerebrovascular accident (CVA) due to embolism of right middle cerebral artery (H)  Assessment: Prior history with residual left-sided weakness       Essential hypertension  Assessment: Chronic       Systemic lupus erythematosus (H)       Morbid obesity with BMI of 40.0-44.9, adult (H)       Permanent atrial fibrillation (H)  Assessment: Chronic. Anticoagulated on warfarin. Pulse over 110 on 8/3, started back on metoprolol, which had been stopped in 2022 due to bradycardia.  Plan: Continue warfarin  Continue metoprolol tartrate 25 mg BID for today as he received a dose  Start succinate tomorrow  Continue telemetry to monitor pulse on metoprolol      Obstructive sleep apnea syndrome  Assessment: Chronic       Status post total right knee replacement  Assessment: He had a right total knee replacement 1 month ago.  He is a has bilateral lower extremity edema but nothing on exam that appears hot or red in the surgical site region.  He is complaining of no new knee pain.  Plan: Started on a CPM machine.     Syncope  Assessment: present on admission, 7/29. Likely hypovolemia from sepsis. No further issues after receiving IVF.       Diet: Combination Diet Regular Diet Adult    DVT Prophylaxis: Warfarin  Ramirez Catheter: Not present  Lines: None     Cardiac Monitoring: ACTIVE order. Indication: Tachyarrhythmias, acute (48 hours)  Code Status:  Full Code      Clinically Significant Risk Factors              # Hypoalbuminemia: Lowest albumin = 3.2 g/dL at 7/31/2023  3:57 PM, will monitor as appropriate       # Hypertension: Noted on problem list                 Disposition Plan      Expected Discharge Date: 08/05/2023      Destination: home with family            Teo Funes MD  Hospitalist Service  [facility]  Securely message with "[employer] (more info)  Text page via [employer] Paging/Directory   ______________________________________________________________________    Interval History   Feels fine. No CP or SOB. Not lightheaded. Using CPM for knee. Not requiring any pain medication for knee replacement.    Physical Exam   Vital Signs: Temp: 98.1  F (36.7  C) Temp src: Tympanic BP: (!) 145/74 Pulse: 90   Resp: 16 SpO2: 93 % O2 Device: None (Room air)    Weight: 341 lbs 4.8 oz    General Appearance: Alert. No acute distress  Chest/Respiratory Exam: Clear to auscultation bilaterally  Cardiovascular Exam: Irregular rate and rhythm. S1, S2, no murmur, gallop, or rubs.  Extremities: Trace pitting lower extremity edema.  Psychiatric: Normal affect and mentation      Medical Decision Making             Data     I have personally reviewed the following data over the past 24 hrs:    8.8  \   11.3 (L)   / 476 (H)     138 98 15.2 /  86   4.3; 4.3 33 (H) 0.65 (L) \     INR:  2.19 (H) PTT:  N/A   D-dimer:  N/A Fibrinogen:  N/A

## 2023-08-04 NOTE — PLAN OF CARE
Goal Outcome Evaluation:      Plan of Care Reviewed With: patient    Overall Patient Progress: improvingOverall Patient Progress: improving    No fever this shift. Vitals are stable, BP WNL. No complaints of pain. Urine is yellow, straw colored, adequate output. Good appetite, patient up to recliner with pa for transfers.

## 2023-08-04 NOTE — PROGRESS NOTES
Interdisciplinary Discharge Planning Note    Anticipated Discharge Date:8/5/2023    Anticipated Discharge Location: [facility]    Clinical Needs Before Discharge:   Medical Stability- infection improvement    Treatment Needs After Discharge:   Skilled Nursing Facility     Potential Barriers to Discharge: None identified. Patient has been accepted to [facility]. [facility] is setup to transport patient to [facility] at 1200 on 8/5/2023.    BHARGAVI Patel  8/4/2023,  3:11 PM

## 2023-08-04 NOTE — PROGRESS NOTES
:    Spoke with Cathy at [facility] to update her that patient is anticipated to be discharged tomorrow 8/5/2023. Cathy stated she setup [facility] to pick patient up at the hospital at 1200 on 8/5/2023 and transport them to [facility]. Cathy stated that [facility] has already been paid for the transportation fee.     PAS Completed: [id number]    No further needs from .    BHARGAVI Patel on 8/4/2023 at 2:53 PM

## 2023-08-04 NOTE — PHARMACY-ANTICOAGULATION SERVICE
Pharmacy Consult- Coumadin Follow-Up    Babak Moran is a 63 year old male admitted on 7/31/2023 with Sepsis due to urinary tract infection (H)    Primary Indication(s) for Anticoagulation: Atrial fibrillation    Goal INR: 2-3    FYI, patient is followed by the Anticoagulation/Protime Clinic at: [facility]    Patient Active Problem List   Diagnosis    Pain in joint, ankle and foot    Atrial fibrillation with RVR (H)    Cerebrovascular accident (CVA) due to embolism of right middle cerebral artery (H)    Osteoarthrosis    Cutaneous lupus erythematosus    Gout    Ascending aorta enlargement (H)    Family history of coronary artery disease    Essential hypertension    Left hemiparesis (H)    Long-term (current) use of anticoagulants, INR goal 2.0-3.0    Systemic lupus erythematosus (H)    Tissue plasminogen activator (t-PA) administered at other facility within 24 hours prior to current admission    Morbid obesity with BMI of 40.0-44.9, adult (H)    Permanent atrial fibrillation (H)    Obstructive sleep apnea syndrome    Need for vaccination    Onychogryphosis    Overweight    Aftercare following knee joint replacement surgery, unspecified laterality    Nail dystrophy    Onychomycosis    Anticoagulation monitoring, INR range 2-3    Status post total right knee replacement    Chronic venous stasis dermatitis of left lower extremity    Sepsis due to urinary tract infection (H)       New factors that may increase patient's sensitivity to warfarin (Coumadin) include: infection, IV antibiotics    New factors that may decrease patient's sensitivity to warfarin (Coumadin) include: none noted    Dietary Intake: 100% per nutrition flowsheet    Recent Labs   Lab Test 08/04/23  0422 08/03/23  0528 08/02/23  0515 08/01/23  0603   HGB 11.3* 11.0* 10.8* 11.0*   INR 2.19* 2.32* 2.86* 3.53*   * 413 320 278        Recent Dosing:    Date INR Dose Given   7/31 3.27 5 mg   8/1 3.53 Dose HELD   8/2 2.86 7.5 mg   8/3 2.32 7.5 mg   8/4 2.19  See below         Plan: Give Warfarin 10 mg x1. INR continues to be therapeutic, but has continued to drop- slightly down from yesterday. Given held dose on 8/1 and patient is now toward lower end of normal range, will give slightly higher dose today. Repeat INR in AM.    Thank You for the Consult. Will continue to follow.    John Harrington RPH ....................  8/4/2023   7:17 AM

## 2023-08-04 NOTE — PROGRESS NOTES
08/04/23 1344   Appointment Info   Signing Clinician's Name / Credentials (OT) Roro Zapata OTR/L   Self-Care/Home Management   Self-Care/Home Mgmt/ADL, Compensatory, Meal Prep Minutes (02924) 15   Symptoms Noted During/After Treatment (Meal Preparation/Planning Training) none   Treatment Detail/Skilled Intervention seated in recliner chair for light bathing   [place] Level (Grooming Training) stand-by assist   [place] Level (Bathing Training) stand-by assist  (seated throughout)   Assistance (Bathing Training) 1 person assist   Therapeutic Activities   Therapeutic Activity Minutes (30297) 15   Symptoms noted during/after treatment fatigue   Treatment Detail/Skilled Intervention pt seated at edge of bed, attempted sit to stand with bed elevated.  Attempted x 2 with FWW and mod-max assist and able to slightly lift hips off bed but unable to fully extend to stand.

## 2023-08-05 ENCOUNTER — APPOINTMENT (OUTPATIENT)
Dept: OCCUPATIONAL THERAPY | Facility: OTHER | Age: 63
End: 2023-08-05
Payer: COMMERCIAL

## 2023-08-05 ENCOUNTER — APPOINTMENT (OUTPATIENT)
Dept: PHYSICAL THERAPY | Facility: OTHER | Age: 63
End: 2023-08-05
Payer: COMMERCIAL

## 2023-08-05 VITALS
SYSTOLIC BLOOD PRESSURE: 133 MMHG | HEART RATE: 83 BPM | OXYGEN SATURATION: 95 % | WEIGHT: 315 LBS | TEMPERATURE: 98.6 F | DIASTOLIC BLOOD PRESSURE: 93 MMHG | BODY MASS INDEX: 43.82 KG/M2 | RESPIRATION RATE: 16 BRPM

## 2023-08-05 PROBLEM — A49.8 INFECTION DUE TO ESBL-PRODUCING ESCHERICHIA COLI: Status: ACTIVE | Noted: 2023-08-05

## 2023-08-05 PROBLEM — Z16.12 INFECTION DUE TO ESBL-PRODUCING ESCHERICHIA COLI: Status: ACTIVE | Noted: 2023-08-05

## 2023-08-05 LAB
ALBUMIN SERPL BCG-MCNC: 2.9 G/DL (ref 3.5–5.2)
ALP SERPL-CCNC: 94 U/L (ref 40–129)
ALT SERPL W P-5'-P-CCNC: 42 U/L (ref 0–70)
ANION GAP SERPL CALCULATED.3IONS-SCNC: 9 MMOL/L (ref 7–15)
AST SERPL W P-5'-P-CCNC: 34 U/L (ref 0–45)
BACTERIA BLD CULT: NO GROWTH
BACTERIA BLD CULT: NO GROWTH
BILIRUB SERPL-MCNC: 0.5 MG/DL
BUN SERPL-MCNC: 17.4 MG/DL (ref 8–23)
CALCIUM SERPL-MCNC: 9 MG/DL (ref 8.8–10.2)
CHLORIDE SERPL-SCNC: 100 MMOL/L (ref 98–107)
CREAT SERPL-MCNC: 0.66 MG/DL (ref 0.67–1.17)
DEPRECATED HCO3 PLAS-SCNC: 29 MMOL/L (ref 22–29)
ERYTHROCYTE [DISTWIDTH] IN BLOOD BY AUTOMATED COUNT: 17 % (ref 10–15)
GFR SERPL CREATININE-BSD FRML MDRD: >90 ML/MIN/1.73M2
GLUCOSE SERPL-MCNC: 89 MG/DL (ref 70–99)
HCT VFR BLD AUTO: 35.9 % (ref 40–53)
HGB BLD-MCNC: 11.3 G/DL (ref 13.3–17.7)
HOLD SPECIMEN: NORMAL
INR PPP: 2.13 (ref 0.85–1.15)
MCH RBC QN AUTO: 27 PG (ref 26.5–33)
MCHC RBC AUTO-ENTMCNC: 31.5 G/DL (ref 31.5–36.5)
MCV RBC AUTO: 86 FL (ref 78–100)
PLATELET # BLD AUTO: 512 10E3/UL (ref 150–450)
POTASSIUM SERPL-SCNC: 4.1 MMOL/L (ref 3.4–5.3)
PROT SERPL-MCNC: 6.7 G/DL (ref 6.4–8.3)
RBC # BLD AUTO: 4.19 10E6/UL (ref 4.4–5.9)
SODIUM SERPL-SCNC: 138 MMOL/L (ref 136–145)
WBC # BLD AUTO: 11 10E3/UL (ref 4–11)

## 2023-08-05 PROCEDURE — 85027 COMPLETE CBC AUTOMATED: CPT | Performed by: FAMILY MEDICINE

## 2023-08-05 PROCEDURE — 97530 THERAPEUTIC ACTIVITIES: CPT | Mod: GP

## 2023-08-05 PROCEDURE — 85610 PROTHROMBIN TIME: CPT | Performed by: INTERNAL MEDICINE

## 2023-08-05 PROCEDURE — 250N000011 HC RX IP 250 OP 636: Mod: JZ | Performed by: INTERNAL MEDICINE

## 2023-08-05 PROCEDURE — 97530 THERAPEUTIC ACTIVITIES: CPT | Mod: GO | Performed by: OCCUPATIONAL THERAPIST

## 2023-08-05 PROCEDURE — 250N000013 HC RX MED GY IP 250 OP 250 PS 637: Performed by: INTERNAL MEDICINE

## 2023-08-05 PROCEDURE — 36415 COLL VENOUS BLD VENIPUNCTURE: CPT | Performed by: INTERNAL MEDICINE

## 2023-08-05 PROCEDURE — 80053 COMPREHEN METABOLIC PANEL: CPT | Performed by: FAMILY MEDICINE

## 2023-08-05 PROCEDURE — 250N000013 HC RX MED GY IP 250 OP 250 PS 637: Performed by: FAMILY MEDICINE

## 2023-08-05 PROCEDURE — 97535 SELF CARE MNGMENT TRAINING: CPT | Mod: GO | Performed by: OCCUPATIONAL THERAPIST

## 2023-08-05 PROCEDURE — 99239 HOSP IP/OBS DSCHRG MGMT >30: CPT | Mod: 24 | Performed by: INTERNAL MEDICINE

## 2023-08-05 RX ORDER — GRANULES FOR ORAL 3 G/1
3 POWDER ORAL
Qty: 3 PACKET | Refills: 0 | Status: SHIPPED | OUTPATIENT
Start: 2023-08-06 | End: 2023-08-05

## 2023-08-05 RX ORDER — NITROFURANTOIN 25; 75 MG/1; MG/1
100 CAPSULE ORAL 2 TIMES DAILY
Qty: 20 CAPSULE | Refills: 0 | Status: SHIPPED | OUTPATIENT
Start: 2023-08-05 | End: 2023-08-15

## 2023-08-05 RX ADMIN — HYDROCHLOROTHIAZIDE 25 MG: 25 TABLET ORAL at 09:08

## 2023-08-05 RX ADMIN — METOPROLOL SUCCINATE 25 MG: 25 TABLET, EXTENDED RELEASE ORAL at 09:08

## 2023-08-05 RX ADMIN — AMLODIPINE BESYLATE 5 MG: 5 TABLET ORAL at 09:08

## 2023-08-05 RX ADMIN — ERTAPENEM SODIUM 1 G: 1 INJECTION, POWDER, LYOPHILIZED, FOR SOLUTION INTRAMUSCULAR; INTRAVENOUS at 09:08

## 2023-08-05 ASSESSMENT — ACTIVITIES OF DAILY LIVING (ADL)
ADLS_ACUITY_SCORE: 32

## 2023-08-05 NOTE — PHARMACY - DISCHARGE MEDICATION RECONCILIATION
Pharmacy:  Discharge Counseling and Medication Reconciliation    Babak Moran     [place] [address]  [phone] (home)   63 year old male  PCP: Teo Funes    Allergies: Diatrizoate, Ioxaglate, Levofloxacin, Metrizamide, and Probenecid    Discharge Counseling:    Pharmacist did NOT meet with patient (and/or family) today to review the medication portion of the After Visit Summary (with an emphasis on NEW medications) and to address patient's questions/concerns. Patient is discharging to a SNF, all medications will be handled/monitored/administered for him.      Discharge Medication Reconciliation:    It has been determined that the patient has an adequate supply of medications available or which can be obtained from the patient's preferred pharmacy, which HE/SHE has confirmed as: [id number] [An updated medication list will be faxed to the patient's pharmacy.]    Thank you for the consult.    Sydni Bush RPH........August 5, 2023 11:14 AM

## 2023-08-05 NOTE — PROGRESS NOTES
08/05/23 1115   Appointment Info   Signing Clinician's Name / Credentials (OT) Roro Zapata OTR/L   Self-Care/Home Management   Self-Care/Home Mgmt/ADL, Compensatory, Meal Prep Minutes (09820) 15   Symptoms Noted During/After Treatment (Meal Preparation/Planning Training) fatigue   Treatment Detail/Skilled Intervention dressed LB while supine in bed   [place] Level (Bathing Training) minimum assist (75% patient effort)   Assistance (Bathing Training) 1 person assist   Lower Body Dressing Training Assistance moderate assist (50% patient effort)   Lower Body Dressing Training Assistance 1 person assist  (pt rolled side to side in bed and bridged to ladarius shorts; assist needed over feet)   Therapeutic Activities   Therapeutic Activity Minutes (73673) 15   Symptoms noted during/after treatment none   Treatment Detail/Skilled Intervention mechanical lift used to lift patient from bed to recliner chair.   OT Discharge Planning   OT Plan DC to [facility]   OT Discharge Recommendation (DC Rec) Transitional Care Facility   OT Rationale for DC Rec pt continues to have LB weakness and requires lift for transfers, unable to stand.  Able to sit at edge of bed to complete cares.  Pt would benefit from rehab to promote increased level of function   OT Brief overview of current status see above

## 2023-08-05 NOTE — PHARMACY
[facility]  Part of [facility]  [address] [address]    August 5, 2023    Dear Pharmacist,    Your customer, Babak Moran, born on 1960, was recently discharged from [facility].  We have updated his medication list and want to alert you to the following:       Review of your medicines        START taking        Dose / Directions   fosfomycin 3 g Packet  Commonly known as: MONUROL  Indication: Urinary Tract Infection      Dose: 3 g  Start taking on: August 6, 2023  Take 1 packet (3 g) by mouth every 72 hours for 3 doses 8/6, 8/9, and 8/12  Quantity: 3 packet  Refills: 0     metoprolol succinate ER 25 MG 24 hr tablet  Commonly known as: TOPROL XL  Indication: Atrial Fibrillation  Used for: Atrial fibrillation with RVR (H)      Dose: 25 mg  Take 1 tablet (25 mg) by mouth daily  Refills: 0            CONTINUE these medicines which have NOT CHANGED        Dose / Directions   acetaminophen 325 MG tablet  Commonly known as: TYLENOL  Indication: Pain  Used for: S/P total knee arthroplasty, right      Dose: 650 mg  Take 2 tablets (650 mg) by mouth every 4 hours as needed for other (mild pain)  Quantity: 100 tablet  Refills: 0     amLODIPine 5 MG tablet  Commonly known as: NORVASC  Indication: High Blood Pressure Disorder  Used for: Benign essential hypertension      Dose: 5 mg  Take 1 tablet (5 mg) by mouth daily  Quantity: 90 tablet  Refills: 4     hydrochlorothiazide 25 MG tablet  Commonly known as: HYDRODIURIL  Indication: High Blood Pressure Disorder  Used for: Benign essential hypertension      Dose: 25 mg  Take 1 tablet (25 mg) by mouth daily  Quantity: 90 tablet  Refills: 4     warfarin ANTICOAGULANT 5 MG tablet  Commonly known as: COUMADIN  Indication: Atrial Fibrillation  Used for: Atrial fibrillation with RVR (H)      Take as directed. If you are unsure how to take this medication, talk to your nurse or doctor.  Original instructions: 7.5  mg every day or as directed by the [facility] clinic  Quantity: 160 tablet  Refills: 1               Where to get your medicines        These medications were sent to [id number] - [place], [place] - [address]  [address] [place] [address]      Phone: [phone]   fosfomycin 3 g Packet         We also reviewed Babak Moran's allergy list and updated it as needed:  Allergies: Diatrizoate, Ioxaglate, Levofloxacin, Metrizamide, and Probenecid    Thank you for continuing to care for Babak Moran.  We look forward to working together with you in the future.    Sincerely,  Sydni Bush [facility]

## 2023-08-05 NOTE — PLAN OF CARE
Pt is A&Ox4 and vitally stable on RA. Pt refused Q2H repos and vitals at 0400. Will continue to monitor.      Problem: Infection  Goal: Absence of Infection Signs and Symptoms  Outcome: Progressing

## 2023-08-05 NOTE — PLAN OF CARE
Goal Outcome Evaluation:      Plan of Care Reviewed With: patient    Overall Patient Progress: improvingOverall Patient Progress: improving      Vitals are stable, patient is afebrile, no complaints of pain. Urine is yellow color, adequate output. Foam dressing removed from coccyx. Patient discharged to [facility] for therapy.

## 2023-08-05 NOTE — PROGRESS NOTES
08/05/23 1200   Appointment Info   Signing Clinician's Name / Credentials (PT) Maribell Rangel, PT   Therapeutic Activity   Therapeutic Activities: dynamic activities to improve functional performance Minutes (70893) 15   Symptoms Noted During/After Treatment None   Treatment Detail/Skilled Intervention transfer to chair with ceiling pa lift. Call light in reach, alarms on.   PT Discharge Planning   PT Discharge Recommendation (DC Rec) Transitional Care Facility   PT Rationale for DC Rec to promote strength, stability and return to safe, independent mobility   PT Brief overview of current status presently requiring; moderate assist of 2 for supine<>sit; and use of total mechanical lift for transfers (patient unable to perform sit to stand due to bilateral LE weakness); patient will certainly benefit from continued PT in short term rehab prior to returning home with family   Total Session Time   Timed Code Treatment Minutes 15   Total Session Time (sum of timed and untimed services) 15

## 2023-08-05 NOTE — DISCHARGE SUMMARY
[facility]    Discharge Summary  Hospitalist    Date of Admission:  7/31/2023  Date of Discharge:  8/5/2023  Discharging Provider: Rambo Kaminski MD  Date of Service (when I saw the patient): 08/05/23    Discharge Diagnoses   Principal Problem:    Sepsis due to urinary tract infection (H) (7/31/2023)  Active Problems:    Cerebrovascular accident (CVA) due to embolism of right middle cerebral artery (H) (2/3/2017)    Essential hypertension (9/8/2009)    Systemic lupus erythematosus (H) (9/18/2008)    Morbid obesity with BMI of 40.0-44.9, adult (H) (2/8/2017)    Permanent atrial fibrillation (H) (2/11/2018)    Obstructive sleep apnea syndrome (5/13/2019)    Status post total right knee replacement (6/26/2023)    Infection due to ESBL-producing Escherichia coli (8/5/2023)      History of Present Illness   Babak Moran is an 63 year old male who presented with sepsis due to E. coli extended spectrum beta-lactamase resistant.  Patient had elevated lactic acid level, elevated white blood cell count.  Urinalysis showing pyuria and bacteria.  Temperature 101.3 degrees.    Initially treated with ceftriaxone x2 days until cultures returned showing ESBL E. coli.  Switched to ertapenem x4 days then discharged with prescription for fosfomycin 3 grams every 72 hours PO x3 doses... 8/6, 8/9, 8/12 -- at [facility].     ---> Fosfomycin is unavailable.  Prescription changed to nitrofurantoin 100 mg twice daily x10 days. Start 8/5 PM.     Toprol XL maintained at 25 mg once daily. History of bradycardia with higher doses.       Hospital Course   Babak Moran was admitted on 7/31/2023.  The following problems were addressed during his hospitalization:    Sepsis due to urinary tract infection (H)  Present on admission.  Lactate 2.0, white blood cell count 15.6, urinalysis shows pyuria and bacteriuria.  Temperature 101.3.   Improved with IVF and antibiotics.  Negative blood cultures  Given ceftriaxone for 2 days  until cultures returned with ESBL E coli.   Switched to ertapenem, day 3. Plan 4 days ertapenem, then discharge to SNF tomorrow for fosfomycin dose 8/6      Active Problems:    Cerebrovascular accident (CVA) due to embolism of right middle cerebral artery (H)  Assessment: Prior history with residual left-sided weakness       Essential hypertension  Assessment: Chronic       Systemic lupus erythematosus (H)       Morbid obesity with BMI of 40.0-44.9, adult (H)       Permanent atrial fibrillation (H)  Assessment: Chronic. Anticoagulated on warfarin. Pulse over 110 on 8/3, started back on metoprolol, which had been stopped in 2022 due to bradycardia.  Plan: Continue warfarin  Continue metoprolol tartrate 25 mg BID for today as he received a dose  Start succinate tomorrow  Continue telemetry to monitor pulse on metoprolol       Obstructive sleep apnea syndrome  Assessment: Chronic       Status post total right knee replacement  Assessment: He had a right total knee replacement 1 month ago.  He is a has bilateral lower extremity edema but nothing on exam that appears hot or red in the surgical site region.  He is complaining of no new knee pain.  Plan: Started on a CPM machine.     Syncope  Assessment: present on admission, 7/29. Likely hypovolemia from sepsis. No further issues after receiving IVF.    Rambo Kaminski MD    Significant Results and Procedures   7/31/2023 urine culture - >100,000 CFU/mL Escherichia coli ESBL Abnormal      Pending Results   These results will be followed up by [place] [facility].   Unresulted Labs Ordered in the Past 30 Days of this Admission       Date and Time Order Name Status Description    7/31/2023  3:42 PM Blood Culture Arm, Left Preliminary     7/31/2023  3:42 PM Blood Culture Line, venous Preliminary             Code Status   Full Code       Primary Care Physician   Teo Funes    Physical Exam   Temp: 98.6  F (37  C) Temp src: Tympanic BP: (!) 133/93 Pulse: 83   Resp: 16 SpO2:  95 % O2 Device: None (Room air)    Vitals:    08/01/23 0357 08/02/23 0500   Weight: (!) 155.6 kg (343 lb) (!) 154.8 kg (341 lb 4.8 oz)     Vital Signs with Ranges  Temp:  [96.8  F (36  C)-99.5  F (37.5  C)] 98.6  F (37  C)  Pulse:  [75-83] 83  Resp:  [16] 16  BP: ()/(42-93) 133/93  SpO2:  [93 %-96 %] 95 %  I/O last 3 completed shifts:  In: 967 [P.O.:960; I.V.:7]  Out: 1325 [Urine:1325]    General Appearance: Alert. No acute distress  Chest/Respiratory Exam: Clear to auscultation bilaterally  Cardiovascular Exam: Irregular rate and rhythm. S1, S2, no murmur, gallop, or rubs.  Extremities: Trace pitting lower extremity edema.  Psychiatric: Normal affect and mentation    Discharge Disposition   Discharged to rehabilitation facility [facility]   Condition at discharge: Stable    Consultations This Hospital Stay   PHARMACY TO DOSE WARFARIN  PHYSICAL THERAPY ADULT IP CONSULT  OCCUPATIONAL THERAPY ADULT IP CONSULT  SOCIAL WORK IP CONSULT  PHYSICAL THERAPY ADULT IP CONSULT  OCCUPATIONAL THERAPY ADULT IP CONSULT    Time Spent on this Encounter   I, Rambo Kaminski MD, personally saw the patient today and spent greater than 30 minutes discharging this patient.    Discharge Orders      General info for SNF    Length of Stay Estimate: Short Term Care: Estimated # of Days <30  Condition at Discharge: Improving  Level of care:skilled   Rehabilitation Potential: Good  Admission H&P remains valid and up-to-date: Yes  Recent Chemotherapy: N/A  Use Nursing Home Standing Orders: Yes     Mantoux instructions    Give two-step Mantoux (PPD) Per Facility Policy Yes     Follow Up and recommended labs and tests    Follow up with custodial physician.  The following labs/tests are recommended: Warfarin / INR.     Reason for your hospital stay    Sepsis due to urinary tract infection with resistant organisms.     Activity - Ambulate in hallway    Every shift     Activity - Up with nursing assistance     Full Code     Physical  Therapy Adult Consult    Evaluate and treat as clinically indicated.    Reason: Left-sided weakness, history of stroke     Occupational Therapy Adult Consult    Evaluate and treat as clinically indicated.    Reason: Left-sided weakness, history of stroke     Contact Isolation    ESBL urine infection     Diet    Follow this diet upon discharge: Orders Placed This Encounter      Combination Diet Regular Diet Adult     Discharge Medications   Discharge Medication List as of 8/5/2023 11:27 AM        START taking these medications    Details   metoprolol succinate ER (TOPROL XL) 25 MG 24 hr tablet Take 1 tablet (25 mg) by mouth daily, No Print Out           CONTINUE these medications which have CHANGED    Details   fosfomycin (MONUROL) 3 g Packet Take 1 packet (3 g) by mouth every 72 hours for 3 doses 8/6, 8/9, and 8/12, Disp-3 packet, R-0, E-Prescribe           CONTINUE these medications which have NOT CHANGED    Details   acetaminophen (TYLENOL) 325 MG tablet Take 2 tablets (650 mg) by mouth every 4 hours as needed for other (mild pain), Disp-100 tablet, R-0, E-Prescribe      amLODIPine (NORVASC) 5 MG tablet Take 1 tablet (5 mg) by mouth daily, Disp-90 tablet, R-4, E-Prescribe      hydrochlorothiazide (HYDRODIURIL) 25 MG tablet Take 1 tablet (25 mg) by mouth daily, Disp-90 tablet, R-4, E-Prescribe      warfarin ANTICOAGULANT (COUMADIN) 5 MG tablet 7.5 mg every day or as directed by the protime clinic, Disp-160 tablet, R-1, E-Prescribe           Allergies   Allergies   Allergen Reactions    Diatrizoate Rash    Ioxaglate Other (See Comments)     Does not recall    Levofloxacin Hives and Rash    Metrizamide Rash     (Diagnostic X-Ray materials)    Probenecid Rash     Data   Most Recent 3 CBC's:  Recent Labs   Lab Test 08/05/23  0555 08/04/23 0422 08/03/23  0528   WBC 11.0 8.8 8.9   HGB 11.3* 11.3* 11.0*   MCV 86 86 87   * 476* 413      Most Recent 3 BMP's:  Recent Labs   Lab Test 08/05/23  0555 08/04/23 0422  08/03/23  0528    138 139   POTASSIUM 4.1 4.3  4.3 3.8   CHLORIDE 100 98 101   CO2 29 33* 32*   BUN 17.4 15.2 14.3   CR 0.66* 0.65* 0.65*   ANIONGAP 9 7 6*   VIRGEN 9.0 9.2 8.8   GLC 89 86 85     Most Recent 2 LFT's:  Recent Labs   Lab Test 08/05/23  0555 07/31/23  1557   AST 34 24   ALT 42 25   ALKPHOS 94 90   BILITOTAL 0.5 0.7     Most Recent INR's and Anticoagulation Dosing History:  Anticoagulation Dose History  More data exists         Latest Ref Rng & Units 7/24/2023 7/31/2023 8/1/2023 8/2/2023   Recent Dosing and Labs   warfarin ANTICOAGULANT (COUMADIN) tablet 7.5 mg - - - - 7.5 mg, $Given   warfarin ANTICOAGULANT (COUMADIN) tablet 5 mg - - 5 mg, $Given - -   INR 0.85 - 1.15 3.4  3.27  3.53  2.86          8/3/2023 8/4/2023 8/5/2023   Recent Dosing and Labs   warfarin ANTICOAGULANT (COUMADIN) tablet 7.5 mg 7.5 mg, $Given - -   warfarin ANTICOAGULANT (COUMADIN) tablet 5 mg - 10 mg, $Given -   INR 2.32  2.19  2.13      Most Recent 3 Troponin's:No lab results found.  Most Recent Cholesterol Panel:  Recent Labs   Lab Test 12/23/22  1353   CHOL 175   *   HDL 47   TRIG 49       Most Recent 6 Bacteria Isolates From Any Culture (See EPIC Reports for Culture Details):  Recent Labs   Lab Test 12/02/19  1557 10/08/19  1034 09/06/19  0830 09/05/19  0918   CULT Light growth  Normal skin dee   Light growth  Enterobacter cloacae  *  Light growth  Staphylococcus intermedius  * No growth after 6 days No growth after 6 days  No growth after 6 days     Most Recent TSH, T4 and A1c Labs:  Recent Labs   Lab Test 08/09/22  0434   A1C 5.8     Results for orders placed or performed during the hospital encounter of 07/31/23   XR Chest 2 Views    Narrative    PROCEDURE:  XR CHEST 2 VIEWS    HISTORY: Fever, .    COMPARISON:  6/2/2023    FINDINGS: Habitus limits penetration.  The cardiomediastinal contours are stable. The trachea is midline.  No focal consolidation, effusion or pneumothorax.    Old left rib fractures are  redemonstrated. No subdiaphragmatic free  air.      Impression    IMPRESSION:      No discrete consolidation.    PRAVIN BARRAGAN MD         SYSTEM ID:  [id number]

## 2023-08-07 ENCOUNTER — PATIENT OUTREACH (OUTPATIENT)
Dept: FAMILY MEDICINE | Facility: OTHER | Age: 63
End: 2023-08-07
Payer: COMMERCIAL

## 2023-08-07 ENCOUNTER — APPOINTMENT (OUTPATIENT)
Dept: GERIATRICS | Facility: OTHER | Age: 63
End: 2023-08-07
Attending: NURSE PRACTITIONER
Payer: COMMERCIAL

## 2023-08-07 ENCOUNTER — ANTICOAGULATION THERAPY VISIT (OUTPATIENT)
Dept: ANTICOAGULATION | Facility: OTHER | Age: 63
End: 2023-08-07
Attending: NURSE PRACTITIONER
Payer: COMMERCIAL

## 2023-08-07 ENCOUNTER — TRANSFERRED RECORDS (OUTPATIENT)
Dept: HEALTH INFORMATION MANAGEMENT | Facility: OTHER | Age: 63
End: 2023-08-07

## 2023-08-07 ENCOUNTER — NURSING HOME VISIT (OUTPATIENT)
Dept: GERIATRICS | Facility: OTHER | Age: 63
End: 2023-08-07
Payer: COMMERCIAL

## 2023-08-07 DIAGNOSIS — I63.411 CEREBROVASCULAR ACCIDENT (CVA) DUE TO EMBOLISM OF RIGHT MIDDLE CEREBRAL ARTERY (H): ICD-10-CM

## 2023-08-07 DIAGNOSIS — N39.0 SEPSIS DUE TO URINARY TRACT INFECTION (H): ICD-10-CM

## 2023-08-07 DIAGNOSIS — Z79.01 LONG-TERM (CURRENT) USE OF ANTICOAGULANTS, INR GOAL 2.0-3.0: Chronic | ICD-10-CM

## 2023-08-07 DIAGNOSIS — E66.01 MORBID OBESITY WITH BMI OF 40.0-44.9, ADULT (H): ICD-10-CM

## 2023-08-07 DIAGNOSIS — A41.9 SEPSIS DUE TO URINARY TRACT INFECTION (H): ICD-10-CM

## 2023-08-07 DIAGNOSIS — Z96.651 STATUS POST TOTAL RIGHT KNEE REPLACEMENT: ICD-10-CM

## 2023-08-07 DIAGNOSIS — Z16.12 INFECTION DUE TO ESBL-PRODUCING ESCHERICHIA COLI: Primary | ICD-10-CM

## 2023-08-07 DIAGNOSIS — G81.94 LEFT HEMIPARESIS (H): ICD-10-CM

## 2023-08-07 DIAGNOSIS — A49.8 INFECTION DUE TO ESBL-PRODUCING ESCHERICHIA COLI: Primary | ICD-10-CM

## 2023-08-07 DIAGNOSIS — I10 ESSENTIAL HYPERTENSION: ICD-10-CM

## 2023-08-07 DIAGNOSIS — I48.21 PERMANENT ATRIAL FIBRILLATION (H): Primary | ICD-10-CM

## 2023-08-07 DIAGNOSIS — G47.33 OBSTRUCTIVE SLEEP APNEA SYNDROME: ICD-10-CM

## 2023-08-07 DIAGNOSIS — I48.21 PERMANENT ATRIAL FIBRILLATION (H): ICD-10-CM

## 2023-08-07 DIAGNOSIS — I48.91 ATRIAL FIBRILLATION WITH RVR (H): ICD-10-CM

## 2023-08-07 DIAGNOSIS — Z79.01 ANTICOAGULATION MONITORING, INR RANGE 2-3: ICD-10-CM

## 2023-08-07 LAB — INR (EXTERNAL): 2 (ref 0.9–1.1)

## 2023-08-07 PROCEDURE — 99306 1ST NF CARE HIGH MDM 50: CPT | Performed by: NURSE PRACTITIONER

## 2023-08-07 NOTE — TELEPHONE ENCOUNTER
Transitional Care Management    Patient discharged to skilled nursing facility for short term rehab. No TCM call required per policy.     Corinne R Thayer, RN on 8/7/2023 at 8:26 AM

## 2023-08-07 NOTE — PROGRESS NOTES
ANTICOAGULATION MANAGEMENT     Babak Moran 63 year old male is on warfarin with therapeutic INR result. (Goal INR 2.0-3.0)    Recent labs: (last 7 days)     08/07/23  1133   INR 2.0*       ASSESSMENT     Source(s): Chart Review and Home Care/Facility Nurse     Warfarin doses taken: While hospitalized on 7/31-8/5: less warfarin administered than maintenance regimen.  Discharged on: home regimen continued  Diet: No new diet changes identified  Medication/supplement changes: fosfomycin 3 grams every 72 hours PO x3 doses... 8/6, 8/9, 8/12 -- at [facility].   New illness, injury, or hospitalization: Yes: sepsis/UTI  Signs or symptoms of bleeding or clotting: No  Previous result: Therapeutic last 2(+) visits  Additional findings: None       PLAN     Recommended plan for temporary change(s) affecting INR     Dosing Instructions: booster dose then continue your current warfarin dose with next INR in 4 days       Summary  As of 8/7/2023      Full warfarin instructions:  8/7: 10 mg; Otherwise 7.5 mg every day   Next INR check:  8/10/2023               Faxed dosing and follow up instructions to [facility]    Orders given to  Homecare nurse/facility to recheck    Patient not here, [facility] Communication sheet with screening questions and current INR received via FAX from outside agency. Results reviewed, Warfarin dosing per protocol, and recommended follow-up appointment made. Paperwork FAXED back to facility.       Plan made per [facility] anticoagulation protocol    Shelley Mercado, RN  Anticoagulation Clinic  8/7/2023    _______________________________________________________________________     Anticoagulation Episode Summary       Current INR goal:  2.0-3.0   TTR:  53.2 % (11.6 mo)   Target end date:  Indefinite   Send INR reminders to:  [facility]    Indications    Permanent atrial fibrillation (H) [I48.21]  Anticoagulation monitoring  INR range 2-3 (Resolved) [Z79.01]  Atrial fibrillation with RVR (H)  [I48.91]  Anticoagulation monitoring  INR range 2-3 [Z79.01]             Comments:  Babak prefers to be closer to 3.0 d/t previous CVA check Q 4 weeks.Declines to reduce dose when slightly over 3.0  Needs to change PCP  Takes a long time get up to therapeutic after holding warfarin.             Anticoagulation Care Providers       Provider Role Specialty Phone number    Teo Funes MD Referring Family Medicine [phone]

## 2023-08-07 NOTE — PROGRESS NOTES
[facility]  Hospital follow up/Initial Assessment    Patient Name: Babak Moran   : 1960  MRN: [id number]    Place of Service: [facility]  DOS: 2023    CC: Hospital follow up/Initial Assessment    HPI:  Babak Moran is a 63 year old male with PMH of multiple chronic medical concerns, who is seen today regarding pt was hospitalized -23 at [facility] sepsis due to  urinary source E. coli extended spectrum beta-lactamase resistant.  Patient had elevated lactic acid level, elevated white blood cell count. Urinalysis showing pyuria and bacteria. Blood cultures Neg.  Temperature 101.3 degrees.     Initially treated with ceftriaxone x2 days until cultures returned showing ESBL E. coli.  Switched to ertapenem x4 days .      Pt discharged to [facility] with nitrofurantoin 100 mg BID x 10 days which was to start 23 PM.     Hx Right middle cerebral artery CVA: residual left sided weakness  Hypertension: Pt had been changed from metoprolol tartrate 25 mg BID to succinate 25 mg daily, also taking amlodipine and hydrochlorothiazide.     Systemic Lupus erythematosus: had symptoms he doesn't remember what many years ago. Had taken medication for lupus but unable to continue taking due to lack of insurance. He has not taken medications for quite a few years.      Permanent atrial fibrillation: Chronic. Anticoagulated on warfarin. Pulse had been over 110 on 8/3, started back on metoprolol, which had been stopped in  due to bradycardia. Metoprolol succinate started 25 mg daily (changed from metoprolol tartrate 25 mg BID).      Obstructive sleep apnea syndrome: Chronic       Status post total right knee replacement: He had a right total knee replacement 1 month ago.  He has bilateral lower extremity edema but nothing on exam that appears hot or red in the surgical site region.  He is complaining of no new knee pain.     Syncope: Occurred  at home.  Likely  hypovolemia from sepsis. No further issues after receiving IVF.    Pt reports he is worried because he has not been able to walk. He has Right TKA. He had PT eval 7/27. He has not yet seen Dr Johnson for post op visit since his surgery per patient report and then fell ill with UTI/sepsis. He is currently at [facility] for therapy.   HR since admission to [facility] in the 80-90s, irregular. BP ranging 110-130s. Denies chest pain, shortness of breath, cough but feels very weak and not able to ambulate due to this.   He denies dizziness/light-headedness.   Noted albumin is low 2.8.   Per patient was given lasix in hospital for edema. Currently has no edema. +noted chronic venous stasis  Denies pain in right knee   Had bowel movement yesterday. Does not think he will need any stool softeners here.   Called wife and left message to call if questions.         Multidisciplinary notes, laboratory values, medications, vital signs, weight and orders arereviewed from nursing home records. I have reviewed the patient s medical history and updated the computerized patient record.     PMH:  Past Medical History:   Diagnosis Date    Atrial fibrillation (H) 02/03/2017    on Warfarin    Bacterial sepsis (H) 8/8/2022    Cellulitis of left lower extremity 9/5/2019    Cerebral infarction (H)     Cerebral infarction due to unspecified occlusion or stenosis of right middle cerebral artery (H) 02/03/2017    ,Left hemiparesis, left neglect, impaired balance and gait following R MCA stroke with mild hemorrhagic transformation s/p tissue plasminogen activator and mechanical thrombectomy, s/p C7 facet fracture from fall off forklift.    Chest pain 02/1997    Disorder of skin or subcutaneous tissue 07/25/2011    Essential (primary) hypertension 02/1997    Fracture of cervical vertebra (H)     02/03/2017,C7 facet fracture from fall off forklift, nondisplaced    Gout     Hemiplegia affecting left nondominant side (H) 02/03/2017    Obesity  02/03/2017    body mass index of 40    Other local lupus erythematosus     Sepsis (H) 9/6/2019       Medications:     Current Outpatient Medications   Medication Sig Dispense Refill    acetaminophen (TYLENOL) 325 MG tablet Take 2 tablets (650 mg) by mouth every 4 hours as needed for other (mild pain) 100 tablet 0    amLODIPine (NORVASC) 5 MG tablet Take 1 tablet (5 mg) by mouth daily 90 tablet 4    hydrochlorothiazide (HYDRODIURIL) 25 MG tablet Take 1 tablet (25 mg) by mouth daily 90 tablet 4    metoprolol succinate ER (TOPROL XL) 25 MG 24 hr tablet Take 1 tablet (25 mg) by mouth daily      nitroFURantoin macrocrystal-monohydrate (MACROBID) 100 MG capsule Take 1 capsule (100 mg) by mouth 2 times daily for 10 days --- Start 8/5/2023 20 capsule 0    warfarin ANTICOAGULANT (COUMADIN) 5 MG tablet 7.5 mg every day or as directed by the protime clinic 160 tablet 1      Medication reconciliation complete between Epic record and [facility].     Allergies:  Allergies   Allergen Reactions    Diatrizoate Rash    Ioxaglate Other (See Comments)     Does not recall    Levofloxacin Hives and Rash    Metrizamide Rash     (Diagnostic X-Ray materials)    Probenecid Rash       Review of Systems:  See HPI    Vital Signs:  Temp 97.8   Pulse 83   Respirations 16   /76   Oxygen Sats 97%   Weight 326#    Physical Exam:     Pleasant and alert without distress.    Sclera nonicteric, conjunctiva non-inflamed.  Skin color pink.   Lungs clear to auscultation throughout. No wheezes or rales noted.   Cardiovascular irregular, controlled rate auscultated. No murmur noted.  Abdomen large round soft and without tenderness   Extremities without edema. Mild venous stasis-chronic, DPPT    Right knee incision healed, mildly enlarged but no pain with active ROM or palpation  Able to move upper and lower extremities   Urine in urinal noted to be yellow, mildly dark, clear      New Labs/Diagnostics:  Lab Results   Component Value Date    WBC 11.0  08/05/2023    WBC 7.1 09/29/2020     Lab Results   Component Value Date    RBC 4.19 08/05/2023    RBC 5.49 09/29/2020     Lab Results   Component Value Date    HGB 11.3 08/05/2023    HGB 14.5 09/29/2020     Lab Results   Component Value Date    HCT 35.9 08/05/2023    HCT 47.3 09/29/2020     No components found for: MCT  Lab Results   Component Value Date    MCV 86 08/05/2023    MCV 86 09/29/2020     Lab Results   Component Value Date    MCH 27.0 08/05/2023    MCH 26.4 09/29/2020     Lab Results   Component Value Date    MCHC 31.5 08/05/2023    MCHC 30.7 09/29/2020     Lab Results   Component Value Date    RDW 17.0 08/05/2023    RDW 16.1 09/29/2020     Lab Results   Component Value Date     08/05/2023     09/29/2020     Last Comprehensive Metabolic Panel:  Sodium   Date Value Ref Range Status   08/05/2023 138 136 - 145 mmol/L Final   09/29/2020 138 134 - 144 mmol/L Final     Potassium   Date Value Ref Range Status   08/05/2023 4.1 3.4 - 5.3 mmol/L Final   08/11/2022 3.7 3.5 - 5.1 mmol/L Final   09/29/2020 4.3 3.5 - 5.1 mmol/L Final     Chloride   Date Value Ref Range Status   08/05/2023 100 98 - 107 mmol/L Final   08/11/2022 102 98 - 107 mmol/L Final   09/29/2020 101 98 - 107 mmol/L Final     Carbon Dioxide   Date Value Ref Range Status   09/29/2020 33 (H) 21 - 31 mmol/L Final     Carbon Dioxide (CO2)   Date Value Ref Range Status   08/05/2023 29 22 - 29 mmol/L Final   08/11/2022 29 21 - 31 mmol/L Final     Anion Gap   Date Value Ref Range Status   08/05/2023 9 7 - 15 mmol/L Final   08/11/2022 8 3 - 14 mmol/L Final   09/29/2020 4 3 - 14 mmol/L Final     Glucose   Date Value Ref Range Status   08/05/2023 89 70 - 99 mg/dL Final   08/11/2022 87 70 - 105 mg/dL Final   09/29/2020 98 70 - 105 mg/dL Final     GLUCOSE BY METER POCT   Date Value Ref Range Status   06/26/2023 99 70 - 99 mg/dL Final     Urea Nitrogen   Date Value Ref Range Status   08/05/2023 17.4 8.0 - 23.0 mg/dL Final   08/11/2022 12 7 - 25  mg/dL Final   09/29/2020 14 7 - 25 mg/dL Final     Creatinine   Date Value Ref Range Status   08/05/2023 0.66 (L) 0.67 - 1.17 mg/dL Final   09/29/2020 0.99 0.70 - 1.30 mg/dL Final     GFR Estimate   Date Value Ref Range Status   08/05/2023 >90 >60 mL/min/1.73m2 Final   09/29/2020 77 >60 mL/min/[1.73_m2] Final     Calcium   Date Value Ref Range Status   08/05/2023 9.0 8.8 - 10.2 mg/dL Final   09/29/2020 9.7 8.6 - 10.3 mg/dL Final     Bilirubin Total   Date Value Ref Range Status   08/05/2023 0.5 <=1.2 mg/dL Final   09/29/2020 0.8 0.3 - 1.0 mg/dL Final     Alkaline Phosphatase   Date Value Ref Range Status   08/05/2023 94 40 - 129 U/L Final   09/29/2020 60 34 - 104 U/L Final     ALT   Date Value Ref Range Status   08/05/2023 42 0 - 70 U/L Final     Comment:     Reference intervals for this test were updated on 6/12/2023 to more accurately reflect our healthy population. There may be differences in the flagging of prior results with similar values performed with this method. Interpretation of those prior results can be made in the context of the updated reference intervals.     09/29/2020 16 7 - 52 U/L Final     AST   Date Value Ref Range Status   08/05/2023 34 0 - 45 U/L Final     Comment:     Reference intervals for this test were updated on 6/12/2023 to more accurately reflect our healthy population. There may be differences in the flagging of prior results with similar values performed with this method. Interpretation of those prior results can be made in the context of the updated reference intervals.   09/29/2020 19 13 - 39 U/L Final         Assessment:  (A49.8,  Z16.12) Infection due to ESBL-producing Escherichia coli  (primary encounter diagnosis)  (I63.411) Cerebrovascular accident (CVA) due to embolism of right middle cerebral artery (H)  (G81.94) Left hemiparesis (H)  (G47.33) Obstructive sleep apnea syndrome  (E66.01,  Z68.41) Morbid obesity with BMI of 40.0-44.9, adult (H)  (I10) Essential  hypertension  (I48.21) Permanent atrial fibrillation (H)  (A41.9,  N39.0) Sepsis due to urinary tract infection (H)  (Z79.01) Long-term (current) use of anticoagulants, INR goal 2.0-3.0  (Z96.651) Status post total right knee replacement    Plan:   Check PVR BID x 2 days to ensure not retaining urine post void  Next Monday check CBC, BMP with recent hospitalization UTI sepsis treated with nitrofurantoin  Cont coumadin with frequent INR checks while on nitrofurantoin  Monitor for edema, monitor HR, BP   BARRIE but does not use CPAP  PT/OT for strengthening after acute sepsis  Follow up with Dr Johnson Aug 21  Cont hydrochlorothiazide, amlodipine, Toprol XL 25 mg daily.       A total of 59 minutes was spent with this patient, of which more than 50% was spent in counseling and/or coordination of care.     JAYLEN Riley, CNP ....................  8/7/2023   10:04 AM

## 2023-08-09 LAB
ATRIAL RATE - MUSE: 102 BPM
DIASTOLIC BLOOD PRESSURE - MUSE: NORMAL MMHG
INTERPRETATION ECG - MUSE: NORMAL
P AXIS - MUSE: NORMAL DEGREES
PR INTERVAL - MUSE: NORMAL MS
QRS DURATION - MUSE: 106 MS
QT - MUSE: 366 MS
QTC - MUSE: 472 MS
R AXIS - MUSE: 45 DEGREES
SYSTOLIC BLOOD PRESSURE - MUSE: NORMAL MMHG
T AXIS - MUSE: -49 DEGREES
VENTRICULAR RATE- MUSE: 100 BPM

## 2023-08-10 ENCOUNTER — ANTICOAGULATION THERAPY VISIT (OUTPATIENT)
Dept: ANTICOAGULATION | Facility: OTHER | Age: 63
End: 2023-08-10
Attending: FAMILY MEDICINE
Payer: COMMERCIAL

## 2023-08-10 ENCOUNTER — TRANSFERRED RECORDS (OUTPATIENT)
Dept: HEALTH INFORMATION MANAGEMENT | Facility: OTHER | Age: 63
End: 2023-08-10

## 2023-08-10 DIAGNOSIS — I48.21 PERMANENT ATRIAL FIBRILLATION (H): Primary | ICD-10-CM

## 2023-08-10 DIAGNOSIS — Z79.01 ANTICOAGULATION MONITORING, INR RANGE 2-3: ICD-10-CM

## 2023-08-10 DIAGNOSIS — I48.91 ATRIAL FIBRILLATION WITH RVR (H): ICD-10-CM

## 2023-08-10 LAB — INR (EXTERNAL): 2.4 (ref 0.9–1.1)

## 2023-08-10 NOTE — PROGRESS NOTES
ANTICOAGULATION MANAGEMENT     Babak Moran 63 year old male is on warfarin with therapeutic INR result. (Goal INR 2.0-3.0)    Recent labs: (last 7 days)     08/10/23  1134   INR 2.4*       ASSESSMENT     Source(s): Chart Review and Home Care/Facility Nurse     Warfarin doses taken: Warfarin taken as instructed  Diet: No new diet changes identified  Medication/supplement changes: None noted  New illness, injury, or hospitalization: No  Signs or symptoms of bleeding or clotting: No  Previous result: Therapeutic last 2(+) visits  Additional findings: None       PLAN     Recommended plan for no diet, medication or health factor changes affecting INR     Dosing Instructions: Continue your current warfarin dose with next INR in 1 week       Summary  As of 8/10/2023      Full warfarin instructions:  10 mg every Mon, Thu; 7.5 mg all other days   Next INR check:  8/17/2023               Faxed dosing and follow up instructions to [facility]    Orders given to  Homecare nurse/facility to recheck    Patient not here, [employer] with screening questions and current INR received via FAX from outside agency. Results reviewed, Warfarin dosing per protocol, and recommended follow-up appointment made. Paperwork FAXED back to facility.       Plan made per [facility] anticoagulation protocol    Shelley Mercado, RN  Anticoagulation Clinic  8/10/2023    _______________________________________________________________________     Anticoagulation Episode Summary       Current INR goal:  2.0-3.0   TTR:  53.6 % (11.7 mo)   Target end date:  Indefinite   Send INR reminders to:  [facility]    Indications    Permanent atrial fibrillation (H) [I48.21]  Anticoagulation monitoring  INR range 2-3 (Resolved) [Z79.01]  Atrial fibrillation with RVR (H) [I48.91]  Anticoagulation monitoring  INR range 2-3 [Z79.01]             Comments:  Babak prefers to be closer to 3.0 d/t previous CVA check Q 4 weeks.Declines to reduce dose when  slightly over 3.0  Needs to change PCP  Takes a long time get up to therapeutic after holding warfarin.             Anticoagulation Care Providers       Provider Role Specialty Phone number    Teo Funes MD Referring Family Medicine [phone]

## 2023-08-14 ENCOUNTER — LAB REQUISITION (OUTPATIENT)
Dept: LAB | Facility: OTHER | Age: 63
End: 2023-08-14
Payer: COMMERCIAL

## 2023-08-14 DIAGNOSIS — Z16.12 EXTENDED SPECTRUM BETA LACTAMASE (ESBL) RESISTANCE: ICD-10-CM

## 2023-08-14 DIAGNOSIS — N39.0 URINARY TRACT INFECTION, SITE NOT SPECIFIED: ICD-10-CM

## 2023-08-14 LAB
ANION GAP SERPL CALCULATED.3IONS-SCNC: 11 MMOL/L (ref 7–15)
BASOPHILS # BLD AUTO: 0.1 10E3/UL (ref 0–0.2)
BASOPHILS NFR BLD AUTO: 1 %
BUN SERPL-MCNC: 20.5 MG/DL (ref 8–23)
CALCIUM SERPL-MCNC: 9.9 MG/DL (ref 8.8–10.2)
CHLORIDE SERPL-SCNC: 97 MMOL/L (ref 98–107)
CREAT SERPL-MCNC: 0.7 MG/DL (ref 0.67–1.17)
DEPRECATED HCO3 PLAS-SCNC: 29 MMOL/L (ref 22–29)
EOSINOPHIL # BLD AUTO: 0.1 10E3/UL (ref 0–0.7)
EOSINOPHIL NFR BLD AUTO: 1 %
ERYTHROCYTE [DISTWIDTH] IN BLOOD BY AUTOMATED COUNT: 16.6 % (ref 10–15)
GFR SERPL CREATININE-BSD FRML MDRD: >90 ML/MIN/1.73M2
GLUCOSE SERPL-MCNC: 93 MG/DL (ref 70–99)
HCT VFR BLD AUTO: 43.1 % (ref 40–53)
HGB BLD-MCNC: 13.3 G/DL (ref 13.3–17.7)
IMM GRANULOCYTES # BLD: 0 10E3/UL
IMM GRANULOCYTES NFR BLD: 0 %
LYMPHOCYTES # BLD AUTO: 1.3 10E3/UL (ref 0.8–5.3)
LYMPHOCYTES NFR BLD AUTO: 13 %
MCH RBC QN AUTO: 26.7 PG (ref 26.5–33)
MCHC RBC AUTO-ENTMCNC: 30.9 G/DL (ref 31.5–36.5)
MCV RBC AUTO: 86 FL (ref 78–100)
MONOCYTES # BLD AUTO: 1 10E3/UL (ref 0–1.3)
MONOCYTES NFR BLD AUTO: 10 %
NEUTROPHILS # BLD AUTO: 7.3 10E3/UL (ref 1.6–8.3)
NEUTROPHILS NFR BLD AUTO: 75 %
NRBC # BLD AUTO: 0 10E3/UL
NRBC BLD AUTO-RTO: 0 /100
PLATELET # BLD AUTO: 692 10E3/UL (ref 150–450)
POTASSIUM SERPL-SCNC: 4.5 MMOL/L (ref 3.4–5.3)
RBC # BLD AUTO: 4.99 10E6/UL (ref 4.4–5.9)
SODIUM SERPL-SCNC: 137 MMOL/L (ref 136–145)
WBC # BLD AUTO: 9.8 10E3/UL (ref 4–11)

## 2023-08-14 PROCEDURE — 80048 BASIC METABOLIC PNL TOTAL CA: CPT | Performed by: NURSE PRACTITIONER

## 2023-08-14 PROCEDURE — 85025 COMPLETE CBC W/AUTO DIFF WBC: CPT | Performed by: NURSE PRACTITIONER

## 2023-08-15 ENCOUNTER — NURSING HOME VISIT (OUTPATIENT)
Dept: GERIATRICS | Facility: OTHER | Age: 63
End: 2023-08-15
Payer: COMMERCIAL

## 2023-08-15 DIAGNOSIS — E66.01 MORBID OBESITY WITH BMI OF 40.0-44.9, ADULT (H): ICD-10-CM

## 2023-08-15 DIAGNOSIS — A49.8 INFECTION DUE TO ESBL-PRODUCING ESCHERICHIA COLI: ICD-10-CM

## 2023-08-15 DIAGNOSIS — G81.94 LEFT HEMIPARESIS (H): ICD-10-CM

## 2023-08-15 DIAGNOSIS — Z16.12 INFECTION DUE TO ESBL-PRODUCING ESCHERICHIA COLI: ICD-10-CM

## 2023-08-15 DIAGNOSIS — I63.411 CEREBROVASCULAR ACCIDENT (CVA) DUE TO EMBOLISM OF RIGHT MIDDLE CEREBRAL ARTERY (H): Primary | ICD-10-CM

## 2023-08-15 DIAGNOSIS — M32.9 SYSTEMIC LUPUS ERYTHEMATOSUS, UNSPECIFIED SLE TYPE, UNSPECIFIED ORGAN INVOLVEMENT STATUS (H): Chronic | ICD-10-CM

## 2023-08-15 DIAGNOSIS — I48.21 PERMANENT ATRIAL FIBRILLATION (H): ICD-10-CM

## 2023-08-15 DIAGNOSIS — I10 ESSENTIAL HYPERTENSION: ICD-10-CM

## 2023-08-15 DIAGNOSIS — I87.2 CHRONIC VENOUS STASIS DERMATITIS OF LEFT LOWER EXTREMITY: ICD-10-CM

## 2023-08-15 DIAGNOSIS — G47.33 OBSTRUCTIVE SLEEP APNEA SYNDROME: ICD-10-CM

## 2023-08-15 PROCEDURE — 99310 SBSQ NF CARE HIGH MDM 45: CPT | Performed by: NURSE PRACTITIONER

## 2023-08-16 RX ORDER — LANOLIN ALCOHOL/MO/W.PET/CERES
CREAM (GRAM) TOPICAL
COMMUNITY
Start: 2023-08-16 | End: 2023-10-31

## 2023-08-16 RX ORDER — SACCHAROMYCES BOULARDII 250 MG
250 CAPSULE ORAL DAILY
COMMUNITY
Start: 2023-08-16 | End: 2024-09-23

## 2023-08-16 NOTE — PROGRESS NOTES
[facility]  Acute Care Visit    Patient Name: Babak Moran   : 1960  MRN: [id number]    Place of Service: [facility]  DOS: 8/15/2023    CC: health concerns/counseling    HPI:  Babak Moran is a 63 year old male with PMH of multiple chronic medical concerns, who is seen today regarding pt and wife present to discuss health concerns/fears/preventative measures to improve health. Good discussion today as pt has shared some concerns about his health that are worrisome for him. He shares that his dad, grandfather, and uncle all  in their 60s. Pt is now 63 and this knowledge is wearing on him heavily. At times it can make him feel down/depressed and he is able to work through it. He declines antidepressant or therapy at this time as he is not truly depressed but concerned.      Today we discussed that he has made changes in his life that his family members did not. Hx of significant alcohol abuse in the family but he stopped drinking in his 30s. He does not smoke. There are financial barriers and now have  care which helps some with medical costs. Prior to getting insurance, pt does report he was trying to cut out pills or take smaller doses which included his coumadin and this ultimately led him to have a stroke. He understands now the implications of this but with insurance helping with some of the costs he is able to maintain the medications he is currently taking and encouraged to talk to his PCP if finances and affording medications is a concern.     Discussed his current health status, current medical diagnoses. Discussed preventative measures to take to help prevent infection, monitoring his skin closely for any signs/symptoms of infection, sores. Signs of UTI other than classic symptoms. Recommended to not walk barefoot but to always wear good fitting shoes to protect feet from getting sores/ulcers. Keep stasis dermatitis hydrated on legs with a hydrating cream like CeraVe  or eucerin. Discussed adding vitamins/protein sources and probiotic.   Encouragement given regarding his progress in therapy. Reviewed recent labs and reassuring for no signs of infection. Recent knee replacement is healed. He has follow up with ortho Dr Johnson Aug 21 and PCP Dr Funes Aug 28.      Multidisciplinary notes, laboratory values, medications, vital signs, weight and orders arereviewed from nursing home records. I have reviewed the patient s medical history and updated the computerized patient record.     PMH:  Past Medical History:   Diagnosis Date    Atrial fibrillation (H) 02/03/2017    on Warfarin    Bacterial sepsis (H) 8/8/2022    Cellulitis of left lower extremity 9/5/2019    Cerebral infarction (H)     Cerebral infarction due to unspecified occlusion or stenosis of right middle cerebral artery (H) 02/03/2017    ,Left hemiparesis, left neglect, impaired balance and gait following R MCA stroke with mild hemorrhagic transformation s/p tissue plasminogen activator and mechanical thrombectomy, s/p C7 facet fracture from fall off forklift.    Chest pain 02/1997    Disorder of skin or subcutaneous tissue 07/25/2011    Essential (primary) hypertension 02/1997    Fracture of cervical vertebra (H)     02/03/2017,C7 facet fracture from fall off forklift, nondisplaced    Gout     Hemiplegia affecting left nondominant side (H) 02/03/2017    Obesity 02/03/2017    body mass index of 40    Other local lupus erythematosus     Sepsis (H) 9/6/2019       Medications:  Current Outpatient Medications   Medication Sig Dispense Refill    saccharomyces boulardii (FLORASTOR) 250 MG capsule Take 1 capsule (250 mg) by mouth daily      acetaminophen (TYLENOL) 325 MG tablet Take 2 tablets (650 mg) by mouth every 4 hours as needed for other (mild pain) 100 tablet 0    amLODIPine (NORVASC) 5 MG tablet Take 1 tablet (5 mg) by mouth daily 90 tablet 4    hydrochlorothiazide (HYDRODIURIL) 25 MG tablet Take 1 tablet (25 mg) by  mouth daily 90 tablet 4    metoprolol succinate ER (TOPROL XL) 25 MG 24 hr tablet Take 1 tablet (25 mg) by mouth daily      warfarin ANTICOAGULANT (COUMADIN) 5 MG tablet 7.5 mg every day or as directed by the [facility] clinic 160 tablet 1      Medication reconciliation complete between Epic record and [facility].     Allergies:  Allergies   Allergen Reactions    Diatrizoate Rash    Ioxaglate Other (See Comments)     Does not recall    Levofloxacin Hives and Rash    Metrizamide Rash     (Diagnostic X-Ray materials)    Probenecid Rash       Review of Systems:  See HPI    Vital Signs:  Temp 97.4   Pulse 78   Respirations 18   /77   Oxygen Sats 96%   Weight 311#    Physical Exam:     Pleasant and alert without distress.    Sclera nonicteric, conjunctiva non-inflamed.  Skin color pink. Mucous membranes moist.   Abdomen soft and without tenderness   Extremities without edema but with chronic venous stasis dermatitis left leg     Bilateral knees with healed incisions from knee replacements  Able to move upper and lower extremities       New Labs/Diagnostics:  Lab Results   Component Value Date    WBC 9.8 08/14/2023    WBC 7.1 09/29/2020     Lab Results   Component Value Date    RBC 4.99 08/14/2023    RBC 5.49 09/29/2020     Lab Results   Component Value Date    HGB 13.3 08/14/2023    HGB 14.5 09/29/2020     Lab Results   Component Value Date    HCT 43.1 08/14/2023    HCT 47.3 09/29/2020     No components found for: MCT  Lab Results   Component Value Date    MCV 86 08/14/2023    MCV 86 09/29/2020     Lab Results   Component Value Date    MCH 26.7 08/14/2023    MCH 26.4 09/29/2020     Lab Results   Component Value Date    MCHC 30.9 08/14/2023    MCHC 30.7 09/29/2020     Lab Results   Component Value Date    RDW 16.6 08/14/2023    RDW 16.1 09/29/2020     Lab Results   Component Value Date     08/14/2023     09/29/2020     Last Comprehensive Metabolic Panel:  Lab Results   Component Value Date      08/14/2023    POTASSIUM 4.5 08/14/2023    CHLORIDE 97 (L) 08/14/2023    CO2 29 08/14/2023    ANIONGAP 11 08/14/2023    GLC 93 08/14/2023    BUN 20.5 08/14/2023    CR 0.70 08/14/2023    GFRESTIMATED >90 08/14/2023    VIRGEN 9.9 08/14/2023           Assessment:  (I63.411) Cerebrovascular accident (CVA) due to embolism of right middle cerebral artery (H)  (primary encounter diagnosis)  (G81.94) Left hemiparesis (H)  (G47.33) Obstructive sleep apnea syndrome  (E66.01,  Z68.41) Morbid obesity with BMI of 40.0-44.9, adult (H)  (I48.21) Permanent atrial fibrillation (H)  (I10) Essential hypertension  (I87.2) Chronic venous stasis dermatitis of left lower extremity  (M32.9) Systemic lupus erythematosus, unspecified SLE type, unspecified organ involvement status (H)  (A49.8,  Z16.12) Infection due to ESBL-producing Escherichia coli    Plan:   Currently taking no lupus medications and has not had a lupus flare in many years  Discussed sleep apnea and if noting snoring, apnea, or not feeling rested the next day to consider following up with sleep medicine--he has lost weight and per wife his sleep apnea seems resolved but educated on this and what to watch for. Discuss with PCP if further questions  Reassurance given and education regarding current level of health, prevention of infection, reviewed labs  Will start florastor 250 mg daily as just finished antibiotic for UTI, recommended good nutrition, protein sources  Continue Cerave or eucerin cream to bilateral lower extremities to keep skin moist, elevate to decrease risk of edema/cellulitis, monitor skin for any signs of infection, sores, wear shoes at all times when up       A total of 55 minutes was spent with this patient, of which more than 50% was spent in counseling and/or coordination of care.     JAYLEN Riley, CNP ....................  8/16/2023   9:51 AM

## 2023-08-17 ENCOUNTER — ANTICOAGULATION THERAPY VISIT (OUTPATIENT)
Dept: ANTICOAGULATION | Facility: OTHER | Age: 63
End: 2023-08-17
Attending: FAMILY MEDICINE
Payer: COMMERCIAL

## 2023-08-17 ENCOUNTER — TRANSFERRED RECORDS (OUTPATIENT)
Dept: HEALTH INFORMATION MANAGEMENT | Facility: OTHER | Age: 63
End: 2023-08-17

## 2023-08-17 DIAGNOSIS — I48.21 PERMANENT ATRIAL FIBRILLATION (H): Primary | ICD-10-CM

## 2023-08-17 DIAGNOSIS — Z79.01 ANTICOAGULATION MONITORING, INR RANGE 2-3: ICD-10-CM

## 2023-08-17 DIAGNOSIS — I48.91 ATRIAL FIBRILLATION WITH RVR (H): ICD-10-CM

## 2023-08-17 LAB — INR (EXTERNAL): 2.2 (ref 0.9–1.1)

## 2023-08-17 NOTE — PROGRESS NOTES
ANTICOAGULATION MANAGEMENT     Babak Moran 63 year old male is on warfarin with therapeutic INR result. (Goal INR 2.0-3.0)    Recent labs: (last 7 days)     08/17/23  1133   INR 2.2*       ASSESSMENT     Source(s): Chart Review and Home Care/Facility Nurse     Warfarin doses taken: Warfarin taken as instructed  Diet: No new diet changes identified  Medication/supplement changes: None noted  New illness, injury, or hospitalization: No  Signs or symptoms of bleeding or clotting: No  Previous result: Therapeutic last 2(+) visits  Additional findings: None       PLAN     Recommended plan for no diet, medication or health factor changes affecting INR     Dosing Instructions: Continue your current warfarin dose with next INR in 1 week       Summary  As of 8/17/2023      Full warfarin instructions:  10 mg every Mon, Thu; 7.5 mg all other days   Next INR check:  8/24/2023               Faxed dosing and follow up instructions to [facility]    Orders given to  Homecare nurse/facility to recheck    Patient not here, [employer] with screening questions and current INR received via FAX from outside agency. Results reviewed, Warfarin dosing per protocol, and recommended follow-up appointment made. Paperwork FAXED back to facility.       Plan made per [facility] anticoagulation protocol    Shelley Mercado, RN  Anticoagulation Clinic  8/17/2023    _______________________________________________________________________     Anticoagulation Episode Summary       Current INR goal:  2.0-3.0   TTR:  54.5 % (11.9 mo)   Target end date:  Indefinite   Send INR reminders to:  [facility]    Indications    Permanent atrial fibrillation (H) [I48.21]  Anticoagulation monitoring  INR range 2-3 (Resolved) [Z79.01]  Atrial fibrillation with RVR (H) [I48.91]  Anticoagulation monitoring  INR range 2-3 [Z79.01]             Comments:  Babak prefers to be closer to 3.0 d/t previous CVA check Q 4 weeks.Declines to reduce dose when  slightly over 3.0  Needs to change PCP  Takes a long time get up to therapeutic after holding warfarin.             Anticoagulation Care Providers       Provider Role Specialty Phone number    Teo Funes MD Referring Family Medicine [phone]

## 2023-08-18 DIAGNOSIS — Z96.651 STATUS POST TOTAL RIGHT KNEE REPLACEMENT: Primary | ICD-10-CM

## 2023-08-21 ENCOUNTER — HOSPITAL ENCOUNTER (OUTPATIENT)
Dept: GENERAL RADIOLOGY | Facility: OTHER | Age: 63
Discharge: HOME | End: 2023-08-21
Attending: ORTHOPAEDIC SURGERY
Payer: COMMERCIAL

## 2023-08-21 ENCOUNTER — OFFICE VISIT (OUTPATIENT)
Dept: ORTHOPEDICS | Facility: OTHER | Age: 63
End: 2023-08-21
Attending: ORTHOPAEDIC SURGERY
Payer: COMMERCIAL

## 2023-08-21 DIAGNOSIS — Z96.652 STATUS POST TOTAL LEFT KNEE REPLACEMENT: ICD-10-CM

## 2023-08-21 DIAGNOSIS — Z96.651 STATUS POST TOTAL RIGHT KNEE REPLACEMENT: ICD-10-CM

## 2023-08-21 DIAGNOSIS — Z96.651 STATUS POST TOTAL RIGHT KNEE REPLACEMENT: Primary | ICD-10-CM

## 2023-08-21 PROCEDURE — 99024 POSTOP FOLLOW-UP VISIT: CPT | Performed by: ORTHOPAEDIC SURGERY

## 2023-08-21 PROCEDURE — G0463 HOSPITAL OUTPT CLINIC VISIT: HCPCS

## 2023-08-21 PROCEDURE — 73562 X-RAY EXAM OF KNEE 3: CPT | Mod: RT

## 2023-08-21 NOTE — PROGRESS NOTES
Subjective:    63-year-old gentleman who is 8 weeks status post right total knee arthroplasty several months status post left total knee arthroplasty who ended up getting a UTI and went septic.  He was found to have dislocated patella a bilaterally and this was because when he fell his knees were in a terrible position.  He has been unable to extend his knees for 3 weeks now and has been limited to a wheelchair    Objective:    On examination is a 63-year-old gentleman in no acute distress.  Very pleasant on examination.  His kneecaps are obviously dislocated laterally.  There is no active extension at the knees and he cannot hold the knees in extension once they are extended.  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  Speaking with him and his wife would have to plan bilateral quadriceps tendon repairs and he will be in a wheelchair for a long time.  We will call him with results of the MRI

## 2023-08-22 ENCOUNTER — TELEPHONE (OUTPATIENT)
Dept: ORTHOPEDICS | Facility: OTHER | Age: 63
End: 2023-08-22
Payer: COMMERCIAL

## 2023-08-22 NOTE — TELEPHONE ENCOUNTER
The patient states he is trying to set up MRI but unable to for a few weeks,  Douglas is able to do that he states.  He is in severe pain in knees.  Needs MRI before surgery.  Please advise.

## 2023-08-23 ENCOUNTER — TELEPHONE (OUTPATIENT)
Dept: ORTHOPEDICS | Facility: OTHER | Age: 63
End: 2023-08-23
Payer: COMMERCIAL

## 2023-08-23 NOTE — TELEPHONE ENCOUNTER
Twyla with [facility] called and requested a call back regarding getting the patient an MRI in [place].    Linda Veronica on 8/23/2023 at 2:30 PM

## 2023-08-24 ENCOUNTER — TRANSFERRED RECORDS (OUTPATIENT)
Dept: HEALTH INFORMATION MANAGEMENT | Facility: OTHER | Age: 63
End: 2023-08-24

## 2023-08-24 ENCOUNTER — ANTICOAGULATION THERAPY VISIT (OUTPATIENT)
Dept: ANTICOAGULATION | Facility: OTHER | Age: 63
End: 2023-08-24
Attending: FAMILY MEDICINE
Payer: COMMERCIAL

## 2023-08-24 DIAGNOSIS — Z79.01 ANTICOAGULATION MONITORING, INR RANGE 2-3: ICD-10-CM

## 2023-08-24 DIAGNOSIS — I48.21 PERMANENT ATRIAL FIBRILLATION (H): Primary | ICD-10-CM

## 2023-08-24 DIAGNOSIS — I48.91 ATRIAL FIBRILLATION WITH RVR (H): ICD-10-CM

## 2023-08-24 LAB — INR (EXTERNAL): 2.5 (ref 0.9–1.1)

## 2023-08-24 NOTE — PROGRESS NOTES
ANTICOAGULATION MANAGEMENT     Babak Moran 63 year old male is on warfarin with therapeutic INR result. (Goal INR 2.0-3.0)    Recent labs: (last 7 days)     08/24/23  1048   INR 2.5*       ASSESSMENT     Source(s): Chart Review, Home Care/Facility Nurse, and Template     Warfarin doses taken: Warfarin taken as instructed  Diet: No new diet changes identified  New illness, injury, or hospitalization: No  Medication/supplement changes: None noted  Signs or symptoms of bleeding or clotting: No  Previous INR: Therapeutic last 2(+) visits  Additional findings: None     PLAN     Recommended plan for no diet, medication or health factor changes affecting INR     Dosing Instructions: Continue your current warfarin dose with next INR in 1 week       Summary  As of 8/24/2023      Full warfarin instructions:  10 mg every Mon, Thu; 7.5 mg all other days   Next INR check:  8/31/2023               Faxed dosing and follow up instructions to [facility]    Lab visit scheduled    Education provided: Please call back if any changes to your diet, medications or how you've been taking warfarin    Plan made per [facility] anticoagulation protocol    Nikki Aguilar RN  Anticoagulation Clinic  8/24/2023    _______________________________________________________________________     Anticoagulation Episode Summary       Current INR goal:  2.0-3.0   TTR:  54.5 % (11.9 mo)   Target end date:  Indefinite   Send INR reminders to:  [facility]    Indications    Permanent atrial fibrillation (H) [I48.21]  Anticoagulation monitoring  INR range 2-3 (Resolved) [Z79.01]  Atrial fibrillation with RVR (H) [I48.91]  Anticoagulation monitoring  INR range 2-3 [Z79.01]             Comments:  Babak prefers to be closer to 3.0 d/t previous CVA check Q 4 weeks.Declines to reduce dose when slightly over 3.0  Needs to change PCP  Takes a long time get up to therapeutic after holding warfarin.             Anticoagulation Care Providers        Provider Role Specialty Phone number    Teo Funes MD Referring Family Medicine [phone]

## 2023-08-25 ENCOUNTER — HOSPITAL ENCOUNTER (OUTPATIENT)
Dept: MRI IMAGING | Facility: OTHER | Age: 63
Discharge: HOME | End: 2023-08-25
Attending: ORTHOPAEDIC SURGERY
Payer: COMMERCIAL

## 2023-08-25 DIAGNOSIS — Z96.652 STATUS POST TOTAL LEFT KNEE REPLACEMENT: ICD-10-CM

## 2023-08-25 DIAGNOSIS — Z96.651 STATUS POST TOTAL RIGHT KNEE REPLACEMENT: ICD-10-CM

## 2023-08-25 PROCEDURE — 73721 MRI JNT OF LWR EXTRE W/O DYE: CPT | Mod: 50

## 2023-08-28 ENCOUNTER — OFFICE VISIT (OUTPATIENT)
Dept: FAMILY MEDICINE | Facility: OTHER | Age: 63
End: 2023-08-28
Attending: FAMILY MEDICINE
Payer: COMMERCIAL

## 2023-08-28 VITALS
BODY MASS INDEX: 39.04 KG/M2 | SYSTOLIC BLOOD PRESSURE: 134 MMHG | HEART RATE: 68 BPM | RESPIRATION RATE: 18 BRPM | WEIGHT: 314 LBS | OXYGEN SATURATION: 96 % | HEIGHT: 75 IN | TEMPERATURE: 97.8 F | DIASTOLIC BLOOD PRESSURE: 78 MMHG

## 2023-08-28 DIAGNOSIS — E66.01 MORBID OBESITY WITH BMI OF 40.0-44.9, ADULT (H): ICD-10-CM

## 2023-08-28 DIAGNOSIS — I48.21 PERMANENT ATRIAL FIBRILLATION (H): ICD-10-CM

## 2023-08-28 DIAGNOSIS — I69.354 HEMIPARESIS OF LEFT NONDOMINANT SIDE AS LATE EFFECT OF CEREBRAL INFARCTION (H): ICD-10-CM

## 2023-08-28 DIAGNOSIS — S83.004A DISLOCATION OF BOTH PATELLAE: ICD-10-CM

## 2023-08-28 DIAGNOSIS — S83.005A DISLOCATION OF BOTH PATELLAE: ICD-10-CM

## 2023-08-28 DIAGNOSIS — I10 BENIGN ESSENTIAL HYPERTENSION: ICD-10-CM

## 2023-08-28 DIAGNOSIS — Z86.73 HISTORY OF STROKE: ICD-10-CM

## 2023-08-28 DIAGNOSIS — Z01.818 PRE-OP EXAM: Primary | ICD-10-CM

## 2023-08-28 PROCEDURE — 99215 OFFICE O/P EST HI 40 MIN: CPT | Performed by: FAMILY MEDICINE

## 2023-08-28 PROCEDURE — G0463 HOSPITAL OUTPT CLINIC VISIT: HCPCS

## 2023-08-28 ASSESSMENT — PAIN SCALES - GENERAL: PAINLEVEL: NO PAIN (0)

## 2023-08-28 NOTE — NURSING NOTE
"Patient presents to the clinic for recertification for nursing home    FOOD SECURITY SCREENING QUESTIONS:    The next two questions are to help us understand your food security.  If you are feeling you need any assistance in this area, we have resources available to support you today.    Hunger Vital Signs:  Within the past 12 months we worried whether our food would run out before we got money to buy more. Never  Within the past 12 months the food we bought just didn't last and we didn't have money to get more. Never    Advance Care Directive on file? No  Advance Care Directive provided to patient? Declined     Chief Complaint   Patient presents with    RE-CERT       Initial /78 (BP Location: Left arm, Patient Position: Sitting, Cuff Size: Adult Large)   Pulse 68   Temp 97.8  F (36.6  C) (Temporal)   Resp 18   Ht 1.905 m (6' 3\")   Wt 142.4 kg (314 lb)   SpO2 96%   BMI 39.25 kg/m   Estimated body mass index is 39.25 kg/m  as calculated from the following:    Height as of this encounter: 1.905 m (6' 3\").    Weight as of this encounter: 142.4 kg (314 lb).  Medication Reconciliation: complete        Zahra Bro LPN on 8/28/2023 at 3:46 PM    "

## 2023-08-28 NOTE — PROGRESS NOTES
Surgical Information:  Surgery/Procedure: knee surgery  Surgery Location: [facility]  Surgeon: Dr. Johnson  Surgery Date: 9/8/23  Time of Surgery: TBD  Where patient plans to recover: [facility]        Assessment & Plan   The proposed surgical procedure is considered INTERMEDIATE risk.       ICD-10-CM    1. Pre-op exam  Z01.818       2. Dislocation of both patellae  S83.004A     S83.005A       3. Permanent atrial fibrillation (H)  I48.21       4. Hemiparesis of left nondominant side as late effect of cerebral infarction (H)  I69.354       5. History of stroke  Z86.73       6. Benign essential hypertension  I10       7. Morbid obesity with BMI of 40.0-44.9, adult (H)  E66.01     Z68.41         Here for nursing home recertification.  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  These occurred due to a fall.  He reports need for surgery very soon with Dr. Johnson.   Completed paperwork for NH recert. Unable to return home at this time due to need for repeat knee surgery.      He is requesting a disability permit and to be able to hunt from a vehicle.  Currently is not able to drive, although he can move his right leg well enough to operate a vehicle.  Informed him that it is a little soon to be looking for a disability permit and to hunt when he is still at the nursing home.  He really wants to get the hunting permit application going, so it will be ready once he has healed from the surgery.  Therefore I provided a disability permit for 1 year based on bilateral knee arthritis status knee replacement, now with torn retinaculum and displaced patellas.    Risks and Recommendations:  The patient has the following additional risks and recommendations for perioperative complications:   - Morbid obesity (BMI >40)     Antiplatelet or Anticoagulation Medication Instructions:   - warfarin: Thromboembolic risk is moderate (4-10%/year). HOLD warfarin 5 days.   - Bridging therapy ordered   Has  atrial fibrillation on warfarin.  Has a history of stroke.  He is in the intermediate category for stroke risk when warfarin is held for surgery.  Any bridging is a risk benefit analysis. In the past when he was not taking warfarin consistently for couple weeks, this is when his stroke occurred.  Therefore, we worked out a balanced plan where warfarin would be held 5 days prior to surgery.  After 3 days he will start enoxaparin injections at near therapeutic dosing of 120 mg twice daily. Take for 2 days, hold 1 full day prior to surgery.  Same plan as prior to knee replacement in January 2023 and June 2023. Did well with this plan.     Additional Medication Instructions:  Hold hydrochlorothiazide morning of surgery  Continue amlodipine and metoprolol without change     RECOMMENDATION:  APPROVAL GIVEN to proceed with proposed procedure, without further diagnostic evaluation.    42 minutes spent by me on the date of the encounter doing chart review, review of test results, patient visit, and documentation       Teo Funes MD         Benji Hilliard is a 63 year old, presenting for the following health issues:  RE-CERT      8/28/2023     3:38 PM   Additional Questions   Roomed by Zahra Z   Accompanied by Self       History of Present Illness       Reason for visit:  Re certifcation    He eats 2-3 servings of fruits and vegetables daily.He consumes 1 sweetened beverage(s) daily.He exercises with enough effort to increase his heart rate 9 or less minutes per day.  He exercises with enough effort to increase his heart rate 3 or less days per week.   He is taking medications regularly.       Nursing home recertification    History of bilateral knee replacement  Returned home from most recent right knee replacement in June  Developed hematuria   Weak, fell July 31. Fever to 101.3  Found to have urinary sepsis  Hospitalized and discharged to SNF  Later found to have bilateral displaced patellas   Needs surgery  No  urinary symptoms at this time.      Review of Systems   General: Denies general constitutional problems  Cardiovascular: Denies problems  Respiratory: Denies problems     Past Medical History:   Diagnosis Date    Atrial fibrillation (H) 2017    on Warfarin    Bacterial sepsis (H) 2022    Cellulitis of left lower extremity 2019    Cerebral infarction (H)     Cerebral infarction due to unspecified occlusion or stenosis of right middle cerebral artery (H) 2017    ,Left hemiparesis, left neglect, impaired balance and gait following R MCA stroke with mild hemorrhagic transformation s/p tissue plasminogen activator and mechanical thrombectomy, s/p C7 facet fracture from fall off forklift.    Chest pain 1997    Disorder of skin or subcutaneous tissue 2011    Essential (primary) hypertension 1997    Fracture of cervical vertebra (H)     2017,C7 facet fracture from fall off forklift, nondisplaced    Gout     Hemiplegia affecting left nondominant side (H) 2017    Obesity 2017    body mass index of 40    Other local lupus erythematosus     Sepsis (H) 2019     Past Surgical History:   Procedure Laterality Date    ARTHROPLASTY KNEE Left 2023    Procedure: ARTHROPLASTY, KNEE, TOTAL;  Surgeon: Elliott Johnson MD;  Location: [facility] OR    ARTHROPLASTY KNEE Right 2023    Procedure: Arthroplasty knee;  Surgeon: Elliott Johnson MD;  Location:  OR    ARTHROSCOPY KNEE      bilateral knees    COLONOSCOPY  2012    Normal; Next due     ORIF WRIST FRACTURE        Family History   Problem Relation Age of Onset    Unknown/Adopted Paternal Grandfather         Unknown, around age 67.    Other - See Comments Father         Parkinson's disease Alzheimer's    Cancer Father         Cancer    Heart Disease Maternal Grandmother         Heart Disease,MI in her 60's.    Heart Disease Mother 60        Heart Disease,MI    Other - See Comments Mother         rheumatoid  "arthritis.    Heart Disease Maternal Uncle 69        Heart Disease    Hypertension Sister         Hypertension    Cancer Sister         Cancer,melanoma    Heart Disease Paternal Uncle         Heart Disease, around 69.     Current Outpatient Medications   Medication Sig Dispense Refill    acetaminophen (TYLENOL) 325 MG tablet Take 2 tablets (650 mg) by mouth every 4 hours as needed for other (mild pain) 100 tablet 0    amLODIPine (NORVASC) 5 MG tablet Take 1 tablet (5 mg) by mouth daily 90 tablet 4    Eucerin external lotion       hydrochlorothiazide (HYDRODIURIL) 25 MG tablet Take 1 tablet (25 mg) by mouth daily 90 tablet 4    metoprolol succinate ER (TOPROL XL) 25 MG 24 hr tablet Take 1 tablet (25 mg) by mouth daily      saccharomyces boulardii (FLORASTOR) 250 MG capsule Take 1 capsule (250 mg) by mouth daily      warfarin ANTICOAGULANT (COUMADIN) 5 MG tablet 7.5 mg every day or as directed by the protime clinic 160 tablet 1        Allergies   Allergen Reactions    Diatrizoate Rash    Ioxaglate Other (See Comments)     Does not recall    Levofloxacin Hives and Rash    Metrizamide Rash     (Diagnostic X-Ray materials)    Probenecid Rash             Objective    /78 (BP Location: Left arm, Patient Position: Sitting, Cuff Size: Adult Large)   Pulse 68   Temp 97.8  F (36.6  C) (Temporal)   Resp 18   Ht 1.905 m (6' 3\")   Wt 142.4 kg (314 lb)   SpO2 96%   BMI 39.25 kg/m    Body mass index is 39.25 kg/m .  Physical Exam General Appearance: Pleasant, alert, appropriate appearance for age. No acute distress  Ear Exam: Normal TM's bilaterally.   OroPharynx Exam: Dental hygiene adequate. Normal buccal mucosa. Normal pharynx. Mallampati 2/4.  Neck Exam: Supple, no masses or nodes.  Chest/Respiratory Exam: Normal chest wall and respirations. Clear to auscultation.  Cardiovascular Exam: Regular rate and rhythm. S1, S2, no murmur, click, gallop, or rubs.  Extremities: 2 + pedal pulses.  No lower extremity " edema.  Neurologic Exam: Nonfocal; symmetric DTRs, normal gross motor movement, tone, and coordination. No tremor.   Psychiatric: Normal affect and mentation      EKG 12-lead, tracing only    Collection Time: 08/03/23  5:52 PM   Result Value Ref Range    Systolic Blood Pressure  mmHg    Diastolic Blood Pressure  mmHg    Ventricular Rate 100 BPM    Atrial Rate 102 BPM    MA Interval  ms    QRS Duration 106 ms     ms    QTc 472 ms    P Axis  degrees    R AXIS 45 degrees    T Axis -49 degrees    Interpretation ECG       Atrial fibrillation with premature ventricular or aberrantly conducted complexes  Nonspecific ST and T wave abnormality  Prolonged QT  Abnormal ECG  When compared with ECG of 16-JUN-2023 11:46,  Nonspecific T wave abnormality now evident in Lateral leads     Basic metabolic panel    Collection Time: 08/14/23 12:27 PM   Result Value Ref Range    Sodium 137 136 - 145 mmol/L    Potassium 4.5 3.4 - 5.3 mmol/L    Chloride 97 (L) 98 - 107 mmol/L    Carbon Dioxide (CO2) 29 22 - 29 mmol/L    Anion Gap 11 7 - 15 mmol/L    Urea Nitrogen 20.5 8.0 - 23.0 mg/dL    Creatinine 0.70 0.67 - 1.17 mg/dL    Calcium 9.9 8.8 - 10.2 mg/dL    Glucose 93 70 - 99 mg/dL    GFR Estimate >90 >60 mL/min/1.73m2   CBC with platelets and differential    Collection Time: 08/14/23 12:27 PM   Result Value Ref Range    WBC Count 9.8 4.0 - 11.0 10e3/uL    RBC Count 4.99 4.40 - 5.90 10e6/uL    Hemoglobin 13.3 13.3 - 17.7 g/dL    Hematocrit 43.1 40.0 - 53.0 %    MCV 86 78 - 100 fL    MCH 26.7 26.5 - 33.0 pg    MCHC 30.9 (L) 31.5 - 36.5 g/dL    RDW 16.6 (H) 10.0 - 15.0 %    Platelet Count 692 (H) 150 - 450 10e3/uL    % Neutrophils 75 %    % Lymphocytes 13 %    % Monocytes 10 %    % Eosinophils 1 %    % Basophils 1 %    % Immature Granulocytes 0 %    NRBCs per 100 WBC 0 <1 /100    Absolute Neutrophils 7.3 1.6 - 8.3 10e3/uL    Absolute Lymphocytes 1.3 0.8 - 5.3 10e3/uL    Absolute Monocytes 1.0 0.0 - 1.3 10e3/uL    Absolute Eosinophils 0.1  0.0 - 0.7 10e3/uL    Absolute Basophils 0.1 0.0 - 0.2 10e3/uL    Absolute Immature Granulocytes 0.0 <=0.4 10e3/uL    Absolute NRBCs 0.0 10e3/uL   INR (External Result)    Collection Time: 08/17/23 11:33 AM   Result Value Ref Range    INR (External) 2.2 (A) 0.9 - 1.1   INR (External Result)    Collection Time: 08/24/23 10:48 AM   Result Value Ref Range    INR (External) 2.5 (A) 0.9 - 1.1

## 2023-08-30 ENCOUNTER — TELEPHONE (OUTPATIENT)
Dept: FAMILY MEDICINE | Facility: OTHER | Age: 63
End: 2023-08-30
Payer: COMMERCIAL

## 2023-08-30 NOTE — TELEPHONE ENCOUNTER
Reason for call: Patient wanting a work in appointment.    Is the appointment for a Hospital Follow up?  NO       Patient is having the following symptoms:  Pre Op: 09/08/2023, Both knee surgery, [facility], Fax: will bring to appt., Dr. Johnson    The patient is requesting an appointment with  ANYONE BEFORE 09/08/2023    Was an appointment offered for this call? No    Preferred method for responding to this message: Telephone Call    Phone number patient can be reached at? Other phone number:  [facility]): [phone]    If we can't reach you directly, may we leave a detailed response at the number you provided? Yes    Can this message wait until your PCP/provider returns if unavailable today? No        Linda Veronica on 8/30/2023 at 1:00 PM

## 2023-08-31 ENCOUNTER — TRANSFERRED RECORDS (OUTPATIENT)
Dept: HEALTH INFORMATION MANAGEMENT | Facility: OTHER | Age: 63
End: 2023-08-31

## 2023-08-31 ENCOUNTER — ANTICOAGULATION THERAPY VISIT (OUTPATIENT)
Dept: ANTICOAGULATION | Facility: OTHER | Age: 63
End: 2023-08-31
Attending: FAMILY MEDICINE
Payer: COMMERCIAL

## 2023-08-31 ENCOUNTER — TELEPHONE (OUTPATIENT)
Dept: ORTHOPEDICS | Facility: OTHER | Age: 63
End: 2023-08-31

## 2023-08-31 ENCOUNTER — TELEPHONE (OUTPATIENT)
Dept: FAMILY MEDICINE | Facility: OTHER | Age: 63
End: 2023-08-31

## 2023-08-31 DIAGNOSIS — I48.91 ATRIAL FIBRILLATION WITH RVR (H): ICD-10-CM

## 2023-08-31 DIAGNOSIS — I48.21 PERMANENT ATRIAL FIBRILLATION (H): Primary | ICD-10-CM

## 2023-08-31 DIAGNOSIS — Z79.01 ANTICOAGULATION MONITORING, INR RANGE 2-3: ICD-10-CM

## 2023-08-31 LAB — INR (EXTERNAL): 2.4 (ref 0.9–1.1)

## 2023-08-31 RX ORDER — ENOXAPARIN SODIUM 150 MG/ML
120 INJECTION SUBCUTANEOUS EVERY 12 HOURS
Qty: 3.2 ML | Refills: 0 | Status: SHIPPED | OUTPATIENT
Start: 2023-09-05 | End: 2023-09-07

## 2023-08-31 RX ORDER — ENOXAPARIN SODIUM 150 MG/ML
120 INJECTION SUBCUTANEOUS EVERY 12 HOURS
Qty: 8.8 ML | Refills: 1 | Status: CANCELLED | OUTPATIENT
Start: 2023-09-05

## 2023-08-31 NOTE — TELEPHONE ENCOUNTER
This patient of Dr Funes is having knee surgery on 9/8. Dr Funes did send directions for Lovenox bridging before procedure in telephone yesterday, but not after procedure. I spoke with Franko nurse at [facility] and they are confused about patient bridging. I am doing new order to help simplify patients warfarin holding and Lovenox dosing before and after procedure. Would you review this and make sure this is ok to proceed with bridging after procedure or should Lovenox not be used post procedure? I did sarkis up new Lovenox order to include enough syringes for after the procedure. Please review and sign if appropriate

## 2023-08-31 NOTE — TELEPHONE ENCOUNTER
DAWITI - for Pre-op:    Note for patient - he has procedure 09/08/2023, Both knee surgery, [facility], Dr. Johnson.    Dr. Funes has completed note to clear for surgery and has pre surgery instructions below.  Dr. Funes is no longer seeing patients in clinic.    Rena Sousa LPN on 8/31/2023 at 3:42 PM

## 2023-08-31 NOTE — TELEPHONE ENCOUNTER
"Babak Moran  [address]   [facility] [address]      Procedure Instructions for Anticoagulation     For your upcoming knee surgery on 9/8/23 your healthcare provider wants you to stop warfarin as directed below. To protect you from a clot while your off your warfarin, your provider wants you to inject a short-acting medication called enoxaparin (Lovenox), this is called \"bridging.\"      Enoxaparin (Lovenox) is an injection that you or a caregiver can give at home. It is injected just underneath the skin in your stomach. Your anticoagulation nurse can explain to you how to give the injection if you have not done so before. Attached is more information on enoxaparin. Additionally a video is available at [email or web address]/patient-self-injection-video    Bring these instructions with you to your procedure to show the provider doing the procedure. Please discuss with the provider doing the procedure if it is okay to restart warfarin and enoxaparin as we have planned.      You will take enoxaparin 120 mg every 12 hours (one full syringe) as outlined below. A prescription was sent to [facility] pharmacy.    9/2, Take last dose of warfarin  9/3, NO warfarin  9/4, NO warfarin  9/5, NO warfarin, enoxaparin every 12 hours (AM and PM)  9/6, NO warfarin, enoxaparin every 12 hours (AM and PM)  9/7, NO warfarin, enoxaparin (no enoxaparin 24 hours prior to surgery)    9/8/23, DAY OF PROCEDURE, NO enoxaparin. Restart warfarin 10 mg in the evening, if okay with provider doing the procedure.    Make sure to ask the provider doing your procedure if it is okay to begin your warfarin and enoxaparin as planned     9/9, Restart enoxaparin every 12 hours (AM and PM), unless instructed otherwise by the physician, and 10 mg warfarin in the evening.   9/10, Enoxaparin every 12 hours (AM and PM) and 10 mg warfarin in the evening.  9/11, Enoxaparin every 12 hours (AM and PM) and 10 mg warfarin in the evening.  9/12, Enoxaparin in the AM. Recheck INR. " Anticoagulation clinic to call with further dosing instructions.     Please watch for symptoms of both bleeding and clotting while on warfarin and enoxaparin:    Call 911 or go to the emergency room if you are experiencing any of the following:   Coughing or throwing up blood, can't control bleeding from a cut or injury after putting pressure on it, have a sudden severe headache, have red or black stools, or have red or orange urine.  Chest pain or shortness of breath  Any symptoms of a stroke including: sudden numbness or weakness in your face, arm or leg; sudden confusion or trouble speaking, reading or understanding; sudden blurred or decreased vision; sudden trouble walking or moving a part of the body; sudden severe headache for no reason.    Call your care team if you have:   Bleeding gums, large bruises, pale skin.  Sudden pain, tenderness or swelling in your leg or arm    Please contact the Anticoagulation Clinic at  [phone]  if you have any questions or concerns.     Shelley Mercado RN

## 2023-08-31 NOTE — PROGRESS NOTES
ANTICOAGULATION MANAGEMENT     Babak Moran 63 year old male is on warfarin with therapeutic INR result. (Goal INR 2.0-3.0)    Recent labs: (last 7 days)     08/31/23  0922   INR 2.4*       ASSESSMENT     Source(s): Chart Review and Home Care/Facility Nurse     Warfarin doses taken: Warfarin taken as instructed  Diet: No new diet changes identified  Medication/supplement changes: None noted  New illness, injury, or hospitalization: No  Signs or symptoms of bleeding or clotting: No  Previous result: Therapeutic last 2(+) visits  Additional findings: Stop warfarin 5 days prior to surgery (last dose evening of Sept 2)  Take Lovenox injections Sept 5 and 6. No Lovenox Sept 7.        PLAN     Recommended plan for temporary change(s) affecting INR     Dosing Instructions:  Continue current dose until needing to hold for procedure on 9/8 will take Lovenox with directions above. After procedure resume 9/8 with 10 mg warfarin and Lovenox resumed on 9/9 with Lovenox  with next INR in 12 days       Summary  As of 8/31/2023      Full warfarin instructions:  9/3: Hold; 9/4: Hold; 9/5: Hold; 9/6: Hold; 9/7: Hold; 9/8: 10 mg; 9/9: 10 mg; 9/10: 10 mg; Otherwise 10 mg every Mon, Thu; 7.5 mg all other days   Next INR check:  9/12/2023               Faxed dosing and follow up instructions to [facility]    Orders given to  Homecare nurse/facility to recheck    Education provided:   Lovenox/Heparin education provided: prescribed dose and frequency  Patient not here, [employer] Communicationsheet with screening questions and current INR received via FAX from outside agency. Results reviewed, Warfarin dosing per protocol, and recommended follow-up appointment made. Paperwork FAXED back to facility.       Plan made per [facility] anticoagulation protocol    Shelley Mercado, RN  Anticoagulation Clinic  8/31/2023    _______________________________________________________________________     Anticoagulation Episode Summary       Current INR goal:   2.0-3.0   TTR:  54.5 % (11.9 mo)   Target end date:  Indefinite   Send INR reminders to:  [facility]    Indications    Permanent atrial fibrillation (H) [I48.21]  Anticoagulation monitoring  INR range 2-3 (Resolved) [Z79.01]  Atrial fibrillation with RVR (H) [I48.91]  Anticoagulation monitoring  INR range 2-3 [Z79.01]             Comments:  Babak prefers to be closer to 3.0 d/t previous CVA check Q 4 weeks.Declines to reduce dose when slightly over 3.0  Needs to change PCP  Takes a long time get up to therapeutic after holding warfarin.             Anticoagulation Care Providers       Provider Role Specialty Phone number    Teo Funes MD Referring Family Medicine [phone]

## 2023-08-31 NOTE — TELEPHONE ENCOUNTER
Franko with [facility] called and requested a call back regarding if the nursing home recert last Monday, 08/28/2023, with PBI could be used as a pre op.      Pre Op: 09/08/2023, Both knee surgery, [facility], Fax: will bring to appt., Dr. Alex Hernandez to leave detailed message.              Linda Veronica on 8/31/2023 at 2:19 PM

## 2023-08-31 NOTE — TELEPHONE ENCOUNTER
I completed his note to clear for surgery  Stop warfarin 5 days prior to surgery (last dose evening of Sept 2)  Take Lovenox injections Sept 5 and 6. No Lovenox Sept 7. Sent prescription to [facility] from visit on Monday    Continue amlodipine and metoprolol through surgery  No hydrochlorothiazide morning of surgery    I can sign a sheet for [facility] if needed on Friday.

## 2023-09-12 ENCOUNTER — ANTICOAGULATION THERAPY VISIT (OUTPATIENT)
Dept: ANTICOAGULATION | Facility: OTHER | Age: 63
End: 2023-09-12
Attending: FAMILY MEDICINE
Payer: COMMERCIAL

## 2023-09-12 ENCOUNTER — TRANSFERRED RECORDS (OUTPATIENT)
Dept: HEALTH INFORMATION MANAGEMENT | Facility: OTHER | Age: 63
End: 2023-09-12

## 2023-09-12 DIAGNOSIS — Z79.01 ANTICOAGULATION MONITORING, INR RANGE 2-3: ICD-10-CM

## 2023-09-12 DIAGNOSIS — I48.21 PERMANENT ATRIAL FIBRILLATION (H): Primary | ICD-10-CM

## 2023-09-12 DIAGNOSIS — I48.91 ATRIAL FIBRILLATION WITH RVR (H): ICD-10-CM

## 2023-09-12 LAB — INR (EXTERNAL): 1.9 (ref 0.9–1.1)

## 2023-09-12 NOTE — PROGRESS NOTES
ANTICOAGULATION MANAGEMENT     Babak Moran 63 year old male is on warfarin with subtherapeutic INR result. (Goal INR 2.0-3.0)    Recent labs: (last 7 days)     09/12/23  1321   INR 1.9*       ASSESSMENT     Source(s): Chart Review and Home Care/Facility Nurse     Warfarin doses taken: Resumed warfarin on 9/8 after interruption for surgery/procedure which may be affecting INR  Diet: No new diet changes identified  Medication/supplement changes: None noted  New illness, injury, or hospitalization: Yes: knee surgery  Signs or symptoms of bleeding or clotting: No  Previous result: Subtherapeutic  Additional findings: Bridging with Enoxaparin until INR >= 2.0       PLAN     Recommended plan for temporary change(s) affecting INR     Dosing Instructions: booster dose then continue your current warfarin dose Continue bridging with Enoxaparin with next INR in 1 day       Summary  As of 9/12/2023      Full warfarin instructions:  9/12: 12.5 mg; Otherwise 10 mg every Mon, Thu; 7.5 mg all other days   Next INR check:  9/13/2023               Faxed dosing and follow up instructions to [facility]    Orders given to  Homecare nurse/facility to recheck    Patient not here, [employer] Communication sheet with screening questions and current INR received via FAX from outside agency. Results reviewed, Warfarin dosing per protocol, and recommended follow-up appointment made. Paperwork FAXED back to facility.       Plan made per [facility] anticoagulation protocol    Shelley Mercado RN  Anticoagulation Clinic  9/12/2023    _______________________________________________________________________     Anticoagulation Episode Summary       Current INR goal:  2.0-3.0   TTR:  53.8 % (11.9 mo)   Target end date:  Indefinite   Send INR reminders to:  [facility]    Indications    Permanent atrial fibrillation (H) [I48.21]  Anticoagulation monitoring  INR range 2-3 (Resolved) [Z79.01]  Atrial fibrillation with RVR (H)  [I48.91]  Anticoagulation monitoring  INR range 2-3 [Z79.01]             Comments:  Babak prefers to be closer to 3.0 d/t previous CVA check Q 4 weeks.Declines to reduce dose when slightly over 3.0  Needs to change PCP  Takes a long time get up to therapeutic after holding warfarin.             Anticoagulation Care Providers       Provider Role Specialty Phone number    Teo Funes MD Referring Family Medicine [phone]

## 2023-09-13 ENCOUNTER — NURSING HOME VISIT (OUTPATIENT)
Dept: GERIATRICS | Facility: OTHER | Age: 63
End: 2023-09-13
Payer: COMMERCIAL

## 2023-09-13 ENCOUNTER — ANTICOAGULATION THERAPY VISIT (OUTPATIENT)
Dept: ANTICOAGULATION | Facility: OTHER | Age: 63
End: 2023-09-13
Payer: COMMERCIAL

## 2023-09-13 ENCOUNTER — TRANSFERRED RECORDS (OUTPATIENT)
Dept: HEALTH INFORMATION MANAGEMENT | Facility: OTHER | Age: 63
End: 2023-09-13

## 2023-09-13 DIAGNOSIS — S83.005A DISLOCATION OF BOTH PATELLAE: Primary | ICD-10-CM

## 2023-09-13 DIAGNOSIS — I87.2 CHRONIC VENOUS STASIS DERMATITIS OF LEFT LOWER EXTREMITY: ICD-10-CM

## 2023-09-13 DIAGNOSIS — I48.91 ATRIAL FIBRILLATION WITH RVR (H): ICD-10-CM

## 2023-09-13 DIAGNOSIS — S83.004A DISLOCATION OF BOTH PATELLAE: Primary | ICD-10-CM

## 2023-09-13 DIAGNOSIS — I48.21 PERMANENT ATRIAL FIBRILLATION (H): Primary | ICD-10-CM

## 2023-09-13 DIAGNOSIS — I63.411 CEREBROVASCULAR ACCIDENT (CVA) DUE TO EMBOLISM OF RIGHT MIDDLE CEREBRAL ARTERY (H): ICD-10-CM

## 2023-09-13 DIAGNOSIS — I48.21 PERMANENT ATRIAL FIBRILLATION (H): ICD-10-CM

## 2023-09-13 DIAGNOSIS — G81.94 LEFT HEMIPARESIS (H): ICD-10-CM

## 2023-09-13 DIAGNOSIS — Z79.01 ANTICOAGULATION MONITORING, INR RANGE 2-3: ICD-10-CM

## 2023-09-13 DIAGNOSIS — I10 BENIGN ESSENTIAL HYPERTENSION: ICD-10-CM

## 2023-09-13 DIAGNOSIS — Z79.01 LONG-TERM (CURRENT) USE OF ANTICOAGULANTS, INR GOAL 2.0-3.0: Chronic | ICD-10-CM

## 2023-09-13 DIAGNOSIS — I10 ESSENTIAL HYPERTENSION: ICD-10-CM

## 2023-09-13 PROBLEM — N39.0 SEPSIS DUE TO URINARY TRACT INFECTION (H): Status: RESOLVED | Noted: 2023-07-31 | Resolved: 2023-09-13

## 2023-09-13 PROBLEM — A41.9 SEPSIS DUE TO URINARY TRACT INFECTION (H): Status: RESOLVED | Noted: 2023-07-31 | Resolved: 2023-09-13

## 2023-09-13 LAB — INR (EXTERNAL): 1.7 (ref 0.9–1.1)

## 2023-09-13 PROCEDURE — 99310 SBSQ NF CARE HIGH MDM 45: CPT | Performed by: NURSE PRACTITIONER

## 2023-09-13 RX ORDER — WARFARIN SODIUM 5 MG/1
7.5 TABLET ORAL
Qty: 160 TABLET | Refills: 1 | COMMUNITY
Start: 2023-09-13 | End: 2024-10-01

## 2023-09-13 RX ORDER — HYDROCODONE BITARTRATE AND ACETAMINOPHEN 5; 325 MG/1; MG/1
1 TABLET ORAL EVERY 6 HOURS PRN
Refills: 0 | COMMUNITY
Start: 2023-09-13 | End: 2024-02-08

## 2023-09-13 RX ORDER — AMLODIPINE BESYLATE 5 MG/1
10 TABLET ORAL DAILY
Qty: 90 TABLET | Refills: 4 | COMMUNITY
Start: 2023-09-13 | End: 2024-09-23

## 2023-09-13 NOTE — PROGRESS NOTES
ANTICOAGULATION MANAGEMENT     Babak Moran 63 year old male is on warfarin with subtherapeutic INR result. (Goal INR 2.0-3.0)    Recent labs: (last 7 days)     09/13/23  1309   INR 1.7*       ASSESSMENT     Source(s): Chart Review and Home Care/Facility Nurse     Warfarin doses taken: Warfarin taken as instructed  Diet: No new diet changes identified  Medication/supplement changes: None noted  New illness, injury, or hospitalization: No  Signs or symptoms of bleeding or clotting: No  Previous result: Subtherapeutic  Additional findings: None and Bridging with Enoxaparin until INR >= 2.0       PLAN     Recommended plan for temporary change(s) affecting INR     Dosing Instructions: booster dose then continue your current warfarin dose Continue bridging with Enoxaparin with next INR in 2 days       Summary  As of 9/13/2023      Full warfarin instructions:  9/13: 12.5 mg; 9/14: 12.5 mg; Otherwise 10 mg every Mon, Thu; 7.5 mg all other days   Next INR check:  9/15/2023               Faxed dosing and follow up instructions to [facility]    Orders given to  Homecare nurse/facility to recheck  Patient not here, [employer] Communicationsheet with screening questions and current INR received via FAX from outside agency. Results reviewed, Warfarin dosing per protocol, and recommended follow-up appointment made. Paperwork FAXED back to facility.       Plan made per [facility] anticoagulation protocol    Shelley Mercado, RN  Anticoagulation Clinic  9/13/2023    _______________________________________________________________________     Anticoagulation Episode Summary       Current INR goal:  2.0-3.0   TTR:  53.6 % (11.9 mo)   Target end date:  Indefinite   Send INR reminders to:  [facility]    Indications    Permanent atrial fibrillation (H) [I48.21]  Anticoagulation monitoring  INR range 2-3 (Resolved) [Z79.01]  Atrial fibrillation with RVR (H) [I48.91]  Anticoagulation monitoring  INR range 2-3 [Z79.01]              Comments:  Babak prefers to be closer to 3.0 d/t previous CVA check Q 4 weeks.Declines to reduce dose when slightly over 3.0  Needs to change PCP  Takes a long time get up to therapeutic after holding warfarin.             Anticoagulation Care Providers       Provider Role Specialty Phone number    Teo Funes MD Referring Family Medicine [phone]

## 2023-09-13 NOTE — PROGRESS NOTES
[facility] Geriatric Services  Hospital follow up/Initial Assessment    Patient Name: Babak Moran   : 1960  MRN: [id number]    Place of Service: [facility]  DOS: 2023    CC: Hospital follow up/Initial Assessment    HPI:  Babak Moran is a 63 year old male with PMH of multiple chronic medical concerns, who is seen today regarding pt was hospitalized - at [facility] for bilateral patellar dislocation in which he had surgical repair by Dr Johnson. Pt did well overnight and was discharged to [facility] rehab facility. He has bilateral knee immobilizers with WBAT only while these are on. Ortho follow up in [place] . Discharged on stronger norco 7.5/325 mg and restarted on coumadin while being bridged with lovenox.     Pt is doing well. He slept well last night. Pain controlled with norco. Tylenol PRN. Denies cough, chest pain, shortness of breath, palpitations. Urinating ok. +passing flatus. On edge of feeling constipated. Has been taking norco post op. NO stool softeners ordered. Wife on speaker phone during assessment. Questions answered.         Multidisciplinary notes, laboratory values, medications, vital signs, weight and orders arereviewed from nursing home records. I have reviewed the patient s medical history and updated the computerized patient record.     PMH:  Past Medical History:   Diagnosis Date    Atrial fibrillation (H) 2017    on Warfarin    Bacterial sepsis (H) 2022    Cellulitis of left lower extremity 2019    Cerebral infarction (H)     Cerebral infarction due to unspecified occlusion or stenosis of right middle cerebral artery (H) 2017    ,Left hemiparesis, left neglect, impaired balance and gait following R MCA stroke with mild hemorrhagic transformation s/p tissue plasminogen activator and mechanical thrombectomy, s/p C7 facet fracture from fall off forklift.    Chest pain 1997    Disorder of skin or subcutaneous tissue 2011     Essential (primary) hypertension 02/1997    Fracture of cervical vertebra (H)     02/03/2017,C7 facet fracture from fall off forklift, nondisplaced    Gout     Hemiplegia affecting left nondominant side (H) 02/03/2017    Obesity 02/03/2017    body mass index of 40    Other local lupus erythematosus     Sepsis (H) 9/6/2019       Medications:  Current Outpatient Medications   Medication Sig Dispense Refill    amLODIPine (NORVASC) 5 MG tablet Take 2 tablets (10 mg) by mouth daily 90 tablet 4    HYDROcodone-acetaminophen (NORCO) 7.5-325 MG per tablet Take 1 tablet by mouth every 4 hours as needed for severe pain or moderate pain And 2 tablets q 4 hours PRN pain 8-10 severe  0    warfarin ANTICOAGULANT (COUMADIN) 5 MG tablet 7.5 mg every day or as directed by the [facility] clinic 160 tablet 1    acetaminophen (TYLENOL) 325 MG tablet Take 2 tablets (650 mg) by mouth every 4 hours as needed for other (mild pain) 100 tablet 0    Eucerin external lotion       hydrochlorothiazide (HYDRODIURIL) 25 MG tablet Take 1 tablet (25 mg) by mouth daily 90 tablet 4    metoprolol succinate ER (TOPROL XL) 25 MG 24 hr tablet Take 1 tablet (25 mg) by mouth daily      saccharomyces boulardii (FLORASTOR) 250 MG capsule Take 1 capsule (250 mg) by mouth daily        Medication reconciliation complete between Epic record and [facility] MAR.     Allergies:  Allergies   Allergen Reactions    Diatrizoate Rash    Ioxaglate Other (See Comments)     Does not recall    Levofloxacin Hives and Rash    Metrizamide Rash     (Diagnostic X-Ray materials)    Probenecid Rash       Review of Systems:  See HPI    Vital Signs:  Temp 98.0   Pulse 73   Respirations 18   /86   Oxygen Sats 94%   Weight 311.4#    Physical Exam:     Pleasant and alert without distress.    Sclera nonicteric, conjunctiva non-inflamed.  Skin color pink.   Lungs clear to auscultation throughout. No wheezes or rales noted.   Cardiovascular irregular, rate controlled auscultated. No murmur  noted.  Abdomen large soft and without tenderness +bowel sounds  Extremities without edema. Bilateral immobilizers on, dressings intact, ace wraps on legs, +CMS intact  Able to move upper and lower extremities      New Labs/Diagnostics:  Lab Results   Component Value Date    WBC 9.8 08/14/2023    WBC 7.1 09/29/2020     Lab Results   Component Value Date    RBC 4.99 08/14/2023    RBC 5.49 09/29/2020     Lab Results   Component Value Date    HGB 13.3 08/14/2023    HGB 14.5 09/29/2020     Lab Results   Component Value Date    HCT 43.1 08/14/2023    HCT 47.3 09/29/2020     No components found for: MCT  Lab Results   Component Value Date    MCV 86 08/14/2023    MCV 86 09/29/2020     Lab Results   Component Value Date    MCH 26.7 08/14/2023    MCH 26.4 09/29/2020     Lab Results   Component Value Date    MCHC 30.9 08/14/2023    MCHC 30.7 09/29/2020     Lab Results   Component Value Date    RDW 16.6 08/14/2023    RDW 16.1 09/29/2020     Lab Results   Component Value Date     08/14/2023     09/29/2020     Last Comprehensive Metabolic Panel:  Lab Results   Component Value Date     08/14/2023    POTASSIUM 4.5 08/14/2023    CHLORIDE 97 (L) 08/14/2023    CO2 29 08/14/2023    ANIONGAP 11 08/14/2023    GLC 93 08/14/2023    BUN 20.5 08/14/2023    CR 0.70 08/14/2023    GFRESTIMATED >90 08/14/2023    VIRGEN 9.9 08/14/2023       NO post op labs done from [facility].     Assessment:  (S83.004A,  S83.005A) Dislocation of both patellae  (primary encounter diagnosis)  Comment: Bilateral patellar repair done by Dr Johnson 9/8/23 at [facility]  (I63.411) Cerebrovascular accident (CVA) due to embolism of right middle cerebral artery (H)  (G81.94) Left hemiparesis (H)  (I48.21) Permanent atrial fibrillation (H)  (I10) Essential hypertension  (I87.2) Chronic venous stasis dermatitis of left lower extremity  (Z79.01) Long-term (current) use of anticoagulants, INR goal 2.0-3.0  (I10) Benign essential hypertension  Plan: amLODIPine  (NORVASC) 5 MG tablet      (I48.91) Atrial fibrillation with RVR (H)  Plan: warfarin ANTICOAGULANT (COUMADIN) 5 MG tablet    Plan:  Next week check CBC, BMP post op surgical repair  Cont PT/OT  Follow up ortho Dr Johnson Sept 18  Cont lovenox bridging with coumadin  Add senna-s while taking narcotics        A total of 49 minutes was spent with this patient, of which time spent reviewing discharge summary and medication orders from [facility], no post op labs done (notes not scanned into epic yet). Updated med list and diagnosis list in epic. Assessment of patient and questions answered. Discussed plan with nursing staff.     JAYLEN Riley, CNP ....................  9/13/2023   8:55 AM

## 2023-09-15 ENCOUNTER — TRANSFERRED RECORDS (OUTPATIENT)
Dept: HEALTH INFORMATION MANAGEMENT | Facility: OTHER | Age: 63
End: 2023-09-15

## 2023-09-15 ENCOUNTER — ANTICOAGULATION THERAPY VISIT (OUTPATIENT)
Dept: ANTICOAGULATION | Facility: OTHER | Age: 63
End: 2023-09-15
Payer: COMMERCIAL

## 2023-09-15 DIAGNOSIS — I48.91 ATRIAL FIBRILLATION WITH RVR (H): ICD-10-CM

## 2023-09-15 DIAGNOSIS — I48.21 PERMANENT ATRIAL FIBRILLATION (H): Primary | ICD-10-CM

## 2023-09-15 DIAGNOSIS — Z79.01 ANTICOAGULATION MONITORING, INR RANGE 2-3: ICD-10-CM

## 2023-09-15 LAB — INR (EXTERNAL): 2.5 (ref 0.9–1.1)

## 2023-09-15 NOTE — PROGRESS NOTES
ANTICOAGULATION MANAGEMENT     Babak Moran 63 year old male is on warfarin with therapeutic INR result. (Goal INR 2.0-3.0)    Recent labs: (last 7 days)     09/15/23  1140   INR 2.5*       ASSESSMENT     Source(s): Chart Review and Patient/Caregiver Call     Warfarin doses taken: Warfarin taken as instructed  Diet: No new diet changes identified  New illness, injury, or hospitalization: No  Medication/supplement changes: None noted  Signs or symptoms of bleeding or clotting: No  Previous INR: Subtherapeutic  Additional findings: None and Bridging with Enoxaparin until INR >= 2.0     PLAN     Recommended plan for no diet, medication or health factor changes affecting INR     Dosing Instructions: Continue your current warfarin dose Stop bridging with Enoxaparin with next INR in 1 week       Summary  As of 9/15/2023      Full warfarin instructions:  10 mg every Mon, Thu; 7.5 mg all other days   Next INR check:  9/22/2023               Faxed dosing and follow up instructions to [facility] at [facility].  Patient not here, [employer] Communicationsheet with screening questions and current INR received via FAX from outside agency. Results reviewed, Warfarin dosing per protocol, and recommended follow-up appointment made. Paperwork FAXED back to facility.       Lab visit scheduled    Education provided: None required    Plan made per [facility] anticoagulation protocol    Liz Coles, RN  Anticoagulation Clinic  9/15/2023    _______________________________________________________________________     Anticoagulation Episode Summary       Current INR goal:  2.0-3.0   TTR:  53.3 % (11.9 mo)   Target end date:  Indefinite   Send INR reminders to:  [facility]    Indications    Permanent atrial fibrillation (H) [I48.21]  Anticoagulation monitoring  INR range 2-3 (Resolved) [Z79.01]  Atrial fibrillation with RVR (H) [I48.91]  Anticoagulation monitoring  INR range 2-3 [Z79.01]             Comments:  Babak prefers to be closer  to 3.0 d/t previous CVA check Q 4 weeks.Declines to reduce dose when slightly over 3.0  Needs to change PCP  Takes a long time get up to therapeutic after holding warfarin.             Anticoagulation Care Providers       Provider Role Specialty Phone number    Teo Funes MD Referring Family Medicine [phone]

## 2023-09-18 ENCOUNTER — LAB REQUISITION (OUTPATIENT)
Dept: LAB | Facility: OTHER | Age: 63
End: 2023-09-18
Payer: COMMERCIAL

## 2023-09-18 ENCOUNTER — OFFICE VISIT (OUTPATIENT)
Dept: ORTHOPEDICS | Facility: OTHER | Age: 63
End: 2023-09-18
Attending: ORTHOPAEDIC SURGERY
Payer: COMMERCIAL

## 2023-09-18 DIAGNOSIS — Z96.651 STATUS POST TOTAL RIGHT KNEE REPLACEMENT: Primary | ICD-10-CM

## 2023-09-18 DIAGNOSIS — Z96.652 STATUS POST TOTAL LEFT KNEE REPLACEMENT: ICD-10-CM

## 2023-09-18 LAB
ANION GAP SERPL CALCULATED.3IONS-SCNC: 10 MMOL/L (ref 7–15)
BASOPHILS # BLD AUTO: 0.1 10E3/UL (ref 0–0.2)
BASOPHILS NFR BLD AUTO: 1 %
BUN SERPL-MCNC: 18.4 MG/DL (ref 8–23)
CALCIUM SERPL-MCNC: 8.9 MG/DL (ref 8.8–10.2)
CHLORIDE SERPL-SCNC: 98 MMOL/L (ref 98–107)
CREAT SERPL-MCNC: 0.57 MG/DL (ref 0.67–1.17)
DEPRECATED HCO3 PLAS-SCNC: 28 MMOL/L (ref 22–29)
EGFRCR SERPLBLD CKD-EPI 2021: >90 ML/MIN/1.73M2
EOSINOPHIL # BLD AUTO: 0.1 10E3/UL (ref 0–0.7)
EOSINOPHIL NFR BLD AUTO: 1 %
ERYTHROCYTE [DISTWIDTH] IN BLOOD BY AUTOMATED COUNT: 17.1 % (ref 10–15)
GLUCOSE SERPL-MCNC: 98 MG/DL (ref 70–99)
HCT VFR BLD AUTO: 35.3 % (ref 40–53)
HGB BLD-MCNC: 11.3 G/DL (ref 13.3–17.7)
IMM GRANULOCYTES # BLD: 0.1 10E3/UL
IMM GRANULOCYTES NFR BLD: 0 %
LYMPHOCYTES # BLD AUTO: 0.9 10E3/UL (ref 0.8–5.3)
LYMPHOCYTES NFR BLD AUTO: 8 %
MCH RBC QN AUTO: 27 PG (ref 26.5–33)
MCHC RBC AUTO-ENTMCNC: 32 G/DL (ref 31.5–36.5)
MCV RBC AUTO: 84 FL (ref 78–100)
MONOCYTES # BLD AUTO: 1.6 10E3/UL (ref 0–1.3)
MONOCYTES NFR BLD AUTO: 14 %
NEUTROPHILS # BLD AUTO: 8.4 10E3/UL (ref 1.6–8.3)
NEUTROPHILS NFR BLD AUTO: 76 %
NRBC # BLD AUTO: 0 10E3/UL
NRBC BLD AUTO-RTO: 0 /100
PLATELET # BLD AUTO: 445 10E3/UL (ref 150–450)
POTASSIUM SERPL-SCNC: 4.3 MMOL/L (ref 3.4–5.3)
RBC # BLD AUTO: 4.18 10E6/UL (ref 4.4–5.9)
SODIUM SERPL-SCNC: 136 MMOL/L (ref 136–145)
WBC # BLD AUTO: 11.1 10E3/UL (ref 4–11)

## 2023-09-18 PROCEDURE — 99024 POSTOP FOLLOW-UP VISIT: CPT | Performed by: ORTHOPAEDIC SURGERY

## 2023-09-18 PROCEDURE — 85025 COMPLETE CBC W/AUTO DIFF WBC: CPT | Performed by: NURSE PRACTITIONER

## 2023-09-18 PROCEDURE — 80048 BASIC METABOLIC PNL TOTAL CA: CPT | Performed by: NURSE PRACTITIONER

## 2023-09-18 PROCEDURE — G0463 HOSPITAL OUTPT CLINIC VISIT: HCPCS

## 2023-09-18 PROCEDURE — 99495 TRANSJ CARE MGMT MOD F2F 14D: CPT | Performed by: ORTHOPAEDIC SURGERY
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
## 2023-09-22 ENCOUNTER — TRANSFERRED RECORDS (OUTPATIENT)
Dept: HEALTH INFORMATION MANAGEMENT | Facility: OTHER | Age: 63
End: 2023-09-22

## 2023-09-22 ENCOUNTER — ANTICOAGULATION THERAPY VISIT (OUTPATIENT)
Dept: ANTICOAGULATION | Facility: OTHER | Age: 63
End: 2023-09-22
Attending: FAMILY MEDICINE
Payer: COMMERCIAL

## 2023-09-22 DIAGNOSIS — I48.21 PERMANENT ATRIAL FIBRILLATION (H): Primary | ICD-10-CM

## 2023-09-22 DIAGNOSIS — I48.91 ATRIAL FIBRILLATION WITH RVR (H): ICD-10-CM

## 2023-09-22 DIAGNOSIS — Z79.01 ANTICOAGULATION MONITORING, INR RANGE 2-3: ICD-10-CM

## 2023-09-22 LAB — INR (EXTERNAL): 2.5 (ref 0.9–1.1)

## 2023-09-22 NOTE — PROGRESS NOTES
ANTICOAGULATION MANAGEMENT     Babak Moran 63 year old male is on warfarin with therapeutic INR result. (Goal INR 2.0-3.0)    Recent labs: (last 7 days)     09/22/23  1417   INR 2.5*       ASSESSMENT     Source(s): Chart Review and Patient/Caregiver Call     Warfarin doses taken: Warfarin taken as instructed  Diet: No new diet changes identified  New illness, injury, or hospitalization: No  Medication/supplement changes: None noted  Signs or symptoms of bleeding or clotting: No  Previous INR: Therapeutic last visit; previously outside of goal range  Additional findings: None     PLAN     Recommended plan for no diet, medication or health factor changes affecting INR     Dosing Instructions: Continue your current warfarin dose with next INR in 2 weeks       Summary  As of 9/22/2023      Full warfarin instructions:  10 mg every Mon, Thu; 7.5 mg all other days   Next INR check:  10/6/2023               Faxed dosing and follow up instructions to [facility] at [facility]    Lab visit scheduled    Education provided: None required    Plan made per [facility] anticoagulation protocol    Liz Coles RN  Anticoagulation Clinic  9/22/2023    _______________________________________________________________________     Anticoagulation Episode Summary       Current INR goal:  2.0-3.0   TTR:  53.4 % (11.9 mo)   Target end date:  Indefinite   Send INR reminders to:  [facility]    Indications    Permanent atrial fibrillation (H) [I48.21]  Anticoagulation monitoring  INR range 2-3 (Resolved) [Z79.01]  Atrial fibrillation with RVR (H) [I48.91]  Anticoagulation monitoring  INR range 2-3 [Z79.01]             Comments:  Babak prefers to be closer to 3.0 d/t previous CVA check Q 4 weeks.Declines to reduce dose when slightly over 3.0  Needs to change PCP  Takes a long time get up to therapeutic after holding warfarin.             Anticoagulation Care Providers       Provider Role Specialty Phone number    Teo Funes MD  [facility] Family Medicine [phone]

## 2023-09-25 ENCOUNTER — OFFICE VISIT (OUTPATIENT)
Dept: ORTHOPEDICS | Facility: OTHER | Age: 63
End: 2023-09-25
Attending: ORTHOPAEDIC SURGERY
Payer: COMMERCIAL

## 2023-09-25 DIAGNOSIS — S83.006A PATELLAR DISLOCATION, UNSPECIFIED LATERALITY, INITIAL ENCOUNTER: Primary | ICD-10-CM

## 2023-09-25 PROCEDURE — G0463 HOSPITAL OUTPT CLINIC VISIT: HCPCS

## 2023-09-25 PROCEDURE — 99024 POSTOP FOLLOW-UP VISIT: CPT | Performed by: ORTHOPAEDIC SURGERY

## 2023-09-25 PROCEDURE — 99495 TRANSJ CARE MGMT MOD F2F 14D: CPT | Performed by: ORTHOPAEDIC SURGERY

## 2023-09-25 NOTE — PROGRESS NOTES
Subjective:    63-year-old gentleman 2 and half weeks status post bilateral knee lateral release and medial reefing of the patellas for patellar dislocations following total knee arthroplasty.  He is doing fairly well with this not having a significant amount of pain and he is healing up nicely on the right less so on the left.  He did have a hematoma on the left-hand side that is resolving.  He still has some drainage from the wound    Objective:    63-year-old gentleman no acute distress.  Very pleasant on examination.  He has quite a bit of rubor and some drainage from the inferior portion of the left wound to the right one appears completely benign and his knee appears completely benign.    Assessment:    63-year-old gentleman doing well status post medial reefing and lateral release of bilateral knees.  He still has some drainage on the left knee    Plan:    We are going to go ahead and have them do dry dressing changes daily to the left knee.  I will also get a put him on a little bit of Keflex as I think this will help him prevent infection.  And we will see him back in a week.  We removed part of his staples today we will remove the rest next week.

## 2023-09-27 ENCOUNTER — NURSING HOME VISIT (OUTPATIENT)
Dept: GERIATRICS | Facility: OTHER | Age: 63
End: 2023-09-27
Payer: COMMERCIAL

## 2023-09-27 DIAGNOSIS — S83.004A DISLOCATION OF BOTH PATELLAE: Primary | ICD-10-CM

## 2023-09-27 DIAGNOSIS — I87.2 CHRONIC VENOUS STASIS DERMATITIS OF LEFT LOWER EXTREMITY: ICD-10-CM

## 2023-09-27 DIAGNOSIS — G81.94 LEFT HEMIPARESIS (H): ICD-10-CM

## 2023-09-27 DIAGNOSIS — I10 ESSENTIAL HYPERTENSION: ICD-10-CM

## 2023-09-27 DIAGNOSIS — I48.21 PERMANENT ATRIAL FIBRILLATION (H): ICD-10-CM

## 2023-09-27 DIAGNOSIS — S83.005A DISLOCATION OF BOTH PATELLAE: Primary | ICD-10-CM

## 2023-09-27 DIAGNOSIS — I63.411 CEREBROVASCULAR ACCIDENT (CVA) DUE TO EMBOLISM OF RIGHT MIDDLE CEREBRAL ARTERY (H): ICD-10-CM

## 2023-09-27 PROBLEM — Z92.82 TISSUE PLASMINOGEN ACTIVATOR (TPA) ADMINISTERED AT OTHER FACILITY WITHIN 24 HOURS BEFORE CURRENT ADMISSION: Status: RESOLVED | Noted: 2018-01-16 | Resolved: 2023-09-27

## 2023-09-27 PROCEDURE — 99309 SBSQ NF CARE MODERATE MDM 30: CPT | Performed by: NURSE PRACTITIONER

## 2023-09-27 NOTE — PROGRESS NOTES
[facility] Geriatric Services  60 day re-certification      Patient Name: Babak Moran   : 1960  MRN: [id number]    Place of Service: [facility]  DOS: 2023    CC: 60 day re-certification      HPI:  Babak Moran is a 63 year old male with PMH of multiple chronic medical concerns, who is seen today regarding 60 day re-certification for the following:    Cerebrovascular accident (CVA) due to embolism of right middle cerebral artery (H)  Left hemiparesis (H)  Essential hypertension  Permanent atrial fibrillation (H)  Chronic venous stasis dermatitis of left lower extremity  Dislocation of both patellae    Pt is s/p surgical repair of bilateral knee dislocation 23  by Dr Johnson and also prior to this had a total knee replacement right in 2023.  He had ortho follow up  and again . Due to see again Oct 2. He was started on keflex for possible infection starting left knee due to hematoma that is draining and slow to heal.     Hypertension/afib: currently taking hydrochlorothiazide, amlodipine, and metoprolol. -120s/60-70s. HR 70-90s, occ in 60s. Oxygenation stable on room air. Weights are down 20# since admission in . Also taking coumadin for afib, hx stroke.     Today pt is doing well. Pain in managed with norco 7.5/325 mg tablets. He tells me he walked a few steps yesterday. He does need to keep immobilizer braces on bilateral legs with no knee bending for a few weeks per ortho. He reports pain was tolerable with weight bearing.     Bowels are moving daily with narcotic use and senna. Urinating ok. Denies chest pain, palpitations, shortness of breath, cough.  NO fevers. Face is flushed but he states he runs hot, windows open in room with cool air blowing. Sleeping ok at night.       Multidisciplinary notes, laboratory values, medications, vital signs, weight and orders arereviewed from nursing home records. I have reviewed the patient s medical history and updated the  computerized patient record.     PMH:  Past Medical History:   Diagnosis Date    Atrial fibrillation (H) 02/03/2017    on Warfarin    Bacterial sepsis (H) 8/8/2022    Cellulitis of left lower extremity 9/5/2019    Cerebral infarction (H)     Cerebral infarction due to unspecified occlusion or stenosis of right middle cerebral artery (H) 02/03/2017    ,Left hemiparesis, left neglect, impaired balance and gait following R MCA stroke with mild hemorrhagic transformation s/p tissue plasminogen activator and mechanical thrombectomy, s/p C7 facet fracture from fall off forklift.    Chest pain 02/1997    Disorder of skin or subcutaneous tissue 07/25/2011    Essential (primary) hypertension 02/1997    Fracture of cervical vertebra (H)     02/03/2017,C7 facet fracture from fall off forklift, nondisplaced    Gout     Hemiplegia affecting left nondominant side (H) 02/03/2017    Obesity 02/03/2017    body mass index of 40    Other local lupus erythematosus     Sepsis (H) 9/6/2019       Medications:  Current Outpatient Medications   Medication Sig Dispense Refill    acetaminophen (TYLENOL) 325 MG tablet Take 2 tablets (650 mg) by mouth every 4 hours as needed for other (mild pain) 100 tablet 0    amLODIPine (NORVASC) 5 MG tablet Take 2 tablets (10 mg) by mouth daily 90 tablet 4    Eucerin external lotion       hydrochlorothiazide (HYDRODIURIL) 25 MG tablet Take 1 tablet (25 mg) by mouth daily 90 tablet 4    HYDROcodone-acetaminophen (NORCO) 7.5-325 MG per tablet Take 1 tablet by mouth every 4 hours as needed for severe pain or moderate pain And 2 tablets q 4 hours PRN pain 8-10 severe  0    metoprolol succinate ER (TOPROL XL) 25 MG 24 hr tablet Take 1 tablet (25 mg) by mouth daily      saccharomyces boulardii (FLORASTOR) 250 MG capsule Take 1 capsule (250 mg) by mouth daily      warfarin ANTICOAGULANT (COUMADIN) 5 MG tablet 7.5 mg every day or as directed by the [facility] clinic 160 tablet 1      Medication reconciliation complete  between Epic record and [facility] MAR.     Allergies:  Allergies   Allergen Reactions    Diatrizoate Rash    Ioxaglate Other (See Comments)     Does not recall    Levofloxacin Hives and Rash    Metrizamide Rash     (Diagnostic X-Ray materials)    Probenecid Rash       Review of Systems:  See HPI    Vital Signs:  Temp 98.2   Pulse 96   Respirations 18   /78   Oxygen Sats 96%   Weight 318#    Physical Exam:     Pleasant and alert without distress. Face flushed.    Sclera nonicteric, conjunctiva non-inflamed.  Skin color pink/warm to touch. Mucous membranes moist.   Lungs clear to auscultation throughout. No wheezes or rales noted.   Cardiovascular irregular, rate controlled auscultated. No murmur noted.  Abdomen obese, soft and without tenderness +bowel sounds  Extremities without edema. DP 3+ bilateral. Dark hemosideran staining, chronic venous stasis dermatitis    Bilateral immobilizers on with left leg ace wrap on knee  Able to move upper and lower extremities       New Labs/Diagnostics:  Lab Results   Component Value Date    WBC 11.1 09/18/2023    WBC 7.1 09/29/2020     Lab Results   Component Value Date    RBC 4.18 09/18/2023    RBC 5.49 09/29/2020     Lab Results   Component Value Date    HGB 11.3 09/18/2023    HGB 14.5 09/29/2020     Lab Results   Component Value Date    HCT 35.3 09/18/2023    HCT 47.3 09/29/2020     No components found for: MCT  Lab Results   Component Value Date    MCV 84 09/18/2023    MCV 86 09/29/2020     Lab Results   Component Value Date    MCH 27.0 09/18/2023    MCH 26.4 09/29/2020     Lab Results   Component Value Date    MCHC 32.0 09/18/2023    MCHC 30.7 09/29/2020     Lab Results   Component Value Date    RDW 17.1 09/18/2023    RDW 16.1 09/29/2020     Lab Results   Component Value Date     09/18/2023     09/29/2020     Last Comprehensive Metabolic Panel:  Lab Results   Component Value Date     09/18/2023    POTASSIUM 4.3 09/18/2023    CHLORIDE 98 09/18/2023    CO2  28 09/18/2023    ANIONGAP 10 09/18/2023    GLC 98 09/18/2023    BUN 18.4 09/18/2023    CR 0.57 (L) 09/18/2023    GFRESTIMATED >90 09/18/2023    VIRGEN 8.9 09/18/2023       Started keflex 9/26 for possible left knee infection (had hematoma post surgery with some drainage)    Assessment:  (S83.004A,  S83.005A) Dislocation of both patellae  (primary encounter diagnosis)  Comment: Surgical repair by Dr Johnson 9/8/23.  (I63.411) Cerebrovascular accident (CVA) due to embolism of right middle cerebral artery (H)  (G81.94) Left hemiparesis (H)  (I10) Essential hypertension  (I48.21) Permanent atrial fibrillation (H)  (I87.2) Chronic venous stasis dermatitis of left lower extremity    Plan:   Cont close follow up with ortho Dr Johnson, next follow up Oct 2  Finish keflex Oct 3  Cont amlodipine, metoprolol, hydrochlorothiazide  Cont coumadin per coumadin clinic dosing        A total of 47 minutes was spent with this patient, of which more than 50% was spent in counseling and/or coordination of care.     JAYLEN Riley, CNP ....................  9/27/2023   9:42 AM

## 2023-09-28 RX ORDER — CEPHALEXIN 500 MG/1
500 CAPSULE ORAL 4 TIMES DAILY
COMMUNITY
Start: 2023-09-28 | End: 2023-10-31

## 2023-10-02 ENCOUNTER — OFFICE VISIT (OUTPATIENT)
Dept: ORTHOPEDICS | Facility: OTHER | Age: 63
End: 2023-10-02
Attending: ORTHOPAEDIC SURGERY
Payer: COMMERCIAL

## 2023-10-02 DIAGNOSIS — S83.006A PATELLAR DISLOCATION, UNSPECIFIED LATERALITY, INITIAL ENCOUNTER: Primary | ICD-10-CM

## 2023-10-02 PROCEDURE — 99495 TRANSJ CARE MGMT MOD F2F 14D: CPT | Performed by: ORTHOPAEDIC SURGERY

## 2023-10-02 PROCEDURE — G0463 HOSPITAL OUTPT CLINIC VISIT: HCPCS

## 2023-10-02 PROCEDURE — 99024 POSTOP FOLLOW-UP VISIT: CPT | Performed by: ORTHOPAEDIC SURGERY
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
## 2023-10-09 ENCOUNTER — ANTICOAGULATION THERAPY VISIT (OUTPATIENT)
Dept: ANTICOAGULATION | Facility: OTHER | Age: 63
End: 2023-10-09
Attending: FAMILY MEDICINE
Payer: COMMERCIAL

## 2023-10-09 ENCOUNTER — TRANSFERRED RECORDS (OUTPATIENT)
Dept: HEALTH INFORMATION MANAGEMENT | Facility: OTHER | Age: 63
End: 2023-10-09

## 2023-10-09 DIAGNOSIS — I48.91 ATRIAL FIBRILLATION WITH RVR (H): ICD-10-CM

## 2023-10-09 DIAGNOSIS — I48.21 PERMANENT ATRIAL FIBRILLATION (H): Primary | ICD-10-CM

## 2023-10-09 DIAGNOSIS — Z79.01 ANTICOAGULATION MONITORING, INR RANGE 2-3: ICD-10-CM

## 2023-10-09 LAB — INR (EXTERNAL): 1.9 (ref 0.9–1.1)

## 2023-10-09 NOTE — PROGRESS NOTES
ANTICOAGULATION MANAGEMENT     Babak Moran 63 year old male is on warfarin with subtherapeutic INR result. (Goal INR 2.0-3.0)    Recent labs: (last 7 days)     10/09/23  1329   INR 1.9*       ASSESSMENT     Source(s): Chart Review and Home Care/Facility Nurse     Warfarin doses taken: Missed dose(s) may be affecting INR  Diet: No new diet changes identified  Medication/supplement changes: None noted  New illness, injury, or hospitalization: No  Signs or symptoms of bleeding or clotting: No  Previous result: Therapeutic last 2(+) visits  Additional findings: None       PLAN     Recommended plan for temporary change(s) affecting INR     Dosing Instructions: Continue your current warfarin dose with next INR in 2 weeks       Summary  As of 10/9/2023      Full warfarin instructions:  10 mg every Mon, Thu; 7.5 mg all other days   Next INR check:  10/23/2023               Faxed dosing and follow up instructions to [facility]    Orders given to  Homecare nurse/facility to recheck    Patient not here, [employer] Communication sheet with screening questions and current INR received via FAX from outside agency. Results reviewed, Warfarin dosing per protocol, and recommended follow-up appointment made. Paperwork FAXED back to facility.       Plan made per [facility] anticoagulation protocol    Shelley Mercado RN  Anticoagulation Clinic  10/9/2023    _______________________________________________________________________     Anticoagulation Episode Summary       Current INR goal:  2.0-3.0   TTR:  52.6 % (11.9 mo)   Target end date:  Indefinite   Send INR reminders to:  [facility]    Indications    Permanent atrial fibrillation (H) [I48.21]  Anticoagulation monitoring  INR range 2-3 (Resolved) [Z79.01]  Atrial fibrillation with RVR (H) [I48.91]  Anticoagulation monitoring  INR range 2-3 [Z79.01]             Comments:  Babak prefers to be closer to 3.0 d/t previous CVA check Q 4 weeks.Declines to reduce dose when slightly  over 3.0  Needs to change PCP  Takes a long time get up to therapeutic after holding warfarin.             Anticoagulation Care Providers       Provider Role Specialty Phone number    Teo Funes MD Referring Family Medicine [phone]

## 2023-10-16 ENCOUNTER — OFFICE VISIT (OUTPATIENT)
Dept: ORTHOPEDICS | Facility: OTHER | Age: 63
End: 2023-10-16
Attending: ORTHOPAEDIC SURGERY
Payer: COMMERCIAL

## 2023-10-16 DIAGNOSIS — S83.006A PATELLAR DISLOCATION, UNSPECIFIED LATERALITY, INITIAL ENCOUNTER: Primary | ICD-10-CM

## 2023-10-16 PROCEDURE — G0463 HOSPITAL OUTPT CLINIC VISIT: HCPCS | Performed by: ORTHOPAEDIC SURGERY

## 2023-10-16 PROCEDURE — G0463 HOSPITAL OUTPT CLINIC VISIT: HCPCS

## 2023-10-16 PROCEDURE — 99024 POSTOP FOLLOW-UP VISIT: CPT | Performed by: ORTHOPAEDIC SURGERY

## 2023-10-16 PROCEDURE — 99495 TRANSJ CARE MGMT MOD F2F 14D: CPT | Performed by: ORTHOPAEDIC SURGERY

## 2023-10-16 NOTE — PROGRESS NOTES
Subjective:    63-year-old gentleman status post medial reefing and lateral release of bilateral knees to realign his patellar tracking.  He had dislocated patella a bilaterally following total knee arthroplasty after a pretty significant fall.    Objective:    On examination today his kneecaps appear to be tracking nicely where they belong.  He has significant swelling on the left knee less so on the right.  Is not put through a range of motion    Assessment:    63-year-old gentleman doing well status post patellar realignment    Plan:    We will work on range of motion of the knee.  Once we have good passive range of motion then we will start working on active range of motion.  He is doing well walking in the knee immobilizer for the time being.  We will see him back in 1 month.  At this point we should start working on range of motion of bilateral knees.

## 2023-10-23 ENCOUNTER — ANTICOAGULATION THERAPY VISIT (OUTPATIENT)
Dept: ANTICOAGULATION | Facility: OTHER | Age: 63
End: 2023-10-23
Attending: FAMILY MEDICINE
Payer: COMMERCIAL

## 2023-10-23 ENCOUNTER — TRANSFERRED RECORDS (OUTPATIENT)
Dept: HEALTH INFORMATION MANAGEMENT | Facility: OTHER | Age: 63
End: 2023-10-23

## 2023-10-23 DIAGNOSIS — I48.21 PERMANENT ATRIAL FIBRILLATION (H): Primary | ICD-10-CM

## 2023-10-23 DIAGNOSIS — I48.91 ATRIAL FIBRILLATION WITH RVR (H): ICD-10-CM

## 2023-10-23 DIAGNOSIS — Z79.01 ANTICOAGULATION MONITORING, INR RANGE 2-3: ICD-10-CM

## 2023-10-23 LAB — INR (EXTERNAL): 3.5 (ref 0.9–1.1)

## 2023-10-23 NOTE — PROGRESS NOTES
ANTICOAGULATION MANAGEMENT     Babak Moran 63 year old male is on warfarin with supratherapeutic INR result. (Goal INR 2.0-3.0)    Recent labs: (last 7 days)     10/23/23  1317   INR 3.5*       ASSESSMENT     Source(s): Chart Review and Home Care/Facility Nurse     Warfarin doses taken: Warfarin taken as instructed  Diet: No new diet changes identified  Medication/supplement changes: None noted  New illness, injury, or hospitalization: No  Signs or symptoms of bleeding or clotting: No  Previous result: Subtherapeutic  Additional findings: None       PLAN     Recommended plan for no diet, medication or health factor changes affecting INR     Dosing Instructions: Continue your current warfarin dose with next INR in 2 weeks       Summary  As of 10/23/2023      Full warfarin instructions:  10 mg every Mon, Thu; 7.5 mg all other days   Next INR check:  11/6/2023               Faxed dosing and follow up instructions to [facility]    Orders given to  Homecare nurse/facility to recheck    Patient not here, [facility] Communication sheet with screening questions and current INR received via FAX from outside agency. Results reviewed, Warfarin dosing per protocol, and recommended follow-up appointment made. Paperwork FAXED back to facility.     Plan made per [facility] anticoagulation protocol    Shelley Mercado RN  Anticoagulation Clinic  10/23/2023    _______________________________________________________________________     Anticoagulation Episode Summary       Current INR goal:  2.0-3.0   TTR:  52.7% (11.9 mo)   Target end date:  Indefinite   Send INR reminders to:  [facility]    Indications    Permanent atrial fibrillation (H) [I48.21]  Anticoagulation monitoring  INR range 2-3 (Resolved) [Z79.01]  Atrial fibrillation with RVR (H) [I48.91]  Anticoagulation monitoring  INR range 2-3 [Z79.01]             Comments:  Babak prefers to be closer to 3.0 d/t previous CVA check Q 4 weeks.Declines to reduce dose when slightly  over 3.0  Needs to change PCP  Takes a long time get up to therapeutic after holding warfarin.             Anticoagulation Care Providers       Provider Role Specialty Phone number    Teo Funes MD Referring Family Medicine [phone]

## 2023-10-23 NOTE — PROGRESS NOTES
ANTICOAGULATION FOLLOW-UP CLINIC VISIT    Patient Name:  Babak Moran  Date:  10/8/2021  Contact Type:  Face to Face    SUBJECTIVE:  Patient Findings         Clinical Outcomes     Negatives:  Major bleeding event, Thromboembolic event, Anticoagulation-related hospital admission, Anticoagulation-related ED visit, Anticoagulation-related fatality           OBJECTIVE    Recent labs: (last 7 days)     10/08/21  1511   INR 2.2*       ASSESSMENT / PLAN  INR assessment THER    Recheck INR In: 4 WEEKS    INR Location Clinic      Anticoagulation Summary  As of 10/8/2021    INR goal:  2.0-3.0   TTR:  91.2 % (1 y)   INR used for dosin.2 (10/8/2021)   Warfarin maintenance plan:  7.5 mg (5 mg x 1.5) every day   Full warfarin instructions:  7.5 mg every day   Weekly warfarin total:  52.5 mg   No change documented:  Shelley Mercado RN   Plan last modified:  Alysa Rodriguez RN (2021)   Next INR check:  2021   Priority:  Maintenance   Target end date:  Indefinite    Indications    Unspecified atrial fibrillation (Resolved) [I48.91]  Anticoagulation monitoring  INR range 2-3 [Z79.01]  Atrial fibrillation with RVR (H) [I48.91]             Anticoagulation Episode Summary     INR check location:      Preferred lab:      Send INR reminders to:  [facility]    Comments:  Babak prefers to be closer to 3.0 d/t previous CVA check Q 4 weeks.Declines to reduce dose when slightly over 3.0  Needs to change PCP      Anticoagulation Care Providers     Provider Role Specialty Phone number    Teo Funes MD Referring Family Medicine [phone]            See the Encounter Report to view Anticoagulation Flowsheet and Dosing Calendar (Go to Encounters tab in chart review, and find the Anticoagulation Therapy Visit)        Shelley Mercado, RN                 
N/A

## 2023-10-26 ENCOUNTER — NURSING HOME VISIT (OUTPATIENT)
Dept: GERIATRICS | Facility: OTHER | Age: 63
End: 2023-10-26
Payer: COMMERCIAL

## 2023-10-26 DIAGNOSIS — S83.005A DISLOCATION OF LEFT PATELLA, INITIAL ENCOUNTER: Primary | ICD-10-CM

## 2023-10-26 PROCEDURE — 99310 SBSQ NF CARE HIGH MDM 45: CPT | Performed by: NURSE PRACTITIONER

## 2023-10-26 NOTE — PROGRESS NOTES
[facility]  Acute Care Visit    Patient Name: Babak Moran   : 1960  MRN: [id number]    Place of Service: [facility]  DOS: 10/25/2023    CC: possible dislocation of left patella    HPI:  Babak Moran is a 63 year old male with PMH of multiple chronic medical concerns, who is seen today regarding PT has been working closely with this patient and noted yesterday that patella was sliding more laterally due to the way he sits and gravity. He was able to position patella back medially but today he is not able to do this. Patella is remaining in far lateral position. He has always had immobilizers in place.   Minimal pain at this point.      Multidisciplinary notes, laboratory values, medications, vital signs, weight and orders arereviewed from nursing home records. I have reviewed the patient s medical history and updated the computerized patient record.     PMH:  Past Medical History:   Diagnosis Date    Atrial fibrillation (H) 2017    on Warfarin    Bacterial sepsis (H) 2022    Cellulitis of left lower extremity 2019    Cerebral infarction (H)     Cerebral infarction due to unspecified occlusion or stenosis of right middle cerebral artery (H) 2017    ,Left hemiparesis, left neglect, impaired balance and gait following R MCA stroke with mild hemorrhagic transformation s/p tissue plasminogen activator and mechanical thrombectomy, s/p C7 facet fracture from fall off forklift.    Chest pain 1997    Disorder of skin or subcutaneous tissue 2011    Essential (primary) hypertension 1997    Fracture of cervical vertebra (H)     2017,C7 facet fracture from fall off forklift, nondisplaced    Gout     Hemiplegia affecting left nondominant side (H) 2017    Obesity 2017    body mass index of 40    Other local lupus erythematosus     Sepsis (H) 2019       Medications:  Current Outpatient Medications   Medication Sig Dispense Refill     acetaminophen (TYLENOL) 325 MG tablet Take 2 tablets (650 mg) by mouth every 4 hours as needed for other (mild pain) 100 tablet 0    amLODIPine (NORVASC) 5 MG tablet Take 2 tablets (10 mg) by mouth daily 90 tablet 4    cephALEXin (KEFLEX) 500 MG capsule Take 1 capsule (500 mg) by mouth 4 times daily      Eucerin external lotion       hydrochlorothiazide (HYDRODIURIL) 25 MG tablet Take 1 tablet (25 mg) by mouth daily 90 tablet 4    HYDROcodone-acetaminophen (NORCO) 7.5-325 MG per tablet Take 1 tablet by mouth every 4 hours as needed for severe pain or moderate pain And 2 tablets q 4 hours PRN pain 8-10 severe  0    metoprolol succinate ER (TOPROL XL) 25 MG 24 hr tablet Take 1 tablet (25 mg) by mouth daily      saccharomyces boulardii (FLORASTOR) 250 MG capsule Take 1 capsule (250 mg) by mouth daily      warfarin ANTICOAGULANT (COUMADIN) 5 MG tablet 7.5 mg every day or as directed by the [facility] clinic 160 tablet 1      Medication reconciliation complete between Epic record and [facility] MAR.     Allergies:  Allergies   Allergen Reactions    Diatrizoate Rash    Ioxaglate Other (See Comments)     Does not recall    Levofloxacin Hives and Rash    Metrizamide Rash     (Diagnostic X-Ray materials)    Probenecid Rash       Review of Systems:  See HPI    Vital Signs:  Temp 98.0   Pulse 52   Respirations 16   /87   Oxygen Sats 95%   Weight 324#    Physical Exam:     Pleasant and alert without distress.    Sclera nonicteric, conjunctiva non-inflamed.  Skin color pink. Extremities with minimal edema. Both legs with immobilizer in place. Left knee assessed and patella is in far lateral position stretching skin, mild redness and tenderness noted. No warmth. Incision is now fully healed.  Not able to manipulate patella back to medial position.          Assessment/Plan:  (S83.005A) Dislocation of left patella, initial encounter  (primary encounter diagnosis)  Comment: possible; patella shifted laterally and not able to manipulate  back to medial position  Plan: Attempted to reach ortho 10/24 and 10/25 was able to talk to Dr Johnson who would like to see him in clinic but is off next week so next available would be Nov 6th. He was able to see Dr Loomis today but this was canceled by provider. Ortho nurse still working on fitting him in sooner.   Dr Johnson instructed that pt can still bear weight as tolerated with immobilizer on.         A total of 65 minutes was spent with this patient, of which much time spent coordinating care trying to reach ortho , ortho nurse or Dr Johnson of which I was able to speak with on the phone 10 min re: plan of care. Much confusion over appointments and back and forth regarding which provider is able to see Mr Moran. Time spent  with assessment of patient and discussing plan with therapy, nursing staff, and patient.     JAYLEN Riley, CNP ....................  10/26/2023   11:49 AM

## 2023-10-31 ENCOUNTER — CARE COORDINATION (OUTPATIENT)
Dept: FAMILY MEDICINE | Facility: OTHER | Age: 63
End: 2023-10-31
Payer: COMMERCIAL

## 2023-10-31 RX ORDER — AMOXICILLIN 250 MG
1 CAPSULE ORAL DAILY
COMMUNITY
End: 2024-09-23

## 2023-10-31 RX ORDER — ZINC OXIDE 216 MG/ML
4 LOTION TOPICAL 2 TIMES DAILY
COMMUNITY
End: 2024-09-23

## 2023-11-01 ENCOUNTER — NURSING HOME VISIT (OUTPATIENT)
Dept: GERIATRICS | Facility: OTHER | Age: 63
End: 2023-11-01
Payer: COMMERCIAL

## 2023-11-01 ENCOUNTER — APPOINTMENT (OUTPATIENT)
Dept: GERIATRICS | Facility: OTHER | Age: 63
End: 2023-11-01
Attending: STUDENT IN AN ORGANIZED HEALTH CARE EDUCATION/TRAINING PROGRAM
Payer: COMMERCIAL

## 2023-11-01 ENCOUNTER — NURSING HOME VISIT (OUTPATIENT)
Dept: FAMILY MEDICINE | Facility: OTHER | Age: 63
End: 2023-11-01
Payer: COMMERCIAL

## 2023-11-01 DIAGNOSIS — Z79.01 LONG-TERM (CURRENT) USE OF ANTICOAGULANTS, INR GOAL 2.0-3.0: Chronic | ICD-10-CM

## 2023-11-01 DIAGNOSIS — I10 ESSENTIAL HYPERTENSION: ICD-10-CM

## 2023-11-01 DIAGNOSIS — T14.8XXA BLISTER: ICD-10-CM

## 2023-11-01 DIAGNOSIS — I48.21 PERMANENT ATRIAL FIBRILLATION (H): ICD-10-CM

## 2023-11-01 DIAGNOSIS — L89.891: ICD-10-CM

## 2023-11-01 DIAGNOSIS — S80.222A: ICD-10-CM

## 2023-11-01 DIAGNOSIS — S83.005D DISLOCATION OF LEFT PATELLA, SUBSEQUENT ENCOUNTER: Primary | ICD-10-CM

## 2023-11-01 DIAGNOSIS — Z47.1 AFTERCARE FOLLOWING KNEE JOINT REPLACEMENT SURGERY, UNSPECIFIED LATERALITY: ICD-10-CM

## 2023-11-01 DIAGNOSIS — E66.01 MORBID OBESITY WITH BMI OF 40.0-44.9, ADULT (H): Primary | ICD-10-CM

## 2023-11-01 DIAGNOSIS — Z96.659 AFTERCARE FOLLOWING KNEE JOINT REPLACEMENT SURGERY, UNSPECIFIED LATERALITY: ICD-10-CM

## 2023-11-01 PROCEDURE — 99308 SBSQ NF CARE LOW MDM 20: CPT | Performed by: STUDENT IN AN ORGANIZED HEALTH CARE EDUCATION/TRAINING PROGRAM

## 2023-11-01 PROCEDURE — 99309 SBSQ NF CARE MODERATE MDM 30: CPT | Performed by: NURSE PRACTITIONER

## 2023-11-01 NOTE — PROGRESS NOTES
[facility]   [facility] RECERTIFICATION    Patient Name: Babak Moran  YOB: 1960   Medical RecordNumber: [id number]  Primary Provider: Ilir Christie        HPI: Patient is seen today for recert evaluation for the following medical issues:       Morbid obesity with BMI of 40.0-44.9, adult (H)  Essential hypertension  Permanent atrial fibrillation (H)  Aftercare following knee joint replacement surgery, unspecified laterality  Long-term (current) use of anticoagulants, INR goal 2.0-3.0  Blister     Main concern is blister today from ice pack last night. Placed an ice pack on his left knee without anything beneath it. Caused a blister that then unroofed.   Has follow up with Dr Johnson next week   Otherwise no concerns and is feeling well         ALLERGIES:  Allergies   Allergen Reactions    Diatrizoate Rash    Ioxaglate Other (See Comments)     Does not recall    Levofloxacin Hives and Rash    Metrizamide Rash     (Diagnostic X-Ray materials)    Probenecid Rash       Past Medical History:    has a past medical history of Atrial fibrillation (H) (02/03/2017), Bacterial sepsis (H) (8/8/2022), Cellulitis of left lower extremity (9/5/2019), Cerebral infarction (H), Cerebral infarction due to unspecified occlusion or stenosis of right middle cerebral artery (H) (02/03/2017), Chest pain (02/1997), Disorder of skin or subcutaneous tissue (07/25/2011), Essential (primary) hypertension (02/1997), Fracture of cervical vertebra (H), Gout, Hemiplegia affecting left nondominant side (H) (02/03/2017), Obesity (02/03/2017), Other local lupus erythematosus, and Sepsis (H) (9/6/2019).    Past Surgical History:   has a past surgical history that includes Arthroscopy knee; Colonoscopy (01/02/2012); Orif Wrist Fracture (2019); Arthroplasty knee (Left, 1/9/2023); and Arthroplasty knee (Right, 6/26/2023).    Family History:  family history includes Cancer in his father and sister; Heart Disease in his maternal  grandmother and paternal uncle; Heart Disease (age of onset: 60) in his mother; Heart Disease (age of onset: 69) in his maternal uncle; Hypertension in his sister; Other - See Comments in his father and mother; Unknown/Adopted in his paternal grandfather.    Social History:   reports that he has never smoked. He has been exposed to tobacco smoke. He has never used smokeless tobacco. He reports that he does not currently use alcohol. He reports that he does not use drugs.    Immunizations:   Immunization History   Administered Date(s) Administered    TDAP (Adacel,Boostrix) 06/16/2023    TDAP Vaccine (Adacel) 04/11/2008    Td (Adult), Adsorbed 12/01/1992, 03/01/1997       Current Medications:   Current Outpatient Medications   Medication    acetaminophen (TYLENOL) 325 MG tablet    amLODIPine (NORVASC) 5 MG tablet    Emollient (CERAVE) CREA    hydrochlorothiazide (HYDRODIURIL) 25 MG tablet    HYDROcodone-acetaminophen (NORCO) 7.5-325 MG per tablet    metoprolol succinate ER (TOPROL XL) 25 MG 24 hr tablet    Nutritional Supplement LIQD    saccharomyces boulardii (FLORASTOR) 250 MG capsule    senna-docusate (SENOKOT-S/PERICOLACE) 8.6-50 MG tablet    warfarin ANTICOAGULANT (COUMADIN) 5 MG tablet     No current facility-administered medications for this visit.     Medication reconciliation completed between Psychiatric record and [facility] MAR.     Diagnostics/Labs (reviewed today):  Anticoagulation Therapy Visit on 10/23/2023   Component Date Value Ref Range Status    INR (External) 10/23/2023 3.5 (A)  0.9 - 1.1 Final        Review Of Systems:    ROS noted in HPI      Vital Signs: (obtained from nursing home record)  -150/60-90s Ox 97% RA, P 75, R 18, T 97.9, Wt 324.1 lbs    Objective:  GENERAL APPEARANCE: healthy, alert and no distress,  RESP: lungs clear to auscultation - no rales, rhonchi or wheezes  CV: irregularly irregular rhythm  ABDOMEN: soft, nontender, without hepatosplenomegaly or masses  SKIN: left knee cap unroofed  blister     Assessment and Plan:  Problem List Items Addressed This Visit          Digestive    Morbid obesity with BMI of 40.0-44.9, adult (H) - Primary       Circulatory    Essential hypertension    Permanent atrial fibrillation (H)       Other    Long-term (current) use of anticoagulants, INR goal 2.0-3.0 (Chronic)    Aftercare following knee joint replacement surgery, unspecified laterality     Other Visit Diagnoses       Blister                Discussed supportive cares for the blister, monitor for infection, advised to use something between skin and ice pack going forward  BP ranges vary quite a bit. HR in afib today at baseline.   No changes to meds  INR followed in warfarin clinic     Multidisciplinary notes, laboratory values, medications, vital signs, weight and orders are reviewed from nursing home records. I have reviewed the patient s medical history and updated the computerized patient record.       Lake Moran MD   [facility]     11/1/2023 11:20 AM

## 2023-11-01 NOTE — PROGRESS NOTES
[facility]  Acute Care Visit    Patient Name: Babak Moran   : 1960  MRN: [id number]    Place of Service: [facility]  DOS: 2023    CC: redness left knee    HPI:  Babak Moran is a 63 year old male with PMH of multiple chronic medical concerns, who is seen today regarding pt has redness left knee that has been persistent. He was treated with antibiotic for some drainage from incision but this has now healed nicely. Redness is blanchable and no sign of infection, or increased pain. From discussion with PT, nursing and patient, the immobilizer needs to be quite tight when walking or it will slide down but not always loosened when back in bed. Also hard lateral bar on immobilizer may be putting pressure on knee cap.   PT is requesting ok to tape knee to help stay in place.     Last night pt had left ice pack on proximal knee for over 3 hours and this caused a blister and redness. Blister has now popped with serous drainage. Mepilex in place.       Multidisciplinary notes, laboratory values, medications, vital signs, weight and orders arereviewed from nursing home records. I have reviewed the patient s medical history and updated the computerized patient record.     PMH:  Past Medical History:   Diagnosis Date    Atrial fibrillation (H) 2017    on Warfarin    Bacterial sepsis (H) 2022    Cellulitis of left lower extremity 2019    Cerebral infarction (H)     Cerebral infarction due to unspecified occlusion or stenosis of right middle cerebral artery (H) 2017    ,Left hemiparesis, left neglect, impaired balance and gait following R MCA stroke with mild hemorrhagic transformation s/p tissue plasminogen activator and mechanical thrombectomy, s/p C7 facet fracture from fall off forklift.    Chest pain 1997    Disorder of skin or subcutaneous tissue 2011    Essential (primary) hypertension 1997    Fracture of cervical vertebra (H)     2017,C7 facet  fracture from fall off forklift, nondisplaced    Gout     Hemiplegia affecting left nondominant side (H) 02/03/2017    Obesity 02/03/2017    body mass index of 40    Other local lupus erythematosus     Sepsis (H) 9/6/2019       Medications:  Current Outpatient Medications   Medication Sig Dispense Refill    acetaminophen (TYLENOL) 325 MG tablet Take 2 tablets (650 mg) by mouth every 4 hours as needed for other (mild pain) 100 tablet 0    amLODIPine (NORVASC) 5 MG tablet Take 2 tablets (10 mg) by mouth daily 90 tablet 4    Emollient (CERAVE) CREA Apply topically 2 times daily      hydrochlorothiazide (HYDRODIURIL) 25 MG tablet Take 1 tablet (25 mg) by mouth daily 90 tablet 4    HYDROcodone-acetaminophen (NORCO) 7.5-325 MG per tablet Take 1 tablet by mouth every 4 hours as needed for severe pain or moderate pain And 2 tablets q 4 hours PRN pain 8-10 severe  0    metoprolol succinate ER (TOPROL XL) 25 MG 24 hr tablet Take 1 tablet (25 mg) by mouth daily      Nutritional Supplement LIQD Take 4 oz by mouth 2 times daily [employer] Supplement two times daily with meals      saccharomyces boulardii (FLORASTOR) 250 MG capsule Take 1 capsule (250 mg) by mouth daily      senna-docusate (SENOKOT-S/PERICOLACE) 8.6-50 MG tablet Take 1 tablet by mouth daily AND 1 tab daily PRN      warfarin ANTICOAGULANT (COUMADIN) 5 MG tablet 7.5 mg every day or as directed by the [facility] 160 tablet 1      Medication reconciliation complete between Epic record and NH MAR.     Allergies:  Allergies   Allergen Reactions    Diatrizoate Rash    Ioxaglate Other (See Comments)     Does not recall    Levofloxacin Hives and Rash    Metrizamide Rash     (Diagnostic X-Ray materials)    Probenecid Rash       Review of Systems:  See HPI    Vital Signs:  Temp 97.8   Pulse 72   Respirations 18   /77   Oxygen Sats 94%   Weight 324#    Physical Exam:     Pleasant and alert without distress.    Sclera nonicteric, conjunctiva non-inflamed.  Skin color  pink. Mucous membranes moist.   Extremities without edema. Left knee with mild dislocation of patella laterally with persistent redness on lateral knee cap that is blanchable. No fluctuance or increased warmth, tenderness, or pain. No open area or drainage.   Blister that has popped just proximal to knee with no s/sx of infection but with surrounding redness.   Able to move upper and lower extremities; dim sensation chronic and chronic venous stasis dermatitis      Assessment/Plan:  (S83.005D) Dislocation of left patella, subsequent encounter  (primary encounter diagnosis)  Comment: intermittent dislocation with positioning of knee; tends to dislocate with mild bending of knee so pt has remained in immobilizer   Plan: Had received direction from ortho with ok for taping to keep patella in place and immobilizer on with ambulation  Follow up ortho Nov 6    (S80.222A) Blister of left knee, initial encounter  Comment: ice pack on for 3 hours causing blister, that has now popped and remaining redness  Plan: cleanse with saline, pat dry, and cover popped blister area with mepilex and change every 3 days until healed    (L89.891) Pressure injury of left knee, stage 1  Comment: blanchable redness left knee; possibly from combination of patella dislocating frequently and immobilizer bar pressing on knee cap and possibly immobilizer on too tight  Plan: Ok for immobilizer to be tight for walking so doesn't slide down but then loosen immobilizer while in bed and protect pressure injury area with ace wrap for edema and mepilex if ok with taping (per PT)        A total of 38 minutes was spent with this patient, of which time spent reviewing chart, assessment of patient, discussing with nursing staff and PT re: pressure injury and orders for dressings    JAYLEN Riley, CNP ....................  11/1/2023   10:47 AM

## 2023-11-02 DIAGNOSIS — S83.006A PATELLAR DISLOCATION, UNSPECIFIED LATERALITY, INITIAL ENCOUNTER: Primary | ICD-10-CM

## 2023-11-06 ENCOUNTER — OFFICE VISIT (OUTPATIENT)
Dept: ORTHOPEDICS | Facility: OTHER | Age: 63
End: 2023-11-06
Attending: ORTHOPAEDIC SURGERY
Payer: COMMERCIAL

## 2023-11-06 ENCOUNTER — HOSPITAL ENCOUNTER (OUTPATIENT)
Dept: GENERAL RADIOLOGY | Facility: OTHER | Age: 63
Discharge: HOME | End: 2023-11-06
Attending: ORTHOPAEDIC SURGERY
Payer: COMMERCIAL

## 2023-11-06 ENCOUNTER — TRANSFERRED RECORDS (OUTPATIENT)
Dept: HEALTH INFORMATION MANAGEMENT | Facility: OTHER | Age: 63
End: 2023-11-06

## 2023-11-06 ENCOUNTER — ANTICOAGULATION THERAPY VISIT (OUTPATIENT)
Dept: ANTICOAGULATION | Facility: OTHER | Age: 63
End: 2023-11-06
Attending: FAMILY MEDICINE
Payer: COMMERCIAL

## 2023-11-06 DIAGNOSIS — Z79.01 ANTICOAGULATION MONITORING, INR RANGE 2-3: ICD-10-CM

## 2023-11-06 DIAGNOSIS — S83.006A PATELLAR DISLOCATION, UNSPECIFIED LATERALITY, INITIAL ENCOUNTER: Primary | ICD-10-CM

## 2023-11-06 DIAGNOSIS — S83.006A PATELLAR DISLOCATION, UNSPECIFIED LATERALITY, INITIAL ENCOUNTER: ICD-10-CM

## 2023-11-06 DIAGNOSIS — I48.21 PERMANENT ATRIAL FIBRILLATION (H): Primary | ICD-10-CM

## 2023-11-06 DIAGNOSIS — I48.91 ATRIAL FIBRILLATION WITH RVR (H): ICD-10-CM

## 2023-11-06 LAB — INR (EXTERNAL): 2.3 (ref 0.9–1.1)

## 2023-11-06 PROCEDURE — 73560 X-RAY EXAM OF KNEE 1 OR 2: CPT | Mod: LT

## 2023-11-06 PROCEDURE — 99024 POSTOP FOLLOW-UP VISIT: CPT | Performed by: ORTHOPAEDIC SURGERY

## 2023-11-06 PROCEDURE — 99495 TRANSJ CARE MGMT MOD F2F 14D: CPT | Performed by: ORTHOPAEDIC SURGERY

## 2023-11-06 PROCEDURE — G0463 HOSPITAL OUTPT CLINIC VISIT: HCPCS
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
## 2023-11-06 NOTE — PROGRESS NOTES
ANTICOAGULATION MANAGEMENT     Babak Moran 63 year old male is on warfarin with therapeutic INR result. (Goal INR 2.0-3.0)    Recent labs: (last 7 days)     11/06/23  1525   INR 2.3*       ASSESSMENT     Source(s): Chart Review and Home Care/Facility Nurse     Warfarin doses taken: Warfarin taken as instructed  Diet: No new diet changes identified  Medication/supplement changes: None noted  New illness, injury, or hospitalization: No  Signs or symptoms of bleeding or clotting: No  Previous result: Supratherapeutic  Additional findings: None       PLAN     Recommended plan for no diet, medication or health factor changes affecting INR     Dosing Instructions: Continue your current warfarin dose with next INR in 2 weeks       Summary  As of 11/6/2023      Full warfarin instructions:  10 mg every Mon, Thu; 7.5 mg all other days   Next INR check:  11/20/2023               Faxed dosing and follow up instructions to [facility]    Orders given to  Homecare nurse/facility to recheck    Patient not here, [employer] with screening questions and current INR received via FAX from outside agency. Results reviewed, Warfarin dosing per protocol, and recommended follow-up appointment made. Paperwork FAXED back to facility.     Plan made per [facility] anticoagulation protocol    Shelley Mercado RN  Anticoagulation Clinic  11/6/2023    _______________________________________________________________________     Anticoagulation Episode Summary       Current INR goal:  2.0-3.0   TTR:  52.1% (11.9 mo)   Target end date:  Indefinite   Send INR reminders to:  RACHEL GRAND ITASCA    Indications    Permanent atrial fibrillation (H) [I48.21]  Anticoagulation monitoring  INR range 2-3 (Resolved) [Z79.01]  Atrial fibrillation with RVR (H) [I48.91]  Anticoagulation monitoring  INR range 2-3 [Z79.01]             Comments:  Babak prefers to be closer to 3.0 d/t previous CVA check Q 4 weeks.Declines to reduce dose when slightly over  3.0  Needs to change PCP  Takes a long time get up to therapeutic after holding warfarin.             Anticoagulation Care Providers       Provider Role Specialty Phone number    Teo Funes MD Referring Family Medicine [phone]

## 2023-11-07 ENCOUNTER — LAB REQUISITION (OUTPATIENT)
Dept: LAB | Facility: OTHER | Age: 63
End: 2023-11-07
Payer: COMMERCIAL

## 2023-11-07 ENCOUNTER — LAB REQUISITION (OUTPATIENT)
Dept: LAB | Facility: OTHER | Age: 63
End: 2023-11-07

## 2023-11-07 ENCOUNTER — NURSING HOME VISIT (OUTPATIENT)
Dept: GERIATRICS | Facility: OTHER | Age: 63
End: 2023-11-07
Payer: COMMERCIAL

## 2023-11-07 DIAGNOSIS — E66.01 MORBID OBESITY WITH BMI OF 40.0-44.9, ADULT (H): ICD-10-CM

## 2023-11-07 DIAGNOSIS — I10 ESSENTIAL HYPERTENSION: ICD-10-CM

## 2023-11-07 DIAGNOSIS — I63.411 CEREBROVASCULAR ACCIDENT (CVA) DUE TO EMBOLISM OF RIGHT MIDDLE CEREBRAL ARTERY (H): ICD-10-CM

## 2023-11-07 DIAGNOSIS — Z87.440 PERSONAL HISTORY OF URINARY (TRACT) INFECTIONS: ICD-10-CM

## 2023-11-07 DIAGNOSIS — G47.33 OBSTRUCTIVE SLEEP APNEA SYNDROME: ICD-10-CM

## 2023-11-07 DIAGNOSIS — G81.94 LEFT HEMIPARESIS (H): ICD-10-CM

## 2023-11-07 DIAGNOSIS — Z79.01 LONG-TERM (CURRENT) USE OF ANTICOAGULANTS, INR GOAL 2.0-3.0: Chronic | ICD-10-CM

## 2023-11-07 DIAGNOSIS — S83.005S PATELLAR DISLOCATION, LEFT, SEQUELA: Primary | ICD-10-CM

## 2023-11-07 DIAGNOSIS — I87.2 CHRONIC VENOUS STASIS DERMATITIS OF LEFT LOWER EXTREMITY: ICD-10-CM

## 2023-11-07 DIAGNOSIS — I48.21 PERMANENT ATRIAL FIBRILLATION (H): ICD-10-CM

## 2023-11-07 PROBLEM — A49.8 INFECTION DUE TO ESBL-PRODUCING ESCHERICHIA COLI: Status: RESOLVED | Noted: 2023-08-05 | Resolved: 2023-11-07

## 2023-11-07 PROBLEM — Z16.12 INFECTION DUE TO ESBL-PRODUCING ESCHERICHIA COLI: Status: RESOLVED | Noted: 2023-08-05 | Resolved: 2023-11-07

## 2023-11-07 LAB
ALBUMIN SERPL BCG-MCNC: 3.7 G/DL (ref 3.5–5.2)
ALBUMIN UR-MCNC: NEGATIVE MG/DL
ALP SERPL-CCNC: 71 U/L (ref 40–129)
ALT SERPL W P-5'-P-CCNC: 15 U/L (ref 0–70)
ANION GAP SERPL CALCULATED.3IONS-SCNC: 11 MMOL/L (ref 7–15)
APPEARANCE UR: CLEAR
AST SERPL W P-5'-P-CCNC: 17 U/L (ref 0–45)
BACTERIA #/AREA URNS HPF: ABNORMAL /HPF
BASOPHILS # BLD AUTO: 0.1 10E3/UL (ref 0–0.2)
BASOPHILS NFR BLD AUTO: 0 %
BILIRUB SERPL-MCNC: 1 MG/DL
BILIRUB UR QL STRIP: NEGATIVE
BUN SERPL-MCNC: 14.6 MG/DL (ref 8–23)
CALCIUM SERPL-MCNC: 9.8 MG/DL (ref 8.8–10.2)
CHLORIDE SERPL-SCNC: 100 MMOL/L (ref 98–107)
COLOR UR AUTO: ABNORMAL
CREAT SERPL-MCNC: 0.67 MG/DL (ref 0.67–1.17)
DEPRECATED HCO3 PLAS-SCNC: 26 MMOL/L (ref 22–29)
EGFRCR SERPLBLD CKD-EPI 2021: >90 ML/MIN/1.73M2
EOSINOPHIL # BLD AUTO: 0 10E3/UL (ref 0–0.7)
EOSINOPHIL NFR BLD AUTO: 0 %
ERYTHROCYTE [DISTWIDTH] IN BLOOD BY AUTOMATED COUNT: 18.3 % (ref 10–15)
FLUAV RNA SPEC QL NAA+PROBE: NEGATIVE
FLUBV RNA RESP QL NAA+PROBE: NEGATIVE
GLUCOSE SERPL-MCNC: 98 MG/DL (ref 70–99)
GLUCOSE UR STRIP-MCNC: NEGATIVE MG/DL
HCT VFR BLD AUTO: 45.3 % (ref 40–53)
HGB BLD-MCNC: 14.3 G/DL (ref 13.3–17.7)
HGB UR QL STRIP: ABNORMAL
IMM GRANULOCYTES # BLD: 0.1 10E3/UL
IMM GRANULOCYTES NFR BLD: 1 %
KETONES UR STRIP-MCNC: NEGATIVE MG/DL
LEUKOCYTE ESTERASE UR QL STRIP: ABNORMAL
LYMPHOCYTES # BLD AUTO: 1.1 10E3/UL (ref 0.8–5.3)
LYMPHOCYTES NFR BLD AUTO: 5 %
MCH RBC QN AUTO: 26.8 PG (ref 26.5–33)
MCHC RBC AUTO-ENTMCNC: 31.6 G/DL (ref 31.5–36.5)
MCV RBC AUTO: 85 FL (ref 78–100)
MONOCYTES # BLD AUTO: 1.9 10E3/UL (ref 0–1.3)
MONOCYTES NFR BLD AUTO: 8 %
NEUTROPHILS # BLD AUTO: 21.1 10E3/UL (ref 1.6–8.3)
NEUTROPHILS NFR BLD AUTO: 86 %
NITRATE UR QL: NEGATIVE
NRBC # BLD AUTO: 0 10E3/UL
NRBC BLD AUTO-RTO: 0 /100
PH UR STRIP: 7 [PH] (ref 5–9)
PLATELET # BLD AUTO: 283 10E3/UL (ref 150–450)
POTASSIUM SERPL-SCNC: 4.2 MMOL/L (ref 3.4–5.3)
PROCALCITONIN SERPL IA-MCNC: <0.02 NG/ML
PROT SERPL-MCNC: 7.5 G/DL (ref 6.4–8.3)
RBC # BLD AUTO: 5.33 10E6/UL (ref 4.4–5.9)
RBC URINE: 5 /HPF
RSV RNA SPEC NAA+PROBE: NEGATIVE
SARS-COV-2 RNA RESP QL NAA+PROBE: NEGATIVE
SODIUM SERPL-SCNC: 137 MMOL/L (ref 135–145)
SP GR UR STRIP: 1.01 (ref 1–1.03)
UROBILINOGEN UR STRIP-MCNC: NORMAL MG/DL
WBC # BLD AUTO: 24.3 10E3/UL (ref 4–11)
WBC URINE: 34 /HPF

## 2023-11-07 PROCEDURE — 87637 SARSCOV2&INF A&B&RSV AMP PRB: CPT | Performed by: NURSE PRACTITIONER

## 2023-11-07 PROCEDURE — 80053 COMPREHEN METABOLIC PANEL: CPT | Performed by: NURSE PRACTITIONER

## 2023-11-07 PROCEDURE — 84145 PROCALCITONIN (PCT): CPT | Performed by: NURSE PRACTITIONER

## 2023-11-07 PROCEDURE — 85025 COMPLETE CBC W/AUTO DIFF WBC: CPT | Performed by: NURSE PRACTITIONER

## 2023-11-07 PROCEDURE — 81001 URINALYSIS AUTO W/SCOPE: CPT | Performed by: NURSE PRACTITIONER

## 2023-11-07 PROCEDURE — 99310 SBSQ NF CARE HIGH MDM 45: CPT | Performed by: NURSE PRACTITIONER

## 2023-11-07 PROCEDURE — 87086 URINE CULTURE/COLONY COUNT: CPT | Performed by: NURSE PRACTITIONER

## 2023-11-07 NOTE — PROGRESS NOTES
[facility]  [address]  [place] [address]  Phone: [phone]  Fax: [phone]  Primary Provider: Ilir Christie  Pre-op Performing Provider: SARA REYNA    PREOPERATIVE EVALUATION:  Today's date: 11/7/2023    Babak is a 63 year old male who presents for a preoperative evaluation.    Surgical Information:  Surgery/Procedure: revision medial reefing and lateral release Left knee  Surgery Location: [facility]  Surgeon: Dr Johnson  Surgery Date: 11/13/23  Time of Surgery: 12:40pm  Where patient plans to recover: At a rehab center [facility]  Fax number for surgical facility: Note does not need to be faxed, will be available electronically in Epic.    Assessment & Plan     The proposed surgical procedure is considered INTERMEDIATE risk. Orthopedic surgery    (S83.005S) Patellar dislocation, left, sequela  (primary encounter diagnosis)  Comment: recurrence of dislocation after surgical repair 8 weeks ago (early Sept 2023)  Plan: surgical repair as above  Check CBC, CMP and EKG today.     (I63.411) Cerebrovascular accident (CVA) due to embolism of right middle cerebral artery (H)  (G81.94) Left hemiparesis (H)  Comment: currently on warfarin  Plan: will need bridging done with lovenox, warfarin would be held 5 days prior to surgery.  After 3 days he will start enoxaparin injections at near therapeutic dosing of 120 mg twice daily. Take for 2 days, hold 1 full day prior to surgery.  Same plan as prior to knee replacement in January 2023 and again in June and Sept 2023.     (G47.33) Obstructive sleep apnea syndrome  Comment: does not wear CPAP   Plan: monitor resp status    (E66.01,  Z68.41) Morbid obesity with BMI of 40.0-44.9, adult (H)  Comment: does put him at additional risks/complications periop/post op  Plan: monitor    (I48.21) Permanent atrial fibrillation (H)  Comment: rate controlled 60-70s  Plan: cont metoprolol succinate 25 mg daily, warfarin. Bridging with  lovenox done as noted above    (I10) Essential hypertension  Comment: 110-130s systolic, occasionally in 140s  Plan: cont hydrochlorothiazide, amlodipine, and metoprolol succinate. HOLD hydrochlorothiazide morning of surgery.     (I87.2) Chronic venous stasis dermatitis of left lower extremity  Comment: chronic, stable  Plan: monitor, increased risk for infection    (Z79.01) Long-term (current) use of anticoagulants, INR goal 2.0-3.0  Comment: chronic  Plan: on warfarin; Bridging with lovenox done as noted above    Covid exposure: 11/6/23 was exposed to covid, Tested NEG today. Does appear flushed today. NO other symptoms. Continue to check covid swabs PRN and 24 hours prior to surgery. Check vitals BID prior to surgery.        RECOMMENDATION:  APPROVAL GIVEN to proceed with proposed procedure, with further diagnostic evaluation to be reviewed when labs and EKG result.      ADDENDUM: WBC on 11/7/23 was 24.3. Unknown source of possible infection. He appeared flushed the morning of 11/7 and felt fatigued but this improved as the day went on. NO fevers. All vitals stable. CXR NEG for any infectious process. NEG for influenza A & B, RSV, Covid swab. No skin infection concerns. UA was looking suspicious and he does have hx of ESBL e. Coli with sepsis back in July. Was treated with oral nitrofurantoin at that time.  Was started on nitrofurantoin 100 mg BID x 5 days on 11/8 to finish this on 11/12. He is feeling well today, just a little tired. Recheck of CBC today showing WBC back to normal 7.7. Hgb 13.9. Sensitivities came back today confirming nitrofurantoin is susceptible and he again had ESBL e. Coli. Will APPROVE surgery for Monday, Nov 13th.     Prescription drug management  75 minutes spent by me on the date of the encounter doing chart review, history and exam, documentation and further activities per the note      Subjective       HPI related to upcoming procedure: 63 year old male residing at [facility] since  June 2023 after right knee replacement, discharged home and re-admitted back to [facility] rehab in Aug after UTI with sepsis, had a fall and subsequently had bilateral patellar dislocation after this. Had bilateral repair done in Sept 2023. Right knee healed well but left knee patellar dislocated again, likely after near accident during ride home in van where had to push hard on the breaks and pt had to brace himself in his wheelchair with left leg to avoid falling out of his chair. Left knee has not fully healed well since this occurred. He is now scheduled next Monday for repair with Dr Johnson.   Pt has hx of stroke and afib. Rate is controlled, on beta blocker. Stroke occurred when pt had taken smaller dose of warfarin than prescribed so pt is higher risk for stroke with stopping warfarin but will bridge him with lovenox like he had done the past couple surgeries and he has done well.    Pt does have pressure injury over left knee due to dislocation of patella and possibly immobilizer brace too tight and hitting knee cap. Monitor skin closely.   Pt went to clinic yesterday for follow up with ortho. Apparently he was exposed to covid by . He does appear flushed today. NO fever. Covid tested at facility and NEG. He does not feel ill, just feels hot which is not unusual but need to keep a close eye on him since he was exposed.             6/16/2023    10:46 AM   Preop Questions   1. Have you ever had a heart attack or stroke? YES - 2017   2. Have you ever had surgery on your heart or blood vessels, such as a stent placement, a coronary artery bypass, or surgery on an artery in your head, neck, heart, or legs? No   3. Do you have chest pain with activity? No   4. Do you have a history of  heart failure? No   5. Do you currently have a cold, bronchitis or symptoms of other infection? No but he was exposed to Covid yesterday 11/6/23, monitoring in place and so far no symptoms and has tested NEG   6. Do  you have a cough, shortness of breath, or wheezing? No   7. Do you or anyone in your family have previous history of blood clots? YES - 2017 stroke   8. Do you or does anyone in your family have a serious bleeding problem such as prolonged bleeding following surgeries or cuts? UNKNOWN -    9. Have you ever had problems with anemia or been told to take iron pills? UNKNOWN -    10. Have you had any abnormal blood loss such as black, tarry or bloody stools? No   11. Have you ever had a blood transfusion? No   12. Are you willing to have a blood transfusion if it is medically needed before, during, or after your surgery? Yes   13. Have you or any of your relatives ever had problems with anesthesia? No   14. Do you have sleep apnea, excessive snoring or daytime drowsiness? Yes-he had sleep study and was told to try a CPAP which he did not tolerate due to claustrophobia and does not want to try again.    15. Do you have any artifical heart valves or other implanted medical devices like a pacemaker, defibrillator, or continuous glucose monitor? No   16. Do you have artificial joints? YES - bilateral knees   17. Are you allergic to latex? No     Health Care Directive:  Patient does not have a Health Care Directive or Living Will: Full code    Preoperative Review of :   reviewed - controlled substances reflected in medication list.      Status of Chronic Conditions:  A-FIB - Patient has a longstanding history of chronic A-fib currently on rate control. Current treatment regimen includes Warfarin for stroke prevention and denies significant symptoms of lightheadedness, palpitations or dyspnea.     HYPERTENSION - Patient has longstanding history of HTN , currently denies any symptoms referable to elevated blood pressure. Specifically denies chest pain, palpitations, dyspnea, orthopnea, PND or peripheral edema. Blood pressure readings ranging 110-130s, with a few in 140s. Current medication regimen is as listed below.  Patient denies any side effects of medication.     Review of Systems  Pt denies feeling short of breath, chest pain, cough, diarrhea, constipation, no urinary concerns, no numbness/tingling in feet or legs. No  pain. Denies headaches, vision changes, chills. Does feel hot today, window open in room. He does commonly feel hot. Appears flushed in cheeks today  Patient Active Problem List    Diagnosis Date Noted    Dislocation of both patellae 09/13/2023     Priority: Medium     Surgical repair by Dr Johnson Bilateral 9/8/23      Infection due to ESBL-producing Escherichia coli 08/05/2023     Priority: Medium    Chronic venous stasis dermatitis of left lower extremity 06/28/2023     Priority: Medium    Status post total right knee replacement 06/26/2023     Priority: Medium    Anticoagulation monitoring, INR range 2-3 06/16/2023     Priority: Medium    Nail dystrophy 02/23/2023     Priority: Medium    Onychomycosis 02/23/2023     Priority: Medium    Aftercare following knee joint replacement surgery, unspecified laterality 01/09/2023     Priority: Medium    Onychogryphosis 08/27/2022     Priority: Medium    Need for vaccination 04/10/2022     Priority: Medium    Obstructive sleep apnea syndrome 05/13/2019     Priority: Medium    Permanent atrial fibrillation (H) 02/11/2018     Priority: Medium    Osteoarthrosis 01/16/2018     Priority: Medium    Morbid obesity with BMI of 40.0-44.9, adult (H) 02/08/2017     Priority: Medium    Cerebrovascular accident (CVA) due to embolism of right middle cerebral artery (H) 02/03/2017     Priority: Medium    Left hemiparesis (H) 02/03/2017     Priority: Medium    Atrial fibrillation with RVR (H) 06/25/2015     Priority: Medium    Long-term (current) use of anticoagulants, INR goal 2.0-3.0 06/25/2015     Priority: Medium    Family history of coronary artery disease 05/19/2014     Priority: Medium    Ascending aorta enlargement (H24) 02/13/2014     Priority: Medium    Gout 01/07/2014      Priority: Medium     Overview:   Overview:   after 3 attacks in < a year, tapped and crystal proven 5/2/13;  Allopurinol started then got ? Atypical side effect, stopped; (short term colchicine prophylaxis)      Overweight 05/02/2013     Priority: Medium     Formatting of this note might be different from the original.  Max weight reported 475 lbs;  5/13 369 lbs      Pain in joint, ankle and foot 05/17/2012     Priority: Medium    Essential hypertension 09/08/2009     Priority: Medium    Cutaneous lupus erythematosus 11/12/2008     Priority: Medium    Systemic lupus erythematosus (H) 09/18/2008     Priority: Medium     Overview:   Dermatitis        Past Medical History:   Diagnosis Date    Atrial fibrillation (H) 02/03/2017    on Warfarin    Bacterial sepsis (H) 8/8/2022    Cellulitis of left lower extremity 9/5/2019    Cerebral infarction (H)     Cerebral infarction due to unspecified occlusion or stenosis of right middle cerebral artery (H) 02/03/2017    ,Left hemiparesis, left neglect, impaired balance and gait following R MCA stroke with mild hemorrhagic transformation s/p tissue plasminogen activator and mechanical thrombectomy, s/p C7 facet fracture from fall off forklift.    Chest pain 02/1997    Disorder of skin or subcutaneous tissue 07/25/2011    Essential (primary) hypertension 02/1997    Fracture of cervical vertebra (H)     02/03/2017,C7 facet fracture from fall off forklift, nondisplaced    Gout     Hemiplegia affecting left nondominant side (H) 02/03/2017    Obesity 02/03/2017    body mass index of 40    Other local lupus erythematosus     Sepsis (H) 9/6/2019     Past Surgical History:   Procedure Laterality Date    ARTHROPLASTY KNEE Left 1/9/2023    Procedure: ARTHROPLASTY, KNEE, TOTAL;  Surgeon: Elliott Johnson MD;  Location:  OR    ARTHROPLASTY KNEE Right 6/26/2023    Procedure: Arthroplasty knee;  Surgeon: Elliott Johnson MD;  Location:  OR    ARTHROSCOPY KNEE      bilateral knees     COLONOSCOPY  2012    Normal; Next due     ORIF WRIST FRACTURE       Current Outpatient Medications   Medication Sig Dispense Refill    acetaminophen (TYLENOL) 325 MG tablet Take 2 tablets (650 mg) by mouth every 4 hours as needed for other (mild pain) 100 tablet 0    amLODIPine (NORVASC) 5 MG tablet Take 2 tablets (10 mg) by mouth daily 90 tablet 4    Emollient (CERAVE) CREA Apply topically 2 times daily      hydrochlorothiazide (HYDRODIURIL) 25 MG tablet Take 1 tablet (25 mg) by mouth daily 90 tablet 4    HYDROcodone-acetaminophen (NORCO) 7.5-325 MG per tablet Take 1 tablet by mouth every 4 hours as needed for severe pain or moderate pain And 2 tablets q 4 hours PRN pain 8-10 severe  0    metoprolol succinate ER (TOPROL XL) 25 MG 24 hr tablet Take 1 tablet (25 mg) by mouth daily      Nutritional Supplement LIQD Take 4 oz by mouth 2 times daily [employer] Supplement two times daily with meals      saccharomyces boulardii (FLORASTOR) 250 MG capsule Take 1 capsule (250 mg) by mouth daily      senna-docusate (SENOKOT-S/PERICOLACE) 8.6-50 MG tablet Take 1 tablet by mouth daily AND 1 tab daily PRN      warfarin ANTICOAGULANT (COUMADIN) 5 MG tablet 7.5 mg every day or as directed by the [facility] clinic 160 tablet 1       Allergies   Allergen Reactions    Diatrizoate Rash    Ioxaglate Other (See Comments)     Does not recall    Levofloxacin Hives and Rash    Metrizamide Rash     (Diagnostic X-Ray materials)    Probenecid Rash        Social History     Tobacco Use    Smoking status: Never     Passive exposure: Yes    Smokeless tobacco: Never   Substance Use Topics    Alcohol use: Not Currently     Comment: couple of times per year     Family History   Problem Relation Age of Onset    Unknown/Adopted Paternal Grandfather         Unknown, around age 67.    Other - See Comments Father         Parkinson's disease Alzheimer's    Cancer Father         Cancer    Heart Disease Maternal Grandmother         Heart  Disease,MI in her 60's.    Heart Disease Mother 60        Heart Disease,MI    Other - See Comments Mother         rheumatoid arthritis.    Heart Disease Maternal Uncle 69        Heart Disease    Hypertension Sister         Hypertension    Cancer Sister         Cancer,melanoma    Heart Disease Paternal Uncle         Heart Disease, around 69.     History   Drug Use Unknown         Objective     There were no vitals taken for this visit.    Physical Exam    GENERAL APPEARANCE: healthy, alert and no distress. Pt does appear flushed today and feels warm     EYES: EOMI,  PERRL     NECK: no adenopathy     RESP: lungs clear with faint exp wheezing in left lower lobe to auscultation - no rales, rhonchi      CV: irregular rates and rhythm, rate controlled     ABDOMEN:  soft, nontender, bowel sounds normal     MS: extremities with chronic venous stasis dermatitis on left, left knee with blanchable dark red area. Knee immobilizer in place. Small open area above knee with healing blister. Left knee cap is laterally displaced     SKIN: no suspicious lesions or rashes     NEURO: Normal strength and tone, sensory exam grossly normal, mentation intact and speech normal     PSYCH: mentation appears normal. and affect normal/bright     LYMPHATICS: No cervical adenopathy    Recent Labs   Lab Test 23  1525 10/23/23  1317 23  1417 23  1249 23  1133 23  1227 08/10/22  0647 22  0434   HGB  --   --   --  11.3*  --  13.3   < > 13.5   PLT  --   --   --  445  --  692*   < > 231   INR 2.3* 3.5*   < >  --    < >  --    < > 2.89*   NA  --   --   --  136  --  137   < > 136   POTASSIUM  --   --   --  4.3  --  4.5   < > 3.7   CR  --   --   --  0.57*  --  0.70   < > 0.80   A1C  --   --   --   --   --   --   --  5.8    < > = values in this interval not displayed.        Diagnostics:  Labs pending at this time.  Results will be reviewed when available. Will check CBC, CMP today.   EKG required for afib and not  "completed in the last 90 days.  EKG done confirming Afib at controlled rate.     Lab Results   Component Value Date    WBC 7.7 11/09/2023    WBC 7.1 09/29/2020     Lab Results   Component Value Date    RBC 5.14 11/09/2023    RBC 5.49 09/29/2020     Lab Results   Component Value Date    HGB 13.9 11/09/2023    HGB 14.5 09/29/2020     Lab Results   Component Value Date    HCT 43.3 11/09/2023    HCT 47.3 09/29/2020     No components found for: \"MCT\"  Lab Results   Component Value Date    MCV 84 11/09/2023    MCV 86 09/29/2020     Lab Results   Component Value Date    MCH 27.0 11/09/2023    MCH 26.4 09/29/2020     Lab Results   Component Value Date    MCHC 32.1 11/09/2023    MCHC 30.7 09/29/2020     Lab Results   Component Value Date    RDW 17.9 11/09/2023    RDW 16.1 09/29/2020     Lab Results   Component Value Date     11/09/2023     09/29/2020         Revised Cardiac Risk Index (RCRI):  The patient has the following serious cardiovascular risks for perioperative complications:   - Cerebrovascular Disease (TIA or CVA) = 1 point     RCRI Interpretation: 1 point: Class II (low risk - 0.9% complication rate)         Signed Electronically by: JAYLEN Riley CNP  Copy of this evaluation report is provided to requesting physician.         "

## 2023-11-08 ENCOUNTER — HOSPITAL ENCOUNTER (OUTPATIENT)
Dept: GENERAL RADIOLOGY | Facility: OTHER | Age: 63
Discharge: HOME | End: 2023-11-08
Attending: NURSE PRACTITIONER | Admitting: NURSE PRACTITIONER
Payer: COMMERCIAL

## 2023-11-08 ENCOUNTER — NURSING HOME VISIT (OUTPATIENT)
Dept: GERIATRICS | Facility: OTHER | Age: 63
End: 2023-11-08
Payer: COMMERCIAL

## 2023-11-08 DIAGNOSIS — D72.820 LYMPHOCYTOSIS: Primary | ICD-10-CM

## 2023-11-08 DIAGNOSIS — N30.00 ACUTE CYSTITIS WITHOUT HEMATURIA: ICD-10-CM

## 2023-11-08 DIAGNOSIS — R53.83 FATIGUE, UNSPECIFIED TYPE: ICD-10-CM

## 2023-11-08 PROCEDURE — 99309 SBSQ NF CARE MODERATE MDM 30: CPT | Performed by: NURSE PRACTITIONER

## 2023-11-08 PROCEDURE — 71046 X-RAY EXAM CHEST 2 VIEWS: CPT

## 2023-11-08 RX ORDER — NITROFURANTOIN 25; 75 MG/1; MG/1
100 CAPSULE ORAL 2 TIMES DAILY
COMMUNITY
Start: 2023-11-08 | End: 2023-12-03

## 2023-11-08 NOTE — PROGRESS NOTES
[facility]  Acute Care Visit    Patient Name: Babak Moran   : 1960  MRN: [id number]    Place of Service: [facility]  DOS: 2023    CC: follow up, Pre-op    HPI:  Babak Moran is a 63 year old male with PMH of multiple chronic medical concerns, who is seen today regarding review of all testing done thus far to clear for surgery on 23. Pt had elevated WBC 25 and yesterday he reports feeling fatigued and appeared flushed. He had no documented fever, other vitals stable. Influenza A and B, RSV, and covid NEG, CXR clear. No resp symptoms. UA/UC did come back over 100,000 gram neg bacilli. He does not have urinary symptoms but due to upcoming surgery opted to treat him with macrobid 100 mg BID (2 doses today) x 5 days. Watch for sensitivities to ensure on correct antibiotic and recheck CBC tomorrow morning. If WBC trending down or normal, will then clear him for surgery.     Multidisciplinary notes, laboratory values, medications, vital signs, weight and orders arereviewed from nursing home records. I have reviewed the patient s medical history and updated the computerized patient record.     PMH:  Past Medical History:   Diagnosis Date    Atrial fibrillation (H) 2017    on Warfarin    Bacterial sepsis (H) 2022    Cellulitis of left lower extremity 2019    Cerebral infarction (H)     Cerebral infarction due to unspecified occlusion or stenosis of right middle cerebral artery (H) 2017    ,Left hemiparesis, left neglect, impaired balance and gait following R MCA stroke with mild hemorrhagic transformation s/p tissue plasminogen activator and mechanical thrombectomy, s/p C7 facet fracture from fall off forklift.    Chest pain 1997    Disorder of skin or subcutaneous tissue 2011    Essential (primary) hypertension 1997    Fracture of cervical vertebra (H)     2017,C7 facet fracture from fall off forklift, nondisplaced    Gout      Hemiplegia affecting left nondominant side (H) 02/03/2017    Obesity 02/03/2017    body mass index of 40    Other local lupus erythematosus     Sepsis (H) 9/6/2019       Medications:  Current Outpatient Medications   Medication Sig Dispense Refill    nitroFURantoin macrocrystal-monohydrate (MACROBID) 100 MG capsule Take 1 capsule (100 mg) by mouth 2 times daily X 5 days      acetaminophen (TYLENOL) 325 MG tablet Take 2 tablets (650 mg) by mouth every 4 hours as needed for other (mild pain) 100 tablet 0    amLODIPine (NORVASC) 5 MG tablet Take 2 tablets (10 mg) by mouth daily 90 tablet 4    Emollient (CERAVE) CREA Apply topically 2 times daily      hydrochlorothiazide (HYDRODIURIL) 25 MG tablet Take 1 tablet (25 mg) by mouth daily 90 tablet 4    HYDROcodone-acetaminophen (NORCO) 7.5-325 MG per tablet Take 1 tablet by mouth every 4 hours as needed for severe pain or moderate pain And 2 tablets q 4 hours PRN pain 8-10 severe  0    metoprolol succinate ER (TOPROL XL) 25 MG 24 hr tablet Take 1 tablet (25 mg) by mouth daily      Nutritional Supplement LIQD Take 4 oz by mouth 2 times daily ECUHouse Supplement two times daily with meals      saccharomyces boulardii (FLORASTOR) 250 MG capsule Take 1 capsule (250 mg) by mouth daily      senna-docusate (SENOKOT-S/PERICOLACE) 8.6-50 MG tablet Take 1 tablet by mouth daily AND 1 tab daily PRN      warfarin ANTICOAGULANT (COUMADIN) 5 MG tablet 7.5 mg every day or as directed by the [facility] clinic 160 tablet 1      Medication reconciliation complete between Epic record and [facility].     Allergies:  Allergies   Allergen Reactions    Diatrizoate Rash    Ioxaglate Other (See Comments)     Does not recall    Levofloxacin Hives and Rash    Metrizamide Rash     (Diagnostic X-Ray materials)    Probenecid Rash       Review of Systems:  See HPI    Vital Signs:  Temp 98.3   Pulse 65   Respirations 16   /72   Oxygen Sats 96%   Weight 321#    Physical Exam:     Pleasant and alert without  "distress.    Sclera nonicteric, conjunctiva non-inflamed.  Skin color pink. Mucous membranes moist.   Abdomen soft and without tenderness   Extremities without edema. Chronic discoloration to lower extremities. Immobilizer on left leg    Able to move upper and lower extremities       New Labs/Diagnostics:  Lab Results   Component Value Date    WBC 24.3 11/07/2023    WBC 7.1 09/29/2020     Lab Results   Component Value Date    RBC 5.33 11/07/2023    RBC 5.49 09/29/2020     Lab Results   Component Value Date    HGB 14.3 11/07/2023    HGB 14.5 09/29/2020     Lab Results   Component Value Date    HCT 45.3 11/07/2023    HCT 47.3 09/29/2020     No components found for: \"MCT\"  Lab Results   Component Value Date    MCV 85 11/07/2023    MCV 86 09/29/2020     Lab Results   Component Value Date    MCH 26.8 11/07/2023    MCH 26.4 09/29/2020     Lab Results   Component Value Date    MCHC 31.6 11/07/2023    MCHC 30.7 09/29/2020     Lab Results   Component Value Date    RDW 18.3 11/07/2023    RDW 16.1 09/29/2020     Lab Results   Component Value Date     11/07/2023     09/29/2020     Last Comprehensive Metabolic Panel:  Lab Results   Component Value Date     11/07/2023    POTASSIUM 4.2 11/07/2023    CHLORIDE 100 11/07/2023    CO2 26 11/07/2023    ANIONGAP 11 11/07/2023    GLC 98 11/07/2023    BUN 14.6 11/07/2023    CR 0.67 11/07/2023    GFRESTIMATED >90 11/07/2023    VIRGEN 9.8 11/07/2023       PROCEDURE:  XR CHEST 2 VIEWS     HISTORY:  pre-op for surgery Monday; but elevated WBC--ruling out  source of infection; Lymphocytosis.      COMPARISON:  7/31/2023     FINDINGS:   The cardiac silhouette is normal in size. The pulmonary vasculature is  normal.  The lungs are clear. No pleural effusion or pneumothorax. Old  Left-sided rib fractures are noted                                                                      IMPRESSION:  No acute cardiopulmonary disease.       HÉCTOR BILLY MD    "     Assessment/Plan:  (D72.820) Lymphocytosis  (primary encounter diagnosis)  (R53.83) Fatigue, unspecified type  (N30.00) Acute cystitis without hematuria  Comment: noted on day of pre-op assessment he felt fatigued and appeared flushed (11/7/23); he has felt much better since, no fevers.   Plan: nitroFURantoin macrocrystal-monohydrate         (MACROBID) 100 MG capsule BID        X 5 days for UTI. Concern for WBC 25, ruling out infection prior to surgery on Monday 11/13/23. CXR clear today. Influenza A & B, RSV, and covid swab NEG. UA/UC did come back growing over 100,000 gram neg bacilli. Will start macrobid and check sensitivities which should come back tomorrow to ensure on correct antibiotic. Will recheck CBC and if WBC improving will clear him for surgery.        A total of 32 minutes was spent with this patient, of which more than 50% was spent in counseling and/or coordination of care.     JAYLEN Riley, CNP ....................  11/8/2023   2:30 PM

## 2023-11-08 NOTE — H&P (VIEW-ONLY)
[facility]  Acute Care Visit    Patient Name: Babak Moran   : 1960  MRN: [id number]    Place of Service: [facility]  DOS: 2023    CC: follow up, Pre-op    HPI:  Babak Moran is a 63 year old male with PMH of multiple chronic medical concerns, who is seen today regarding review of all testing done thus far to clear for surgery on 23. Pt had elevated WBC 25 and yesterday he reports feeling fatigued and appeared flushed. He had no documented fever, other vitals stable. Influenza A and B, RSV, and covid NEG, CXR clear. No resp symptoms. UA/UC did come back over 100,000 gram neg bacilli. He does not have urinary symptoms but due to upcoming surgery opted to treat him with macrobid 100 mg BID (2 doses today) x 5 days. Watch for sensitivities to ensure on correct antibiotic and recheck CBC tomorrow morning. If WBC trending down or normal, will then clear him for surgery.     Multidisciplinary notes, laboratory values, medications, vital signs, weight and orders arereviewed from nursing home records. I have reviewed the patient s medical history and updated the computerized patient record.     PMH:  Past Medical History:   Diagnosis Date    Atrial fibrillation (H) 2017    on Warfarin    Bacterial sepsis (H) 2022    Cellulitis of left lower extremity 2019    Cerebral infarction (H)     Cerebral infarction due to unspecified occlusion or stenosis of right middle cerebral artery (H) 2017    ,Left hemiparesis, left neglect, impaired balance and gait following R MCA stroke with mild hemorrhagic transformation s/p tissue plasminogen activator and mechanical thrombectomy, s/p C7 facet fracture from fall off forklift.    Chest pain 1997    Disorder of skin or subcutaneous tissue 2011    Essential (primary) hypertension 1997    Fracture of cervical vertebra (H)     2017,C7 facet fracture from fall off forklift, nondisplaced    Gout      Hemiplegia affecting left nondominant side (H) 02/03/2017    Obesity 02/03/2017    body mass index of 40    Other local lupus erythematosus     Sepsis (H) 9/6/2019       Medications:  Current Outpatient Medications   Medication Sig Dispense Refill    nitroFURantoin macrocrystal-monohydrate (MACROBID) 100 MG capsule Take 1 capsule (100 mg) by mouth 2 times daily X 5 days      acetaminophen (TYLENOL) 325 MG tablet Take 2 tablets (650 mg) by mouth every 4 hours as needed for other (mild pain) 100 tablet 0    amLODIPine (NORVASC) 5 MG tablet Take 2 tablets (10 mg) by mouth daily 90 tablet 4    Emollient (CERAVE) CREA Apply topically 2 times daily      hydrochlorothiazide (HYDRODIURIL) 25 MG tablet Take 1 tablet (25 mg) by mouth daily 90 tablet 4    HYDROcodone-acetaminophen (NORCO) 7.5-325 MG per tablet Take 1 tablet by mouth every 4 hours as needed for severe pain or moderate pain And 2 tablets q 4 hours PRN pain 8-10 severe  0    metoprolol succinate ER (TOPROL XL) 25 MG 24 hr tablet Take 1 tablet (25 mg) by mouth daily      Nutritional Supplement LIQD Take 4 oz by mouth 2 times daily ECUHouse Supplement two times daily with meals      saccharomyces boulardii (FLORASTOR) 250 MG capsule Take 1 capsule (250 mg) by mouth daily      senna-docusate (SENOKOT-S/PERICOLACE) 8.6-50 MG tablet Take 1 tablet by mouth daily AND 1 tab daily PRN      warfarin ANTICOAGULANT (COUMADIN) 5 MG tablet 7.5 mg every day or as directed by the [facility] clinic 160 tablet 1      Medication reconciliation complete between Epic record and [facility].     Allergies:  Allergies   Allergen Reactions    Diatrizoate Rash    Ioxaglate Other (See Comments)     Does not recall    Levofloxacin Hives and Rash    Metrizamide Rash     (Diagnostic X-Ray materials)    Probenecid Rash       Review of Systems:  See HPI    Vital Signs:  Temp 98.3   Pulse 65   Respirations 16   /72   Oxygen Sats 96%   Weight 321#    Physical Exam:     Pleasant and alert without  "distress.    Sclera nonicteric, conjunctiva non-inflamed.  Skin color pink. Mucous membranes moist.   Abdomen soft and without tenderness   Extremities without edema. Chronic discoloration to lower extremities. Immobilizer on left leg    Able to move upper and lower extremities       New Labs/Diagnostics:  Lab Results   Component Value Date    WBC 24.3 11/07/2023    WBC 7.1 09/29/2020     Lab Results   Component Value Date    RBC 5.33 11/07/2023    RBC 5.49 09/29/2020     Lab Results   Component Value Date    HGB 14.3 11/07/2023    HGB 14.5 09/29/2020     Lab Results   Component Value Date    HCT 45.3 11/07/2023    HCT 47.3 09/29/2020     No components found for: \"MCT\"  Lab Results   Component Value Date    MCV 85 11/07/2023    MCV 86 09/29/2020     Lab Results   Component Value Date    MCH 26.8 11/07/2023    MCH 26.4 09/29/2020     Lab Results   Component Value Date    MCHC 31.6 11/07/2023    MCHC 30.7 09/29/2020     Lab Results   Component Value Date    RDW 18.3 11/07/2023    RDW 16.1 09/29/2020     Lab Results   Component Value Date     11/07/2023     09/29/2020     Last Comprehensive Metabolic Panel:  Lab Results   Component Value Date     11/07/2023    POTASSIUM 4.2 11/07/2023    CHLORIDE 100 11/07/2023    CO2 26 11/07/2023    ANIONGAP 11 11/07/2023    GLC 98 11/07/2023    BUN 14.6 11/07/2023    CR 0.67 11/07/2023    GFRESTIMATED >90 11/07/2023    VIRGEN 9.8 11/07/2023       PROCEDURE:  XR CHEST 2 VIEWS     HISTORY:  pre-op for surgery Monday; but elevated WBC--ruling out  source of infection; Lymphocytosis.      COMPARISON:  7/31/2023     FINDINGS:   The cardiac silhouette is normal in size. The pulmonary vasculature is  normal.  The lungs are clear. No pleural effusion or pneumothorax. Old  Left-sided rib fractures are noted                                                                      IMPRESSION:  No acute cardiopulmonary disease.       HÉCTOR BILLY MD    "     Assessment/Plan:  (D72.820) Lymphocytosis  (primary encounter diagnosis)  (R53.83) Fatigue, unspecified type  (N30.00) Acute cystitis without hematuria  Comment: noted on day of pre-op assessment he felt fatigued and appeared flushed (11/7/23); he has felt much better since, no fevers.   Plan: nitroFURantoin macrocrystal-monohydrate         (MACROBID) 100 MG capsule BID        X 5 days for UTI. Concern for WBC 25, ruling out infection prior to surgery on Monday 11/13/23. CXR clear today. Influenza A & B, RSV, and covid swab NEG. UA/UC did come back growing over 100,000 gram neg bacilli. Will start macrobid and check sensitivities which should come back tomorrow to ensure on correct antibiotic. Will recheck CBC and if WBC improving will clear him for surgery.        A total of 32 minutes was spent with this patient, of which more than 50% was spent in counseling and/or coordination of care.     JAYLEN Riley, CNP ....................  11/8/2023   2:30 PM

## 2023-11-09 ENCOUNTER — LAB REQUISITION (OUTPATIENT)
Dept: LAB | Facility: OTHER | Age: 63
End: 2023-11-09
Payer: COMMERCIAL

## 2023-11-09 LAB
BACTERIA UR CULT: ABNORMAL
BASOPHILS # BLD AUTO: 0.1 10E3/UL (ref 0–0.2)
BASOPHILS NFR BLD AUTO: 1 %
EOSINOPHIL # BLD AUTO: 0 10E3/UL (ref 0–0.7)
EOSINOPHIL NFR BLD AUTO: 0 %
ERYTHROCYTE [DISTWIDTH] IN BLOOD BY AUTOMATED COUNT: 17.9 % (ref 10–15)
HCT VFR BLD AUTO: 43.3 % (ref 40–53)
HGB BLD-MCNC: 13.9 G/DL (ref 13.3–17.7)
IMM GRANULOCYTES # BLD: 0 10E3/UL
IMM GRANULOCYTES NFR BLD: 0 %
LYMPHOCYTES # BLD AUTO: 0.6 10E3/UL (ref 0.8–5.3)
LYMPHOCYTES NFR BLD AUTO: 8 %
MCH RBC QN AUTO: 27 PG (ref 26.5–33)
MCHC RBC AUTO-ENTMCNC: 32.1 G/DL (ref 31.5–36.5)
MCV RBC AUTO: 84 FL (ref 78–100)
MONOCYTES # BLD AUTO: 0.7 10E3/UL (ref 0–1.3)
MONOCYTES NFR BLD AUTO: 9 %
NEUTROPHILS # BLD AUTO: 6.3 10E3/UL (ref 1.6–8.3)
NEUTROPHILS NFR BLD AUTO: 82 %
NRBC # BLD AUTO: 0 10E3/UL
NRBC BLD AUTO-RTO: 0 /100
PLATELET # BLD AUTO: 236 10E3/UL (ref 150–450)
RBC # BLD AUTO: 5.14 10E6/UL (ref 4.4–5.9)
WBC # BLD AUTO: 7.7 10E3/UL (ref 4–11)

## 2023-11-09 PROCEDURE — 85025 COMPLETE CBC W/AUTO DIFF WBC: CPT | Performed by: NURSE PRACTITIONER

## 2023-11-10 ENCOUNTER — ANESTHESIA EVENT (OUTPATIENT)
Dept: SURGERY | Facility: OTHER | Age: 63
End: 2023-11-10
Payer: COMMERCIAL

## 2023-11-13 ENCOUNTER — ANESTHESIA (OUTPATIENT)
Dept: SURGERY | Facility: OTHER | Age: 63
End: 2023-11-13
Payer: COMMERCIAL

## 2023-11-13 ENCOUNTER — DOCUMENTATION ONLY (OUTPATIENT)
Dept: FAMILY MEDICINE | Facility: OTHER | Age: 63
End: 2023-11-13

## 2023-11-13 ENCOUNTER — HOSPITAL ENCOUNTER (OUTPATIENT)
Facility: OTHER | Age: 63
Discharge: HOME | End: 2023-11-13
Attending: ORTHOPAEDIC SURGERY | Admitting: ORTHOPAEDIC SURGERY
Payer: COMMERCIAL

## 2023-11-13 VITALS
WEIGHT: 314 LBS | OXYGEN SATURATION: 93 % | BODY MASS INDEX: 39.04 KG/M2 | RESPIRATION RATE: 18 BRPM | TEMPERATURE: 96.7 F | SYSTOLIC BLOOD PRESSURE: 142 MMHG | DIASTOLIC BLOOD PRESSURE: 86 MMHG | HEIGHT: 75 IN | HEART RATE: 69 BPM

## 2023-11-13 DIAGNOSIS — S83.005S PATELLAR DISLOCATION, LEFT, SEQUELA: Primary | ICD-10-CM

## 2023-11-13 LAB
HOLD SPECIMEN: NORMAL
HOLD SPECIMEN: NORMAL
INR PPP: 1.1 (ref 0.85–1.15)

## 2023-11-13 PROCEDURE — 250N000011 HC RX IP 250 OP 636: Performed by: ORTHOPAEDIC SURGERY

## 2023-11-13 PROCEDURE — 250N000011 HC RX IP 250 OP 636: Performed by: NURSE ANESTHETIST, CERTIFIED REGISTERED

## 2023-11-13 PROCEDURE — 258N000003 HC RX IP 258 OP 636: Performed by: NURSE ANESTHETIST, CERTIFIED REGISTERED

## 2023-11-13 PROCEDURE — 360N000076 HC SURGERY LEVEL 3, PER MIN: Performed by: ORTHOPAEDIC SURGERY

## 2023-11-13 PROCEDURE — 710N000010 HC RECOVERY PHASE 1, LEVEL 2, PER MIN: Performed by: ORTHOPAEDIC SURGERY

## 2023-11-13 PROCEDURE — 36415 COLL VENOUS BLD VENIPUNCTURE: CPT | Performed by: NURSE ANESTHETIST, CERTIFIED REGISTERED

## 2023-11-13 PROCEDURE — 250N000025 HC SEVOFLURANE, PER MIN: Performed by: ORTHOPAEDIC SURGERY

## 2023-11-13 PROCEDURE — 370N000017 HC ANESTHESIA TECHNICAL FEE, PER MIN: Performed by: ORTHOPAEDIC SURGERY

## 2023-11-13 PROCEDURE — 272N000001 HC OR GENERAL SUPPLY STERILE: Performed by: ORTHOPAEDIC SURGERY

## 2023-11-13 PROCEDURE — 710N000012 HC RECOVERY PHASE 2, PER MINUTE: Performed by: ORTHOPAEDIC SURGERY

## 2023-11-13 PROCEDURE — 64447 NJX AA&/STRD FEMORAL NRV IMG: CPT | Mod: LT | Performed by: NURSE ANESTHETIST, CERTIFIED REGISTERED

## 2023-11-13 PROCEDURE — 999N000141 HC STATISTIC PRE-PROCEDURE NURSING ASSESSMENT: Performed by: ORTHOPAEDIC SURGERY

## 2023-11-13 PROCEDURE — 27425 LAT RETINACULAR RELEASE OPEN: CPT | Mod: LT | Performed by: ORTHOPAEDIC SURGERY

## 2023-11-13 PROCEDURE — 27425 LAT RETINACULAR RELEASE OPEN: CPT | Performed by: NURSE ANESTHETIST, CERTIFIED REGISTERED

## 2023-11-13 PROCEDURE — 85610 PROTHROMBIN TIME: CPT | Performed by: NURSE ANESTHETIST, CERTIFIED REGISTERED

## 2023-11-13 PROCEDURE — 250N000009 HC RX 250: Performed by: NURSE ANESTHETIST, CERTIFIED REGISTERED

## 2023-11-13 RX ORDER — ONDANSETRON 2 MG/ML
INJECTION INTRAMUSCULAR; INTRAVENOUS PRN
Status: DISCONTINUED | OUTPATIENT
Start: 2023-11-13 | End: 2023-11-13

## 2023-11-13 RX ORDER — FENTANYL CITRATE 50 UG/ML
50 INJECTION, SOLUTION INTRAMUSCULAR; INTRAVENOUS EVERY 5 MIN PRN
Status: DISCONTINUED | OUTPATIENT
Start: 2023-11-13 | End: 2023-11-13 | Stop reason: HOSPADM

## 2023-11-13 RX ORDER — SODIUM CHLORIDE, SODIUM LACTATE, POTASSIUM CHLORIDE, CALCIUM CHLORIDE 600; 310; 30; 20 MG/100ML; MG/100ML; MG/100ML; MG/100ML
INJECTION, SOLUTION INTRAVENOUS CONTINUOUS
Status: DISCONTINUED | OUTPATIENT
Start: 2023-11-13 | End: 2023-11-13 | Stop reason: HOSPADM

## 2023-11-13 RX ORDER — FENTANYL CITRATE 50 UG/ML
25 INJECTION, SOLUTION INTRAMUSCULAR; INTRAVENOUS EVERY 5 MIN PRN
Status: DISCONTINUED | OUTPATIENT
Start: 2023-11-13 | End: 2023-11-13 | Stop reason: HOSPADM

## 2023-11-13 RX ORDER — ONDANSETRON 4 MG/1
4 TABLET, ORALLY DISINTEGRATING ORAL EVERY 30 MIN PRN
Status: DISCONTINUED | OUTPATIENT
Start: 2023-11-13 | End: 2023-11-13 | Stop reason: HOSPADM

## 2023-11-13 RX ORDER — GLYCINE 1.5 G/100ML
SOLUTION IRRIGATION PRN
Status: DISCONTINUED | OUTPATIENT
Start: 2023-11-13 | End: 2023-11-13 | Stop reason: HOSPADM

## 2023-11-13 RX ORDER — LIDOCAINE HYDROCHLORIDE 20 MG/ML
INJECTION, SOLUTION INFILTRATION; PERINEURAL PRN
Status: DISCONTINUED | OUTPATIENT
Start: 2023-11-13 | End: 2023-11-13

## 2023-11-13 RX ORDER — NALOXONE HYDROCHLORIDE 0.4 MG/ML
0.2 INJECTION, SOLUTION INTRAMUSCULAR; INTRAVENOUS; SUBCUTANEOUS
Status: DISCONTINUED | OUTPATIENT
Start: 2023-11-13 | End: 2023-11-13 | Stop reason: HOSPADM

## 2023-11-13 RX ORDER — HYDROCODONE BITARTRATE AND ACETAMINOPHEN 5; 325 MG/1; MG/1
2 TABLET ORAL
Status: DISCONTINUED | OUTPATIENT
Start: 2023-11-13 | End: 2023-11-13 | Stop reason: HOSPADM

## 2023-11-13 RX ORDER — PROPOFOL 10 MG/ML
INJECTION, EMULSION INTRAVENOUS PRN
Status: DISCONTINUED | OUTPATIENT
Start: 2023-11-13 | End: 2023-11-13

## 2023-11-13 RX ORDER — DEXAMETHASONE SODIUM PHOSPHATE 4 MG/ML
INJECTION, SOLUTION INTRA-ARTICULAR; INTRALESIONAL; INTRAMUSCULAR; INTRAVENOUS; SOFT TISSUE PRN
Status: DISCONTINUED | OUTPATIENT
Start: 2023-11-13 | End: 2023-11-13

## 2023-11-13 RX ORDER — OXYCODONE HYDROCHLORIDE 5 MG/1
5 TABLET ORAL
Status: DISCONTINUED | OUTPATIENT
Start: 2023-11-13 | End: 2023-11-13 | Stop reason: HOSPADM

## 2023-11-13 RX ORDER — HYDROMORPHONE HCL IN WATER/PF 6 MG/30 ML
0.5 PATIENT CONTROLLED ANALGESIA SYRINGE INTRAVENOUS EVERY 5 MIN PRN
Status: DISCONTINUED | OUTPATIENT
Start: 2023-11-13 | End: 2023-11-13 | Stop reason: HOSPADM

## 2023-11-13 RX ORDER — OXYCODONE HYDROCHLORIDE 5 MG/1
10 TABLET ORAL
Status: DISCONTINUED | OUTPATIENT
Start: 2023-11-13 | End: 2023-11-13 | Stop reason: HOSPADM

## 2023-11-13 RX ORDER — NALOXONE HYDROCHLORIDE 0.4 MG/ML
0.4 INJECTION, SOLUTION INTRAMUSCULAR; INTRAVENOUS; SUBCUTANEOUS
Status: DISCONTINUED | OUTPATIENT
Start: 2023-11-13 | End: 2023-11-13 | Stop reason: HOSPADM

## 2023-11-13 RX ORDER — ENOXAPARIN SODIUM 150 MG/ML
120 INJECTION SUBCUTANEOUS EVERY 12 HOURS
COMMUNITY
Start: 2023-11-07 | End: 2023-12-13

## 2023-11-13 RX ORDER — LIDOCAINE 40 MG/G
CREAM TOPICAL
Status: DISCONTINUED | OUTPATIENT
Start: 2023-11-13 | End: 2023-11-13 | Stop reason: HOSPADM

## 2023-11-13 RX ORDER — ONDANSETRON 2 MG/ML
4 INJECTION INTRAMUSCULAR; INTRAVENOUS EVERY 30 MIN PRN
Status: DISCONTINUED | OUTPATIENT
Start: 2023-11-13 | End: 2023-11-13 | Stop reason: HOSPADM

## 2023-11-13 RX ORDER — HYDROMORPHONE HCL IN WATER/PF 6 MG/30 ML
0.2 PATIENT CONTROLLED ANALGESIA SYRINGE INTRAVENOUS EVERY 5 MIN PRN
Status: DISCONTINUED | OUTPATIENT
Start: 2023-11-13 | End: 2023-11-13 | Stop reason: HOSPADM

## 2023-11-13 RX ORDER — DEXAMETHASONE SODIUM PHOSPHATE 10 MG/ML
INJECTION, SOLUTION INTRAMUSCULAR; INTRAVENOUS PRN
Status: DISCONTINUED | OUTPATIENT
Start: 2023-11-13 | End: 2023-11-13

## 2023-11-13 RX ORDER — BUPIVACAINE HYDROCHLORIDE 5 MG/ML
INJECTION, SOLUTION EPIDURAL; INTRACAUDAL PRN
Status: DISCONTINUED | OUTPATIENT
Start: 2023-11-13 | End: 2023-11-13

## 2023-11-13 RX ORDER — CEFAZOLIN SODIUM/WATER 3 G/30 ML
3 SYRINGE (ML) INTRAVENOUS
Status: COMPLETED | OUTPATIENT
Start: 2023-11-13 | End: 2023-11-13

## 2023-11-13 RX ORDER — CEFAZOLIN SODIUM/WATER 3 G/30 ML
3 SYRINGE (ML) INTRAVENOUS SEE ADMIN INSTRUCTIONS
Status: DISCONTINUED | OUTPATIENT
Start: 2023-11-13 | End: 2023-11-13 | Stop reason: HOSPADM

## 2023-11-13 RX ORDER — HYDROCODONE BITARTRATE AND ACETAMINOPHEN 5; 325 MG/1; MG/1
1-2 TABLET ORAL EVERY 4 HOURS PRN
Qty: 30 TABLET | Refills: 0 | Status: SHIPPED | OUTPATIENT
Start: 2023-11-13 | End: 2024-02-08

## 2023-11-13 RX ADMIN — ONDANSETRON HYDROCHLORIDE 4 MG: 2 SOLUTION INTRAMUSCULAR; INTRAVENOUS at 13:32

## 2023-11-13 RX ADMIN — SODIUM CHLORIDE, POTASSIUM CHLORIDE, SODIUM LACTATE AND CALCIUM CHLORIDE 100 ML/HR: 600; 310; 30; 20 INJECTION, SOLUTION INTRAVENOUS at 11:40

## 2023-11-13 RX ADMIN — LIDOCAINE HYDROCHLORIDE 40 MG: 20 INJECTION, SOLUTION INFILTRATION; PERINEURAL at 12:46

## 2023-11-13 RX ADMIN — DEXAMETHASONE SODIUM PHOSPHATE 10 MG: 10 INJECTION, SOLUTION INTRAMUSCULAR; INTRAVENOUS at 12:19

## 2023-11-13 RX ADMIN — DEXAMETHASONE SODIUM PHOSPHATE 4 MG: 4 INJECTION, SOLUTION INTRA-ARTICULAR; INTRALESIONAL; INTRAMUSCULAR; INTRAVENOUS; SOFT TISSUE at 13:32

## 2023-11-13 RX ADMIN — MIDAZOLAM 2 MG: 1 INJECTION INTRAMUSCULAR; INTRAVENOUS at 12:03

## 2023-11-13 RX ADMIN — PROPOFOL 200 MG: 10 INJECTION, EMULSION INTRAVENOUS at 12:46

## 2023-11-13 RX ADMIN — BUPIVACAINE HYDROCHLORIDE 30 ML: 5 INJECTION, SOLUTION EPIDURAL; INTRACAUDAL; PERINEURAL at 12:19

## 2023-11-13 RX ADMIN — Medication 3 G: at 12:25

## 2023-11-13 ASSESSMENT — ACTIVITIES OF DAILY LIVING (ADL)
ADLS_ACUITY_SCORE: 35
ADLS_ACUITY_SCORE: 35
ADLS_ACUITY_SCORE: 33

## 2023-11-13 ASSESSMENT — ENCOUNTER SYMPTOMS: DYSRHYTHMIAS: 1

## 2023-11-13 NOTE — DISCHARGE INSTRUCTIONS
[facility]  Adult Discharge Orders & Instructions      For 24 hours after surgery:  Get plenty of rest.  A responsible adult must stay with you for at least 24 hours after you leave the hospital.   You may feel lightheaded.  IF so, sit for a few minutes before standing.  Have someone help you get up.   You may have a slight fever. Call the doctor if your fever is over 101 F (38.3 C) (taken under the tongue) or lasts longer than 24 hours.  You may have a dry mouth, a sore throat, muscle aches or trouble sleeping.  These should go away after 24 hours.  Do not make important or legal decisions.  6.   Do not drive or use heavy equipment.  If you have weakness or tingling, don't drive or use heavy equipment until this feeling goes away.                                                                                                                                                                         To contact a doctor, call    [phone]______________

## 2023-11-13 NOTE — OR NURSING
Babak has been discharged to [facility] at 1521 via [facility] transport van accompanied by wife and [facility] staff.     Written discharge instructions were provided to and wife, telephone report given to Miesha at 1538.  Prescriptions were e-prescribed.  Patient states their pain is denies, and denies any nausea or dizziness upon discharge.    Patient and adult caring for them verbalize understanding of discharge instructions including no driving until tomorrow and no longer taking narcotic pain medications - no operating mechanical equipment and no making any important decisions.They understand reason for discharge, and necessary follow-up appointments.

## 2023-11-13 NOTE — BRIEF OP NOTE
[facility]    Brief Operative Note    Pre-operative diagnosis: Patellar dislocation, left, initial encounter [S83.005A]  Post-operative diagnosis Same as pre-operative diagnosis    Procedure: Knee Patellar Extensor Mechanism Repair, Left - Knee    Surgeon: Surgeon(s) and Role:     * Elliott Johnson MD - Primary     * Albert Zapata PA - Assisting  Anesthesia: Choice   Estimated Blood Loss: Minimal    Drains: None  Specimens: * No specimens in log *  Findings:   None.  Complications: None.  Implants: * No implants in log *

## 2023-11-13 NOTE — ANESTHESIA POSTPROCEDURE EVALUATION
Patient: Babak Moran    Procedure: Procedure(s):  Knee Patellar Extensor Mechanism Repair       Anesthesia Type:  General    Note:  Disposition: Outpatient   Postop Pain Control: Uneventful            Sign Out: Well controlled pain   PONV: No   Neuro/Psych: Uneventful            Sign Out: Acceptable/Baseline neuro status   Airway/Respiratory: Uneventful            Sign Out: Acceptable/Baseline resp. status   CV/Hemodynamics: Uneventful            Sign Out: Acceptable CV status; No obvious hypovolemia; No obvious fluid overload   Other NRE: NONE   DID A NON-ROUTINE EVENT OCCUR? No           Last vitals:  Vitals Value Taken Time   /86 11/13/23 1455   Temp 96.7  F (35.9  C) 11/13/23 1433   Pulse 69 11/13/23 1455   Resp 21 11/13/23 1426   SpO2 93 % 11/13/23 1456   Vitals shown include unfiled device data.    Electronically Signed By: JAYLEN BOURGEOIS CRNA  November 13, 2023  4:05 PM

## 2023-11-13 NOTE — OR NURSING
PACU Transfer Note    Babak Moran was transferred to 731 via cart.  Equipment used for transport:  none.  Accompanied by:  Marianne BASSETT  Prescriptions were: escribed    PACU Respiratory Event Documentation     1) Episodes of Apnea greater than or equal to 10 seconds: 0    2) Bradypnea - less than 8 breaths per minute: 0    3) Pain score on 0 to 10 scale: 0    4) Pain-sedation mismatch (yes or no): no    5) Repeated 02 desaturation less than 90% (yes or no): no    Anesthesia notified? (yes or no): n/a    Any of the above events occuring repeatedly in separate 30 minute intervals may be considered recurrent PACU respiratory events.    Patient stable and meets phase 1 discharge criteria for transport from PACU.

## 2023-11-13 NOTE — ANESTHESIA PROCEDURE NOTES
Femoral Procedure Note    Pre-Procedure   Staff -        CRNA: Donn Joy APRN CRNA       Performed By: CRNA       Location: pre-op       Procedure Start/Stop Times: 11/13/2023 12:03 PM and 11/13/2023 12:19 PM       Pre-Anesthestic Checklist: patient identified, IV checked, site marked, risks and benefits discussed, informed consent, monitors and equipment checked, pre-op evaluation, at physician/surgeon's request and post-op pain management  Timeout:       Correct Patient: Yes        Correct Procedure: Yes        Correct Site: Yes        Correct Position: Yes        Correct Laterality: Yes        Site Marked: Yes  Procedure Documentation  Procedure: Femoral       Diagnosis: POST OPERATIVE PAIN CONTROL       Laterality: left       Patient Position: supine       Patient Prep/Sterile Barriers: sterile gloves, mask       Skin prep: Chloraprep       Local skin infiltrated with 0.5 mL of. Local skin infiltration: bupivacaine 0.5%       Needle Type: insulated and short bevel       Needle Gauge: 21.        Needle Length (Inches): 6        Ultrasound guided       1. Ultrasound was used to identify targeted nerve, plexus, vascular marker, or fascial plane and place a needle adjacent to it in real-time.       2. Ultrasound was used to visualize the spread of anesthetic in close proximity to the above referenced structure.       3. A permanent image is entered into the patient's record.       4. The visualized anatomic structures appeared normal.       5. There were no apparent abnormal pathologic findings.       Nerve Stim: Initial Level 0.6 mA.  Lowest motor response 0.6 mA.    Assessment/Narrative         The placement was negative for: blood aspirated, painful injection and site bleeding       Paresthesias: No.       Bolus given via needle..        Secured via.        Insertion/Infusion Method: Single Shot       Complications: none       Injection made incrementally with aspirations every 5 mL.    Medication(s)  "Administered   Medication Administration Time: 11/13/2023 12:03 PM      FOR [facility] ONLY:   Pain Team Contact information: please page the Pain Team Via Webdyn. Search \"Pain\". During daytime hours, please page the attending first. At night please page the resident first.      "

## 2023-11-13 NOTE — ANESTHESIA PREPROCEDURE EVALUATION
Anesthesia Pre-Procedure Evaluation    Patient: Babak Moran   MRN: [id number] : 1960        Procedure : Procedure(s):  Knee Patellar Extensor Mechanism Repair          Past Medical History:   Diagnosis Date    Atrial fibrillation (H) 2017    on Warfarin    Bacterial sepsis (H) 2022    Cellulitis of left lower extremity 2019    Cerebral infarction (H)     Cerebral infarction due to unspecified occlusion or stenosis of right middle cerebral artery (H) 2017    ,Left hemiparesis, left neglect, impaired balance and gait following R MCA stroke with mild hemorrhagic transformation s/p tissue plasminogen activator and mechanical thrombectomy, s/p C7 facet fracture from fall off forklift.    Chest pain 1997    Disorder of skin or subcutaneous tissue 2011    Essential (primary) hypertension 1997    Fracture of cervical vertebra (H)     2017,C7 facet fracture from fall off forklift, nondisplaced    Gout     Hemiplegia affecting left nondominant side (H) 2017    Obesity 2017    body mass index of 40    Other local lupus erythematosus     Sepsis (H) 2019      Past Surgical History:   Procedure Laterality Date    ARTHROPLASTY KNEE Left 2023    Procedure: ARTHROPLASTY, KNEE, TOTAL;  Surgeon: Elliott Johnson MD;  Location: [facility] OR    ARTHROPLASTY KNEE Right 2023    Procedure: Arthroplasty knee;  Surgeon: Elliott Johnson MD;  Location: [facility] OR    ARTHROSCOPY KNEE      bilateral knees    COLONOSCOPY  2012    Normal; Next due     ORIF WRIST FRACTURE        Allergies   Allergen Reactions    Diatrizoate Rash    Ioxaglate Other (See Comments)     Does not recall    Levofloxacin Hives and Rash    Metrizamide Rash     (Diagnostic X-Ray materials)    Probenecid Rash      Social History     Tobacco Use    Smoking status: Never     Passive exposure: Yes    Smokeless tobacco: Never   Substance Use Topics    Alcohol use: Not Currently     Comment: couple of times  per year      Wt Readings from Last 1 Encounters:   11/13/23 142.4 kg (314 lb)        Anesthesia Evaluation   Pt has had prior anesthetic.     No history of anesthetic complications       ROS/MED HX  ENT/Pulmonary: Comment: Exposed to second hand smoke     (+) sleep apnea, moderate, uses CPAP,                                     Neurologic: Comment: Hemiplegia - Left    (+)          CVA,    TIA,                  Cardiovascular: Comment: Ascending aortic dilation   Chronic venous stasis   METS unable to assess - Pt has been in a wheelchair for weeks following his knee replacements     (+)  hypertension- -   -  - -   Taking blood thinners Pt has received instructions:                    dysrhythmias, a-fib, Irregular Heartbeat/Palpitations,            METS/Exercise Tolerance:     Hematologic:  - neg hematologic  ROS     Musculoskeletal: Comment: Hx bilateral knee replacements and bilateral patella dislocations   (+)  arthritis,             GI/Hepatic:  - neg GI/hepatic ROS     Renal/Genitourinary:  - neg Renal ROS     Endo: Comment: Lupus erythematosus   Gout    (+)               Obesity,       Psychiatric/Substance Use:  - neg psychiatric ROS     Infectious Disease:  - neg infectious disease ROS     Malignancy:  - neg malignancy ROS     Other:      (+)  , ,, other significant disability Wheelchair bound         Physical Exam    Airway        Mallampati: II   TM distance: > 3 FB   Neck ROM: full   Mouth opening: > 3 cm    Respiratory Devices and Support         Dental     Comment: Upper denture and lower partial in place.  Pt wishes to keep these in place during procedure     (+) Modest Abnormalities - crowns, retainers, 1 or 2 missing teeth      Cardiovascular   cardiovascular exam normal       Rhythm and rate: irregular and normal     Pulmonary   pulmonary exam normal        breath sounds clear to auscultation           OUTSIDE LABS:  CBC:   Lab Results   Component Value Date    WBC 7.7 11/09/2023    WBC 24.3 (H)  "11/07/2023    HGB 13.9 11/09/2023    HGB 14.3 11/07/2023    HCT 43.3 11/09/2023    HCT 45.3 11/07/2023     11/09/2023     11/07/2023     BMP:   Lab Results   Component Value Date     11/07/2023     09/18/2023    POTASSIUM 4.2 11/07/2023    POTASSIUM 4.3 09/18/2023    CHLORIDE 100 11/07/2023    CHLORIDE 98 09/18/2023    CO2 26 11/07/2023    CO2 28 09/18/2023    BUN 14.6 11/07/2023    BUN 18.4 09/18/2023    CR 0.67 11/07/2023    CR 0.57 (L) 09/18/2023    GLC 98 11/07/2023    GLC 98 09/18/2023     COAGS:   Lab Results   Component Value Date    PTT 63 (H) 07/31/2023    INR 2.3 (A) 11/06/2023     POC: No results found for: \"BGM\", \"HCG\", \"HCGS\"  HEPATIC:   Lab Results   Component Value Date    ALBUMIN 3.7 11/07/2023    PROTTOTAL 7.5 11/07/2023    ALT 15 11/07/2023    AST 17 11/07/2023    ALKPHOS 71 11/07/2023    BILITOTAL 1.0 11/07/2023     OTHER:   Lab Results   Component Value Date    LACT 1.2 08/02/2023    A1C 5.8 08/09/2022    VIRGEN 9.8 11/07/2023    MAG 2.0 08/04/2023    LIPASE 28 05/25/2023    CRP 42.6 (H) 08/07/2022    SED 13 08/07/2022       Anesthesia Plan    ASA Status:  3    NPO Status:  NPO Appropriate    Anesthesia Type: General.     - Airway: LMA   Induction: Intravenous, Propofol.   Maintenance: Balanced.        Consents    Anesthesia Plan(s) and associated risks, benefits, and realistic alternatives discussed. Questions answered and patient/representative(s) expressed understanding.     - Discussed: Risks, Benefits and Alternatives for BOTH SEDATION and the PROCEDURE were discussed     - Discussed with:  Patient      - Extended Intubation/Ventilatory Support Discussed: No.      - Patient is DNR/DNI Status: No     Use of blood products discussed: No .     Postoperative Care    Pain management: Peripheral nerve block (Single Shot).   PONV prophylaxis: Ondansetron (or other 5HT-3)     Comments:                JAYLEN Moses CRNA  "

## 2023-11-13 NOTE — OP NOTE
Preop Dx: Left patella lateral instability    Postop Dx: Same    Procedure: Medial reefing left patellar retinaculum    Surgeon: Elliott Johnson MD    Assistant: AARON Gray/RN    Anesthesia: General    Indications: Patient is a 63-year-old gentleman status post bilateral total knee arthroplasties who fell and dislocated both of his patellas.  His left patella started having recurrent dislocations following an injury after the last lateral release and patellar reefing.  This occurred in the back of a transport van.  It was thought that he may have disrupted the medial reefing of the left knee.  All the risks and benefits of surgical intervention for this were discussed with him and he wished to proceed.    Description: Patient taken the operating room if adequate duction anesthesia was positioned, prepped and draped in usual sterile fashion the operative table.  A universal protocol was initiated once on room in agreement an incision was made utilizing the previous scar all the way down the extensor mechanism.  Several pockets of old blood and scar tissue were appreciated in the prepatellar bursa as well as in the lateral side of the knee.  This did appear to be old.  Once were down to the retinaculum the medial side of the reefing was identified and inspected and did not appear to be torn.  The lateral release appeared to be intact and the patella was not overtly lateralized.  Knee was put through range of motion and the patella appeared to track quite nicely.  The wound was copiously irrigated.  #2 FiberWire was used in a figure-of-eight fashion to imbricate the medial patellar retinaculum in an attempt to release the patella a little bit more medial.  This did improve the tracking even more.  The wound was copiously irrigated and closed with #1 Vicryl, 2-0 Vicryl and staples.  An Aquacel dressing was applied and he was taken in stable condition postanesthesia care unit.

## 2023-11-13 NOTE — ANESTHESIA CARE TRANSFER NOTE
Patient: aBbak Moran    Procedure: Procedure(s):  Knee Patellar Extensor Mechanism Repair       Diagnosis: Patellar dislocation, left, initial encounter [S83.005A]  Diagnosis Additional Information: No value filed.    Anesthesia Type:   General     Note:    Oropharynx: oropharynx clear of all foreign objects and spontaneously breathing  Level of Consciousness: drowsy  Oxygen Supplementation: face mask  Level of Supplemental Oxygen (L/min / FiO2): 8  Independent Airway: airway patency satisfactory and stable  Dentition: dentition unchanged  Vital Signs Stable: post-procedure vital signs reviewed and stable  Report to RN Given: handoff report given  Patient transferred to: PACU    Handoff Report: Identifed the Patient, Identified the Reponsible Provider, Reviewed the pertinent medical history, Discussed the surgical course, Reviewed Intra-OP anesthesia mangement and issues during anesthesia, Set expectations for post-procedure period and Allowed opportunity for questions and acknowledgement of understanding    Vitals:  Vitals Value Taken Time   BP     Temp     Pulse     Resp     SpO2         Electronically Signed By: JAYLEN Moses CRNA  November 13, 2023  2:06 PM

## 2023-11-16 ENCOUNTER — TELEPHONE (OUTPATIENT)
Dept: ORTHOPEDICS | Facility: OTHER | Age: 63
End: 2023-11-16
Payer: COMMERCIAL

## 2023-11-16 NOTE — TELEPHONE ENCOUNTER
Message sent to [place] office to Dr. Johnson's coordinator.   Suzanne Benedict LPN .....................11/16/2023 11:02 AM

## 2023-11-16 NOTE — TELEPHONE ENCOUNTER
Received a call from Costa at [facility].  She is requesting clarification of orders from Dr. Johnson for the patient.    The clarification is in regards to ROM orders and strengthening.      Meghann Frausto on 11/16/2023 at 10:19 AM

## 2023-11-20 ENCOUNTER — TRANSFERRED RECORDS (OUTPATIENT)
Dept: HEALTH INFORMATION MANAGEMENT | Facility: OTHER | Age: 63
End: 2023-11-20

## 2023-11-20 ENCOUNTER — OFFICE VISIT (OUTPATIENT)
Dept: ORTHOPEDICS | Facility: OTHER | Age: 63
End: 2023-11-20
Attending: ORTHOPAEDIC SURGERY
Payer: COMMERCIAL

## 2023-11-20 ENCOUNTER — ANTICOAGULATION THERAPY VISIT (OUTPATIENT)
Dept: ANTICOAGULATION | Facility: OTHER | Age: 63
End: 2023-11-20
Attending: FAMILY MEDICINE
Payer: COMMERCIAL

## 2023-11-20 DIAGNOSIS — I48.21 PERMANENT ATRIAL FIBRILLATION (H): Primary | ICD-10-CM

## 2023-11-20 DIAGNOSIS — S83.006A PATELLAR DISLOCATION, UNSPECIFIED LATERALITY, INITIAL ENCOUNTER: Primary | ICD-10-CM

## 2023-11-20 DIAGNOSIS — I48.91 ATRIAL FIBRILLATION WITH RVR (H): ICD-10-CM

## 2023-11-20 DIAGNOSIS — Z79.01 ANTICOAGULATION MONITORING, INR RANGE 2-3: ICD-10-CM

## 2023-11-20 LAB — INR (EXTERNAL): 1.7 (ref 0.9–1.1)

## 2023-11-20 PROCEDURE — G0463 HOSPITAL OUTPT CLINIC VISIT: HCPCS

## 2023-11-20 PROCEDURE — 99495 TRANSJ CARE MGMT MOD F2F 14D: CPT | Performed by: ORTHOPAEDIC SURGERY

## 2023-11-20 PROCEDURE — 99024 POSTOP FOLLOW-UP VISIT: CPT | Performed by: ORTHOPAEDIC SURGERY

## 2023-11-20 NOTE — PROGRESS NOTES
Subjective:    63-year-old gentleman status post bilateral total knee arthroplasty plasties who fell and dislocated both of his patellas.  Both of his knees were treated with a lateral release and medial retinacular repair/reefing.  His right knee did well his left knee still had difficulty with instability of the patella.  We recently took him back to the operating room thinking that he may have disrupted the repair and instead found that the medial retinaculum was actually intact.  Given the lateralization of his patella we opted to perform a pants over vest imbrication of the medial retinaculum.  He tolerated it well and had no real issues.    Objective:    On examination today however his left patella is quite a bit lateral to the medial femoral condyle.  It is easily reducible back into the groove without significant pain on his part.  His wound is completely benign with staples in place.    Assessment:    63-year-old gentleman with lateral instability of his left patella following a lateral release and medial reefing    Plan:    At this point we need to strengthen his quadriceps.  He has severe quadriceps weakness and this is allowing for pseudolaxity of his patella.  They are to work on this in physical therapy and I will see him back in approximately 1 month

## 2023-11-20 NOTE — PROGRESS NOTES
ANTICOAGULATION MANAGEMENT     Babak Moran 63 year old male is on warfarin with subtherapeutic INR result. (Goal INR 2.0-3.0)    Recent labs: (last 7 days)     11/20/23  1221   INR 1.7*       ASSESSMENT     Source(s): Chart Review and Home Care/Facility Nurse     Warfarin doses taken: Resumed warfarin on 11/13 after interruption for surgery/procedure which may be affecting INR  Diet: No new diet changes identified  Medication/supplement changes: None noted  New illness, injury, or hospitalization: No  Signs or symptoms of bleeding or clotting: No  Previous result: Subtherapeutic  Additional findings: Bridging with Enoxaparin until INR >= 2.0       PLAN     Recommended plan for temporary change(s) affecting INR     Dosing Instructions: booster dose then continue your current warfarin dose Continue bridging with Enoxaparin with next INR in 3 days       Summary  As of 11/20/2023      Full warfarin instructions:  11/20: 15 mg; Otherwise 10 mg every Mon, Thu; 7.5 mg all other days   Next INR check:  11/22/2023               Faxed dosing and follow up instructions to [facility]    Orders given to  Homecare nurse/facility to recheck    Patient not here, [employer] Communication sheet with screening questions and current INR received via FAX from outside agency. Results reviewed, Warfarin dosing per protocol, and recommended follow-up appointment made. Paperwork FAXED back to facility.       Plan made per [facility] anticoagulation protocol    Shelley Mercado, RN  Anticoagulation Clinic  11/20/2023    _______________________________________________________________________     Anticoagulation Episode Summary       Current INR goal:  2.0-3.0   TTR:  52.4% (11.9 mo)   Target end date:  Indefinite   Send INR reminders to:  [facility]    Indications    Permanent atrial fibrillation (H) [I48.21]  Anticoagulation monitoring  INR range 2-3 (Resolved) [Z79.01]  Atrial fibrillation with RVR (H) [I48.91]  Anticoagulation  monitoring  INR range 2-3 [Z79.01]             Comments:  Babak prefers to be closer to 3.0 d/t previous CVA check Q 4 weeks.Declines to reduce dose when slightly over 3.0  Needs to change PCP  Takes a long time get up to therapeutic after holding warfarin.             Anticoagulation Care Providers       Provider Role Specialty Phone number    Teo Funes MD Referring Family Medicine [phone]

## 2023-11-20 NOTE — PROGRESS NOTES
Chief Complaint   Patient presents with    Surgical Followup     S/p 1 week left patellar retinaculum       Oneida Quintana LPN

## 2023-11-22 ENCOUNTER — ANTICOAGULATION THERAPY VISIT (OUTPATIENT)
Dept: ANTICOAGULATION | Facility: OTHER | Age: 63
End: 2023-11-22
Attending: FAMILY MEDICINE
Payer: COMMERCIAL

## 2023-11-22 ENCOUNTER — TRANSFERRED RECORDS (OUTPATIENT)
Dept: HEALTH INFORMATION MANAGEMENT | Facility: OTHER | Age: 63
End: 2023-11-22

## 2023-11-22 DIAGNOSIS — Z79.01 ANTICOAGULATION MONITORING, INR RANGE 2-3: ICD-10-CM

## 2023-11-22 DIAGNOSIS — I48.21 PERMANENT ATRIAL FIBRILLATION (H): Primary | ICD-10-CM

## 2023-11-22 DIAGNOSIS — I48.91 ATRIAL FIBRILLATION WITH RVR (H): ICD-10-CM

## 2023-11-22 LAB — INR (EXTERNAL): 2.2 (ref 0.9–1.1)

## 2023-11-22 NOTE — PROGRESS NOTES
ANTICOAGULATION MANAGEMENT     Babak Moran 63 year old male is on warfarin with therapeutic INR result. (Goal INR 2.0-3.0)    Recent labs: (last 7 days)     11/22/23  1106   INR 2.2*       ASSESSMENT     Source(s): Chart Review and Home Care/Facility Nurse     Warfarin doses taken: Warfarin taken as instructed and missed a dose of Lovenox  Diet: No new diet changes identified  Medication/supplement changes: None noted  New illness, injury, or hospitalization: No  Signs or symptoms of bleeding or clotting: No  Previous result: Subtherapeutic  Additional findings: Bridging with Enoxaparin until INR >= 2.0       PLAN     Recommended plan for temporary change(s) affecting INR     Dosing Instructions: Continue your current warfarin dose Stop bridging with Enoxaparin with next INR in 1 week       Summary  As of 11/22/2023      Full warfarin instructions:  10 mg every Mon, Thu; 7.5 mg all other days   Next INR check:  11/29/2023               Faxed dosing and follow up instructions to [facility]    Orders given to  Homecare nurse/facility to recheck    Patient not here, [employer] Communicationsheet with screening questions and current INR received via FAX from outside agency. Results reviewed, Warfarin dosing per protocol, and recommended follow-up appointment made. Paperwork FAXED back to facility.       Plan made per [facility] anticoagulation protocol    Shelley Mercado RN  Anticoagulation Clinic  11/22/2023    _______________________________________________________________________     Anticoagulation Episode Summary       Current INR goal:  2.0-3.0   TTR:  52.1% (11.9 mo)   Target end date:  Indefinite   Send INR reminders to:  [facility]    Indications    Permanent atrial fibrillation (H) [I48.21]  Anticoagulation monitoring  INR range 2-3 (Resolved) [Z79.01]  Atrial fibrillation with RVR (H) [I48.91]  Anticoagulation monitoring  INR range 2-3 [Z79.01]             Comments:  Babak prefers to be closer to 3.0 d/t  previous CVA check Q 4 weeks.Declines to reduce dose when slightly over 3.0  Needs to change PCP  Takes a long time get up to therapeutic after holding warfarin.             Anticoagulation Care Providers       Provider Role Specialty Phone number    Teo Funes MD Referring Family Medicine [phone]

## 2023-11-27 ENCOUNTER — OFFICE VISIT (OUTPATIENT)
Dept: ORTHOPEDICS | Facility: OTHER | Age: 63
End: 2023-11-27
Attending: FAMILY MEDICINE
Payer: COMMERCIAL

## 2023-11-27 DIAGNOSIS — Z96.652 STATUS POST TOTAL LEFT KNEE REPLACEMENT: Primary | ICD-10-CM

## 2023-11-27 PROCEDURE — 99207 PR NO CHARGE LOS: CPT

## 2023-11-27 NOTE — PROGRESS NOTES
Patient came in today to have staples removed from his left patellar surgery that was 2 weeks ago.  Area was clean dry and intact.  Staples removed and steri strips placed.  Patient has not been wearing an ace wrap since last week.  Patient denied issues with swelling.  Ace wraps to be worn if needed for swelling.  Leg brace placed back on his leg, patient voiced that it was comfortable.  To follow up with Dr. Johnson as scheduled.  If any issues before he is to call.  Oneida Quintana LPN .......11/27/2023 1:53 PM

## 2023-11-29 ENCOUNTER — TRANSFERRED RECORDS (OUTPATIENT)
Dept: HEALTH INFORMATION MANAGEMENT | Facility: OTHER | Age: 63
End: 2023-11-29

## 2023-11-29 ENCOUNTER — ANTICOAGULATION THERAPY VISIT (OUTPATIENT)
Dept: ANTICOAGULATION | Facility: OTHER | Age: 63
End: 2023-11-29
Payer: COMMERCIAL

## 2023-11-29 DIAGNOSIS — I48.21 PERMANENT ATRIAL FIBRILLATION (H): Primary | ICD-10-CM

## 2023-11-29 DIAGNOSIS — Z79.01 ANTICOAGULATION MONITORING, INR RANGE 2-3: ICD-10-CM

## 2023-11-29 DIAGNOSIS — I48.91 ATRIAL FIBRILLATION WITH RVR (H): ICD-10-CM

## 2023-11-29 LAB — INR (EXTERNAL): 2.3 (ref 0.9–1.1)

## 2023-11-29 NOTE — PROGRESS NOTES
ANTICOAGULATION MANAGEMENT     Babak Moran 63 year old male is on warfarin with therapeutic INR result. (Goal INR 2.0-3.0)    Recent labs: (last 7 days)     11/29/23  1106   INR 2.3*       ASSESSMENT     Source(s): Chart Review and Home Care/Facility Nurse     Warfarin doses taken: Warfarin taken as instructed  Diet: No new diet changes identified  Medication/supplement changes: None noted  New illness, injury, or hospitalization: No  Signs or symptoms of bleeding or clotting: No  Previous result: Therapeutic last visit; previously outside of goal range  Additional findings: None       PLAN     Recommended plan for no diet, medication or health factor changes affecting INR     Dosing Instructions: Continue your current warfarin dose with next INR in 1 week       Summary  As of 11/29/2023      Full warfarin instructions:  10 mg every Mon, Thu; 7.5 mg all other days   Next INR check:  12/6/2023               Faxed dosing and follow up instructions to [facility]    Orders given to  Homecare nurse/facility to recheck    Patient not here, [employer] Communication sheet with screening questions and current INR received via FAX from outside agency. Results reviewed, Warfarin dosing per protocol, and recommended follow-up appointment made. Paperwork FAXED back to facility.       Plan made per [facility] anticoagulation protocol    Shelley Mercado RN  Anticoagulation Clinic  11/29/2023    _______________________________________________________________________     Anticoagulation Episode Summary       Current INR goal:  2.0-3.0   TTR:  52.1% (11.9 mo)   Target end date:  Indefinite   Send INR reminders to:  [facility]    Indications    Permanent atrial fibrillation (H) [I48.21]  Anticoagulation monitoring  INR range 2-3 (Resolved) [Z79.01]  Atrial fibrillation with RVR (H) [I48.91]  Anticoagulation monitoring  INR range 2-3 [Z79.01]             Comments:  Babak prefers to be closer to 3.0 d/t previous CVA check Q 4  weeks.Declines to reduce dose when slightly over 3.0  Needs to change PCP  Takes a long time get up to therapeutic after holding warfarin.             Anticoagulation Care Providers       Provider Role Specialty Phone number    Teo Funes MD Referring Family Medicine [phone]

## 2023-11-30 ENCOUNTER — DOCUMENTATION ONLY (OUTPATIENT)
Dept: ORTHOPEDICS | Facility: OTHER | Age: 63
End: 2023-11-30
Payer: COMMERCIAL

## 2023-11-30 DIAGNOSIS — Z96.652 PRESENCE OF LEFT ARTIFICIAL KNEE JOINT: Primary | ICD-10-CM

## 2023-12-01 ENCOUNTER — LAB REQUISITION (OUTPATIENT)
Dept: LAB | Facility: OTHER | Age: 63
End: 2023-12-01
Payer: COMMERCIAL

## 2023-12-01 ENCOUNTER — NURSING HOME VISIT (OUTPATIENT)
Dept: GERIATRICS | Facility: OTHER | Age: 63
End: 2023-12-01

## 2023-12-01 DIAGNOSIS — R82.90 CLOUDY URINE: Primary | ICD-10-CM

## 2023-12-01 DIAGNOSIS — S83.014D LATERAL DISLOCATION OF RIGHT PATELLA, SUBSEQUENT ENCOUNTER: ICD-10-CM

## 2023-12-01 DIAGNOSIS — E44.0 MODERATE PROTEIN MALNUTRITION (H): ICD-10-CM

## 2023-12-01 DIAGNOSIS — S83.005S PATELLAR DISLOCATION, LEFT, SEQUELA: ICD-10-CM

## 2023-12-01 DIAGNOSIS — R31.29 MICROSCOPIC HEMATURIA: ICD-10-CM

## 2023-12-01 DIAGNOSIS — S83.015D LATERAL DISLOCATION OF LEFT PATELLA, SUBSEQUENT ENCOUNTER: ICD-10-CM

## 2023-12-01 DIAGNOSIS — I69.354 HEMIPLEGIA AND HEMIPARESIS FOLLOWING CEREBRAL INFARCTION AFFECTING LEFT NON-DOMINANT SIDE (H): ICD-10-CM

## 2023-12-01 DIAGNOSIS — R73.03 PREDIABETES: ICD-10-CM

## 2023-12-01 LAB
ALBUMIN SERPL BCG-MCNC: 2.9 G/DL (ref 3.5–5.2)
ALP SERPL-CCNC: 76 U/L (ref 40–150)
ALT SERPL W P-5'-P-CCNC: 28 U/L (ref 0–70)
ANION GAP SERPL CALCULATED.3IONS-SCNC: 11 MMOL/L (ref 7–15)
AST SERPL W P-5'-P-CCNC: 27 U/L (ref 0–45)
BASOPHILS # BLD AUTO: 0.1 10E3/UL (ref 0–0.2)
BASOPHILS NFR BLD AUTO: 1 %
BILIRUB SERPL-MCNC: 0.4 MG/DL
BUN SERPL-MCNC: 13.2 MG/DL (ref 8–23)
CALCIUM SERPL-MCNC: 9.5 MG/DL (ref 8.8–10.2)
CHLORIDE SERPL-SCNC: 97 MMOL/L (ref 98–107)
CREAT SERPL-MCNC: 0.61 MG/DL (ref 0.67–1.17)
DEPRECATED HCO3 PLAS-SCNC: 28 MMOL/L (ref 22–29)
EGFRCR SERPLBLD CKD-EPI 2021: >90 ML/MIN/1.73M2
EOSINOPHIL # BLD AUTO: 0.1 10E3/UL (ref 0–0.7)
EOSINOPHIL NFR BLD AUTO: 1 %
ERYTHROCYTE [DISTWIDTH] IN BLOOD BY AUTOMATED COUNT: 17.2 % (ref 10–15)
GLUCOSE SERPL-MCNC: 98 MG/DL (ref 70–99)
HBA1C MFR BLD: 5.5 % (ref 4–6.2)
HCT VFR BLD AUTO: 41.7 % (ref 40–53)
HGB BLD-MCNC: 13.3 G/DL (ref 13.3–17.7)
IMM GRANULOCYTES # BLD: 0 10E3/UL
IMM GRANULOCYTES NFR BLD: 0 %
LYMPHOCYTES # BLD AUTO: 1.4 10E3/UL (ref 0.8–5.3)
LYMPHOCYTES NFR BLD AUTO: 18 %
MCH RBC QN AUTO: 26.9 PG (ref 26.5–33)
MCHC RBC AUTO-ENTMCNC: 31.9 G/DL (ref 31.5–36.5)
MCV RBC AUTO: 84 FL (ref 78–100)
MONOCYTES # BLD AUTO: 1.3 10E3/UL (ref 0–1.3)
MONOCYTES NFR BLD AUTO: 16 %
NEUTROPHILS # BLD AUTO: 5.2 10E3/UL (ref 1.6–8.3)
NEUTROPHILS NFR BLD AUTO: 64 %
NRBC # BLD AUTO: 0 10E3/UL
NRBC BLD AUTO-RTO: 0 /100
PLATELET # BLD AUTO: 312 10E3/UL (ref 150–450)
POTASSIUM SERPL-SCNC: 3.8 MMOL/L (ref 3.4–5.3)
PROT SERPL-MCNC: 7.3 G/DL (ref 6.4–8.3)
RBC # BLD AUTO: 4.95 10E6/UL (ref 4.4–5.9)
SODIUM SERPL-SCNC: 136 MMOL/L (ref 135–145)
WBC # BLD AUTO: 8.1 10E3/UL (ref 4–11)

## 2023-12-01 PROCEDURE — 85025 COMPLETE CBC W/AUTO DIFF WBC: CPT | Performed by: NURSE PRACTITIONER

## 2023-12-01 PROCEDURE — 99310 SBSQ NF CARE HIGH MDM 45: CPT | Mod: 24 | Performed by: NURSE PRACTITIONER

## 2023-12-01 PROCEDURE — 80053 COMPREHEN METABOLIC PANEL: CPT | Performed by: NURSE PRACTITIONER

## 2023-12-01 PROCEDURE — 83036 HEMOGLOBIN GLYCOSYLATED A1C: CPT | Performed by: NURSE PRACTITIONER

## 2023-12-01 NOTE — PROGRESS NOTES
[facility]  Acute Care Visit    Patient Name: Babak Moran   : 1960  MRN: [id number]    Place of Service: [facility]  DOS: 2023    CC: urinary concerns    HPI:  Babak Moran is a 63 year old male with PMH of multiple chronic medical concerns, who is seen today regarding pt and wife concerned about cloudy urine, intermittent strong smell. He has hx of sepsis with UTI back in 2023. He also has ESBL in urine. He denies burning with urinating, denies hesitancy, urgency, frequency. He has increased his fluid intake to 2L of water and states he drinks 2 mugs of lemonade along with his fluids with meals.   He does urinate frequently (about every 90 min) but he thinks this is because he has increased his fluid intake so much and also he is in a habit of doing this but doesn't necessarily think he feels his bladder is full. At times he is only urinating 150 ml. Urine appears moderate to light yellow and mildly cloudy. He had UA/UC done pre-op  and only showed few bacteria and low number of WBC, trace blood verses in July he had large blood, large WBC and large bacteria. He was treated in Nov as his WBC was elevated and could find no other source of infection and he was going to have surgery in 5 days.  He very well could have just been colonized with e.coli ESBL but at that time being more cautious pre-operatively.     Pt reassured that it does not seem he is having UTI symptoms but educated on the red flags/symptoms of UTI. Discussed considering urology consult for cause of blood in urine.     Pt does have hx of prediabetes but not Diabetes type 2.       Multidisciplinary notes, laboratory values, medications, vital signs, weight and orders arereviewed from nursing home records. I have reviewed the patient s medical history and updated the computerized patient record.     PMH:  Past Medical History:   Diagnosis Date    Atrial fibrillation (H) 2017    on Warfarin     Bacterial sepsis (H) 8/8/2022    Cellulitis of left lower extremity 9/5/2019    Cerebral infarction (H)     Cerebral infarction due to unspecified occlusion or stenosis of right middle cerebral artery (H) 02/03/2017    ,Left hemiparesis, left neglect, impaired balance and gait following R MCA stroke with mild hemorrhagic transformation s/p tissue plasminogen activator and mechanical thrombectomy, s/p C7 facet fracture from fall off forklift.    Chest pain 02/1997    Disorder of skin or subcutaneous tissue 07/25/2011    Essential (primary) hypertension 02/1997    Fracture of cervical vertebra (H)     02/03/2017,C7 facet fracture from fall off forklift, nondisplaced    Gout     Hemiplegia affecting left nondominant side (H) 02/03/2017    Obesity 02/03/2017    body mass index of 40    Other local lupus erythematosus     Sepsis (H) 9/6/2019       Medications:  Current Outpatient Medications   Medication Sig Dispense Refill    acetaminophen (TYLENOL) 325 MG tablet Take 2 tablets (650 mg) by mouth every 4 hours as needed for other (mild pain) 100 tablet 0    amLODIPine (NORVASC) 5 MG tablet Take 2 tablets (10 mg) by mouth daily 90 tablet 4    Emollient (CERAVE) CREA Apply topically 2 times daily      enoxaparin ANTICOAGULANT (LOVENOX) 120 MG/0.8ML syringe Inject 120 mg Subcutaneous every 12 hours      hydrochlorothiazide (HYDRODIURIL) 25 MG tablet Take 1 tablet (25 mg) by mouth daily 90 tablet 4    HYDROcodone-acetaminophen (NORCO) 5-325 MG tablet Take 1-2 tablets by mouth every 4 hours as needed for moderate to severe pain 30 tablet 0    HYDROcodone-acetaminophen (NORCO) 7.5-325 MG per tablet Take 1 tablet by mouth every 4 hours as needed for severe pain or moderate pain And 2 tablets q 4 hours PRN pain 8-10 severe  0    metoprolol succinate ER (TOPROL XL) 25 MG 24 hr tablet Take 1 tablet (25 mg) by mouth daily      nitroFURantoin macrocrystal-monohydrate (MACROBID) 100 MG capsule Take 1 capsule (100 mg) by mouth 2 times  daily X 5 days      Nutritional Supplement LIQD Take 4 oz by mouth 2 times daily [employer] Supplement two times daily with meals      saccharomyces boulardii (FLORASTOR) 250 MG capsule Take 1 capsule (250 mg) by mouth daily      senna-docusate (SENOKOT-S/PERICOLACE) 8.6-50 MG tablet Take 1 tablet by mouth daily AND 1 tab daily PRN      warfarin ANTICOAGULANT (COUMADIN) 5 MG tablet 7.5 mg every day or as directed by the [facility] clinic 160 tablet 1      Medication reconciliation complete between [facility] record and [facility].     Allergies:  Allergies   Allergen Reactions    Diatrizoate Rash    Ioxaglate Other (See Comments)     Does not recall    Levofloxacin Hives and Rash    Metrizamide Rash     (Diagnostic X-Ray materials)    Probenecid Rash       Review of Systems:  See HPI    Vital Signs:  Temp 96.9   Pulse 88   Respirations 20   /83   Oxygen Sats 96%   Weight 315#    Physical Exam:     Pleasant and alert without distress.    Sclera nonicteric, conjunctiva non-inflamed.  Skin color pink. Mucous membranes moist.   Lungs clear to auscultation throughout. No wheezes or rales noted.   Cardiovascular irregular auscultated. No murmur noted.  Abdomen large soft and without tenderness   Extremities without edema. Dorsalis pedis pulses 3+    Able to move upper and lower extremities Left leg with immobilizer brace on        New Labs/Diagnostics:  Last Comprehensive Metabolic Panel:  Sodium   Date Value Ref Range Status   12/01/2023 136 135 - 145 mmol/L Final     Comment:     Reference intervals for this test were updated on 09/26/2023 to more accurately reflect our healthy population. There may be differences in the flagging of prior results with similar values performed with this method. Interpretation of those prior results can be made in the context of the updated reference intervals.    09/29/2020 138 134 - 144 mmol/L Final     Potassium   Date Value Ref Range Status   12/01/2023 3.8 3.4 - 5.3 mmol/L Final   08/11/2022  3.7 3.5 - 5.1 mmol/L Final   09/29/2020 4.3 3.5 - 5.1 mmol/L Final     Chloride   Date Value Ref Range Status   12/01/2023 97 (L) 98 - 107 mmol/L Final   08/11/2022 102 98 - 107 mmol/L Final   09/29/2020 101 98 - 107 mmol/L Final     Carbon Dioxide   Date Value Ref Range Status   09/29/2020 33 (H) 21 - 31 mmol/L Final     Carbon Dioxide (CO2)   Date Value Ref Range Status   12/01/2023 28 22 - 29 mmol/L Final   08/11/2022 29 21 - 31 mmol/L Final     Anion Gap   Date Value Ref Range Status   12/01/2023 11 7 - 15 mmol/L Final   08/11/2022 8 3 - 14 mmol/L Final   09/29/2020 4 3 - 14 mmol/L Final     Glucose   Date Value Ref Range Status   12/01/2023 98 70 - 99 mg/dL Final   08/11/2022 87 70 - 105 mg/dL Final   09/29/2020 98 70 - 105 mg/dL Final     GLUCOSE BY METER POCT   Date Value Ref Range Status   06/26/2023 99 70 - 99 mg/dL Final     Urea Nitrogen   Date Value Ref Range Status   12/01/2023 13.2 8.0 - 23.0 mg/dL Final   08/11/2022 12 7 - 25 mg/dL Final   09/29/2020 14 7 - 25 mg/dL Final     Creatinine   Date Value Ref Range Status   12/01/2023 0.61 (L) 0.67 - 1.17 mg/dL Final   09/29/2020 0.99 0.70 - 1.30 mg/dL Final     GFR Estimate   Date Value Ref Range Status   12/01/2023 >90 >60 mL/min/1.73m2 Final   09/29/2020 77 >60 mL/min/[1.73_m2] Final     Calcium   Date Value Ref Range Status   12/01/2023 9.5 8.8 - 10.2 mg/dL Final   09/29/2020 9.7 8.6 - 10.3 mg/dL Final     Bilirubin Total   Date Value Ref Range Status   12/01/2023 0.4 <=1.2 mg/dL Final   09/29/2020 0.8 0.3 - 1.0 mg/dL Final     Alkaline Phosphatase   Date Value Ref Range Status   12/01/2023 76 40 - 150 U/L Final     Comment:     Reference intervals for this test were updated on 11/14/2023 to more accurately reflect our healthy population. There may be differences in the flagging of prior results with similar values performed with this method. Interpretation of those prior results can be made in the context of the updated reference intervals.  "  09/29/2020 60 34 - 104 U/L Final     ALT   Date Value Ref Range Status   12/01/2023 28 0 - 70 U/L Final     Comment:     Reference intervals for this test were updated on 6/12/2023 to more accurately reflect our healthy population. There may be differences in the flagging of prior results with similar values performed with this method. Interpretation of those prior results can be made in the context of the updated reference intervals.     09/29/2020 16 7 - 52 U/L Final     AST   Date Value Ref Range Status   12/01/2023 27 0 - 45 U/L Final     Comment:     Reference intervals for this test were updated on 6/12/2023 to more accurately reflect our healthy population. There may be differences in the flagging of prior results with similar values performed with this method. Interpretation of those prior results can be made in the context of the updated reference intervals.   09/29/2020 19 13 - 39 U/L Final     Lab Results   Component Value Date    WBC 8.1 12/01/2023    WBC 7.1 09/29/2020     Lab Results   Component Value Date    RBC 4.95 12/01/2023    RBC 5.49 09/29/2020     Lab Results   Component Value Date    HGB 13.3 12/01/2023    HGB 14.5 09/29/2020     Lab Results   Component Value Date    HCT 41.7 12/01/2023    HCT 47.3 09/29/2020     No components found for: \"MCT\"  Lab Results   Component Value Date    MCV 84 12/01/2023    MCV 86 09/29/2020     Lab Results   Component Value Date    MCH 26.9 12/01/2023    MCH 26.4 09/29/2020     Lab Results   Component Value Date    MCHC 31.9 12/01/2023    MCHC 30.7 09/29/2020     Lab Results   Component Value Date    RDW 17.2 12/01/2023    RDW 16.1 09/29/2020     Lab Results   Component Value Date     12/01/2023     09/29/2020     Hemoglobin A1C   Date Value Ref Range Status   12/01/2023 5.5 4.0 - 6.2 % Final   09/06/2019 6.0 4.0 - 6.0 % Final        Assessment/Plan:  (R82.90) Cloudy urine  (primary encounter diagnosis)  Comment: no UTI symptoms  Plan: cont " encourage water intake, monitor for symptoms of UTI  Check A1c with hx of prediabetes  Consistent blood in urine, hx of sepsis with UTI in July and ESBL (urology consult placed)    (S83.005S) Patellar dislocation, left, sequela  Comment: revised surgery 11/13/23  Plan: follow up ortho and continue working with therapy    (E44.0) Moderate protein malnutrition (H24)  Comment: noted mild to mod low albumin  Plan: improved nutrition/protein options    (R73.03) Prediabetes  Comment: noted A1c in past checks have reflected prediabetes, recent check was in good range  Plan: continue to encourage good food choices, activity, weight loss        A total of 45 minutes was spent with this patient, of which time spent reviewing chart, labs, meds, assessment, update nursing staff and pt on labs.     JAYLEN Riley, CNP ....................  12/1/2023   9:31 AM

## 2023-12-03 ENCOUNTER — HOSPITAL ENCOUNTER (EMERGENCY)
Facility: OTHER | Age: 63
Discharge: NURSING, REHAB OR LONG-TERM CARE | End: 2023-12-03
Attending: EMERGENCY MEDICINE | Admitting: EMERGENCY MEDICINE
Payer: COMMERCIAL

## 2023-12-03 VITALS
RESPIRATION RATE: 20 BRPM | OXYGEN SATURATION: 94 % | DIASTOLIC BLOOD PRESSURE: 71 MMHG | WEIGHT: 314 LBS | BODY MASS INDEX: 39.25 KG/M2 | SYSTOLIC BLOOD PRESSURE: 120 MMHG | HEART RATE: 72 BPM | TEMPERATURE: 98.3 F

## 2023-12-03 DIAGNOSIS — Z86.19 HISTORY OF ESBL E. COLI INFECTION: ICD-10-CM

## 2023-12-03 DIAGNOSIS — N39.0 URINARY TRACT INFECTION WITH HEMATURIA, SITE UNSPECIFIED: ICD-10-CM

## 2023-12-03 DIAGNOSIS — R31.9 URINARY TRACT INFECTION WITH HEMATURIA, SITE UNSPECIFIED: ICD-10-CM

## 2023-12-03 LAB
ALBUMIN UR-MCNC: 50 MG/DL
ANION GAP SERPL CALCULATED.3IONS-SCNC: 12 MMOL/L (ref 7–15)
APPEARANCE UR: ABNORMAL
BACTERIA #/AREA URNS HPF: ABNORMAL /HPF
BASOPHILS # BLD AUTO: 0.1 10E3/UL (ref 0–0.2)
BASOPHILS NFR BLD AUTO: 1 %
BILIRUB UR QL STRIP: NEGATIVE
BUN SERPL-MCNC: 13.8 MG/DL (ref 8–23)
CALCIUM SERPL-MCNC: 9.6 MG/DL (ref 8.8–10.2)
CHLORIDE SERPL-SCNC: 96 MMOL/L (ref 98–107)
COLOR UR AUTO: YELLOW
CREAT SERPL-MCNC: 0.57 MG/DL (ref 0.67–1.17)
DEPRECATED HCO3 PLAS-SCNC: 28 MMOL/L (ref 22–29)
EGFRCR SERPLBLD CKD-EPI 2021: >90 ML/MIN/1.73M2
EOSINOPHIL # BLD AUTO: 0.1 10E3/UL (ref 0–0.7)
EOSINOPHIL NFR BLD AUTO: 1 %
ERYTHROCYTE [DISTWIDTH] IN BLOOD BY AUTOMATED COUNT: 17 % (ref 10–15)
GLUCOSE SERPL-MCNC: 145 MG/DL (ref 70–99)
GLUCOSE UR STRIP-MCNC: NEGATIVE MG/DL
HCT VFR BLD AUTO: 44.1 % (ref 40–53)
HGB BLD-MCNC: 14 G/DL (ref 13.3–17.7)
HGB UR QL STRIP: ABNORMAL
HOLD SPECIMEN: NORMAL
IMM GRANULOCYTES # BLD: 0 10E3/UL
IMM GRANULOCYTES NFR BLD: 0 %
INR PPP: 2.32 (ref 0.85–1.15)
KETONES UR STRIP-MCNC: NEGATIVE MG/DL
LEUKOCYTE ESTERASE UR QL STRIP: ABNORMAL
LYMPHOCYTES # BLD AUTO: 1.3 10E3/UL (ref 0.8–5.3)
LYMPHOCYTES NFR BLD AUTO: 15 %
MCH RBC QN AUTO: 26.6 PG (ref 26.5–33)
MCHC RBC AUTO-ENTMCNC: 31.7 G/DL (ref 31.5–36.5)
MCV RBC AUTO: 84 FL (ref 78–100)
MONOCYTES # BLD AUTO: 0.8 10E3/UL (ref 0–1.3)
MONOCYTES NFR BLD AUTO: 9 %
MUCOUS THREADS #/AREA URNS LPF: PRESENT /LPF
NEUTROPHILS # BLD AUTO: 6.7 10E3/UL (ref 1.6–8.3)
NEUTROPHILS NFR BLD AUTO: 74 %
NITRATE UR QL: POSITIVE
NRBC # BLD AUTO: 0 10E3/UL
NRBC BLD AUTO-RTO: 0 /100
PH UR STRIP: 6.5 [PH] (ref 5–9)
PLATELET # BLD AUTO: 330 10E3/UL (ref 150–450)
POTASSIUM SERPL-SCNC: 3.6 MMOL/L (ref 3.4–5.3)
RBC # BLD AUTO: 5.27 10E6/UL (ref 4.4–5.9)
RBC URINE: >182 /HPF
SODIUM SERPL-SCNC: 136 MMOL/L (ref 135–145)
SP GR UR STRIP: 1.01 (ref 1–1.03)
UROBILINOGEN UR STRIP-MCNC: NORMAL MG/DL
WBC # BLD AUTO: 8.9 10E3/UL (ref 4–11)
WBC CLUMPS #/AREA URNS HPF: PRESENT /HPF
WBC URINE: >182 /HPF
YEAST #/AREA URNS HPF: ABNORMAL /HPF

## 2023-12-03 PROCEDURE — 99284 EMERGENCY DEPT VISIT MOD MDM: CPT | Performed by: EMERGENCY MEDICINE

## 2023-12-03 PROCEDURE — 250N000011 HC RX IP 250 OP 636: Mod: JZ | Performed by: EMERGENCY MEDICINE

## 2023-12-03 PROCEDURE — 87086 URINE CULTURE/COLONY COUNT: CPT | Performed by: EMERGENCY MEDICINE

## 2023-12-03 PROCEDURE — 96365 THER/PROPH/DIAG IV INF INIT: CPT | Performed by: EMERGENCY MEDICINE

## 2023-12-03 PROCEDURE — 85025 COMPLETE CBC W/AUTO DIFF WBC: CPT | Performed by: EMERGENCY MEDICINE

## 2023-12-03 PROCEDURE — 250N000013 HC RX MED GY IP 250 OP 250 PS 637: Performed by: EMERGENCY MEDICINE

## 2023-12-03 PROCEDURE — 99284 EMERGENCY DEPT VISIT MOD MDM: CPT | Mod: 25 | Performed by: EMERGENCY MEDICINE

## 2023-12-03 PROCEDURE — 81001 URINALYSIS AUTO W/SCOPE: CPT | Performed by: EMERGENCY MEDICINE

## 2023-12-03 PROCEDURE — 80048 BASIC METABOLIC PNL TOTAL CA: CPT | Performed by: EMERGENCY MEDICINE

## 2023-12-03 PROCEDURE — 36415 COLL VENOUS BLD VENIPUNCTURE: CPT | Performed by: EMERGENCY MEDICINE

## 2023-12-03 PROCEDURE — 85610 PROTHROMBIN TIME: CPT | Performed by: EMERGENCY MEDICINE

## 2023-12-03 RX ORDER — NITROFURANTOIN 25; 75 MG/1; MG/1
100 CAPSULE ORAL 2 TIMES DAILY
Qty: 20 CAPSULE | Refills: 0 | Status: SHIPPED | OUTPATIENT
Start: 2023-12-03 | End: 2023-12-13

## 2023-12-03 RX ORDER — NITROFURANTOIN 25; 75 MG/1; MG/1
100 CAPSULE ORAL ONCE
Status: COMPLETED | OUTPATIENT
Start: 2023-12-03 | End: 2023-12-03

## 2023-12-03 RX ORDER — CEFTRIAXONE 2 G/1
2 INJECTION, POWDER, FOR SOLUTION INTRAMUSCULAR; INTRAVENOUS ONCE
Status: COMPLETED | OUTPATIENT
Start: 2023-12-03 | End: 2023-12-03

## 2023-12-03 RX ADMIN — NITROFURANTOIN (MONOHYDRATE/MACROCRYSTALS) 100 MG: 75; 25 CAPSULE ORAL at 12:02

## 2023-12-03 RX ADMIN — CEFTRIAXONE 2 G: 2 INJECTION, POWDER, FOR SOLUTION INTRAMUSCULAR; INTRAVENOUS at 12:24

## 2023-12-03 ASSESSMENT — ENCOUNTER SYMPTOMS
LIGHT-HEADEDNESS: 0
SHORTNESS OF BREATH: 0
CHILLS: 0
FLANK PAIN: 0
ARTHRALGIAS: 0
CHEST TIGHTNESS: 0
FEVER: 0
AGITATION: 0
DYSURIA: 0
VOMITING: 0
NAUSEA: 0

## 2023-12-03 ASSESSMENT — ACTIVITIES OF DAILY LIVING (ADL)
ADLS_ACUITY_SCORE: 35
ADLS_ACUITY_SCORE: 35

## 2023-12-03 NOTE — PHARMACY-CONSULT NOTE
Pharmacy- Weight Dose Adjustment    Patient Active Problem List   Diagnosis    Pain in joint, ankle and foot    Atrial fibrillation with RVR (H)    Cerebrovascular accident (CVA) due to embolism of right middle cerebral artery (H)    Osteoarthrosis    Cutaneous lupus erythematosus    Gout    Ascending aorta enlargement (H24)    Family history of coronary artery disease    Essential hypertension    Left hemiparesis (H)    Long-term (current) use of anticoagulants, INR goal 2.0-3.0    Systemic lupus erythematosus (H)    Morbid obesity with BMI of 40.0-44.9, adult (H)    Permanent atrial fibrillation (H)    Obstructive sleep apnea syndrome    Need for vaccination    Onychogryphosis    Overweight    Aftercare following knee joint replacement surgery, unspecified laterality    Nail dystrophy    Onychomycosis    Anticoagulation monitoring, INR range 2-3    Status post total right knee replacement    Chronic venous stasis dermatitis of left lower extremity    Dislocation of both patellae    Patellar dislocation, left, sequela    Prediabetes        Relevant Labs:  Recent Labs   Lab Test 12/03/23  1024 12/01/23  1115   WBC 8.9 8.1   HGB 14.0 13.3    312        CrCl: 204.7    No intake or output data in the 24 hours ending 12/03/23 1217       Per Weight Dose Adjustment Protocol, will adjust:  Rocephin x1 in the ER to 2 g as patient's weight is >90 kgs      Will continue to follow and make adjustments accordingly. Thank You.    Sydni Bush [facility] ....................  12/3/2023   12:17 PM

## 2023-12-03 NOTE — ED TRIAGE NOTES
[facility] called with nurse report stating pt is with them due to a bilateral knee surgery, pt has had concentrated urine with pink tinge up until this am which urine had turned bright red. Pt has a hx of ESBL with urosepsis in past and has been being monitored for UTIs. VSS, Alert and oriented, up with assist of 1. Coming via [facility] and wife also will be coming. Call back for Nurse [phone]

## 2023-12-03 NOTE — ED TRIAGE NOTES
ED Nursing Triage Note (General)   ________________________________    Babak Moran is a 63 year old Male that presents to triage via [facility] with complaints of hematuria and foul smelling urine.  Patient denies symptoms including body aches, dysuria, or abdominal/flank pain. Staff state symptoms x1 week.   Significant symptoms had onset 1 week(s) ago.  There were no vitals taken for this visit.      [facility]      Triage Assessment (Adult)       Row Name 12/03/23 0954          Triage Assessment    Airway WDL WDL        Respiratory WDL    Respiratory WDL WDL        Skin Circulation/Temperature WDL    Skin Circulation/Temperature WDL X        Cardiac WDL    Cardiac WDL WDL        Peripheral/Neurovascular WDL    Peripheral Neurovascular WDL WDL        Cognitive/Neuro/Behavioral WDL    Cognitive/Neuro/Behavioral WDL WDL

## 2023-12-03 NOTE — DISCHARGE INSTRUCTIONS
We will do a culture on your urine, and if that indicates that you would need a different antibiotic than what you were prescribed, we will call you.  Make sure you take the antibiotic for full 10 days.  Return if at all worse.

## 2023-12-03 NOTE — ED PROVIDER NOTES
History     Chief Complaint   Patient presents with    Hematuria     HPI  Babak Moran is a 63 year old male who comes in from local nursing home via ambulance for possible UTI.  Patient is there convalescing from bilateral knee surgery.  He also reports that he was admitted to the hospital in the summer for sepsis from urinary source.  Staff has noticed that his urine has started to look darker and perhaps blood-tinged and had a bad smell over the last few days.  Today it seemed even worse and more bright red.  Patient states he feels fine and denies fevers or chills or feeling like he is coming down with anything.  There is no pain anywhere.  No shortness of breath.  No diarrhea or constipation.  He states that he really did not feel that bad when he initially presented with sepsis either.    Allergies:  Allergies   Allergen Reactions    Diatrizoate Rash    Ioxaglate Other (See Comments)     Does not recall    Levofloxacin Hives and Rash    Metrizamide Rash     (Diagnostic X-Ray materials)    Probenecid Rash       Problem List:    Patient Active Problem List    Diagnosis Date Noted    Prediabetes 12/01/2023     Priority: Medium    Patellar dislocation, left, sequela 11/07/2023     Priority: Medium     Recurrence dislocation after revision done 8 weeks ago (early September 2023)  Revised surgery by Dr Johnson Nov 13, 2023.       Dislocation of both patellae 09/13/2023     Priority: Medium     Surgical repair by Dr Johnson Bilateral 9/8/23      Chronic venous stasis dermatitis of left lower extremity 06/28/2023     Priority: Medium    Status post total right knee replacement 06/26/2023     Priority: Medium    Anticoagulation monitoring, INR range 2-3 06/16/2023     Priority: Medium    Nail dystrophy 02/23/2023     Priority: Medium    Onychomycosis 02/23/2023     Priority: Medium    Aftercare following knee joint replacement surgery, unspecified laterality 01/09/2023     Priority: Medium    Onychogryphosis  08/27/2022     Priority: Medium    Need for vaccination 04/10/2022     Priority: Medium    Obstructive sleep apnea syndrome 05/13/2019     Priority: Medium    Permanent atrial fibrillation (H) 02/11/2018     Priority: Medium    Osteoarthrosis 01/16/2018     Priority: Medium    Morbid obesity with BMI of 40.0-44.9, adult (H) 02/08/2017     Priority: Medium    Cerebrovascular accident (CVA) due to embolism of right middle cerebral artery (H) 02/03/2017     Priority: Medium    Left hemiparesis (H) 02/03/2017     Priority: Medium    Atrial fibrillation with RVR (H) 06/25/2015     Priority: Medium    Long-term (current) use of anticoagulants, INR goal 2.0-3.0 06/25/2015     Priority: Medium    Family history of coronary artery disease 05/19/2014     Priority: Medium    Ascending aorta enlargement (H24) 02/13/2014     Priority: Medium    Gout 01/07/2014     Priority: Medium     Overview:   Overview:   after 3 attacks in < a year, tapped and crystal proven 5/2/13;  Allopurinol started then got ? Atypical side effect, stopped; (short term colchicine prophylaxis)      Overweight 05/02/2013     Priority: Medium     Formatting of this note might be different from the original.  Max weight reported 475 lbs;  5/13 369 lbs      Pain in joint, ankle and foot 05/17/2012     Priority: Medium    Essential hypertension 09/08/2009     Priority: Medium    Cutaneous lupus erythematosus 11/12/2008     Priority: Medium    Systemic lupus erythematosus (H) 09/18/2008     Priority: Medium     Overview:   Dermatitis          Past Medical History:    Past Medical History:   Diagnosis Date    Atrial fibrillation (H) 02/03/2017    Bacterial sepsis (H) 8/8/2022    Cellulitis of left lower extremity 9/5/2019    Cerebral infarction (H)     Cerebral infarction due to unspecified occlusion or stenosis of right middle cerebral artery (H) 02/03/2017    Chest pain 02/1997    Disorder of skin or subcutaneous tissue 07/25/2011    Essential (primary)  hypertension 1997    Fracture of cervical vertebra (H)     Gout     Hemiplegia affecting left nondominant side (H) 2017    Obesity 2017    Other local lupus erythematosus     Sepsis (H) 2019       Past Surgical History:    Past Surgical History:   Procedure Laterality Date    ARTHROPLASTY KNEE Left 2023    Procedure: ARTHROPLASTY, KNEE, TOTAL;  Surgeon: Elliott Johnson MD;  Location: [facility] OR    ARTHROPLASTY KNEE Right 2023    Procedure: Arthroplasty knee;  Surgeon: Elliott Johnson MD;  Location: [facility] OR    ARTHROSCOPY KNEE      bilateral knees    ARTHROSCOPY KNEE Left 2023    Procedure: Knee Patellar Extensor Mechanism Repair;  Surgeon: Elliott Johnson MD;  Location:  OR    COLONOSCOPY  2012    Normal; Next due     ORIF WRIST FRACTURE         Family History:    Family History   Problem Relation Age of Onset    Unknown/Adopted Paternal Grandfather         Unknown, around age 67.    Other - See Comments Father         Parkinson's disease Alzheimer's    Cancer Father         Cancer    Heart Disease Maternal Grandmother         Heart Disease,MI in her 60's.    Heart Disease Mother 60        Heart Disease,MI    Other - See Comments Mother         rheumatoid arthritis.    Heart Disease Maternal Uncle 69        Heart Disease    Hypertension Sister         Hypertension    Cancer Sister         Cancer,melanoma    Heart Disease Paternal Uncle         Heart Disease, around 69.       Social History:  Marital Status:   [2]  Social History     Tobacco Use    Smoking status: Never     Passive exposure: Yes    Smokeless tobacco: Never   Vaping Use    Vaping Use: Never used   Substance Use Topics    Alcohol use: Not Currently     Comment: couple of times per year    Drug use: Never        Medications:    nitroFURantoin macrocrystal-monohydrate (MACROBID) 100 MG capsule  acetaminophen (TYLENOL) 325 MG tablet  amLODIPine (NORVASC) 5 MG tablet  Emollient (CERAVE)  CREA  enoxaparin ANTICOAGULANT (LOVENOX) 120 MG/0.8ML syringe  hydrochlorothiazide (HYDRODIURIL) 25 MG tablet  HYDROcodone-acetaminophen (NORCO) 5-325 MG tablet  HYDROcodone-acetaminophen (NORCO) 7.5-325 MG per tablet  metoprolol succinate ER (TOPROL XL) 25 MG 24 hr tablet  Nutritional Supplement LIQD  saccharomyces boulardii (FLORASTOR) 250 MG capsule  senna-docusate (SENOKOT-S/PERICOLACE) 8.6-50 MG tablet  warfarin ANTICOAGULANT (COUMADIN) 5 MG tablet          Review of Systems   Constitutional:  Negative for chills and fever.   HENT:  Negative for congestion.    Respiratory:  Negative for chest tightness and shortness of breath.    Cardiovascular:  Negative for chest pain.   Gastrointestinal:  Negative for nausea and vomiting.   Genitourinary:  Negative for dysuria and flank pain.   Musculoskeletal:  Negative for arthralgias.   Skin:  Negative for rash.   Neurological:  Negative for light-headedness.   Psychiatric/Behavioral:  Negative for agitation.        Physical Exam   BP: (!) 169/100  Pulse: 74  Temp: 98.3  F (36.8  C)  Resp: 20  Weight: 142.4 kg (314 lb)  SpO2: 97 %      Physical Exam  Vitals and nursing note reviewed.   Constitutional:       Appearance: Normal appearance.   HENT:      Head: Normocephalic and atraumatic.      Mouth/Throat:      Mouth: Mucous membranes are moist.   Eyes:      Conjunctiva/sclera: Conjunctivae normal.   Cardiovascular:      Rate and Rhythm: Normal rate and regular rhythm.      Heart sounds: Normal heart sounds.   Pulmonary:      Effort: Pulmonary effort is normal.      Breath sounds: Normal breath sounds.   Abdominal:      General: Bowel sounds are normal. There is no distension.      Palpations: Abdomen is soft.      Tenderness: There is no abdominal tenderness.   Skin:     General: Skin is warm and dry.   Neurological:      Mental Status: He is alert and oriented to person, place, and time.   Psychiatric:         Mood and Affect: Mood normal.         Behavior: Behavior  normal.         ED Course                 Procedures                Results for orders placed or performed during the hospital encounter of 12/03/23 (from the past 24 hour(s))   CBC with platelets differential    Narrative    The following orders were created for panel order CBC with platelets differential.  Procedure                               Abnormality         Status                     ---------                               -----------         ------                     CBC with platelets and d...[[id number]]  Abnormal            Final result                 Please view results for these tests on the individual orders.   Basic metabolic panel   Result Value Ref Range    Sodium 136 135 - 145 mmol/L    Potassium 3.6 3.4 - 5.3 mmol/L    Chloride 96 (L) 98 - 107 mmol/L    Carbon Dioxide (CO2) 28 22 - 29 mmol/L    Anion Gap 12 7 - 15 mmol/L    Urea Nitrogen 13.8 8.0 - 23.0 mg/dL    Creatinine 0.57 (L) 0.67 - 1.17 mg/dL    GFR Estimate >90 >60 mL/min/1.73m2    Calcium 9.6 8.8 - 10.2 mg/dL    Glucose 145 (H) 70 - 99 mg/dL   [facility] Draw    Narrative    The following orders were created for panel order [place] Draw.  Procedure                               Abnormality         Status                     ---------                               -----------         ------                     Extra Blue Top Tube[[id number]]                              Final result               Extra Red Top Tube[[id number]]                               Final result               Extra Green Top (Lithium...[[id number]]                      Final result                 Please view results for these tests on the individual orders.   CBC with platelets and differential   Result Value Ref Range    WBC Count 8.9 4.0 - 11.0 10e3/uL    RBC Count 5.27 4.40 - 5.90 10e6/uL    Hemoglobin 14.0 13.3 - 17.7 g/dL    Hematocrit 44.1 40.0 - 53.0 %    MCV 84 78 - 100 fL    MCH 26.6 26.5 - 33.0 pg    MCHC 31.7 31.5 - 36.5 g/dL    RDW 17.0 (H) 10.0 - 15.0 %     Platelet Count 330 150 - 450 10e3/uL    % Neutrophils 74 %    % Lymphocytes 15 %    % Monocytes 9 %    % Eosinophils 1 %    % Basophils 1 %    % Immature Granulocytes 0 %    NRBCs per 100 WBC 0 <1 /100    Absolute Neutrophils 6.7 1.6 - 8.3 10e3/uL    Absolute Lymphocytes 1.3 0.8 - 5.3 10e3/uL    Absolute Monocytes 0.8 0.0 - 1.3 10e3/uL    Absolute Eosinophils 0.1 0.0 - 0.7 10e3/uL    Absolute Basophils 0.1 0.0 - 0.2 10e3/uL    Absolute Immature Granulocytes 0.0 <=0.4 10e3/uL    Absolute NRBCs 0.0 10e3/uL   Extra Blue Top Tube   Result Value Ref Range    Hold Specimen JIC    Extra Red Top Tube   Result Value Ref Range    Hold Specimen JIC    Extra Green Top (Lithium Heparin) ON ICE   Result Value Ref Range    Hold Specimen JIC    INR   Result Value Ref Range    INR 2.32 (H) 0.85 - 1.15   UA with Microscopic reflex to Culture    Specimen: Urine, Midstream   Result Value Ref Range    Color Urine Yellow Colorless, Straw, Light Yellow, Yellow    Appearance Urine Cloudy (A) Clear    Glucose Urine Negative Negative mg/dL    Bilirubin Urine Negative Negative    Ketones Urine Negative Negative mg/dL    Specific Gravity Urine 1.013 1.000 - 1.030    Blood Urine Large (A) Negative    pH Urine 6.5 5.0 - 9.0    Protein Albumin Urine 50 (A) Negative mg/dL    Urobilinogen Urine Normal Normal, 2.0 mg/dL    Nitrite Urine Positive (A) Negative    Leukocyte Esterase Urine Large (A) Negative    Bacteria Urine Many (A) None Seen /HPF    WBC Clumps Urine Present (A) None Seen /HPF    Budding Yeast Urine Few (A) None Seen /HPF    Mucus Urine Present (A) None Seen /LPF    RBC Urine >182 (H) <=2 /HPF    WBC Urine >182 (H) <=5 /HPF    Narrative    Urine Culture ordered based on laboratory criteria       Medications   cefTRIAXone (ROCEPHIN) 2 g vial to attach to  ml bag for ADULTS or NS 50 ml bag for PEDS (has no administration in time range)   nitroFURantoin macrocrystal-monohydrate (MACROBID) capsule 100 mg (100 mg Oral $Given  12/3/23 1202)       Assessments & Plan (with Medical Decision Making)     I have reviewed the nursing notes.    I have reviewed the findings, diagnosis, plan and need for follow up with the patient.  Labs are all reassuring.  Urine certainly appears to show recurrent UTI.  Based on previous culture, will place on Macrobid.  Will do a little longer course this time.  Will also give initial dose of IV Rocephin.  Culture initiated, and he will be contacted should this indicate he needs a different antibiotic.  Return if worse.        New Prescriptions    NITROFURANTOIN MACROCRYSTAL-MONOHYDRATE (MACROBID) 100 MG CAPSULE    Take 1 capsule (100 mg) by mouth 2 times daily for 10 days       Final diagnoses:   Urinary tract infection with hematuria, site unspecified   History of ESBL E. coli infection       12/3/2023   [facility] AND \[facility]\""       Alexei Chong MD  12/03/23 121

## 2023-12-04 ENCOUNTER — DOCUMENTATION ONLY (OUTPATIENT)
Dept: OTHER | Facility: CLINIC | Age: 63
End: 2023-12-04
Payer: COMMERCIAL

## 2023-12-05 LAB — BACTERIA UR CULT: ABNORMAL

## 2023-12-06 ENCOUNTER — ANTICOAGULATION THERAPY VISIT (OUTPATIENT)
Dept: ANTICOAGULATION | Facility: OTHER | Age: 63
End: 2023-12-06
Attending: FAMILY MEDICINE
Payer: COMMERCIAL

## 2023-12-06 ENCOUNTER — TRANSFERRED RECORDS (OUTPATIENT)
Dept: HEALTH INFORMATION MANAGEMENT | Facility: OTHER | Age: 63
End: 2023-12-06

## 2023-12-06 DIAGNOSIS — Z79.01 ANTICOAGULATION MONITORING, INR RANGE 2-3: ICD-10-CM

## 2023-12-06 DIAGNOSIS — I48.21 PERMANENT ATRIAL FIBRILLATION (H): Primary | ICD-10-CM

## 2023-12-06 DIAGNOSIS — I48.91 ATRIAL FIBRILLATION WITH RVR (H): ICD-10-CM

## 2023-12-06 LAB — INR (EXTERNAL): 2.9 (ref 0.9–1.1)

## 2023-12-06 NOTE — PROGRESS NOTES
ANTICOAGULATION MANAGEMENT     Babak Moran 63 year old male is on warfarin with therapeutic INR result. (Goal INR 2.0-3.0)    Recent labs: (last 7 days)     12/06/23  1425   INR 2.9*       ASSESSMENT     Source(s): Template     Warfarin doses taken: Warfarin taken as instructed  Diet: No new diet changes identified  New illness, injury, or hospitalization: No  Medication/supplement changes: None noted  Signs or symptoms of bleeding or clotting: No  Previous INR: Therapeutic last 2(+) visits  Additional findings: None     PLAN     Recommended plan for no diet, medication or health factor changes affecting INR     Dosing Instructions: Continue your current warfarin dose with next INR in 1 week       Summary  As of 12/6/2023      Full warfarin instructions:  10 mg every Mon, Thu; 7.5 mg all other days   Next INR check:                 Faxed dosing and follow up instructions to [facility]    Orders given to  Homecare nurse/facility to recheck    Education provided: None required    Plan made per [facility] anticoagulation protocol    Siobhan Clayton RN  Anticoagulation Clinic  12/6/2023    _______________________________________________________________________     Anticoagulation Episode Summary       Current INR goal:  2.0-3.0   TTR:  52.1% (11.9 mo)   Target end date:  Indefinite   Send INR reminders to:  [facility]    Indications    Permanent atrial fibrillation (H) [I48.21]  Anticoagulation monitoring  INR range 2-3 (Resolved) [Z79.01]  Atrial fibrillation with RVR (H) [I48.91]  Anticoagulation monitoring  INR range 2-3 [Z79.01]             Comments:  Babak prefers to be closer to 3.0 d/t previous CVA check Q 4 weeks.Declines to reduce dose when slightly over 3.0  Needs to change PCP  Takes a long time get up to therapeutic after holding warfarin.             Anticoagulation Care Providers       Provider Role Specialty Phone number    Teo Funes MD Referring Family Medicine [phone]

## 2023-12-13 ENCOUNTER — ANTICOAGULATION THERAPY VISIT (OUTPATIENT)
Dept: ANTICOAGULATION | Facility: OTHER | Age: 63
End: 2023-12-13
Payer: COMMERCIAL

## 2023-12-13 ENCOUNTER — NURSING HOME VISIT (OUTPATIENT)
Dept: GERIATRICS | Facility: OTHER | Age: 63
End: 2023-12-13

## 2023-12-13 ENCOUNTER — TRANSFERRED RECORDS (OUTPATIENT)
Dept: HEALTH INFORMATION MANAGEMENT | Facility: OTHER | Age: 63
End: 2023-12-13

## 2023-12-13 DIAGNOSIS — G81.94 LEFT HEMIPARESIS (H): ICD-10-CM

## 2023-12-13 DIAGNOSIS — I48.21 PERMANENT ATRIAL FIBRILLATION (H): Primary | ICD-10-CM

## 2023-12-13 DIAGNOSIS — Z96.651 STATUS POST TOTAL RIGHT KNEE REPLACEMENT: ICD-10-CM

## 2023-12-13 DIAGNOSIS — I10 ESSENTIAL HYPERTENSION: ICD-10-CM

## 2023-12-13 DIAGNOSIS — I48.21 PERMANENT ATRIAL FIBRILLATION (H): ICD-10-CM

## 2023-12-13 DIAGNOSIS — R73.03 PREDIABETES: ICD-10-CM

## 2023-12-13 DIAGNOSIS — S83.005S PATELLAR DISLOCATION, LEFT, SEQUELA: Primary | ICD-10-CM

## 2023-12-13 DIAGNOSIS — I48.91 ATRIAL FIBRILLATION WITH RVR (H): ICD-10-CM

## 2023-12-13 DIAGNOSIS — I87.2 CHRONIC VENOUS STASIS DERMATITIS OF LEFT LOWER EXTREMITY: ICD-10-CM

## 2023-12-13 DIAGNOSIS — Z79.01 ANTICOAGULATION MONITORING, INR RANGE 2-3: ICD-10-CM

## 2023-12-13 DIAGNOSIS — E66.01 MORBID OBESITY WITH BMI OF 40.0-44.9, ADULT (H): ICD-10-CM

## 2023-12-13 DIAGNOSIS — Z79.01 LONG-TERM (CURRENT) USE OF ANTICOAGULANTS, INR GOAL 2.0-3.0: Chronic | ICD-10-CM

## 2023-12-13 DIAGNOSIS — N02.9 RECURRENT GROSS HEMATURIA: ICD-10-CM

## 2023-12-13 LAB — INR (EXTERNAL): 3.1 (ref 0.9–1.1)

## 2023-12-13 PROCEDURE — 99310 SBSQ NF CARE HIGH MDM 45: CPT | Mod: 24 | Performed by: NURSE PRACTITIONER

## 2023-12-13 NOTE — PROGRESS NOTES
[facility]  60 day re-certification      Patient Name: Babak Moran   : 1960  MRN: [id number]    Place of Service: [facility]  DOS: 2023    CC: 60 day re-certification      HPI:  Babak Moran is a 63 year old male with PMH of multiple chronic medical concerns, who is seen today regarding 60 day re-certification for the following:    Permanent atrial fibrillation (H)  Essential hypertension  Prediabetes  Morbid obesity with BMI of 40.0-44.9, adult (H)  Chronic venous stasis dermatitis of left lower extremity  Status post total right knee replacement  Long-term (current) use of anticoagulants, INR goal 2.0-3.0  Patellar dislocation, left, sequela  Left hemiparesis (H)  Recurrent gross hematuria    Pt seen today for re-certification visit and also was recently in [facility] ED 12/3 for hematuria. UA was strongly suspicious for recurrent UTI at this time. He was given rocephin IV in ED and discharged back to rehab with a longer duration of nitrofurantoin which last dose is 23. Given his recurrent gross hematuria with UTI/ESBL, we had discussed prior to ED visit having a urology consult and this is scheduled for  with Dr Browning.     Pt has had UTI sepsis 2023 with no symptoms other than gross hematuria. Pre-op workup in Nov showed elevated WBC likely from UTI, Due to going to surgery elected to treat with nitrofurantoin. Post surgery about 2 weeks, started developing strong odor to urine, cloudy but again absolutely no classic UTI symptoms (no burning, frequency, urgency, flank pain, pelvic discomfort, fever). His urine appeared very cloudy, almost a faint milky but light yellow appearance. Two days later pt developed gross hematuria and UA appeared supportive for another UTI. Currently since being treated with nitrofurantoin, he has had clear yellow urine.     Pt reports he is feeling well. He has improved significantly with his mobility in therapy and now only  SBA with walker, wears immobilizer brace to support knee and keep from dislocating patella. He has follow up ortho Dr Johnson Dec 18.     Pain is well controlled, only takes tylenol at this time. He is not taking norco but would like to keep it to have available when therapy starts ramping up.     Afib/hypertension: well controlled, currently taking metoprolol succinate, hydrochlorothiazide and amlodipine as well as coumadin. Pt is high risk for stroke without therapeutic INR.         Multidisciplinary notes, laboratory values, medications, vital signs, weight and orders arereviewed from nursing home records. I have reviewed the patient s medical history and updated the computerized patient record.     PMH:  Past Medical History:   Diagnosis Date    Atrial fibrillation (H) 02/03/2017    on Warfarin    Bacterial sepsis (H) 8/8/2022    Cellulitis of left lower extremity 9/5/2019    Cerebral infarction (H)     Cerebral infarction due to unspecified occlusion or stenosis of right middle cerebral artery (H) 02/03/2017    ,Left hemiparesis, left neglect, impaired balance and gait following R MCA stroke with mild hemorrhagic transformation s/p tissue plasminogen activator and mechanical thrombectomy, s/p C7 facet fracture from fall off forklift.    Chest pain 02/1997    Disorder of skin or subcutaneous tissue 07/25/2011    Essential (primary) hypertension 02/1997    Fracture of cervical vertebra (H)     02/03/2017,C7 facet fracture from fall off forklift, nondisplaced    Gout     Hemiplegia affecting left nondominant side (H) 02/03/2017    Obesity 02/03/2017    body mass index of 40    Other local lupus erythematosus     Sepsis (H) 9/6/2019       Medications:  Current Outpatient Medications   Medication Sig Dispense Refill    acetaminophen (TYLENOL) 325 MG tablet Take 2 tablets (650 mg) by mouth every 4 hours as needed for other (mild pain) 100 tablet 0    amLODIPine (NORVASC) 5 MG tablet Take 2 tablets (10 mg) by mouth  daily 90 tablet 4    Emollient (CERAVE) CREA Apply topically 2 times daily      enoxaparin ANTICOAGULANT (LOVENOX) 120 MG/0.8ML syringe Inject 120 mg Subcutaneous every 12 hours      hydrochlorothiazide (HYDRODIURIL) 25 MG tablet Take 1 tablet (25 mg) by mouth daily 90 tablet 4    HYDROcodone-acetaminophen (NORCO) 5-325 MG tablet Take 1-2 tablets by mouth every 4 hours as needed for moderate to severe pain 30 tablet 0    HYDROcodone-acetaminophen (NORCO) 7.5-325 MG per tablet Take 1 tablet by mouth every 4 hours as needed for severe pain or moderate pain And 2 tablets q 4 hours PRN pain 8-10 severe  0    metoprolol succinate ER (TOPROL XL) 25 MG 24 hr tablet Take 1 tablet (25 mg) by mouth daily      nitroFURantoin macrocrystal-monohydrate (MACROBID) 100 MG capsule Take 1 capsule (100 mg) by mouth 2 times daily for 10 days 20 capsule 0    Nutritional Supplement LIQD Take 4 oz by mouth 2 times daily [employer] Supplement two times daily with meals      saccharomyces boulardii (FLORASTOR) 250 MG capsule Take 1 capsule (250 mg) by mouth daily      senna-docusate (SENOKOT-S/PERICOLACE) 8.6-50 MG tablet Take 1 tablet by mouth daily AND 1 tab daily PRN      warfarin ANTICOAGULANT (COUMADIN) 5 MG tablet 7.5 mg every day or as directed by the protime clinic 160 tablet 1      Medication reconciliation complete between [facility] record and [facility].     Allergies:  Allergies   Allergen Reactions    Diatrizoate Rash    Ioxaglate Other (See Comments)     Does not recall    Levofloxacin Hives and Rash    Metrizamide Rash     (Diagnostic X-Ray materials)    Probenecid Rash       Review of Systems:  See HPI    Vital Signs:  Temp 98.0   Pulse 78   Respirations 16   /68   Oxygen Sats 96%   Weight 318#    Physical Exam:     Pleasant and alert without distress.    Sclera nonicteric, conjunctiva non-inflamed.  Skin color pink. Mucous membranes moist.   Lungs clear to auscultation throughout. No wheezes or rales noted.   Cardiovascular  "irregular auscultated. No murmur noted.  Abdomen obese soft and without tenderness   Extremities without edema. DPPT  chronic venous stasis dermatitis  Gait is stable with SBA and walker, immobilizer on left leg  Able to move upper and lower extremities         New Labs/Diagnostics:    Lab Results   Component Value Date    WBC 8.9 12/03/2023    WBC 7.1 09/29/2020     Lab Results   Component Value Date    RBC 5.27 12/03/2023    RBC 5.49 09/29/2020     Lab Results   Component Value Date    HGB 14.0 12/03/2023    HGB 14.5 09/29/2020     Lab Results   Component Value Date    HCT 44.1 12/03/2023    HCT 47.3 09/29/2020     No components found for: \"MCT\"  Lab Results   Component Value Date    MCV 84 12/03/2023    MCV 86 09/29/2020     Lab Results   Component Value Date    MCH 26.6 12/03/2023    MCH 26.4 09/29/2020     Lab Results   Component Value Date    MCHC 31.7 12/03/2023    MCHC 30.7 09/29/2020     Lab Results   Component Value Date    RDW 17.0 12/03/2023    RDW 16.1 09/29/2020     Lab Results   Component Value Date     12/03/2023     09/29/2020     Last Comprehensive Metabolic Panel:  Lab Results   Component Value Date     12/03/2023    POTASSIUM 3.6 12/03/2023    CHLORIDE 96 (L) 12/03/2023    CO2 28 12/03/2023    ANIONGAP 12 12/03/2023     (H) 12/03/2023    BUN 13.8 12/03/2023    CR 0.57 (L) 12/03/2023    GFRESTIMATED >90 12/03/2023    VIRGEN 9.6 12/03/2023      UA RESULTS:  Recent Labs   Lab Test 12/03/23  1052 08/07/22  1909 09/06/19  1120   COLOR Yellow   < > Yellow   APPEARANCE Cloudy*   < > Clear   URINEGLC Negative   < > Negative   URINEBILI Negative   < > Negative   URINEKETONE Negative   < > Negative   SG 1.013   < > 1.020   UBLD Large*   < > Moderate*   URINEPH 6.5   < > 5.0   PROTEIN 50*   < > Negative   UROBILINOGEN  --   --  0.2   NITRITE Positive*   < > Negative   LEUKEST Large*   < > Negative   RBCU >182*   < > O - 2   WBCU >182*   < > 0 - 5    < > = values in this interval not " displayed.        Assessment/Plan:  (S83.005S) Patellar dislocation, left, sequela  (primary encounter diagnosis)  Comment: Recurrence dislocation after revision done 8 weeks ago (early September 2023)  Revised surgery by Dr Johnson Nov 13, 2023.   Plan: healing; follow up with Dr Johnson Dec 18, cont working with PT/OT    (I48.21) Permanent atrial fibrillation (H)  (Z79.01) Long-term (current) use of anticoagulants, INR goal 2.0-3.0  Comment: chronic, rate controlled  Plan: cont metoprolol succinate 25 mg daily, coumadin    (I10) Essential hypertension  Comment: chronic, stable  Plan: cont metoprolol, amlodipine, hydrochlorothiazide    (R73.03) Prediabetes  Comment: stable  Plan: diet controlled, slowly increasing activity    (E66.01,  Z68.41) Morbid obesity with BMI of 40.0-44.9, adult (H)  Comment: chronic  Plan: diet, increase activity    (I87.2) Chronic venous stasis dermatitis of left lower extremity  Comment: chronic  Plan: lotion to keep skin intact, no edema    (Z96.651) Status post total right knee replacement  Comment: healed  Plan: follow up ortho, working with PT/OT    (G81.94) Left hemiparesis (H)  Comment: chronic, mild  Plan: cont working with PT/OT; remain on coumadin     (N02.9) Recurrent gross hematuria  Comment: microscopic to gross hematuria with recent recurrent UTI 3x since July 2023 when he had sepsis with no symptoms other than hematuria  Plan: due to recurrent hematuria and recurrent UTI (ESBL in urine) he will consult with urology Dr Browning Jan 24; last dose of nitrofurantoin today (10 days worth)        A total of 47 minutes was spent with this patient, of which more than 50% was spent in counseling and/or coordination of care.     JAYLEN Riley, CNP ....................  12/13/2023   10:09 AM

## 2023-12-13 NOTE — PROGRESS NOTES
ANTICOAGULATION MANAGEMENT     Babak Moran 63 year old male is on warfarin with therapeutic INR result. (Goal INR 2.0-3.0)    Recent labs: (last 7 days)     12/13/23  1154   INR 3.1*       ASSESSMENT     Source(s): Chart Review and Home Care/Facility Nurse     Warfarin doses taken: Warfarin taken as instructed  Diet: No new diet changes identified  Medication/supplement changes:  finishes macrobid today  New illness, injury, or hospitalization: No  Signs or symptoms of bleeding or clotting: No  Previous result: Therapeutic last 2(+) visits  Additional findings: None       PLAN     Recommended plan for ongoing change(s) affecting INR     Dosing Instructions: Continue your current warfarin dose with next INR in 2 weeks       Summary  As of 12/13/2023      Full warfarin instructions:  10 mg every Mon, Thu; 7.5 mg all other days   Next INR check:  12/27/2023               Faxed dosing and follow up instructions to [facility]    Orders given to  Homecare nurse/facility to recheck    Patient not here, [facility] Communication sheet with screening questions and current INR received via FAX from outside agency. Results reviewed, Warfarin dosing per protocol, and recommended follow-up appointment made. Paperwork FAXED back to facility.       Plan made per [facility] anticoagulation protocol    Shelley Mercado RN  Anticoagulation Clinic  12/13/2023    _______________________________________________________________________     Anticoagulation Episode Summary       Current INR goal:  2.0-3.0   TTR:  51.1% (11.9 mo)   Target end date:  Indefinite   Send INR reminders to:  [facility]    Indications    Permanent atrial fibrillation (H) [I48.21]  Anticoagulation monitoring  INR range 2-3 (Resolved) [Z79.01]  Atrial fibrillation with RVR (H) [I48.91]  Anticoagulation monitoring  INR range 2-3 [Z79.01]             Comments:  Babak prefers to be closer to 3.0 d/t previous CVA check Q 4 weeks.Declines to reduce dose when slightly  over 3.0  Needs to change PCP  Takes a long time get up to therapeutic after holding warfarin.             Anticoagulation Care Providers       Provider Role Specialty Phone number    Teo Funes MD Referring Family Medicine [phone]

## 2023-12-18 ENCOUNTER — OFFICE VISIT (OUTPATIENT)
Dept: ORTHOPEDICS | Facility: OTHER | Age: 63
End: 2023-12-18
Attending: ORTHOPAEDIC SURGERY
Payer: COMMERCIAL

## 2023-12-18 DIAGNOSIS — Z96.652 STATUS POST TOTAL LEFT KNEE REPLACEMENT: Primary | ICD-10-CM

## 2023-12-18 DIAGNOSIS — S83.006A PATELLAR DISLOCATION, UNSPECIFIED LATERALITY, INITIAL ENCOUNTER: ICD-10-CM

## 2023-12-18 PROCEDURE — 99024 POSTOP FOLLOW-UP VISIT: CPT | Performed by: ORTHOPAEDIC SURGERY

## 2023-12-18 PROCEDURE — G0463 HOSPITAL OUTPT CLINIC VISIT: HCPCS

## 2023-12-18 NOTE — PROGRESS NOTES
Subjective:    63-year-old gentleman with redislocation of the left patella following medial retinacular repair and subsequent traumatic dislocation a second time and subsequent rerepair of the medial retinaculum.  At this point he is continuing to have dislocation of his left patella.    Objective:    On examination his patella is located today however x-rays were taken on 6 November that showed that it is dislocated and it is dynamic in nature.    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  This is the best chance he has to have normal function of his knee at this point.  All the risks and benefits surgical intervention though well-known to him were reiterated today he would like to proceed forward.

## 2023-12-27 ENCOUNTER — ANTICOAGULATION THERAPY VISIT (OUTPATIENT)
Dept: ANTICOAGULATION | Facility: OTHER | Age: 63
End: 2023-12-27
Payer: COMMERCIAL

## 2023-12-27 ENCOUNTER — TRANSFERRED RECORDS (OUTPATIENT)
Dept: HEALTH INFORMATION MANAGEMENT | Facility: OTHER | Age: 63
End: 2023-12-27

## 2023-12-27 DIAGNOSIS — Z79.01 ANTICOAGULATION MONITORING, INR RANGE 2-3: ICD-10-CM

## 2023-12-27 DIAGNOSIS — I48.91 ATRIAL FIBRILLATION WITH RVR (H): ICD-10-CM

## 2023-12-27 DIAGNOSIS — I48.21 PERMANENT ATRIAL FIBRILLATION (H): Primary | ICD-10-CM

## 2023-12-27 LAB — INR (EXTERNAL): 2.7 (ref 0.9–1.1)

## 2023-12-27 NOTE — PROGRESS NOTES
ANTICOAGULATION MANAGEMENT     Babak Moran 63 year old male is on warfarin with therapeutic INR result. (Goal INR 2.0-3.0)    Recent labs: (last 7 days)     12/27/23  1428   INR 2.7*       ASSESSMENT     Source(s): Chart Review and Home Care/Facility Nurse     Warfarin doses taken: Warfarin taken as instructed  Diet: No new diet changes identified  Medication/supplement changes: None noted  New illness, injury, or hospitalization: No  Signs or symptoms of bleeding or clotting: No  Previous result: Therapeutic last 2(+) visits  Additional findings: Upcoming surgery/procedure surgery to left knee 1/11       PLAN     Recommended plan for temporary change(s) affecting INR     Dosing Instructions:  will continue with same dose until he needs to start holding warfarin on 1/6/ patient will need bridging instructions for procedure   with next INR in 2 weeks       Summary  As of 12/27/2023      Full warfarin instructions:  1/6: Hold; 1/7: Hold; 1/8: Hold; 1/9: Hold; 1/10: Hold; Otherwise 10 mg every Mon, Thu; 7.5 mg all other days   Next INR check:  1/17/2024               Faxed dosing and follow up instructions to [facility]    Orders given to  Homecare nurse/facility to recheck    Patient not here, [facility] Communication sheet with screening questions and current INR received via FAX from outside agency. Results reviewed, Warfarin dosing per protocol, and recommended follow-up appointment made. Paperwork FAXED back to facility.       Plan made per [facility] anticoagulation protocol    Shelley Mercado RN  Anticoagulation Clinic  12/27/2023    _______________________________________________________________________     Anticoagulation Episode Summary       Current INR goal:  2.0-3.0   TTR:  52.5% (11.9 mo)   Target end date:  Indefinite   Send INR reminders to:  [facility]    Indications    Permanent atrial fibrillation (H) [I48.21]  Anticoagulation monitoring  INR range 2-3 (Resolved) [Z79.01]  Atrial fibrillation  with RVR (H) [I48.91]  Anticoagulation monitoring  INR range 2-3 [Z79.01]             Comments:  Babak prefers to be closer to 3.0 d/t previous CVA check Q 4 weeks.Declines to reduce dose when slightly over 3.0  Needs to change PCP  Takes a long time get up to therapeutic after holding warfarin.             Anticoagulation Care Providers       Provider Role Specialty Phone number    Teo Funes MD Referring Family Medicine [phone]

## 2024-01-04 ENCOUNTER — NURSING HOME VISIT (OUTPATIENT)
Dept: GERIATRICS | Facility: OTHER | Age: 64
End: 2024-01-04
Attending: NURSE PRACTITIONER
Payer: COMMERCIAL

## 2024-01-04 DIAGNOSIS — S83.005S PATELLAR DISLOCATION, LEFT, SEQUELA: Primary | ICD-10-CM

## 2024-01-04 DIAGNOSIS — I87.2 CHRONIC VENOUS STASIS DERMATITIS OF LEFT LOWER EXTREMITY: ICD-10-CM

## 2024-01-04 DIAGNOSIS — G81.94 LEFT HEMIPARESIS (H): ICD-10-CM

## 2024-01-04 DIAGNOSIS — Z79.01 LONG-TERM (CURRENT) USE OF ANTICOAGULANTS, INR GOAL 2.0-3.0: Chronic | ICD-10-CM

## 2024-01-04 DIAGNOSIS — E66.01 MORBID OBESITY WITH BMI OF 40.0-44.9, ADULT (H): ICD-10-CM

## 2024-01-04 DIAGNOSIS — I10 ESSENTIAL HYPERTENSION: ICD-10-CM

## 2024-01-04 DIAGNOSIS — I63.411 CEREBROVASCULAR ACCIDENT (CVA) DUE TO EMBOLISM OF RIGHT MIDDLE CEREBRAL ARTERY (H): ICD-10-CM

## 2024-01-04 DIAGNOSIS — G47.33 OBSTRUCTIVE SLEEP APNEA SYNDROME: ICD-10-CM

## 2024-01-04 DIAGNOSIS — I48.21 PERMANENT ATRIAL FIBRILLATION (H): ICD-10-CM

## 2024-01-04 PROCEDURE — 99310 SBSQ NF CARE HIGH MDM 45: CPT | Mod: 24 | Performed by: NURSE PRACTITIONER

## 2024-01-04 NOTE — PROGRESS NOTES
[facility] AND [facility]  [address]  [place] [address]  Phone: [phone]  Fax: [phone]  Primary Provider: Ilir Christie  Pre-op Performing Provider: SARA REYNA     PREOPERATIVE EVALUATION:  Today's date: 1/4/2023     Babak is a 63 year old male who presents for a preoperative evaluation.     Surgical Information:  Surgery/Procedure: MPFL repair utilizing cadaveric tendon   Surgery Location: [facility]  Surgeon: Dr Johnson  Surgery Date: 1/11/24  Time of Surgery: 12pm  Where patient plans to recover: At a rehab center [facility]  Fax number for surgical facility: Note does not need to be faxed, will be available electronically in Epic.        Assessment & Plan   The proposed surgical procedure is considered INTERMEDIATE risk. Orthopedic surgery     (S83.005S) Patellar dislocation, left, sequela  (primary encounter diagnosis)  Comment: recurrence of dislocation after surgical repair 8 weeks ago (early Sept 2023)  Plan: surgical repair as above  Check CBC, CMP   EKG done 11/7/23.      (I63.411) Cerebrovascular accident (CVA) due to embolism of right middle cerebral artery (H)  (G81.94) Left hemiparesis (H)  Comment: currently on warfarin  Plan: will need bridging done with lovenox, warfarin would be held 5 days prior to surgery.  After 3 days he will start enoxaparin injections at near therapeutic dosing of 120 mg twice daily. Take for 2 days, hold 1 full day prior to surgery.  Same plan as prior to knee replacement in January 2023 and again in June, Sept, and Nov 2023.      (G47.33) Obstructive sleep apnea syndrome  Comment: does not wear CPAP   Plan: monitor resp status     (E66.01,  Z68.41) Morbid obesity with BMI of 40.0-44.9, adult (H)  Comment: does put him at additional risks/complications periop/post op  Plan: monitor     (I48.21) Permanent atrial fibrillation (H)  Comment: rate controlled 60-70s, rare in 90s  Plan: cont metoprolol succinate 25 mg  daily, warfarin pre-op orders. Bridging with lovenox done as noted above     (I10) Essential hypertension  Comment: 110-130s systolic, occasionally in 140s  Plan: cont hydrochlorothiazide, amlodipine, and metoprolol succinate. HOLD hydrochlorothiazide morning of surgery.      (I87.2) Chronic venous stasis dermatitis of left lower extremity  Comment: chronic, stable  Plan: monitor, increased risk for infection     (Z79.01) Long-term (current) use of anticoagulants, INR goal 2.0-3.0  Comment: chronic  Plan: on warfarin; Bridging with lovenox done as noted above         RECOMMENDATION:  APPROVAL GIVEN to proceed with proposed procedure, with further diagnostic evaluation to be reviewed when labs result.   Will APPROVE surgery for Thursday, Jan 11th.      Prescription drug management  45 minutes spent by me on the date of the encounter doing chart review, history and exam, documentation and further activities per the note              Subjective   HPI related to upcoming procedure: 63 year old male residing at [facility] since June 2023 after right knee replacement, discharged home and re-admitted back to [facility] rehab in Aug after UTI with sepsis, had a fall and subsequently had bilateral patellar dislocation after this. Had bilateral repair done in Sept 2023. Right knee healed well but left knee patellar dislocated again, likely after near accident during ride home in van where had to push hard on the breaks and pt had to brace himself in his wheelchair with left leg to avoid falling out of his chair. Left knee has not fully healed well since this occurred. He had another left patella repair by Dr Johnson Nov 2023. He again had lateral patella dislocation and is requiring another surgery MPFL repair utilizing cadaveric tendon on Jan 11.     Pt has hx of stroke and afib. Rate is controlled, on beta blocker. Stroke occurred when pt had taken smaller dose of warfarin than prescribed so pt is higher risk for  stroke with stopping warfarin but will bridge him with lovenox like he had done the past few surgeries and he has done well.                  6/16/2023    10:46 AM   Preop Questions   1. Have you ever had a heart attack or stroke? YES - 2017   2. Have you ever had surgery on your heart or blood vessels, such as a stent placement, a coronary artery bypass, or surgery on an artery in your head, neck, heart, or legs? No   3. Do you have chest pain with activity? No   4. Do you have a history of  heart failure? No   5. Do you currently have a cold, bronchitis or symptoms of other infection? No    6. Do you have a cough, shortness of breath, or wheezing? No   7. Do you or anyone in your family have previous history of blood clots? YES - 2017 stroke   8. Do you or does anyone in your family have a serious bleeding problem such as prolonged bleeding following surgeries or cuts? UNKNOWN -    9. Have you ever had problems with anemia or been told to take iron pills? UNKNOWN -    10. Have you had any abnormal blood loss such as black, tarry or bloody stools? No   11. Have you ever had a blood transfusion? No   12. Are you willing to have a blood transfusion if it is medically needed before, during, or after your surgery? Yes   13. Have you or any of your relatives ever had problems with anesthesia? No   14. Do you have sleep apnea, excessive snoring or daytime drowsiness? Yes-he had sleep study and was told to try a CPAP which he did not tolerate due to claustrophobia and does not want to try again.    15. Do you have any artifical heart valves or other implanted medical devices like a pacemaker, defibrillator, or continuous glucose monitor? No   16. Do you have artificial joints? YES - bilateral knees   17. Are you allergic to latex? No      Health Care Directive:  Patient does not have a Health Care Directive or Living Will: Full code     Preoperative Review of :   reviewed - controlled substances reflected in  medication list.        Status of Chronic Conditions:  A-FIB - Patient has a longstanding history of chronic A-fib currently on rate control. Current treatment regimen includes Warfarin for stroke prevention and denies significant symptoms of lightheadedness, palpitations or dyspnea.      HYPERTENSION - Patient has longstanding history of HTN , currently denies any symptoms referable to elevated blood pressure. Specifically denies chest pain, palpitations, dyspnea, orthopnea, PND or peripheral edema. Blood pressure readings ranging 110-130s, with a few in 140s. Current medication regimen is as listed below. Patient denies any side effects of medication.      Review of Systems  Pt denies feeling short of breath, chest pain, cough, diarrhea, constipation, no urinary concerns, no numbness/tingling in feet or legs. No  pain. Denies headaches, vision changes, chills.           Patient Active Problem List     Diagnosis Date Noted    Dislocation of both patellae 09/13/2023       Priority: Medium       Surgical repair by Dr Johnson Bilateral 9/8/23       Infection due to ESBL-producing Escherichia coli 08/05/2023       Priority: Medium    Chronic venous stasis dermatitis of left lower extremity 06/28/2023       Priority: Medium    Status post total right knee replacement 06/26/2023       Priority: Medium    Anticoagulation monitoring, INR range 2-3 06/16/2023       Priority: Medium    Nail dystrophy 02/23/2023       Priority: Medium    Onychomycosis 02/23/2023       Priority: Medium    Aftercare following knee joint replacement surgery, unspecified laterality 01/09/2023       Priority: Medium    Onychogryphosis 08/27/2022       Priority: Medium    Need for vaccination 04/10/2022       Priority: Medium    Obstructive sleep apnea syndrome 05/13/2019       Priority: Medium    Permanent atrial fibrillation (H) 02/11/2018       Priority: Medium    Osteoarthrosis 01/16/2018       Priority: Medium    Morbid obesity with BMI of  40.0-44.9, adult (H) 02/08/2017       Priority: Medium    Cerebrovascular accident (CVA) due to embolism of right middle cerebral artery (H) 02/03/2017       Priority: Medium    Left hemiparesis (H) 02/03/2017       Priority: Medium    Atrial fibrillation with RVR (H) 06/25/2015       Priority: Medium    Long-term (current) use of anticoagulants, INR goal 2.0-3.0 06/25/2015       Priority: Medium    Family history of coronary artery disease 05/19/2014       Priority: Medium    Ascending aorta enlargement (H24) 02/13/2014       Priority: Medium    Gout 01/07/2014       Priority: Medium       Overview:   Overview:   after 3 attacks in < a year, tapped and crystal proven 5/2/13;  Allopurinol started then got ? Atypical side effect, stopped; (short term colchicine prophylaxis)       Overweight 05/02/2013       Priority: Medium       Formatting of this note might be different from the original.  Max weight reported 475 lbs;  5/13 369 lbs       Pain in joint, ankle and foot 05/17/2012       Priority: Medium    Essential hypertension 09/08/2009       Priority: Medium    Cutaneous lupus erythematosus 11/12/2008       Priority: Medium    Systemic lupus erythematosus (H) 09/18/2008       Priority: Medium       Overview:   Dermatitis         Past Medical History        Past Medical History:   Diagnosis Date    Atrial fibrillation (H) 02/03/2017     on Warfarin    Bacterial sepsis (H) 8/8/2022    Cellulitis of left lower extremity 9/5/2019    Cerebral infarction (H)      Cerebral infarction due to unspecified occlusion or stenosis of right middle cerebral artery (H) 02/03/2017     ,Left hemiparesis, left neglect, impaired balance and gait following R MCA stroke with mild hemorrhagic transformation s/p tissue plasminogen activator and mechanical thrombectomy, s/p C7 facet fracture from fall off forklift.    Chest pain 02/1997    Disorder of skin or subcutaneous tissue 07/25/2011    Essential (primary) hypertension 02/1997     Fracture of cervical vertebra (H)       02/03/2017,C7 facet fracture from fall off forklift, nondisplaced    Gout      Hemiplegia affecting left nondominant side (H) 02/03/2017    Obesity 02/03/2017     body mass index of 40    Other local lupus erythematosus      Sepsis (H) 9/6/2019         Past Surgical History         Past Surgical History:   Procedure Laterality Date    ARTHROPLASTY KNEE Left 1/9/2023     Procedure: ARTHROPLASTY, KNEE, TOTAL;  Surgeon: Elliott Johnson MD;  Location: [facility] OR    ARTHROPLASTY KNEE Right 6/26/2023     Procedure: Arthroplasty knee;  Surgeon: Elliott Johnson MD;  Location: [facility] OR    ARTHROSCOPY KNEE         bilateral knees    COLONOSCOPY   01/02/2012     Normal; Next due 2022    ORIF WRIST FRACTURE   2019         Current Outpatient Prescriptions          Current Outpatient Medications   Medication Sig Dispense Refill    acetaminophen (TYLENOL) 325 MG tablet Take 2 tablets (650 mg) by mouth every 4 hours as needed for other (mild pain) 100 tablet 0    amLODIPine (NORVASC) 5 MG tablet Take 2 tablets (10 mg) by mouth daily 90 tablet 4    Emollient (CERAVE) CREA Apply topically 2 times daily        hydrochlorothiazide (HYDRODIURIL) 25 MG tablet Take 1 tablet (25 mg) by mouth daily 90 tablet 4    HYDROcodone-acetaminophen (NORCO) 7.5-325 MG per tablet Take 1 tablet by mouth every 4 hours as needed for severe pain or moderate pain And 2 tablets q 4 hours PRN pain 8-10 severe   0    metoprolol succinate ER (TOPROL XL) 25 MG 24 hr tablet Take 1 tablet (25 mg) by mouth daily        Nutritional Supplement LIQD Take 4 oz by mouth 2 times daily [employer] Supplement two times daily with meals        saccharomyces boulardii (FLORASTOR) 250 MG capsule Take 1 capsule (250 mg) by mouth daily        senna-docusate (SENOKOT-S/PERICOLACE) 8.6-50 MG tablet Take 1 tablet by mouth daily AND 1 tab daily PRN        warfarin ANTICOAGULANT (COUMADIN) 5 MG tablet 7.5 mg every day or as directed by the protime  clinic 160 tablet 1                  Allergies   Allergen Reactions    Diatrizoate Rash    Ioxaglate Other (See Comments)       Does not recall    Levofloxacin Hives and Rash    Metrizamide Rash       (Diagnostic X-Ray materials)    Probenecid Rash         Social History            Tobacco Use    Smoking status: Never       Passive exposure: Yes    Smokeless tobacco: Never   Substance Use Topics    Alcohol use: Not Currently       Comment: couple of times per year      Family History         Family History   Problem Relation Age of Onset    Unknown/Adopted Paternal Grandfather           Unknown, around age 67.    Other - See Comments Father           Parkinson's disease Alzheimer's    Cancer Father           Cancer    Heart Disease Maternal Grandmother           Heart Disease,MI in her 60's.    Heart Disease Mother 60         Heart Disease,MI    Other - See Comments Mother           rheumatoid arthritis.    Heart Disease Maternal Uncle 69         Heart Disease    Hypertension Sister           Hypertension    Cancer Sister           Cancer,melanoma    Heart Disease Paternal Uncle           Heart Disease, around 69.             History   Drug Use Unknown               Objective   T 97.7   HR 73   RR 16   /80    Oxygenation 93% room air   Weight 327#     Physical Exam    GENERAL APPEARANCE: healthy, alert and no distress.      EYES: EOMI,  PERRL     NECK: no adenopathy     RESP: lungs clear to auscultation - no rales, rhonchi      CV: irregular rates and rhythm, rate controlled     ABDOMEN:  soft, nontender, bowel sounds normal     MS: extremities with chronic venous stasis dermatitis on left, Knee immobilizer in place.  Left knee cap is laterally displaced     SKIN: no suspicious lesions or rashes     NEURO: Normal strength and tone, sensory exam grossly normal, mentation intact and speech normal     PSYCH: mentation appears normal. and affect normal/bright     LYMPHATICS: No cervical adenopathy                   Labs:  Last Comprehensive Metabolic Panel:  Sodium   Date Value Ref Range Status   01/05/2024 140 135 - 145 mmol/L Final     Comment:     Reference intervals for this test were updated on 09/26/2023 to more accurately reflect our healthy population. There may be differences in the flagging of prior results with similar values performed with this method. Interpretation of those prior results can be made in the context of the updated reference intervals.    09/29/2020 138 134 - 144 mmol/L Final     Potassium   Date Value Ref Range Status   01/05/2024 4.1 3.4 - 5.3 mmol/L Final   08/11/2022 3.7 3.5 - 5.1 mmol/L Final   09/29/2020 4.3 3.5 - 5.1 mmol/L Final     Chloride   Date Value Ref Range Status   01/05/2024 102 98 - 107 mmol/L Final   08/11/2022 102 98 - 107 mmol/L Final   09/29/2020 101 98 - 107 mmol/L Final     Carbon Dioxide   Date Value Ref Range Status   09/29/2020 33 (H) 21 - 31 mmol/L Final     Carbon Dioxide (CO2)   Date Value Ref Range Status   01/05/2024 28 22 - 29 mmol/L Final   08/11/2022 29 21 - 31 mmol/L Final     Anion Gap   Date Value Ref Range Status   01/05/2024 10 7 - 15 mmol/L Final   08/11/2022 8 3 - 14 mmol/L Final   09/29/2020 4 3 - 14 mmol/L Final     Glucose   Date Value Ref Range Status   01/05/2024 92 70 - 99 mg/dL Final   08/11/2022 87 70 - 105 mg/dL Final   09/29/2020 98 70 - 105 mg/dL Final     GLUCOSE BY METER POCT   Date Value Ref Range Status   06/26/2023 99 70 - 99 mg/dL Final     Urea Nitrogen   Date Value Ref Range Status   01/05/2024 17.8 8.0 - 23.0 mg/dL Final   08/11/2022 12 7 - 25 mg/dL Final   09/29/2020 14 7 - 25 mg/dL Final     Creatinine   Date Value Ref Range Status   01/05/2024 0.67 0.67 - 1.17 mg/dL Final   09/29/2020 0.99 0.70 - 1.30 mg/dL Final     GFR Estimate   Date Value Ref Range Status   01/05/2024 >90 >60 mL/min/1.73m2 Final   09/29/2020 77 >60 mL/min/[1.73_m2] Final     Calcium   Date Value Ref Range Status   01/05/2024 9.4 8.8 - 10.2 mg/dL Final  "  09/29/2020 9.7 8.6 - 10.3 mg/dL Final     Bilirubin Total   Date Value Ref Range Status   01/05/2024 0.6 <=1.2 mg/dL Final   09/29/2020 0.8 0.3 - 1.0 mg/dL Final     Alkaline Phosphatase   Date Value Ref Range Status   01/05/2024 71 40 - 150 U/L Final     Comment:     Reference intervals for this test were updated on 11/14/2023 to more accurately reflect our healthy population. There may be differences in the flagging of prior results with similar values performed with this method. Interpretation of those prior results can be made in the context of the updated reference intervals.   09/29/2020 60 34 - 104 U/L Final     ALT   Date Value Ref Range Status   01/05/2024 16 0 - 70 U/L Final     Comment:     Reference intervals for this test were updated on 6/12/2023 to more accurately reflect our healthy population. There may be differences in the flagging of prior results with similar values performed with this method. Interpretation of those prior results can be made in the context of the updated reference intervals.     09/29/2020 16 7 - 52 U/L Final     AST   Date Value Ref Range Status   01/05/2024 22 0 - 45 U/L Final     Comment:     Reference intervals for this test were updated on 6/12/2023 to more accurately reflect our healthy population. There may be differences in the flagging of prior results with similar values performed with this method. Interpretation of those prior results can be made in the context of the updated reference intervals.   09/29/2020 19 13 - 39 U/L Final     Lab Results   Component Value Date    WBC 8.6 01/05/2024    WBC 7.1 09/29/2020     Lab Results   Component Value Date    RBC 5.42 01/05/2024    RBC 5.49 09/29/2020     Lab Results   Component Value Date    HGB 14.8 01/05/2024    HGB 14.5 09/29/2020     Lab Results   Component Value Date    HCT 46.2 01/05/2024    HCT 47.3 09/29/2020     No components found for: \"MCT\"  Lab Results   Component Value Date    MCV 85 01/05/2024    MCV 86 " 09/29/2020     Lab Results   Component Value Date    MCH 27.3 01/05/2024    MCH 26.4 09/29/2020     Lab Results   Component Value Date    MCHC 32.0 01/05/2024    MCHC 30.7 09/29/2020     Lab Results   Component Value Date    RDW 18.0 01/05/2024    RDW 16.1 09/29/2020     Lab Results   Component Value Date     01/05/2024     09/29/2020     Labs reviewed and all looks very good in preparation for surgery      Diagnostics:   Results will be reviewed when available. Will check CBC, CMP today.   EKG done in Nov for afib.  EKG done confirming Afib at controlled rate.        Revised Cardiac Risk Index (RCRI):  The patient has the following serious cardiovascular risks for perioperative complications:   - Cerebrovascular Disease (TIA or CVA) = 1 point      RCRI Interpretation: 1 point: Class II (low risk - 0.9% complication rate)        Signed Electronically by: JAYLEN Riley CNP, APRN CNP on 1/5/2024 at 10:29 AM    Copy of this evaluation report is provided to requesting physician.

## 2024-01-05 ENCOUNTER — LAB REQUISITION (OUTPATIENT)
Dept: LAB | Facility: OTHER | Age: 64
End: 2024-01-05
Payer: COMMERCIAL

## 2024-01-05 DIAGNOSIS — Z01.812 ENCOUNTER FOR PREPROCEDURAL LABORATORY EXAMINATION: ICD-10-CM

## 2024-01-05 LAB
ALBUMIN SERPL BCG-MCNC: 3.6 G/DL (ref 3.5–5.2)
ALP SERPL-CCNC: 71 U/L (ref 40–150)
ALT SERPL W P-5'-P-CCNC: 16 U/L (ref 0–70)
ANION GAP SERPL CALCULATED.3IONS-SCNC: 10 MMOL/L (ref 7–15)
AST SERPL W P-5'-P-CCNC: 22 U/L (ref 0–45)
BASOPHILS # BLD AUTO: 0.1 10E3/UL (ref 0–0.2)
BASOPHILS NFR BLD AUTO: 1 %
BILIRUB SERPL-MCNC: 0.6 MG/DL
BUN SERPL-MCNC: 17.8 MG/DL (ref 8–23)
CALCIUM SERPL-MCNC: 9.4 MG/DL (ref 8.8–10.2)
CHLORIDE SERPL-SCNC: 102 MMOL/L (ref 98–107)
CREAT SERPL-MCNC: 0.67 MG/DL (ref 0.67–1.17)
DEPRECATED HCO3 PLAS-SCNC: 28 MMOL/L (ref 22–29)
EGFRCR SERPLBLD CKD-EPI 2021: >90 ML/MIN/1.73M2
EOSINOPHIL # BLD AUTO: 0.1 10E3/UL (ref 0–0.7)
EOSINOPHIL NFR BLD AUTO: 2 %
ERYTHROCYTE [DISTWIDTH] IN BLOOD BY AUTOMATED COUNT: 18 % (ref 10–15)
GLUCOSE SERPL-MCNC: 92 MG/DL (ref 70–99)
HCT VFR BLD AUTO: 46.2 % (ref 40–53)
HGB BLD-MCNC: 14.8 G/DL (ref 13.3–17.7)
IMM GRANULOCYTES # BLD: 0 10E3/UL
IMM GRANULOCYTES NFR BLD: 0 %
LYMPHOCYTES # BLD AUTO: 1.6 10E3/UL (ref 0.8–5.3)
LYMPHOCYTES NFR BLD AUTO: 19 %
MCH RBC QN AUTO: 27.3 PG (ref 26.5–33)
MCHC RBC AUTO-ENTMCNC: 32 G/DL (ref 31.5–36.5)
MCV RBC AUTO: 85 FL (ref 78–100)
MONOCYTES # BLD AUTO: 0.6 10E3/UL (ref 0–1.3)
MONOCYTES NFR BLD AUTO: 7 %
NEUTROPHILS # BLD AUTO: 6.1 10E3/UL (ref 1.6–8.3)
NEUTROPHILS NFR BLD AUTO: 71 %
NRBC # BLD AUTO: 0 10E3/UL
NRBC BLD AUTO-RTO: 0 /100
PLATELET # BLD AUTO: 289 10E3/UL (ref 150–450)
POTASSIUM SERPL-SCNC: 4.1 MMOL/L (ref 3.4–5.3)
PROT SERPL-MCNC: 7.3 G/DL (ref 6.4–8.3)
RBC # BLD AUTO: 5.42 10E6/UL (ref 4.4–5.9)
SODIUM SERPL-SCNC: 140 MMOL/L (ref 135–145)
WBC # BLD AUTO: 8.6 10E3/UL (ref 4–11)

## 2024-01-05 PROCEDURE — 85025 COMPLETE CBC W/AUTO DIFF WBC: CPT | Performed by: NURSE PRACTITIONER

## 2024-01-05 PROCEDURE — 80053 COMPREHEN METABOLIC PANEL: CPT | Performed by: NURSE PRACTITIONER

## 2024-01-16 DIAGNOSIS — N02.9 RECURRENT GROSS HEMATURIA: Primary | ICD-10-CM

## 2024-01-17 ENCOUNTER — TRANSFERRED RECORDS (OUTPATIENT)
Dept: HEALTH INFORMATION MANAGEMENT | Facility: OTHER | Age: 64
End: 2024-01-17

## 2024-01-17 ENCOUNTER — ANTICOAGULATION THERAPY VISIT (OUTPATIENT)
Dept: ANTICOAGULATION | Facility: OTHER | Age: 64
End: 2024-01-17
Attending: FAMILY MEDICINE
Payer: COMMERCIAL

## 2024-01-17 DIAGNOSIS — I48.91 ATRIAL FIBRILLATION WITH RVR (H): ICD-10-CM

## 2024-01-17 DIAGNOSIS — I48.21 PERMANENT ATRIAL FIBRILLATION (H): Primary | ICD-10-CM

## 2024-01-17 DIAGNOSIS — Z79.01 ANTICOAGULATION MONITORING, INR RANGE 2-3: ICD-10-CM

## 2024-01-17 LAB — INR (EXTERNAL): 1.5 (ref 0.9–1.1)

## 2024-01-17 NOTE — PROGRESS NOTES
ANTICOAGULATION MANAGEMENT     Babak Moran 63 year old male is on warfarin with subtherapeutic INR result. (Goal INR 2.0-3.0)    Recent labs: (last 7 days)     01/17/24  0958   INR 1.5*       ASSESSMENT     Source(s): Template     Warfarin doses taken: Held for procedure  recently which may be affecting INR  Diet: No new diet changes identified  New illness, injury, or hospitalization: No  Medication/supplement changes: None noted  Signs or symptoms of bleeding or clotting: No  Previous INR: Therapeutic last visit; previously outside of goal range  Additional findings: None and Bridging with Enoxaparin until INR >= 2.0     PLAN     Recommended plan for temporary change(s) affecting INR     Dosing Instructions: booster dose then continue your current warfarin dose with next INR in 2 days-patient to continue BID lovenox       Summary  As of 1/17/2024      Full warfarin instructions:  1/17: 15 mg; Otherwise 10 mg every Mon, Thu; 7.5 mg all other days   Next INR check:  1/19/2024               Faxed dosing and follow up instructions to [facility]    Orders given to  Homecare nurse/facility to recheck    Education provided: None required    Plan made per [facility] anticoagulation protocol    Nikki Hernandez, RN  Anticoagulation Clinic  1/17/2024    _______________________________________________________________________     Anticoagulation Episode Summary       Current INR goal:  2.0-3.0   TTR:  53.9% (11.9 mo)   Target end date:  Indefinite   Send INR reminders to:  [facility]    Indications    Permanent atrial fibrillation (H) [I48.21]  Anticoagulation monitoring  INR range 2-3 (Resolved) [Z79.01]  Atrial fibrillation with RVR (H) [I48.91]  Anticoagulation monitoring  INR range 2-3 [Z79.01]             Comments:  Babak prefers to be closer to 3.0 d/t previous CVA check Q 4 weeks.Declines to reduce dose when slightly over 3.0  Needs to change PCP  Takes a long time get up to therapeutic after holding  warfarin.             Anticoagulation Care Providers       Provider Role Specialty Phone number    Teo Funes MD Referring Family Medicine [phone]

## 2024-01-18 ENCOUNTER — TRANSFERRED RECORDS (OUTPATIENT)
Dept: HEALTH INFORMATION MANAGEMENT | Facility: OTHER | Age: 64
End: 2024-01-18
Payer: COMMERCIAL

## 2024-01-19 ENCOUNTER — ANTICOAGULATION THERAPY VISIT (OUTPATIENT)
Dept: ANTICOAGULATION | Facility: OTHER | Age: 64
End: 2024-01-19
Attending: FAMILY MEDICINE
Payer: COMMERCIAL

## 2024-01-19 DIAGNOSIS — I48.91 ATRIAL FIBRILLATION WITH RVR (H): ICD-10-CM

## 2024-01-19 DIAGNOSIS — I48.21 PERMANENT ATRIAL FIBRILLATION (H): Primary | ICD-10-CM

## 2024-01-19 DIAGNOSIS — Z79.01 ANTICOAGULATION MONITORING, INR RANGE 2-3: ICD-10-CM

## 2024-01-19 LAB — INR POINT OF CARE: 1.9 (ref 0.9–1.1)

## 2024-01-19 PROCEDURE — 36416 COLLJ CAPILLARY BLOOD SPEC: CPT | Mod: ZL

## 2024-01-19 NOTE — PROGRESS NOTES
ANTICOAGULATION MANAGEMENT     Babak Moran 63 year old male is on warfarin with subtherapeutic INR result. (Goal INR 2.0-3.0)    Recent labs: (last 7 days)     01/19/24  0840   INR 1.9*       ASSESSMENT     Source(s): Home care/ facility nurse     Warfarin doses taken: Warfarin taken as instructed  Diet: No new diet changes identified  New illness, injury, or hospitalization: No  Medication/supplement changes: None noted  Signs or symptoms of bleeding or clotting: No  Previous INR: Subtherapeutic  Additional findings: None and Bridging with Enoxaparin until INR >= 2.0     PLAN     Recommended plan for temporary change(s) affecting INR     Dosing Instructions: booster dose then continue your current warfarin dose with next INR in 3 days       Summary  As of 1/19/2024      Full warfarin instructions:  1/19: 10 mg; Otherwise 10 mg every Mon, Thu; 7.5 mg all other days   Next INR check:  1/22/2024               Telephone call with [facility] nurse who verbalizes understanding and agrees to plan    Orders given to  Homecare nurse/facility to recheck    Education provided: Please call back if any changes to your diet, medications or how you've been taking warfarin    Plan made per [facility] anticoagulation protocol    An Xiong RN  Anticoagulation Clinic  1/19/2024    _______________________________________________________________________     Anticoagulation Episode Summary       Current INR goal:  2.0-3.0   TTR:  54.0% (11.9 mo)   Target end date:  Indefinite   Send INR reminders to:  [facility]    Indications    Permanent atrial fibrillation (H) [I48.21]  Anticoagulation monitoring  INR range 2-3 (Resolved) [Z79.01]  Atrial fibrillation with RVR (H) [I48.91]  Anticoagulation monitoring  INR range 2-3 [Z79.01]             Comments:  Babak prefers to be closer to 3.0 d/t previous CVA check Q 4 weeks.Declines to reduce dose when slightly over 3.0  Needs to change PCP  Takes a long time get up to  therapeutic after holding warfarin.             Anticoagulation Care Providers       Provider Role Specialty Phone number    Teo Funes MD Referring Family Medicine [phone]

## 2024-01-22 ENCOUNTER — OFFICE VISIT (OUTPATIENT)
Dept: ORTHOPEDICS | Facility: OTHER | Age: 64
End: 2024-01-22
Attending: ORTHOPAEDIC SURGERY
Payer: COMMERCIAL

## 2024-01-22 ENCOUNTER — ANTICOAGULATION THERAPY VISIT (OUTPATIENT)
Dept: ANTICOAGULATION | Facility: OTHER | Age: 64
End: 2024-01-22
Attending: FAMILY MEDICINE
Payer: COMMERCIAL

## 2024-01-22 ENCOUNTER — TELEPHONE (OUTPATIENT)
Dept: ORTHOPEDICS | Facility: OTHER | Age: 64
End: 2024-01-22

## 2024-01-22 ENCOUNTER — TRANSFERRED RECORDS (OUTPATIENT)
Dept: HEALTH INFORMATION MANAGEMENT | Facility: OTHER | Age: 64
End: 2024-01-22

## 2024-01-22 DIAGNOSIS — I48.91 ATRIAL FIBRILLATION WITH RVR (H): ICD-10-CM

## 2024-01-22 DIAGNOSIS — S86.812S: Primary | ICD-10-CM

## 2024-01-22 DIAGNOSIS — Z79.01 ANTICOAGULATION MONITORING, INR RANGE 2-3: ICD-10-CM

## 2024-01-22 DIAGNOSIS — I48.21 PERMANENT ATRIAL FIBRILLATION (H): Primary | ICD-10-CM

## 2024-01-22 LAB — INR (EXTERNAL): 2.1 (ref 0.9–1.1)

## 2024-01-22 PROCEDURE — 99495 TRANSJ CARE MGMT MOD F2F 14D: CPT | Performed by: ORTHOPAEDIC SURGERY

## 2024-01-22 PROCEDURE — 99024 POSTOP FOLLOW-UP VISIT: CPT | Performed by: ORTHOPAEDIC SURGERY

## 2024-01-22 PROCEDURE — G0463 HOSPITAL OUTPT CLINIC VISIT: HCPCS

## 2024-01-22 NOTE — PROGRESS NOTES
Subjective:    63-year-old gentleman status post MPFL reconstruction of the left knee following a left total knee arthroplasty.  He is now 11 days out and having minimal pain.    Objective:    On examination his patella is significantly more lateral than it was following surgery indicating that his reconstruction is likely already failed.    Assessment:    63-year-old gentleman with failed MPFL reconstruction    Plan:    At this point we will try and rehab his left knee as best we can.  I am going to refer him to another surgeon preferably a fellowship trained joint reconstruction expert for consultation.  He is exhausted my ability to repair his left knee.  His right knee however is doing just fine

## 2024-01-22 NOTE — PROGRESS NOTES
ANTICOAGULATION MANAGEMENT     Babak Moran 63 year old male is on warfarin with subtherapeutic INR result. (Goal INR 2.0-3.0)    Recent labs: (last 7 days)     01/22/24  1004   INR 2.1*       ASSESSMENT     Source(s): Template     Warfarin doses taken: Held for procedure  recently which may be affecting INR  Diet: No new diet changes identified  New illness, injury, or hospitalization: No  Medication/supplement changes: None noted  Signs or symptoms of bleeding or clotting: No  Previous INR: Subtherapeutic  Additional findings: None and Bridging with Enoxaparin until INR >= 2.0  Okay to stop lovenox today     PLAN     Recommended plan for no diet, medication or health factor changes affecting INR     Dosing Instructions: Continue your current warfarin dose with next INR in 3 days       Summary  As of 1/22/2024      Full warfarin instructions:  10 mg every Mon, Thu; 7.5 mg all other days   Next INR check:  1/24/2024               Faxed dosing and follow up instructions to [facility]    Orders given to  Homecare nurse/facility to recheck    Education provided: None required    Plan made per [facility] anticoagulation protocol    Nikki Hernandez RN  Anticoagulation Clinic  1/22/2024    _______________________________________________________________________     Anticoagulation Episode Summary       Current INR goal:  2.0-3.0   TTR:  54.4% (11.9 mo)   Target end date:  Indefinite   Send INR reminders to:  [facility]    Indications    Permanent atrial fibrillation (H) [I48.21]  Anticoagulation monitoring  INR range 2-3 (Resolved) [Z79.01]  Atrial fibrillation with RVR (H) [I48.91]  Anticoagulation monitoring  INR range 2-3 [Z79.01]             Comments:  Babak prefers to be closer to 3.0 d/t previous CVA check Q 4 weeks.Declines to reduce dose when slightly over 3.0  Needs to change PCP  Takes a long time get up to therapeutic after holding warfarin.             Anticoagulation Care Providers       Provider  Role Specialty Phone number    Teo Funes MD Referring Family Medicine [phone]

## 2024-01-22 NOTE — TELEPHONE ENCOUNTER
WTBAT with knee immobilizer. I let them know.     Suzanne Benedict LPN .....................1/22/2024 2:10 PM

## 2024-01-22 NOTE — TELEPHONE ENCOUNTER
Franko from [facility] is hoping Dr. Johnson will give them a weightbearing status on his leg. Please call.    Leslie Andrews on 1/22/2024 at 1:53 PM

## 2024-01-24 ENCOUNTER — OFFICE VISIT (OUTPATIENT)
Dept: UROLOGY | Facility: OTHER | Age: 64
End: 2024-01-24
Attending: NURSE PRACTITIONER
Payer: COMMERCIAL

## 2024-01-24 ENCOUNTER — LAB (OUTPATIENT)
Dept: LAB | Facility: OTHER | Age: 64
End: 2024-01-24
Attending: UROLOGY
Payer: COMMERCIAL

## 2024-01-24 VITALS
WEIGHT: 315 LBS | OXYGEN SATURATION: 95 % | SYSTOLIC BLOOD PRESSURE: 124 MMHG | HEART RATE: 90 BPM | RESPIRATION RATE: 14 BRPM | DIASTOLIC BLOOD PRESSURE: 72 MMHG | BODY MASS INDEX: 40.62 KG/M2 | TEMPERATURE: 97 F

## 2024-01-24 DIAGNOSIS — N02.9 RECURRENT GROSS HEMATURIA: ICD-10-CM

## 2024-01-24 DIAGNOSIS — N39.0 RECURRENT UTI: ICD-10-CM

## 2024-01-24 DIAGNOSIS — R31.0 GROSS HEMATURIA: Primary | ICD-10-CM

## 2024-01-24 LAB
ALBUMIN UR-MCNC: NEGATIVE MG/DL
APPEARANCE UR: CLEAR
BILIRUB UR QL STRIP: NEGATIVE
COLOR UR AUTO: NORMAL
GLUCOSE UR STRIP-MCNC: NEGATIVE MG/DL
HGB UR QL STRIP: NEGATIVE
KETONES UR STRIP-MCNC: NEGATIVE MG/DL
LEUKOCYTE ESTERASE UR QL STRIP: NEGATIVE
NITRATE UR QL: NEGATIVE
PH UR STRIP: 5.5 [PH] (ref 5–9)
SP GR UR STRIP: 1.02 (ref 1–1.03)
UROBILINOGEN UR STRIP-MCNC: NORMAL MG/DL

## 2024-01-24 PROCEDURE — 99203 OFFICE O/P NEW LOW 30 MIN: CPT | Performed by: UROLOGY

## 2024-01-24 PROCEDURE — 51798 US URINE CAPACITY MEASURE: CPT | Performed by: UROLOGY

## 2024-01-24 PROCEDURE — G0463 HOSPITAL OUTPT CLINIC VISIT: HCPCS | Mod: 25

## 2024-01-24 PROCEDURE — 81003 URINALYSIS AUTO W/O SCOPE: CPT | Mod: ZL

## 2024-01-24 RX ORDER — ACETAMINOPHEN 500 MG
1000 TABLET ORAL EVERY 8 HOURS PRN
COMMUNITY
Start: 2024-01-24 | End: 2024-02-02

## 2024-01-24 ASSESSMENT — PAIN SCALES - GENERAL: PAINLEVEL: NO PAIN (0)

## 2024-01-24 NOTE — PATIENT INSTRUCTIONS
You will need cystoscopy to look in your bladder to see what may be causing your infections  We will need to order a renal ultrasound to evaluate the kidneys looking for a source of your infections  In order to look in your bladder we will need to hold your warfarin 5 days before.  The INR clinic will need to bridge you with lovenox for those 5 before and for several days afterward until your warfarin is therapeutic again.    Drink plenty of fluids, > 2 liters/day  Avoid constipation.   Void after sexual activity  Good hygiene or your penis area  Cranberry tabs 150 mg  D-mannose 1350 mg  .  Use both twice a day  Probiotics:  Yogurts that contain good bacteria.  Kefir 1% milkfat (plain)

## 2024-01-24 NOTE — NURSING NOTE
"Chief Complaint   Patient presents with    Hematuria     Micro with cloudy urine        Initial /72 (BP Location: Right arm, Patient Position: Sitting, Cuff Size: Adult Large)   Pulse 90   Temp 97  F (36.1  C) (Temporal)   Resp 14   Wt 147.4 kg (325 lb)   SpO2 95%   BMI 40.62 kg/m   Estimated body mass index is 40.62 kg/m  as calculated from the following:    Height as of 11/13/23: 1.905 m (6' 3\").    Weight as of this encounter: 147.4 kg (325 lb).  Medication Review: complete    The next two questions are to help us understand your food security.  If you are feeling you need any assistance in this area, we have resources available to support you today.           No data to display                  Health Care Directive:  Patient has a Health Care Directive on file      Meghann Gonzalez LPN      "

## 2024-01-24 NOTE — PROGRESS NOTES
Chief Complaint: Hematuria (Micro with cloudy urine )  Recurrent UTI,Gross hematuria     HPI: Mr. Babak Moran is a 63 year old year old male with a history of atrial fibrillation on warfarin, previous CVA with left hemineglect, hypertension, obesity and previous urinary sepsis who presents today January 24, 2024 for evaluation of recurrent UTI and microhematuria    Patient has had 3 recent culture positive UTIs in November December and July 2023.  In addition his UAs have shown significant microscopic red blood cells per high-power view even when not infected.  He has gross hematuria in July during one of those episodes.     Here today for initial evaluation.  Of note patient is on warfarin for atrial fibrillation and a severe stroke.  He takes no medicine for his prostate.    Reports a strong stream with nocturia x 2.  No frequency/urgency.  Drinks a good amount of water daily.  No constipation.  Denies kidney stones.  Unsure of why he developed UTIs.    Has never smoked.  Drinks no caffeine.  Currently in a nursing home after left knee surgery.  Able to stand with walker.          Past Medical History:   Diagnosis Date    Atrial fibrillation (H) 02/03/2017    on Warfarin    Bacterial sepsis (H) 8/8/2022    Cellulitis of left lower extremity 9/5/2019    Cerebral infarction (H)     Cerebral infarction due to unspecified occlusion or stenosis of right middle cerebral artery (H) 02/03/2017    ,Left hemiparesis, left neglect, impaired balance and gait following R MCA stroke with mild hemorrhagic transformation s/p tissue plasminogen activator and mechanical thrombectomy, s/p C7 facet fracture from fall off forklift.    Chest pain 02/1997    Disorder of skin or subcutaneous tissue 07/25/2011    Essential (primary) hypertension 02/1997    Fracture of cervical vertebra (H)     02/03/2017,C7 facet fracture from fall off forklift, nondisplaced    Gout     Hemiplegia affecting left nondominant side (H) 02/03/2017    Obesity  02/03/2017    body mass index of 40    Other local lupus erythematosus     Sepsis (H) 9/6/2019       Past Surgical History:   Procedure Laterality Date    ARTHROPLASTY KNEE Left 1/9/2023    Procedure: ARTHROPLASTY, KNEE, TOTAL;  Surgeon: Elliott Johnson MD;  Location: [facility] OR    ARTHROPLASTY KNEE Right 6/26/2023    Procedure: Arthroplasty knee;  Surgeon: Elliott Johnson MD;  Location: [facility] OR    ARTHROSCOPY KNEE      bilateral knees    ARTHROSCOPY KNEE Left 11/13/2023    Procedure: Knee Patellar Extensor Mechanism Repair;  Surgeon: Elliott Johnson MD;  Location:  OR    COLONOSCOPY  01/02/2012    Normal; Next due 2022    ORIF WRIST FRACTURE  2019       FAMILY HISTORY: Denies a family history of prostate cancer.      SOCIAL HISTORY:    reports that he has never smoked. He has been exposed to tobacco smoke. He has never used smokeless tobacco.    Current Outpatient Medications   Medication Sig Dispense Refill    acetaminophen (TYLENOL) 325 MG tablet Take 2 tablets (650 mg) by mouth every 4 hours as needed for other (mild pain) 100 tablet 0    acetaminophen (TYLENOL) 500 MG tablet Take 1,000 mg by mouth every 8 hours as needed for mild pain      amLODIPine (NORVASC) 5 MG tablet Take 2 tablets (10 mg) by mouth daily 90 tablet 4    Emollient (CERAVE) CREA Apply topically 2 times daily      hydrochlorothiazide (HYDRODIURIL) 25 MG tablet Take 1 tablet (25 mg) by mouth daily 90 tablet 4    HYDROcodone-acetaminophen (NORCO) 5-325 MG tablet Take 1-2 tablets by mouth every 4 hours as needed for moderate to severe pain (Patient taking differently: Take 1 tablet by mouth every 6 hours as needed for moderate to severe pain) 30 tablet 0    metoprolol succinate ER (TOPROL XL) 25 MG 24 hr tablet Take 1 tablet (25 mg) by mouth daily      saccharomyces boulardii (FLORASTOR) 250 MG capsule Take 1 capsule (250 mg) by mouth daily      senna-docusate (SENOKOT-S/PERICOLACE) 8.6-50 MG tablet Take 1 tablet by mouth daily AND 1 tab daily PRN       warfarin ANTICOAGULANT (COUMADIN) 5 MG tablet 7.5 mg every day or as directed by the protime clinic 160 tablet 1    HYDROcodone-acetaminophen (NORCO) 7.5-325 MG per tablet Take 1 tablet by mouth every 4 hours as needed for severe pain or moderate pain And 2 tablets q 4 hours PRN pain 8-10 severe (Patient not taking: Reported on 1/24/2024)  0    Nutritional Supplement LIQD Take 4 oz by mouth 2 times daily [employer] Supplement two times daily with meals (Patient not taking: Reported on 1/24/2024)         ALLERGIES: Diatrizoate, Ioxaglate, Levofloxacin, Metrizamide, and Probenecid      REVIEW OF SYSTEMS:  Skin: negative  Eyes: negative  Ears/Nose/Throat: negative  Respiratory: No shortness of breath, dyspnea on exertion, cough, or hemoptysis  Cardiovascular: negative  Gastrointestinal: negative  Genitourinary: negative and as above  Musculoskeletal: negative and injury to Left knee back on July 31 2023   Neurologic: negative  Psychiatric: negative  Hematologic/Lymphatic/Immunologic: negative  Endocrine: negative  Meghann Gonzalez LPN on 1/24/2024 at 3:20 PM  Post-Void Residual  A post-void residual was measured by ultrasonic bladder scanner.  3 mL  Meghann Gonzalez LPN  1/24/2024 3:20 PM     GENERAL PHYSICAL EXAM:   Vitals: /72 (BP Location: Right arm, Patient Position: Sitting, Cuff Size: Adult Large)   Pulse 90   Temp 97  F (36.1  C) (Temporal)   Resp 14   Wt 147.4 kg (325 lb)   SpO2 95%   BMI 40.62 kg/m    Body mass index is 40.62 kg/m .    GENERAL: Well groomed, well developed, well nourished male in NAD. Morbid obesity  HEENT:  Normal   CV:  Warm extremities   RESPIRATORY: Normal respiratory effort.    MS: in wheelchair   NEURO: Alert and oriented x 3.  PSYCH: Normal mood and affect, pleasant and agreeable during interview and exam.    :  Could not perform     PVR: Residual urine by ultrasound was 0 ml.           RADIOLOGY: The following tests were reviewed: No abdominal imaging    LABS: The last test  "results for Ms. Babak Moran were reviewed.   Results for orders placed or performed in visit on 01/24/24 (from the past 24 hour(s))   Urine Macroscopic with reflex to Microscopic   Result Value Ref Range    Color Urine Light Yellow Colorless, Straw, Light Yellow, Yellow    Appearance Urine Clear Clear    Glucose Urine Negative Negative mg/dL    Bilirubin Urine Negative Negative    Ketones Urine Negative Negative mg/dL    Specific Gravity Urine 1.021 1.000 - 1.030    Blood Urine Negative Negative    pH Urine 5.5 5.0 - 9.0    Protein Albumin Urine Negative Negative mg/dL    Urobilinogen Urine Normal Normal, 2.0 mg/dL    Nitrite Urine Negative Negative    Leukocyte Esterase Urine Negative Negative    Narrative    Microscopic not indicated       PSA - No results found for: \"PSA\"  BMP -   Recent Labs   Lab Test 01/05/24  0900 12/03/23  1024 12/01/23  1115 08/05/23  0555 08/04/23  0422 08/03/23  0528 08/02/23  1139 08/02/23  0515    136 136   < > 138 139  --  139   POTASSIUM 4.1 3.6 3.8   < > 4.3  4.3 3.8   < > 3.2*   CHLORIDE 102 96* 97*   < > 98 101  --  103   CO2 28 28 28   < > 33* 32*  --  28   BUN 17.8 13.8 13.2   < > 15.2 14.3  --  12.6   CR 0.67 0.57* 0.61*   < > 0.65* 0.65*  --  0.58*   GLC 92 145* 98   < > 86 85  --  125*   VIRGEN 9.4 9.6 9.5   < > 9.2 8.8  --  8.4*   MAG  --   --   --   --  2.0 2.0  --  2.0    < > = values in this interval not displayed.       CBC -   Recent Labs   Lab Test 01/05/24  0900 12/03/23  1024 12/01/23  1115   WBC 8.6 8.9 8.1   HGB 14.8 14.0 13.3    330 312       ASSESSMENT:   Recurrent UTI  Gross hematuria    PLAN:   Patient with a history of gross hematuria and chronic microscopic hematuria.  Etiology unknown although I discussed possibilities including kidney stones, bladder stones, BPH with prostatic enlargement and unknown.    Highly likely that his gross hematuria was a result of the infection.    Patient is doing everything right in terms of preventing infections.  " He drinks plenty water and actually has very few if any lower urinary tract symptoms.  I am unable to do a prostate exam.  In addition I cannot prescribe him alpha blockers secondary to his medications which cross-react.  Given his few symptoms and his complete bladder emptying today I am okay with that.    He will need cystoscopy at her next visit.  Unfortunately he will need to be bridged given his severe stroke.  That will need to be done by the INR clinic.  He will need to hold his warfarin 5 days before.    In the interim we will get a renal ultrasound to rule out upper level obstruction or stone disease.    Patient voiced an understanding.  Prevention of UTIs including cranberry tablets, d-mannose and probiotics were discussed and encouraged.    25 minutes spent on the date of this encounter doing chart review, history and exam, documentation and further activities as noted above.      Je Browning MD   [facility]

## 2024-01-25 ENCOUNTER — ANTICOAGULATION THERAPY VISIT (OUTPATIENT)
Dept: ANTICOAGULATION | Facility: OTHER | Age: 64
End: 2024-01-25
Payer: COMMERCIAL

## 2024-01-25 ENCOUNTER — TELEPHONE (OUTPATIENT)
Dept: UROLOGY | Facility: OTHER | Age: 64
End: 2024-01-25

## 2024-01-25 ENCOUNTER — TRANSFERRED RECORDS (OUTPATIENT)
Dept: HEALTH INFORMATION MANAGEMENT | Facility: OTHER | Age: 64
End: 2024-01-25

## 2024-01-25 DIAGNOSIS — Z79.01 ANTICOAGULATION MONITORING, INR RANGE 2-3: ICD-10-CM

## 2024-01-25 DIAGNOSIS — N39.0 RECURRENT UTI: Primary | ICD-10-CM

## 2024-01-25 DIAGNOSIS — I48.21 PERMANENT ATRIAL FIBRILLATION (H): Primary | ICD-10-CM

## 2024-01-25 DIAGNOSIS — I48.91 ATRIAL FIBRILLATION WITH RVR (H): ICD-10-CM

## 2024-01-25 LAB — INR (EXTERNAL): 2.2 (ref 0.9–1.1)

## 2024-01-25 NOTE — PROGRESS NOTES
ANTICOAGULATION MANAGEMENT     Babak Moran 63 year old male is on warfarin with therapeutic INR result. (Goal INR 2.0-3.0)    Recent labs: (last 7 days)     01/25/24  1112   INR 2.2*       ASSESSMENT     Source(s): Chart Review and Home Care/Facility Nurse     Warfarin doses taken: Warfarin taken as instructed  Diet: No new diet changes identified  Medication/supplement changes: None noted  New illness, injury, or hospitalization: No  Signs or symptoms of bleeding or clotting: No  Previous result: Therapeutic last visit; previously outside of goal range  Additional findings:  increased activity level  plan for cystoscopy 3/20 will need 5 day hold with bridging for procedure       PLAN     Recommended plan for no diet, medication or health factor changes affecting INR     Dosing Instructions: Continue your current warfarin dose with next INR in 1 week       Summary  As of 1/25/2024      Full warfarin instructions:  10 mg every Mon, Thu; 7.5 mg all other days   Next INR check:  2/1/2024               Faxed dosing and follow up instructions to [facility]    Orders given to  Homecare nurse/facility to recheck    Patient not here, [facility] Communication sheet with screening questions and current INR received via FAX from outside agency. Results reviewed, Warfarin dosing per protocol, and recommended follow-up appointment made. Paperwork FAXED back to facility.       Plan made per [facility] anticoagulation protocol    Shelley Mercado, RN  Anticoagulation Clinic  1/25/2024    _______________________________________________________________________     Anticoagulation Episode Summary       Current INR goal:  2.0-3.0   TTR:  55.2% (11.9 mo)   Target end date:  Indefinite   Send INR reminders to:  [facility]    Indications    Permanent atrial fibrillation (H) [I48.21]  Anticoagulation monitoring  INR range 2-3 (Resolved) [Z79.01]  Atrial fibrillation with RVR (H) [I48.91]  Anticoagulation monitoring  INR range 2-3  [Z79.01]             Comments:  Babak prefers to be closer to 3.0 d/t previous CVA check Q 4 weeks.Declines to reduce dose when slightly over 3.0  Needs to change PCP  Takes a long time get up to therapeutic after holding warfarin.             Anticoagulation Care Providers       Provider Role Specialty Phone number    Teo Funes MD Referring Family Medicine [phone]

## 2024-01-25 NOTE — TELEPHONE ENCOUNTER
Franko with [facility] requests a fax with signed orders for cranberry tablet 150 mg and D-MANNOSE 1350 mg, use both twice a day.     Fax: [phone]    Okay to leave detailed message.              Linda Veronica on 1/25/2024 at 1:57 PM

## 2024-02-01 ENCOUNTER — ANTICOAGULATION THERAPY VISIT (OUTPATIENT)
Dept: ANTICOAGULATION | Facility: OTHER | Age: 64
End: 2024-02-01
Attending: FAMILY MEDICINE
Payer: COMMERCIAL

## 2024-02-01 ENCOUNTER — TRANSFERRED RECORDS (OUTPATIENT)
Dept: HEALTH INFORMATION MANAGEMENT | Facility: OTHER | Age: 64
End: 2024-02-01

## 2024-02-01 DIAGNOSIS — I48.21 PERMANENT ATRIAL FIBRILLATION (H): Primary | ICD-10-CM

## 2024-02-01 DIAGNOSIS — Z79.01 ANTICOAGULATION MONITORING, INR RANGE 2-3: ICD-10-CM

## 2024-02-01 DIAGNOSIS — I48.91 ATRIAL FIBRILLATION WITH RVR (H): ICD-10-CM

## 2024-02-01 LAB — INR (EXTERNAL): 2.5 (ref 0.9–1.1)

## 2024-02-01 NOTE — PROGRESS NOTES
ANTICOAGULATION MANAGEMENT     Babak Moran 63 year old male is on warfarin with therapeutic INR result. (Goal INR 2.0-3.0)    Recent labs: (last 7 days)     02/01/24  0900   INR 2.5*       ASSESSMENT     Source(s): Chart Review and Home Care/Facility Nurse     Warfarin doses taken: Warfarin taken as instructed  Diet: No new diet changes identified  Medication/supplement changes: cranberry tablet 150 mg and D-MANNOSE 1350 mg, use both twice a day.   New illness, injury, or hospitalization: No  Signs or symptoms of bleeding or clotting: No  Previous result: Therapeutic last 2(+) visits  Additional findings: None       PLAN     Recommended plan for ongoing change(s) affecting INR     Dosing Instructions: Continue your current warfarin dose with next INR in 1 week       Summary  As of 2/1/2024      Full warfarin instructions:  10 mg every Mon, Thu; 7.5 mg all other days   Next INR check:  2/8/2024               Faxed dosing and follow up instructions to [facility]    Orders given to  Homecare nurse/facility to recheck    Patient not here, [facility] Communication sheet with screening questions and current INR received via FAX from outside agency. Results reviewed, Warfarin dosing per protocol, and recommended follow-up appointment made. Paperwork FAXED back to facility.     Plan made per [facility] anticoagulation protocol    Shelley Mercado RN  Anticoagulation Clinic  2/1/2024    _______________________________________________________________________     Anticoagulation Episode Summary       Current INR goal:  2.0-3.0   TTR:  56.8% (11.9 mo)   Target end date:  Indefinite   Send INR reminders to:  [facility]    Indications    Permanent atrial fibrillation (H) [I48.21]  Anticoagulation monitoring  INR range 2-3 (Resolved) [Z79.01]  Atrial fibrillation with RVR (H) [I48.91]  Anticoagulation monitoring  INR range 2-3 [Z79.01]             Comments:  Babak prefers to be closer to 3.0 d/t previous CVA check Q 4  weeks.Declines to reduce dose when slightly over 3.0  Needs to change PCP  Takes a long time get up to therapeutic after holding warfarin.             Anticoagulation Care Providers       Provider Role Specialty Phone number    Teo Funes MD Referring Family Medicine [phone]

## 2024-02-02 ENCOUNTER — CARE COORDINATION (OUTPATIENT)
Dept: FAMILY MEDICINE | Facility: OTHER | Age: 64
End: 2024-02-02
Payer: COMMERCIAL

## 2024-02-08 ENCOUNTER — ANTICOAGULATION THERAPY VISIT (OUTPATIENT)
Dept: ANTICOAGULATION | Facility: OTHER | Age: 64
End: 2024-02-08
Attending: FAMILY MEDICINE
Payer: COMMERCIAL

## 2024-02-08 ENCOUNTER — TRANSFERRED RECORDS (OUTPATIENT)
Dept: HEALTH INFORMATION MANAGEMENT | Facility: OTHER | Age: 64
End: 2024-02-08

## 2024-02-08 ENCOUNTER — NURSING HOME VISIT (OUTPATIENT)
Dept: GERIATRICS | Facility: OTHER | Age: 64
End: 2024-02-08
Attending: FAMILY MEDICINE
Payer: COMMERCIAL

## 2024-02-08 DIAGNOSIS — I48.21 PERMANENT ATRIAL FIBRILLATION (H): ICD-10-CM

## 2024-02-08 DIAGNOSIS — G81.94 LEFT HEMIPARESIS (H): ICD-10-CM

## 2024-02-08 DIAGNOSIS — Z79.01 LONG-TERM (CURRENT) USE OF ANTICOAGULANTS, INR GOAL 2.0-3.0: ICD-10-CM

## 2024-02-08 DIAGNOSIS — Z79.01 ANTICOAGULATION MONITORING, INR RANGE 2-3: ICD-10-CM

## 2024-02-08 DIAGNOSIS — N02.9 RECURRENT GROSS HEMATURIA: ICD-10-CM

## 2024-02-08 DIAGNOSIS — E66.01 MORBID OBESITY WITH BMI OF 40.0-44.9, ADULT (H): ICD-10-CM

## 2024-02-08 DIAGNOSIS — I63.411 CEREBROVASCULAR ACCIDENT (CVA) DUE TO EMBOLISM OF RIGHT MIDDLE CEREBRAL ARTERY (H): Primary | ICD-10-CM

## 2024-02-08 DIAGNOSIS — G47.33 OBSTRUCTIVE SLEEP APNEA SYNDROME: ICD-10-CM

## 2024-02-08 DIAGNOSIS — I87.2 CHRONIC VENOUS STASIS DERMATITIS OF LEFT LOWER EXTREMITY: ICD-10-CM

## 2024-02-08 DIAGNOSIS — R73.03 PREDIABETES: ICD-10-CM

## 2024-02-08 DIAGNOSIS — I48.91 ATRIAL FIBRILLATION WITH RVR (H): ICD-10-CM

## 2024-02-08 DIAGNOSIS — S83.005S PATELLAR DISLOCATION, LEFT, SEQUELA: ICD-10-CM

## 2024-02-08 DIAGNOSIS — I48.21 PERMANENT ATRIAL FIBRILLATION (H): Primary | ICD-10-CM

## 2024-02-08 DIAGNOSIS — I10 ESSENTIAL HYPERTENSION: ICD-10-CM

## 2024-02-08 LAB — INR (EXTERNAL): 2.6 (ref 0.9–1.1)

## 2024-02-08 PROCEDURE — 99309 SBSQ NF CARE MODERATE MDM 30: CPT | Mod: 24 | Performed by: FAMILY MEDICINE

## 2024-02-08 NOTE — PROGRESS NOTES
ANTICOAGULATION MANAGEMENT     Babak Moran 63 year old male is on warfarin with therapeutic INR result. (Goal INR 2.0-3.0)    Recent labs: (last 7 days)     02/08/24  0856   INR 2.6*       ASSESSMENT     Source(s): Chart Review and Home Care/Facility Nurse     Warfarin doses taken: Warfarin taken as instructed  Diet: No new diet changes identified  Medication/supplement changes: None noted  New illness, injury, or hospitalization: No  Signs or symptoms of bleeding or clotting: No  Previous result: Therapeutic last 2(+) visits  Additional findings: None       PLAN     Recommended plan for no diet, medication or health factor changes affecting INR     Dosing Instructions: Continue your current warfarin dose with next INR in 2 weeks       Summary  As of 2/8/2024      Full warfarin instructions:  10 mg every Mon, Thu; 7.5 mg all other days   Next INR check:  2/22/2024               Faxed dosing and follow up instructions to [facility]    Orders given to  Homecare nurse/facility to recheck    Education provided:   None required    Plan made per [facility] anticoagulation protocol    Shelley Mercado, RN  Anticoagulation Clinic  2/8/2024    _______________________________________________________________________     Anticoagulation Episode Summary       Current INR goal:  2.0-3.0   TTR:  56.8% (11.9 mo)   Target end date:  Indefinite   Send INR reminders to:  [facility]    Indications    Permanent atrial fibrillation (H) [I48.21]  Anticoagulation monitoring  INR range 2-3 (Resolved) [Z79.01]  Atrial fibrillation with RVR (H) [I48.91]  Anticoagulation monitoring  INR range 2-3 [Z79.01]             Comments:  Babak prefers to be closer to 3.0 d/t previous CVA check Q 4 weeks.Declines to reduce dose when slightly over 3.0  Needs to change PCP  Takes a long time get up to therapeutic after holding warfarin.             Anticoagulation Care Providers       Provider Role Specialty Phone number    Teo Funes MD  [facility] Family [facility] [phone]

## 2024-02-08 NOTE — PROGRESS NOTES
Babak Moran is a 63 year old male being seen today for regulatory visit at [facility].    Code Status: CPR.   Health Care Power of : Extended Emergency Contact Information  Primary Emergency Contact: Erika Moran   [place]  Mobile Phone: [phone]  Relation: Spouse  Secondary Emergency Contact: Cinda Gomez   [place]  Mobile Phone: [phone]  Relation: Daughter     Allergies: Diatrizoate, Ioxaglate, Levofloxacin, Metrizamide, and Probenecid     Chief Complaint / HPI: Babak Moran is a 63 year old male is seen for 60 day evaluation.  He is followed by orthopedics due to history of left TKA with recurrent patella dislocation.  Plan is for additional referral and probable surgery.  He's had one surgery for this and patella continues to dislocate.  He wears a long leg immobilizer.  He's able to transfer and ambulate in his room independently with use of a walker and can walk 100+feet.  Isn't taking prescription pain medications.      UTI - recurrent:  last UTI about two months ago.  D-Mannose started by urology to help with recurrence prevention.     Denies shortness of breath, chest pain, heartburn.     Patient Active Problem List   Diagnosis    Pain in joint, ankle and foot    Atrial fibrillation with RVR (H)    Cerebrovascular accident (CVA) due to embolism of right middle cerebral artery (H)    Osteoarthrosis    Cutaneous lupus erythematosus    Gout    Ascending aorta enlargement (H24)    Family history of coronary artery disease    Essential hypertension    Left hemiparesis (H)    Long-term (current) use of anticoagulants, INR goal 2.0-3.0    Systemic lupus erythematosus (H)    Morbid obesity with BMI of 40.0-44.9, adult (H)    Permanent atrial fibrillation (H)    Obstructive sleep apnea syndrome    Need for vaccination    Onychogryphosis    Overweight    Aftercare following knee joint replacement surgery, unspecified laterality    Nail dystrophy    Onychomycosis     Anticoagulation monitoring, INR range 2-3    Status post total right knee replacement    Chronic venous stasis dermatitis of left lower extremity    Dislocation of both patellae    Patellar dislocation, left, sequela    Prediabetes    Recurrent gross hematuria           Current Outpatient Medications   Medication    acetaminophen (TYLENOL) 325 MG tablet    amLODIPine (NORVASC) 5 MG tablet    Cranberry 125 MG TABS    D-Mannose 500 MG CAPS    Emollient (CERAVE) CREA    hydrochlorothiazide (HYDRODIURIL) 25 MG tablet    metoprolol succinate ER (TOPROL XL) 25 MG 24 hr tablet    Nutritional Supplement LIQD    saccharomyces boulardii (FLORASTOR) 250 MG capsule    senna-docusate (SENOKOT-S/PERICOLACE) 8.6-50 MG tablet    warfarin ANTICOAGULANT (COUMADIN) 5 MG tablet     No current facility-administered medications for this visit.       Medications - recent changes: D-mannose started last month      Toileting:    Continent of Bowel: Yes   Continent of Bladder: Yes  Mobility: walker    Recent Labs:   Lab Results   Component Value Date    WBC 8.6 01/05/2024    WBC 7.1 09/29/2020     Lab Results   Component Value Date    RBC 5.42 01/05/2024    RBC 5.49 09/29/2020     Lab Results   Component Value Date    HGB 14.8 01/05/2024    HGB 14.5 09/29/2020     Lab Results   Component Value Date    HCT 46.2 01/05/2024    HCT 47.3 09/29/2020     Lab Results   Component Value Date    MCV 85 01/05/2024    MCV 86 09/29/2020     Lab Results   Component Value Date    MCH 27.3 01/05/2024    MCH 26.4 09/29/2020     Lab Results   Component Value Date    MCHC 32.0 01/05/2024    MCHC 30.7 09/29/2020     Lab Results   Component Value Date    RDW 18.0 01/05/2024    RDW 16.1 09/29/2020     Lab Results   Component Value Date     01/05/2024     09/29/2020       Last Comprehensive Metabolic Panel:  Sodium   Date Value Ref Range Status   01/05/2024 140 135 - 145 mmol/L Final     Comment:     Reference intervals for this test were updated on  09/26/2023 to more accurately reflect our healthy population. There may be differences in the flagging of prior results with similar values performed with this method. Interpretation of those prior results can be made in the context of the updated reference intervals.    09/29/2020 138 134 - 144 mmol/L Final     Potassium   Date Value Ref Range Status   01/05/2024 4.1 3.4 - 5.3 mmol/L Final   08/11/2022 3.7 3.5 - 5.1 mmol/L Final   09/29/2020 4.3 3.5 - 5.1 mmol/L Final     Chloride   Date Value Ref Range Status   01/05/2024 102 98 - 107 mmol/L Final   08/11/2022 102 98 - 107 mmol/L Final   09/29/2020 101 98 - 107 mmol/L Final     Carbon Dioxide   Date Value Ref Range Status   09/29/2020 33 (H) 21 - 31 mmol/L Final     Carbon Dioxide (CO2)   Date Value Ref Range Status   01/05/2024 28 22 - 29 mmol/L Final   08/11/2022 29 21 - 31 mmol/L Final     Anion Gap   Date Value Ref Range Status   01/05/2024 10 7 - 15 mmol/L Final   08/11/2022 8 3 - 14 mmol/L Final   09/29/2020 4 3 - 14 mmol/L Final     Glucose   Date Value Ref Range Status   01/05/2024 92 70 - 99 mg/dL Final   08/11/2022 87 70 - 105 mg/dL Final   09/29/2020 98 70 - 105 mg/dL Final     GLUCOSE BY METER POCT   Date Value Ref Range Status   06/26/2023 99 70 - 99 mg/dL Final     Urea Nitrogen   Date Value Ref Range Status   01/05/2024 17.8 8.0 - 23.0 mg/dL Final   08/11/2022 12 7 - 25 mg/dL Final   09/29/2020 14 7 - 25 mg/dL Final     Creatinine   Date Value Ref Range Status   01/05/2024 0.67 0.67 - 1.17 mg/dL Final   09/29/2020 0.99 0.70 - 1.30 mg/dL Final     GFR Estimate   Date Value Ref Range Status   01/05/2024 >90 >60 mL/min/1.73m2 Final   09/29/2020 77 >60 mL/min/[1.73_m2] Final     Calcium   Date Value Ref Range Status   01/05/2024 9.4 8.8 - 10.2 mg/dL Final   09/29/2020 9.7 8.6 - 10.3 mg/dL Final     Lab Results   Component Value Date    A1C 5.5 12/01/2023    A1C 5.8 08/09/2022    A1C 6.0 09/06/2019     Lab Results   Component Value Date    CHOL 175  12/23/2022    CHOL 146 01/08/2019     Lab Results   Component Value Date    HDL 47 12/23/2022    HDL 36 01/08/2019     Lab Results   Component Value Date     12/23/2022    LDL 98 01/08/2019     Lab Results   Component Value Date    TRIG 49 12/23/2022    TRIG 59 01/08/2019         Pertinent Screening Tool results: None      Current Therapies: physical therapy    Exam:  Vital signs reviewed. Wts and VSS  Patient seen in recliner in his room  Neuro:  alert, oriented, no cognitive deficits  CV - irregulat  Musculoskeletal:  left knee immobilizer     Assessment and Plan:    ICD-10-CM    1. Cerebrovascular accident (CVA) due to embolism of right middle cerebral artery (H)  I63.411       2. Left hemiparesis (H)  G81.94       3. Obstructive sleep apnea syndrome  G47.33       4. Morbid obesity with BMI of 40.0-44.9, adult (H)  E66.01     Z68.41       5. Permanent atrial fibrillation (H)  I48.21       6. Patellar dislocation, left, sequela  S83.005S       7. Essential hypertension  I10       8. Chronic venous stasis dermatitis of left lower extremity  I87.2       9. Prediabetes  R73.03       10. Recurrent gross hematuria  N02.9       11. Anticoagulation monitoring, INR range 2-3  Z79.01       12. Long-term (current) use of anticoagulants, INR goal 2.0-3.0  Z79.01              Plan of care reviewed and signed.  Med changes made.  Stop noroco due to no use  Continue follow up with urology  Continue follow up with orthopedics and physical therapy  Ambulation as tolerated  BP is at goal - no change   A fib - anticoagulated and rate controlled   History of CVA     Time spent 33 minutes     Sis Toscano MD

## 2024-02-22 ENCOUNTER — ANTICOAGULATION THERAPY VISIT (OUTPATIENT)
Dept: ANTICOAGULATION | Facility: OTHER | Age: 64
End: 2024-02-22
Payer: COMMERCIAL

## 2024-02-22 ENCOUNTER — TRANSFERRED RECORDS (OUTPATIENT)
Dept: HEALTH INFORMATION MANAGEMENT | Facility: OTHER | Age: 64
End: 2024-02-22

## 2024-02-22 DIAGNOSIS — Z79.01 ANTICOAGULATION MONITORING, INR RANGE 2-3: ICD-10-CM

## 2024-02-22 DIAGNOSIS — I48.91 ATRIAL FIBRILLATION WITH RVR (H): ICD-10-CM

## 2024-02-22 DIAGNOSIS — I48.21 PERMANENT ATRIAL FIBRILLATION (H): Primary | ICD-10-CM

## 2024-02-22 LAB — INR (EXTERNAL): 2.3 (ref 0.9–1.1)

## 2024-02-22 NOTE — PROGRESS NOTES
ANTICOAGULATION MANAGEMENT     Babak Moran 63 year old male is on warfarin with therapeutic INR result. (Goal INR 2.0-3.0)    Recent labs: (last 7 days)     02/22/24  1106   INR 2.3*       ASSESSMENT     Source(s): Chart Review and Home Care/Facility Nurse     Warfarin doses taken: Warfarin taken as instructed  Diet: No new diet changes identified  Medication/supplement changes: None noted  New illness, injury, or hospitalization: No  Signs or symptoms of bleeding or clotting: No  Previous result: Therapeutic last 2(+) visits  Additional findings: None       PLAN     Recommended plan for no diet, medication or health factor changes affecting INR     Dosing Instructions: Continue your current warfarin dose with next INR in 2 weeks       Summary  As of 2/22/2024      Full warfarin instructions:  10 mg every Mon, Thu; 7.5 mg all other days   Next INR check:  3/7/2024               Faxed dosing and follow up instructions to [facility]    Orders given to  Homecare nurse/facility to recheck    Patient not here, [facility] Communication sheet with screening questions and current INR received via FAX from outside agency. Results reviewed, Warfarin dosing per protocol, and recommended follow-up appointment made. Paperwork FAXED back to facility.       Plan made per [facility] anticoagulation protocol    Shelley Mercado, RN  Anticoagulation Clinic  2/22/2024    _______________________________________________________________________     Anticoagulation Episode Summary       Current INR goal:  2.0-3.0   TTR:  56.8% (11.9 mo)   Target end date:  Indefinite   Send INR reminders to:  [facility]    Indications    Permanent atrial fibrillation (H) [I48.21]  Anticoagulation monitoring  INR range 2-3 (Resolved) [Z79.01]  Atrial fibrillation with RVR (H) [I48.91]  Anticoagulation monitoring  INR range 2-3 [Z79.01]             Comments:  Babak prefers to be closer to 3.0 d/t previous CVA check Q 4 weeks.Declines to reduce dose when  slightly over 3.0  Needs to change PCP  Takes a long time get up to therapeutic after holding warfarin.             Anticoagulation Care Providers       Provider Role Specialty Phone number    Teo Funes MD Referring Family Medicine [phone]

## 2024-02-24 ENCOUNTER — HEALTH MAINTENANCE LETTER (OUTPATIENT)
Age: 64
End: 2024-02-24

## 2024-03-07 ENCOUNTER — ANTICOAGULATION THERAPY VISIT (OUTPATIENT)
Dept: ANTICOAGULATION | Facility: OTHER | Age: 64
End: 2024-03-07
Attending: FAMILY MEDICINE
Payer: COMMERCIAL

## 2024-03-07 ENCOUNTER — TRANSFERRED RECORDS (OUTPATIENT)
Dept: HEALTH INFORMATION MANAGEMENT | Facility: OTHER | Age: 64
End: 2024-03-07

## 2024-03-07 DIAGNOSIS — Z79.01 ANTICOAGULATION MONITORING, INR RANGE 2-3: ICD-10-CM

## 2024-03-07 DIAGNOSIS — I48.21 PERMANENT ATRIAL FIBRILLATION (H): Primary | ICD-10-CM

## 2024-03-07 DIAGNOSIS — I48.91 ATRIAL FIBRILLATION WITH RVR (H): ICD-10-CM

## 2024-03-07 LAB — INR (EXTERNAL): 2.2 (ref 0.9–1.1)

## 2024-03-07 NOTE — PROGRESS NOTES
ANTICOAGULATION MANAGEMENT     Babak Moran 63 year old male is on warfarin with therapeutic INR result. (Goal INR 2.0-3.0)    Recent labs: (last 7 days)     03/07/24  0904   INR 2.2*       ASSESSMENT     Source(s): Chart Review and Home Care/Facility Nurse     Warfarin doses taken: Warfarin taken as instructed  Diet: No new diet changes identified  Medication/supplement changes: None noted  New illness, injury, or hospitalization: No  Signs or symptoms of bleeding or clotting: No  Previous result: Therapeutic last 2(+) visits  Additional findings: None       PLAN     Recommended plan for no diet, medication or health factor changes affecting INR     Dosing Instructions: Continue your current warfarin dose with next INR in 3 weeks       Summary  As of 3/7/2024      Full warfarin instructions:  10 mg every Mon, Thu; 7.5 mg all other days   Next INR check:  3/28/2024               Faxed dosing and follow up instructions to [facility]    Orders given to  Homecare nurse/facility to recheck    Education provided:   None required    Plan made per [facility] anticoagulation protocol    Shelley Mercado, RN  Anticoagulation Clinic  3/7/2024    _______________________________________________________________________     Anticoagulation Episode Summary       Current INR goal:  2.0-3.0   TTR:  57.3% (11.9 mo)   Target end date:  Indefinite   Send INR reminders to:  [facility]    Indications    Permanent atrial fibrillation (H) [I48.21]  Anticoagulation monitoring  INR range 2-3 (Resolved) [Z79.01]  Atrial fibrillation with RVR (H) [I48.91]  Anticoagulation monitoring  INR range 2-3 [Z79.01]             Comments:  Babak prefers to be closer to 3.0 d/t previous CVA check Q 4 weeks.Declines to reduce dose when slightly over 3.0  Needs to change PCP  Takes a long time get up to therapeutic after holding warfarin.             Anticoagulation Care Providers       Provider Role Specialty Phone number    Teo Funes MD  [facility] [phone]

## 2024-03-08 ENCOUNTER — TELEPHONE (OUTPATIENT)
Dept: FAMILY MEDICINE | Facility: OTHER | Age: 64
End: 2024-03-08
Payer: MEDICAID

## 2024-03-08 NOTE — TELEPHONE ENCOUNTER
Reason for call: Patient wanting a work in appointment.    Is the appointment for a Hospital Follow up?  No     Patient is having the following symptoms:  Pre-op, surgery on left knee    The patient is requesting an appointment with  J or anyone who can work him in today or monday    Was an appointment offered for this call? No    Preferred method for responding to this message: Telephone Call    Phone number patient can be reached at? Other phone number:  [phone]*    If we can't reach you directly, may we leave a detailed response at the number you provided? Yes    Can this message wait until your PCP/provider returns if unavailable today? No

## 2024-03-08 NOTE — TELEPHONE ENCOUNTER
I called Mya at [facility] and she stated the patient is having a Pre-op at [facility] so does not need one here now.  Lydia Chandra LPN..................3/8/2024   2:49 PM

## 2024-03-12 LAB — INR (EXTERNAL): 1.3 (ref 2–3)

## 2024-03-13 LAB — INR (EXTERNAL): 1.2 (ref 2–3)

## 2024-03-14 ENCOUNTER — DOCUMENTATION ONLY (OUTPATIENT)
Dept: FAMILY MEDICINE | Facility: OTHER | Age: 64
End: 2024-03-14
Payer: MEDICAID

## 2024-03-14 DIAGNOSIS — I48.91 ATRIAL FIBRILLATION WITH RVR (H): ICD-10-CM

## 2024-03-14 DIAGNOSIS — I48.21 PERMANENT ATRIAL FIBRILLATION (H): Primary | ICD-10-CM

## 2024-03-14 DIAGNOSIS — Z79.01 ANTICOAGULATION MONITORING, INR RANGE 2-3: ICD-10-CM

## 2024-03-14 LAB — INR POINT OF CARE: 1.3 (ref 0.9–1.1)

## 2024-03-14 NOTE — PROGRESS NOTES
Updated [facility] calendar patient discharged from [facility]

## 2024-03-15 ENCOUNTER — ANTICOAGULATION THERAPY VISIT (OUTPATIENT)
Dept: FAMILY MEDICINE | Facility: OTHER | Age: 64
End: 2024-03-15
Payer: MEDICAID

## 2024-03-15 ENCOUNTER — TRANSFERRED RECORDS (OUTPATIENT)
Dept: HEALTH INFORMATION MANAGEMENT | Facility: OTHER | Age: 64
End: 2024-03-15
Payer: MEDICAID

## 2024-03-15 DIAGNOSIS — I48.91 ATRIAL FIBRILLATION WITH RVR (H): ICD-10-CM

## 2024-03-15 DIAGNOSIS — Z79.01 ANTICOAGULATION MONITORING, INR RANGE 2-3: ICD-10-CM

## 2024-03-15 DIAGNOSIS — I48.21 PERMANENT ATRIAL FIBRILLATION (H): Primary | ICD-10-CM

## 2024-03-15 NOTE — PROGRESS NOTES
ANTICOAGULATION MANAGEMENT     Babak Moran 63 year old male is on warfarin with subtherapeutic INR result. (Goal INR 2.0-3.0)    Recent labs: (last 7 days)     03/14/24  0815   INR 1.3*       ASSESSMENT     Source(s): Chart review     Warfarin doses taken: While hospitalized on 3/12/24: more warfarin administered than maintenance regimen .  Discharged on: home regimen continued  Diet: No new diet changes identified  New illness, injury, or hospitalization: No  Medication/supplement changes: None noted  Signs or symptoms of bleeding or clotting: No  Previous INR: Subtherapeutic  Additional findings: Bridging with Enoxaparin until INR >= 2.0. Patient was discharged from [facility] for a knee      PLAN     Recommended plan for temporary change(s) affecting INR     Dosing Instructions: Continue your current warfarin dose with next INR in 4 days       Summary  As of 3/15/2024      Full warfarin instructions:  10 mg every Mon, Thu; 7.5 mg all other days   Next INR check:  3/18/2024               Telephone call with [facility] nurse who verbalizes understanding and agrees to plan  Faxed dosing and follow up instructions to [facility]    Orders given to  Homecare nurse/facility to recheck    Education provided: Please call back if any changes to your diet, medications or how you've been taking warfarin and Monitoring for clotting signs and symptoms    Plan made per [facility] anticoagulation protocol    An Xiong RN  Anticoagulation Clinic  3/15/2024    _______________________________________________________________________     Anticoagulation Episode Summary       Current INR goal:  2.0-3.0   TTR:  57.8% (11.9 mo)   Target end date:  Indefinite   Send INR reminders to:  [facility]    Indications    Permanent atrial fibrillation (H) [I48.21]  Anticoagulation monitoring  INR range 2-3 (Resolved) [Z79.01]  Atrial fibrillation with RVR (H) [I48.91]  Anticoagulation monitoring  INR range 2-3 [Z79.01]              Comments:  Babak prefers to be closer to 3.0 d/t previous CVA check Q 4 weeks.Declines to reduce dose when slightly over 3.0  Needs to change PCP  Takes a long time get up to therapeutic after holding warfarin.             Anticoagulation Care Providers       Provider Role Specialty Phone number    Teo Funes MD Referring Family Medicine [phone]

## 2024-03-18 ENCOUNTER — ANTICOAGULATION THERAPY VISIT (OUTPATIENT)
Dept: ANTICOAGULATION | Facility: OTHER | Age: 64
End: 2024-03-18
Payer: MEDICAID

## 2024-03-18 DIAGNOSIS — Z79.01 ANTICOAGULATION MONITORING, INR RANGE 2-3: ICD-10-CM

## 2024-03-18 DIAGNOSIS — I48.21 PERMANENT ATRIAL FIBRILLATION (H): Primary | ICD-10-CM

## 2024-03-18 DIAGNOSIS — I48.91 ATRIAL FIBRILLATION WITH RVR (H): ICD-10-CM

## 2024-03-18 LAB — INR POINT OF CARE: 1.2 (ref 0.9–1.1)

## 2024-03-18 PROCEDURE — 36416 COLLJ CAPILLARY BLOOD SPEC: CPT | Mod: ZL

## 2024-03-18 NOTE — PROGRESS NOTES
ANTICOAGULATION MANAGEMENT     Babak Moran 63 year old male is on warfarin with subtherapeutic INR result. (Goal INR 2.0-3.0)    Recent labs: (last 7 days)     03/18/24  1300   INR 1.2*       ASSESSMENT     Source(s): Home care/ facility nurse     Warfarin doses taken: Less warfarin taken than planned which may be affecting INR  Diet: No new diet changes identified  New illness, injury, or hospitalization: Yes: 3/12/24 Status post left total knee arthroplasty with loss of extensor mechanism/patella malalignment.   Medication/supplement changes: None noted  Signs or symptoms of bleeding or clotting: Yes, left knee bleeding on Saturday.   Previous INR: Subtherapeutic  Additional findings: Bridging with Enoxaparin until INR >= 2.0. Patient began bleeding in their left knee on Saturday per Cuong nurse at [facility]. Contacted surgical team and reported to have patient stop their coumadin due to unable to control bleeding. On 3/17/2024, surgical team gave verbal order to restart coumadin. Spoke with Cuong to gave coumadin instructions on 3/18/2024. Patient has an appointment tomorrow at  for their knee incision hold warfarin and continue Lovenox per verbal order from PCP.     PLAN     Recommended plan for temporary change(s) affecting INR     Dosing Instructions: Continue bridging with Enoxaparin hold warfarin  with next INR in 2 days       Summary  As of 3/18/2024      Full warfarin instructions:  3/18: Hold; Otherwise 10 mg every Mon, Thu; 7.5 mg all other days   Next INR check:  3/20/2024               Telephone call with [facility] nurse who verbalizes understanding and agrees to plan  Faxed dosing and follow up instructions to [facility]    Orders given to  Homecare nurse/facility to recheck    Education provided: Please call back if any changes to your diet, medications or how you've been taking warfarin and Monitoring for clotting signs and symptoms    Plan made per [facility] anticoagulation  protocol    An Xiong RN  Anticoagulation Clinic  3/18/2024    _______________________________________________________________________     Anticoagulation Episode Summary       Current INR goal:  2.0-3.0   TTR:  57.6% (11.9 mo)   Target end date:  Indefinite   Send INR reminders to:  [facility]    Indications    Permanent atrial fibrillation (H) [I48.21]  Anticoagulation monitoring  INR range 2-3 (Resolved) [Z79.01]  Atrial fibrillation with RVR (H) [I48.91]  Anticoagulation monitoring  INR range 2-3 [Z79.01]             Comments:  Babak prefers to be closer to 3.0 d/t previous CVA check Q 4 weeks.Declines to reduce dose when slightly over 3.0  Needs to change PCP  Takes a long time get up to therapeutic after holding warfarin.             Anticoagulation Care Providers       Provider Role Specialty Phone number    Teo Funes MD Referring Family Medicine [phone]

## 2024-03-19 ENCOUNTER — TELEPHONE (OUTPATIENT)
Dept: ANTICOAGULATION | Facility: OTHER | Age: 64
End: 2024-03-19
Payer: MEDICAID

## 2024-03-19 NOTE — TELEPHONE ENCOUNTER
Cuong from [facility] called with questions regarding patient's INR. She stated that he did go to the surgeon today and they restapled his incision and put on a wound vac. Surgeon deferred to [facility] for warfarin dosing. Family was concerned about low INR. Writer explained to Cuong that the lovenox is providing coverage until his INR is within range. She verbalized understanding and will be speaking with family. Plan remains to recheck INR tomorrow.      Siobhan Clayton RN on 3/19/2024 at 3:39 PM     21.5

## 2024-03-20 ENCOUNTER — ANTICOAGULATION THERAPY VISIT (OUTPATIENT)
Dept: ANTICOAGULATION | Facility: OTHER | Age: 64
End: 2024-03-20
Attending: FAMILY MEDICINE
Payer: MEDICAID

## 2024-03-20 ENCOUNTER — TRANSFERRED RECORDS (OUTPATIENT)
Dept: HEALTH INFORMATION MANAGEMENT | Facility: OTHER | Age: 64
End: 2024-03-20

## 2024-03-20 DIAGNOSIS — Z79.01 ANTICOAGULATION MONITORING, INR RANGE 2-3: ICD-10-CM

## 2024-03-20 DIAGNOSIS — I48.21 PERMANENT ATRIAL FIBRILLATION (H): Primary | ICD-10-CM

## 2024-03-20 DIAGNOSIS — I48.91 ATRIAL FIBRILLATION WITH RVR (H): ICD-10-CM

## 2024-03-20 LAB — INR (EXTERNAL): 1.1 (ref 0.9–1.1)

## 2024-03-20 NOTE — PROGRESS NOTES
ANTICOAGULATION MANAGEMENT     Babak Moran 63 year old male is on warfarin with subtherapeutic INR result. (Goal INR 2.0-3.0)    Recent labs: (last 7 days)     03/20/24  0948   INR 1.1       ASSESSMENT     Source(s): Chart Review and Home Care/Facility Nurse     Warfarin doses taken: Held for incisional bleeding  recently which may be affecting INR  Diet: No new diet changes identified  Medication/supplement changes: None noted  New illness, injury, or hospitalization: No  Signs or symptoms of bleeding or clotting: Yes: after knee surgery  Previous result: Subtherapeutic  Additional findings: Bridging with Enoxaparin until INR >= 2.0       PLAN     Recommended plan for temporary change(s) affecting INR     Dosing Instructions: Resume warfarin Continue bridging with Enoxaparin with next INR in 5 days       Summary  As of 3/20/2024      Full warfarin instructions:  3/20: 10 mg; 3/22: 10 mg; 3/23: 10 mg; 3/24: 10 mg; Otherwise 10 mg every Mon, Thu; 7.5 mg all other days   Next INR check:  3/25/2024               Faxed dosing and follow up instructions to [facility]    Orders given to  Homecare nurse/facility to recheck    Patient not here, [facility] Communication sheet with screening questions and current INR received via FAX from outside agency. Results reviewed, Warfarin dosing per protocol, and recommended follow-up appointment made. Paperwork FAXED back to facility.       Plan made per [facility] anticoagulation protocol    Shelley Mercado, RN  Anticoagulation Clinic  3/20/2024    _______________________________________________________________________     Anticoagulation Episode Summary       Current INR goal:  2.0-3.0   TTR:  57.6% (11.9 mo)   Target end date:  Indefinite   Send INR reminders to:  [facility]    Indications    Permanent atrial fibrillation (H) [I48.21]  Anticoagulation monitoring  INR range 2-3 (Resolved) [Z79.01]  Atrial fibrillation with RVR (H) [I48.91]  Anticoagulation monitoring  INR  range 2-3 [Z79.01]             Comments:  Babak prefers to be closer to 3.0 d/t previous CVA check Q 4 weeks.Declines to reduce dose when slightly over 3.0  Needs to change PCP  Takes a long time get up to therapeutic after holding warfarin.             Anticoagulation Care Providers       Provider Role Specialty Phone number    Teo Funes MD Referring Family Medicine [phone]

## 2024-03-25 ENCOUNTER — TRANSFERRED RECORDS (OUTPATIENT)
Dept: HEALTH INFORMATION MANAGEMENT | Facility: OTHER | Age: 64
End: 2024-03-25

## 2024-03-25 ENCOUNTER — ANTICOAGULATION THERAPY VISIT (OUTPATIENT)
Dept: ANTICOAGULATION | Facility: OTHER | Age: 64
End: 2024-03-25
Attending: FAMILY MEDICINE
Payer: MEDICAID

## 2024-03-25 DIAGNOSIS — I48.21 PERMANENT ATRIAL FIBRILLATION (H): Primary | ICD-10-CM

## 2024-03-25 DIAGNOSIS — Z79.01 ANTICOAGULATION MONITORING, INR RANGE 2-3: ICD-10-CM

## 2024-03-25 DIAGNOSIS — I48.91 ATRIAL FIBRILLATION WITH RVR (H): ICD-10-CM

## 2024-03-25 LAB — INR (EXTERNAL): 1.4 (ref 0.9–1.1)

## 2024-03-25 NOTE — PROGRESS NOTES
ANTICOAGULATION MANAGEMENT     Bbaak Moran 63 year old male is on warfarin with subtherapeutic INR result. (Goal INR 2.0-3.0)    Recent labs: (last 7 days)     03/25/24  1451   INR 1.4*       ASSESSMENT     Source(s): Chart Review and Home Care/Facility Nurse     Warfarin doses taken: Warfarin taken as instructed  Diet: No new diet changes identified  Medication/supplement changes: None noted  New illness, injury, or hospitalization: No  Signs or symptoms of bleeding or clotting: No  Previous result: Subtherapeutic  Additional findings: Bridging with Enoxaparin until INR >= 2.0       PLAN     Recommended plan for temporary change(s) affecting INR     Dosing Instructions: booster dose then continue your current warfarin dose Continue bridging with Enoxaparin with next INR in 2 days       Summary  As of 3/25/2024      Full warfarin instructions:  3/25: 15 mg; 3/26: 10 mg; Otherwise 10 mg every Mon, Thu; 7.5 mg all other days   Next INR check:  3/27/2024               Faxed dosing and follow up instructions to [facility]    Orders given to  Homecare nurse/facility to recheck    Patient not here, [facility] Communication sheet with screening questions and current INR received via FAX from outside agency. Results reviewed, Warfarin dosing per protocol, and recommended follow-up appointment made. Paperwork FAXED back to facility.       Plan made per [facility] anticoagulation protocol    Shelley Mercado RN  Anticoagulation Clinic  3/25/2024    _______________________________________________________________________     Anticoagulation Episode Summary       Current INR goal:  2.0-3.0   TTR:  57.6% (11.9 mo)   Target end date:  Indefinite   Send INR reminders to:  [facility]    Indications    Permanent atrial fibrillation (H) [I48.21]  Anticoagulation monitoring  INR range 2-3 (Resolved) [Z79.01]  Atrial fibrillation with RVR (H) [I48.91]  Anticoagulation monitoring  INR range 2-3 [Z79.01]             Comments:  Babak  prefers to be closer to 3.0 d/t previous CVA check Q 4 weeks.Declines to reduce dose when slightly over 3.0  Needs to change PCP  Takes a long time get up to therapeutic after holding warfarin.             Anticoagulation Care Providers       Provider Role Specialty Phone number    Teo Funes MD Referring Family Medicine [phone]

## 2024-03-27 ENCOUNTER — TRANSFERRED RECORDS (OUTPATIENT)
Dept: HEALTH INFORMATION MANAGEMENT | Facility: OTHER | Age: 64
End: 2024-03-27

## 2024-03-27 ENCOUNTER — ANTICOAGULATION THERAPY VISIT (OUTPATIENT)
Dept: ANTICOAGULATION | Facility: OTHER | Age: 64
End: 2024-03-27
Attending: FAMILY MEDICINE
Payer: MEDICAID

## 2024-03-27 DIAGNOSIS — I48.91 ATRIAL FIBRILLATION WITH RVR (H): ICD-10-CM

## 2024-03-27 DIAGNOSIS — I48.21 PERMANENT ATRIAL FIBRILLATION (H): Primary | ICD-10-CM

## 2024-03-27 DIAGNOSIS — Z79.01 ANTICOAGULATION MONITORING, INR RANGE 2-3: ICD-10-CM

## 2024-03-27 LAB — INR (EXTERNAL): 1.8 (ref 0.9–1.1)

## 2024-03-27 NOTE — PROGRESS NOTES
ANTICOAGULATION MANAGEMENT     Babak Moran 63 year old male is on warfarin with subtherapeutic INR result. (Goal INR 2.0-3.0)    Recent labs: (last 7 days)     03/27/24  1522   INR 1.8*       ASSESSMENT     Source(s): Chart Review and Home Care/Facility Nurse     Warfarin doses taken: Warfarin taken as instructed  Diet: No new diet changes identified  Medication/supplement changes: None noted  New illness, injury, or hospitalization: No  Signs or symptoms of bleeding or clotting: No  Previous result: Subtherapeutic  Additional findings: Bridging with Enoxaparin until INR >= 2.0       PLAN     Recommended plan for temporary change(s) affecting INR     Dosing Instructions: booster dose then continue your current warfarin dose with next INR in 2 days       Summary  As of 3/27/2024      Full warfarin instructions:  3/27: 15 mg; Otherwise 10 mg every Mon, Thu; 7.5 mg all other days   Next INR check:  3/29/2024               Faxed dosing and follow up instructions to [facility]    Orders given to  Homecare nurse/facility to recheck    Patient not here, [employer]heet with screening questions and current INR received via FAX from outside agency. Results reviewed, Warfarin dosing per protocol, and recommended follow-up appointment made. Paperwork FAXED back to facility.       Plan made per [facility] anticoagulation protocol    Shelley Mercado RN  Anticoagulation Clinic  3/27/2024    _______________________________________________________________________     Anticoagulation Episode Summary       Current INR goal:  2.0-3.0   TTR:  57.6% (11.9 mo)   Target end date:  Indefinite   Send INR reminders to:  [facility]    Indications    Permanent atrial fibrillation (H) [I48.21]  Anticoagulation monitoring  INR range 2-3 (Resolved) [Z79.01]  Atrial fibrillation with RVR (H) [I48.91]  Anticoagulation monitoring  INR range 2-3 [Z79.01]             Comments:  Babak prefers to be closer to 3.0 d/t previous CVA  check Q 4 weeks.Declines to reduce dose when slightly over 3.0  Needs to change PCP  Takes a long time get up to therapeutic after holding warfarin.             Anticoagulation Care Providers       Provider Role Specialty Phone number    Teo Funes MD Referring Family Medicine [phone]

## 2024-03-28 ENCOUNTER — TELEPHONE (OUTPATIENT)
Dept: FAMILY MEDICINE | Facility: OTHER | Age: 64
End: 2024-03-28
Payer: MEDICAID

## 2024-03-28 NOTE — TELEPHONE ENCOUNTER
Nurse from [facility] called and and reported patient had missed his dose of Warfarin the evening before. There were instructed to give it to him this morning and resume his usual dosing instructions. Nurse agreeed to the plan and verbalized agreement. Liz Coles RN on 3/28/2024 at 9:59 AM

## 2024-03-29 ENCOUNTER — TRANSFERRED RECORDS (OUTPATIENT)
Dept: HEALTH INFORMATION MANAGEMENT | Facility: OTHER | Age: 64
End: 2024-03-29

## 2024-03-29 ENCOUNTER — ANTICOAGULATION THERAPY VISIT (OUTPATIENT)
Dept: ANTICOAGULATION | Facility: OTHER | Age: 64
End: 2024-03-29
Attending: FAMILY MEDICINE
Payer: MEDICAID

## 2024-03-29 DIAGNOSIS — Z79.01 ANTICOAGULATION MONITORING, INR RANGE 2-3: ICD-10-CM

## 2024-03-29 DIAGNOSIS — I48.91 ATRIAL FIBRILLATION WITH RVR (H): ICD-10-CM

## 2024-03-29 DIAGNOSIS — I48.21 PERMANENT ATRIAL FIBRILLATION (H): Primary | ICD-10-CM

## 2024-03-29 LAB — INR (EXTERNAL): 2.1 (ref 0.9–1.1)

## 2024-03-29 NOTE — PROGRESS NOTES
ANTICOAGULATION MANAGEMENT     Babak Moran 63 year old male is on warfarin with therapeutic INR result. (Goal INR 2.0-3.0)    Recent labs: (last 7 days)     03/29/24  1454   INR 2.1*       ASSESSMENT     Source(s): Home care/ facility nurse     Warfarin doses taken: Warfarin taken as instructed  Diet: No new diet changes identified  New illness, injury, or hospitalization: No  Medication/supplement changes: None noted  Signs or symptoms of bleeding or clotting: No  Previous INR: Subtherapeutic  Additional findings: None     PLAN     Recommended plan for no diet, medication or health factor changes affecting INR     Dosing Instructions: Continue your current warfarin dose with next INR in 3 days       Summary  As of 3/29/2024      Full warfarin instructions:  10 mg every Mon, Thu; 7.5 mg all other days   Next INR check:  4/1/2024               Faxed dosing and follow up instructions to    Patient not here, [employer]heet with screening questions and current INR received via FAX from outside agency. Results reviewed, Warfarin dosing per protocol, and recommended follow-up appointment made. Paperwork FAXED back to facility.     Lab visit scheduled    Education provided: Please call back if any changes to your diet, medications or how you've been taking warfarin    Plan made per [facility] anticoagulation protocol    Liz Coles RN  Anticoagulation Clinic  3/29/2024    _______________________________________________________________________     Anticoagulation Episode Summary       Current INR goal:  2.0-3.0   TTR:  57.7% (11.9 mo)   Target end date:  Indefinite   Send INR reminders to:  [facility]    Indications    Permanent atrial fibrillation (H) [I48.21]  Anticoagulation monitoring  INR range 2-3 (Resolved) [Z79.01]  Atrial fibrillation with RVR (H) [I48.91]  Anticoagulation monitoring  INR range 2-3 [Z79.01]             Comments:  Babak prefers to be closer to 3.0 d/t previous CVA check Q 4  weeks.Declines to reduce dose when slightly over 3.0  Needs to change PCP  Takes a long time get up to therapeutic after holding warfarin.             Anticoagulation Care Providers       Provider Role Specialty Phone number    Teo Funes MD Referring Family Medicine [phone]

## 2024-04-01 ENCOUNTER — ANTICOAGULATION THERAPY VISIT (OUTPATIENT)
Dept: ANTICOAGULATION | Facility: OTHER | Age: 64
End: 2024-04-01
Attending: FAMILY MEDICINE
Payer: MEDICAID

## 2024-04-01 ENCOUNTER — TRANSFERRED RECORDS (OUTPATIENT)
Dept: HEALTH INFORMATION MANAGEMENT | Facility: OTHER | Age: 64
End: 2024-04-01

## 2024-04-01 DIAGNOSIS — I48.21 PERMANENT ATRIAL FIBRILLATION (H): Primary | ICD-10-CM

## 2024-04-01 DIAGNOSIS — I48.91 ATRIAL FIBRILLATION WITH RVR (H): ICD-10-CM

## 2024-04-01 DIAGNOSIS — Z79.01 ANTICOAGULATION MONITORING, INR RANGE 2-3: ICD-10-CM

## 2024-04-01 LAB — INR (EXTERNAL): 2.2 (ref 0.9–1.1)

## 2024-04-01 NOTE — PROGRESS NOTES
ANTICOAGULATION MANAGEMENT     Babak Moran 63 year old male is on warfarin with therapeutic INR result. (Goal INR 2.0-3.0)    Recent labs: (last 7 days)     04/01/24  0914   INR 2.2*       ASSESSMENT     Source(s): Chart Review and Home Care/Facility Nurse     Warfarin doses taken: Warfarin taken as instructed  Diet: No new diet changes identified  Medication/supplement changes: None noted  New illness, injury, or hospitalization: No  Signs or symptoms of bleeding or clotting: No  Previous result: Therapeutic last visit; previously outside of goal range  Additional findings: Bridging with Enoxaparin until INR >= 2.0       PLAN     Recommended plan for temporary change(s) affecting INR     Dosing Instructions: Continue your current warfarin dose Stop bridging with Enoxaparin with next INR in 4 days       Summary  As of 4/1/2024      Full warfarin instructions:  10 mg every Mon, Thu; 7.5 mg all other days   Next INR check:  4/4/2024               Faxed dosing and follow up instructions to [facility]    Orders given to  Homecare nurse/facility to recheck    Patient not here, [employer] Communication sheet with screening questions and current INR received via FAX from outside agency. Results reviewed, Warfarin dosing per protocol, and recommended follow-up appointment made. Paperwork FAXED back to facility.     Plan made per [facility] anticoagulation protocol    Shelley Mercado, RN  Anticoagulation Clinic  4/1/2024    _______________________________________________________________________     Anticoagulation Episode Summary       Current INR goal:  2.0-3.0   TTR:  58.5% (11.9 mo)   Target end date:  Indefinite   Send INR reminders to:  [facility]    Indications    Permanent atrial fibrillation (H) [I48.21]  Anticoagulation monitoring  INR range 2-3 (Resolved) [Z79.01]  Atrial fibrillation with RVR (H) [I48.91]  Anticoagulation monitoring  INR range 2-3 [Z79.01]             Comments:  Babak prefers to be closer to  3.0 d/t previous CVA check Q 4 weeks.Declines to reduce dose when slightly over 3.0  Needs to change PCP  Takes a long time get up to therapeutic after holding warfarin.             Anticoagulation Care Providers       Provider Role Specialty Phone number    Teo Funes MD Referring Family Medicine [phone]

## 2024-04-04 ENCOUNTER — ANTICOAGULATION THERAPY VISIT (OUTPATIENT)
Dept: ANTICOAGULATION | Facility: OTHER | Age: 64
End: 2024-04-04
Attending: FAMILY MEDICINE
Payer: MEDICAID

## 2024-04-04 DIAGNOSIS — I48.91 ATRIAL FIBRILLATION WITH RVR (H): ICD-10-CM

## 2024-04-04 DIAGNOSIS — Z79.01 ANTICOAGULATION MONITORING, INR RANGE 2-3: ICD-10-CM

## 2024-04-04 DIAGNOSIS — I48.21 PERMANENT ATRIAL FIBRILLATION (H): Primary | ICD-10-CM

## 2024-04-04 LAB — INR (EXTERNAL): 2.2 (ref 0.9–1.1)

## 2024-04-04 NOTE — PROGRESS NOTES
ANTICOAGULATION MANAGEMENT     Babak Moran 63 year old male is on warfarin with therapeutic INR result. (Goal INR 2.0-3.0)    Recent labs: (last 7 days)     04/04/24  1450   INR 2.2*       ASSESSMENT     Source(s): Chart Review and Patient/Caregiver Call     Warfarin doses taken: Warfarin taken as instructed  Diet: No new diet changes identified  Medication/supplement changes: None noted  New illness, injury, or hospitalization: No  Signs or symptoms of bleeding or clotting: No  Previous result: Therapeutic last 2(+) visits  Additional findings: None       PLAN     Recommended plan for no diet, medication or health factor changes affecting INR     Dosing Instructions: Continue your current warfarin dose with next INR in 1 week       Summary  As of 4/4/2024      Full warfarin instructions:  10 mg every Mon, Thu; 7.5 mg all other days   Next INR check:  4/11/2024               Telephone call with Alana [facility] nurse who verbalizes understanding and agrees to plan    Orders given to  Homecare nurse/facility to recheck    Education provided:   None required    Plan made per [facility] anticoagulation protocol    Shelley Mercado, RN  Anticoagulation Clinic  4/4/2024    _______________________________________________________________________     Anticoagulation Episode Summary       Current INR goal:  2.0-3.0   TTR:  59.4% (11.9 mo)   Target end date:  Indefinite   Send INR reminders to:  [facility]    Indications    Permanent atrial fibrillation (H) [I48.21]  Anticoagulation monitoring  INR range 2-3 (Resolved) [Z79.01]  Atrial fibrillation with RVR (H) [I48.91]  Anticoagulation monitoring  INR range 2-3 [Z79.01]             Comments:  Babak prefers to be closer to 3.0 d/t previous CVA check Q 4 weeks.Declines to reduce dose when slightly over 3.0  Needs to change PCP  Takes a long time get up to therapeutic after holding warfarin.             Anticoagulation Care Providers       Provider Role  Specialty Phone number    Teo Funes MD Referring Family Medicine [phone]

## 2024-04-08 ENCOUNTER — NURSING HOME VISIT (OUTPATIENT)
Dept: GERIATRICS | Facility: OTHER | Age: 64
End: 2024-04-08
Payer: MEDICAID

## 2024-04-08 DIAGNOSIS — I87.2 CHRONIC VENOUS STASIS DERMATITIS OF LEFT LOWER EXTREMITY: ICD-10-CM

## 2024-04-08 DIAGNOSIS — N39.0 RECURRENT UTI: ICD-10-CM

## 2024-04-08 DIAGNOSIS — S83.005S PATELLAR DISLOCATION, LEFT, SEQUELA: ICD-10-CM

## 2024-04-08 DIAGNOSIS — I10 ESSENTIAL HYPERTENSION: ICD-10-CM

## 2024-04-08 DIAGNOSIS — R73.03 PREDIABETES: ICD-10-CM

## 2024-04-08 DIAGNOSIS — I48.21 PERMANENT ATRIAL FIBRILLATION (H): ICD-10-CM

## 2024-04-08 DIAGNOSIS — E66.01 MORBID OBESITY WITH BMI OF 40.0-44.9, ADULT (H): ICD-10-CM

## 2024-04-08 DIAGNOSIS — Z98.890 HX OF LEFT KNEE SURGERY: Primary | ICD-10-CM

## 2024-04-08 DIAGNOSIS — N02.9 RECURRENT GROSS HEMATURIA: ICD-10-CM

## 2024-04-08 DIAGNOSIS — G81.94 LEFT HEMIPARESIS (H): ICD-10-CM

## 2024-04-08 PROCEDURE — 99310 SBSQ NF CARE HIGH MDM 45: CPT | Performed by: NURSE PRACTITIONER

## 2024-04-08 NOTE — PROGRESS NOTES
[facility]  60 day re-certification      Patient Name: Babak Moran   : 1960  MRN: [id number]    Place of Service: [facility]  DOS: 2024    CC: 60 day re-certification      HPI:  Babak Moran is a 63 year old male with PMH of multiple chronic medical concerns, who is seen today regarding 60 day re-certification for the following:    Left hemiparesis (H)  Morbid obesity with BMI of 40.0-44.9, adult (H)  Prediabetes  Permanent atrial fibrillation (H)  Essential hypertension  Chronic venous stasis dermatitis of left lower extremity  Patellar dislocation, left, sequela  Recurrent gross hematuria  Hx of left knee surgery    Pt is doing well from a therapy standpoint and left knee healing. Pain controlled with tylenol. Incision now almost completely healed. Had wound vac for drainage for a time but this has since been removed and staples removed. He has ortho follow up . He was cleared to walk WBAT only with immobilizer on and no other exercises     Afib-rate controlled. Off lovenox now and INR therapeutic on coumadin.     Denies chest pain, shortness of breath. He is getting over a head cold. Feels and sounds a little congestion on head. Denies headaches, ear aches, sore throat. Doesn't feel he's having post nasal gtt. Cough syrup or cough drops PRN are effective. Coughs some at night.      Noted BP has trended higher at times in 150s. Weight has gone up some. Last weight entered  is inaccurate so will have them reweigh tomorrow morning before breakfast. Up about 15# but he does fluctuate some and has been at current weight at admission in 2023.     Gross hematuria and UTIs: he had consult with Dr Browning in  and was to follow up with some testing (renal ultrasound and cysto). However this appointment needed to be rescheduled due to recent knee surgery. He has not had any further gross hematuria. He has also been taking cranberry tablets and D-mannose.          Multidisciplinary notes, laboratory values, medications, vital signs, weight and orders arereviewed from nursing home records. I have reviewed the patient s medical history and updated the computerized patient record.     PMH:  Past Medical History:   Diagnosis Date    Atrial fibrillation (H) 02/03/2017    on Warfarin    Bacterial sepsis (H) 8/8/2022    Cellulitis of left lower extremity 9/5/2019    Cerebral infarction (H)     Cerebral infarction due to unspecified occlusion or stenosis of right middle cerebral artery (H) 02/03/2017    ,Left hemiparesis, left neglect, impaired balance and gait following R MCA stroke with mild hemorrhagic transformation s/p tissue plasminogen activator and mechanical thrombectomy, s/p C7 facet fracture from fall off forklift.    Chest pain 02/1997    Disorder of skin or subcutaneous tissue 07/25/2011    Essential (primary) hypertension 02/1997    Fracture of cervical vertebra (H)     02/03/2017,C7 facet fracture from fall off forklift, nondisplaced    Gout     Hemiplegia affecting left nondominant side (H) 02/03/2017    Obesity 02/03/2017    body mass index of 40    Other local lupus erythematosus     Sepsis (H) 9/6/2019       Medications:  Current Outpatient Medications   Medication Sig Dispense Refill    acetaminophen (TYLENOL) 325 MG tablet Take 2 tablets (650 mg) by mouth every 4 hours as needed for other (mild pain) 100 tablet 0    amLODIPine (NORVASC) 5 MG tablet Take 2 tablets (10 mg) by mouth daily 90 tablet 4    Cranberry 125 MG TABS Take 150 mg by mouth daily (Patient taking differently: Take 200 mg by mouth daily) 180 tablet 4    D-Mannose 500 MG CAPS Take 1,000 mg by mouth 2 times daily (Patient taking differently: Take 1,500 mg by mouth 2 times daily) 360 capsule 4    Emollient (CERAVE) CREA Apply topically 2 times daily To bilateral knees      hydrochlorothiazide (HYDRODIURIL) 25 MG tablet Take 1 tablet (25 mg) by mouth daily 90 tablet 4    metoprolol succinate  ER (TOPROL XL) 25 MG 24 hr tablet Take 1 tablet (25 mg) by mouth daily      Nutritional Supplement LIQD Take 4 oz by mouth 2 times daily [employer] Supplement two times daily with meals      saccharomyces boulardii (FLORASTOR) 250 MG capsule Take 1 capsule (250 mg) by mouth daily      senna-docusate (SENOKOT-S/PERICOLACE) 8.6-50 MG tablet Take 1 tablet by mouth daily AND 1 tab daily PRN      warfarin ANTICOAGULANT (COUMADIN) 5 MG tablet Take 7.5 mg by mouth Give 10 mg every Monday and Thursday and give 7.5 mg every Tuesday, Wednesday, Friday, Saturday, and Sunday as directed by the [facility] clinic 160 tablet 1      Medication reconciliation complete between [facility] record and [facility].     Allergies:  Allergies   Allergen Reactions    Diatrizoate Rash    Ioxaglate Other (See Comments)     Does not recall    Levofloxacin Hives and Rash    Metrizamide Rash     (Diagnostic X-Ray materials)    Probenecid Rash       Review of Systems:  See HPI    Vital Signs:  Temp 97.7   Pulse 72   Respirations 16   /90   Oxygen Sats 96%   Weight 336#    Physical Exam:     Pleasant and alert without distress.    Sclera nonicteric, conjunctiva non-inflamed.  Skin color pink. Mucous membranes moist.   Lungs clear/dim bases to auscultation. No wheezes or rales noted.   Cardiovascular irregular, rate controlled auscultated. No murmur noted.  Abdomen large soft and without tenderness   Extremities with scant edema. Left lower extremity with chronic venous stasis dermatitis  Very dry skin on arms, flaking   WBAT on left leg in immobilizer.   Able to move upper and lower extremities   Good sensation both legs. Incision is healing left knee with just a few steri strips starting to loosen.       New Labs/Diagnostics:  Lab Results   Component Value Date    WBC 8.6 01/05/2024    WBC 7.1 09/29/2020     Lab Results   Component Value Date    RBC 5.42 01/05/2024    RBC 5.49 09/29/2020     Lab Results   Component Value Date    HGB 14.8 01/05/2024    HGB  "14.5 09/29/2020     Lab Results   Component Value Date    HCT 46.2 01/05/2024    HCT 47.3 09/29/2020     No components found for: \"MCT\"  Lab Results   Component Value Date    MCV 85 01/05/2024    MCV 86 09/29/2020     Lab Results   Component Value Date    MCH 27.3 01/05/2024    MCH 26.4 09/29/2020     Lab Results   Component Value Date    MCHC 32.0 01/05/2024    MCHC 30.7 09/29/2020     Lab Results   Component Value Date    RDW 18.0 01/05/2024    RDW 16.1 09/29/2020     Lab Results   Component Value Date     01/05/2024     09/29/2020     Last Comprehensive Metabolic Panel:  Sodium   Date Value Ref Range Status   01/05/2024 140 135 - 145 mmol/L Final     Comment:     Reference intervals for this test were updated on 09/26/2023 to more accurately reflect our healthy population. There may be differences in the flagging of prior results with similar values performed with this method. Interpretation of those prior results can be made in the context of the updated reference intervals.    09/29/2020 138 134 - 144 mmol/L Final     Potassium   Date Value Ref Range Status   01/05/2024 4.1 3.4 - 5.3 mmol/L Final   08/11/2022 3.7 3.5 - 5.1 mmol/L Final   09/29/2020 4.3 3.5 - 5.1 mmol/L Final     Chloride   Date Value Ref Range Status   01/05/2024 102 98 - 107 mmol/L Final   08/11/2022 102 98 - 107 mmol/L Final   09/29/2020 101 98 - 107 mmol/L Final     Carbon Dioxide   Date Value Ref Range Status   09/29/2020 33 (H) 21 - 31 mmol/L Final     Carbon Dioxide (CO2)   Date Value Ref Range Status   01/05/2024 28 22 - 29 mmol/L Final   08/11/2022 29 21 - 31 mmol/L Final     Anion Gap   Date Value Ref Range Status   01/05/2024 10 7 - 15 mmol/L Final   08/11/2022 8 3 - 14 mmol/L Final   09/29/2020 4 3 - 14 mmol/L Final     Glucose   Date Value Ref Range Status   01/05/2024 92 70 - 99 mg/dL Final   08/11/2022 87 70 - 105 mg/dL Final   09/29/2020 98 70 - 105 mg/dL Final     GLUCOSE BY METER POCT   Date Value Ref Range Status "   06/26/2023 99 70 - 99 mg/dL Final     Urea Nitrogen   Date Value Ref Range Status   01/05/2024 17.8 8.0 - 23.0 mg/dL Final   08/11/2022 12 7 - 25 mg/dL Final   09/29/2020 14 7 - 25 mg/dL Final     Creatinine   Date Value Ref Range Status   01/05/2024 0.67 0.67 - 1.17 mg/dL Final   09/29/2020 0.99 0.70 - 1.30 mg/dL Final     GFR Estimate   Date Value Ref Range Status   01/05/2024 >90 >60 mL/min/1.73m2 Final   09/29/2020 77 >60 mL/min/[1.73_m2] Final     Calcium   Date Value Ref Range Status   01/05/2024 9.4 8.8 - 10.2 mg/dL Final   09/29/2020 9.7 8.6 - 10.3 mg/dL Final     Bilirubin Total   Date Value Ref Range Status   01/05/2024 0.6 <=1.2 mg/dL Final   09/29/2020 0.8 0.3 - 1.0 mg/dL Final     Alkaline Phosphatase   Date Value Ref Range Status   01/05/2024 71 40 - 150 U/L Final     Comment:     Reference intervals for this test were updated on 11/14/2023 to more accurately reflect our healthy population. There may be differences in the flagging of prior results with similar values performed with this method. Interpretation of those prior results can be made in the context of the updated reference intervals.   09/29/2020 60 34 - 104 U/L Final     ALT   Date Value Ref Range Status   01/05/2024 16 0 - 70 U/L Final     Comment:     Reference intervals for this test were updated on 6/12/2023 to more accurately reflect our healthy population. There may be differences in the flagging of prior results with similar values performed with this method. Interpretation of those prior results can be made in the context of the updated reference intervals.     09/29/2020 16 7 - 52 U/L Final     AST   Date Value Ref Range Status   01/05/2024 22 0 - 45 U/L Final     Comment:     Reference intervals for this test were updated on 6/12/2023 to more accurately reflect our healthy population. There may be differences in the flagging of prior results with similar values performed with this method. Interpretation of those prior results can  be made in the context of the updated reference intervals.   09/29/2020 19 13 - 39 U/L Final     Hemoglobin A1C   Date Value Ref Range Status   12/01/2023 5.5 4.0 - 6.2 % Final   09/06/2019 6.0 4.0 - 6.0 % Final       Assessment/Plan:  (Z98.890) Hx of left knee surgery  (primary encounter diagnosis)  (S83.005S) Patellar dislocation, left, sequela  Comment: Revision done 3/12/24 in [place]  Plan: follow up ortho April 24, pain controlled with tylenol, WBAT with immobilizer and working PT/OT  Check CBC, CMP    (G81.94) Left hemiparesis (H)  Comment: chronic, subtle  Plan: cont on coumadin    (E66.01,  Z68.41) Morbid obesity with BMI of 40.0-44.9, adult (H)  Comment: noted weight gain  Plan: encourage weight loss as able, work with PT/OT  Check TSH    (R73.03) Prediabetes  Comment: last A1c 5.5  Plan: check A1c    (I48.21) Permanent atrial fibrillation (H)  Comment: rate controlled  Plan: cont coumadin, metoprolol    (I10) Essential hypertension  Comment: mildly elevated blood pressures  Plan: check BP, HR daily at various times for next week, update NP    (I87.2) Chronic venous stasis dermatitis of left lower extremity  Comment: chronic stable  Plan: keep lotion on, skin intact      (N02.9) Recurrent gross hematuria  Comment: as of late this has been stable, hx of UTI's  Plan: reschedule urology follow up        A total of 47 minutes spent by me on the date of the encounter doing chart review from [facility] and [facility], review of test results, interpretation of tests, review of medications, patient visit, and documentation.    JAYLEN Riley, CNP ....................  4/8/2024   12:00 PM

## 2024-04-09 ENCOUNTER — LAB REQUISITION (OUTPATIENT)
Dept: LAB | Facility: OTHER | Age: 64
End: 2024-04-09
Payer: MEDICAID

## 2024-04-09 DIAGNOSIS — I69.354 HEMIPLEGIA AND HEMIPARESIS FOLLOWING CEREBRAL INFARCTION AFFECTING LEFT NON-DOMINANT SIDE (H): ICD-10-CM

## 2024-04-09 DIAGNOSIS — I48.21 PERMANENT ATRIAL FIBRILLATION (H): ICD-10-CM

## 2024-04-09 DIAGNOSIS — I10 ESSENTIAL (PRIMARY) HYPERTENSION: ICD-10-CM

## 2024-04-09 LAB
ALBUMIN SERPL BCG-MCNC: 4.2 G/DL (ref 3.5–5.2)
ALP SERPL-CCNC: 66 U/L (ref 40–150)
ALT SERPL W P-5'-P-CCNC: 21 U/L (ref 0–70)
ANION GAP SERPL CALCULATED.3IONS-SCNC: 11 MMOL/L (ref 7–15)
AST SERPL W P-5'-P-CCNC: 30 U/L (ref 0–45)
BASOPHILS # BLD AUTO: 0.1 10E3/UL (ref 0–0.2)
BASOPHILS NFR BLD AUTO: 1 %
BILIRUB SERPL-MCNC: 0.5 MG/DL
BUN SERPL-MCNC: 19.2 MG/DL (ref 8–23)
CALCIUM SERPL-MCNC: 9.4 MG/DL (ref 8.8–10.2)
CHLORIDE SERPL-SCNC: 102 MMOL/L (ref 98–107)
CREAT SERPL-MCNC: 0.64 MG/DL (ref 0.67–1.17)
DEPRECATED HCO3 PLAS-SCNC: 29 MMOL/L (ref 22–29)
EGFRCR SERPLBLD CKD-EPI 2021: >90 ML/MIN/1.73M2
EOSINOPHIL # BLD AUTO: 0.3 10E3/UL (ref 0–0.7)
EOSINOPHIL NFR BLD AUTO: 5 %
ERYTHROCYTE [DISTWIDTH] IN BLOOD BY AUTOMATED COUNT: 16.6 % (ref 10–15)
GLUCOSE SERPL-MCNC: 83 MG/DL (ref 70–99)
HBA1C MFR BLD: 5.1 % (ref 4–6.2)
HCT VFR BLD AUTO: 46.3 % (ref 40–53)
HGB BLD-MCNC: 14.3 G/DL (ref 13.3–17.7)
IMM GRANULOCYTES # BLD: 0 10E3/UL
IMM GRANULOCYTES NFR BLD: 0 %
LYMPHOCYTES # BLD AUTO: 1.7 10E3/UL (ref 0.8–5.3)
LYMPHOCYTES NFR BLD AUTO: 25 %
MCH RBC QN AUTO: 28.1 PG (ref 26.5–33)
MCHC RBC AUTO-ENTMCNC: 30.9 G/DL (ref 31.5–36.5)
MCV RBC AUTO: 91 FL (ref 78–100)
MONOCYTES # BLD AUTO: 0.7 10E3/UL (ref 0–1.3)
MONOCYTES NFR BLD AUTO: 10 %
NEUTROPHILS # BLD AUTO: 3.9 10E3/UL (ref 1.6–8.3)
NEUTROPHILS NFR BLD AUTO: 58 %
NRBC # BLD AUTO: 0 10E3/UL
NRBC BLD AUTO-RTO: 0 /100
PLATELET # BLD AUTO: 313 10E3/UL (ref 150–450)
POTASSIUM SERPL-SCNC: 4.9 MMOL/L (ref 3.4–5.3)
PROT SERPL-MCNC: 7.5 G/DL (ref 6.4–8.3)
RBC # BLD AUTO: 5.09 10E6/UL (ref 4.4–5.9)
SODIUM SERPL-SCNC: 142 MMOL/L (ref 135–145)
TSH SERPL DL<=0.005 MIU/L-ACNC: 1.97 UIU/ML (ref 0.3–4.2)
WBC # BLD AUTO: 6.6 10E3/UL (ref 4–11)

## 2024-04-09 PROCEDURE — 85025 COMPLETE CBC W/AUTO DIFF WBC: CPT | Performed by: NURSE PRACTITIONER

## 2024-04-09 PROCEDURE — 80053 COMPREHEN METABOLIC PANEL: CPT | Performed by: NURSE PRACTITIONER

## 2024-04-09 PROCEDURE — 83036 HEMOGLOBIN GLYCOSYLATED A1C: CPT | Performed by: NURSE PRACTITIONER

## 2024-04-09 PROCEDURE — 84443 ASSAY THYROID STIM HORMONE: CPT | Performed by: NURSE PRACTITIONER

## 2024-04-11 ENCOUNTER — ANTICOAGULATION THERAPY VISIT (OUTPATIENT)
Dept: ANTICOAGULATION | Facility: OTHER | Age: 64
End: 2024-04-11
Attending: FAMILY MEDICINE
Payer: MEDICAID

## 2024-04-11 ENCOUNTER — TRANSFERRED RECORDS (OUTPATIENT)
Dept: HEALTH INFORMATION MANAGEMENT | Facility: OTHER | Age: 64
End: 2024-04-11

## 2024-04-11 DIAGNOSIS — Z79.01 ANTICOAGULATION MONITORING, INR RANGE 2-3: ICD-10-CM

## 2024-04-11 DIAGNOSIS — I48.21 PERMANENT ATRIAL FIBRILLATION (H): Primary | ICD-10-CM

## 2024-04-11 LAB — INR (EXTERNAL): 2.1 (ref 0.9–1.1)

## 2024-04-11 NOTE — PROGRESS NOTES
ANTICOAGULATION MANAGEMENT     Babak Moran 63 year old male is on warfarin with therapeutic INR result. (Goal INR 2.0-3.0)    Recent labs: (last 7 days)     04/11/24  1100   INR 2.1*       ASSESSMENT     Source(s): Home care/ facility nurse     Warfarin doses taken: Warfarin taken as instructed  Diet: No new diet changes identified  New illness, injury, or hospitalization: No  Medication/supplement changes: None noted  Signs or symptoms of bleeding or clotting: No  Previous INR: Therapeutic last 2(+) visits  Additional findings: None     PLAN     Recommended plan for no diet, medication or health factor changes affecting INR     Dosing Instructions: Continue your current warfarin dose with next INR in 1 week       Summary  As of 4/11/2024      Full warfarin instructions:  10 mg every Mon, Thu; 7.5 mg all other days   Next INR check:  4/18/2024               Faxed dosing and follow up instructions to [facility]    Orders given to  Homecare nurse/facility to recheck    Education provided: None required    Plan made per [facility] anticoagulation protocol    Siobhan Clayton RN  Anticoagulation Clinic  4/11/2024    _______________________________________________________________________     Anticoagulation Episode Summary       Current INR goal:  2.0-3.0   TTR:  61.3% (11.9 mo)   Target end date:  Indefinite   Send INR reminders to:  [facility]    Indications    Permanent atrial fibrillation (H) [I48.21]  Anticoagulation monitoring  INR range 2-3 (Resolved) [Z79.01]  Atrial fibrillation with RVR (H) (Resolved) [I48.91]  Anticoagulation monitoring  INR range 2-3 [Z79.01]             Comments:  Babak prefers to be closer to 3.0 d/t previous CVA check Q 4 weeks.Declines to reduce dose when slightly over 3.0  Needs to change PCP  Takes a long time get up to therapeutic after holding warfarin.             Anticoagulation Care Providers       Provider Role Specialty Phone number    Teo Funes MD Referring  Family Medicine [phone]

## 2024-04-18 ENCOUNTER — TRANSFERRED RECORDS (OUTPATIENT)
Dept: HEALTH INFORMATION MANAGEMENT | Facility: OTHER | Age: 64
End: 2024-04-18

## 2024-04-18 ENCOUNTER — ANTICOAGULATION THERAPY VISIT (OUTPATIENT)
Dept: ANTICOAGULATION | Facility: OTHER | Age: 64
End: 2024-04-18
Attending: FAMILY MEDICINE
Payer: MEDICAID

## 2024-04-18 DIAGNOSIS — I48.21 PERMANENT ATRIAL FIBRILLATION (H): Primary | ICD-10-CM

## 2024-04-18 DIAGNOSIS — Z79.01 ANTICOAGULATION MONITORING, INR RANGE 2-3: ICD-10-CM

## 2024-04-19 LAB — INR (EXTERNAL): 2.7

## 2024-04-19 NOTE — PROGRESS NOTES
ANTICOAGULATION MANAGEMENT     Babak Moran 63 year old male is on warfarin with therapeutic INR result. (Goal INR 2.0-3.0)    Recent labs: (last 7 days)     04/19/24  1517   INR 2.7       ASSESSMENT     Source(s): Patient/ Care giver call     Warfarin doses taken: Warfarin taken as instructed  Diet: No new diet changes identified  New illness, injury, or hospitalization: No  Medication/supplement changes: None noted  Signs or symptoms of bleeding or clotting: No  Previous INR: Therapeutic last 2(+) visits  Additional findings: None     PLAN     Recommended plan for no diet, medication or health factor changes affecting INR     Dosing Instructions: Continue your current warfarin dose with next INR in 1 week       Summary  As of 4/18/2024      Full warfarin instructions:  10 mg every Mon, Thu; 7.5 mg all other days   Next INR check:  4/25/2024               Faxed dosing and follow up instructions to [facility]    Orders given to  Homecare nurse/facility to recheck    Education provided: None required    Plan made per [facility] anticoagulation protocol    Siobhan Clayton RN  Anticoagulation Clinic  4/19/2024    _______________________________________________________________________     Anticoagulation Episode Summary       Current INR goal:  2.0-3.0   TTR:  63.6% (11.9 mo)   Target end date:  Indefinite   Send INR reminders to:  [facility]    Indications    Permanent atrial fibrillation (H) [I48.21]  Anticoagulation monitoring  INR range 2-3 (Resolved) [Z79.01]  Atrial fibrillation with RVR (H) (Resolved) [I48.91]  Anticoagulation monitoring  INR range 2-3 [Z79.01]             Comments:  Babak prefers to be closer to 3.0 d/t previous CVA check Q 4 weeks.Declines to reduce dose when slightly over 3.0  Needs to change PCP  Takes a long time get up to therapeutic after holding warfarin.             Anticoagulation Care Providers       Provider Role Specialty Phone number    Teo Funes MD Referring  Family Medicine [phone]

## 2024-04-25 ENCOUNTER — ANTICOAGULATION THERAPY VISIT (OUTPATIENT)
Dept: ANTICOAGULATION | Facility: OTHER | Age: 64
End: 2024-04-25
Attending: FAMILY MEDICINE
Payer: MEDICAID

## 2024-04-25 ENCOUNTER — TRANSFERRED RECORDS (OUTPATIENT)
Dept: HEALTH INFORMATION MANAGEMENT | Facility: OTHER | Age: 64
End: 2024-04-25

## 2024-04-25 DIAGNOSIS — I48.21 PERMANENT ATRIAL FIBRILLATION (H): Primary | ICD-10-CM

## 2024-04-25 DIAGNOSIS — Z79.01 ANTICOAGULATION MONITORING, INR RANGE 2-3: ICD-10-CM

## 2024-04-25 LAB — INR (EXTERNAL): 2.6 (ref 0.9–1.1)

## 2024-04-25 NOTE — PROGRESS NOTES
ANTICOAGULATION MANAGEMENT     Babak Moran 63 year old male is on warfarin with therapeutic INR result. (Goal INR 2.0-3.0)    Recent labs: (last 7 days)     04/25/24  1024   INR 2.6*       ASSESSMENT     Source(s): Chart Review and Home Care/Facility Nurse     Warfarin doses taken: Warfarin taken as instructed  Diet: No new diet changes identified  Medication/supplement changes: None noted  New illness, injury, or hospitalization: No  Signs or symptoms of bleeding or clotting: No  Previous result: Therapeutic last 2(+) visits  Additional findings: None       PLAN     Recommended plan for no diet, medication or health factor changes affecting INR     Dosing Instructions: Continue your current warfarin dose with next INR in 2 weeks       Summary  As of 4/25/2024      Full warfarin instructions:  10 mg every Mon, Thu; 7.5 mg all other days   Next INR check:  5/9/2024               Faxed dosing and follow up instructions to [facility]    Orders given to  Homecare nurse/facility to recheck    Patient not here, [facility] Communication sheet with screening questions and current INR received via FAX from outside agency. Results reviewed, Warfarin dosing per protocol, and recommended follow-up appointment made. Paperwork FAXED back to facility.       Plan made per [facility] anticoagulation protocol    Shelley Mercado, RN  Anticoagulation Clinic  4/25/2024    _______________________________________________________________________     Anticoagulation Episode Summary       Current INR goal:  2.0-3.0   TTR:  65.2% (11.9 mo)   Target end date:  Indefinite   Send INR reminders to:  [facility]    Indications    Permanent atrial fibrillation (H) [I48.21]  Anticoagulation monitoring  INR range 2-3 (Resolved) [Z79.01]  Atrial fibrillation with RVR (H) (Resolved) [I48.91]  Anticoagulation monitoring  INR range 2-3 [Z79.01]             Comments:  Babak prefers to be closer to 3.0 d/t previous CVA check Q 4 weeks.Declines to reduce  dose when slightly over 3.0  Needs to change PCP  Takes a long time get up to therapeutic after holding warfarin.             Anticoagulation Care Providers       Provider Role Specialty Phone number    Teo Funes MD Referring Family Medicine [phone]

## 2024-05-06 ENCOUNTER — HOSPITAL ENCOUNTER (OUTPATIENT)
Dept: ULTRASOUND IMAGING | Facility: OTHER | Age: 64
Discharge: HOME | End: 2024-05-06
Attending: UROLOGY | Admitting: UROLOGY
Payer: MEDICAID

## 2024-05-06 DIAGNOSIS — N39.0 RECURRENT UTI: ICD-10-CM

## 2024-05-06 PROCEDURE — 76770 US EXAM ABDO BACK WALL COMP: CPT

## 2024-05-09 ENCOUNTER — TRANSFERRED RECORDS (OUTPATIENT)
Dept: HEALTH INFORMATION MANAGEMENT | Facility: OTHER | Age: 64
End: 2024-05-09

## 2024-05-09 ENCOUNTER — ANTICOAGULATION THERAPY VISIT (OUTPATIENT)
Dept: ANTICOAGULATION | Facility: OTHER | Age: 64
End: 2024-05-09
Payer: MEDICAID

## 2024-05-09 DIAGNOSIS — I48.21 PERMANENT ATRIAL FIBRILLATION (H): Primary | ICD-10-CM

## 2024-05-09 DIAGNOSIS — Z79.01 ANTICOAGULATION MONITORING, INR RANGE 2-3: ICD-10-CM

## 2024-05-09 LAB — INR (EXTERNAL): 2.5 (ref 0.9–1.1)

## 2024-05-09 NOTE — PROGRESS NOTES
ANTICOAGULATION MANAGEMENT     Babak Moran 63 year old male is on warfarin with therapeutic INR result. (Goal INR 2.0-3.0)    Recent labs: (last 7 days)     05/09/24  0952   INR 2.5*       ASSESSMENT     Source(s): Chart Review and Home Care/Facility Nurse     Warfarin doses taken: Warfarin taken as instructed  Diet: No new diet changes identified  Medication/supplement changes: None noted  New illness, injury, or hospitalization: No  Signs or symptoms of bleeding or clotting: No  Previous result: Therapeutic last 2(+) visits  Additional findings: None       PLAN     Recommended plan for no diet, medication or health factor changes affecting INR     Dosing Instructions: Continue your current warfarin dose with next INR in 4 weeks       Summary  As of 5/9/2024      Full warfarin instructions:  10 mg every Mon, Thu; 7.5 mg all other days   Next INR check:  6/6/2024               Faxed dosing and follow up instructions to [facility]    Orders given to  Homecare nurse/facility to recheck    Patient not here, Protime Communicationsheet with screening questions and current INR received via FAX from outside agency. Results reviewed, Warfarin dosing per protocol, and recommended follow-up appointment made. Paperwork FAXED back to facility.     Plan made per [facility] anticoagulation protocol    Shelley Mercado, RN  Anticoagulation Clinic  5/9/2024    _______________________________________________________________________     Anticoagulation Episode Summary       Current INR goal:  2.0-3.0   TTR:  66.8% (11.9 mo)   Target end date:  Indefinite   Send INR reminders to:  [facility]    Indications    Permanent atrial fibrillation (H) [I48.21]  Anticoagulation monitoring  INR range 2-3 (Resolved) [Z79.01]  Atrial fibrillation with RVR (H) (Resolved) [I48.91]  Anticoagulation monitoring  INR range 2-3 [Z79.01]             Comments:  Babak prefers to be closer to 3.0 d/t previous CVA check Q 4 weeks.Declines to reduce dose  when slightly over 3.0  Needs to change PCP  Takes a long time get up to therapeutic after holding warfarin.             Anticoagulation Care Providers       Provider Role Specialty Phone number    Teo Funes MD Referring Family Medicine [phone]

## 2024-05-23 ENCOUNTER — CARE COORDINATION (OUTPATIENT)
Dept: FAMILY MEDICINE | Facility: OTHER | Age: 64
End: 2024-05-23
Payer: MEDICAID

## 2024-05-23 RX ORDER — CALCIUM CARBONATE 500 MG/1
1 TABLET, CHEWABLE ORAL EVERY 6 HOURS PRN
COMMUNITY
End: 2024-09-23

## 2024-05-29 NOTE — PROGRESS NOTES
"    Assessment & Plan     No diagnosis found.     ***    PDMP Review         Value Time User    State PDMP site checked  Yes 2/8/2024 10:29 AM Sis Hammer MD            {[facility] 2021 Documentation (Optional):557815}  {2021 E&M time (Optional):870529}         The longitudinal plan of care for *** was addressed during this visit. Due to the added complexity in care, I will continue to support Babak Moran in the subsequent management of this condition(s) and with the ongoing continuity of care of this condition(s).        BMI  Estimated body mass index is 40.62 kg/m  as calculated from the following:    Height as of 11/13/23: 1.905 m (6' 3\").    Weight as of 1/24/24: 147.4 kg (325 lb).   {Weight Management Plan needed for ACO:676954}      No follow-ups on file.    SIS RAMÍREZ MD  [facility]    Subjective   Babak Moran is a 63 year old male  presenting for the following health issues: ***                          HPI Babak Moran is a 63 year old male presents for ***        Current Outpatient Medications   Medication Sig Dispense Refill    acetaminophen (TYLENOL) 325 MG tablet Take 2 tablets (650 mg) by mouth every 4 hours as needed for other (mild pain) 100 tablet 0    amLODIPine (NORVASC) 5 MG tablet Take 2 tablets (10 mg) by mouth daily 90 tablet 4    calcium carbonate (TUMS) 500 MG chewable tablet Take 1 chew tab by mouth every 6 hours as needed for heartburn or other (indigestion)      Cranberry 125 MG TABS Take 200 mg by mouth daily      D-Mannose 500 MG CAPS Take 1,500 mg by mouth 2 times daily      Emollient (CERAVE) CREA Apply topically 2 times daily To bilateral knees      hydrochlorothiazide (HYDRODIURIL) 25 MG tablet Take 1 tablet (25 mg) by mouth daily 90 tablet 4    magnesium hydroxide (MILK OF MAGNESIA) 400 MG/5ML suspension Take 30 mLs by mouth daily as needed for constipation      metoprolol succinate ER (TOPROL XL) 25 MG 24 hr tablet Take 1 tablet (25 " mg) by mouth daily      Nutritional Supplement LIQD Take 4 oz by mouth 2 times daily [employer] Supplement two times daily with meals      saccharomyces boulardii (FLORASTOR) 250 MG capsule Take 1 capsule (250 mg) by mouth daily      senna-docusate (SENOKOT-S/PERICOLACE) 8.6-50 MG tablet Take 1 tablet by mouth daily AND 1 tab daily PRN      warfarin ANTICOAGULANT (COUMADIN) 5 MG tablet Take 7.5 mg by mouth Give 10 mg every Monday and Thursday and give 7.5 mg every Tuesday, Wednesday, Friday, Saturday, and Sunday as directed by the protime clinic 160 tablet 1     No current facility-administered medications for this visit.     Past Medical History:   Diagnosis Date    Atrial fibrillation (H) 02/03/2017    on Warfarin    Bacterial sepsis (H) 8/8/2022    Cellulitis of left lower extremity 9/5/2019    Cerebral infarction (H)     Cerebral infarction due to unspecified occlusion or stenosis of right middle cerebral artery (H) 02/03/2017    ,Left hemiparesis, left neglect, impaired balance and gait following R MCA stroke with mild hemorrhagic transformation s/p tissue plasminogen activator and mechanical thrombectomy, s/p C7 facet fracture from fall off forklift.    Chest pain 02/1997    Disorder of skin or subcutaneous tissue 07/25/2011    Essential (primary) hypertension 02/1997    Fracture of cervical vertebra (H)     02/03/2017,C7 facet fracture from fall off forklift, nondisplaced    Gout     Hemiplegia affecting left nondominant side (H) 02/03/2017    Obesity 02/03/2017    body mass index of 40    Other local lupus erythematosus     Sepsis (H) 9/6/2019         {SUPERLIST (Optional):734517}      Review of Systems   {ROS COMP (Optional):927533}        9/25/2019     3:00 PM 3/19/2020     4:08 PM   PHQ   PHQ-9 Total Score 0 0   Q9: Thoughts of better off dead/self-harm past 2 weeks Not at all Not at all         9/25/2019     3:00 PM   ULISSES-7 SCORE   Total Score 0             Objective  There were no vitals taken for this  visit.   Physical Exam   {Exam List (Optional):711492}    {Diagnostic Test Results (Optional):558208}

## 2024-05-30 ENCOUNTER — NURSING HOME VISIT (OUTPATIENT)
Dept: GERIATRICS | Facility: OTHER | Age: 64
End: 2024-05-30
Attending: FAMILY MEDICINE
Payer: MEDICAID

## 2024-05-30 DIAGNOSIS — G81.94 LEFT HEMIPARESIS (H): Primary | ICD-10-CM

## 2024-05-30 DIAGNOSIS — I48.21 PERMANENT ATRIAL FIBRILLATION (H): ICD-10-CM

## 2024-05-30 DIAGNOSIS — I87.2 CHRONIC VENOUS STASIS DERMATITIS OF LEFT LOWER EXTREMITY: ICD-10-CM

## 2024-05-30 DIAGNOSIS — Z98.890 HX OF LEFT KNEE SURGERY: ICD-10-CM

## 2024-05-30 DIAGNOSIS — S83.005S PATELLAR DISLOCATION, LEFT, SEQUELA: ICD-10-CM

## 2024-05-30 DIAGNOSIS — R73.03 PREDIABETES: ICD-10-CM

## 2024-05-30 DIAGNOSIS — I10 ESSENTIAL HYPERTENSION: ICD-10-CM

## 2024-05-30 DIAGNOSIS — E66.01 MORBID OBESITY WITH BMI OF 40.0-44.9, ADULT (H): ICD-10-CM

## 2024-05-30 PROCEDURE — 99308 SBSQ NF CARE LOW MDM 20: CPT | Performed by: FAMILY MEDICINE

## 2024-05-30 NOTE — PROGRESS NOTES
Babak Moran is a 63 year old male being seen today for regulatory visit at [facility].    Code Status: CPR.   Health Care Power of : Extended Emergency Contact Information  Primary Emergency Contact: Erika Moran   [place]  Mobile Phone: [phone]  Relation: Spouse  Secondary Emergency Contact: Cinda Gomez   [place]  Mobile Phone: [phone]  Relation: Daughter     Allergies: Diatrizoate, Ioxaglate, Levofloxacin, Metrizamide, and Probenecid     Chief Complaint / HPI: Babak Moran is a 63 year old male is at [facility] following knee surgeries and recurrent patellar dislocations.  Complicating factor is history of CVA and left hemiplegia.  He is able to stand/bear wt but not to bend left knee.  Denies pain.  Has orthopedics follow up next week.    Currently not in therapy.      On coumadin for a fib.      Patient Active Problem List   Diagnosis    Pain in joint, ankle and foot    Cerebrovascular accident (CVA) due to embolism of right middle cerebral artery (H)    Osteoarthrosis    Cutaneous lupus erythematosus    Gout    Ascending aorta enlargement (H24)    Family history of coronary artery disease    Essential hypertension    Left hemiparesis (H)    Long-term (current) use of anticoagulants, INR goal 2.0-3.0    Systemic lupus erythematosus (H)    Morbid obesity with BMI of 40.0-44.9, adult (H)    Permanent atrial fibrillation (H)    Obstructive sleep apnea syndrome    Need for vaccination    Onychogryphosis    Overweight    Aftercare following knee joint replacement surgery, unspecified laterality    Nail dystrophy    Onychomycosis    Anticoagulation monitoring, INR range 2-3    Status post total right knee replacement    Chronic venous stasis dermatitis of left lower extremity    Dislocation of both patellae    Patellar dislocation, left, sequela    Prediabetes    Recurrent gross hematuria    Hx of left knee surgery         Current Outpatient Medications    Medication Sig Dispense Refill    acetaminophen (TYLENOL) 325 MG tablet Take 2 tablets (650 mg) by mouth every 4 hours as needed for other (mild pain) 100 tablet 0    amLODIPine (NORVASC) 5 MG tablet Take 2 tablets (10 mg) by mouth daily 90 tablet 4    calcium carbonate (TUMS) 500 MG chewable tablet Take 1 chew tab by mouth every 6 hours as needed for heartburn or other (indigestion)      Cranberry 125 MG TABS Take 200 mg by mouth daily      D-Mannose 500 MG CAPS Take 1,500 mg by mouth 2 times daily      Emollient (CERAVE) CREA Apply topically 2 times daily To bilateral knees      hydrochlorothiazide (HYDRODIURIL) 25 MG tablet Take 1 tablet (25 mg) by mouth daily 90 tablet 4    magnesium hydroxide (MILK OF MAGNESIA) 400 MG/5ML suspension Take 30 mLs by mouth daily as needed for constipation      metoprolol succinate ER (TOPROL XL) 25 MG 24 hr tablet Take 1 tablet (25 mg) by mouth daily      Nutritional Supplement LIQD Take 4 oz by mouth 2 times daily [employer] Supplement two times daily with meals      saccharomyces boulardii (FLORASTOR) 250 MG capsule Take 1 capsule (250 mg) by mouth daily      senna-docusate (SENOKOT-S/PERICOLACE) 8.6-50 MG tablet Take 1 tablet by mouth daily AND 1 tab daily PRN      warfarin ANTICOAGULANT (COUMADIN) 5 MG tablet Take 7.5 mg by mouth Give 10 mg every Monday and Thursday and give 7.5 mg every Tuesday, Wednesday, Friday, Saturday, and Sunday as directed by the protime clinic 160 tablet 1     No current facility-administered medications for this visit.       Medications - recent changes: none    Review of Systems:  Denies dyspnea, abdomen pain       Toileting:    Continent of Bowel: Yes   Continent of Bladder: Yes  Mobility:  uses wheelchair, but can stand    Recent Labs:   Lab Results   Component Value Date    WBC 6.6 04/09/2024    WBC 7.1 09/29/2020     Lab Results   Component Value Date    RBC 5.09 04/09/2024    RBC 5.49 09/29/2020     Lab Results   Component Value Date    HGB  14.3 04/09/2024    HGB 14.5 09/29/2020     Lab Results   Component Value Date    HCT 46.3 04/09/2024    HCT 47.3 09/29/2020     Lab Results   Component Value Date    MCV 91 04/09/2024    MCV 86 09/29/2020     Lab Results   Component Value Date    MCH 28.1 04/09/2024    MCH 26.4 09/29/2020     Lab Results   Component Value Date    MCHC 30.9 04/09/2024    MCHC 30.7 09/29/2020     Lab Results   Component Value Date    RDW 16.6 04/09/2024    RDW 16.1 09/29/2020     Lab Results   Component Value Date     04/09/2024     09/29/2020       Last Comprehensive Metabolic Panel:  Sodium   Date Value Ref Range Status   04/09/2024 142 135 - 145 mmol/L Final     Comment:     Reference intervals for this test were updated on 09/26/2023 to more accurately reflect our healthy population. There may be differences in the flagging of prior results with similar values performed with this method. Interpretation of those prior results can be made in the context of the updated reference intervals.    09/29/2020 138 134 - 144 mmol/L Final     Potassium   Date Value Ref Range Status   04/09/2024 4.9 3.4 - 5.3 mmol/L Final   08/11/2022 3.7 3.5 - 5.1 mmol/L Final   09/29/2020 4.3 3.5 - 5.1 mmol/L Final     Chloride   Date Value Ref Range Status   04/09/2024 102 98 - 107 mmol/L Final   08/11/2022 102 98 - 107 mmol/L Final   09/29/2020 101 98 - 107 mmol/L Final     Carbon Dioxide   Date Value Ref Range Status   09/29/2020 33 (H) 21 - 31 mmol/L Final     Carbon Dioxide (CO2)   Date Value Ref Range Status   04/09/2024 29 22 - 29 mmol/L Final   08/11/2022 29 21 - 31 mmol/L Final     Anion Gap   Date Value Ref Range Status   04/09/2024 11 7 - 15 mmol/L Final   08/11/2022 8 3 - 14 mmol/L Final   09/29/2020 4 3 - 14 mmol/L Final     Glucose   Date Value Ref Range Status   04/09/2024 83 70 - 99 mg/dL Final   08/11/2022 87 70 - 105 mg/dL Final   09/29/2020 98 70 - 105 mg/dL Final     GLUCOSE BY METER POCT   Date Value Ref Range Status  "  06/26/2023 99 70 - 99 mg/dL Final     Urea Nitrogen   Date Value Ref Range Status   04/09/2024 19.2 8.0 - 23.0 mg/dL Final   08/11/2022 12 7 - 25 mg/dL Final   09/29/2020 14 7 - 25 mg/dL Final     Creatinine   Date Value Ref Range Status   04/09/2024 0.64 (L) 0.67 - 1.17 mg/dL Final   09/29/2020 0.99 0.70 - 1.30 mg/dL Final     GFR Estimate   Date Value Ref Range Status   04/09/2024 >90 >60 mL/min/1.73m2 Final   09/29/2020 77 >60 mL/min/[1.73_m2] Final     Calcium   Date Value Ref Range Status   04/09/2024 9.4 8.8 - 10.2 mg/dL Final   09/29/2020 9.7 8.6 - 10.3 mg/dL Final     Lab Results   Component Value Date    A1C 5.1 04/09/2024    A1C 5.5 12/01/2023    A1C 5.8 08/09/2022    A1C 6.0 09/06/2019     Lab Results   Component Value Date    CHOL 175 12/23/2022    CHOL 146 01/08/2019     Lab Results   Component Value Date    HDL 47 12/23/2022    HDL 36 01/08/2019     Lab Results   Component Value Date     12/23/2022    LDL 98 01/08/2019     Lab Results   Component Value Date    TRIG 49 12/23/2022    TRIG 59 01/08/2019     No results found for: \"CHOLHDLRATIO\"  TSH   Date Value Ref Range Status   04/09/2024 1.97 0.30 - 4.20 uIU/mL Final         Current Therapies: physical therapy on hold    Exam:  Vital signs reviewed. Wt 362#, vss   General:  alert - seen in his wc waiting for family outing  CV - irregular  Left LE brace on and knee non-tender but is swollen, 1-2+ left LE pitting     Assessment and Plan:    ICD-10-CM    1. Left hemiparesis (H)  G81.94       2. Morbid obesity with BMI of 40.0-44.9, adult (H)  E66.01     Z68.41       3. Prediabetes  R73.03       4. Patellar dislocation, left, sequela  S83.005S       5. Permanent atrial fibrillation (H)  I48.21       6. Essential hypertension  I10       7. Chronic venous stasis dermatitis of left lower extremity  I87.2       8. Hx of left knee surgery  Z98.890              Plan of care reviewed and signed.  No changes made.  Continue with orthopedics follow " up  Continue INR monitoring  Last A1C normal  BPs at goal  Work on modest wt loss    Sis Toscano MD

## 2024-06-06 ENCOUNTER — TRANSFERRED RECORDS (OUTPATIENT)
Dept: HEALTH INFORMATION MANAGEMENT | Facility: OTHER | Age: 64
End: 2024-06-06

## 2024-06-06 ENCOUNTER — ANTICOAGULATION THERAPY VISIT (OUTPATIENT)
Dept: ANTICOAGULATION | Facility: OTHER | Age: 64
End: 2024-06-06
Attending: FAMILY MEDICINE
Payer: MEDICAID

## 2024-06-06 DIAGNOSIS — Z79.01 ANTICOAGULATION MONITORING, INR RANGE 2-3: ICD-10-CM

## 2024-06-06 DIAGNOSIS — I48.21 PERMANENT ATRIAL FIBRILLATION (H): Primary | ICD-10-CM

## 2024-06-06 LAB — INR (EXTERNAL): 2.8 (ref 0.9–1.1)

## 2024-06-06 NOTE — PROGRESS NOTES
ANTICOAGULATION MANAGEMENT     Babak Moran 64 year old male is on warfarin with therapeutic INR result. (Goal INR 2.0-3.0)    Recent labs: (last 7 days)     06/06/24  1037   INR 2.8*       ASSESSMENT     Source(s): Chart Review and Home Care/Facility Nurse     Warfarin doses taken: Warfarin taken as instructed  Diet: No new diet changes identified  Medication/supplement changes: None noted  New illness, injury, or hospitalization: No  Signs or symptoms of bleeding or clotting: No  Previous result: Therapeutic last 2(+) visits  Additional findings: None       PLAN     Recommended plan for no diet, medication or health factor changes affecting INR     Dosing Instructions: Continue your current warfarin dose with next INR in 4 weeks       Summary  As of 6/6/2024      Full warfarin instructions:  10 mg every Mon, Thu; 7.5 mg all other days   Next INR check:  7/3/2024               Faxed dosing and follow up instructions to [facility]    Orders given to  Homecare nurse/facility to recheck    Patient not here, Protime Communication sheet with screening questions and current INR received via FAX from outside agency. Results reviewed, Warfarin dosing per protocol, and recommended follow-up appointment made. Paperwork FAXED back to facility.     Plan made per [facility] anticoagulation protocol    Shelley Mercado, RN  Anticoagulation Clinic  6/6/2024    _______________________________________________________________________     Anticoagulation Episode Summary       Current INR goal:  2.0-3.0   TTR:  67.5% (11.9 mo)   Target end date:  Indefinite   Send INR reminders to:  [facility]    Indications    Permanent atrial fibrillation (H) [I48.21]  Anticoagulation monitoring  INR range 2-3 (Resolved) [Z79.01]  Atrial fibrillation with RVR (H) (Resolved) [I48.91]  Anticoagulation monitoring  INR range 2-3 [Z79.01]             Comments:  Babak prefers to be closer to 3.0 d/t previous CVA check Q 4 weeks.Declines to reduce  dose when slightly over 3.0  Needs to change PCP  Takes a long time get up to therapeutic after holding warfarin.             Anticoagulation Care Providers       Provider Role Specialty Phone number    Teo Funes MD Referring Family Medicine [phone]    Ilir Christie MD Referring Family Medicine [phone]

## 2024-06-25 ENCOUNTER — TELEPHONE (OUTPATIENT)
Dept: UROLOGY | Facility: OTHER | Age: 64
End: 2024-06-25
Payer: MEDICAID

## 2024-06-25 NOTE — TELEPHONE ENCOUNTER
Patient has procedure on July 11 . He is on   Coumadin . Possibly at a nursing home. Will need to stop 4 days prior . Vani Still LPN ....................6/25/2024  3:53 PM

## 2024-07-01 ENCOUNTER — TELEPHONE (OUTPATIENT)
Dept: UROLOGY | Facility: OTHER | Age: 64
End: 2024-07-01
Payer: MEDICAID

## 2024-07-01 NOTE — TELEPHONE ENCOUNTER
Will need warfarin held for 5 days and Coverage of Lovenox per Dr. Browning. Last Creatinine: 0.64 and GFR: >90 on 4/9/24. Last recorded weight is 147.4 kg.

## 2024-07-01 NOTE — TELEPHONE ENCOUNTER
Call to [facility]: DANYELLE Warner to discuss Cystoscopy instructions. Call to Babak and discussed Cystoscopy instructions, need to hold warfarin, and need to bridge with Lovenox. Patient is concerned about about getting a ride to and from the clinic as his son usually brings him. He was also concerned about transferring once he was here. We discussed using a standing lift for transfers and or 2 person transfers. He will call back if he is able to move forward with the procedure. Liz Coles RN on 7/1/2024 at 2:08 PM

## 2024-07-02 ENCOUNTER — DOCUMENTATION ONLY (OUTPATIENT)
Dept: UROLOGY | Facility: OTHER | Age: 64
End: 2024-07-02
Payer: MEDICAID

## 2024-07-02 DIAGNOSIS — R31.0 GROSS HEMATURIA: Primary | ICD-10-CM

## 2024-07-03 ENCOUNTER — ANTICOAGULATION THERAPY VISIT (OUTPATIENT)
Dept: ANTICOAGULATION | Facility: OTHER | Age: 64
End: 2024-07-03
Payer: MEDICAID

## 2024-07-03 ENCOUNTER — TRANSFERRED RECORDS (OUTPATIENT)
Dept: HEALTH INFORMATION MANAGEMENT | Facility: OTHER | Age: 64
End: 2024-07-03

## 2024-07-03 DIAGNOSIS — Z79.01 ANTICOAGULATION MONITORING, INR RANGE 2-3: ICD-10-CM

## 2024-07-03 DIAGNOSIS — I48.21 PERMANENT ATRIAL FIBRILLATION (H): Primary | ICD-10-CM

## 2024-07-03 LAB — INR (EXTERNAL): 2.2 (ref 0.9–1.1)

## 2024-07-03 NOTE — PROGRESS NOTES
ANTICOAGULATION MANAGEMENT     Babak Moran 64 year old male is on warfarin with therapeutic INR result. (Goal INR 2.0-3.0)    Recent labs: (last 7 days)     07/03/24  1027   INR 2.2*       ASSESSMENT     Source(s): Chart Review and Home Care/Facility Nurse     Warfarin doses taken: Warfarin taken as instructed  Diet: No new diet changes identified  Medication/supplement changes: None noted  New illness, injury, or hospitalization: No  Signs or symptoms of bleeding or clotting: No  Previous result: Therapeutic last 2(+) visits  Additional findings: Upcoming surgery/procedure patient will be having cystoscopy 7/12 he was directed to hold 5 days and use Lovenox for bridging while off warfarin       PLAN     Recommended plan for temporary change(s) affecting INR     Dosing Instructions:  will continue current dose until he starts to hold on 7/7 resume warfarin 7/12 with 10 mg daily   with next INR in 2 weeks       Summary  As of 7/3/2024      Full warfarin instructions:  7/7: Hold; 7/8: Hold; 7/9: Hold; 7/10: Hold; 7/11: Hold; 7/12: 10 mg; 7/13: 10 mg; 7/14: 10 mg; 7/16: 10 mg; Otherwise 10 mg every Mon, Thu; 7.5 mg all other days   Next INR check:  7/17/2024               Faxed dosing and follow up instructions to [facility]    Orders given to  Homecare nurse/facility to recheck    Patient not here, [employer] Communication sheet with screening questions and current INR received via FAX from outside agency. Results reviewed, Warfarin dosing per protocol, and recommended follow-up appointment made. Paperwork FAXED back to facility.       Plan made per [facility] anticoagulation protocol    Shelley Mercado, RN  Anticoagulation Clinic  7/3/2024    _______________________________________________________________________     Anticoagulation Episode Summary       Current INR goal:  2.0-3.0   TTR:  73.2% (11.9 mo)   Target end date:  Indefinite   Send INR reminders to:  [facility]    Indications    Permanent atrial  fibrillation (H) [I48.21]  Anticoagulation monitoring  INR range 2-3 (Resolved) [Z79.01]  Atrial fibrillation with RVR (H) (Resolved) [I48.91]  Anticoagulation monitoring  INR range 2-3 [Z79.01]             Comments:  Babak prefers to be closer to 3.0 d/t previous CVA check Q 4 weeks.Declines to reduce dose when slightly over 3.0  Needs to change PCP  Takes a long time get up to therapeutic after holding warfarin.             Anticoagulation Care Providers       Provider Role Specialty Phone number    Teo Funes MD Referring Family Medicine [phone]    Ilir Christie MD Referring Family Medicine [phone]

## 2024-07-03 NOTE — PROGRESS NOTES
"Babak Moran  [address]   [place] [address]    Called and spoke with alessandro [facility] nurse and she is going to have Marycarmen Zapata put in a prescription for patient.      Procedure Instructions for Anticoagulation     For your upcoming cystoscopy on 7/12/24 your healthcare provider wants you to stop warfarin as directed below. To protect you from a clot while your off your warfarin, your provider wants you to inject a short-acting medication called enoxaparin (Lovenox), this is called \"bridging.\"      Enoxaparin (Lovenox) is an injection that you or a caregiver can give at home. It is injected just underneath the skin in your stomach. Your anticoagulation nurse can explain to you how to give the injection if you have not done so before. Attached is more information on enoxaparin. Additionally a video is available at [email or web address]    Bring these instructions with you to your procedure to show the provider doing the procedure. Please discuss with the provider doing the procedure if it is okay to restart warfarin and enoxaparin as we have planned.      You will take enoxaparin 100 mg every 12 hours (one full syringe) as outlined below.     7/6/24, Take last dose of warfarin  7/7/24, NO warfarin  7/8/24, NO warfarin  7/9/24, NO warfarin, enoxaparin every 12 hours (AM and PM)  7/10/24, NO warfarin, enoxaparin every 12 hours (AM and PM)  7/11/24, NO warfarin, enoxaparin AM only (no enoxaparin 24 hours prior to surgery)    7/12/24, DAY OF PROCEDURE, NO enoxaparin. Restart warfarin 10mg in the evening, if okay with provider doing the procedure.    Make sure to ask the provider doing your procedure if it is okay to begin your warfarin and enoxaparin as planned     7/13/24, Restart enoxaparin every 12 hours (AM and PM), unless instructed otherwise by the physician, and 10 mg warfarin in the evening.   7/14/24, Enoxaparin every 12 hours (AM and PM) and 10 mg warfarin in the " evening.  7/15/24, Enoxaparin every 12 hours (AM and PM) and 10 mg warfarin in the evening.  7/16/24, Enoxaparin every 12 hours (AM and PM) and 10 mg warfarin in the evening.  7/17/24, Enoxaparin in the AM. Recheck INR. Anticoagulation clinic to call with further dosing instructions.     Please watch for symptoms of both bleeding and clotting while on warfarin and enoxaparin:    Call 911 or go to the emergency room if you are experiencing any of the following:   Coughing or throwing up blood, can't control bleeding from a cut or injury after putting pressure on it, have a sudden severe headache, have red or black stools, or have red or orange urine.  Chest pain or shortness of breath  Any symptoms of a stroke including: sudden numbness or weakness in your face, arm or leg; sudden confusion or trouble speaking, reading or understanding; sudden blurred or decreased vision; sudden trouble walking or moving a part of the body; sudden severe headache for no reason.    Call your care team if you have:   Bleeding gums, large bruises, pale skin.  Sudden pain, tenderness or swelling in your leg or arm    Please contact the Anticoagulation Clinic at [phone] if you have any questions or concerns.     Nikki Hernandez RN  Faxed to [facility]

## 2024-07-05 ENCOUNTER — TELEPHONE (OUTPATIENT)
Dept: ANTICOAGULATION | Facility: OTHER | Age: 64
End: 2024-07-05
Payer: MEDICAID

## 2024-07-05 DIAGNOSIS — Z79.01 ANTICOAGULATION MONITORING, INR RANGE 2-3: ICD-10-CM

## 2024-07-05 DIAGNOSIS — I48.21 PERMANENT ATRIAL FIBRILLATION (H): Primary | ICD-10-CM

## 2024-07-05 NOTE — TELEPHONE ENCOUNTER
Mya the nurse from [facility] called and informed writer that patient has canceled his cystoscopy for 7/12/24. Returned patients dose to maintenance dose and advised them to not use lovenox as bridging is not needed as he is not holding his warfarin. Nurse verbalized understanding. New calendar faxed to [facility].     Anticoag calendar updated.     Fax: [phone]

## 2024-07-23 ENCOUNTER — NURSING HOME VISIT (OUTPATIENT)
Dept: GERIATRICS | Facility: OTHER | Age: 64
End: 2024-07-23
Payer: MEDICAID

## 2024-07-23 DIAGNOSIS — Z96.659 AFTERCARE FOLLOWING KNEE JOINT REPLACEMENT SURGERY, UNSPECIFIED LATERALITY: ICD-10-CM

## 2024-07-23 DIAGNOSIS — R60.0 LOCALIZED EDEMA: ICD-10-CM

## 2024-07-23 DIAGNOSIS — Z98.890 HX OF LEFT KNEE SURGERY: Primary | ICD-10-CM

## 2024-07-23 DIAGNOSIS — G47.33 OBSTRUCTIVE SLEEP APNEA SYNDROME: ICD-10-CM

## 2024-07-23 DIAGNOSIS — Z47.1 AFTERCARE FOLLOWING KNEE JOINT REPLACEMENT SURGERY, UNSPECIFIED LATERALITY: ICD-10-CM

## 2024-07-23 DIAGNOSIS — S83.005S PATELLAR DISLOCATION, LEFT, SEQUELA: ICD-10-CM

## 2024-07-23 DIAGNOSIS — G81.94 LEFT HEMIPARESIS (H): ICD-10-CM

## 2024-07-23 DIAGNOSIS — I48.21 PERMANENT ATRIAL FIBRILLATION (H): ICD-10-CM

## 2024-07-23 DIAGNOSIS — R63.5 WEIGHT GAIN: ICD-10-CM

## 2024-07-23 DIAGNOSIS — I10 ESSENTIAL HYPERTENSION: ICD-10-CM

## 2024-07-23 PROCEDURE — 99310 SBSQ NF CARE HIGH MDM 45: CPT | Performed by: NURSE PRACTITIONER

## 2024-07-23 NOTE — PROGRESS NOTES
[facility]  60 day re-certification      Patient Name: Babak Moran   : 1960  MRN: [id number]    Place of Service: [facility]  DOS: 2024    CC: 60 day re-certification      HPI:  Babak Moran is a 64 year old male with PMH of multiple chronic medical concerns, who is seen today regarding 60 day re-certification for the following:    Left hemiparesis (H)  Obstructive sleep apnea syndrome  Essential hypertension  Permanent atrial fibrillation (H)  Hx of left knee surgery  Patellar dislocation, left, sequela  Aftercare following knee joint replacement surgery, unspecified laterality    Pt seen today for routine re-certification. Pt overall feels well. Denies chest pain, shortness of breath, cough. Denies palpitations. Chronic afib-rate controlled on metoprolol succinate. Also takes coumadin. Hx of stroke when INR not therapeutic.   Bowel and bladder working well. NO further gross hematuria or any s/sx of UTI. He drinks 2-2.5L water/day. He reports last night he urinated a lot and filled up urinal 3 times.     Nursing staff had reported that his weight is up 10# in past month. Overall weight is up 40# since 2024.  Nursing also reported increased edema to lower extremities. He did report eating more salt in past couple weeks and not elevating legs as much.   Last echo done in  as well as dobutamine stress test. He is not sure why that was done then. He was not having chest pain or any symptoms. EF 60%.     Pt has had multiple surgeries on left knee for patella dislocation and each surgery failed to heal properly as patella kept dislocating. He has been residing in rehab facility since 2023. He has not been able to be as active due to knee surgeries. Last surgery was Aug 2023 at  and he has been slowly allowing knee to recover since. Last follow up 24 by ortho reported good healing and can slowly add 10 degrees flexion to immobilizer each week.  He is currently at 60 degrees flexion and tomorrow will increase to 70 degrees. Next ortho follow up Aug 5th. If this appointment goes well he may be cleared to start physical therapy again. Currently he is with OT for upper body and he is able to ambulate on his own but his immobilizer does not stay in proper position when starts walking and it slips down so he is not able to walk a measurable distance.     Pt has diagnosis of sleep apnea but does not use CPAP. He reports remembering having a sleep study years ago and was given a CPAP but he felt he was suffocating and was not able to use it. It does not sound like he was given any other type of mask/nasal mask to try.         Multidisciplinary notes, laboratory values, medications, vital signs, weight and orders arereviewed from nursing home records. I have reviewed the patient s medical history and updated the computerized patient record.     PMH:  Past Medical History:   Diagnosis Date    Atrial fibrillation (H) 02/03/2017    on Warfarin    Bacterial sepsis (H) 8/8/2022    Cellulitis of left lower extremity 9/5/2019    Cerebral infarction (H)     Cerebral infarction due to unspecified occlusion or stenosis of right middle cerebral artery (H) 02/03/2017    ,Left hemiparesis, left neglect, impaired balance and gait following R MCA stroke with mild hemorrhagic transformation s/p tissue plasminogen activator and mechanical thrombectomy, s/p C7 facet fracture from fall off forklift.    Chest pain 02/1997    Disorder of skin or subcutaneous tissue 07/25/2011    Essential (primary) hypertension 02/1997    Fracture of cervical vertebra (H)     02/03/2017,C7 facet fracture from fall off forklift, nondisplaced    Gout     Hemiplegia affecting left nondominant side (H) 02/03/2017    Obesity 02/03/2017    body mass index of 40    Other local lupus erythematosus     Sepsis (H) 9/6/2019       Medications:  Current Outpatient Medications   Medication Sig Dispense Refill     acetaminophen (TYLENOL) 325 MG tablet Take 2 tablets (650 mg) by mouth every 4 hours as needed for other (mild pain) 100 tablet 0    amLODIPine (NORVASC) 5 MG tablet Take 2 tablets (10 mg) by mouth daily 90 tablet 4    calcium carbonate (TUMS) 500 MG chewable tablet Take 1 chew tab by mouth every 6 hours as needed for heartburn or other (indigestion)      Cranberry 125 MG TABS Take 200 mg by mouth daily      D-Mannose 500 MG CAPS Take 1,500 mg by mouth 2 times daily      Emollient (CERAVE) CREA Apply topically 2 times daily To bilateral knees      hydrochlorothiazide (HYDRODIURIL) 25 MG tablet Take 1 tablet (25 mg) by mouth daily 90 tablet 4    magnesium hydroxide (MILK OF MAGNESIA) 400 MG/5ML suspension Take 30 mLs by mouth daily as needed for constipation      metoprolol succinate ER (TOPROL XL) 25 MG 24 hr tablet Take 1 tablet (25 mg) by mouth daily      Nutritional Supplement LIQD Take 4 oz by mouth 2 times daily [employer] Supplement two times daily with meals      saccharomyces boulardii (FLORASTOR) 250 MG capsule Take 1 capsule (250 mg) by mouth daily      senna-docusate (SENOKOT-S/PERICOLACE) 8.6-50 MG tablet Take 1 tablet by mouth daily AND 1 tab daily PRN      warfarin ANTICOAGULANT (COUMADIN) 5 MG tablet Take 7.5 mg by mouth Give 10 mg every Monday and Thursday and give 7.5 mg every Tuesday, Wednesday, Friday, Saturday, and Sunday as directed by the [facility] clinic 160 tablet 1      Medication reconciliation complete between Epic record and [facility].     Allergies:  Allergies   Allergen Reactions    Diatrizoate Rash    Ioxaglate Other (See Comments)     Does not recall    Levofloxacin Hives and Rash    Metrizamide Rash     (Diagnostic X-Ray materials)    Probenecid Rash       Review of Systems:  See HPI    Vital Signs:  Temp 97.3   Pulse 86   Respirations 18   /76   Oxygen Sats 92%   Weight 370#    Physical Exam:     Pleasant and alert without distress.    Sclera nonicteric, conjunctiva  "non-inflamed.  Skin color pink. Mucous membranes moist.   Lungs clear to auscultation throughout. No wheezes or rales noted.   Cardiovascular irregular, rate controlled auscultated. No murmur noted.  Abdomen large soft and without tenderness   Extremities with 1+edema.    Able to move upper and lower extremities   Left leg with immobilizer in place. Ace wraps both legs. CMS intact.       New Labs/Diagnostics:  Lab Results   Component Value Date    WBC 6.6 04/09/2024    WBC 7.1 09/29/2020     Lab Results   Component Value Date    RBC 5.09 04/09/2024    RBC 5.49 09/29/2020     Lab Results   Component Value Date    HGB 14.3 04/09/2024    HGB 14.5 09/29/2020     Lab Results   Component Value Date    HCT 46.3 04/09/2024    HCT 47.3 09/29/2020     No components found for: \"MCT\"  Lab Results   Component Value Date    MCV 91 04/09/2024    MCV 86 09/29/2020     Lab Results   Component Value Date    MCH 28.1 04/09/2024    MCH 26.4 09/29/2020     Lab Results   Component Value Date    MCHC 30.9 04/09/2024    MCHC 30.7 09/29/2020     Lab Results   Component Value Date    RDW 16.6 04/09/2024    RDW 16.1 09/29/2020     Lab Results   Component Value Date     04/09/2024     09/29/2020     Last Comprehensive Metabolic Panel:  Sodium   Date Value Ref Range Status   04/09/2024 142 135 - 145 mmol/L Final     Comment:     Reference intervals for this test were updated on 09/26/2023 to more accurately reflect our healthy population. There may be differences in the flagging of prior results with similar values performed with this method. Interpretation of those prior results can be made in the context of the updated reference intervals.    09/29/2020 138 134 - 144 mmol/L Final     Potassium   Date Value Ref Range Status   04/09/2024 4.9 3.4 - 5.3 mmol/L Final   08/11/2022 3.7 3.5 - 5.1 mmol/L Final   09/29/2020 4.3 3.5 - 5.1 mmol/L Final     Chloride   Date Value Ref Range Status   04/09/2024 102 98 - 107 mmol/L Final "   08/11/2022 102 98 - 107 mmol/L Final   09/29/2020 101 98 - 107 mmol/L Final     Carbon Dioxide   Date Value Ref Range Status   09/29/2020 33 (H) 21 - 31 mmol/L Final     Carbon Dioxide (CO2)   Date Value Ref Range Status   04/09/2024 29 22 - 29 mmol/L Final   08/11/2022 29 21 - 31 mmol/L Final     Anion Gap   Date Value Ref Range Status   04/09/2024 11 7 - 15 mmol/L Final   08/11/2022 8 3 - 14 mmol/L Final   09/29/2020 4 3 - 14 mmol/L Final     Glucose   Date Value Ref Range Status   04/09/2024 83 70 - 99 mg/dL Final   08/11/2022 87 70 - 105 mg/dL Final   09/29/2020 98 70 - 105 mg/dL Final     GLUCOSE BY METER POCT   Date Value Ref Range Status   06/26/2023 99 70 - 99 mg/dL Final     Urea Nitrogen   Date Value Ref Range Status   04/09/2024 19.2 8.0 - 23.0 mg/dL Final   08/11/2022 12 7 - 25 mg/dL Final   09/29/2020 14 7 - 25 mg/dL Final     Creatinine   Date Value Ref Range Status   04/09/2024 0.64 (L) 0.67 - 1.17 mg/dL Final   09/29/2020 0.99 0.70 - 1.30 mg/dL Final     GFR Estimate   Date Value Ref Range Status   04/09/2024 >90 >60 mL/min/1.73m2 Final   09/29/2020 77 >60 mL/min/[1.73_m2] Final     Calcium   Date Value Ref Range Status   04/09/2024 9.4 8.8 - 10.2 mg/dL Final   09/29/2020 9.7 8.6 - 10.3 mg/dL Final     Bilirubin Total   Date Value Ref Range Status   04/09/2024 0.5 <=1.2 mg/dL Final   09/29/2020 0.8 0.3 - 1.0 mg/dL Final     Alkaline Phosphatase   Date Value Ref Range Status   04/09/2024 66 40 - 150 U/L Final     Comment:     Reference intervals for this test were updated on 11/14/2023 to more accurately reflect our healthy population. There may be differences in the flagging of prior results with similar values performed with this method. Interpretation of those prior results can be made in the context of the updated reference intervals.   09/29/2020 60 34 - 104 U/L Final     ALT   Date Value Ref Range Status   04/09/2024 21 0 - 70 U/L Final     Comment:     Reference intervals for this test were  updated on 6/12/2023 to more accurately reflect our healthy population. There may be differences in the flagging of prior results with similar values performed with this method. Interpretation of those prior results can be made in the context of the updated reference intervals.     09/29/2020 16 7 - 52 U/L Final     AST   Date Value Ref Range Status   04/09/2024 30 0 - 45 U/L Final     Comment:     Reference intervals for this test were updated on 6/12/2023 to more accurately reflect our healthy population. There may be differences in the flagging of prior results with similar values performed with this method. Interpretation of those prior results can be made in the context of the updated reference intervals.   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--last surgery 8/2023 has been successful in keeping patella in place  Plan: cont ortho follow up with [facility]-next appointment 8/5/24    (G81.94) Left hemiparesis (H)  Comment: hx of stroke when INR was not therapeutic  Plan: Echocardiogram Complete        Cont on coumadin    (G47.33) Obstructive sleep apnea syndrome  Comment: noted   Plan: Echocardiogram Complete        Overnight oximetry, possible sleep medicine consult if abnormal    (I10) Essential hypertension  Comment: range 120-140s  Plan: Echocardiogram Complete        Cont amlodipine and metoprolol succinate, hydrochlorothiazide      (I48.21) Permanent atrial fibrillation (H)  Comment:  rate controlled  Plan: Echocardiogram Complete        Cont coumadin, metoprolol succinate      (R60.0) Localized edema  Comment: lower extremities  Plan: low salt diet, elevation of legs, ace wraps, cont hydrochlorothiazide  Daily weights, Echo    (R63.5) Weight gain  Comment: 40# weight gain since Jan--multifactorial from multiple knee surgeries contributing to inactivity, increased salt intake  Plan: low salt, daily weights, check echo        A total of 47 minutes spent by me on the date of the encounter doing chart review from [facility], [facility], review of test results, interpretation of tests, review of medications, patient visit, and documentation.    JAYLEN Riley, CNP ....................  7/23/2024   9:06 AM

## 2024-07-31 ENCOUNTER — ANTICOAGULATION THERAPY VISIT (OUTPATIENT)
Dept: ANTICOAGULATION | Facility: OTHER | Age: 64
End: 2024-07-31
Payer: MEDICAID

## 2024-07-31 ENCOUNTER — HOSPITAL ENCOUNTER (OUTPATIENT)
Dept: CARDIOLOGY | Facility: OTHER | Age: 64
Discharge: HOME | End: 2024-07-31
Attending: NURSE PRACTITIONER
Payer: MEDICAID

## 2024-07-31 ENCOUNTER — TRANSFERRED RECORDS (OUTPATIENT)
Dept: HEALTH INFORMATION MANAGEMENT | Facility: OTHER | Age: 64
End: 2024-07-31

## 2024-07-31 DIAGNOSIS — I10 ESSENTIAL HYPERTENSION: ICD-10-CM

## 2024-07-31 DIAGNOSIS — I48.21 PERMANENT ATRIAL FIBRILLATION (H): ICD-10-CM

## 2024-07-31 DIAGNOSIS — G81.94 LEFT HEMIPARESIS (H): ICD-10-CM

## 2024-07-31 DIAGNOSIS — I48.21 PERMANENT ATRIAL FIBRILLATION (H): Primary | ICD-10-CM

## 2024-07-31 DIAGNOSIS — G47.33 OBSTRUCTIVE SLEEP APNEA SYNDROME: ICD-10-CM

## 2024-07-31 DIAGNOSIS — Z79.01 ANTICOAGULATION MONITORING, INR RANGE 2-3: ICD-10-CM

## 2024-07-31 LAB
INR (EXTERNAL): 2
LVEF ECHO: NORMAL

## 2024-07-31 PROCEDURE — 255N000002 HC RX 255 OP 636: Performed by: NURSE PRACTITIONER

## 2024-07-31 PROCEDURE — 93306 TTE W/DOPPLER COMPLETE: CPT | Mod: 26 | Performed by: INTERNAL MEDICINE

## 2024-07-31 PROCEDURE — 999N000208 ECHOCARDIOGRAM COMPLETE

## 2024-07-31 PROCEDURE — C8929 TTE W OR WO FOL WCON,DOPPLER: HCPCS

## 2024-07-31 RX ADMIN — PERFLUTREN 3 ML: 6.52 INJECTION, SUSPENSION INTRAVENOUS at 10:00

## 2024-07-31 NOTE — PROGRESS NOTES
ANTICOAGULATION MANAGEMENT     Babak Moran 64 year old male is on warfarin with therapeutic INR result. (Goal INR 2.0-3.0)    Recent labs: (last 7 days)     07/31/24  1129   INR 2.0       ASSESSMENT     Source(s): Template     Warfarin doses taken: Warfarin taken as instructed  Diet: No new diet changes identified  New illness, injury, or hospitalization: No  Medication/supplement changes: None noted  Signs or symptoms of bleeding or clotting: No  Previous INR: Therapeutic last 2(+) visits  Additional findings: None     PLAN     Recommended plan for no diet, medication or health factor changes affecting INR     Dosing Instructions: Continue your current warfarin dose with next INR in 2 weeks       Summary  As of 7/31/2024      Full warfarin instructions:  10 mg every Mon, Thu; 7.5 mg all other days   Next INR check:  8/14/2024               Faxed dosing and follow up instructions to [facility]    Orders given to  Homecare nurse/facility to recheck    Education provided: Please call back if any changes to your diet, medications or how you've been taking warfarin    Plan made per [facility] anticoagulation protocol    Nikki Hernandez RN  Anticoagulation Clinic  7/31/2024    _______________________________________________________________________     Anticoagulation Episode Summary       Current INR goal:  2.0-3.0   TTR:  80.2% (11.9 mo)   Target end date:  Indefinite   Send INR reminders to:  [facility]    Indications    Permanent atrial fibrillation (H) [I48.21]  Anticoagulation monitoring  INR range 2-3 (Resolved) [Z79.01]  Atrial fibrillation with RVR (H) (Resolved) [I48.91]  Anticoagulation monitoring  INR range 2-3 [Z79.01]             Comments:  Babak prefers to be closer to 3.0 d/t previous CVA check Q 4 weeks.Declines to reduce dose when slightly over 3.0  Needs to change PCP  Takes a long time get up to therapeutic after holding warfarin.

## 2024-08-06 ENCOUNTER — NURSING HOME VISIT (OUTPATIENT)
Dept: GERIATRICS | Facility: OTHER | Age: 64
End: 2024-08-06
Payer: MEDICAID

## 2024-08-06 ENCOUNTER — MYC MEDICAL ADVICE (OUTPATIENT)
Dept: PULMONOLOGY | Facility: OTHER | Age: 64
End: 2024-08-06

## 2024-08-06 DIAGNOSIS — I10 ESSENTIAL HYPERTENSION: ICD-10-CM

## 2024-08-06 DIAGNOSIS — I50.810 RIGHT HEART FAILURE WITH REDUCED RIGHT VENTRICULAR FUNCTION (H): ICD-10-CM

## 2024-08-06 DIAGNOSIS — E66.01 MORBID OBESITY WITH BMI OF 40.0-44.9, ADULT (H): ICD-10-CM

## 2024-08-06 DIAGNOSIS — G47.33 OBSTRUCTIVE SLEEP APNEA SYNDROME: Primary | ICD-10-CM

## 2024-08-06 PROCEDURE — 99310 SBSQ NF CARE HIGH MDM 45: CPT | Performed by: NURSE PRACTITIONER

## 2024-08-06 RX ORDER — LISINOPRIL 5 MG/1
10 TABLET ORAL DAILY
COMMUNITY
Start: 2024-08-06 | End: 2024-09-23

## 2024-08-06 ASSESSMENT — SLEEP AND FATIGUE QUESTIONNAIRES
HOW LIKELY ARE YOU TO NOD OFF OR FALL ASLEEP WHILE SITTING AND TALKING TO SOMEONE: WOULD NEVER DOZE
HOW LIKELY ARE YOU TO NOD OFF OR FALL ASLEEP WHILE SITTING AND READING: SLIGHT CHANCE OF DOZING
HOW LIKELY ARE YOU TO NOD OFF OR FALL ASLEEP IN A CAR, WHILE STOPPED FOR A FEW MINUTES IN TRAFFIC: WOULD NEVER DOZE
HOW LIKELY ARE YOU TO NOD OFF OR FALL ASLEEP WHEN YOU ARE A PASSENGER IN A CAR FOR AN HOUR WITHOUT A BREAK: WOULD NEVER DOZE
HOW LIKELY ARE YOU TO NOD OFF OR FALL ASLEEP WHILE SITTING INACTIVE IN A PUBLIC PLACE: SLIGHT CHANCE OF DOZING
HOW LIKELY ARE YOU TO NOD OFF OR FALL ASLEEP WHILE SITTING QUIETLY AFTER LUNCH WITHOUT ALCOHOL: SLIGHT CHANCE OF DOZING
HOW LIKELY ARE YOU TO NOD OFF OR FALL ASLEEP WHILE WATCHING TV: SLIGHT CHANCE OF DOZING
HOW LIKELY ARE YOU TO NOD OFF OR FALL ASLEEP WHILE LYING DOWN TO REST IN THE AFTERNOON WHEN CIRCUMSTANCES PERMIT: SLIGHT CHANCE OF DOZING

## 2024-08-06 NOTE — PROGRESS NOTES
[facility]  Acute Care Visit    Patient Name: Babak Moran   : 1960  MRN: [id number]    Place of Service: [facility]  DOS: 2024    CC: follow up echo and overnight oximetry    HPI:  Babak Moran is a 64 year old male with PMH of multiple chronic medical concerns, who is seen today regarding discussing results of echo and overnight oximetry results. Echo showed EF 60-65%, with mild left ventricular dilation and moderate right ventricular dilation. Global right ventricular function is mildly reduced, No valve concerns.   Overnight oximetry showing that 97.7% of the time his sats are less than 90% and 38.9% of the time his sats are less than 85%. He also has sats that go below 75-80% and HR drops below 60 during these times.   He reports having tried CPAP in the past but was many years ago. Discussed concerns for heart failure, hypertension, pulmonary hypertension. Discussed weight loss.   He is in agreement to see sleep medicine again to see if can get a mask that works for him.       Multidisciplinary notes, laboratory values, medications, vital signs, weight and orders arereviewed from nursing home records. I have reviewed the patient s medical history and updated the computerized patient record.     PMH:  Past Medical History:   Diagnosis Date    Atrial fibrillation (H) 2017    on Warfarin    Bacterial sepsis (H) 2022    Cellulitis of left lower extremity 2019    Cerebral infarction (H)     Cerebral infarction due to unspecified occlusion or stenosis of right middle cerebral artery (H) 2017    ,Left hemiparesis, left neglect, impaired balance and gait following R MCA stroke with mild hemorrhagic transformation s/p tissue plasminogen activator and mechanical thrombectomy, s/p C7 facet fracture from fall off forklift.    Chest pain 1997    Disorder of skin or subcutaneous tissue 2011    Essential (primary) hypertension 1997    Fracture of  cervical vertebra (H)     02/03/2017,C7 facet fracture from fall off forklift, nondisplaced    Gout     Hemiplegia affecting left nondominant side (H) 02/03/2017    Obesity 02/03/2017    body mass index of 40    Other local lupus erythematosus     Sepsis (H) 9/6/2019       Medications:  Current Outpatient Medications   Medication Sig Dispense Refill    acetaminophen (TYLENOL) 325 MG tablet Take 2 tablets (650 mg) by mouth every 4 hours as needed for other (mild pain) 100 tablet 0    amLODIPine (NORVASC) 5 MG tablet Take 2 tablets (10 mg) by mouth daily 90 tablet 4    calcium carbonate (TUMS) 500 MG chewable tablet Take 1 chew tab by mouth every 6 hours as needed for heartburn or other (indigestion)      Cranberry 125 MG TABS Take 200 mg by mouth daily      D-Mannose 500 MG CAPS Take 1,500 mg by mouth 2 times daily      Emollient (CERAVE) CREA Apply topically 2 times daily To bilateral knees      hydrochlorothiazide (HYDRODIURIL) 25 MG tablet Take 1 tablet (25 mg) by mouth daily 90 tablet 4    magnesium hydroxide (MILK OF MAGNESIA) 400 MG/5ML suspension Take 30 mLs by mouth daily as needed for constipation      metoprolol succinate ER (TOPROL XL) 25 MG 24 hr tablet Take 1 tablet (25 mg) by mouth daily      Nutritional Supplement LIQD Take 4 oz by mouth 2 times daily [employer] Supplement two times daily with meals      saccharomyces boulardii (FLORASTOR) 250 MG capsule Take 1 capsule (250 mg) by mouth daily      senna-docusate (SENOKOT-S/PERICOLACE) 8.6-50 MG tablet Take 1 tablet by mouth daily AND 1 tab daily PRN      warfarin ANTICOAGULANT (COUMADIN) 5 MG tablet Take 7.5 mg by mouth Give 10 mg every Monday and Thursday and give 7.5 mg every Tuesday, Wednesday, Friday, Saturday, and Sunday as directed by the [facility] clinic 160 tablet 1      Medication reconciliation complete between [facility] record and [facility].     Allergies:  Allergies   Allergen Reactions    Diatrizoate Rash    Ioxaglate Other (See Comments)     Does not  "recall    Levofloxacin Hives and Rash    Metrizamide Rash     (Diagnostic X-Ray materials)    Probenecid Rash       Review of Systems:  See HPI    Vital Signs:  Temp 97.5   Pulse 84   Respirations 16   /97   Oxygen Sats 94%   Weight 360#    Physical Exam:     Pleasant and alert without distress.    Sclera nonicteric, conjunctiva non-inflamed.  Skin color pink. Mucous membranes moist.   Abdomen large soft and without tenderness   Extremities with mild edema.     Able to move upper and lower extremities       New Labs/Diagnostics:  Lab Results   Component Value Date    WBC 6.6 04/09/2024    WBC 7.1 09/29/2020     Lab Results   Component Value Date    RBC 5.09 04/09/2024    RBC 5.49 09/29/2020     Lab Results   Component Value Date    HGB 14.3 04/09/2024    HGB 14.5 09/29/2020     Lab Results   Component Value Date    HCT 46.3 04/09/2024    HCT 47.3 09/29/2020     No components found for: \"MCT\"  Lab Results   Component Value Date    MCV 91 04/09/2024    MCV 86 09/29/2020     Lab Results   Component Value Date    MCH 28.1 04/09/2024    MCH 26.4 09/29/2020     Lab Results   Component Value Date    MCHC 30.9 04/09/2024    MCHC 30.7 09/29/2020     Lab Results   Component Value Date    RDW 16.6 04/09/2024    RDW 16.1 09/29/2020     Lab Results   Component Value Date     04/09/2024     09/29/2020     Last Comprehensive Metabolic Panel:  Sodium   Date Value Ref Range Status   04/09/2024 142 135 - 145 mmol/L Final     Comment:     Reference intervals for this test were updated on 09/26/2023 to more accurately reflect our healthy population. There may be differences in the flagging of prior results with similar values performed with this method. Interpretation of those prior results can be made in the context of the updated reference intervals.    09/29/2020 138 134 - 144 mmol/L Final     Potassium   Date Value Ref Range Status   04/09/2024 4.9 3.4 - 5.3 mmol/L Final   08/11/2022 3.7 3.5 - 5.1 mmol/L Final "   09/29/2020 4.3 3.5 - 5.1 mmol/L Final     Chloride   Date Value Ref Range Status   04/09/2024 102 98 - 107 mmol/L Final   08/11/2022 102 98 - 107 mmol/L Final   09/29/2020 101 98 - 107 mmol/L Final     Carbon Dioxide   Date Value Ref Range Status   09/29/2020 33 (H) 21 - 31 mmol/L Final     Carbon Dioxide (CO2)   Date Value Ref Range Status   04/09/2024 29 22 - 29 mmol/L Final   08/11/2022 29 21 - 31 mmol/L Final     Anion Gap   Date Value Ref Range Status   04/09/2024 11 7 - 15 mmol/L Final   08/11/2022 8 3 - 14 mmol/L Final   09/29/2020 4 3 - 14 mmol/L Final     Glucose   Date Value Ref Range Status   04/09/2024 83 70 - 99 mg/dL Final   08/11/2022 87 70 - 105 mg/dL Final   09/29/2020 98 70 - 105 mg/dL Final     GLUCOSE BY METER POCT   Date Value Ref Range Status   06/26/2023 99 70 - 99 mg/dL Final     Urea Nitrogen   Date Value Ref Range Status   04/09/2024 19.2 8.0 - 23.0 mg/dL Final   08/11/2022 12 7 - 25 mg/dL Final   09/29/2020 14 7 - 25 mg/dL Final     Creatinine   Date Value Ref Range Status   04/09/2024 0.64 (L) 0.67 - 1.17 mg/dL Final   09/29/2020 0.99 0.70 - 1.30 mg/dL Final     GFR Estimate   Date Value Ref Range Status   04/09/2024 >90 >60 mL/min/1.73m2 Final   09/29/2020 77 >60 mL/min/[1.73_m2] Final     Calcium   Date Value Ref Range Status   04/09/2024 9.4 8.8 - 10.2 mg/dL Final   09/29/2020 9.7 8.6 - 10.3 mg/dL Final     Bilirubin Total   Date Value Ref Range Status   04/09/2024 0.5 <=1.2 mg/dL Final   09/29/2020 0.8 0.3 - 1.0 mg/dL Final     Alkaline Phosphatase   Date Value Ref Range Status   04/09/2024 66 40 - 150 U/L Final     Comment:     Reference intervals for this test were updated on 11/14/2023 to more accurately reflect our healthy population. There may be differences in the flagging of prior results with similar values performed with this method. Interpretation of those prior results can be made in the context of the updated reference intervals.   09/29/2020 60 34 - 104 U/L Final      ALT   Date Value Ref Range Status   04/09/2024 21 0 - 70 U/L Final     Comment:     Reference intervals for this test were updated on 6/12/2023 to more accurately reflect our healthy population. There may be differences in the flagging of prior results with similar values performed with this method. Interpretation of those prior results can be made in the context of the updated reference intervals.     09/29/2020 16 7 - 52 U/L Final     AST   Date Value Ref Range Status   04/09/2024 30 0 - 45 U/L Final     Comment:     Reference intervals for this test were updated on 6/12/2023 to more accurately reflect our healthy population. There may be differences in the flagging of prior results with similar values performed with this method. Interpretation of those prior results can be made in the context of the updated reference intervals.   09/29/2020 19 13 - 39 U/L Final     Lab Results   Component Value Date    TSH 1.97 04/09/2024      Hemoglobin A1C   Date Value Ref Range Status   04/09/2024 5.1 4.0 - 6.2 % Final   09/06/2019 6.0 4.0 - 6.0 % Final      Echo  Interpretation Summary  The patient's rhythm is atrial fibrillation.  Global and regional left ventricular function is normal with an EF of 60-65%.  Mild left ventricular dilation is present.  Moderate right ventricular dilation is present.  Global right ventricular function is mildly reduced.  No significant valvular abnormalities present.  There is no prior study for direct comparison.    Assessment/Plan:  (G47.33) Obstructive sleep apnea syndrome  (primary encounter diagnosis)  Comment: reviewed overnight oximetry and 97.7% of the time his sats are less than 90% and 38.9% of the time sats are less than 85%.   Plan: referral to sleep medicine    (E66.01,  Z68.41) Morbid obesity with BMI of 40.0-44.9, adult (H)  Comment: with low salt diet and 2000 ml/day he is down 10# and edema in legs is down  Plan: continue    (I10) Essential hypertension  (I50.810) Right  heart failure with reduced right ventricular function (H)  Comment: New diagnosis  Plan: sleep medicine referral and start lisinopril 5 mg daily, check BMP in 2 weeks  Low salt diet, weight loss        A total of 46 minutes spent by me on the date of the encounter doing chart review from [place], review of test results, interpretation of tests, review of medications, patient visit, and documentation.    JAYLEN Riley, CNP ....................  8/6/2024   12:52 PM

## 2024-08-08 NOTE — TELEPHONE ENCOUNTER
Chart review prior to sleep testing.    Patient Summary:  64 year old male who is referred for sleep-disordered breathing.    Patient Active Problem List    Diagnosis Date Noted    Right heart failure with reduced right ventricular function (H) 08/06/2024     Priority: Medium    Hx of left knee surgery 04/08/2024     Priority: Medium     Revision by Dr Aida Jauregui with most recent revision done 3/12/24.       Recurrent gross hematuria 12/13/2023     Priority: Medium    Prediabetes 12/01/2023     Priority: Medium    Patellar dislocation, left, sequela 11/07/2023     Priority: Medium     Recurrence dislocation after revision done 8 weeks ago (early September 2023)  Revised surgery by Dr Johnson Nov 13, 2023.       Dislocation of both patellae 09/13/2023     Priority: Medium     Surgical repair by Dr Johnson Bilateral 9/8/23      Chronic venous stasis dermatitis of left lower extremity 06/28/2023     Priority: Medium    Status post total right knee replacement 06/26/2023     Priority: Medium    Anticoagulation monitoring, INR range 2-3 06/16/2023     Priority: Medium    Nail dystrophy 02/23/2023     Priority: Medium    Onychomycosis 02/23/2023     Priority: Medium    Aftercare following knee joint replacement surgery, unspecified laterality 01/09/2023     Priority: Medium    Onychogryphosis 08/27/2022     Priority: Medium    Need for vaccination 04/10/2022     Priority: Medium    Obstructive sleep apnea syndrome 05/13/2019     Priority: Medium    Permanent atrial fibrillation (H) 02/11/2018     Priority: Medium    Osteoarthrosis 01/16/2018     Priority: Medium    Morbid obesity with BMI of 40.0-44.9, adult (H) 02/08/2017     Priority: Medium    Cerebrovascular accident (CVA) due to embolism of right middle cerebral artery (H) 02/03/2017     Priority: Medium    Left hemiparesis (H) 02/03/2017     Priority: Medium    Long-term (current) use of anticoagulants, INR goal 2.0-3.0 06/25/2015     Priority: Medium    Family  history of coronary artery disease 05/19/2014     Priority: Medium    Ascending aorta enlargement (H24) 02/13/2014     Priority: Medium    Gout 01/07/2014     Priority: Medium     Overview:   Overview:   after 3 attacks in < a year, tapped and crystal proven 5/2/13;  Allopurinol started then got ? Atypical side effect, stopped; (short term colchicine prophylaxis)      Overweight 05/02/2013     Priority: Medium     Formatting of this note might be different from the original.  Max weight reported 475 lbs;  5/13 369 lbs      Pain in joint, ankle and foot 05/17/2012     Priority: Medium    Essential hypertension 09/08/2009     Priority: Medium    Cutaneous lupus erythematosus 11/12/2008     Priority: Medium    Systemic lupus erythematosus (H) 09/18/2008     Priority: Medium     Overview:   Dermatitis         Current Outpatient Medications   Medication Sig Dispense Refill    acetaminophen (TYLENOL) 325 MG tablet Take 2 tablets (650 mg) by mouth every 4 hours as needed for other (mild pain) 100 tablet 0    amLODIPine (NORVASC) 5 MG tablet Take 2 tablets (10 mg) by mouth daily 90 tablet 4    calcium carbonate (TUMS) 500 MG chewable tablet Take 1 chew tab by mouth every 6 hours as needed for heartburn or other (indigestion)      Cranberry 125 MG TABS Take 200 mg by mouth daily      D-Mannose 500 MG CAPS Take 1,500 mg by mouth 2 times daily      Emollient (CERAVE) CREA Apply topically 2 times daily To bilateral knees      hydrochlorothiazide (HYDRODIURIL) 25 MG tablet Take 1 tablet (25 mg) by mouth daily 90 tablet 4    lisinopril (ZESTRIL) 5 MG tablet Take 0.5 tablets (2.5 mg) by mouth daily      magnesium hydroxide (MILK OF MAGNESIA) 400 MG/5ML suspension Take 30 mLs by mouth daily as needed for constipation      metoprolol succinate ER (TOPROL XL) 25 MG 24 hr tablet Take 1 tablet (25 mg) by mouth daily      Nutritional Supplement LIQD Take 4 oz by mouth 2 times daily [employer] Supplement two times daily with meals       "saccharomyces boulardii (FLORASTOR) 250 MG capsule Take 1 capsule (250 mg) by mouth daily      senna-docusate (SENOKOT-S/PERICOLACE) 8.6-50 MG tablet Take 1 tablet by mouth daily AND 1 tab daily PRN      warfarin ANTICOAGULANT (COUMADIN) 5 MG tablet Take 7.5 mg by mouth Give 10 mg every Monday and Thursday and give 7.5 mg every Tuesday, Wednesday, Friday, Saturday, and Sunday as directed by the [facility] clinic 160 tablet 1     No current facility-administered medications for this visit.       Pertinent PMHx of CVA with persistent left hemiparesis, BARRIE, obesity, prediabetes, HTN, atrial fibrillation, SLE.    Echo:  7/31/2024 - LVEF 60-65%    Prior Sleep Testing:  3/5/2014 - [facility] AHI 8.7, jocelyn SpO2 81%.  CPAP 9 effective.  EKG consistent atrial fib.    STOP-BANG score of 4, with unknown neck circumference.  Hatteras score of 5.  MILIND: 4    BMI of Estimated body mass index is 40.62 kg/m  as calculated from the following:    Height as of 11/13/23: 1.905 m (6' 3\").    Weight as of 1/24/24: 147.4 kg (325 lb).     Chief concern per questionnaire: \"Dr told me to \"    Duration of symptoms:  \"not sure\"    Goals for visit per questionnaire: \"not sure\"    Sleep pattern:  Workdays.  [place] - 6am.  Weekends.  9pm - 6am.  Time to fall asleep: ~few minutes.  Awakenings: 2-4 times per night, few minutes to return to sleep.  Average total sleep time:  6-7 hours  Napping.  1-3 days per week, 0.5-1 hours per nap.    No for RLS screen.  No for sleep walking.  No for dream enactment behavior.  No for bruxism.    No for morning headaches.  Yes for snoring.  No for observed apnea.  No for FHx of BARRIE.    Caffeine use:  No for 3+ per day.  No for within 6 hours of bed.    A/P:    1.)  High likelihood of BARRIE with STOP-BANG score of 4.  2.)  History of mild BARRIE    Unclear if currently using PAP therapy.  Recommend clinic visit to see if currently on treatment.  If repeat testing is desired, would appear to be candidate for either home sleep testing " or in-lab PSG.    ---  This note was written with the assistance of the Dragon voice-dictation technology software. The final document, although reviewed, may contain errors. For corrections, please contact the office.    Alexei Mack MD    Sleep Medicine  [place], [place]  Main Office: [phone]  [facility] [address], [address]  Schedule visits: [phone]  Main Office: [phone]  Fax: [phone]

## 2024-08-08 NOTE — PROGRESS NOTES
08/06/24 2022   Reason For Your Visit   Please briefly describe the main reason(s) for your sleep visit Dr told me to   Approximately when did this problem start Not sure   What are your goals for this visit Not sure   Time in Bed - Work Or School Days   Do you work or go to school No   What time do you usually get into bed Usually before midnight   About how long does it take you to fall asleep Minutes   How often do you have trouble falling asleep 0   How often do you wake up during the night 2-4 times a night   What wakes you up at night Use the bathroom   How often do you have trouble falling back to sleep Usually go right back to sleep   About how long does it take to fall back to sleep minutes   What do you usually do if you have trouble getting back to sleep Don't usually have any trouble   What time do you usually get out of bed to start your day About 6 am   Do you use an alarm No   Time in Bed - Weekends/Non-work Days/All Other Days   What time do you usually get into bed Around 9 watch tv , read   About how long does it take you to fall asleep Just minutes when I'm ready to go to sleep   What time do you usually get out of bed to start your day Around 6am   Do you use an alarm No   Sleep Need   On average, about how much sleep do you think you get 6-7 hours a night   About how much sleep do you think you need 6-7 is about average each night   Sleep Position   Which sleep positions do you prefer Side   Do you do any of the following activities in bed Read;Watch TV;Use phone, computer, or tablet   How often do you take a nap on purpose 1-3 times per week it really depends   About how long are your naps 1/2-1 hour   Do you feel better after naps Yes   How often do you doze off unintentionally Not sure   Have you ever had a driving accident or near-miss due to sleepiness/drowsiness No   Sleep Disruptions - Breathing/Snoring   Do you snore Yes   Do other people complain about your snoring Yes   Have you  been told you stop breathing in your sleep No   Do you have issues with any of the following Morning mouth dryness;Getting up to urinate more than once   Sleep Disruptions - Movement   Do you get pain, discomfort, with an urge to move No   Does it happen when you are resting No   Does it get better if you move around No   Does it happen more at night No   Have you been told you kick your legs at night No   Sleep Disruptions - Behaviours in Sleep   Do you ever experience sudden muscle weakness during the day No   Caffeine, Alcohol and Other Substances   How many caffeinated beverages (coffee, tea, soda, energy drinks) per day 0   What time of day is your last caffeine use Don't take any caffeine   Do you drink alcohol to help you sleep No   Do you drink alcohol near bedtime No   Family History   Has any family member been diagnosed with a sleep disorder No   In the last TWO WEEKS have you experienced any of the following symptoms?   Fevers No   Night Sweats No   Weight Gain No   Pain at Night No   Double Vision No   Changes in Vision No   Difficulty Breathing through Nose No   Sore Throat in Morning No   Dry Mouth in the Morning Yes   Shortness of Breath Lying Flat No   Shortness of Breath With Activity No   Awakening with Shortness of Breath No   Increased Cough No   Heart Racing at Night No   Swelling in Feet or Legs No   Diarrhea at Night No   Heartburn at Night No   Urinating More than Once at Night Yes   Losing Control of Urine at Night No   Joint Pains at Night No   Headaches in Morning No   Weakness in Arms or Legs No   Depressed Mood No   Anxiety No         8/6/2024     8:28 PM    [place] Sleepiness Scale ( KAYLA Barillas  [id number]<br>ESS - [place]/English - Final version - 21 Nov 07 - [facility] [place].)   Sitting and reading Slight chance of dozing   Watching TV Slight chance of dozing   Sitting, inactive in a public place (e.g. a theatre or a meeting) Slight chance of dozing   As a passenger in a car for an  hour without a break Would never doze   Lying down to rest in the afternoon when circumstances permit Slight chance of dozing   Sitting and talking to someone Would never doze   Sitting quietly after a lunch without alcohol Slight chance of dozing   In a car, while stopped for a few minutes in traffic Would never doze   Elliott Score (MC) 5   Elliott Score (Sleep) 5         8/6/2024     8:25 PM   Insomnia Severity Index (MILIND)   Difficulty falling asleep 0   Difficulty staying asleep 2   Problems waking up too early 0   How SATISFIED/DISSATISFIED are you with your CURRENT sleep pattern? 2   How NOTICEABLE to others do you think your sleep problem is in terms of impairing the quality of your life? 0   How WORRIED/DISTRESSED are you about your current sleep problem? 0   To what extent do you consider your sleep problem to INTERFERE with your daily functioning (e.g. daytime fatigue, mood, ability to function at work/daily chores, concentration, memory, mood, etc.) CURRENTLY? 0   MILIND Total Score 4         8/6/2024     8:26 PM   STOP BANG Questionnaire (  2008, the American Society of Anesthesiologists, Inc[employer]   1. Snoring - Do you snore loudly (louder than talking or loud enough to be heard through closed doors)? No   2. Tired - Do you often feel tired, fatigued, or sleepy during daytime? No   3. Observed - Has anyone observed you stop breathing during your sleep? No   4. Blood pressure - Do you have or are you being treated for high blood pressure? Yes   5. BMI - BMI more than 35 kg/m2? Yes   6. Age - Age over 50 yr old? Yes   7. Neck circumference - Neck circumference greater than 40 cm? Yes   8. Gender - Gender male? Yes   STOP BANG Score (MC): 4 (High risk of BARRIE)

## 2024-08-14 ENCOUNTER — TRANSFERRED RECORDS (OUTPATIENT)
Dept: HEALTH INFORMATION MANAGEMENT | Facility: OTHER | Age: 64
End: 2024-08-14

## 2024-08-14 ENCOUNTER — ANTICOAGULATION THERAPY VISIT (OUTPATIENT)
Dept: ANTICOAGULATION | Facility: OTHER | Age: 64
End: 2024-08-14
Payer: MEDICAID

## 2024-08-14 DIAGNOSIS — Z79.01 ANTICOAGULATION MONITORING, INR RANGE 2-3: ICD-10-CM

## 2024-08-14 DIAGNOSIS — I48.21 PERMANENT ATRIAL FIBRILLATION (H): Primary | ICD-10-CM

## 2024-08-14 LAB — INR (EXTERNAL): 2 (ref 0.9–1.1)

## 2024-08-14 NOTE — PROGRESS NOTES
ANTICOAGULATION MANAGEMENT     Babak Moran 64 year old male is on warfarin with therapeutic INR result. (Goal INR 2.0-3.0)    Recent labs: (last 7 days)     08/14/24  1007   INR 2.0*       ASSESSMENT     Source(s): Chart Review and Home Care/Facility Nurse     Warfarin doses taken: Warfarin taken as instructed  Diet: No new diet changes identified  Medication/supplement changes: None noted  New illness, injury, or hospitalization: No  Signs or symptoms of bleeding or clotting: No  Previous result: Therapeutic last 2(+) visits  Additional findings: None       PLAN     Recommended plan for no diet, medication or health factor changes affecting INR     Dosing Instructions: Increase your warfarin dose (4.3% change) patient prefers INR to be closer to 3.0 with next INR in 2 weeks       Summary  As of 8/14/2024      Full warfarin instructions:  10 mg every Mon, Wed, Fri; 7.5 mg all other days   Next INR check:  8/28/2024               Faxed dosing and follow up instructions to [facility]    Orders given to  Homecare nurse/facility to recheck    Patient not here, [employer] Communication sheet with screening questions and current INR received via FAX from outside agency. Results reviewed, Warfarin dosing per protocol, and recommended follow-up appointment made. Paperwork FAXED back to facility.       Plan made per [facility] anticoagulation protocol    Shelley Mercado, RN  Anticoagulation Clinic  8/14/2024    _______________________________________________________________________     Anticoagulation Episode Summary       Current INR goal:  2.0-3.0   TTR:  80.6% (1 y)   Target end date:  Indefinite   Send INR reminders to:  [facility]    Indications    Permanent atrial fibrillation (H) [I48.21]  Anticoagulation monitoring  INR range 2-3 (Resolved) [Z79.01]  Atrial fibrillation with RVR (H) (Resolved) [I48.91]  Anticoagulation monitoring  INR range 2-3 [Z79.01]             Comments:  Babak prefers to be closer to 3.0 d/t  previous CVA check Q 4 weeks.Declines to reduce dose when slightly over 3.0  Needs to change PCP  Takes a long time get up to therapeutic after holding warfarin.

## 2024-08-20 ENCOUNTER — LAB REQUISITION (OUTPATIENT)
Dept: LAB | Facility: OTHER | Age: 64
End: 2024-08-20
Payer: MEDICAID

## 2024-08-20 DIAGNOSIS — I10 ESSENTIAL (PRIMARY) HYPERTENSION: ICD-10-CM

## 2024-08-20 LAB
ANION GAP SERPL CALCULATED.3IONS-SCNC: 10 MMOL/L (ref 7–15)
BUN SERPL-MCNC: 19.4 MG/DL (ref 8–23)
CALCIUM SERPL-MCNC: 9.5 MG/DL (ref 8.8–10.4)
CHLORIDE SERPL-SCNC: 100 MMOL/L (ref 98–107)
CREAT SERPL-MCNC: 0.83 MG/DL (ref 0.67–1.17)
EGFRCR SERPLBLD CKD-EPI 2021: >90 ML/MIN/1.73M2
GLUCOSE SERPL-MCNC: 75 MG/DL (ref 70–99)
HCO3 SERPL-SCNC: 32 MMOL/L (ref 22–29)
POTASSIUM SERPL-SCNC: 4.6 MMOL/L (ref 3.4–5.3)
SODIUM SERPL-SCNC: 142 MMOL/L (ref 135–145)

## 2024-08-20 PROCEDURE — 36415 COLL VENOUS BLD VENIPUNCTURE: CPT | Performed by: NURSE PRACTITIONER

## 2024-08-20 PROCEDURE — 80048 BASIC METABOLIC PNL TOTAL CA: CPT | Performed by: NURSE PRACTITIONER

## 2024-08-21 ENCOUNTER — NURSING HOME VISIT (OUTPATIENT)
Dept: GERIATRICS | Facility: OTHER | Age: 64
End: 2024-08-21
Payer: MEDICAID

## 2024-08-21 DIAGNOSIS — S80.821A BLISTER OF RIGHT LOWER LEG, INITIAL ENCOUNTER: Primary | ICD-10-CM

## 2024-08-21 PROCEDURE — 99307 SBSQ NF CARE SF MDM 10: CPT | Performed by: NURSE PRACTITIONER

## 2024-08-21 NOTE — PROGRESS NOTES
[facility]  Acute Care Visit    Patient Name: Babak Moran   : 1960  MRN: [id number]    Place of Service: [facility]  DOS: 2024    CC: blisters right leg    HPI:  Babak Moran is a 64 year old male with PMH of multiple chronic medical concerns, who is seen today regarding pt developed large blisters on shin of right leg. He did have mild edema but weight is down 6-8# with just watching fluid intake and low salt diet. He is getting his legs ace wrapped. Today blister popped and is draining serous fluid. NO sign of infection.         Multidisciplinary notes, laboratory values, medications, vital signs, weight and orders arereviewed from nursing home records. I have reviewed the patient s medical history and updated the computerized patient record.     PMH:  Past Medical History:   Diagnosis Date    Atrial fibrillation (H) 2017    on Warfarin    Bacterial sepsis (H) 2022    Cellulitis of left lower extremity 2019    Cerebral infarction (H)     Cerebral infarction due to unspecified occlusion or stenosis of right middle cerebral artery (H) 2017    ,Left hemiparesis, left neglect, impaired balance and gait following R MCA stroke with mild hemorrhagic transformation s/p tissue plasminogen activator and mechanical thrombectomy, s/p C7 facet fracture from fall off forklift.    Chest pain 1997    Disorder of skin or subcutaneous tissue 2011    Essential (primary) hypertension 1997    Fracture of cervical vertebra (H)     2017,C7 facet fracture from fall off forklift, nondisplaced    Gout     Hemiplegia affecting left nondominant side (H) 2017    Obesity 2017    body mass index of 40    Other local lupus erythematosus     Sepsis (H) 2019       Medications:  Current Outpatient Medications   Medication Sig Dispense Refill    acetaminophen (TYLENOL) 325 MG tablet Take 2 tablets (650 mg) by mouth every 4 hours as needed for other (mild  pain) 100 tablet 0    amLODIPine (NORVASC) 5 MG tablet Take 2 tablets (10 mg) by mouth daily 90 tablet 4    calcium carbonate (TUMS) 500 MG chewable tablet Take 1 chew tab by mouth every 6 hours as needed for heartburn or other (indigestion)      Cranberry 125 MG TABS Take 200 mg by mouth daily      D-Mannose 500 MG CAPS Take 1,500 mg by mouth 2 times daily      Emollient (CERAVE) CREA Apply topically 2 times daily To bilateral knees      hydrochlorothiazide (HYDRODIURIL) 25 MG tablet Take 1 tablet (25 mg) by mouth daily 90 tablet 4    lisinopril (ZESTRIL) 5 MG tablet Take 0.5 tablets (2.5 mg) by mouth daily      magnesium hydroxide (MILK OF MAGNESIA) 400 MG/5ML suspension Take 30 mLs by mouth daily as needed for constipation      metoprolol succinate ER (TOPROL XL) 25 MG 24 hr tablet Take 1 tablet (25 mg) by mouth daily      Nutritional Supplement LIQD Take 4 oz by mouth 2 times daily [employer] Supplement two times daily with meals      saccharomyces boulardii (FLORASTOR) 250 MG capsule Take 1 capsule (250 mg) by mouth daily      senna-docusate (SENOKOT-S/PERICOLACE) 8.6-50 MG tablet Take 1 tablet by mouth daily AND 1 tab daily PRN      warfarin ANTICOAGULANT (COUMADIN) 5 MG tablet Take 7.5 mg by mouth Give 10 mg every Monday and Thursday and give 7.5 mg every Tuesday, Wednesday, Friday, Saturday, and Sunday as directed by the [facility] clinic 160 tablet 1      Medication reconciliation complete between Epic record and NH MAR.     Allergies:  Allergies   Allergen Reactions    Diatrizoate Rash    Ioxaglate Other (See Comments)     Does not recall    Levofloxacin Hives and Rash    Metrizamide Rash     (Diagnostic X-Ray materials)    Probenecid Rash       Review of Systems:  See HPI    Vital Signs:  Temp 97.8   Pulse 68   Respirations 16   /97   Oxygen Sats 92%   Weight 364#    Physical Exam:     Pleasant and alert without distress.    Sclera nonicteric, conjunctiva non-inflamed.  Skin color pink. Extremities with  minimal edema. Right lower extremity with fluid filled blisters that have now popped and draining serous fluid.     Gait is stable.   Able to move upper and lower extremities with immobilizer on left leg      Labs:  Last Comprehensive Metabolic Panel:  Lab Results   Component Value Date     08/20/2024    POTASSIUM 4.6 08/20/2024    CHLORIDE 100 08/20/2024    CO2 32 (H) 08/20/2024    ANIONGAP 10 08/20/2024    GLC 75 08/20/2024    BUN 19.4 08/20/2024    CR 0.83 08/20/2024    GFRESTIMATED >90 08/20/2024    VIRGEN 9.5 08/20/2024       Reviewed labs. Will stop 1L oxygen at night. Sleep medicine referral scheduled for Sept 24    Assessment/Plan:  (T90.520U) Blister of right lower leg, initial encounter  (primary encounter diagnosis)  Comment: acute  Plan: cleanse with saline, pat dry. Apply foam dressing and wrap with kerlix. Change daily        A total of 15 minutes spent by me on the date of the encounter doing chart review, review of test results, interpretation of tests, review of medications, patient visit, and documentation.    JAYLEN Riley, CNP ....................  8/21/2024   12:58 PM

## 2024-08-26 ENCOUNTER — LAB REQUISITION (OUTPATIENT)
Dept: LAB | Facility: OTHER | Age: 64
End: 2024-08-26

## 2024-08-26 DIAGNOSIS — S60.429A: ICD-10-CM

## 2024-08-26 PROCEDURE — 83516 IMMUNOASSAY NONANTIBODY: CPT | Performed by: NURSE PRACTITIONER

## 2024-08-27 ENCOUNTER — NURSING HOME VISIT (OUTPATIENT)
Dept: GERIATRICS | Facility: OTHER | Age: 64
End: 2024-08-27
Payer: MEDICAID

## 2024-08-27 DIAGNOSIS — R60.0 LOCALIZED EDEMA: ICD-10-CM

## 2024-08-27 DIAGNOSIS — I10 BENIGN ESSENTIAL HYPERTENSION: ICD-10-CM

## 2024-08-27 DIAGNOSIS — R63.5 WEIGHT GAIN: ICD-10-CM

## 2024-08-27 DIAGNOSIS — I50.810 RIGHT HEART FAILURE WITH REDUCED RIGHT VENTRICULAR FUNCTION (H): Primary | ICD-10-CM

## 2024-08-27 DIAGNOSIS — S80.821D BLISTER OF RIGHT LOWER EXTREMITY, SUBSEQUENT ENCOUNTER: ICD-10-CM

## 2024-08-27 DIAGNOSIS — I48.21 PERMANENT ATRIAL FIBRILLATION (H): ICD-10-CM

## 2024-08-27 DIAGNOSIS — I10 ESSENTIAL HYPERTENSION: ICD-10-CM

## 2024-08-27 DIAGNOSIS — G47.33 OBSTRUCTIVE SLEEP APNEA SYNDROME: ICD-10-CM

## 2024-08-27 PROCEDURE — 99309 SBSQ NF CARE MODERATE MDM 30: CPT | Performed by: NURSE PRACTITIONER

## 2024-08-27 RX ORDER — FUROSEMIDE 40 MG
40 TABLET ORAL DAILY
COMMUNITY
Start: 2024-08-27 | End: 2024-09-23

## 2024-08-27 RX ORDER — HYDROCHLOROTHIAZIDE 25 MG/1
12.5 TABLET ORAL DAILY
COMMUNITY
Start: 2024-08-27 | End: 2024-09-12

## 2024-08-27 NOTE — PROGRESS NOTES
[facility] Services  Acute Care Visit    Patient Name: Babak Moran   : 1960  MRN: [id number]    Place of Service: [facility]  DOS: 2024    CC: blisters on right leg; hypertension, edema, weight gain    HPI:  Babak Moran is a 64 year old male with PMH of multiple chronic medical concerns, who is seen today regarding pt has gained 4# in one day but this has fluctuated some over past few months with a general trend of weight gain. Overall trend of weight up 50# (315#) from one year ago  and has remained stable at this weight (355#) since May until a month ago when gained another 10#. Pt has been through at least 3 or 4 left knee surgeries where he was non-weight bearing for many months which really limited his mobility. He used an electric wheelchair for a time. He has recently been cleared by surgery to start ambulating again and some weight has come off but keeps fluctuating around 360-365#.  Pt does have noticeable edema in extremities and has been getting blisters on RLE that keep re-occuring.   Echo done showing normal EF but mild reduction of right heart. He has had more hypertension and started him on lisinopril and continued hydrochlorothiazide, amlodipine, and metoprolol succinate. Pt also has afib, on coumadin.     Recently had an overnight oximetry test done showing desaturation. He has known sleep apnea but did not tolerate mask. He is willing to see sleep medicine again. Trialed 1L oxygen at night via nasal cannula but CO2 level mildly increased. He reports he is tired today, feels he must not have slept well last night. We discussed that is likley from sleep apnea. Also discussed concern for heart failure/possible pulmonary hypertension with untreated sleep apnea. He has eval with sleep medicine .     Blisters on legs: possibly from edema but due to consistent recurrence and not significant edema enough to cause blisters, awaiting labs for bullous pemphigoid to  result. If positive will refer to dermatology for biopsy.       Multidisciplinary notes, laboratory values, medications, vital signs, weight and orders arereviewed from nursing home records. I have reviewed the patient s medical history and updated the computerized patient record.     PMH:  Past Medical History:   Diagnosis Date    Atrial fibrillation (H) 02/03/2017    on Warfarin    Bacterial sepsis (H) 8/8/2022    Cellulitis of left lower extremity 9/5/2019    Cerebral infarction (H)     Cerebral infarction due to unspecified occlusion or stenosis of right middle cerebral artery (H) 02/03/2017    ,Left hemiparesis, left neglect, impaired balance and gait following R MCA stroke with mild hemorrhagic transformation s/p tissue plasminogen activator and mechanical thrombectomy, s/p C7 facet fracture from fall off forklift.    Chest pain 02/1997    Disorder of skin or subcutaneous tissue 07/25/2011    Essential (primary) hypertension 02/1997    Fracture of cervical vertebra (H)     02/03/2017,C7 facet fracture from fall off forklift, nondisplaced    Gout     Hemiplegia affecting left nondominant side (H) 02/03/2017    Obesity 02/03/2017    body mass index of 40    Other local lupus erythematosus     Sepsis (H) 9/6/2019       Medications:  Current Outpatient Medications   Medication Sig Dispense Refill    furosemide (LASIX) 40 MG tablet Take 1 tablet (40 mg) by mouth daily.      hydrochlorothiazide (HYDRODIURIL) 25 MG tablet Take 0.5 tablets (12.5 mg) by mouth daily.      acetaminophen (TYLENOL) 325 MG tablet Take 2 tablets (650 mg) by mouth every 4 hours as needed for other (mild pain) 100 tablet 0    amLODIPine (NORVASC) 5 MG tablet Take 2 tablets (10 mg) by mouth daily 90 tablet 4    calcium carbonate (TUMS) 500 MG chewable tablet Take 1 chew tab by mouth every 6 hours as needed for heartburn or other (indigestion)      Cranberry 125 MG TABS Take 200 mg by mouth daily      D-Mannose 500 MG CAPS Take 1,500 mg by mouth  2 times daily      Emollient (CERAVE) CREA Apply topically 2 times daily To bilateral knees      lisinopril (ZESTRIL) 5 MG tablet Take 10 mg by mouth daily.      magnesium hydroxide (MILK OF MAGNESIA) 400 MG/5ML suspension Take 30 mLs by mouth daily as needed for constipation      metoprolol succinate ER (TOPROL XL) 25 MG 24 hr tablet Take 1 tablet (25 mg) by mouth daily      Nutritional Supplement LIQD Take 4 oz by mouth 2 times daily [employer] Supplement two times daily with meals      saccharomyces boulardii (FLORASTOR) 250 MG capsule Take 1 capsule (250 mg) by mouth daily      senna-docusate (SENOKOT-S/PERICOLACE) 8.6-50 MG tablet Take 1 tablet by mouth daily AND 1 tab daily PRN      warfarin ANTICOAGULANT (COUMADIN) 5 MG tablet Take 7.5 mg by mouth Give 10 mg every Monday and Thursday and give 7.5 mg every Tuesday, Wednesday, Friday, Saturday, and Sunday as directed by the [facility] clinic 160 tablet 1      Medication reconciliation complete between Epic record and NH MAR.     Allergies:  Allergies   Allergen Reactions    Diatrizoate Rash    Ioxaglate Other (See Comments)     Does not recall    Levofloxacin Hives and Rash    Metrizamide Rash     (Diagnostic X-Ray materials)    Probenecid Rash       Review of Systems:  See HPI    Vital Signs:  Temp 97.3   Pulse 63   Respirations 16   /92   Oxygen Sats 93%   Weight 365#    Physical Exam:     Pleasant and alert without distress.    Sclera nonicteric, conjunctiva non-inflamed.  Skin color pink. Mucous membranes moist.   Abdomen large soft and without tenderness   Extremities with 1+ edema right leg; mod left leg.     Able to move upper and lower extremities--left leg in immobilizer and ace wrap on       New Labs/Diagnostics:  Last Comprehensive Metabolic Panel:  Lab Results   Component Value Date     08/20/2024    POTASSIUM 4.6 08/20/2024    CHLORIDE 100 08/20/2024    CO2 32 (H) 08/20/2024    ANIONGAP 10 08/20/2024    GLC 75 08/20/2024    BUN 19.4  "08/20/2024    CR 0.83 08/20/2024    GFRESTIMATED >90 08/20/2024    VIRGEN 9.5 08/20/2024       Lab Results   Component Value Date    WBC 6.6 04/09/2024    WBC 7.1 09/29/2020     Lab Results   Component Value Date    RBC 5.09 04/09/2024    RBC 5.49 09/29/2020     Lab Results   Component Value Date    HGB 14.3 04/09/2024    HGB 14.5 09/29/2020     Lab Results   Component Value Date    HCT 46.3 04/09/2024    HCT 47.3 09/29/2020     No components found for: \"MCT\"  Lab Results   Component Value Date    MCV 91 04/09/2024    MCV 86 09/29/2020     Lab Results   Component Value Date    MCH 28.1 04/09/2024    MCH 26.4 09/29/2020     Lab Results   Component Value Date    MCHC 30.9 04/09/2024    MCHC 30.7 09/29/2020     Lab Results   Component Value Date    RDW 16.6 04/09/2024    RDW 16.1 09/29/2020     Lab Results   Component Value Date     04/09/2024     09/29/2020         Assessment/Plan:  (I50.810) Right heart failure with reduced right ventricular function (H)  (primary encounter diagnosis)  Comment: new-mild but due to weight gain, edema, also has afib, and sleep apnea  Plan: Adult Cardiology Eval  Referral        Start lasix 40 mg daily, follow daily weights closely, check BMP next week    (G47.33) Obstructive sleep apnea syndrome  Comment: hx of this but not able to tolerate cpap  Plan: pt open to having a second eval and trying a different mask          (I10) Essential hypertension  Comment: chronic  Plan: Adult Cardiology Eval  Referral        Added lisinopril a month ago, cont metoprolol succinate, amlodipine, and hydrochlorothiazide      (I48.21) Permanent atrial fibrillation (H)  Comment: chronic, rate controlled  Plan: Adult Cardiology Eval  Referral        Cont metoprolol succinate, coumadin      (R63.5) Weight gain  Comment: continues to increase  Plan: 2000 ml fluid restriction, low Na diet, increase activity as able with left knee surgery    (R60.0) Localized " edema  Comment: 1+RLE, weight gain  Plan: 2000 ml fluid restriction, low Na diet, cont hydrochlorothiazide, ADD lasix 40 mg daily, elevate legs, ace wrap     (S87.733D) Blister of right lower extremity, subsequent encounter  Comment: recurring tense blisters  Plan: awaiting labs , , and IgG to consider if bullous pemphigoid, if pos will get dermatology referral for biopsy        A total of 42 minutes spent by me on the date of the encounter doing chart review, review of test results, interpretation of tests, review of medications, patient visit, and documentation.    JAYLEN Riley, CNP ....................  8/27/2024   9:38 AM

## 2024-08-28 ENCOUNTER — ANTICOAGULATION THERAPY VISIT (OUTPATIENT)
Dept: ANTICOAGULATION | Facility: OTHER | Age: 64
End: 2024-08-28
Payer: MEDICAID

## 2024-08-28 DIAGNOSIS — I48.21 PERMANENT ATRIAL FIBRILLATION (H): Primary | ICD-10-CM

## 2024-08-28 DIAGNOSIS — Z79.01 ANTICOAGULATION MONITORING, INR RANGE 2-3: ICD-10-CM

## 2024-08-28 LAB — INR (EXTERNAL): 2.5 (ref 0.9–1.1)

## 2024-08-28 NOTE — PROGRESS NOTES
ANTICOAGULATION MANAGEMENT     Babak Moran 64 year old male is on warfarin with therapeutic INR result. (Goal INR 2.0-3.0)    Recent labs: (last 7 days)     08/28/24  1434   INR 2.5*       ASSESSMENT     Source(s): Chart Review and Home Care/Facility Nurse     Warfarin doses taken: Warfarin taken as instructed  Diet: No new diet changes identified  Medication/supplement changes: None noted  New illness, injury, or hospitalization: No  Signs or symptoms of bleeding or clotting: No  Previous result: Therapeutic last visit; previously outside of goal range  Additional findings: None       PLAN     Recommended plan for no diet, medication or health factor changes affecting INR     Dosing Instructions: Continue your current warfarin dose with next INR in 2 weeks       Summary  As of 8/28/2024      Full warfarin instructions:  10 mg every Mon, Wed, Fri; 7.5 mg all other days   Next INR check:  9/11/2024               Telephone call with [facility] nurse    Faxed dosing and follow up instructions to [facility]    Orders given to  Homecare nurse/facility to recheck    Education provided: None required    Plan made per [facility] anticoagulation protocol    Shelley Mercado, RN  Anticoagulation Clinic  8/28/2024    _______________________________________________________________________     Anticoagulation Episode Summary       Current INR goal:  2.0-3.0   TTR:  80.6% (1 y)   Target end date:  Indefinite   Send INR reminders to:  [facility]    Indications    Permanent atrial fibrillation (H) [I48.21]  Anticoagulation monitoring  INR range 2-3 (Resolved) [Z79.01]  Atrial fibrillation with RVR (H) (Resolved) [I48.91]  Anticoagulation monitoring  INR range 2-3 [Z79.01]             Comments:  Babak prefers to be closer to 3.0 d/t previous CVA check Q 4 weeks.Declines to reduce dose when slightly over 3.0  Needs to change PCP  Takes a long time get up to therapeutic after holding warfarin.              Anticoagulation Care Providers       Provider Role Specialty Phone number    Cris Major PA-C [facility] Medicine [phone]

## 2024-08-29 LAB
BMZ BP230 AB SERPL-ACNC: NORMAL
EER BULLOUS PEMPHIGOID ANTIGEN: NORMAL

## 2024-09-04 ENCOUNTER — LAB REQUISITION (OUTPATIENT)
Dept: LAB | Facility: OTHER | Age: 64
End: 2024-09-04

## 2024-09-04 DIAGNOSIS — I10 ESSENTIAL (PRIMARY) HYPERTENSION: ICD-10-CM

## 2024-09-04 DIAGNOSIS — I50.810 RIGHT HEART FAILURE, UNSPECIFIED (H): ICD-10-CM

## 2024-09-04 LAB
ANION GAP SERPL CALCULATED.3IONS-SCNC: 10 MMOL/L (ref 7–15)
BUN SERPL-MCNC: 22.1 MG/DL (ref 8–23)
CALCIUM SERPL-MCNC: 9.6 MG/DL (ref 8.8–10.4)
CHLORIDE SERPL-SCNC: 98 MMOL/L (ref 98–107)
CREAT SERPL-MCNC: 0.84 MG/DL (ref 0.67–1.17)
EGFRCR SERPLBLD CKD-EPI 2021: >90 ML/MIN/1.73M2
GLUCOSE SERPL-MCNC: 83 MG/DL (ref 70–99)
HCO3 SERPL-SCNC: 34 MMOL/L (ref 22–29)
POTASSIUM SERPL-SCNC: 4.5 MMOL/L (ref 3.4–5.3)
SODIUM SERPL-SCNC: 142 MMOL/L (ref 135–145)

## 2024-09-04 PROCEDURE — 82374 ASSAY BLOOD CARBON DIOXIDE: CPT | Performed by: NURSE PRACTITIONER

## 2024-09-09 ENCOUNTER — CARE COORDINATION (OUTPATIENT)
Dept: FAMILY MEDICINE | Facility: OTHER | Age: 64
End: 2024-09-09
Payer: MEDICAID

## 2024-09-11 ENCOUNTER — LAB REQUISITION (OUTPATIENT)
Dept: LAB | Facility: OTHER | Age: 64
End: 2024-09-11
Payer: MEDICAID

## 2024-09-11 ENCOUNTER — APPOINTMENT (OUTPATIENT)
Dept: LAB | Facility: OTHER | Age: 64
End: 2024-09-11
Payer: MEDICAID

## 2024-09-11 ENCOUNTER — ANTICOAGULATION THERAPY VISIT (OUTPATIENT)
Dept: ANTICOAGULATION | Facility: OTHER | Age: 64
End: 2024-09-11
Payer: MEDICAID

## 2024-09-11 DIAGNOSIS — Z79.01 ANTICOAGULATION MONITORING, INR RANGE 2-3: ICD-10-CM

## 2024-09-11 DIAGNOSIS — R60.9 EDEMA, UNSPECIFIED: ICD-10-CM

## 2024-09-11 DIAGNOSIS — I48.21 PERMANENT ATRIAL FIBRILLATION (H): Primary | ICD-10-CM

## 2024-09-11 LAB
ANION GAP SERPL CALCULATED.3IONS-SCNC: 9 MMOL/L (ref 7–15)
BUN SERPL-MCNC: 23 MG/DL (ref 8–23)
CALCIUM SERPL-MCNC: 9.2 MG/DL (ref 8.8–10.4)
CHLORIDE SERPL-SCNC: 101 MMOL/L (ref 98–107)
CREAT SERPL-MCNC: 0.81 MG/DL (ref 0.67–1.17)
EGFRCR SERPLBLD CKD-EPI 2021: >90 ML/MIN/1.73M2
GLUCOSE SERPL-MCNC: 107 MG/DL (ref 70–99)
HCO3 SERPL-SCNC: 31 MMOL/L (ref 22–29)
HGB BLD-MCNC: 14.4 G/DL (ref 13.3–17.7)
INR PPP: 2.47 (ref 0.85–1.15)
POTASSIUM SERPL-SCNC: 3.7 MMOL/L (ref 3.4–5.3)
SODIUM SERPL-SCNC: 141 MMOL/L (ref 135–145)

## 2024-09-11 PROCEDURE — 85018 HEMOGLOBIN: CPT | Performed by: NURSE PRACTITIONER

## 2024-09-11 PROCEDURE — 80048 BASIC METABOLIC PNL TOTAL CA: CPT | Performed by: NURSE PRACTITIONER

## 2024-09-11 PROCEDURE — 85610 PROTHROMBIN TIME: CPT | Performed by: NURSE PRACTITIONER

## 2024-09-11 NOTE — PROGRESS NOTES
ANTICOAGULATION MANAGEMENT     Babak Moran 64 year old male is on warfarin with therapeutic INR result. (Goal INR 2.0-3.0)    Recent labs: (last 7 days)     09/11/24  1042   INR 2.47*       ASSESSMENT     Source(s): Chart Review and Home Care/Facility Nurse     Warfarin doses taken: Warfarin taken as instructed  Diet: No new diet changes identified  Medication/supplement changes: None noted  New illness, injury, or hospitalization: No  Signs or symptoms of bleeding or clotting: No  Previous result: Therapeutic last visit; previously outside of goal range  Additional findings:  patient likes his INR closer to 3.0 he seems stable so we will plan in increase dose. Patient will be discharging from [facility] on 9/14/24       PLAN     Recommended plan for no diet, medication or health factor changes affecting INR     Dosing Instructions: Increase your warfarin dose (8.3% change) with next INR in 2 weeks       Summary  As of 9/11/2024      Full warfarin instructions:  7.5 mg every Mon, Fri; 10 mg all other days   Next INR check:  9/23/2024               Telephone call with [facility] home care nurse who verbalizes understanding and agrees to plan    Orders given to  Homecare nurse/facility to recheck patient has appt set up for anticoag at 10:45am 9/23/24    Education provided: Written instructions provided Will fax AVS to [facility]    Plan made per [facility] anticoagulation protocol    Shelley Mercado RN  9/11/2024  Anticoagulation Clinic  [employer] for routing messages: p [facility]          _______________________________________________________________________     Anticoagulation Episode Summary       Current INR goal:  2.0-3.0   TTR:  81.1% (1 y)   Target end date:  Indefinite   Send INR reminders to:  [facility]    Indications    Permanent atrial fibrillation (H) [I48.21]  Anticoagulation monitoring  INR range 2-3 (Resolved) [Z79.01]  Atrial fibrillation with RVR (H) (Resolved)  [I48.91]  Anticoagulation monitoring  INR range 2-3 [Z79.01]             Comments:  Babak prefers to be closer to 3.0 d/t previous CVA check Q 4 weeks.Declines to reduce dose when slightly over 3.0  Needs to change PCP  Takes a long time get up to therapeutic after holding warfarin.             Anticoagulation Care Providers       Provider Role Specialty Phone number    Cris Major PA-C [facility] Medicine [phone]

## 2024-09-12 ENCOUNTER — NURSING HOME VISIT (OUTPATIENT)
Dept: GERIATRICS | Facility: OTHER | Age: 64
End: 2024-09-12
Payer: MEDICAID

## 2024-09-12 DIAGNOSIS — I50.810 RIGHT HEART FAILURE WITH REDUCED RIGHT VENTRICULAR FUNCTION (H): ICD-10-CM

## 2024-09-12 DIAGNOSIS — E66.01 MORBID OBESITY WITH BMI OF 40.0-44.9, ADULT (H): ICD-10-CM

## 2024-09-12 DIAGNOSIS — I10 ESSENTIAL HYPERTENSION: ICD-10-CM

## 2024-09-12 DIAGNOSIS — S83.005S PATELLAR DISLOCATION, LEFT, SEQUELA: ICD-10-CM

## 2024-09-12 DIAGNOSIS — Z98.890 HX OF LEFT KNEE SURGERY: Primary | ICD-10-CM

## 2024-09-12 DIAGNOSIS — I63.411 CEREBROVASCULAR ACCIDENT (CVA) DUE TO EMBOLISM OF RIGHT MIDDLE CEREBRAL ARTERY (H): ICD-10-CM

## 2024-09-12 DIAGNOSIS — G47.33 OBSTRUCTIVE SLEEP APNEA SYNDROME: ICD-10-CM

## 2024-09-12 DIAGNOSIS — S80.821D BLISTER OF RIGHT LOWER EXTREMITY, SUBSEQUENT ENCOUNTER: ICD-10-CM

## 2024-09-12 DIAGNOSIS — G81.94 LEFT HEMIPARESIS (H): ICD-10-CM

## 2024-09-12 DIAGNOSIS — I87.2 CHRONIC VENOUS STASIS DERMATITIS OF BOTH LOWER EXTREMITIES: ICD-10-CM

## 2024-09-12 DIAGNOSIS — I48.21 PERMANENT ATRIAL FIBRILLATION (H): ICD-10-CM

## 2024-09-12 PROBLEM — S80.821A BLISTER OF RIGHT LEG: Status: ACTIVE | Noted: 2024-09-12

## 2024-09-12 PROCEDURE — 99310 SBSQ NF CARE HIGH MDM 45: CPT | Performed by: NURSE PRACTITIONER

## 2024-09-12 PROCEDURE — 99418 PROLNG IP/OBS E/M EA 15 MIN: CPT | Performed by: NURSE PRACTITIONER

## 2024-09-12 RX ORDER — POTASSIUM CHLORIDE 750 MG/1
10 TABLET, EXTENDED RELEASE ORAL EVERY OTHER DAY
COMMUNITY
Start: 2024-09-12 | End: 2024-09-23

## 2024-09-12 NOTE — PROGRESS NOTES
[facility]  Acute Care Visit/Discharge summary from Rehab to Home    Patient Name: Babak Moran   : 1960  MRN: [id number]    Place of Service: [facility]  DOS: 2024    CC: Discharge from Rehab    HPI:  Babak Moran is a 64 year old male with PMH of multiple chronic medical concerns, who is seen today regarding pt has been at [facility] since 2023 due to recurrent left knee patellar dislocation requiring multiple surgical revisions that failed due to recurrent patellar dislocation. Last attempt at correcting this issue he had a surgical intervention Status post left total knee arthroplasty with loss of extensor mechanism/patella malalignment on 3/12/24 by Dr Parra at [facility] and with a slow progression and gentle therapy until cleared for full weight bearing and ROM, he quickly progressed in therapy and now will be discharged home 24. He has ortho follow up in [place] 24.     Hx CVA with left hemiparesis: He currently takes coumadin. CVA occurred when INR was not in therapeutic range for some time. His goal INR is 2.5-3.0 and he feels most comfortable when he is closer to 3.0. He has mild left hemiparesis and when he gets tired his left foot can drag and he knows he needs to rest.     Hypertension: currently taking amlodipine and lisinopril, metoprolol. Will stop hydrochlorothiazide due to dehydration and addition of lasix due to some lower extremity edema. BP ranges 120-130s most of the time with some in 140s.     BARRIE: pt has known sleep apnea per patient was diagnosed years ago but failed CPAP due to not tolerating the mask. He had developed more edema and blisters on right lower extremity and concern for heart failure/pulmonary hypertension so an overnight oximetry test was done 24 showing evidence of significant desaturation with 90% of the time sats below 90% and 38.9% of the time sats below 85%. He also had 13% of time sats were less  than 80% with lowest at 70%. Pt triggered 245 events with sats 80-84% and 60 events with sats 75-79% with HR ranging 34-72 correlating with change in oxygen saturation.   Sleep Medicine consult done with appointment scheduled 9/24/24. Last sleep study done in 2014. He is open to trying again and open to trying different masks if he can get one to work.     Afib: rate controlled. Currently taking metoprolol succinate 25 mg daily and coumadin.     Edema/blisters RLE: Noted pt weight started going up over past few months. He denies shortness of breath, cough, chest pain, dizziness, light-headedness. He has been immobile for many months due to knee surgery and not able to bear weight. Per nursing he was also eating more and adding salt, drinking lots of water, etc. With diet changes he did lose 8# within a week or so but now weight has been fluctuating around 365#. Edema much improved but he is still getting recurrent fluid filled blisters. He was started on lasix 40 mg daily and he feels he urinates a lot more until about 2pm then it slows down. Hoping with improvement in edema that the blisters would fully resolve but this does not seem to be the case.   There was concern this could be bullous pemphigoid and due to not able to get into dermatology right away for biopsy, opted to check  and  and both of these were negative. Despite this, I would recommend a biopsy of blisters with dermatology referral but will allow new PCP to determine next steps with follow up. Continue to ace wrap, elevate, lasix, low sodium diet, 2000 ml fluid restriction, and cardiology referral as well which is scheduled for Oct 10.     New diagnosis of right heart failure with mild reduction in right ventricular function: cardiology consult, echo done 7/31/24. EF 60-65%. Likely related to sleep apnea, now having more edema in legs with blisters. Need to Rule out bullous pemphigoid. Started on lasix 40 mg daily. Cont amlodipine and  lisinopril, metoprolol.     Hx of recurrent UTI and hematuria: saw Dr Browning in Jan 2024. He was recommended to have cystoscopy and was started on D-mannose and cranberry tablet and also continues on florastor. He had renal ultrasound in May 2024 with unremarkable exam. He was to have cysto July 11 but still recovering from knee surgery he opted to cancel at this time. He has been doing well and no recurrent UTI or gross hematuria.      Hx of Lupus: He currently takes no medicine to treat this.     Pt plans to discharge home 9/14/24, lives with his wife. He will have outpatient therapy at [facility] and has eval on 9/17/24. Discharge follow up and establishing care with AARON Mcclure on Sept 23.   Sleep medicine consult Sept 24  Ortho follow up Sept 16  Cardiology consult Oct 10 for afib, new right heart mild reduction, edema, hypertension, sleep apnea currently not being treated with CPAP.   Recommend rechecking BMP at follow up with Cris Major and new lasix order.         Multidisciplinary notes, laboratory values, medications, vital signs, weight and orders arereviewed from nursing home records. I have reviewed the patient s medical history and updated the computerized patient record.     PMH:  Past Medical History:   Diagnosis Date    Atrial fibrillation (H) 02/03/2017    on Warfarin    Bacterial sepsis (H) 8/8/2022    Cellulitis of left lower extremity 9/5/2019    Cerebral infarction (H)     Cerebral infarction due to unspecified occlusion or stenosis of right middle cerebral artery (H) 02/03/2017    ,Left hemiparesis, left neglect, impaired balance and gait following R MCA stroke with mild hemorrhagic transformation s/p tissue plasminogen activator and mechanical thrombectomy, s/p C7 facet fracture from fall off [employer].    Chest pain 02/1997    Disorder of skin or subcutaneous tissue 07/25/2011    Essential (primary) hypertension 02/1997    Fracture of cervical vertebra (H)     02/03/2017,C7  facet fracture from fall off forklift, nondisplaced    Gout     Hemiplegia affecting left nondominant side (H) 02/03/2017    Obesity 02/03/2017    body mass index of 40    Other local lupus erythematosus     Sepsis (H) 9/6/2019       Medications:  Current Outpatient Medications   Medication Sig Dispense Refill    potassium chloride macey ER (KLOR-CON M10) 10 MEQ CR tablet Take 1 tablet (10 mEq) by mouth every other day.      acetaminophen (TYLENOL) 325 MG tablet Take 2 tablets (650 mg) by mouth every 4 hours as needed for other (mild pain) 100 tablet 0    amLODIPine (NORVASC) 5 MG tablet Take 2 tablets (10 mg) by mouth daily 90 tablet 4    calcium carbonate (TUMS) 500 MG chewable tablet Take 1 chew tab by mouth every 6 hours as needed for heartburn or other (indigestion)      Cranberry 125 MG TABS Take 200 mg by mouth daily      D-Mannose 500 MG CAPS Take 1,500 mg by mouth 2 times daily      Emollient (CERAVE) CREA Apply topically 2 times daily To bilateral knees      furosemide (LASIX) 40 MG tablet Take 1 tablet (40 mg) by mouth daily.      lisinopril (ZESTRIL) 5 MG tablet Take 10 mg by mouth daily.      magnesium hydroxide (MILK OF MAGNESIA) 400 MG/5ML suspension Take 30 mLs by mouth daily as needed for constipation      metoprolol succinate ER (TOPROL XL) 25 MG 24 hr tablet Take 1 tablet (25 mg) by mouth daily      Nutritional Supplement LIQD Take 4 oz by mouth 2 times daily [employer] Supplement two times daily with meals (Patient not taking: Reported on 9/9/2024)      saccharomyces boulardii (FLORASTOR) 250 MG capsule Take 1 capsule (250 mg) by mouth daily      senna-docusate (SENOKOT-S/PERICOLACE) 8.6-50 MG tablet Take 1 tablet by mouth daily AND 1 tab daily PRN      warfarin ANTICOAGULANT (COUMADIN) 5 MG tablet Take 7.5 mg by mouth Give 10 mg every Monday and Thursday and give 7.5 mg every Tuesday, Wednesday, Friday, Saturday, and Sunday as directed by the [facility] clinic 160 tablet 1      Medication  "reconciliation complete between Epic record and [facility] MAR.     Allergies:  Allergies   Allergen Reactions    Diatrizoate Rash    Ioxaglate Other (See Comments)     Does not recall    Levofloxacin Hives and Rash    Metrizamide Rash     (Diagnostic X-Ray materials)    Probenecid Rash       Review of Systems:  See HPI    Vital Signs:  Temp 97.2   Pulse 76   Respirations 16   /89   Oxygen Sats 93%   Weight 365#    Physical Exam:     Pleasant and alert without distress.    Sclera nonicteric, conjunctiva non-inflamed.  Skin color pink. Mucous membranes moist.   Lungs clear to auscultation throughout. No wheezes or rales noted.   Cardiovascular irregular, rate controlled auscultated. No murmur noted.  Abdomen large soft and without tenderness   Extremities with mild edema. RLE shin area with healed blisters and some new intact fluid filled blisters.   Immobilizer on right leg    Gait is stable.   Able to move upper and lower extremities       New Labs/Diagnostics:  Last Comprehensive Metabolic Panel:  Lab Results   Component Value Date     09/11/2024    POTASSIUM 3.7 09/11/2024    CHLORIDE 101 09/11/2024    CO2 31 (H) 09/11/2024    ANIONGAP 9 09/11/2024     (H) 09/11/2024    BUN 23.0 09/11/2024    CR 0.81 09/11/2024    GFRESTIMATED >90 09/11/2024    VIRGEN 9.2 09/11/2024     Lab Results   Component Value Date    WBC 6.6 04/09/2024    WBC 7.1 09/29/2020     Lab Results   Component Value Date    RBC 5.09 04/09/2024    RBC 5.49 09/29/2020     Lab Results   Component Value Date    HGB 14.4 09/11/2024    HGB 14.5 09/29/2020     Lab Results   Component Value Date    HCT 46.3 04/09/2024    HCT 47.3 09/29/2020     No components found for: \"MCT\"  Lab Results   Component Value Date    MCV 91 04/09/2024    MCV 86 09/29/2020     Lab Results   Component Value Date    MCH 28.1 04/09/2024    MCH 26.4 09/29/2020     Lab Results   Component Value Date    MCHC 30.9 04/09/2024    MCHC 30.7 09/29/2020     Lab Results   Component " Value Date    RDW 16.6 04/09/2024    RDW 16.1 09/29/2020     Lab Results   Component Value Date     04/09/2024     09/29/2020     Lab Results   Component Value Date    TSH 1.97 04/09/2024      Magnesium   Date Value Ref Range Status   08/04/2023 2.0 1.7 - 2.3 mg/dL Final   09/13/2019 2.2 1.9 - 2.7 mg/dL Final      Hemoglobin A1C   Date Value Ref Range Status   04/09/2024 5.1 4.0 - 6.2 % Final   09/06/2019 6.0 4.0 - 6.0 % Final          Assessment/Plan:  (Z98.890) Hx of left knee surgery  (primary encounter diagnosis)  (S83.005S) Patellar dislocation, left, sequela  Comment: hx of recurrent patellar dislocations with last successful surgery in 3/2024  Plan: PT at [facility]; ortho follow up 9/16/24.     (I63.411) Cerebrovascular accident (CVA) due to embolism of right middle cerebral artery (H)  (G81.94) Left hemiparesis (H)  Comment: chronic  Plan: supportive cares, cont coumadin as his stroke occurred when INR subtherapeutic so goal INR 2.5-3.0    (G47.33) Obstructive sleep apnea syndrome  Comment: last CPAP done 2014 with failed attempt at mask, open to trying again,  overnight oximetry done 7/30  thru [facility] with results in HPI.   Plan: sleep medicine consult Sept 24    (E66.01,  Z68.41) Morbid obesity with BMI of 40.0-44.9, adult (H)  Comment: chronic  Plan: encourage weight loss    (I87.2) Chronic venous stasis dermatitis of both lower extremities  Comment: chronic with blisters now RLE---possibly bullous pemphigoid but will need skin biopsy to rule out (lab  and  were neg)  Plan: consider skin biopsy of blisters, cont ace wrap, lasix, elevation, low sodium diet    (I10) Essential hypertension  Comment: stable, at times runs in 140s  Plan: cont amlodipine, lisinopril, lasix, metoprolol     (I48.21) Permanent atrial fibrillation (H)  Comment: chronic, rate controlled  Plan: cont metoprolol and coumadin    (I50.810) Right heart failure with reduced right ventricular  function (H)  Comment: NEW, mild right ventricular reduction per echo   Plan: cont lasix, metoprolol, lisinopril, amlodipine, cardiology consult Oct 10  Recommend recheck BMP at discharge follow up      (S80.620K) Blister of right lower extremity, subsequent encounter  Comment: recurrent despite reduction in edema, ace wrap, lasix,  and  neg  Plan: would still recommend skin biopsy to rule out bullous pemphigoid, cont the above         A total of 92 minutes spent by me on the date of the encounter doing chart review from [facility], [facility], review of test results, interpretation of tests, review of medications, patient visit, and documentation.    JAYLEN Riley, CNP ....................  9/12/2024   9:48 AM

## 2024-09-17 ENCOUNTER — TRANSFERRED RECORDS (OUTPATIENT)
Dept: HEALTH INFORMATION MANAGEMENT | Facility: OTHER | Age: 64
End: 2024-09-17
Payer: MEDICAID

## 2024-09-23 ENCOUNTER — OFFICE VISIT (OUTPATIENT)
Dept: FAMILY MEDICINE | Facility: OTHER | Age: 64
End: 2024-09-23
Attending: PHYSICIAN ASSISTANT
Payer: MEDICAID

## 2024-09-23 ENCOUNTER — ANTICOAGULATION THERAPY VISIT (OUTPATIENT)
Dept: ANTICOAGULATION | Facility: OTHER | Age: 64
End: 2024-09-23
Payer: MEDICAID

## 2024-09-23 VITALS
DIASTOLIC BLOOD PRESSURE: 70 MMHG | BODY MASS INDEX: 45.5 KG/M2 | WEIGHT: 315 LBS | HEART RATE: 68 BPM | RESPIRATION RATE: 20 BRPM | SYSTOLIC BLOOD PRESSURE: 110 MMHG | TEMPERATURE: 97.1 F

## 2024-09-23 DIAGNOSIS — Z79.01 ANTICOAGULATION MONITORING, INR RANGE 2-3: ICD-10-CM

## 2024-09-23 DIAGNOSIS — G47.33 OSA (OBSTRUCTIVE SLEEP APNEA): ICD-10-CM

## 2024-09-23 DIAGNOSIS — K21.00 GASTROESOPHAGEAL REFLUX DISEASE WITH ESOPHAGITIS WITHOUT HEMORRHAGE: ICD-10-CM

## 2024-09-23 DIAGNOSIS — I50.810 RIGHT HEART FAILURE WITH REDUCED RIGHT VENTRICULAR FUNCTION (H): ICD-10-CM

## 2024-09-23 DIAGNOSIS — I63.411 CEREBROVASCULAR ACCIDENT (CVA) DUE TO EMBOLISM OF RIGHT MIDDLE CEREBRAL ARTERY (H): ICD-10-CM

## 2024-09-23 DIAGNOSIS — Z98.890 STATUS POST LEFT KNEE SURGERY: ICD-10-CM

## 2024-09-23 DIAGNOSIS — I48.21 PERMANENT ATRIAL FIBRILLATION (H): Primary | ICD-10-CM

## 2024-09-23 DIAGNOSIS — Z28.21 IMMUNIZATION DECLINED: ICD-10-CM

## 2024-09-23 DIAGNOSIS — I48.91 ATRIAL FIBRILLATION WITH RVR (H): Primary | ICD-10-CM

## 2024-09-23 DIAGNOSIS — Z74.09 IMPAIRED MOBILITY: ICD-10-CM

## 2024-09-23 DIAGNOSIS — E66.01 MORBID OBESITY WITH BMI OF 40.0-44.9, ADULT (H): ICD-10-CM

## 2024-09-23 DIAGNOSIS — N39.0 RECURRENT UTI: ICD-10-CM

## 2024-09-23 DIAGNOSIS — I10 ESSENTIAL HYPERTENSION: ICD-10-CM

## 2024-09-23 LAB — INR POINT OF CARE: 2.6 (ref 0.9–1.1)

## 2024-09-23 PROCEDURE — 99214 OFFICE O/P EST MOD 30 MIN: CPT | Performed by: PHYSICIAN ASSISTANT

## 2024-09-23 PROCEDURE — G0463 HOSPITAL OUTPT CLINIC VISIT: HCPCS | Performed by: PHYSICIAN ASSISTANT

## 2024-09-23 PROCEDURE — 36416 COLLJ CAPILLARY BLOOD SPEC: CPT | Mod: ZL

## 2024-09-23 RX ORDER — CALCIUM CARBONATE 500 MG/1
1 TABLET, CHEWABLE ORAL EVERY 6 HOURS PRN
Qty: 90 TABLET | Refills: 4 | Status: SHIPPED | OUTPATIENT
Start: 2024-09-23

## 2024-09-23 RX ORDER — CERAMIDE 1,3,6-II/SALICYLIC/B3
1 CLEANSER (ML) TOPICAL 2 TIMES DAILY
Qty: 340 G | Refills: 11 | Status: SHIPPED | OUTPATIENT
Start: 2024-09-23

## 2024-09-23 RX ORDER — ACETAMINOPHEN 325 MG/1
650 TABLET ORAL EVERY 4 HOURS PRN
Qty: 100 TABLET | Refills: 4 | Status: SHIPPED | OUTPATIENT
Start: 2024-09-23

## 2024-09-23 RX ORDER — METOPROLOL SUCCINATE 25 MG/1
25 TABLET, EXTENDED RELEASE ORAL DAILY
Qty: 90 TABLET | Refills: 4 | Status: SHIPPED | OUTPATIENT
Start: 2024-09-23

## 2024-09-23 RX ORDER — LISINOPRIL 10 MG/1
10 TABLET ORAL DAILY
Qty: 90 TABLET | Refills: 4 | Status: SHIPPED | OUTPATIENT
Start: 2024-09-23

## 2024-09-23 RX ORDER — POTASSIUM CHLORIDE 750 MG/1
10 TABLET, EXTENDED RELEASE ORAL EVERY OTHER DAY
Qty: 90 TABLET | Refills: 4 | Status: SHIPPED | OUTPATIENT
Start: 2024-09-23

## 2024-09-23 RX ORDER — FUROSEMIDE 40 MG
40 TABLET ORAL DAILY
Qty: 90 TABLET | Refills: 4 | Status: SHIPPED | OUTPATIENT
Start: 2024-09-23

## 2024-09-23 RX ORDER — AMOXICILLIN 250 MG
CAPSULE ORAL
Qty: 100 TABLET | Refills: 11 | Status: SHIPPED | OUTPATIENT
Start: 2024-09-23

## 2024-09-23 RX ORDER — AMLODIPINE BESYLATE 10 MG/1
10 TABLET ORAL DAILY
Qty: 90 TABLET | Refills: 4 | Status: SHIPPED | OUTPATIENT
Start: 2024-09-23

## 2024-09-23 RX ORDER — SACCHAROMYCES BOULARDII 250 MG
250 CAPSULE ORAL DAILY
Qty: 90 CAPSULE | Refills: 4 | Status: SHIPPED | OUTPATIENT
Start: 2024-09-23

## 2024-09-23 RX ORDER — HYDROCHLOROTHIAZIDE 25 MG/1
25 TABLET ORAL DAILY
Qty: 90 TABLET | Refills: 4 | Status: CANCELLED | OUTPATIENT
Start: 2024-09-23

## 2024-09-23 ASSESSMENT — PAIN SCALES - GENERAL: PAINLEVEL: NO PAIN (0)

## 2024-09-23 NOTE — NURSING NOTE
"Patient presents to the clinic for establish care and follow up with nursing home discharge.    FOOD SECURITY SCREENING QUESTIONS:    The next two questions are to help us understand your food security.  If you are feeling you need any assistance in this area, we have resources available to support you today.    Hunger Vital Signs:  Within the past 12 months we worried whether our food would run out before we got money to buy more. Never  Within the past 12 months the food we bought just didn't last and we didn't have money to get more. Never    Advance Care Directive on file? no  Advance Care Directive provided to patient? Declined.      Chief Complaint   Patient presents with    [facility]    Clinic Care Coordination - Follow-up     Nursing home discharge       Initial /70 (BP Location: Right arm, Patient Position: Sitting, Cuff Size: Adult Large)   Pulse 68   Temp 97.1  F (36.2  C) (Tympanic)   Resp 20   Wt (!) 165.1 kg (364 lb)   BMI 45.50 kg/m   Estimated body mass index is 45.5 kg/m  as calculated from the following:    Height as of 11/13/23: 1.905 m (6' 3\").    Weight as of this encounter: 165.1 kg (364 lb).  Medication Reconciliation: complete        Oneida Malone LPN     "

## 2024-09-23 NOTE — PROGRESS NOTES
Assessment & Plan       ICD-10-CM    1. Atrial fibrillation with RVR (H)  I48.91 metoprolol succinate ER (TOPROL XL) 25 MG 24 hr tablet      2. Essential hypertension  I10 lisinopril (ZESTRIL) 10 MG tablet     furosemide (LASIX) 40 MG tablet     amLODIPine (NORVASC) 10 MG tablet      3. Right heart failure with reduced right ventricular function (H)  I50.810 potassium chloride macey ER (KLOR-CON M10) 10 MEQ CR tablet     lisinopril (ZESTRIL) 10 MG tablet      4. Recurrent UTI  N39.0 saccharomyces boulardii (FLORASTOR) 250 MG capsule     potassium chloride macey ER (KLOR-CON M10) 10 MEQ CR tablet     magnesium hydroxide (MILK OF MAGNESIA) 400 MG/5ML suspension     D-Mannose 500 MG CAPS     Cranberry 125 MG TABS      5. Status post left knee surgery  Z98.890 senna-docusate (SENOKOT-S/PERICOLACE) 8.6-50 MG tablet     Emollient (CERAVE MOISTURIZING) CREA     acetaminophen (TYLENOL) 325 MG tablet     Wheelchair Order for DME - ONLY FOR DME      6. Gastroesophageal reflux disease with esophagitis without hemorrhage  K21.00 calcium carbonate (TUMS) 500 MG chewable tablet      7. Cerebrovascular accident (CVA) due to embolism of right middle cerebral artery (H)  I63.411 Wheelchair Order for DME - ONLY FOR DME      8. Impaired mobility  Z74.09 Wheelchair Order for DME - ONLY FOR DME      9. Morbid obesity with BMI of 40.0-44.9, adult (H)  E66.01     Z68.41       10. BARRIE (obstructive sleep apnea)  G47.33       11. Immunization declined  Z28.21         Atrial Fibrillation - Type: Paroxysmal Atrial Fibrillation, chronic, ongoing.  + Hypercoagulable state due to atrial fibrillation.  Continues with warfarin (COUMADIN) for oral anticoagulation.  Heart rates are controlled with metoprolol.  Tolerating well.  Denies excessive bleeding issues. Occasional easy bruising. Medication list reviewed/updated. Refills completed as needed.  HYPERTENSION - Ongoing. Blood pressure is currently well controlled.  Medication side effects: None.  Denies syncope or presyncope.  Continue current medications -amlodipine 10 mg daily, metoprolol 25 mg daily and lisinopril 10 mg daily.   Medication list reviewed/updated. Refills completed as needed.   Right-sided heart failure, recommend ongoing low-salt diet, close monitoring of daily weights.  Heart failure action plan updated today.  Continue with furosemide/Lasix as needed for swelling.  Return precautions reviewed.  Recurrent UTI, continues to follow closely with urology team.  Refilled probiotic, d-mannose and cranberry.  Return precautions reviewed.  Status post left knee arthroscopy, continues to follow closely with orthopedics team outpatient.  Encouraged Babak and Erika to reach out to orthopedics in regards to brace prescription.  GERD, continue Tums as needed.  Recommend lifestyle modifications including limiting triggering agent such as acidic, greasy, spicy, caffeinated foods, etc.  CVA, continues on Coumadin for anticoagulation.  Chronic left hemiparesis, discussed this will likely be lifelong.  Impaired mobility secondary to this, would benefit from wheelchair.  Impaired mobility, would benefit from having a wheelchair available for mobility status.  DME order placed.  Addendum: He is unable to complete his activities of daily living without a wheelchair. He is able to safely utilize a wheelchair within the home setting. A fitted cane or walker will not adequately address his mobility status. A wheelchair would greatly benefit his mobility status and he is willing to utilize wheelchair on wheelchair on regular/daily basis. He has adequate upper extremity strength to self propel wheelchair.   OBESITY - Ongoing.  (See Encounter Diagnosis list above for obesity class / severity). Encourage continued maintenance / improvement in diet and exercise. Consider Nutrition / Dietician appointment. Weight loss would improve Hypertension, Cholesterol.   Sleep Apnea, chronic, ongoing.  Failed CPAP use due to to  "inability to tolerate mask.  RV heart failure is likely secondary to this.  Ongoing close follow-up.  Kindly deferred COVID, Influenza, Pneumonia and RSV vaccines.     BMI  Estimated body mass index is 45.5 kg/m  as calculated from the following:    Height as of 11/13/23: 1.905 m (6' 3\").    Weight as of this encounter: 165.1 kg (364 lb).   Weight management plan: Discussed healthy diet and exercise guidelines    See Patient Instructions    No follow-ups on file.    Subjective   Babak is a 64 year old, presenting for the following health issues:  Establish Care and Clinic Care Coordination - Follow-up (Nursing home discharge)        9/23/2024    10:08 AM   Additional Questions   Roomed by loren fermin lpn   Accompanied by spouse     History of Present Illness       Reason for visit:  Check up after staying in nursing home   He is taking medications regularly.     Babak presents to the clinic to establish care. He resided at [facility] short term after knee surgery starting 8/5/23 - followed with Marycarmen Zapata NP - discharged home 9/14/24     Personal history -  to wife, Erika. They have 2 children together (son and daughter) and 3 children from previous marriage (total of 5 children). They have 5 grandchildren (3 grandsons and 2 granddaughters). They enjoy spending time with [place] - [place]     Health History:  History of CVA with left sided hemiparesis - right sided stroke involving anterior   inferior right frontal lobe and right basal ganglia - 2/3/24 - received TPA. Currently on Coumadin. Goal INR 2.5-3.0. He feels better closer to 3.0 range.   Hypertension - currently on Amlodipine 10 mg daily, Lisinopril 10 mg daily and Metoprolol 25 mg daily. Hydrochlorothiazide stopped on 9/12/24 due to dehydration.   BARRIE - long standing diagnosis. Failed CPAP outpatient, did not tolerate mask. Upcoming consult 9/24/24 with sleep medicine team. Last sleep study in 2014.   Atrial " fibrillation - currently on Metoprolol succinate 25 mg daily + Coumadin. Reports rates are well controlled. Declines chest pain, shortness of breath, centralized or peripheral edema, headaches, vision changes.   Edema - increased weight over the last few months. No systemic symptoms. + diet changes. Concerns of bullous pemphigoid - recommended dermatology - unsure etiology.   RV HF - cardiology + ECHO, 7/31/24. Likely related to BARRIE. Lasix 40 mg started.   Lupus - no medications  Recurrent UTIs - follows with Dr. Browning. Recommended cystoscopy. Started on D-mannose + Cranberry. Also taking florastor. Cystoscopy scheduled for 07/2024 cancelled as Babak was recovering from knee surgery. No UTIs since this timeframe.   Left knee - recurrent patellar dislocations - multiple surgical revisions that unfortunately have failed due to recurrent dislocations. He is s/p left TKA with loss of extensor mechanism/patellar malalignment on 3/12/24 with Dr. Reyes, [facility]. Progressed well with PT. Ortho outpatient follow up on 9/16/24 - custom brace, will be working with [facility]/bracing in [place], referral was sent a week ago, have not heard back. Request for wheelchair to help with mobility.   Will be in aquatic therapy twice weekly for 4 weeks, then reevaluation.     Review of Systems  Constitutional, HEENT, cardiovascular, pulmonary, GI, , musculoskeletal, neuro, skin, endocrine and psych systems are negative, except as otherwise noted.      Objective    /70 (BP Location: Right arm, Patient Position: Sitting, Cuff Size: Adult Large)   Pulse 68   Temp 97.1  F (36.2  C) (Tympanic)   Resp 20   Wt (!) 165.1 kg (364 lb)   BMI 45.50 kg/m    Body mass index is 45.5 kg/m .  Physical Exam   GENERAL: alert and no distress  EYES: Eyes grossly normal to inspection  RESP: lungs clear to auscultation - no rales, rhonchi or wheezes  CV: regular rate and rhythm, normal S1 S2, no S3 or S4, no murmur, click or rub, no  peripheral edema  MS: sitting upright. Left knee with hyperextension upon standing.   SKIN: no suspicious lesions or rashes  NEURO: Normal strength and tone, mentation intact and speech normal  PSYCH: mentation appears normal, affect normal/bright    Results for orders placed or performed in visit on 09/23/24   INR POCT     Status: Abnormal   Result Value Ref Range    INR Point of Care 2.6 (A) 0.9 - 1.1     Signed Electronically by: Cris Major PA-C

## 2024-09-23 NOTE — LETTER
My Heart Failure Action Plan  Name: Babak Moran   YOB: 1960  Date: 9/23/2024   My doctor:   [facility] Major [facility]   [address]  [place] [address] [phone] My Diagnosis: HF-pEF (EF > 40%)  My Ejection Fraction:   Lab Results   Component Value Date    LVEF 60-65% 07/31/2024     Over 50%    My Exercise Goal: Start exercise slowly - to begin, do a few minutes of exercise, several times a day. Increase your time and speed [id number] to build tolerance, with a goal of 30 minutes of exercise daily. Steady, slow, and consistent exercise is both safe and healthy. Stop and rest when you feel tired or become short of breath. Do not push yourself on days when you don t feel well.       My Weight Plan:   Wt Readings from Last 2 Encounters:   09/23/24 (!) 165.1 kg (364 lb)   01/24/24 147.4 kg (325 lb)     Weigh yourself daily using the same scale. If you gain more than 2 pounds in 24 hours or 5 pounds in 7 days. call the clinic    My Diet Goal: 1,500 mg of sodium    Emergency Room Visits:    Our goal is to improve your quality of life and help you avoid a visit to the emergency room or hospital.  If we work together, we can achieve this goal. But, if you feel you need to call 911 or go to the emergency room, please do so.  If you go to the emergency room, please bring your list of medicines and your daily weight chart with you.    Each day ask yourself:  Is my weight up?  Do I have swelling?  Do I have trouble breathing?  How did I sleep?  Other problems?       GREEN ZONE     Weight gained is no more than 2 pounds a day or 5 pounds a in 7 days.  No swelling in feet, ankles, legs or stomach.  No more swelling than usual.  No more trouble breathing than usual.  No change in my sleep.  No other problems. What should I do?  I am doing fine. I will take my medicine, follow my diet, see my doctor, exercise, and watch for symptoms.           YELLOW ZONE          Weight gain of more than 2 pounds in one day or 5 pounds in 7 days.  New swelling in ankle, leg, knee or thigh.  Bloating in belly, pants feel tighter.  Swelling in hands or face.  Coughing or trouble breathing while walking or talking.  Harder to breathe last night.  Have trouble sleeping, wake up short of breath.  Unusually tired.  Not eating.  Nausea, vomiting, or diarrhea  Pain in my chest or bad  leg cramps.  Feel weak or dizzy. What should I do?  I need to take action and call my doctor or nurse today.                 RED ZONE         Weight gain of 5 pounds overnight.  Chest pain or pressure that does not go away.  Feel less alert.  Wheezing or have trouble breathing when at rest.  Cannot sleep lying down.  Cannot take my medicines.  Pass out or faint. What should I do?  I need to call my doctor or nurse now!  Call 911 if I have chest pain or cannot breathe.

## 2024-09-23 NOTE — PROGRESS NOTES
ANTICOAGULATION MANAGEMENT     Babak Moran 64 year old male is on warfarin with therapeutic INR result. (Goal INR 2.0-3.0)    Recent labs: (last 7 days)     09/23/24  1102   INR 2.6*       ASSESSMENT     Source(s): In person     Warfarin doses taken: Warfarin taken differently, but did not change total weekly dose  Diet: No new diet changes identified  New illness, injury, or hospitalization: No  Medication/supplement changes:  Wed doubled his warfarin dose, Thursday held it.   Signs or symptoms of bleeding or clotting: No  Previous INR: Therapeutic last 2(+) visits  Additional findings: None     PLAN     Recommended plan for temporary change(s) and ongoing change(s) affecting INR     Dosing Instructions: Continue your current warfarin dose with next INR in 2 weeks       Summary  As of 9/23/2024      Full warfarin instructions:  7.5 mg every Mon, Fri; 10 mg all other days   Next INR check:  10/7/2024               In person    Lab visit scheduled    Education provided: Please call back if any changes to your diet, medications or how you've been taking warfarin    Plan made per [facility] anticoagulation protocol    Matilda Lloyd RN  Anticoagulation Clinic  9/23/2024    _______________________________________________________________________     Anticoagulation Episode Summary       Current INR goal:  2.0-3.0   TTR:  81.7% (1 y)   Target end date:  Indefinite   Send INR reminders to:  [facility]    Indications    Permanent atrial fibrillation (H) [I48.21]  Anticoagulation monitoring  INR range 2-3 (Resolved) [Z79.01]  Atrial fibrillation with RVR (H) (Resolved) [I48.91]  Anticoagulation monitoring  INR range 2-3 [Z79.01]             Comments:  Babak prefers to be closer to 3.0 d/t previous CVA check Q 4 weeks.Declines to reduce dose when slightly over 3.0  Needs to change PCP  Takes a long time get up to therapeutic after holding warfarin.             Anticoagulation Care Providers       Provider Role Specialty  Phone number    Cris Major PA-C [facility] [phone]

## 2024-09-24 ENCOUNTER — VIRTUAL VISIT (OUTPATIENT)
Dept: PULMONOLOGY | Facility: OTHER | Age: 64
End: 2024-09-24
Attending: FAMILY MEDICINE
Payer: MEDICAID

## 2024-09-24 VITALS — HEIGHT: 75 IN | WEIGHT: 315 LBS | BODY MASS INDEX: 39.17 KG/M2

## 2024-09-24 DIAGNOSIS — M32.9 SYSTEMIC LUPUS ERYTHEMATOSUS, UNSPECIFIED SLE TYPE, UNSPECIFIED ORGAN INVOLVEMENT STATUS (H): Chronic | ICD-10-CM

## 2024-09-24 DIAGNOSIS — I63.411 CEREBROVASCULAR ACCIDENT (CVA) DUE TO EMBOLISM OF RIGHT MIDDLE CEREBRAL ARTERY (H): ICD-10-CM

## 2024-09-24 DIAGNOSIS — G47.33 OSA (OBSTRUCTIVE SLEEP APNEA): Primary | ICD-10-CM

## 2024-09-24 PROCEDURE — 99203 OFFICE O/P NEW LOW 30 MIN: CPT | Mod: 95 | Performed by: FAMILY MEDICINE

## 2024-09-24 ASSESSMENT — PAIN SCALES - GENERAL: PAINLEVEL: NO PAIN (0)

## 2024-09-24 NOTE — PROGRESS NOTES
"Virtual Visit Details    Type of service:  Video Visit   Video Start Time:  2pm  Video End Time: 2:15pm    Originating Location (pt. Location): Home    Distant Location (provider location):  Off-site  Platform used for Video Visit: Angelica Moran is a 64 year old male who is being evaluated via a billable video visit.       The patient has been notified of following:      \"This video visit will be conducted via a call between you and your physician/provider. We have found that certain health care needs can be provided without the need for an in-person physical exam.  This service lets us provide the care you need with a video conversation.  If a prescription is necessary we can send it directly to your pharmacy.  If lab work is needed we can place an order for that and you can then stop by our lab to have the test done at a later time.     Video visits are billed at different rates depending on your insurance coverage.  Please reach out to your insurance provider with any questions.     If during the course of the call the physician/provider feels a video visit is not appropriate, you will not be charged for this service.\"     Patient has given verbal consent for Video visit? Yes  How would you like to obtain your AVS? Mail a copy  If you are dropped from the video visit, the video invite should be resent to: Text to cell phone: -  Will anyone else be joining your video visit? No  If patient encounters technical issues they should call [phone]      Video-Visit Details     Type of service:  Video Visit     Virtual visit for review of sleep testing results and CPAP compliance follow-up.     A/P:     1.)  High likelihood of BARRIE with STOP-BANG score of 4.  2.)  History of mild BARRIE    Interim weight loss of ~30 lbs.     I recommend getting an up-to-date CPAP download and if AHI is less than 5-10, plan to continue on current settings.    We will also perform a WatchPAT home sleep test while he is using CPAP " "as well as checking a baseline venous blood gas given potential for interstitial lung disease in the setting of SLE.    SUBJECTIVE:  Babak Moran is a 64 year old male.    64 year old male who is referred for sleep-disordered breathing.     Pertinent PMHx of CVA with persistent left hemiparesis, BARRIE, obesity, prediabetes, HTN, atrial fibrillation, SLE.     Echo:  7/31/2024 - LVEF 60-65%     Prior Sleep Testing:  3/5/2014 - PSG with weight 355 lbs, BMI 44.4.  AHI 8.7, jocelyn SpO2 81%.  CPAP 9 effective.  EKG consistent atrial fib.     STOP-BANG score of 4, with unknown neck circumference.  [place] score of 5.  MILIND: 4     BMI of Estimated body mass index is 40.62 kg/m  as calculated from the following:    Height as of 11/13/23: 1.905 m (6' 3\").    Weight as of 1/24/24: 147.4 kg (325 lb).      Today - he reports that he is using CPAP, settings of 9 cm H2O though I did not have CPAP download for review.  He feels it has been effective with no morning headaches.    Chief concern per questionnaire: \" told me to \"     Duration of symptoms:  \"not sure\"     Goals for visit per questionnaire: \"not sure\"     Sleep pattern:  [place]days.  [place] - 6am.  Weekends.  9pm - 6am.  Time to fall asleep: ~few minutes.  Awakenings: 2-4 times per night, few minutes to return to sleep.  Average total sleep time:  6-7 hours  Napping.  1-3 days per week, 0.5-1 hours per nap.     No for RLS screen.  No for sleep walking.  No for dream enactment behavior.  No for bruxism.     No for morning headaches.  Yes for snoring.  No for observed apnea.  No for FHx of BARRIE.     Caffeine use:  No for 3+ per day.  No for within 6 hours of bed.      Past medical history:    Patient Active Problem List    Diagnosis Date Noted    Blister of right leg 09/12/2024     Priority: Medium    Right heart failure with reduced right ventricular function (H) 08/06/2024     Priority: Medium    Hx of left knee surgery 04/08/2024     Priority: Medium     Revision by Dr Parra " Shania with most recent revision done 3/12/24.       Recurrent gross hematuria 12/13/2023     Priority: Medium    Prediabetes 12/01/2023     Priority: Medium    Patellar dislocation, left, sequela 11/07/2023     Priority: Medium     Recurrence dislocation after revision done 8 weeks ago (early September 2023)  Revised surgery by Dr Johnson Nov 13, 2023.       Dislocation of both patellae 09/13/2023     Priority: Medium     Surgical repair by Dr Johnson Bilateral 9/8/23      Chronic venous stasis dermatitis of both lower extremities 06/28/2023     Priority: Medium    Status post total right knee replacement 06/26/2023     Priority: Medium    Anticoagulation monitoring, INR range 2-3 06/16/2023     Priority: Medium    Nail dystrophy 02/23/2023     Priority: Medium    Onychomycosis 02/23/2023     Priority: Medium    Aftercare following knee joint replacement surgery, unspecified laterality 01/09/2023     Priority: Medium    Onychogryphosis 08/27/2022     Priority: Medium    Need for vaccination 04/10/2022     Priority: Medium    Obstructive sleep apnea syndrome 05/13/2019     Priority: Medium    Permanent atrial fibrillation (H) 02/11/2018     Priority: Medium    Osteoarthrosis 01/16/2018     Priority: Medium    Morbid obesity with BMI of 40.0-44.9, adult (H) 02/08/2017     Priority: Medium    Cerebrovascular accident (CVA) due to embolism of right middle cerebral artery (H) 02/03/2017     Priority: Medium    Left hemiparesis (H) 02/03/2017     Priority: Medium    Long-term (current) use of anticoagulants, INR goal 2.0-3.0 06/25/2015     Priority: Medium    Family history of coronary artery disease 05/19/2014     Priority: Medium    Ascending aorta enlargement (H24) 02/13/2014     Priority: Medium    Gout 01/07/2014     Priority: Medium     Overview:   Overview:   after 3 attacks in < a year, tapped and crystal proven 5/2/13;  Allopurinol started then got ? Atypical side effect, stopped; (short term colchicine  prophylaxis)      Overweight 05/02/2013     Priority: Medium     Formatting of this note might be different from the original.  Max weight reported 475 lbs;  5/13 369 lbs      Pain in joint, ankle and foot 05/17/2012     Priority: Medium    Essential hypertension 09/08/2009     Priority: Medium    Cutaneous lupus erythematosus 11/12/2008     Priority: Medium    Systemic lupus erythematosus (H) 09/18/2008     Priority: Medium     Overview:   Dermatitis         10 point ROS of systems including Constitutional, Eyes, Respiratory, Cardiovascular, Gastroenterology, Genitourinary, Integumentary, Muscularskeletal, Psychiatric were all negative except for pertinent positives noted in my HPI.    Current Outpatient Medications   Medication Sig Dispense Refill    acetaminophen (TYLENOL) 325 MG tablet Take 2 tablets (650 mg) by mouth every 4 hours as needed for other (mild pain). 100 tablet 4    amLODIPine (NORVASC) 10 MG tablet Take 1 tablet (10 mg) by mouth daily. 90 tablet 4    calcium carbonate (TUMS) 500 MG chewable tablet Take 1 tablet (500 mg) by mouth every 6 hours as needed for heartburn or other (indigestion). 90 tablet 4    Cranberry 125 MG TABS Take 200 mg by mouth daily. 90 tablet 4    D-Mannose 500 MG CAPS Take 1,500 mg by mouth 2 times daily. 180 capsule 4    Emollient (CERAVE MOISTURIZING) CREA Apply 1 Application topically 2 times daily. To bilateral knees 340 g 11    furosemide (LASIX) 40 MG tablet Take 1 tablet (40 mg) by mouth daily. 90 tablet 4    lisinopril (ZESTRIL) 10 MG tablet Take 1 tablet (10 mg) by mouth daily. 90 tablet 4    magnesium hydroxide (MILK OF MAGNESIA) 400 MG/5ML suspension Take 30 mLs by mouth daily as needed for constipation. 354 mL 11    metoprolol succinate ER (TOPROL XL) 25 MG 24 hr tablet Take 1 tablet (25 mg) by mouth daily. 90 tablet 4    potassium chloride macey ER (KLOR-CON M10) 10 MEQ CR tablet Take 1 tablet (10 mEq) by mouth every other day. 90 tablet 4    saccharomyces  boulardii (FLORASTOR) 250 MG capsule Take 1 capsule (250 mg) by mouth daily. 90 capsule 4    senna-docusate (SENOKOT-S/PERICOLACE) 8.6-50 MG tablet 1 TAB PO every day AND 1 tab daily  tablet 11    warfarin ANTICOAGULANT (COUMADIN) 5 MG tablet Take 7.5 mg by mouth Give 10 mg every Monday and Thursday and give 7.5 mg every Tuesday, Wednesday, Friday, Saturday, and Sunday as directed [facility] 1       OBJECTIVE:  There were no vitals taken for this visit.    Physical Exam     ---  This note was written with the assistance of the Dragon voice-dictation technology software. The final document, although reviewed, may contain errors. For corrections, please contact the office.    Total time spent preparing to see the patient, review of chart, obtaining history and physical examination, review of sleep testing, review of treatment options, education, discussion with patient and documenting in Epic / EMR was 20 minutes.  All time involved was spent on the day of service for the patient (the same day as the patient's appointment).    Alexei Mack MD    Sleep Medicine  [facility] [place], [place]  Main Office: [phone]  [facility]  [address] [place] [address], [address]  Schedule visits: [phone]  Main Office: [phone]  Fax: [phone]

## 2024-09-24 NOTE — NURSING NOTE
Current patient location:    [facility] [address]    Is the patient currently in the state of [place]? YES    Visit mode:VIDEO    If the visit is dropped, the patient can be reconnected by: [phone]     Will anyone else be joining the visit? NO  (If patient encounters technical issues they should call [phone]    How would you like to obtain your AVS? MyChart    Are changes needed to the allergy or medication list? Pt stated no med changes    Are refills needed on medications prescribed by this physician? NO    Rooming Documentation:  Questionnaire(s) completed    Reason for visit: RECHECK    Dipika POTTER

## 2024-09-27 ENCOUNTER — LAB (OUTPATIENT)
Dept: LAB | Facility: OTHER | Age: 64
End: 2024-09-27
Payer: MEDICAID

## 2024-09-27 DIAGNOSIS — G47.33 OSA (OBSTRUCTIVE SLEEP APNEA): ICD-10-CM

## 2024-09-27 DIAGNOSIS — M32.9 SYSTEMIC LUPUS ERYTHEMATOSUS, UNSPECIFIED SLE TYPE, UNSPECIFIED ORGAN INVOLVEMENT STATUS (H): Chronic | ICD-10-CM

## 2024-09-27 DIAGNOSIS — I63.411 CEREBROVASCULAR ACCIDENT (CVA) DUE TO EMBOLISM OF RIGHT MIDDLE CEREBRAL ARTERY (H): ICD-10-CM

## 2024-09-27 LAB
BASE EXCESS BLDV CALC-SCNC: 3.3 MMOL/L (ref -3–3)
HCO3 BLDV-SCNC: 30 MMOL/L (ref 21–28)
O2/TOTAL GAS SETTING VFR VENT: 21 %
OXYHGB MFR BLDV: 72 % (ref 70–75)
PCO2 BLDV: 50 MM HG (ref 40–50)
PH BLDV: 7.38 [PH] (ref 7.32–7.43)
PO2 BLDV: 44 MM HG (ref 25–47)
SAO2 % BLDV: 72.6 % (ref 70–75)

## 2024-09-27 PROCEDURE — 36415 COLL VENOUS BLD VENIPUNCTURE: CPT | Mod: ZL

## 2024-09-27 PROCEDURE — 82805 BLOOD GASES W/O2 SATURATION: CPT | Mod: ZL

## 2024-09-30 ENCOUNTER — TELEPHONE (OUTPATIENT)
Dept: PULMONOLOGY | Facility: OTHER | Age: 64
End: 2024-09-30

## 2024-09-30 DIAGNOSIS — I48.91 ATRIAL FIBRILLATION WITH RVR (H): ICD-10-CM

## 2024-09-30 DIAGNOSIS — Z79.01 LONG-TERM (CURRENT) USE OF ANTICOAGULANTS, INR GOAL 2.0-3.0: Chronic | ICD-10-CM

## 2024-09-30 DIAGNOSIS — I48.21 PERMANENT ATRIAL FIBRILLATION (H): Primary | ICD-10-CM

## 2024-09-30 DIAGNOSIS — Z79.01 ANTICOAGULATION MONITORING, INR RANGE 2-3: ICD-10-CM

## 2024-09-30 NOTE — TELEPHONE ENCOUNTER
PATIENTS INSURANCE ONLY COVERS IN LAB TESTING FOR SLEEP. PATIENT WANTS TO THINK ABOUT IT AND WILL CALL BACK. 09/30

## 2024-09-30 NOTE — RESULT ENCOUNTER NOTE
Radha Mr. Moran,    The blood gas test returned looking reassuring and improved compared to prior values.  The mild increase in bicarbonate and base excess is not a concern and was very mild.  The most important finding is the normalization of the CO2 (carbon dioxide) and would currently not suggest an underlying lung disorder.    Alexei Mack MD    Sleep Medicine  [place], [place]  Main Office: [phone]  [facility] [address], [address]  Schedule visits: [phone]  Main Office: [phone]  Fax: [phone]

## 2024-10-01 RX ORDER — WARFARIN SODIUM 5 MG/1
TABLET ORAL
Qty: 120 TABLET | Refills: 1 | Status: SHIPPED | OUTPATIENT
Start: 2024-10-01

## 2024-10-01 RX ORDER — WARFARIN SODIUM 7.5 MG/1
TABLET ORAL
Qty: 30 TABLET | Refills: 1 | Status: SHIPPED | OUTPATIENT
Start: 2024-10-01

## 2024-10-01 NOTE — TELEPHONE ENCOUNTER
ANTICOAGULATION MANAGEMENT:  Medication Refill    Anticoagulation Summary  As of 9/23/2024      Warfarin maintenance plan:  7.5 mg (5 mg x 1.5) every Mon, Fri; 10 mg (5 mg x 2) all other days   Next INR check:  10/7/2024   Target end date:  Indefinite    Indications    Permanent atrial fibrillation (H) [I48.21]  Anticoagulation monitoring  INR range 2-3 (Resolved) [Z79.01]  Atrial fibrillation with RVR (H) (Resolved) [I48.91]  Anticoagulation monitoring  INR range 2-3 [Z79.01]                 Anticoagulation Care Providers       Provider Role Specialty Phone number    Cris Major PA-C [facility] [phone]            Visit with referring provider/group within last year: Yes 9/12/24- nursing home visit    [facility] referral signed within last year: Yes    Babak meets all criteria for refill (current [facility] referral, visit with referring provider/group in last 15 months unless directed to return in 2 years in last referring provider visit note, lab monitoring up to date or not exceeding 2 weeks overdue). Rx instructions and quantity supplied updated to match patient's current dosing plan. Warfarin 90 day supply with 1 refill granted per [facility] protocol     Nikki Hernandez RN  Anticoagulation Clinic

## 2024-10-04 ENCOUNTER — MEDICAL CORRESPONDENCE (OUTPATIENT)
Dept: HEALTH INFORMATION MANAGEMENT | Facility: OTHER | Age: 64
End: 2024-10-04
Payer: MEDICAID

## 2024-10-09 NOTE — PROGRESS NOTES
[facility] HEART CARE   CARDIOLOGY PROGRESS NOTE     Chief Complaint   Patient presents with    Consult For     Afib, hypertension          Diagnosis:  1.  CHF-diastolic.   -Right ventricular enlargement on 7/31/2024   -BNP of 200 on 8/7/2022.   -Limited evaluation 11/30/2012.  2.  CVA.   -2/4/2017.   -Basal ganglia infarction appears to have occurred more recently    -Anterior inferior right frontal lobe and right basal ganglia.   -Right malar CN II/III/XVII.  3.  A-fib, new on 12/13/2011.   -Metoprolol and Coumadin.   -On EKG of 7/31/2023.   -Continuous.  4.  CAD.  5.  TAAA.   -4.3 cm on 7/31/2024.   -4.4 cm on 11/30/2012.  6.  Hypertension-controlled.  7.  Prediabetes-controlled.   -5.1% on/9/24.  8.  BARRIE.   -Mild on 3/5/2014.  9.  Morbid obesity.   -BMI 45 on 9/24/2024.  10.  Anemia-mild.  11.  B12 deficiency.   -417 on 9/29/2020.   -197 on 9/9/2019.        Assessment/Plan:    1.  CHF: Diastolic in nature.  Patient has peripheral edema/stasis changes and elevated pressures on his echocardiogram in 7/31/2024.  The right ventricle is moderately enlarged with mildly reduced right global function.  Patient has been put on a reduced liquid diet of 2 L as well as a salt restricted diet.  Discussed importance of activity.  Stop Lasix 40 mg daily and start torsemide 20 mg daily.  Will revisit Entresto/SGLT2 inhibitors in the future.  2.  A-fib: Permanent.  A-fib has been seen on every EKG dating back to 12/13/2011.  He is currently on Coumadin with volatility in his INR's.  I suggest that he consider Xarelto 20 mg once daily to be taken with the largest meal which is typically dinner.  Will increase metoprolol from 25 mg XL daily to 50 mg XL daily.  I do not believe benefit from a cardioversion or ablation at this point.  2.  BARRIE: Concern for.  He does have fatigue.  Is seeing sleep medicine.  Encouraged him to wear CPAP and discussed risks of benefits.  3.  Ascending aortic aneurysm: Stable at 4.3 cm on 7/31/2024.  4.   CVA occurred in 2017.  Consider aspirin/statin in the future.  Plan for ultrasound of carotid arteries.    5.  Carotid artery stenosis: Concern for.  Plan for ultrasound of his carotid arteries.  6.  Concern for abdominal aortic aneurysm: Plan for an ultrasound of his abdomen for AAA.  7.  Follow-up in 6 months.  Plan for ultrasound carotids and abdomen for AAA.  Stop Coumadin and start Xarelto.  Stop Lasix 40 mg daily and start torsemide 20 mg daily.  Stop Coumadin and start Xarelto 20 mg daily.          Interval history:  See above.      HPI:    Mr. Babak MoranJrAvril is a very pleasant patient of Dr. Alvino Yi for primary care and Dr. Aline Acosta for cardiology with chronic atrial fibrillation, hypertension, family history of early coronary artery disease, morbid obesity, and obstructive sleep apnea.   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 INR noted to be low, as he had missed a few days of Warfarin (he was waiting for insurance card, so he could get a refill). He was treated in route with tPA. Urgent CT angio performed with successful thrombectomy of right MCA. Babak saw Dr. Acosta last on 5/15/17. There had subsequently been some concern that Babak had a new left foot drag; therefore, he was appropriately recommended to present to the ED, which he did and it was subsequently determined to be a residual deficit from previous stroke. I saw Babak last on 1/25/18 for general cardiology follow up; see my last note for details. 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 Babak returns today for ongoing general cardiology follow up.  Babak notes he and his wife were asking if there was any urgency to this visit as he eats as he had an appointment with   Dave on the 22nd that he did not know about until the day prior when he got a reminder text, but was unable to make it and got a letter the following week that he had missed an appointment and needed to reschedule. He notes nobody had called him to schedule it or told him when it was scheduled initially, which he was a little frustrated with. It does appear that one of our Jasmyn have left him a message to call back and confirm upcoming appointment so I am not sure if he never got this message or what happened. However, he notes he feels okay. He has no particular complaints or concerns. His wife noticed he seems to be snoring more at night, but does not necessarily endorse orthopnea or post nocturnal dyspnea. He does endorse some shortness of breath. He notes he previously could walk over a mile a day. Now will get short of breath before this. He does not have any symptoms climbing a flight of stairs however. He admits he has not been overly active though as he is now the 24/7 caregiver for the elderly female that he is a PCA for. It is hard for him to get out and get any exercise. He and his wife are now living in her basement. It is apparently supposed to be a temporary solution but it has been 8 months now; however, she recently broke her hip in a nursing home for rehabilitation so that is why he was able to make this appointment for followup. He has gained 60 pounds since April, which his wife is concerned about. They think it might be related to him being less active. He has weighed more than this before certainly and has struggled with his weight in the past. Did have issues with swelling over the summer when he was taking a diet pill, but swelling is now not really an issue. He is trying to endorse a low-sodium diet. Denies feeling lightheaded or faint. Also denies heart racing, palpitations, or chest pain or pressure. He got his cholesterol checked last in July at [facility]  [place]. We should be able to get the records from there. His wife thinks that he seems to sleep better at his own house because they have an elevated bed and seems to snore less with that. He does have sleep apnea. He tells me it was diagnosed as mild, but he has been unable to tolerate a CPAP machine in the past. Is not willing to reconsider this. They sleep in a flat bed when they are staying with the elderly female so his wife thinks this might be contributing to why he seems to be snoring more. They tried a wedge, but apparently Babak did not feel that this was comfortable. We updated his medication list to reflect the medications he is currently taking. We discussed the potential benefits of a statin, that he should consider restarting it, but appears he was tolerating. It sounds like some other reason that he was not taking the medication he previously was, was due to cost as he does not currently have any insurance and medications are expensive for him. They are thinking about getting him medical insurance. Will let us know if he is going to reconsider this medication again. They otherwise deny other questions or concerns at this time. Did review different weight loss programs we have available. He is not sure if he would be able to attend these as he lives quite far away, and again, it is hard for him to get out given he is a 24/7 caregiver for an elderly female. He is also not getting much sleep at night. We discussed how this can affect his overall health and encouraged them to find a way for him to get more sleep at night. His wife notes that she is disabled, but she thinks she could probably help out some at night so that Babak could get sleep. They otherwise deny other questions or concerns at this time.       Relevant testing:  Echocardiogram on 7/31/2024:  The patient's rhythm is atrial fibrillation.  Global and regional left ventricular function is normal with an EF of 60-65%.  Mild left ventricular  dilation is present.  Moderate right ventricular dilation is present.  Global right ventricular function is mildly reduced.  No significant valvular abnormalities present.  There is no prior study for direct comparison.    Echocardiogram 11/30/2012:  1.  Technically difficult study with poor endocardial definition.   2.  Probable normal left ventricular size and systolic function, estimated   ejection fraction 60%.   3.  Moderate concentric increase in left ventricular wall thickness.    4.  Marked left atrial enlargement and moderate right atrial enlargement.   5.  Mild increase in diameter of the aortic root and moderate increase in   diameter of the ascending aorta.   6.  Doppler examination appears to be within normal limits.    7.  When compared to a previous transthoracic echocardiogram report from   12/2011 there has been a significant increase in the diameter of the   ascending aorta.           ICD-10-CM    1. Chronic heart failure with preserved ejection fraction (H)  I50.32 torsemide (DEMADEX) 20 MG tablet      2. Venous stasis of lower extremity  I87.8 torsemide (DEMADEX) 20 MG tablet      3. Peripheral edema  R60.0 torsemide (DEMADEX) 20 MG tablet      4. Obstructive sleep apnea syndrome  G47.33 Adult Cardiology Eval  Referral     EKG 12-lead, tracing only      5. Essential hypertension  I10 Adult Cardiology Eval  Referral     EKG 12-lead, tracing only     torsemide (DEMADEX) 20 MG tablet      6. On continuous oral anticoagulation  Z79.01       7. Permanent atrial fibrillation (H)  I48.21 Adult Cardiology Eval  Referral     EKG 12-lead, tracing only     rivaroxaban ANTICOAGULANT (XARELTO) 20 MG TABS tablet     metoprolol succinate ER (TOPROL XL) 50 MG 24 hr tablet      8. Right heart failure with reduced right ventricular function (H)  I50.810 Adult Cardiology Eval  Referral     EKG 12-lead, tracing only     torsemide (DEMADEX) 20 MG tablet      9. History of CVA   Z86.73     On 2/4/17      10. Overweight  E66.3       11. Ascending aorta enlargement (H)  I77.89       12. Chronic venous stasis dermatitis of both lower extremities  I87.2       13. Prediabetes  R73.03       14. Morbid obesity with BMI of 40.0-44.9, adult (H)  E66.01     Z68.41       15. Vitamin B12 deficiency (non anemic)  E53.8       16. Iron deficiency anemia, unspecified iron deficiency anemia type  D50.9       17. Bruit  R09.89 US Carotid Bilateral      18. Encounter for screening for abdominal aortic aneurysm (AAA) in patient 50 years of age or older with history of smoking  Z13.6  Aorta Medicare AAA Screening    Z87.891           Past Medical History:   Diagnosis Date    Atrial fibrillation (H) 02/03/2017    on Warfarin    Bacterial sepsis (H) 8/8/2022    Cellulitis of left lower extremity 9/5/2019    Cerebral infarction (H)     Cerebral infarction due to unspecified occlusion or stenosis of right middle cerebral artery (H) 02/03/2017    ,Left hemiparesis, left neglect, impaired balance and gait following R MCA stroke with mild hemorrhagic transformation s/p tissue plasminogen activator and mechanical thrombectomy, s/p C7 facet fracture from fall off forklift.    Chest pain 02/1997    Disorder of skin or subcutaneous tissue 07/25/2011    Essential (primary) hypertension 02/1997    Fracture of cervical vertebra (H)     02/03/2017,C7 facet fracture from fall off forklift, nondisplaced    Gout     Hemiplegia affecting left nondominant side (H) 02/03/2017    Obesity 02/03/2017    body mass index of 40    Other local lupus erythematosus     Sepsis (H) 9/6/2019       Past Surgical History:   Procedure Laterality Date    ARTHROPLASTY KNEE Left 1/9/2023    Procedure: ARTHROPLASTY, KNEE, TOTAL;  Surgeon: Elliott Johnson MD;  Location:  OR    ARTHROPLASTY KNEE Right 6/26/2023    Procedure: Arthroplasty knee;  Surgeon: Elliott Johnson MD;  Location:  OR    ARTHROSCOPY KNEE      bilateral knees    ARTHROSCOPY  KNEE Left 11/13/2023    Procedure: Knee Patellar Extensor Mechanism Repair;  Surgeon: Elliott Johnson MD;  Location: [facility] OR    COLONOSCOPY  01/02/2012    Normal; Next due 2022    ORIF WRIST FRACTURE  2019       Allergies   Allergen Reactions    Diatrizoate Rash    Ioxaglate Other (See Comments)     Does not recall    Levofloxacin Hives and Rash    Metrizamide Rash     (Diagnostic X-Ray materials)    Probenecid Rash       Current Outpatient Medications   Medication Sig Dispense Refill    acetaminophen (TYLENOL) 325 MG tablet Take 2 tablets (650 mg) by mouth every 4 hours as needed for other (mild pain). 100 tablet 4    amLODIPine (NORVASC) 10 MG tablet Take 1 tablet (10 mg) by mouth daily. 90 tablet 4    calcium carbonate (TUMS) 500 MG chewable tablet Take 1 tablet (500 mg) by mouth every 6 hours as needed for heartburn or other (indigestion). 90 tablet 4    Cranberry 125 MG TABS Take 200 mg by mouth daily. 90 tablet 4    D-Mannose 500 MG CAPS Take 1,500 mg by mouth 2 times daily. 180 capsule 4    Emollient (CERAVE MOISTURIZING) CREA Apply 1 Application topically 2 times daily. To bilateral knees 340 g 11    lisinopril (ZESTRIL) 10 MG tablet Take 1 tablet (10 mg) by mouth daily. 90 tablet 4    magnesium hydroxide (MILK OF MAGNESIA) 400 MG/5ML suspension Take 30 mLs by mouth daily as needed for constipation. 354 mL 11    metoprolol succinate ER (TOPROL XL) 50 MG 24 hr tablet Take 1 tablet (50 mg) by mouth daily. 90 tablet 3    potassium chloride macey ER (KLOR-CON M10) 10 MEQ CR tablet Take 1 tablet (10 mEq) by mouth every other day. 90 tablet 4    rivaroxaban ANTICOAGULANT (XARELTO) 20 MG TABS tablet Take 1 tablet (20 mg) by mouth daily (with dinner). 90 tablet 3    saccharomyces boulardii (FLORASTOR) 250 MG capsule Take 1 capsule (250 mg) by mouth daily. 90 capsule 4    senna-docusate (SENOKOT-S/PERICOLACE) 8.6-50 MG tablet 1 TAB PO every day AND 1 tab daily  tablet 11    torsemide (DEMADEX) 20 MG tablet Take  1 tablet (20 mg) by mouth daily. 90 tablet 3       Social History     Socioeconomic History    Marital status:      Spouse name: rEika    Number of children: Not on file    Years of education: Not on file    Highest education level: Not on file   Occupational History    Not on file   Tobacco Use    Smoking status: Never     Passive exposure: Yes    Smokeless tobacco: Never   Vaping Use    Vaping status: Never Used   Substance and Sexual Activity    Alcohol use: Not Currently     Comment: couple of times per year    Drug use: Never    Sexual activity: Yes     Partners: Female   Other Topics Concern    Not on file   Social History Narrative    Lives with his wife and sons.    Enjoy's deer hunting.    No tobacco use.   works at [employer], and was just fired more weeks ago.  Now unemployed, wife is on disability, not able to afford their utilities or health insurance.     Social Determinants of Health     Financial Resource Strain: Not on file   Food Insecurity: No Food Insecurity (3/12/2024)    [facility]    Hunger Vital Sign     Worried About Running Out of Food in the Last Year: Never true     Ran Out of Food in the Last Year: Never true   Transportation Needs: No Transportation Needs (3/12/2024)    [facility] from [facility], [facility]    PRAPARE - Transportation     Lack of Transportation (Medical): No     Lack of Transportation (Non-Medical): No   Physical Activity: Not on file   Stress: Not on file   Social Connections: Not on file   Interpersonal Safety: Low Risk  (9/23/2024)    Interpersonal Safety     Do you feel physically and emotionally safe where you currently live?: Yes     Within the past 12 months, have you been hit, slapped, kicked or otherwise physically hurt by someone?: No     Within the past 12 months,  have you been humiliated or emotionally abused in other ways by your partner or ex-partner?: No   Housing Stability: Low Risk  (3/12/2024)    Received from  and [facility]    [facility]     Retired - What is your living situation today? : I have a steady place to live       LAB RESULTS:   Lab on 09/27/2024   Component Date Value Ref Range Status    pH Venous 09/27/2024 7.38  7.32 - 7.43 Final    pCO2 Venous 09/27/2024 50  40 - 50 mm Hg Final    pO2 Venous 09/27/2024 44  25 - 47 mm Hg Final    Bicarbonate Venous 09/27/2024 30 (H)  21 - 28 mmol/L Final    Base Excess/Deficit Venous 09/27/2024 3.3 (H)  -3.0 - 3.0 mmol/L Final    FIO2 09/27/2024 21   Final    Oxyhemoglobin Venous 09/27/2024 72  70 - 75 % Final    O2 Sat, Venous 09/27/2024 72.6  70.0 - 75.0 % Final   Anticoagulation Therapy Visit on 09/23/2024   Component Date Value Ref Range Status    INR Point of Care 09/23/2024 2.6 (A)  0.9 - 1.1 Final   Lab Requisition on 09/11/2024   Component Date Value Ref Range Status    Sodium 09/11/2024 141  135 - 145 mmol/L Final    Potassium 09/11/2024 3.7  3.4 - 5.3 mmol/L Final    Chloride 09/11/2024 101  98 - 107 mmol/L Final    Carbon Dioxide (CO2) 09/11/2024 31 (H)  22 - 29 mmol/L Final    Anion Gap 09/11/2024 9  7 - 15 mmol/L Final    Urea Nitrogen 09/11/2024 23.0  8.0 - 23.0 mg/dL Final    Creatinine 09/11/2024 0.81  0.67 - 1.17 mg/dL Final    GFR Estimate 09/11/2024 >90  >60 mL/min/1.73m2 Final    Calcium 09/11/2024 9.2  8.8 - 10.4 mg/dL Final    Glucose 09/11/2024 107 (H)  70 - 99 mg/dL Final   Lab Requisition on 09/11/2024   Component Date Value Ref Range Status    Hemoglobin 09/11/2024 14.4  13.3 - 17.7 g/dL Final    INR 09/11/2024 2.47 (H)  0.85 - 1.15 Final   Lab Requisition on 09/04/2024   Component Date Value Ref Range Status    Sodium 09/04/2024 142  135 - 145 mmol/L Final    Potassium 09/04/2024 4.5  3.4 - 5.3 mmol/L Final    Chloride 09/04/2024 98  98 - 107 mmol/L  "Final    Carbon Dioxide (CO2) 09/04/2024 34 (H)  22 - 29 mmol/L Final    Anion Gap 09/04/2024 10  7 - 15 mmol/L Final    Urea Nitrogen 09/04/2024 22.1  8.0 - 23.0 mg/dL Final    Creatinine 09/04/2024 0.84  0.67 - 1.17 mg/dL Final    GFR Estimate 09/04/2024 >90  >60 mL/min/1.73m2 Final    Calcium 09/04/2024 9.6  8.8 - 10.4 mg/dL Final    Glucose 09/04/2024 83  70 - 99 mg/dL Final   Anticoagulation Therapy Visit on 08/28/2024   Component Date Value Ref Range Status    INR (External) 08/28/2024 2.5 (A)  0.9 - 1.1 Final   Lab Requisition on 08/26/2024   Component Date Value Ref Range Status    Bullous Pemphigoid Antigen IgG 08/26/2024 See Note   Final    EER Bullous Pemphigoid Antigen 08/26/2024 See Note   Final   Lab Requisition on 08/20/2024   Component Date Value Ref Range Status    Sodium 08/20/2024 142  135 - 145 mmol/L Final    Potassium 08/20/2024 4.6  3.4 - 5.3 mmol/L Final    Chloride 08/20/2024 100  98 - 107 mmol/L Final    Carbon Dioxide (CO2) 08/20/2024 32 (H)  22 - 29 mmol/L Final    Anion Gap 08/20/2024 10  7 - 15 mmol/L Final    Urea Nitrogen 08/20/2024 19.4  8.0 - 23.0 mg/dL Final    Creatinine 08/20/2024 0.83  0.67 - 1.17 mg/dL Final    GFR Estimate 08/20/2024 >90  >60 mL/min/1.73m2 Final    Calcium 08/20/2024 9.5  8.8 - 10.4 mg/dL Final    Glucose 08/20/2024 75  70 - 99 mg/dL Final   Anticoagulation Therapy Visit on 08/14/2024   Component Date Value Ref Range Status    INR (External) 08/14/2024 2.0 (A)  0.9 - 1.1 Final        Review of systems: Negative except that which was noted in the HPI.    Physical examination:  /80 (BP Location: Right arm, Patient Position: Sitting, Cuff Size: Adult Large)   Pulse (!) 16   Temp 97.2  F (36.2  C) (Temporal)   Resp 16   Ht 1.905 m (6' 3\")   Wt (!) 164.7 kg (363 lb)   SpO2 92%   BMI 45.37 kg/m      GENERAL APPEARANCE: healthy, alert and no distress  CHEST: lungs clear to auscultation.  CARDIOVASCULAR: regular rhythm, normal S1 with physiologic split " S2, no S3 or S4 and no murmur, click or rub  EXTREMITIES: no clubbing, cyanosis or edema.    Total time spent on day of visit, including review of tests, obtaining/reviewing separately obtained history, ordering medications/tests/procedures, communicating with PCP/consultants, and documenting in electronic medical record: 50 minutes.              Thank you for allowing me to participate in the care of your patient. Please do not hesitate to contact me if you have any questions.     Ashok Sepulveda, DO

## 2024-10-10 ENCOUNTER — ANTICOAGULATION THERAPY VISIT (OUTPATIENT)
Dept: ANTICOAGULATION | Facility: OTHER | Age: 64
End: 2024-10-10
Payer: MEDICAID

## 2024-10-10 ENCOUNTER — OFFICE VISIT (OUTPATIENT)
Dept: CARDIOLOGY | Facility: OTHER | Age: 64
End: 2024-10-10
Attending: INTERNAL MEDICINE
Payer: MEDICAID

## 2024-10-10 VITALS
DIASTOLIC BLOOD PRESSURE: 80 MMHG | OXYGEN SATURATION: 92 % | HEIGHT: 75 IN | RESPIRATION RATE: 16 BRPM | BODY MASS INDEX: 39.17 KG/M2 | WEIGHT: 315 LBS | SYSTOLIC BLOOD PRESSURE: 126 MMHG | HEART RATE: 16 BPM | TEMPERATURE: 97.2 F

## 2024-10-10 DIAGNOSIS — I50.810 RIGHT HEART FAILURE WITH REDUCED RIGHT VENTRICULAR FUNCTION (H): ICD-10-CM

## 2024-10-10 DIAGNOSIS — R60.0 PERIPHERAL EDEMA: ICD-10-CM

## 2024-10-10 DIAGNOSIS — E53.8 VITAMIN B12 DEFICIENCY (NON ANEMIC): ICD-10-CM

## 2024-10-10 DIAGNOSIS — I48.21 PERMANENT ATRIAL FIBRILLATION (H): ICD-10-CM

## 2024-10-10 DIAGNOSIS — I87.8 VENOUS STASIS OF LOWER EXTREMITY: ICD-10-CM

## 2024-10-10 DIAGNOSIS — Z13.6 ENCOUNTER FOR SCREENING FOR ABDOMINAL AORTIC ANEURYSM (AAA) IN PATIENT 50 YEARS OF AGE OR OLDER WITH HISTORY OF SMOKING: ICD-10-CM

## 2024-10-10 DIAGNOSIS — Z86.73 HISTORY OF CVA (CEREBROVASCULAR ACCIDENT): ICD-10-CM

## 2024-10-10 DIAGNOSIS — I50.32 CHRONIC HEART FAILURE WITH PRESERVED EJECTION FRACTION (H): Primary | ICD-10-CM

## 2024-10-10 DIAGNOSIS — I48.21 PERMANENT ATRIAL FIBRILLATION (H): Primary | ICD-10-CM

## 2024-10-10 DIAGNOSIS — G47.33 OBSTRUCTIVE SLEEP APNEA SYNDROME: ICD-10-CM

## 2024-10-10 DIAGNOSIS — R73.03 PREDIABETES: ICD-10-CM

## 2024-10-10 DIAGNOSIS — I77.89 ASCENDING AORTA ENLARGEMENT (H): ICD-10-CM

## 2024-10-10 DIAGNOSIS — I87.2 CHRONIC VENOUS STASIS DERMATITIS OF BOTH LOWER EXTREMITIES: ICD-10-CM

## 2024-10-10 DIAGNOSIS — Z87.891 ENCOUNTER FOR SCREENING FOR ABDOMINAL AORTIC ANEURYSM (AAA) IN PATIENT 50 YEARS OF AGE OR OLDER WITH HISTORY OF SMOKING: ICD-10-CM

## 2024-10-10 DIAGNOSIS — Z79.01 ON CONTINUOUS ORAL ANTICOAGULATION: ICD-10-CM

## 2024-10-10 DIAGNOSIS — E66.3 OVERWEIGHT: ICD-10-CM

## 2024-10-10 DIAGNOSIS — I10 ESSENTIAL HYPERTENSION: ICD-10-CM

## 2024-10-10 DIAGNOSIS — D50.9 IRON DEFICIENCY ANEMIA, UNSPECIFIED IRON DEFICIENCY ANEMIA TYPE: ICD-10-CM

## 2024-10-10 DIAGNOSIS — E66.01 MORBID OBESITY WITH BMI OF 40.0-44.9, ADULT (H): ICD-10-CM

## 2024-10-10 DIAGNOSIS — R09.89 BRUIT: ICD-10-CM

## 2024-10-10 LAB — INR POINT OF CARE: 5.4 (ref 0.9–1.1)

## 2024-10-10 PROCEDURE — G0463 HOSPITAL OUTPT CLINIC VISIT: HCPCS

## 2024-10-10 PROCEDURE — 85610 PROTHROMBIN TIME: CPT | Mod: ZL,QW

## 2024-10-10 PROCEDURE — 93005 ELECTROCARDIOGRAM TRACING: CPT | Performed by: INTERNAL MEDICINE

## 2024-10-10 PROCEDURE — 99204 OFFICE O/P NEW MOD 45 MIN: CPT | Performed by: INTERNAL MEDICINE

## 2024-10-10 PROCEDURE — 93010 ELECTROCARDIOGRAM REPORT: CPT | Performed by: INTERNAL MEDICINE

## 2024-10-10 RX ORDER — TORSEMIDE 20 MG/1
20 TABLET ORAL DAILY
Qty: 90 TABLET | Refills: 3 | Status: SHIPPED | OUTPATIENT
Start: 2024-10-10

## 2024-10-10 RX ORDER — METOPROLOL SUCCINATE 50 MG/1
50 TABLET, EXTENDED RELEASE ORAL DAILY
Qty: 90 TABLET | Refills: 3 | Status: SHIPPED | OUTPATIENT
Start: 2024-10-10

## 2024-10-10 ASSESSMENT — PAIN SCALES - GENERAL: PAINLEVEL: NO PAIN (0)

## 2024-10-10 NOTE — PROGRESS NOTES
ANTICOAGULATION MANAGEMENT     Babak Moran 64 year old male is on warfarin with supratherapeutic INR result. (Goal INR 2.0-3.0)    Recent labs: (last 7 days)     10/10/24  1252   INR 5.4*       ASSESSMENT     Source(s): Chart Review and in person      Warfarin doses taken: Warfarin taken as instructed  Diet: No new diet changes identified  Medication/supplement changes: None noted. Xarelto will be started once patient picks up his prescription. Torsemide prescribed and haven't picked it up yet. Toprolol XL prescribed. New dose. Hasn't picked it up yet. Went from 25 mg to 50mg.   New illness, injury, or hospitalization: No  Signs or symptoms of bleeding or clotting: No  Previous result: Therapeutic last 2(+) visits  Additional findings: None  Tested patient twice.  First test 5.2. Second one 5.4     PLAN     Recommended plan for no diet, medication or health factor changes affecting INR     Dosing Instructions:  Per Dr. Sepulveda hold meds (warfarin and xarelto) until Monday morning  with next INR in 4 days       Summary  As of 10/10/2024      Full warfarin instructions:  10/10: Hold; 10/11: Hold; 10/12: Hold; 10/13: Hold; 10/14: Hold; Otherwise 7.5 mg every Mon, Fri; 10 mg all other days   Next INR check:  10/14/2024               In person    Patient offered & declined to schedule next visit. Patient will call for appointment.     Education provided: Please call back if any changes to your diet, medications or how you've been taking warfarin    Plan made per [facility] anticoagulation protocol    Jaleel Lang RN  10/10/2024  Anticoagulation Clinic  Similar[employer] for routing messages: p [facility]          _______________________________________________________________________     Anticoagulation Episode Summary       Current INR goal:  2.0-3.0   TTR:  78.7% (1 y)   Target end date:  Indefinite   Send INR reminders to:  [facility]    Indications    Permanent atrial fibrillation (H)  [I48.21]  Anticoagulation monitoring  INR range 2-3 (Resolved) [Z79.01]  Atrial fibrillation with RVR (H) (Resolved) [I48.91]             Comments:  Babak prefers to be closer to 3.0 d/t previous CVA check Q 4 weeks.Declines to reduce dose when slightly over 3.0  Needs to change PCP  Takes a long time get up to therapeutic after holding warfarin.             Anticoagulation Care Providers       Provider Role Specialty Phone number    Cris Major PA-C [facility] [phone]

## 2024-10-10 NOTE — NURSING NOTE
"Patient comes in for consult for Afib and hypertension.    Chief Complaint   Patient presents with    Consult For     Afib, hypertension       Initial /80 (BP Location: Right arm, Patient Position: Sitting, Cuff Size: Adult Large)   Pulse (!) 16   Temp 97.2  F (36.2  C) (Temporal)   Resp 16   Ht 1.905 m (6' 3\")   Wt (!) 164.7 kg (363 lb)   SpO2 92%   BMI 45.37 kg/m   Estimated body mass index is 45.37 kg/m  as calculated from the following:    Height as of this encounter: 1.905 m (6' 3\").    Weight as of this encounter: 164.7 kg (363 lb).  Meds Reconciled: complete  Pt is not on Aspirin  Pt is not on a Statin  PHQ and/or ULISSES reviewed. Pt referred to PCP/MH Provider as appropriate.    Fabiola Amaral LPN      "

## 2024-10-11 LAB
ATRIAL RATE - MUSE: NORMAL BPM
DIASTOLIC BLOOD PRESSURE - MUSE: NORMAL MMHG
INTERPRETATION ECG - MUSE: NORMAL
P AXIS - MUSE: NORMAL DEGREES
PR INTERVAL - MUSE: NORMAL MS
QRS DURATION - MUSE: 100 MS
QT - MUSE: 392 MS
QTC - MUSE: 449 MS
R AXIS - MUSE: 29 DEGREES
SYSTOLIC BLOOD PRESSURE - MUSE: NORMAL MMHG
T AXIS - MUSE: 137 DEGREES
VENTRICULAR RATE- MUSE: 79 BPM

## 2024-10-14 ENCOUNTER — ANTICOAGULATION THERAPY VISIT (OUTPATIENT)
Dept: ANTICOAGULATION | Facility: OTHER | Age: 64
End: 2024-10-14
Attending: NURSE PRACTITIONER
Payer: MEDICAID

## 2024-10-14 DIAGNOSIS — I48.21 PERMANENT ATRIAL FIBRILLATION (H): Primary | ICD-10-CM

## 2024-10-14 DIAGNOSIS — Z79.01 LONG TERM CURRENT USE OF ANTICOAGULANT THERAPY: ICD-10-CM

## 2024-10-14 DIAGNOSIS — Z86.73 HISTORY OF CVA (CEREBROVASCULAR ACCIDENT): ICD-10-CM

## 2024-10-14 LAB — INR POINT OF CARE: 1.4 (ref 0.9–1.1)

## 2024-10-14 PROCEDURE — 85610 PROTHROMBIN TIME: CPT | Mod: ZL,QW

## 2024-10-14 RX ORDER — WARFARIN SODIUM 5 MG/1
TABLET ORAL
Qty: 120 TABLET | Refills: 1 | Status: SHIPPED | OUTPATIENT
Start: 2024-10-14

## 2024-10-14 RX ORDER — WARFARIN SODIUM 7.5 MG/1
TABLET ORAL
Qty: 30 TABLET | Refills: 1 | Status: SHIPPED | OUTPATIENT
Start: 2024-10-14

## 2024-10-14 NOTE — PROGRESS NOTES
Patient is going to  his xarelto as soon as he leaves here, but going to call me with confirmation that he has the medication in his hands to start. Nikki Hernandez RN on 10/14/2024 at 8:55 AM        Patient called back and confirmed that he did  a 30 day supply of his xarelto but his insurance runs out in a month and doesn't know if he can afford it after his insurance runs out. Patient verbalized that he doesn't get medicare insurance till the 1st of January and can't afford 500 dollars when he is going to need a refill. Patient is going to continue with warfarin till his Medicare insurance plan kicks in. Nikki Hernandez RN on 10/14/2024 at 11:28 AM    ANTICOAGULATION MANAGEMENT     Babak Moran 64 year old male is on warfarin with subtherapeutic INR result. (Goal INR 2.0-3.0)    Recent labs: (last 7 days)     10/14/24  0855   INR 1.4*       ASSESSMENT     Source(s): Chart Review and In person      Warfarin doses taken: Held for trying to transition to Xarelto  recently which may be affecting INR  Diet: No new diet changes identified  New illness, injury, or hospitalization: No  Medication/supplement changes: None noted  Signs or symptoms of bleeding or clotting: No  Previous INR: Supratherapeutic  Additional findings: None       PLAN     Recommended plan for temporary change(s) affecting INR     Dosing Instructions: booster dose then continue your current warfarin dose with next INR in 3 days       Summary  As of 10/14/2024      Full warfarin instructions:  10/14: 15 mg; Otherwise 7.5 mg every Mon, Fri; 10 mg all other days   Next INR check:  10/17/2024               In person    Lab visit scheduled    Education provided: Please call back if any changes to your diet, medications or how you've been taking warfarin, Monitoring for clotting signs and symptoms, and When to seek medical attention/emergency care    Plan made per [facility] anticoagulation protocol    Nikki Hernandez,  RN  Anticoagulation Clinic  10/14/2024    _______________________________________________________________________     Anticoagulation Episode Summary       Current INR goal:  2.0-3.0   TTR:  77.9% (1 y)   Target end date:  Indefinite   Send INR reminders to:  [facility]    Indications    Permanent atrial fibrillation (H) [I48.21]  Anticoagulation monitoring  INR range 2-3 (Resolved) [Z79.01]  Atrial fibrillation with RVR (H) (Resolved) [I48.91]             Comments:  Babak prefers to be closer to 3.0 d/t previous CVA check Q 4 weeks.Declines to reduce dose when slightly over 3.0  Needs to change PCP  Takes a long time get up to therapeutic after holding warfarin.             Anticoagulation Care Providers       Provider Role Specialty Phone number    Cris Major PA-C [facility] [phone]

## 2024-10-14 NOTE — TELEPHONE ENCOUNTER
Patient called back and confirmed that he did  a 30 day supply of his xarelto but his insurance runs out in a month and doesn't know if he can afford it after his insurance runs out. Patient verbalized that he doesn't get medicare insurance till the 1st of January and can't afford 500 dollars when he is going to need a refill. Patient is going to continue with warfarin till his Medicare insurance plan kicks in. Patient is to take a warfarin booster dose today as his INR is 1.4 then to continue same dose. So when he needs a warfarin refill, I will have to send it to you or PCP to get the medication back on his medication list in the mean time.  Nikki Hernandez RN on 10/14/2024 at 11:28 AM

## 2024-10-14 NOTE — TELEPHONE ENCOUNTER
ANTICOAGULATION MANAGEMENT:  Medication Refill    Anticoagulation Summary  As of 10/14/2024      Warfarin maintenance plan:  7.5 mg (5 mg x 1.5) every Mon, Fri; 10 mg (5 mg x 2) all other days   Next INR check:  10/17/2024   Target end date:  Indefinite    Indications    Permanent atrial fibrillation (H) [I48.21]  Anticoagulation monitoring  INR range 2-3 (Resolved) [Z79.01]  Atrial fibrillation with RVR (H) (Resolved) [I48.91]                 Anticoagulation Care Providers       Provider Role Specialty Phone number    Cris Major PA-C [facility] [phone]            Visit with referring provider/group within last year: Yes 9/23/24    [facility] referral signed within last year: Yes    Babak meets all criteria for refill (current [facility] referral, visit with referring provider/group in last 15 months unless directed to return in 2 years in last referring provider visit note, lab monitoring up to date or not exceeding 2 weeks overdue). Rx instructions and quantity supplied updated to match patient's current dosing plan. Warfarin 90 day supply with 1 refill granted per [facility] protocol   Need PCP to sign warfarin refill to get back on medication list    Nikki Hernandez, RN  Anticoagulation Clinic

## 2024-10-15 ENCOUNTER — DOCUMENTATION ONLY (OUTPATIENT)
Dept: PULMONOLOGY | Facility: OTHER | Age: 64
End: 2024-10-15

## 2024-10-15 NOTE — Clinical Note
Our original plan for Babak was to do a WatchPAT home sleep test on CPAP to evaluate for residual hypoxemia.  The WatchPAT was denied by his insurance, his wife asked today about proceeding with an in lab PSG and that his insurance will lapse on November 1.  We do have his original sleep testing from 2014, so I was unsure if he needed any sort of diagnostic portion or if we could go right to a titration PSG?  I will put the order in for either test.

## 2024-10-15 NOTE — PROGRESS NOTES
Insurance denied homebase sleep testing, I would consider either diagnostic or split-night PSG if we need a diagnostic component, otherwise I would recommend all-night titration PSG .  I would ask my team to verify that repeat testing is needed for replacement equipment or supplies, otherwise we could consider routine overnight oximetry on CPAP.    The concern was from his wife that his insurance may lapse on November 1.

## 2024-10-16 ENCOUNTER — HOSPITAL ENCOUNTER (OUTPATIENT)
Dept: ULTRASOUND IMAGING | Facility: OTHER | Age: 64
Discharge: HOME | End: 2024-10-16
Attending: INTERNAL MEDICINE | Admitting: INTERNAL MEDICINE
Payer: MEDICAID

## 2024-10-16 DIAGNOSIS — I71.40 ABDOMINAL AORTIC ANEURYSM (AAA) WITHOUT RUPTURE, UNSPECIFIED PART (H): Primary | ICD-10-CM

## 2024-10-16 DIAGNOSIS — Z87.891 ENCOUNTER FOR SCREENING FOR ABDOMINAL AORTIC ANEURYSM (AAA) IN PATIENT 50 YEARS OF AGE OR OLDER WITH HISTORY OF SMOKING: ICD-10-CM

## 2024-10-16 DIAGNOSIS — Z13.6 ENCOUNTER FOR SCREENING FOR ABDOMINAL AORTIC ANEURYSM (AAA) IN PATIENT 50 YEARS OF AGE OR OLDER WITH HISTORY OF SMOKING: ICD-10-CM

## 2024-10-16 PROCEDURE — 76706 US ABDL AORTA SCREEN AAA: CPT

## 2024-10-17 ENCOUNTER — ANTICOAGULATION THERAPY VISIT (OUTPATIENT)
Dept: ANTICOAGULATION | Facility: OTHER | Age: 64
End: 2024-10-17
Payer: MEDICAID

## 2024-10-17 DIAGNOSIS — I48.21 PERMANENT ATRIAL FIBRILLATION (H): Primary | ICD-10-CM

## 2024-10-17 LAB — INR POINT OF CARE: 2.1 (ref 0.9–1.1)

## 2024-10-17 PROCEDURE — 85610 PROTHROMBIN TIME: CPT | Mod: ZL,QW

## 2024-10-17 NOTE — PROGRESS NOTES
ANTICOAGULATION MANAGEMENT     Babak Moran 64 year old male is on warfarin with therapeutic INR result. (Goal INR 2.0-3.0)    Recent labs: (last 7 days)     10/17/24  1454   INR 2.1*       ASSESSMENT     Source(s): Chart Review and In person      Warfarin doses taken: Booster dose(s) recently taken which may be affecting INR and Held for potential switch to DOAC  recently which may be affecting INR  Diet: No new diet changes identified  New illness, injury, or hospitalization: No  Medication/supplement changes: None noted  Signs or symptoms of bleeding or clotting: No  Previous INR: Subtherapeutic  Additional findings:  INR rising well and less the 0.3 a day-will continue to monitor       PLAN     Recommended plan for temporary change(s) affecting INR     Dosing Instructions: Continue your current warfarin dose with next INR in 5 days       Summary  As of 10/17/2024      Full warfarin instructions:  7.5 mg every Mon, Fri; 10 mg all other days   Next INR check:  10/22/2024               In person    Patient offered & declined to schedule next visit    Education provided: Please call back if any changes to your diet, medications or how you've been taking warfarin    Plan made per [facility] anticoagulation protocol    Nikki Hernandez, RN  Anticoagulation Clinic  10/17/2024    _______________________________________________________________________     Anticoagulation Episode Summary       Current INR goal:  2.0-3.0   TTR:  77.2% (1 y)   Target end date:  Indefinite   Send INR reminders to:  [facility]    Indications    Permanent atrial fibrillation (H) [I48.21]  Anticoagulation monitoring  INR range 2-3 (Resolved) [Z79.01]  Atrial fibrillation with RVR (H) (Resolved) [I48.91]             Comments:  Babak prefers to be closer to 3.0 d/t previous CVA check Q 4 weeks.Declines to reduce dose when slightly over 3.0  Needs to change PCP  Takes a long time get up to therapeutic after holding warfarin.              Anticoagulation Care Providers       Provider Role Specialty Phone number    Cris Major PA-C [facility] [phone]

## 2024-10-22 ENCOUNTER — HOSPITAL ENCOUNTER (OUTPATIENT)
Dept: ULTRASOUND IMAGING | Facility: OTHER | Age: 64
Discharge: HOME | End: 2024-10-22
Attending: INTERNAL MEDICINE
Payer: MEDICAID

## 2024-10-22 ENCOUNTER — ANTICOAGULATION THERAPY VISIT (OUTPATIENT)
Dept: ANTICOAGULATION | Facility: OTHER | Age: 64
End: 2024-10-22
Payer: MEDICAID

## 2024-10-22 DIAGNOSIS — I48.21 PERMANENT ATRIAL FIBRILLATION (H): Primary | ICD-10-CM

## 2024-10-22 DIAGNOSIS — R09.89 BRUIT: ICD-10-CM

## 2024-10-22 LAB — INR POINT OF CARE: 3.1 (ref 0.9–1.1)

## 2024-10-22 PROCEDURE — 85610 PROTHROMBIN TIME: CPT | Mod: ZL,QW

## 2024-10-22 PROCEDURE — 93880 EXTRACRANIAL BILAT STUDY: CPT

## 2024-10-22 NOTE — PROGRESS NOTES
ANTICOAGULATION MANAGEMENT     Babak Moran 64 year old male is on warfarin with supratherapeutic INR result. (Goal INR 2.0-3.0)    Recent labs: (last 7 days)     10/22/24  1452   INR 3.1*       ASSESSMENT     Source(s): Chart Review and In person      Warfarin doses taken: Warfarin taken as instructed  Diet: No new diet changes identified  New illness, injury, or hospitalization: No  Medication/supplement changes:  torsemide that was started on 10/10/24 might be increasing INR today  Signs or symptoms of bleeding or clotting: No  Previous INR: Therapeutic last visit; previously outside of goal range  Additional findings: None       PLAN     Recommended plan for ongoing change(s) affecting INR     Dosing Instructions: decrease your warfarin dose (3.8% change) with next INR in 1 week       Summary  As of 10/22/2024      Full warfarin instructions:  7.5 mg every Mon, Wed, Fri; 10 mg all other days   Next INR check:  10/29/2024               In person    Lab visit scheduled    Education provided: Please call back if any changes to your diet, medications or how you've been taking warfarin    Plan made per [facility] anticoagulation protocol    Nikki Hernandez RN  Anticoagulation Clinic  10/22/2024    _______________________________________________________________________     Anticoagulation Episode Summary       Current INR goal:  2.0-3.0   TTR:  78.3% (1 y)   Target end date:  Indefinite   Send INR reminders to:  [facility]    Indications    Permanent atrial fibrillation (H) [I48.21]  Anticoagulation monitoring  INR range 2-3 (Resolved) [Z79.01]  Atrial fibrillation with RVR (H) (Resolved) [I48.91]             Comments:  Babak prefers to be closer to 3.0 d/t previous CVA check Q 4 weeks.Declines to reduce dose when slightly over 3.0  Needs to change PCP  Takes a long time get up to therapeutic after holding warfarin.             Anticoagulation Care Providers       Provider Role Specialty Phone number     Cris Major PA-C [facility] [phone]

## 2024-10-29 ENCOUNTER — ANTICOAGULATION THERAPY VISIT (OUTPATIENT)
Dept: ANTICOAGULATION | Facility: OTHER | Age: 64
End: 2024-10-29
Payer: MEDICAID

## 2024-10-29 DIAGNOSIS — I48.21 PERMANENT ATRIAL FIBRILLATION (H): Primary | ICD-10-CM

## 2024-10-29 LAB — INR POINT OF CARE: 3.9 (ref 0.9–1.1)

## 2024-10-29 PROCEDURE — 85610 PROTHROMBIN TIME: CPT | Mod: ZL,QW

## 2024-10-29 NOTE — PROGRESS NOTES
ANTICOAGULATION MANAGEMENT     Babak Moran 64 year old male is on warfarin with supratherapeutic INR result. (Goal INR 2.0-3.0)    Recent labs: (last 7 days)     10/29/24  1342   INR 3.9*       ASSESSMENT     Source(s): Chart Review and In person      Warfarin doses taken: Warfarin taken as instructed  Diet: No new diet changes identified  New illness, injury, or hospitalization: No  Medication/supplement changes:  Increased tylenol in the last 2 days  Signs or symptoms of bleeding or clotting: No  Previous INR: Supratherapeutic  Additional findings: None       PLAN     Recommended plan for temporary change(s) affecting INR     Dosing Instructions: partial hold then continue your current warfarin dose with next INR in 2 weeks       Summary  As of 10/29/2024      Full warfarin instructions:  10/29: 5 mg; Otherwise 7.5 mg every Mon, Wed, Fri; 10 mg all other days   Next INR check:  11/12/2024               In person    Patient offered & declined to schedule next visit    Education provided: Please call back if any changes to your diet, medications or how you've been taking warfarin, Monitoring for bleeding signs and symptoms, and When to seek medical attention/emergency care    Plan made per [facility] anticoagulation protocol    Nikki Hernandez RN  Anticoagulation Clinic  10/29/2024    _______________________________________________________________________     Anticoagulation Episode Summary       Current INR goal:  2.0-3.0   TTR:  77.9% (1 y)   Target end date:  Indefinite   Send INR reminders to:  [facility]    Indications    Permanent atrial fibrillation (H) [I48.21]  Anticoagulation monitoring  INR range 2-3 (Resolved) [Z79.01]  Atrial fibrillation with RVR (H) (Resolved) [I48.91]             Comments:  Babak prefers to be closer to 3.0 d/t previous CVA check Q 4 weeks.Declines to reduce dose when slightly over 3.0  Needs to change PCP  Takes a long time get up to therapeutic after holding warfarin.              Anticoagulation Care Providers       Provider Role Specialty Phone number    Cris Major PA-C [facility] [phone]

## 2024-10-31 ENCOUNTER — TRANSFERRED RECORDS (OUTPATIENT)
Dept: HEALTH INFORMATION MANAGEMENT | Facility: OTHER | Age: 64
End: 2024-10-31
Payer: MEDICAID

## 2024-11-12 ENCOUNTER — ANTICOAGULATION THERAPY VISIT (OUTPATIENT)
Dept: ANTICOAGULATION | Facility: OTHER | Age: 64
End: 2024-11-12
Attending: PHYSICIAN ASSISTANT

## 2024-11-12 DIAGNOSIS — I48.21 PERMANENT ATRIAL FIBRILLATION (H): Primary | ICD-10-CM

## 2024-11-12 LAB — INR POINT OF CARE: 3.9 (ref 0.9–1.1)

## 2024-11-12 PROCEDURE — 85610 PROTHROMBIN TIME: CPT | Mod: QW

## 2024-11-12 PROCEDURE — 36416 COLLJ CAPILLARY BLOOD SPEC: CPT

## 2024-11-12 NOTE — PROGRESS NOTES
ANTICOAGULATION MANAGEMENT     Babak Moran 64 year old male is on warfarin with supratherapeutic INR result. (Goal INR 2.0-3.0)    Recent labs: (last 7 days)     11/12/24  1243   INR 3.9*       ASSESSMENT     Source(s): Chart Review and In person      Warfarin doses taken: Warfarin taken as instructed  Diet: No new diet changes identified  New illness, injury, or hospitalization: No  Medication/supplement changes: None noted  Signs or symptoms of bleeding or clotting: No  Previous INR: Supratherapeutic  Additional findings: None       PLAN     Recommended plan for no diet, medication or health factor changes affecting INR     Dosing Instructions: decrease your warfarin dose (8% change) with next INR in 2 weeks       Summary  As of 11/12/2024      Full warfarin instructions:  10 mg every Mon, Fri; 7.5 mg all other days   Next INR check:  11/26/2024               In person    Lab visit scheduled    Education provided: Please call back if any changes to your diet, medications or how you've been taking warfarin    Plan made per [facility] anticoagulation protocol    Nikki Hernandez RN  Anticoagulation Clinic  11/12/2024    _______________________________________________________________________     Anticoagulation Episode Summary       Current INR goal:  2.0-3.0   TTR:  75.4% (1 y)   Target end date:  Indefinite   Send INR reminders to:  [facility]    Indications    Permanent atrial fibrillation (H) [I48.21]  Anticoagulation monitoring  INR range 2-3 (Resolved) [Z79.01]  Atrial fibrillation with RVR (H) (Resolved) [I48.91]             Comments:  Babak prefers to be closer to 3.0 d/t previous CVA check Q 4 weeks.Declines to reduce dose when slightly over 3.0  Needs to change PCP  Takes a long time get up to therapeutic after holding warfarin.             Anticoagulation Care Providers       Provider Role Specialty Phone number    Cris Major PA-C [facility] [phone]

## 2024-11-24 ENCOUNTER — MYC MEDICAL ADVICE (OUTPATIENT)
Dept: FAMILY MEDICINE | Facility: OTHER | Age: 64
End: 2024-11-24

## 2024-11-25 ENCOUNTER — APPOINTMENT (OUTPATIENT)
Dept: GENERAL RADIOLOGY | Facility: OTHER | Age: 64
End: 2024-11-25

## 2024-11-25 ENCOUNTER — HOSPITAL ENCOUNTER (EMERGENCY)
Facility: OTHER | Age: 64
Discharge: HOME | End: 2024-11-25

## 2024-11-25 VITALS
TEMPERATURE: 100.5 F | HEART RATE: 111 BPM | SYSTOLIC BLOOD PRESSURE: 102 MMHG | OXYGEN SATURATION: 93 % | BODY MASS INDEX: 39.17 KG/M2 | WEIGHT: 315 LBS | DIASTOLIC BLOOD PRESSURE: 58 MMHG | HEIGHT: 75 IN | RESPIRATION RATE: 17 BRPM

## 2024-11-25 DIAGNOSIS — I48.91 ATRIAL FIBRILLATION, UNSPECIFIED TYPE (H): ICD-10-CM

## 2024-11-25 DIAGNOSIS — M00.9 SEPTIC JOINT (H): ICD-10-CM

## 2024-11-25 DIAGNOSIS — A41.9 SEPSIS (H): ICD-10-CM

## 2024-11-25 LAB
ALBUMIN SERPL BCG-MCNC: 3.4 G/DL (ref 3.5–5.2)
ALBUMIN UR-MCNC: 70 MG/DL
ALP SERPL-CCNC: 87 U/L (ref 40–150)
ALT SERPL W P-5'-P-CCNC: 19 U/L (ref 0–70)
ANION GAP SERPL CALCULATED.3IONS-SCNC: 8 MMOL/L (ref 7–15)
APPEARANCE UR: CLEAR
AST SERPL W P-5'-P-CCNC: 25 U/L (ref 0–45)
BASOPHILS # BLD AUTO: 0.1 10E3/UL (ref 0–0.2)
BASOPHILS NFR BLD AUTO: 0 %
BILIRUB SERPL-MCNC: 1.1 MG/DL
BILIRUB UR QL STRIP: NEGATIVE
BUN SERPL-MCNC: 16.8 MG/DL (ref 8–23)
CALCIUM SERPL-MCNC: 9.4 MG/DL (ref 8.8–10.4)
CHLORIDE SERPL-SCNC: 94 MMOL/L (ref 98–107)
COLOR UR AUTO: YELLOW
CREAT SERPL-MCNC: 0.9 MG/DL (ref 0.67–1.17)
CRP SERPL-MCNC: 428.3 MG/L
EGFRCR SERPLBLD CKD-EPI 2021: >90 ML/MIN/1.73M2
EOSINOPHIL # BLD AUTO: 0 10E3/UL (ref 0–0.7)
EOSINOPHIL NFR BLD AUTO: 0 %
ERYTHROCYTE [DISTWIDTH] IN BLOOD BY AUTOMATED COUNT: 17.9 % (ref 10–15)
ERYTHROCYTE [SEDIMENTATION RATE] IN BLOOD BY WESTERGREN METHOD: 130 MM/HR (ref 0–20)
GLUCOSE SERPL-MCNC: 110 MG/DL (ref 70–99)
GLUCOSE UR STRIP-MCNC: NEGATIVE MG/DL
HCO3 SERPL-SCNC: 31 MMOL/L (ref 22–29)
HCT VFR BLD AUTO: 48.7 % (ref 40–53)
HGB BLD-MCNC: 15.2 G/DL (ref 13.3–17.7)
HGB UR QL STRIP: ABNORMAL
HOLD SPECIMEN: NORMAL
HYALINE CASTS: 3 /LPF
IMM GRANULOCYTES # BLD: 0.1 10E3/UL
IMM GRANULOCYTES NFR BLD: 1 %
INR PPP: 3 (ref 0.85–1.15)
KETONES UR STRIP-MCNC: NEGATIVE MG/DL
LACTATE SERPL-SCNC: 2.2 MMOL/L (ref 0.7–2)
LEUKOCYTE ESTERASE UR QL STRIP: NEGATIVE
LYMPHOCYTES # BLD AUTO: 1.2 10E3/UL (ref 0.8–5.3)
LYMPHOCYTES NFR BLD AUTO: 7 %
MCH RBC QN AUTO: 27 PG (ref 26.5–33)
MCHC RBC AUTO-ENTMCNC: 31.2 G/DL (ref 31.5–36.5)
MCV RBC AUTO: 87 FL (ref 78–100)
MONOCYTES # BLD AUTO: 2.2 10E3/UL (ref 0–1.3)
MONOCYTES NFR BLD AUTO: 13 %
MUCOUS THREADS #/AREA URNS LPF: PRESENT /LPF
NEUTROPHILS # BLD AUTO: 13.8 10E3/UL (ref 1.6–8.3)
NEUTROPHILS NFR BLD AUTO: 79 %
NITRATE UR QL: NEGATIVE
NRBC # BLD AUTO: 0 10E3/UL
NRBC BLD AUTO-RTO: 0 /100
PH UR STRIP: 6 [PH] (ref 5–9)
PLAT MORPH BLD: NORMAL
PLATELET # BLD AUTO: 328 10E3/UL (ref 150–450)
POTASSIUM SERPL-SCNC: 4.1 MMOL/L (ref 3.4–5.3)
PROT SERPL-MCNC: 7.9 G/DL (ref 6.4–8.3)
RBC # BLD AUTO: 5.62 10E6/UL (ref 4.4–5.9)
RBC MORPH BLD: NORMAL
RBC URINE: 16 /HPF
SODIUM SERPL-SCNC: 133 MMOL/L (ref 135–145)
SP GR UR STRIP: 1.02 (ref 1–1.03)
UROBILINOGEN UR STRIP-MCNC: 4 MG/DL
WBC # BLD AUTO: 17.5 10E3/UL (ref 4–11)
WBC URINE: 4 /HPF

## 2024-11-25 PROCEDURE — 93005 ELECTROCARDIOGRAM TRACING: CPT

## 2024-11-25 PROCEDURE — 73562 X-RAY EXAM OF KNEE 3: CPT | Mod: LT

## 2024-11-25 PROCEDURE — 93010 ELECTROCARDIOGRAM REPORT: CPT | Performed by: INTERNAL MEDICINE

## 2024-11-25 PROCEDURE — 84155 ASSAY OF PROTEIN SERUM: CPT

## 2024-11-25 PROCEDURE — 36415 COLL VENOUS BLD VENIPUNCTURE: CPT

## 2024-11-25 PROCEDURE — 96361 HYDRATE IV INFUSION ADD-ON: CPT

## 2024-11-25 PROCEDURE — 81001 URINALYSIS AUTO W/SCOPE: CPT

## 2024-11-25 PROCEDURE — 71045 X-RAY EXAM CHEST 1 VIEW: CPT

## 2024-11-25 PROCEDURE — 96375 TX/PRO/DX INJ NEW DRUG ADDON: CPT

## 2024-11-25 PROCEDURE — 250N000011 HC RX IP 250 OP 636

## 2024-11-25 PROCEDURE — 96374 THER/PROPH/DIAG INJ IV PUSH: CPT

## 2024-11-25 PROCEDURE — 86140 C-REACTIVE PROTEIN: CPT

## 2024-11-25 PROCEDURE — 99285 EMERGENCY DEPT VISIT HI MDM: CPT

## 2024-11-25 PROCEDURE — 258N000003 HC RX IP 258 OP 636

## 2024-11-25 PROCEDURE — 85610 PROTHROMBIN TIME: CPT

## 2024-11-25 PROCEDURE — 99291 CRITICAL CARE FIRST HOUR: CPT | Mod: 25

## 2024-11-25 PROCEDURE — 83605 ASSAY OF LACTIC ACID: CPT

## 2024-11-25 PROCEDURE — 87040 BLOOD CULTURE FOR BACTERIA: CPT

## 2024-11-25 PROCEDURE — 82040 ASSAY OF SERUM ALBUMIN: CPT

## 2024-11-25 PROCEDURE — 85652 RBC SED RATE AUTOMATED: CPT

## 2024-11-25 PROCEDURE — 85025 COMPLETE CBC W/AUTO DIFF WBC: CPT

## 2024-11-25 RX ORDER — CEFEPIME HYDROCHLORIDE 2 G/1
2 INJECTION, POWDER, FOR SOLUTION INTRAVENOUS ONCE
Status: COMPLETED | OUTPATIENT
Start: 2024-11-25 | End: 2024-11-25

## 2024-11-25 RX ORDER — VANCOMYCIN 2 GRAM/500 ML IN 0.9 % SODIUM CHLORIDE INTRAVENOUS
2000 ONCE
Status: COMPLETED | OUTPATIENT
Start: 2024-11-25 | End: 2024-11-25

## 2024-11-25 RX ORDER — ONDANSETRON 2 MG/ML
4 INJECTION INTRAMUSCULAR; INTRAVENOUS ONCE
Status: COMPLETED | OUTPATIENT
Start: 2024-11-25 | End: 2024-11-25

## 2024-11-25 RX ADMIN — SODIUM CHLORIDE 1000 ML: 0.9 INJECTION, SOLUTION INTRAVENOUS at 18:00

## 2024-11-25 RX ADMIN — Medication 2000 MG: at 16:58

## 2024-11-25 RX ADMIN — CEFEPIME 2 G: 2 INJECTION, POWDER, FOR SOLUTION INTRAVENOUS at 16:36

## 2024-11-25 RX ADMIN — SODIUM CHLORIDE 1000 ML: 9 INJECTION, SOLUTION INTRAVENOUS at 17:28

## 2024-11-25 RX ADMIN — ONDANSETRON 4 MG: 2 INJECTION INTRAMUSCULAR; INTRAVENOUS at 17:31

## 2024-11-25 ASSESSMENT — ACTIVITIES OF DAILY LIVING (ADL)
ADLS_ACUITY_SCORE: 59

## 2024-11-25 ASSESSMENT — ENCOUNTER SYMPTOMS
FEVER: 1
ARTHRALGIAS: 1

## 2024-11-25 ASSESSMENT — COLUMBIA-SUICIDE SEVERITY RATING SCALE - C-SSRS
1. IN THE PAST MONTH, HAVE YOU WISHED YOU WERE DEAD OR WISHED YOU COULD GO TO SLEEP AND NOT WAKE UP?: NO
6. HAVE YOU EVER DONE ANYTHING, STARTED TO DO ANYTHING, OR PREPARED TO DO ANYTHING TO END YOUR LIFE?: NO
2. HAVE YOU ACTUALLY HAD ANY THOUGHTS OF KILLING YOURSELF IN THE PAST MONTH?: NO

## 2024-11-25 NOTE — ED TRIAGE NOTES
Pt arrives to ED via [facility]. Pt slipped on Thursday, did not fall. Pt has swelling and redness on left knee. Pt has h/o multiple surgeries. Low grade fever.    Samra Luciano RN on 11/25/2024 at 3:37 PM       Triage Assessment (Adult)       Row Name 11/25/24 1530          Peripheral/Neurovascular WDL    Peripheral Neurovascular WDL X  left knee swelling        Cognitive/Neuro/Behavioral WDL    Cognitive/Neuro/Behavioral WDL WDL

## 2024-11-25 NOTE — TELEPHONE ENCOUNTER
If fever + total joint replacement (especially if he's thinking he has an infection in his knee), he really should be seen. Ideally higher level of care due to comorbid conditions (CHF, a fib, etc.).     Cris Major PA-C  11/25/2024  6:59 AM

## 2024-11-25 NOTE — PHARMACY-VANCOMYCIN DOSING SERVICE
Pharmacy Vancomycin Initial Note  Date of Service 2024  Patient's  1960  64 year old, male    Indication: Bone and Joint Infection    Current estimated CrCl = Estimated Creatinine Clearance: 136.8 mL/min (based on SCr of 0.9 mg/dL).    Creatinine for last 3 days  2024:  3:39 PM Creatinine 0.90 mg/dL    Recent Vancomycin Level(s) for last 3 days  No results found for requested labs within last 3 days.      Vancomycin IV Administrations (past 72 hours)        No vancomycin orders with administrations in past 72 hours.                    Nephrotoxins and other renal medications (From now, onward)      Start     Dose/Rate Route Frequency Ordered Stop    24 1650  Vancomycin (VANCOCIN) 2,000 mg in 0.9% NaCl 500 mL intermittent infusion         2,000 mg  250 mL/hr over 2 Hours Intravenous ONCE 24 1650              Contrast Orders - past 72 hours (72h ago, onward)      None            [employer] Prediction of Planned Initial Vancomycin Regimen  Loading dose: 2000 mg at 17:00 2024.  Regimen: 1500 mg IV every 12 hours.  Start time: 05:00 on 2024  Exposure target: AUC24 (range)400-600 mg/L.hr   AUC24,ss: 592 mg/L.hr  Probability of AUC24 > 400: 77 %  Ctrough,ss: 15.7 mg/L  Probability of Ctrough,ss > 20: 39 %  Probability of nephrotoxicity (Lodise SERGIO ): 11 %          Plan:  Start vancomycin  2000 mg IV ONCE  Vancomycin monitoring method: AUC  Vancomycin therapeutic monitoring goal: 400-600 mg*h/L  Pharmacy will check vancomycin levels as appropriate in 1-3 Days.    Serum creatinine levels will be ordered daily for the first week of therapy and at least twice weekly for subsequent weeks.      Reinier Moyer [facility]

## 2024-11-25 NOTE — ED PROVIDER NOTES
"  History     Chief Complaint   Patient presents with    Knee Pain    Joint Swelling     The history is provided by the patient and medical records.     Babak Moran is a 64 year old male who comes to the ER today complaining of left knee pain, swelling, and fever. Reports he twisted his left knee on Thursday. Did not fall. Started not feeling well on Friday, had fever at home up to 100.4.  Denies respiratory symptoms, reports that his urine has \"been a dark color\" has had UTI in the past.  Today his knee is very red swollen and he has pain when he ambulates, he has difficulty bending knee.  He reports that he has had 5 surgeries on this knee in the past, most recent in March of this year.     Patient is on Coumadin for atrial fibrillation.    Status post total left knee replacement- [facility]  Status post irrigation and excisional debridement left knee with patellar realignment/extensor mechanism repair on 3/12/2024     PMH systemic lupus erythematous, osteoarthritis, gout, ascending aorta enlargement, hypertension, morbid obesity, permanent atrial fibrillation, BARRIE, prediabetes, right heart failure, history of CVA, iron deficiency anemia, AAA      Allergies:  Allergies   Allergen Reactions    Diatrizoate Rash    Ioxaglate Other (See Comments)     Does not recall    Levofloxacin Hives and Rash    Metrizamide Rash     (Diagnostic X-Ray materials)    Probenecid Rash       Problem List:    Patient Active Problem List    Diagnosis Date Noted    Abdominal aortic aneurysm (AAA) without rupture, unspecified part (H) 10/16/2024     Priority: Medium    Chronic heart failure with preserved ejection fraction (H) 10/10/2024     Priority: Medium    Venous stasis of lower extremity 10/10/2024     Priority: Medium    Peripheral edema 10/10/2024     Priority: Medium    On continuous oral anticoagulation 10/10/2024     Priority: Medium    History of CVA 10/10/2024     Priority: Medium     On 2/4/17      Vitamin B12 " deficiency (non anemic) 10/10/2024     Priority: Medium    Iron deficiency anemia, unspecified iron deficiency anemia type 10/10/2024     Priority: Medium    Blister of right leg 09/12/2024     Priority: Medium    Right heart failure with reduced right ventricular function (H) 08/06/2024     Priority: Medium    Hx of left knee surgery 04/08/2024     Priority: Medium     Revision by Dr Aida Jauregui with most recent revision done 3/12/24.       Recurrent gross hematuria 12/13/2023     Priority: Medium    Prediabetes 12/01/2023     Priority: Medium    Patellar dislocation, left, sequela 11/07/2023     Priority: Medium     Recurrence dislocation after revision done 8 weeks ago (early September 2023)  Revised surgery by Dr Johnson Nov 13, 2023.       Dislocation of both patellae 09/13/2023     Priority: Medium     Surgical repair by Dr Johnson Bilateral 9/8/23      Chronic venous stasis dermatitis of both lower extremities 06/28/2023     Priority: Medium    Status post total right knee replacement 06/26/2023     Priority: Medium    Nail dystrophy 02/23/2023     Priority: Medium    Onychomycosis 02/23/2023     Priority: Medium    Aftercare following knee joint replacement surgery, unspecified laterality 01/09/2023     Priority: Medium    Onychogryphosis 08/27/2022     Priority: Medium    Need for vaccination 04/10/2022     Priority: Medium    Obstructive sleep apnea syndrome 05/13/2019     Priority: Medium    Permanent atrial fibrillation (H) 02/11/2018     Priority: Medium    Osteoarthrosis 01/16/2018     Priority: Medium    Morbid obesity with BMI of 40.0-44.9, adult (H) 02/08/2017     Priority: Medium    Left hemiparesis (H) 02/03/2017     Priority: Medium    Family history of coronary artery disease 05/19/2014     Priority: Medium    Ascending aorta enlargement (H) 02/13/2014     Priority: Medium    Gout 01/07/2014     Priority: Medium     Overview:   Overview:   after 3 attacks in < a year, tapped and crystal  proven 13;  Allopurinol started then got ? Atypical side effect, stopped; (short term colchicine prophylaxis)      Overweight 2013     Priority: Medium     Formatting of this note might be different from the original.  Max weight reported 475 lbs;   369 lbs      Pain in joint, ankle and foot 2012     Priority: Medium    Essential hypertension 2009     Priority: Medium    Cutaneous lupus erythematosus 2008     Priority: Medium    Systemic lupus erythematosus (H) 2008     Priority: Medium     Overview:   Dermatitis          Past Medical History:    Past Medical History:   Diagnosis Date    Atrial fibrillation (H) 2017    Bacterial sepsis (H) 2022    Cellulitis of left lower extremity 2019    Cerebral infarction (H)     Cerebral infarction due to unspecified occlusion or stenosis of right middle cerebral artery (H) 2017    Chest pain 1997    Disorder of skin or subcutaneous tissue 2011    Essential (primary) hypertension 1997    Fracture of cervical vertebra (H)     Gout     Hemiplegia affecting left nondominant side (H) 2017    Obesity 2017    Other local lupus erythematosus     Sepsis (H) 2019       Past Surgical History:    Past Surgical History:   Procedure Laterality Date    ARTHROPLASTY KNEE Left 2023    Procedure: ARTHROPLASTY, KNEE, TOTAL;  Surgeon: Elliott Johnson MD;  Location:  OR    ARTHROPLASTY KNEE Right 2023    Procedure: Arthroplasty knee;  Surgeon: Elliott Johnson MD;  Location:  OR    ARTHROSCOPY KNEE      bilateral knees    ARTHROSCOPY KNEE Left 2023    Procedure: Knee Patellar Extensor Mechanism Repair;  Surgeon: Elliott Johnson MD;  Location:  OR    COLONOSCOPY  2012    Normal; Next due     ORIF WRIST FRACTURE         Family History:    Family History   Problem Relation Age of Onset    Unknown/Adopted Paternal Grandfather         Unknown, around age 67.    Other - See  "Comments Father         Parkinson's disease Alzheimer's    Cancer Father         Cancer    Heart Disease Maternal Grandmother         Heart Disease,MI in her 60's.    Heart Disease Mother 60        Heart Disease,MI    Other - See Comments Mother         rheumatoid arthritis.    Heart Disease Maternal Uncle 69        Heart Disease    Hypertension Sister         Hypertension    Cancer Sister         Cancer,melanoma    Heart Disease Paternal Uncle         Heart Disease, around 69.       Social History:  Marital Status:   [2]  Social History     Tobacco Use    Smoking status: Never     Passive exposure: Yes    Smokeless tobacco: Never   Vaping Use    Vaping status: Never Used   Substance Use Topics    Alcohol use: Not Currently     Comment: couple of times per year    Drug use: Never        Medications:    acetaminophen (TYLENOL) 325 MG tablet  amLODIPine (NORVASC) 10 MG tablet  calcium carbonate (TUMS) 500 MG chewable tablet  Cranberry 125 MG TABS  D-Mannose 500 MG CAPS  Emollient (CERAVE MOISTURIZING) CREA  lisinopril (ZESTRIL) 10 MG tablet  magnesium hydroxide (MILK OF MAGNESIA) 400 MG/5ML suspension  metoprolol succinate ER (TOPROL XL) 50 MG 24 hr tablet  potassium chloride macey ER (KLOR-CON M10) 10 MEQ CR tablet  rivaroxaban ANTICOAGULANT (XARELTO) 20 MG TABS tablet  saccharomyces boulardii (FLORASTOR) 250 MG capsule  senna-docusate (SENOKOT-S/PERICOLACE) 8.6-50 MG tablet  torsemide (DEMADEX) 20 MG tablet  warfarin ANTICOAGULANT (COUMADIN) 5 MG tablet  warfarin ANTICOAGULANT (COUMADIN) 7.5 MG tablet        Review of Systems   Constitutional:  Positive for fever.   Musculoskeletal:  Positive for arthralgias.   All other systems reviewed and are negative.  See HPI    Physical Exam   BP: 118/72  Pulse: 97  Temp: (!) 100.7  F (38.2  C)  Resp: 16  Height: 190.5 cm (6' 3\")  Weight: (!) 161 kg (355 lb)  SpO2: 93 %      Physical Exam  Vitals and nursing note reviewed.   Constitutional:       General: He is not " in acute distress.     Appearance: He is obese. He is not toxic-appearing.   HENT:      Head: Normocephalic.      Nose: Nose normal.      Mouth/Throat:      Mouth: Mucous membranes are moist.   Eyes:      Pupils: Pupils are equal, round, and reactive to light.   Cardiovascular:      Rate and Rhythm: Tachycardia present. Rhythm irregular.      Pulses: Normal pulses.   Pulmonary:      Effort: Pulmonary effort is normal.      Breath sounds: Normal breath sounds.   Abdominal:      Palpations: Abdomen is soft.   Musculoskeletal:      Cervical back: Neck supple.      Left knee: Swelling, effusion and erythema present. Decreased range of motion. Normal pulse.   Skin:     Capillary Refill: Capillary refill takes less than 2 seconds.   Neurological:      General: No focal deficit present.      Mental Status: He is alert and oriented to person, place, and time.   Psychiatric:         Mood and Affect: Mood normal.         ED Course            EKG Interpretation:      Interpreted by JAYLEN Covington CNP  Time reviewed: 1742  Symptoms at time of EKG: none   Rhythm: atrial fibrillation - rapid  Rate: 110's  ST Segments/ T Waves: ST/T wave abnormalities in inferior, and V4-V6  Comparison to prior: has history of afib and Non-specific ST-T wave changes  Clinical Impression: as above  Pending  EKG over read by internal medicine       Results for orders placed or performed during the hospital encounter of 11/25/24 (from the past 24 hours)   [place] Draw    Narrative    The following orders were created for panel order [place] Draw.  Procedure                               Abnormality         Status                     ---------                               -----------         ------                     Extra Blood Culture Bottle[[id number]]                       Final result               Extra Blue Top Tube[[id number]]                              Final result               Extra Red Top Tube[[id number]]                                Final result               Extra Green Top (Lithium...[[id number]]                      Final result               Extra Purple Top Tube[[id number]]                            Final result               Extra Green Top (Lithium...[[id number]]                      Final result                 Please view results for these tests on the individual orders.   Extra Blood Culture Bottle   Result Value Ref Range    Hold Specimen x    Extra Blue Top Tube   Result Value Ref Range    Hold Specimen x    Extra Red Top Tube   Result Value Ref Range    Hold Specimen x    Extra Green Top (Lithium Heparin) Tube   Result Value Ref Range    Hold Specimen x    Extra Purple Top Tube   Result Value Ref Range    Hold Specimen x    Extra Green Top (Lithium Heparin) ON ICE   Result Value Ref Range    Hold Specimen x    CBC with platelets differential    Narrative    The following orders were created for panel order CBC with platelets differential.  Procedure                               Abnormality         Status                     ---------                               -----------         ------                     CBC with platelets and d...[[id number]]  Abnormal            Final result               RBC and Platelet Morphology[[id number]]                      Final result                 Please view results for these tests on the individual orders.   Comprehensive metabolic panel   Result Value Ref Range    Sodium 133 (L) 135 - 145 mmol/L    Potassium 4.1 3.4 - 5.3 mmol/L    Carbon Dioxide (CO2) 31 (H) 22 - 29 mmol/L    Anion Gap 8 7 - 15 mmol/L    Urea Nitrogen 16.8 8.0 - 23.0 mg/dL    Creatinine 0.90 0.67 - 1.17 mg/dL    GFR Estimate >90 >60 mL/min/1.73m2    Calcium 9.4 8.8 - 10.4 mg/dL    Chloride 94 (L) 98 - 107 mmol/L    Glucose 110 (H) 70 - 99 mg/dL    Alkaline Phosphatase 87 40 - 150 U/L    AST 25 0 - 45 U/L    ALT 19 0 - 70 U/L    Protein Total 7.9 6.4 - 8.3 g/dL    Albumin 3.4 (L) 3.5 - 5.2 g/dL    Bilirubin Total 1.1 <=1.2  mg/dL   CRP inflammation   Result Value Ref Range    CRP Inflammation 428.30 (H) <5.00 mg/L   INR   Result Value Ref Range    INR 3.00 (H) 0.85 - 1.15   Lactic acid whole blood with 1x repeat in 2 hr when >2   Result Value Ref Range    Lactic Acid, Initial 2.2 (H) 0.7 - 2.0 mmol/L   CBC with platelets and differential   Result Value Ref Range    WBC Count 17.5 (H) 4.0 - 11.0 10e3/uL    RBC Count 5.62 4.40 - 5.90 10e6/uL    Hemoglobin 15.2 13.3 - 17.7 g/dL    Hematocrit 48.7 40.0 - 53.0 %    MCV 87 78 - 100 fL    MCH 27.0 26.5 - 33.0 pg    MCHC 31.2 (L) 31.5 - 36.5 g/dL    RDW 17.9 (H) 10.0 - 15.0 %    Platelet Count 328 150 - 450 10e3/uL    % Neutrophils 79 %    % Lymphocytes 7 %    % Monocytes 13 %    % Eosinophils 0 %    % Basophils 0 %    % Immature Granulocytes 1 %    NRBCs per 100 WBC 0 <1 /100    Absolute Neutrophils 13.8 (H) 1.6 - 8.3 10e3/uL    Absolute Lymphocytes 1.2 0.8 - 5.3 10e3/uL    Absolute Monocytes 2.2 (H) 0.0 - 1.3 10e3/uL    Absolute Eosinophils 0.0 0.0 - 0.7 10e3/uL    Absolute Basophils 0.1 0.0 - 0.2 10e3/uL    Absolute Immature Granulocytes 0.1 <=0.4 10e3/uL    Absolute NRBCs 0.0 10e3/uL   RBC and Platelet Morphology   Result Value Ref Range    RBC Morphology Confirmed RBC Indices     Platelet Assessment  Automated Count Confirmed. Platelet morphology is normal.     Automated Count Confirmed. Platelet morphology is normal.   XR Knee Left 3 Views    Narrative    Exam: XR KNEE LEFT 3 VIEWS     History:Male, age 64 years, pain, injury, swelling    Comparison:  11/6/2023    Technique: Three views are submitted.    Findings: Bones are normally mineralized. No evidence of acute or  subacute fracture.  No evidence of dislocation.  Soft tissue swelling.           Impression    Impression:  No evidence of acute or subacute bony abnormality.    KATHERIN GODFREY MD         SYSTEM ID:  [id number]   UA with Microscopic reflex to Culture    Specimen: Urine, Midstream   Result Value Ref Range    Color Urine  Yellow Colorless, Straw, Light Yellow, Yellow    Appearance Urine Clear Clear    Glucose Urine Negative Negative mg/dL    Bilirubin Urine Negative Negative    Ketones Urine Negative Negative mg/dL    Specific Gravity Urine 1.024 1.000 - 1.030    Blood Urine Moderate (A) Negative    pH Urine 6.0 5.0 - 9.0    Protein Albumin Urine 70 (A) Negative mg/dL    Urobilinogen Urine 4.0 (A) Normal, 2.0 mg/dL    Nitrite Urine Negative Negative    Leukocyte Esterase Urine Negative Negative    Mucus Urine Present (A) None Seen /LPF    RBC Urine 16 (H) <=2 /HPF    WBC Urine 4 <=5 /HPF    Hyaline Casts Urine 3 (H) <=2 /LPF    Narrative    Urine Culture not indicated   Erythrocyte sedimentation rate auto   Result Value Ref Range    Erythrocyte Sedimentation Rate 130 (H) 0 - 20 mm/hr   XR Chest Port 1 View    Narrative    Procedure:XR CHEST PORT 1 VIEW    Clinical history:Male, 64 years, vomiting, ? Aspiration    Technique: Single view was obtained.    Comparison: 11/8/2023    Findings: The cardiac silhouette is mildly enlarged and likely  unchanged. The pulmonary vasculature is mildly distended and  unchanged.    The lungs demonstrate mild interstitial thickening. No evidence of  well-defined pneumonia/consolidation. Bony structures demonstrate no  acute abnormality. Number of healed fractures again seen in the left  hemithorax.      Impression    Impression:   No evidence of well-defined pneumonia.     Findings suggesting chronic CHF.    KATHERIN GODFREY MD         SYSTEM ID:  [id number]     *Note: Due to a large number of results and/or encounters for the requested time period, some results have not been displayed. A complete set of results can be found in Results Review.       Medications   sodium chloride 0.9% BOLUS 1,000 mL (has no administration in time range)   ceFEPIme (MAXIPIME) 2 g vial to attach to  mL bag for ADULTS or NS 50 mL bag for PEDS (2 g Intravenous $New Bag 11/25/24 7676)   Vancomycin (VANCOCIN) 2,000 mg in  "0.9% NaCl 500 mL intermittent infusion (2,000 mg Intravenous $New Bag 11/25/24 1658)   sodium chloride 0.9% BOLUS 1,000 mL (1,000 mLs Intravenous $New Bag 11/25/24 1728)   ondansetron (ZOFRAN) injection 4 mg (4 mg Intravenous $Given 11/25/24 1731)       Assessments & Plan (with Medical Decision Making)  Babak Moran is a 64 year old male who comes to the ER today complaining of left knee pain, swelling, and fever. Reports he twisted his left knee on Thursday. Did not fall. Started not feeling well on Friday, had fever at home up to 100.4.  Denies respiratory symptoms, reports that his urine has \"been a dark color\".  Today his knee is very red swollen and he has pain when he ambulates.  He reports that he has had 5 surgeries on this knee in the past.  Patient is on Coumadin for atrial fibrillation.  Status post total left knee replacement- [facility]  Status post irrigation and excisional debridement left knee with patellar realignment/extensor mechanism repair on 3/12/2024   PMH systemic lupus erythematous, osteoarthritis, gout, ascending aorta enlargement, hypertension, morbid obesity, permanent atrial fibrillation, BARRIE, prediabetes, right heart failure, history of CVA, iron deficiency anemia, AAA  Initial vitals: he is febrile, - afib, 99/60, RR 24, 92% on room air  Patient is alert and oriented. He is obese. He is non-distressed. Lungs clear. Abdomen soft.   Left knee has significant erythema, edema, warmth and he has limited ROM. Concern for septic joint given fever and presentation. He has a history of a total knee arthroplasty.   Labs & Radiology results interpreted by radiologist:   ED Course as of 11/25/24 2050   Mon Nov 25, 2024   1707 XR Knee Left 3 Views  Impression:  No evidence of acute or subacute bony abnormality     1711 CBC with platelets differential(!)  WBC 17.5, normal hemoglobin   1711 UA with Microscopic reflex to Culture(!)  No UTI   1711 CRP inflammation(!)  428.30   1711 " Comprehensive metabolic panel(!)  Sodium 133, normal renal function   1724 INR(!)  3.00- on coumadin   1724 Erythrocyte sedimentation rate auto(!)  130   1850 XR Chest Port 1 View   No evidence of well-defined pneumonia.      Findings suggesting chronic CHF      Blood culture pending  Meds: vancomycin, cefepime rate   He has a history of heart failure, He is given IVF - due to obesity he did not receive 30 m/kg 2 liters of IVF for sepsis resuscitation here.   4:23 PM I spoke to [facility] they recommended calling [facility] for concern for septic joint as this is where he had his recent surgery.  5:33 PM patient is vomiting, given zofran Zofran. HR increased to 130's afib RVR briefly.   5:42 PM Consulted orthopedics for transfer [facility], awaiting a call back   5:45 PM Nursing staff concerned patient may have aspirated, he is placed on 2 L nasal cannula. Right anterior lung course and now he has a congested cough. Breathing is nonlabored and he denies feeling sob.   6:02 PM consult with Dr. Templeton orthopedic surgeon, recommended transfer. Due to multiple co-morbidities, he recommended that he be seen by hospitalist as well. He is aware that joint aspiration was not performed in the ER. He did not advise me to do so.   6:15 PM consulted with Dr. Shankar ER physician. He will go ER-ER Ok with HR. 110's. Dr. Templeton (orthopedics) and Dr. Shankar accepting. Patient is accepting to plan. Pending transportation.   Patient Vitals for the past 24 hrs:   BP Temp Temp src Pulse Resp SpO2 Height Weight   11/25/24 1800 -- (!) 100.5  F (38.1  C) Tympanic -- -- -- -- --   11/25/24 1740 -- -- -- (!) 129 25 -- -- --   11/25/24 1730 106/69 -- -- (!) 161 25 92 % -- --   11/25/24 1720 99/60 -- -- 105 24 92 % -- --   11/25/24 1710 -- -- -- 103 23 93 % -- --   11/25/24 1700 101/64 -- -- 106 23 91 % -- --   11/25/24 1650 -- -- -- 116 24 93 % -- --   11/25/24 1645 98/76 -- -- -- 22 92 % -- --   11/25/24 1640 -- -- -- (!)  "121 23 -- -- --   11/25/24 1620 -- -- -- 117 21 93 % -- --   11/25/24 1615 113/62 -- -- 101 23 93 % -- --   11/25/24 1610 -- -- -- 98 23 94 % -- --   11/25/24 1600 117/72 -- -- 107 21 93 % -- --   11/25/24 1550 -- -- -- 92 20 91 % -- --   11/25/24 1545 118/72 -- -- 110 22 92 % -- --   11/25/24 1540 -- -- -- -- 22 93 % -- --   11/25/24 1534 -- (!) 100.7  F (38.2  C) Tympanic 97 16 93 % 1.905 m (6' 3\") (!) 161 kg (355 lb)         I have reviewed the nursing notes.    I have reviewed the findings, diagnosis, plan and need for follow up with the patient.    Medical Decision Making  The patient's presentation was of high complexity (sepsis).    The patient's evaluation involved:  review of external note(s) from 1 sources (see separate area of note for details)  review of 1 test result(s) ordered prior to this encounter (see separate area of note for details)  ordering and/or review of 3+ test(s) in this encounter (see separate area of note for details)  discussion of management or test interpretation with another health professional (see separate area of note for details)    The patient's management necessitated moderate risk (prescription drug management including medications given in the ED) and high risk (a decision regarding hospitalization).        New Prescriptions    No medications on file       Final diagnoses:   Sepsis (H)   Septic joint (H)   Atrial fibrillation, unspecified type (H)       11/25/2024   [facility] AND [facility]valeGurmeetaJAYLEN CNP  11/25/24 2051    "

## 2024-11-25 NOTE — ED NOTES
Nurse was at bedside with provider speaking with family when patient became nauseated and began dry-heaving.  Staff immediately sat patient up and provided bag and patient had episode of emesis.  During emesis, patient bradied down and then went into afib RVR with rates up to the 170's.  Fluids and zofran given as ordered with repeat EKG.  2L 02 NC applied due to 02 decreasing into the 80's, RT notified of patient being placed on 02 with sats now maintaining at 92%.  Patients lung sounds assessed with posterior wheezes heard, provider notified and CXR ordered.  Second PIV placed.

## 2024-11-26 LAB
ATRIAL RATE - MUSE: 122 BPM
DIASTOLIC BLOOD PRESSURE - MUSE: NORMAL MMHG
INTERPRETATION ECG - MUSE: NORMAL
P AXIS - MUSE: NORMAL DEGREES
PR INTERVAL - MUSE: NORMAL MS
QRS DURATION - MUSE: 100 MS
QT - MUSE: 306 MS
QTC - MUSE: 419 MS
R AXIS - MUSE: 45 DEGREES
SYSTOLIC BLOOD PRESSURE - MUSE: NORMAL MMHG
T AXIS - MUSE: 247 DEGREES
VENTRICULAR RATE- MUSE: 113 BPM

## 2024-11-26 NOTE — ED NOTES
[facility] out of [place] will be here at approximately 2040 to transport patient to [facility].

## 2024-11-28 LAB — BACTERIA BLD CULT: NORMAL

## 2024-11-30 LAB — BACTERIA BLD CULT: NO GROWTH

## 2024-12-11 NOTE — PROGRESS NOTES
ANTICOAGULATION FOLLOW-UP CLINIC VISIT    Patient Name:  Babak Moran  Date:  6/18/2018  Contact Type:  Face to Face    SUBJECTIVE:     Patient Findings     Positives No Problem Findings           OBJECTIVE    INR Protime   Date Value Ref Range Status   06/18/2018 2.9 (A) 0.86 - 1.14 Final       ASSESSMENT / PLAN  INR assessment THER    Recheck INR In: 4 WEEKS    INR Location Clinic      Anticoagulation Summary as of 6/18/2018     INR goal 2.0-3.0   Today's INR 2.9   Warfarin maintenance plan 10 mg (5 mg x 2) on Tue, Thu, Sat; 7.5 mg (5 mg x 1.5) all other days   Full warfarin instructions 10 mg on Tue, Thu, Sat; 7.5 mg all other days   Weekly warfarin total 60 mg   No change documented Shelley Mercado, DANYELLE   Next INR check 7/16/2018   Priority INR   Target end date Indefinite    Indications   Long term (current) use of anticoagulants [Z79.01]  Unspecified atrial fibrillation [I48.91]         Anticoagulation Episode Summary     INR check location     Preferred lab     Send INR reminders to  INR    Comments Babak prefers to be closer to 3.0 due to previous CVA.check Q 4 weeks.Declines to reduce dose when slightly over 3.0      Anticoagulation Care Providers     Provider Role Specialty Phone number    Alvino Yi MD [facility] [phone]            See the Encounter Report to view Anticoagulation Flowsheet and Dosing Calendar (Go to Encounters tab in chart review, and find the Anticoagulation Therapy Visit)        Shelley Mercado, RN                [[email or web address]] : [age] year old female with fever, cough, and sore throat most likely due to viral etiology. Cough resembled that of a croup cough. Recommended use of budesonide nebulizer treatment twice daily, and if symptoms worsening can give one time dose of oral steroids. Rapid strep negative and will follow-up with throat culture. Recommend using mist from a humidifier. Allow the child to breathe cool air during the night by opening a window or door. Fever can be treated with an over-the-counter medication such as acetaminophen or ibuprofen. Coughing can be treated with warm, clear fluids to loosen mucus on the vocal cords. Warm water, apple juice, or lemonade is safe for children older than four months. Frozen juice popsicles also can be given. Keep the child's head elevated. If the child's stridor does not improve contact health care provider immediately.

## 2025-01-17 ENCOUNTER — OFFICE VISIT (OUTPATIENT)
Dept: FAMILY MEDICINE | Facility: OTHER | Age: 65
End: 2025-01-17
Attending: NURSE PRACTITIONER
Payer: COMMERCIAL

## 2025-01-17 VITALS
SYSTOLIC BLOOD PRESSURE: 124 MMHG | RESPIRATION RATE: 18 BRPM | HEIGHT: 75 IN | WEIGHT: 315 LBS | DIASTOLIC BLOOD PRESSURE: 72 MMHG | BODY MASS INDEX: 39.17 KG/M2 | TEMPERATURE: 98.9 F | HEART RATE: 106 BPM | OXYGEN SATURATION: 93 %

## 2025-01-17 DIAGNOSIS — Z98.890 STATUS POST LEFT KNEE SURGERY: ICD-10-CM

## 2025-01-17 DIAGNOSIS — I50.810 RIGHT HEART FAILURE WITH REDUCED RIGHT VENTRICULAR FUNCTION (H): ICD-10-CM

## 2025-01-17 DIAGNOSIS — G47.33 OSA (OBSTRUCTIVE SLEEP APNEA): ICD-10-CM

## 2025-01-17 DIAGNOSIS — M00.262 STREPTOCOCCAL ARTHRITIS OF LEFT KNEE (H): Primary | ICD-10-CM

## 2025-01-17 DIAGNOSIS — Z28.21 IMMUNIZATION DECLINED: ICD-10-CM

## 2025-01-17 DIAGNOSIS — I10 ESSENTIAL HYPERTENSION: ICD-10-CM

## 2025-01-17 DIAGNOSIS — Z86.73 HISTORY OF CVA (CEREBROVASCULAR ACCIDENT): ICD-10-CM

## 2025-01-17 DIAGNOSIS — I63.49 CEREBROVASCULAR ACCIDENT (CVA) DUE TO EMBOLISM OF OTHER CEREBRAL ARTERY (H): ICD-10-CM

## 2025-01-17 DIAGNOSIS — I48.21 PERMANENT ATRIAL FIBRILLATION (H): ICD-10-CM

## 2025-01-17 DIAGNOSIS — Z09 HOSPITAL DISCHARGE FOLLOW-UP: ICD-10-CM

## 2025-01-17 DIAGNOSIS — E66.01 MORBID OBESITY WITH BMI OF 40.0-44.9, ADULT (H): ICD-10-CM

## 2025-01-17 LAB
ALBUMIN SERPL BCG-MCNC: 3.7 G/DL (ref 3.5–5.2)
ALP SERPL-CCNC: 67 U/L (ref 40–150)
ALT SERPL W P-5'-P-CCNC: 18 U/L (ref 0–70)
ANION GAP SERPL CALCULATED.3IONS-SCNC: 9 MMOL/L (ref 7–15)
AST SERPL W P-5'-P-CCNC: 17 U/L (ref 0–45)
BASOPHILS # BLD AUTO: 0.1 10E3/UL (ref 0–0.2)
BASOPHILS NFR BLD AUTO: 1 %
BILIRUB SERPL-MCNC: 0.5 MG/DL
BUN SERPL-MCNC: 19.1 MG/DL (ref 8–23)
CALCIUM SERPL-MCNC: 9.6 MG/DL (ref 8.8–10.4)
CHLORIDE SERPL-SCNC: 101 MMOL/L (ref 98–107)
CREAT SERPL-MCNC: 0.81 MG/DL (ref 0.67–1.17)
EGFRCR SERPLBLD CKD-EPI 2021: >90 ML/MIN/1.73M2
EOSINOPHIL # BLD AUTO: 0.3 10E3/UL (ref 0–0.7)
EOSINOPHIL NFR BLD AUTO: 4 %
ERYTHROCYTE [DISTWIDTH] IN BLOOD BY AUTOMATED COUNT: 16.9 % (ref 10–15)
GLUCOSE SERPL-MCNC: 95 MG/DL (ref 70–99)
HCO3 SERPL-SCNC: 31 MMOL/L (ref 22–29)
HCT VFR BLD AUTO: 45.3 % (ref 40–53)
HGB BLD-MCNC: 13.8 G/DL (ref 13.3–17.7)
IMM GRANULOCYTES # BLD: 0 10E3/UL
IMM GRANULOCYTES NFR BLD: 0 %
LYMPHOCYTES # BLD AUTO: 1.3 10E3/UL (ref 0.8–5.3)
LYMPHOCYTES NFR BLD AUTO: 15 %
MCH RBC QN AUTO: 27 PG (ref 26.5–33)
MCHC RBC AUTO-ENTMCNC: 30.5 G/DL (ref 31.5–36.5)
MCV RBC AUTO: 89 FL (ref 78–100)
MONOCYTES # BLD AUTO: 0.7 10E3/UL (ref 0–1.3)
MONOCYTES NFR BLD AUTO: 9 %
NEUTROPHILS # BLD AUTO: 6 10E3/UL (ref 1.6–8.3)
NEUTROPHILS NFR BLD AUTO: 71 %
NRBC # BLD AUTO: 0 10E3/UL
NRBC BLD AUTO-RTO: 0 /100
PLATELET # BLD AUTO: 307 10E3/UL (ref 150–450)
POTASSIUM SERPL-SCNC: 4.1 MMOL/L (ref 3.4–5.3)
PROT SERPL-MCNC: 7.5 G/DL (ref 6.4–8.3)
RBC # BLD AUTO: 5.11 10E6/UL (ref 4.4–5.9)
SODIUM SERPL-SCNC: 141 MMOL/L (ref 135–145)
WBC # BLD AUTO: 8.4 10E3/UL (ref 4–11)

## 2025-01-17 PROCEDURE — 85004 AUTOMATED DIFF WBC COUNT: CPT | Mod: ZL | Performed by: NURSE PRACTITIONER

## 2025-01-17 PROCEDURE — 85041 AUTOMATED RBC COUNT: CPT | Mod: ZL | Performed by: NURSE PRACTITIONER

## 2025-01-17 PROCEDURE — 80053 COMPREHEN METABOLIC PANEL: CPT | Mod: ZL | Performed by: NURSE PRACTITIONER

## 2025-01-17 PROCEDURE — 36415 COLL VENOUS BLD VENIPUNCTURE: CPT | Mod: ZL | Performed by: NURSE PRACTITIONER

## 2025-01-17 RX ORDER — AMOXICILLIN 500 MG/1
1000 CAPSULE ORAL
COMMUNITY
Start: 2025-01-08

## 2025-01-17 ASSESSMENT — PAIN SCALES - GENERAL: PAINLEVEL_OUTOF10: NO PAIN (0)

## 2025-01-17 NOTE — PROGRESS NOTES
Assessment & Plan   Problem List Items Addressed This Visit       Essential hypertension    Morbid obesity with BMI of 40.0-44.9, adult (H)    Permanent atrial fibrillation (H)    Right heart failure with reduced right ventricular function (H)    History of CVA     Other Visit Diagnoses       Streptococcal arthritis of left knee (H)    -  Primary    Relevant Orders    Comprehensive Metabolic Panel (Completed)    CBC and Differential (Completed)    Hospital discharge follow-up        Relevant Orders    Comprehensive Metabolic Panel (Completed)    CBC and Differential (Completed)    Status post left knee surgery        Cerebrovascular accident (CVA) due to embolism of other cerebral artery (H)        BARRIE (obstructive sleep apnea)        Immunization declined               1. Hospital discharge follow-up  2. Streptococcal arthritis of left knee (H) (Primary)  - Comprehensive Metabolic Panel  - CBC and Differential  Improving; stable values. Continue on amoxicillin and follow up with infectious disease as scheduled     3. Status post left knee surgery  Year 2023; several surgeries since. Brace is intact, advancing by 10 degrees weekly. No evidence of active infection. Continue with 6 weeks of amoxicillin and follow up with infectious disease as planned/scheduled.     4. Morbid obesity with BMI of 40.0-44.9, adult (H)  Chronic; would benefit from weight loss in the future     5. Cerebrovascular accident (CVA) due to embolism of other cerebral artery (H)  6. Right heart failure with reduced right ventricular function (H)  7. Essential hypertension  Blood pressure is currently well controlled with lisinopril, demadex, metoprolol succinate     8. Permanent atrial fibrillation (H)  Anticoagulated on warfarin, metoprolol succinate   Target INR 2-3    9. BARRIE (obstructive sleep apnea)  Wears cpap; chronic and stable     10. History of CVA  Anticoagulated on warfarin     11. Immunization declined  Offered and recommended  pneumococcal, rsv, covid immunizations which patient declined        MED REC REQUIRED  Post Medication Reconciliation Status: discharge medications reconciled, continue medications without change    Follow up as scheduled with infectious disease, orthopedics, physical therapy       Subjective   Babak is a 64 year old, presenting for the following health issues:  Hospital F/U ([facility])        1/17/2025     8:36 AM   Additional Questions   Roomed by STEWART Ortiz   Accompanied by n/a         1/17/2025     8:36 AM   Patient Reported Additional Medications   Patient reports taking the following new medications amoxicillin     History of Present Illness       Hypertension: He presents for follow up of hypertension.  He does not check blood pressure  regularly outside of the clinic. Outpatient blood pressures have not been over 140/90. He follows a low salt diet.           Hospital Follow-up Visit:    Hospital/Nursing Home/IP Rehab Facility:  [facility]  Date of Admission: 12/4/24  Date of Discharge: 1/7/25  Reason(s) for Admission: AAA, Permanent Afib, cerebral infarction affecting left side, etc.  Was the patient in the ICU or did the patient experience delirium during hospitalization?  No  Do you have any other stressors you would like to discuss with your provider? No    Problems taking medications regularly:  None  Medication changes since discharge: Taking Amoxicillin   Problems adhering to non-medication therapy:  None    Summary of hospitalization:  [facility] hospital discharge summary reviewed  Diagnostic Tests/Treatments reviewed.  Follow up needed: upcoming appointments are scheduled for infectious disease doctor, orthopedic doctor, physical therapy at [facility]   Other Healthcare Providers Involved in Patient s Care:         None  Update since discharge: improved.       Babak presents today for hospital follow up. He spent some time in [place] and then was transferred to  "[place] for hospitalization from 12/4-1/7 where he was receiving antibiotics via a picc line. He has no pain and has felt ok since hospital discharge. No subsequent fevers. Covid has resolved. He is on amoxicillin three times daily for 6 weeks. Upcoming virtual follow up with the infectious disease doctor as well as ortho doctor.     Brace - He is adjusting up by 10 degrees each week - as guidance provided by PT and ortho. Brace remains on since September of 2023. He has had 7 surgeries since this his knee replacement.     Chronic anticoagulation with warfarin; atrial fibrillation. Denies chest pain, palpitations. He had covid during hospitalization which has resolved. No cough or shortness of breath.    Plan of care communicated with patient             Review of Systems  Constitutional, neuro, ENT, endocrine, pulmonary, cardiac, gastrointestinal, genitourinary, musculoskeletal, integument and psychiatric systems are negative, except as otherwise noted.      Objective    /72 (BP Location: Right arm, Patient Position: Sitting, Cuff Size: Adult Large)   Pulse 106   Temp 98.9  F (37.2  C) (Tympanic)   Resp 18   Ht 1.905 m (6' 3\")   Wt (!) 161.2 kg (355 lb 6.4 oz)   SpO2 93%   BMI 44.42 kg/m    Body mass index is 44.42 kg/m .  Physical Exam  Vitals and nursing note reviewed.   Constitutional:       General: He is not in acute distress.     Appearance: Normal appearance. He is normal weight. He is not ill-appearing or toxic-appearing.   Cardiovascular:      Rate and Rhythm: Regular rhythm. Tachycardia present.      Heart sounds: Normal heart sounds. No murmur heard.  Pulmonary:      Effort: Pulmonary effort is normal. No respiratory distress.      Breath sounds: Normal breath sounds. No stridor. No wheezing, rhonchi or rales.   Chest:      Chest wall: No tenderness.   Abdominal:      General: Abdomen is flat. Bowel sounds are normal.      Palpations: Abdomen is soft.   Musculoskeletal:         General: " Normal range of motion.      Right lower leg: Edema present.      Left lower leg: Edema present.      Comments: Left knee is in brace; there is no drainage, warmth, or erythema. The leg is generally swollen. No calf tenderness or warmth. A dressing is in place.    Skin:     General: Skin is warm and dry.      Coloration: Skin is not pale.      Findings: No rash.   Neurological:      General: No focal deficit present.      Mental Status: He is alert and oriented to person, place, and time.   Psychiatric:         Mood and Affect: Mood normal.         Behavior: Behavior normal.              Results for orders placed or performed in visit on 01/17/25   Comprehensive Metabolic Panel     Status: Abnormal   Result Value Ref Range    Sodium 141 135 - 145 mmol/L    Potassium 4.1 3.4 - 5.3 mmol/L    Carbon Dioxide (CO2) 31 (H) 22 - 29 mmol/L    Anion Gap 9 7 - 15 mmol/L    Urea Nitrogen 19.1 8.0 - 23.0 mg/dL    Creatinine 0.81 0.67 - 1.17 mg/dL    GFR Estimate >90 >60 mL/min/1.73m2    Calcium 9.6 8.8 - 10.4 mg/dL    Chloride 101 98 - 107 mmol/L    Glucose 95 70 - 99 mg/dL    Alkaline Phosphatase 67 40 - 150 U/L    AST 17 0 - 45 U/L    ALT 18 0 - 70 U/L    Protein Total 7.5 6.4 - 8.3 g/dL    Albumin 3.7 3.5 - 5.2 g/dL    Bilirubin Total 0.5 <=1.2 mg/dL   CBC with platelets and differential     Status: Abnormal   Result Value Ref Range    WBC Count 8.4 4.0 - 11.0 10e3/uL    RBC Count 5.11 4.40 - 5.90 10e6/uL    Hemoglobin 13.8 13.3 - 17.7 g/dL    Hematocrit 45.3 40.0 - 53.0 %    MCV 89 78 - 100 fL    MCH 27.0 26.5 - 33.0 pg    MCHC 30.5 (L) 31.5 - 36.5 g/dL    RDW 16.9 (H) 10.0 - 15.0 %    Platelet Count 307 150 - 450 10e3/uL    % Neutrophils 71 %    % Lymphocytes 15 %    % Monocytes 9 %    % Eosinophils 4 %    % Basophils 1 %    % Immature Granulocytes 0 %    NRBCs per 100 WBC 0 <1 /100    Absolute Neutrophils 6.0 1.6 - 8.3 10e3/uL    Absolute Lymphocytes 1.3 0.8 - 5.3 10e3/uL    Absolute Monocytes 0.7 0.0 - 1.3 10e3/uL     Absolute Eosinophils 0.3 0.0 - 0.7 10e3/uL    Absolute Basophils 0.1 0.0 - 0.2 10e3/uL    Absolute Immature Granulocytes 0.0 <=0.4 10e3/uL    Absolute NRBCs 0.0 10e3/uL   CBC and Differential     Status: Abnormal    Narrative    The following orders were created for panel order CBC and Differential.  Procedure                               Abnormality         Status                     ---------                               -----------         ------                     CBC with platelets and d...[[id number]]  Abnormal            Final result                 Please view results for these tests on the individual orders.   Results for orders placed or performed in visit on 01/17/25   INR POCT     Status: Abnormal   Result Value Ref Range    INR Point of Care 3.3 (A) 0.9 - 1.1       Signed Electronically by: Kyara Erazo NP

## 2025-01-17 NOTE — NURSING NOTE
"Chief Complaint   Patient presents with    Hospital F/U     [facility]   Patient presents today for hospital follow up from [facility]. (12/4/24-1/7/25). Says that he has been feeling the same since discharge.         Initial /72 (BP Location: Right arm, Patient Position: Sitting, Cuff Size: Adult Large)   Pulse 106   Temp 98.9  F (37.2  C) (Tympanic)   Resp 18   Ht 1.905 m (6' 3\")   Wt (!) 161.2 kg (355 lb 6.4 oz)   SpO2 93%   BMI 44.42 kg/m   Estimated body mass index is 44.42 kg/m  as calculated from the following:    Height as of this encounter: 1.905 m (6' 3\").    Weight as of this encounter: 161.2 kg (355 lb 6.4 oz).     FOOD SECURITY SCREENING QUESTIONS:    The next two questions are to help us understand your food security.  If you are feeling you need any assistance in this area, we have resources available to support you today.    Hunger Vital Signs:  Within the past 12 months we worried whether our food would run out before we got money to buy more. Never  Within the past 12 months the food we bought just didn't last and we didn't have money to get more. Never      Kayla Garcia    "

## 2025-01-27 ENCOUNTER — MEDICAL CORRESPONDENCE (OUTPATIENT)
Dept: HEALTH INFORMATION MANAGEMENT | Facility: OTHER | Age: 65
End: 2025-01-27
Payer: COMMERCIAL

## 2025-02-09 ENCOUNTER — HEALTH MAINTENANCE LETTER (OUTPATIENT)
Age: 65
End: 2025-02-09

## 2025-03-16 SDOH — HEALTH STABILITY: PHYSICAL HEALTH: ON AVERAGE, HOW MANY DAYS PER WEEK DO YOU ENGAGE IN MODERATE TO STRENUOUS EXERCISE (LIKE A BRISK WALK)?: 0 DAYS

## 2025-03-16 SDOH — HEALTH STABILITY: PHYSICAL HEALTH: ON AVERAGE, HOW MANY MINUTES DO YOU ENGAGE IN EXERCISE AT THIS LEVEL?: 0 MIN

## 2025-03-16 ASSESSMENT — SOCIAL DETERMINANTS OF HEALTH (SDOH): HOW OFTEN DO YOU GET TOGETHER WITH FRIENDS OR RELATIVES?: ONCE A WEEK

## 2025-03-20 ASSESSMENT — PATIENT HEALTH QUESTIONNAIRE - PHQ9
SUM OF ALL RESPONSES TO PHQ QUESTIONS 1-9: 15
10. IF YOU CHECKED OFF ANY PROBLEMS, HOW DIFFICULT HAVE THESE PROBLEMS MADE IT FOR YOU TO DO YOUR WORK, TAKE CARE OF THINGS AT HOME, OR GET ALONG WITH OTHER PEOPLE: VERY DIFFICULT
SUM OF ALL RESPONSES TO PHQ QUESTIONS 1-9: 15

## 2025-03-21 ENCOUNTER — OFFICE VISIT (OUTPATIENT)
Dept: FAMILY MEDICINE | Facility: OTHER | Age: 65
End: 2025-03-21
Attending: PHYSICIAN ASSISTANT
Payer: COMMERCIAL

## 2025-03-21 VITALS
DIASTOLIC BLOOD PRESSURE: 68 MMHG | BODY MASS INDEX: 40.43 KG/M2 | WEIGHT: 315 LBS | SYSTOLIC BLOOD PRESSURE: 104 MMHG | HEIGHT: 74 IN | TEMPERATURE: 98.2 F | RESPIRATION RATE: 18 BRPM | HEART RATE: 72 BPM

## 2025-03-21 DIAGNOSIS — I10 ESSENTIAL HYPERTENSION: ICD-10-CM

## 2025-03-21 DIAGNOSIS — R73.03 PREDIABETES: ICD-10-CM

## 2025-03-21 DIAGNOSIS — G47.33 OBSTRUCTIVE SLEEP APNEA SYNDROME: ICD-10-CM

## 2025-03-21 DIAGNOSIS — Z12.5 SCREENING FOR PROSTATE CANCER: ICD-10-CM

## 2025-03-21 DIAGNOSIS — I48.21 PERMANENT ATRIAL FIBRILLATION (H): ICD-10-CM

## 2025-03-21 DIAGNOSIS — G81.94 LEFT HEMIPARESIS (H): ICD-10-CM

## 2025-03-21 DIAGNOSIS — Z00.00 ENCOUNTER FOR MEDICARE ANNUAL WELLNESS EXAM: Primary | ICD-10-CM

## 2025-03-21 DIAGNOSIS — Z96.651 STATUS POST TOTAL RIGHT KNEE REPLACEMENT: ICD-10-CM

## 2025-03-21 DIAGNOSIS — I50.810 RIGHT HEART FAILURE WITH REDUCED RIGHT VENTRICULAR FUNCTION (H): ICD-10-CM

## 2025-03-21 DIAGNOSIS — Z12.11 COLON CANCER SCREENING: ICD-10-CM

## 2025-03-21 DIAGNOSIS — I87.2 CHRONIC VENOUS STASIS DERMATITIS OF BOTH LOWER EXTREMITIES: ICD-10-CM

## 2025-03-21 DIAGNOSIS — E66.01 MORBID OBESITY WITH BMI OF 40.0-44.9, ADULT (H): ICD-10-CM

## 2025-03-21 DIAGNOSIS — E53.8 VITAMIN B12 DEFICIENCY (NON ANEMIC): ICD-10-CM

## 2025-03-21 DIAGNOSIS — Z98.890 HX OF LEFT KNEE SURGERY: ICD-10-CM

## 2025-03-21 DIAGNOSIS — Z96.652 S/P TOTAL KNEE ARTHROPLASTY, LEFT: ICD-10-CM

## 2025-03-21 DIAGNOSIS — M32.9 SYSTEMIC LUPUS ERYTHEMATOSUS, UNSPECIFIED SLE TYPE, UNSPECIFIED ORGAN INVOLVEMENT STATUS (H): Chronic | ICD-10-CM

## 2025-03-21 LAB
ALBUMIN SERPL BCG-MCNC: 4.3 G/DL (ref 3.5–5.2)
ALP SERPL-CCNC: 77 U/L (ref 40–150)
ALT SERPL W P-5'-P-CCNC: 22 U/L (ref 0–70)
ANION GAP SERPL CALCULATED.3IONS-SCNC: 9 MMOL/L (ref 7–15)
AST SERPL W P-5'-P-CCNC: 27 U/L (ref 0–45)
BILIRUB SERPL-MCNC: 0.6 MG/DL
BUN SERPL-MCNC: 18.2 MG/DL (ref 8–23)
CALCIUM SERPL-MCNC: 9.7 MG/DL (ref 8.8–10.4)
CHLORIDE SERPL-SCNC: 99 MMOL/L (ref 98–107)
CHOLEST SERPL-MCNC: 163 MG/DL
CREAT SERPL-MCNC: 1 MG/DL (ref 0.67–1.17)
EGFRCR SERPLBLD CKD-EPI 2021: 85 ML/MIN/1.73M2
FASTING STATUS PATIENT QL REPORTED: YES
FASTING STATUS PATIENT QL REPORTED: YES
GLUCOSE SERPL-MCNC: 92 MG/DL (ref 70–99)
HCO3 SERPL-SCNC: 33 MMOL/L (ref 22–29)
HDLC SERPL-MCNC: 33 MG/DL
LDLC SERPL CALC-MCNC: 111 MG/DL
NONHDLC SERPL-MCNC: 130 MG/DL
POTASSIUM SERPL-SCNC: 4.2 MMOL/L (ref 3.4–5.3)
PROT SERPL-MCNC: 8.4 G/DL (ref 6.4–8.3)
PSA SERPL DL<=0.01 NG/ML-MCNC: 1.47 NG/ML (ref 0–4.5)
SODIUM SERPL-SCNC: 141 MMOL/L (ref 135–145)
TRIGL SERPL-MCNC: 94 MG/DL

## 2025-03-21 PROCEDURE — 36415 COLL VENOUS BLD VENIPUNCTURE: CPT | Mod: ZL | Performed by: PHYSICIAN ASSISTANT

## 2025-03-21 PROCEDURE — 82607 VITAMIN B-12: CPT | Mod: ZL | Performed by: PHYSICIAN ASSISTANT

## 2025-03-21 PROCEDURE — 80053 COMPREHEN METABOLIC PANEL: CPT | Mod: ZL | Performed by: PHYSICIAN ASSISTANT

## 2025-03-21 PROCEDURE — 80061 LIPID PANEL: CPT | Mod: ZL | Performed by: PHYSICIAN ASSISTANT

## 2025-03-21 PROCEDURE — 99214 OFFICE O/P EST MOD 30 MIN: CPT | Mod: 25 | Performed by: PHYSICIAN ASSISTANT

## 2025-03-21 PROCEDURE — 3074F SYST BP LT 130 MM HG: CPT | Performed by: PHYSICIAN ASSISTANT

## 2025-03-21 PROCEDURE — G0402 INITIAL PREVENTIVE EXAM: HCPCS | Performed by: PHYSICIAN ASSISTANT

## 2025-03-21 PROCEDURE — G2211 COMPLEX E/M VISIT ADD ON: HCPCS | Performed by: PHYSICIAN ASSISTANT

## 2025-03-21 PROCEDURE — G0103 PSA SCREENING: HCPCS | Mod: ZL | Performed by: PHYSICIAN ASSISTANT

## 2025-03-21 PROCEDURE — 3078F DIAST BP <80 MM HG: CPT | Performed by: PHYSICIAN ASSISTANT

## 2025-03-21 PROCEDURE — 1126F AMNT PAIN NOTED NONE PRSNT: CPT | Performed by: PHYSICIAN ASSISTANT

## 2025-03-21 ASSESSMENT — PAIN SCALES - GENERAL: PAINLEVEL_OUTOF10: NO PAIN (0)

## 2025-03-21 NOTE — PATIENT INSTRUCTIONS
Patient Education   Preventive Care Advice   This is general advice given by our system to help you stay healthy. However, your care team may have specific advice just for you. Please talk to your care team about your preventive care needs.  Nutrition  Eat 5 or more servings of fruits and vegetables each day.  Try wheat bread, brown rice and whole grain pasta (instead of white bread, rice, and pasta).  Get enough calcium and vitamin D. Check the label on foods and aim for 100% of the RDA (recommended daily allowance).  Lifestyle  Exercise at least 150 minutes each week  (30 minutes a day, 5 days a week).  Do muscle strengthening activities 2 days a week. These help control your weight and prevent disease.  No smoking.  Wear sunscreen to prevent skin cancer.  Have a dental exam and cleaning every 6 months.  Yearly exams  See your health care team every year to talk about:  Any changes in your health.  Any medicines your care team has prescribed.  Preventive care, family planning, and ways to prevent chronic diseases.  Shots (vaccines)   HPV shots (up to age 26), if you've never had them before.  Hepatitis B shots (up to age 59), if you've never had them before.  COVID-19 shot: Get this shot when it's due.  Flu shot: Get a flu shot every year.  Tetanus shot: Get a tetanus shot every 10 years.  Pneumococcal, hepatitis A, and RSV shots: Ask your care team if you need these based on your risk.  Shingles shot (for age 50 and up)  General health tests  Diabetes screening:  Starting at age 35, Get screened for diabetes at least every 3 years.  If you are younger than age 35, ask your care team if you should be screened for diabetes.  Cholesterol test: At age 39, start having a cholesterol test every 5 years, or more often if advised.  Bone density scan (DEXA): At age 50, ask your care team if you should have this scan for osteoporosis (brittle bones).  Hepatitis C: Get tested at least once in your life.  STIs (sexually  transmitted infections)  Before age 24: Ask your care team if you should be screened for STIs.  After age 24: Get screened for STIs if you're at risk. You are at risk for STIs (including HIV) if:  You are sexually active with more than one person.  You don't use condoms every time.  You or a partner was diagnosed with a sexually transmitted infection.  If you are at risk for HIV, ask about PrEP medicine to prevent HIV.  Get tested for HIV at least once in your life, whether you are at risk for HIV or not.  Cancer screening tests  Cervical cancer screening: If you have a cervix, begin getting regular cervical cancer screening tests starting at age 21.  Breast cancer scan (mammogram): If you've ever had breasts, begin having regular mammograms starting at age 40. This is a scan to check for breast cancer.  Colon cancer screening: It is important to start screening for colon cancer at age 45.  Have a colonoscopy test every 10 years (or more often if you're at risk) Or, ask your provider about stool tests like a FIT test every year or Cologuard test every 3 years.  To learn more about your testing options, visit:   .  For help making a decision, visit:   https://bit.ly/ak05196.  Prostate cancer screening test: If you have a prostate, ask your care team if a prostate cancer screening test (PSA) at age 55 is right for you.  Lung cancer screening: If you are a current or former smoker ages 50 to 80, ask your care team if ongoing lung cancer screenings are right for you.  For informational purposes only. Not to replace the advice of your health care provider. Copyright   2023 [facility]. All rights reserved. Clinically reviewed by the [facility] Transitions Program. "[employer] 237390 - REV 01/24.  Learning About Activities of Daily Living  What are activities of daily living?     Activities of daily living (ADLs) are the basic self-care tasks you do every day. These include eating, bathing, dressing,  and moving around.  As you age, and if you have health problems, you may find that it's harder to do some of these tasks. If so, your doctor can suggest ideas that may help.  To measure what kind of help you may need, your doctor will ask how well you are able to do ADLs. Let your doctor know if there are any tasks that you are having trouble doing. This is an important first step to getting help. And when you have the help you need, you can stay as independent as possible.  How will a doctor assess your ADLs?  Asking about ADLs is part of a routine health checkup your doctor will likely do as you age. Your health check might be done in a doctor's office, in your home, or at a hospital. The goal is to find out if you are having any problems that could make it hard to care for yourself or that make it unsafe for you to be on your own.  To measure your ADLs, your doctor will ask how hard it is for you to do routine tasks. Your doctor may also want to know if you have changed the way you do a task because of a health problem. Your doctor may watch how you:  Walk back and forth.  Keep your balance while you stand or walk.  Move from sitting to standing or from a bed to a chair.  Button or unbutton a shirt or sweater.  Remove and put on your shoes.  It's common to feel a little worried or anxious if you find you can't do all the things you used to be able to do. Talking with your doctor about ADLs is a way to make sure you're as safe as possible and able to care for yourself as well as you can. You may want to bring a caregiver, friend, or family member to your checkup. They can help you talk to your doctor.  Follow-up care is a key part of your treatment and safety. Be sure to make and go to all appointments, and call your doctor if you are having problems. It's also a good idea to know your test results and keep a list of the medicines you take.  Current as of: October 24, 2024  Content Version: 14.4    5786-8686  [employer].   Care instructions adapted under license by your healthcare professional. If you have questions about a medical condition or this instruction, always ask your healthcare professional. [employer] disclaims any warranty or liability for your use of this information.    Preventing Falls: Care Instructions  Injuries and health problems such as trouble walking or poor eyesight can increase your risk of falling. So can some medicines. But there are things you can do to help prevent falls. You can exercise to get stronger. You can also arrange your home to make it safer.    Talk to your doctor about the medicines you take. Ask if any of them increase the risk of falls and whether they can be changed or stopped.   Try to exercise regularly. It can help improve your strength and balance. This can help lower your risk of falling.         Practice fall safety and prevention.   Wear low-heeled shoes that fit well and give your feet good support. Talk to your doctor if you have foot problems that make this hard.  Carry a cellphone or wear a medical alert device that you can use to call for help.  Use stepladders instead of chairs to reach high objects. Don't climb if you're at risk for falls. Ask for help, if needed.  Wear the correct eyeglasses, if you need them.        Make your home safer.   Remove rugs, cords, clutter, and furniture from walkways.  Keep your house well lit. Use night-lights in hallways and bathrooms.  Install and use sturdy handrails on stairways.  Wear nonskid footwear, even inside. Don't walk barefoot or in socks without shoes.        Be safe outside.   Use handrails, curb cuts, and ramps whenever possible.  Keep your hands free by using a shoulder bag or backpack.  Try to walk in well-lit areas. Watch out for uneven ground, changes in pavement, and debris.  Be careful in the winter. Walk on the grass or gravel when sidewalks are slippery. Use de-icer on steps and  "walkways. Add non-slip devices to shoes.    Put grab bars and nonskid mats in your shower or tub and near the toilet. Try to use a shower chair or bath bench when bathing.   Get into a tub or shower by putting in your weaker leg first. Get out with your strong side first. Have a phone or medical alert device in the bathroom with you.   Where can you learn more?  Go to https://www.[employer].youmag/patiented  Enter G117 in the search box to learn more about \"Preventing Falls: Care Instructions.\"  Current as of: July 31, 2024  Content Version: 14.4    5384-2287 [employer] / ?????.??.   Care instructions adapted under license by your healthcare professional. If you have questions about a medical condition or this instruction, always ask your healthcare professional. [employer] / ?????.?? disclaims any warranty or liability for your use of this information.    Learning About Stress  What is stress?     Stress is your body's response to a hard situation. Your body can have a physical, emotional, or mental response. Stress is a fact of life for most people, and it affects everyone differently. What causes stress for you may not be stressful for someone else.  A lot of things can cause stress. You may feel stress when you go on a job interview, take a test, or run a race. This kind of short-term stress is normal and even useful. It can help you if you need to work hard or react quickly. For example, stress can help you finish an important job on time.  Long-term stress is caused by ongoing stressful situations or events. Examples of long-term stress include long-term health problems, ongoing problems at work, or conflicts in your family. Long-term stress can harm your health.  How does stress affect your health?  When you are stressed, your body responds as though you are in danger. It makes hormones that speed up your heart, make you breathe faster, and give you a burst of energy. This is called the fight-or-flight stress " response. If the stress is over quickly, your body goes back to normal and no harm is done.  But if stress happens too often or lasts too long, it can have bad effects. Long-term stress can make you more likely to get sick, and it can make symptoms of some diseases worse. If you tense up when you are stressed, you may develop neck, shoulder, or low back pain. Stress is linked to high blood pressure and heart disease.  Stress also harms your emotional health. It can make you felton, tense, or depressed. Your relationships may suffer, and you may not do well at work or school.  What can you do to manage stress?  You can try these things to help manage stress:   Do something active. Exercise or activity can help reduce stress. Walking is a great way to get started. Even everyday activities such as housecleaning or yard work can help.  Try yoga or ole chi. These techniques combine exercise and meditation. You may need some training at first to learn them.  Do something you enjoy. For example, listen to music or go to a movie. Practice your hobby or do volunteer work.  Meditate. This can help you relax, because you are not worrying about what happened before or what may happen in the future.  Do guided imagery. Imagine yourself in any setting that helps you feel calm. You can use online videos, books, or a teacher to guide you.  Do breathing exercises. For example:  From a standing position, bend forward from the waist with your knees slightly bent. Let your arms dangle close to the floor.  Breathe in slowly and deeply as you return to a standing position. Roll up slowly and lift your head last.  Hold your breath for just a few seconds in the standing position.  Breathe out slowly and bend forward from the waist.  Let your feelings out. Talk, laugh, cry, and express anger when you need to. Talking with supportive friends or family, a counselor, or a braden leader about your feelings is a healthy way to relieve stress.  "Avoid discussing your feelings with people who make you feel worse.  Write. It may help to write about things that are bothering you. This helps you find out how much stress you feel and what is causing it. When you know this, you can find better ways to cope.  What can you do to prevent stress?  You might try some of these things to help prevent stress:  Manage your time. This helps you find time to do the things you want and need to do.  Get enough sleep. Your body recovers from the stresses of the day while you are sleeping.  Get support. Your family, friends, and community can make a difference in how you experience stress.  Limit your news feed. Avoid or limit time on social media or news that may make you feel stressed.  Do something active. Exercise or activity can help reduce stress. Walking is a great way to get started.  Where can you learn more?  Go to https://www.[employer]/patiented  Enter N032 in the search box to learn more about \"Learning About Stress.\"  Current as of: October 24, 2024  Content Version: 14.4    7503-5097 [employer].   Care instructions adapted under license by your healthcare professional. If you have questions about a medical condition or this instruction, always ask your healthcare professional. [employer] disclaims any warranty or liability for your use of this information.    Learning About Sleeping Well  What does sleeping well mean?     Sleeping well means getting enough sleep to feel good and stay healthy. How much sleep is enough varies among people.  The number of hours you sleep and how you feel when you wake up are both important. If you do not feel refreshed, you probably need more sleep. Another sign of not getting enough sleep is feeling tired during the day.  Experts recommend that adults get at least 7 or more hours of sleep per day. Children and older adults need more sleep.  Why is getting enough sleep important?  Getting enough quality " "sleep is a basic part of good health. When your sleep suffers, your physical health, mood, and your thoughts can suffer too. You may find yourself feeling more grumpy or stressed. Not getting enough sleep also can lead to serious problems, including injury, accidents, anxiety, and depression.  What might cause poor sleeping?  Many things can cause sleep problems, including:  Changes to your sleep schedule.  Stress. Stress can be caused by fear about a single event, such as giving a speech. Or you may have ongoing stress, such as worry about work or school.  Depression, anxiety, and other mental or emotional conditions.  Changes in your sleep habits or surroundings. This includes changes that happen where you sleep, such as noise, light, or sleeping in a different bed. It also includes changes in your sleep pattern, such as having jet lag or working a late shift.  Health problems, such as pain, breathing problems, and restless legs syndrome.  Lack of regular exercise.  Using alcohol, nicotine, or caffeine before bed.  How can you help yourself?  Here are some tips that may help you sleep more soundly and wake up feeling more refreshed.  Your sleeping area   Use your bedroom only for sleeping and sex. A bit of light reading may help you fall asleep. But if it doesn't, do your reading elsewhere in the house. Try not to use your TV, computer, smartphone, or tablet while you are in bed.  Be sure your bed is big enough to stretch out comfortably, especially if you have a sleep partner.  Keep your bedroom quiet, dark, and cool. Use curtains, blinds, or a sleep mask to block out light. To block out noise, use earplugs, soothing music, or a \"white noise\" machine.  Your evening and bedtime routine   Create a relaxing bedtime routine. You might want to take a warm shower or bath, or listen to soothing music.  Go to bed at the same time every night. And get up at the same time every morning, even if you feel tired.  What to " "avoid   Limit caffeine (coffee, tea, caffeinated sodas) during the day, and don't have any for at least 6 hours before bedtime.  Avoid drinking alcohol before bedtime. Alcohol can cause you to wake up more often during the night.  Try not to smoke or use tobacco, especially in the evening. Nicotine can keep you awake.  Limit naps during the day, especially close to bedtime.  Avoid lying in bed awake for too long. If you can't fall asleep or if you wake up in the middle of the night and can't get back to sleep within about 20 minutes, get out of bed and go to another room until you feel sleepy.  Avoid taking medicine right before bed that may keep you awake or make you feel hyper or energized. Your doctor can tell you if your medicine may do this and if you can take it earlier in the day.  If you can't sleep   Imagine yourself in a peaceful, pleasant scene. Focus on the details and feelings of being in a place that is relaxing.  Get up and do a quiet or boring activity until you feel sleepy.  Avoid drinking any liquids before going to bed to help prevent waking up often to use the bathroom.  Where can you learn more?  Go to https://www.[employer].net/patiented  Enter J942 in the search box to learn more about \"Learning About Sleeping Well.\"  Current as of: July 31, 2024  Content Version: 14.4    4544-4854 [employer]y.   Care instructions adapted under license by your healthcare professional. If you have questions about a medical condition or this instruction, always ask your healthcare professional. [employer] disclaims any warranty or liability for your use of this information.    Learning About Depression Screening  What is depression screening?  Depression screening is a way to see if you have depression symptoms. It may be done by a doctor or counselor. It's often part of a routine checkup. That's because your mental health is just as important as your physical health.  Depression is a " "mental health condition that affects how you feel, think, and act. You may:  Have less energy.  Lose interest in your daily activities.  Feel sad and grouchy for a long time.  Depression is very common. It affects people of all ages.  Many things can lead to depression. Some people become depressed after they have a stroke or find out they have a major illness like cancer or heart disease. The death of a loved one or a breakup may lead to depression. It can run in families. Most experts believe that a combination of inherited genes and stressful life events can cause it.  What happens during screening?  You may be asked to fill out a form about your depression symptoms. You and the doctor will discuss your answers. The doctor may ask you more questions to learn more about how you think, act, and feel.  What happens after screening?  If you have symptoms of depression, your doctor will talk to you about your options.  Doctors usually treat depression with medicines or counseling. Often, combining the two works best. Many people don't get help because they think that they'll get over the depression on their own. But people with depression may not get better unless they get treatment.  The cause of depression is not well understood. There may be many factors involved. But if you have depression, it's not your fault.  A serious symptom of depression is thinking about death or suicide. If you or someone you care about talks about this or about feeling hopeless, get help right away.  It's important to know that depression can be treated. Medicine, counseling, and self-care may help.  Where can you learn more?  Go to https://www.TalkShoe.net/patiented  Enter T185 in the search box to learn more about \"Learning About Depression Screening.\"  Current as of: July 31, 2024  Content Version: 14.4    7055-9699 [employer].   Care instructions adapted under license by your healthcare professional. If you have " questions about a medical condition or this instruction, always ask your healthcare professional. [employer] disclaims any warranty or liability for your use of this information.

## 2025-03-21 NOTE — PROGRESS NOTES
Preventive Care Visit  [facility] AND [facility]  Cris Major PA-C, Family Medicine  Mar 21, 2025      Assessment & Plan       ICD-10-CM    1. Encounter for Medicare annual wellness exam  Z00.00       2. Vitamin B12 deficiency (non anemic)  E53.8       3. Morbid obesity with BMI of 40.0-44.9, adult (H)  E66.01     Z68.41       4. Obstructive sleep apnea syndrome  G47.33       5. Left hemiparesis (H)  G81.94       6. Prediabetes  R73.03 Comprehensive Metabolic Panel     Lipid Panel      7. Permanent atrial fibrillation (H)  I48.21       8. Essential hypertension  I10       9. Chronic venous stasis dermatitis of both lower extremities  I87.2       10. Right heart failure with reduced right ventricular function (H)  I50.810       11. Systemic lupus erythematosus, unspecified SLE type, unspecified organ involvement status (H)  M32.9       12. Hx of left knee surgery  Z98.890       13. S/P total knee arthroplasty, left  Z96.652       14. Status post total right knee replacement  Z96.651       15. Screening for prostate cancer  Z12.5 PSA Screen [facility]      16. Colon cancer screening  Z12.11         Medicare annual physical completed today. Vaccine counseling performed today in regards to RSV, Shingrix and Pneumococcal vaccines. No previous immunization reactions. No recent or current illness. Kindly deferred immunizations today due to recovery from recent left knee surgery and infection, as well as currently on antibiotics.   B12 deficiency - chronic in nature. B12 level in process.   Morbid obesity - Recommend increased lean proteins, fruits and vegetable intake. Goal exercise 150 minutes of moderate exercise per week (walking is great exercise). Weight loss would benefit cardiopulmonary and overall health.   Exercise is limited by recovery from recurrent left knee surgeries and recent infection.  Obstructive sleep apnea - stable. Wears CPAP with benefit. No changes to treatment plan. Aware of benefits of wearing  CPAP.   Left hemiparesis - chronic in nature. Foot drop due to this, upcoming fitting for brace. Aware of return precautions.   Prediabetes - updated within last year, A1c normal at 5.1%.   Permanent atrial fibrillation - upcoming visit with Dr. Derick MD and cardiology team. Babak will discuss whether it would be feasible to switch from Coumadin to Xarelto with cardiology team. Continue Metoprolol. Aware of return precautions.   Essential hypertension - blood pressures continue to be stable. Goal blood pressure < 120/80. Continue low salt diet. Continue current hypertensive regimen and close cardiology follow up. Up to date on refills.   Chronic venous stasis dermatitis - no acute changes. Continue with topical emollients and corticosteroids as needed.   Right sided heart failure - continue with CPAP compliance, close blood pressure monitoring and close cardiology follow up. Continue to monitor daily weights. Return precautions reviewed.   Lupus - no acute changes. Continued close follow up, no current pharmacotherapy.   History of left knee surgery - arthroplasty, wash out and other procedures listed below. Continued close follow up with orthopedics.   History of left knee total arthroplasty - recent infection requiring surgical wash out, IV antibiotics and now on oral Amoxil. Continue close follow up with infectious disease and orthopedics service lines. Return precautions reviewed.   History of right knee total arthroplasty - surgery x2 to right knee. Well recovered. Continue with strengthening at home.   Screening for prostate cancer - PSA updated today, normal. Continue with annual screening until age 70 tentatively, consider longer screening/shared decision making on screening beyond this point.   Colon cancer screening - last scope in 2012 which was normal. Due to recent left knee infection and concurrent antibiotic use, we will defer colonoscopy at this time (prefers this over cologuard) to prevent risks of  "complication. No red flag symptoms.     The longitudinal plan of care for the diagnosis(es)/condition(s) as documented were addressed during this visit. Due to the added complexity in care, I will continue to support Babak in the subsequent management and with ongoing continuity of care.    Patient has been advised of split billing requirements and indicates understanding: Yes    BMI  Estimated body mass index is 44.68 kg/m  as calculated from the following:    Height as of this encounter: 1.88 m (6' 2\").    Weight as of this encounter: 157.9 kg (348 lb).   Weight management plan: Discussed healthy diet and exercise guidelines    Depression Screening Follow Up        3/20/2025     4:21 PM   PHQ   PHQ-9 Total Score 15    Q9: Thoughts of better off dead/self-harm past 2 weeks Not at all       Patient-reported     Follow Up Actions Taken  Crisis resource information provided in After Visit Summary  Patient has experienced a recent significant life event.  Patient counseled, no additional follow up at this time.     Counseling  Appropriate preventive services were addressed with this patient via screening, questionnaire, or discussion as appropriate for fall prevention, nutrition, physical activity, Tobacco-use cessation, social engagement, weight loss and cognition.  Checklist reviewing preventive services available has been given to the patient.  Reviewed patient's diet, addressing concerns and/or questions.   The patient was instructed to see the dentist every 6 months.   He is at risk for psychosocial distress and has been provided with information to reduce risk.   Discussed possible causes of fatigue. Updated plan of care.  Patient reported difficulty with activities of daily living were addressed today.The patient's PHQ-9 score is consistent with moderate depression. He was provided with information regarding depression.     See Patient Instructions    No follow-ups on file.    Subjective   Babak is a 64 year old, " presenting for the following:  Medicare Visit (wellness)        3/21/2025     3:47 PM   Additional Questions   Roomed by loren fermin lpn   Accompanied by spouse     History of Present Illness       Mental Health Follow-up:  Patient presents to follow-up on Depression.Patient's depression since last visit has been:  Worse  The patient is having other symptoms associated with depression.      Any significant life events: health concerns  Patient is not feeling anxious or having panic attacks.  Patient has no concerns about alcohol or drug use.He exercises with enough effort to increase his heart rate 9 or less minutes per day.  He exercises with enough effort to increase his heart rate 3 or less days per week.   He is taking medications regularly.    Babak presents to the clinic for annual physical. I saw him last on 9/23/24.     Since this time, he has had many external follow ups.     Hospitalization due to septic left knee. He presented to the [facility] ER on 11/25/24 with concerns of left knee pain, swelling and fever. Babak had previously undergone 5 surgeries to the left knee in [id number] including - left knee total arthroplasty on 1/9/23. Lactic elevated, febrile and concerns of septic joint, therefore, transferred to [facility]    Admitted 11/25/24-12/5/24  - Joint aspiration noted to have gram + cocci, given IV ABX and taken to the OR on 11/26/25 for irrigation and excisional debridement of left TKA with poly exchange. Culture grew out group B strep. Infectious disease consulted, treated initially with Cefepime from 11/25/24-11/28/24 with transition to Ceftriaxone. Plans to continue IV antibiotics x6 weeks (through 1/7/25) followed by oral antibiotics (Amoxil 1000 mg TID, to complete at least 3 months of ABX, tentative end date 2/27/25).  - Follow up orthopedics visits on 12/18/24, 1/7/25, 1/22/25  - Follow up infectious disease visits on 12/18/24, 1/3/25, 1/24/25    Updates: plan to continue Amoxil for up to a  year per orthopedics. Tolerating well, no GI effects. Upcoming visit with orthopedics team.  - Recently started physical therapy, at 110* of flexion. Plans to transition from traditional hinged brace to custom brace in the upcoming weeks hopefully.   - Mental health has been struggling with decreased independence. Babak reports he has a smaller walker at home and does well with this, however, if he wants to go to the store/run errands his larger walker which stays in his car is difficult/nearly impossible to remove from the vehicle on his own, which has led to him having to rely on spouse, Erika more often. Babak reports he is irritated with this, he thought that getting a knee replacement and prison would greatly benefit his life, versus holding it back like it is currently (due to recurrent surgery, infection, etc.).     Previous left knee surgeries:  - S/p left TKA on 1/9/23  - S/p left knee lateral release with medial reefing on 9/8/23  - S/p revision left knee lateral release with medial reefing on 11/13/23  - S/p left knee medial patellofemoral ligament reconstruction utilizing cadaveric graft on 1/11/24  - S/p irrigation and excisional debridement of left knee with patellar realignment/extensor mechanism repair on 3/12/24  - S/p irritation and excisional debridement of left knee with poly exchange on 11/26/24    History of atrial fibrillation - following with INR team and cardiology. Anticoagulated with Coumadin, there was initially a plan to switch to Xarelto in 2025. Change of insurance recently to Medicare, previous insurance did not cover Xarelto, prompting delay in switch. He plans to discuss potential switch to Xarelto with Dr. Sepulveda at upcoming visit on 4/10/25.   - Also on Metoprolol XL 50 mg daily.    CHF - following with cardiology. Monitoring weights. Continues on Torsemide 20 mg daily. CHF action plan up to date    HTN: stable on Lisinopril 10 mg daily + Amlodipine 10 mg daily. Declines chest  pain, shortness of breath, centralized or peripheral edema, headaches and/or vision changes.     Male Physical:  FH of early CA?: none  Cholesterol/DM concerns/screening: update today  Tobacco?: never user  Calcium intake: dietary  Colon cancer screenin (10 year follow up)  PSA screening: due  Immunizations: shingles, pneumonia, RSV    Colon cancer screening and immunizations - prefers to hold off for now with knee and recovery.     Advance Care Planning  Patient has a Health Care Directive on file  Advance care planning document is on file but is outdated.  Patient encouraged to update.        3/16/2025   General Health   How would you rate your overall physical health? Good   Feel stress (tense, anxious, or unable to sleep) Very much   (!) STRESS CONCERN      3/16/2025   Nutrition   Diet: Low salt         3/16/2025   Exercise   Days per week of moderate/strenous exercise 0 days   Average minutes spent exercising at this level 0 min   (!) EXERCISE CONCERN      3/16/2025   Social Factors   Frequency of gathering with friends or relatives Once a week   Worry food won't last until get money to buy more No   Food not last or not have enough money for food? No   Do you have housing? (Housing is defined as stable permanent housing and does not include staying ouside in a car, in a tent, in an abandoned building, in an overnight shelter, or couch-surfing.) Yes   Are you worried about losing your housing? No   Lack of transportation? No   Unable to get utilities (heat,electricity)? No         3/21/2025   Fall Risk   Reason Gait Speed Test Not Completed Unable to complete test, patient reported symptoms          3/16/2025   Activities of Daily Living- Home Safety   Needs help with the following daily activites Transportation    Shopping    Preparing meals    Housework    Bathing    Laundry    Dressing   Safety concerns in the home None of the above       Multiple values from one day are sorted in  reverse-chronological order         3/16/2025   Dental   Dentist two times every year? (!) NO         3/16/2025   Hearing Screening   Hearing concerns? None of the above         3/16/2025   Driving Risk Screening   Patient/family members have concerns about driving No         3/16/2025   General Alertness/Fatigue Screening   Have you been more tired than usual lately? (!) YES         3/16/2025   Urinary Incontinence Screening   Bothered by leaking urine in past 6 months No     Today's PHQ-9 Score:       3/20/2025     4:21 PM   PHQ-9 SCORE   PHQ-9 Total Score MyChart 15 (Moderately severe depression)   PHQ-9 Total Score 15        Patient-reported         3/16/2025   Substance Use   Alcohol more than 3/day or more than 7/wk No   Do you have a current opioid prescription? No   How severe/bad is pain from 1 to 10? 2/10   Do you use any other substances recreationally? No     Social History     Tobacco Use    Smoking status: Never     Passive exposure: Yes    Smokeless tobacco: Never   Vaping Use    Vaping status: Never Used   Substance Use Topics    Alcohol use: Not Currently     Comment: couple of times per year    Drug use: Never         3/16/2025   One time HIV Screening   Previous HIV test? I don't know   Last PSA:   Prostate Specific Antigen Screen   Date Value Ref Range Status   03/21/2025 1.47 0.00 - 4.50 ng/mL Final     ASCVD Risk   The ASCVD Risk score (Erika TRUONG, et al., 2019) failed to calculate for the following reasons:    Risk score cannot be calculated because patient has a medical history suggesting prior/existing ASCVD    Reviewed and updated as needed this visit by Provider   Tobacco  Allergies  Meds  Problems  Med Hx  Surg Hx  Fam Hx            Past Medical History:   Diagnosis Date    Atrial fibrillation (H) 02/03/2017    on Warfarin    Bacterial sepsis (H) 8/8/2022    Cellulitis of left lower extremity 9/5/2019    Cerebral infarction (H)     Cerebral infarction due to unspecified  occlusion or stenosis of right middle cerebral artery (H) 02/03/2017    ,Left hemiparesis, left neglect, impaired balance and gait following R MCA stroke with mild hemorrhagic transformation s/p tissue plasminogen activator and mechanical thrombectomy, s/p C7 facet fracture from fall off forklift.    Chest pain 02/1997    Disorder of skin or subcutaneous tissue 07/25/2011    Essential (primary) hypertension 02/1997    Fracture of cervical vertebra (H)     02/03/2017,C7 facet fracture from fall off forklift, nondisplaced    Gout     Hemiplegia affecting left nondominant side (H) 02/03/2017    Obesity 02/03/2017    body mass index of 40    Other local lupus erythematosus     Sepsis (H) 9/6/2019     Past Surgical History:   Procedure Laterality Date    ARTHROPLASTY KNEE Left 1/9/2023    Procedure: ARTHROPLASTY, KNEE, TOTAL;  Surgeon: Elliott Johnson MD;  Location: [facility] OR    ARTHROPLASTY KNEE Right 6/26/2023    Procedure: Arthroplasty knee;  Surgeon: Elliott Johnson MD;  Location:  OR    ARTHROSCOPY KNEE      bilateral knees    ARTHROSCOPY KNEE Left 11/13/2023    Procedure: Knee Patellar Extensor Mechanism Repair;  Surgeon: Elliott Johnson MD;  Location:  OR    COLONOSCOPY  01/02/2012    Normal; Next due 2022    ORIF WRIST FRACTURE  2019     Current providers sharing in care for this patient include:  Patient Care Team:  Cris Major PA-C as PCP - General (Family Medicine)  Elliott Johnson MD as MD (Orthopaedic Surgery)  Elliott Johnson MD as Assigned Musculoskeletal Provider  Elliott Johnson MD as MD (Orthopaedic Surgery)  Marycarmen Zapata APRN CNP as Assigned PCP  Ashok Sepulveda DO as Assigned Heart and Vascular Provider  Alexei Mack MD as Assigned Sleep Provider    The following health maintenance items are reviewed in Epic and correct as of today:  Health Maintenance   Topic Date Due    HIV SCREENING  Never done    HEPATITIS C SCREENING  Never done    Pneumococcal Vaccine: 50+ Years (1  "of 2 - PCV) Never done    ZOSTER IMMUNIZATION (1 of 2) Never done    RSV VACCINE (1 - Risk 60-74 years 1-dose series) Never done    COLORECTAL CANCER SCREENING  01/02/2022    COVID-19 Vaccine (1 - 2024-25 season) Never done    BMP  09/21/2025    CBC  01/17/2026    MEDICARE ANNUAL WELLNESS VISIT  03/21/2026    ALT  03/21/2026    LIPID  03/21/2026    HF ACTION PLAN  09/23/2027    DIABETES SCREENING  03/21/2028    ADVANCE CARE PLANNING  03/21/2030    DTAP/TDAP/TD IMMUNIZATION (3 - Td or Tdap) 06/16/2033    TSH W/FREE T4 REFLEX  Completed    PHQ-2 (once per calendar year)  Completed    HPV IMMUNIZATION  Aged Out    MENINGITIS IMMUNIZATION  Aged Out    INFLUENZA VACCINE  Discontinued     Review of Systems  Constitutional, HEENT, cardiovascular, pulmonary, GI, , musculoskeletal, neuro, skin, endocrine and psych systems are negative, except as otherwise noted.    Objective    Exam  /68 (BP Location: Right arm, Patient Position: Sitting, Cuff Size: Adult Large)   Pulse 72   Temp 98.2  F (36.8  C) (Tympanic)   Resp 18   Ht 1.88 m (6' 2\")   Wt (!) 157.9 kg (348 lb)   BMI 44.68 kg/m     Estimated body mass index is 44.68 kg/m  as calculated from the following:    Height as of this encounter: 1.88 m (6' 2\").    Weight as of this encounter: 157.9 kg (348 lb).    Physical Exam  GENERAL: alert and no distress  EYES: Eyes grossly normal to inspection  HENT: ear canals and TM's normal, nose and mouth without ulcers or lesions  NECK: no adenopathy, no asymmetry, masses, or scars  RESP: lungs clear to auscultation - no rales, rhonchi or wheezes  CV: regular rate and rhythm, normal S1 S2, no S3 or S4, no murmur, click or rub, no peripheral edema  MS: hinged knee brace in place to left knee/lower extremity. Ambulating with walker. Able to rise from seated position. Able to achieve 110* of left knee flexion.   SKIN: no suspicious lesions or rashes  NEURO: Normal strength and tone, mentation intact and speech normal  PSYCH: " mentation appears normal, affect normal/bright    Results for orders placed or performed in visit on 03/21/25   Comprehensive Metabolic Panel     Status: Abnormal   Result Value Ref Range    Sodium 141 135 - 145 mmol/L    Potassium 4.2 3.4 - 5.3 mmol/L    Carbon Dioxide (CO2) 33 (H) 22 - 29 mmol/L    Anion Gap 9 7 - 15 mmol/L    Urea Nitrogen 18.2 8.0 - 23.0 mg/dL    Creatinine 1.00 0.67 - 1.17 mg/dL    GFR Estimate 85 >60 mL/min/1.73m2    Calcium 9.7 8.8 - 10.4 mg/dL    Chloride 99 98 - 107 mmol/L    Glucose 92 70 - 99 mg/dL    Alkaline Phosphatase 77 40 - 150 U/L    AST 27 0 - 45 U/L    ALT 22 0 - 70 U/L    Protein Total 8.4 (H) 6.4 - 8.3 g/dL    Albumin 4.3 3.5 - 5.2 g/dL    Bilirubin Total 0.6 <=1.2 mg/dL    Patient Fasting > 8hrs? Yes    Lipid Panel     Status: Abnormal   Result Value Ref Range    Cholesterol 163 <200 mg/dL    Triglycerides 94 <150 mg/dL    Direct Measure HDL 33 (L) >=40 mg/dL    LDL Cholesterol Calculated 111 (H) <100 mg/dL    Non HDL Cholesterol 130 (H) <130 mg/dL    Patient Fasting > 8hrs? Yes     Narrative    Cholesterol  Desirable: < 200 mg/dL  Borderline High: 200 - 239 mg/dL  High: >= 240 mg/dL    Triglycerides  Normal: < 150 mg/dL  Borderline High: 150 - 199 mg/dL  High: 200-499 mg/dL  Very High: >= 500 mg/dL    Direct Measure HDL  Female: >= 50 mg/dL   Male: >= 40 mg/dL    LDL Cholesterol  Desirable: < 100 mg/dL  Above Desirable: 100 - 129 mg/dL   Borderline High: 130 - 159 mg/dL   High:  160 - 189 mg/dL   Very High: >= 190 mg/dL    Non HDL Cholesterol  Desirable: < 130 mg/dL  Above Desirable: 130 - 159 mg/dL  Borderline High: 160 - 189 mg/dL  High: 190 - 219 mg/dL  Very High: >= 220 mg/dL   PSA Screen [facility]     Status: Normal   Result Value Ref Range    Prostate Specific Antigen Screen 1.47 0.00 - 4.50 ng/mL    Narrative    This result is obtained using the Roche Elecsys total PSA method on the lisa e601 immunoassay analyzer, which is an ultrasensitive method. Results obtained with  different assay methods or kits cannot be used interchangeably.  This test is intended for initial prostate cancer screening. PSA values exceeding the age-specific limits are suspicious for prostate disease, but additional testing, such as prostate biopsy, is needed to diagnose prostate pathology. The American Cancer Society recommends annual examination with digital rectal examination and serum PSA beginning at age 50 and for men with a life expectancy of at least 10 years after detection of prostate cancer. For men in high-risk groups, such as  Americans or men with a first-degree relative diagnosed at a younger age, testing should begin at a younger age. It is generally recommended that information be provided to patients about the benefits and limitations of testing and treatment so they can make informed decisions.   Results for orders placed or performed in visit on 03/21/25   INR POCT     Status: Abnormal   Result Value Ref Range    INR Point of Care 3.0 (A) 0.9 - 1.1         3/21/2025   Mini Cog   Clock Draw Score 2 Normal   3 Item Recall 3 objects recalled   Mini Cog Total Score 5         Vision Screen  Reason Vision Screen Not Completed: Screening Recommend: Patient/Guardian Declined    Signed Electronically by: Cris Major PA-C

## 2025-03-21 NOTE — NURSING NOTE
"Patient presents to the clinic for medicare wellness.  Advance Care Directive on file? no  Advance Care Directive provided to patient? Declined.      Chief Complaint   Patient presents with    Medicare Visit     wellness       Initial /68 (BP Location: Right arm, Patient Position: Sitting, Cuff Size: Adult Large)   Pulse 72   Temp 98.2  F (36.8  C) (Tympanic)   Resp 18   Ht 1.88 m (6' 2\")   Wt (!) 157.9 kg (348 lb)   BMI 44.68 kg/m   Estimated body mass index is 44.68 kg/m  as calculated from the following:    Height as of this encounter: 1.88 m (6' 2\").    Weight as of this encounter: 157.9 kg (348 lb).  Medication Reconciliation: complete        Oneida Malone LPN     "

## 2025-03-22 LAB — VIT B12 SERPL-MCNC: NORMAL PG/ML

## 2025-03-31 ENCOUNTER — THERAPY VISIT (OUTPATIENT)
Dept: PHYSICAL THERAPY | Facility: OTHER | Age: 65
End: 2025-03-31
Attending: ORTHOPAEDIC SURGERY
Payer: COMMERCIAL

## 2025-03-31 DIAGNOSIS — Z96.652 S/P TOTAL KNEE REPLACEMENT, LEFT: ICD-10-CM

## 2025-03-31 PROCEDURE — 97110 THERAPEUTIC EXERCISES: CPT | Mod: GP

## 2025-03-31 PROCEDURE — 97162 PT EVAL MOD COMPLEX 30 MIN: CPT | Mod: GP

## 2025-03-31 NOTE — PROGRESS NOTES
ANTICOAGULATION MANAGEMENT     Babak Moran 62 year old male is on warfarin with therapeutic INR result. (Goal INR 2.0-3.0)    Recent labs: (last 7 days)     08/31/22  0816   INR 2.6*       ASSESSMENT       Source(s): Chart Review and In person       Warfarin doses taken: Warfarin taken as instructed    Diet: No new diet changes identified    New illness, injury, or hospitalization: No    Medication/supplement changes: None noted    Signs or symptoms of bleeding or clotting: No    Previous INR: Therapeutic last 2(+) visits    Additional findings: None       PLAN     Recommended plan for no diet, medication or health factor changes affecting INR     Dosing Instructions: Continue your current warfarin dose with next INR in 2 weeks       Summary  As of 8/31/2022    Full warfarin instructions:  7.5 mg every day   Next INR check:  9/14/2022             In person contact    Lab visit scheduled    Education provided: None required    Plan made per [facility] anticoagulation protocol    Liz Coles RN  Anticoagulation Clinic  8/31/2022    _______________________________________________________________________     Anticoagulation Episode Summary     Current INR goal:  2.0-3.0   TTR:  72.2 % (11.8 mo)   Target end date:  Indefinite   Send INR reminders to:  [facility]    Indications    Unspecified atrial fibrillation (Resolved) [I48.91]  Anticoagulation monitoring  INR range 2-3 (Resolved) [Z79.01]  Atrial fibrillation with RVR (H) [I48.91]           Comments:  Babak prefers to be closer to 3.0 d/t previous CVA check Q 4 weeks.Declines to reduce dose when slightly over 3.0  Needs to change PCP         Anticoagulation Care Providers     Provider Role Specialty Phone number    Teo Funes MD Referring Family Medicine [phone]             Subjective   Najma Joshua is a 33 y.o. female who complains of vaginal discharge/itch.    HPI:  Here with c/o vaginal discharge, irritation and odor. She had a recent change in partners. Request STD testing by cervical cultures.   Symptoms reported: vaginal discharge, foul vaginal odor, and vaginal itching  Onset: a few days ago  Course: persistent  Progression: stayed the same    Helpful treatments: none  Unhelpful treatments tried: none    Answers submitted by the patient for this visit:  Female Genital Questionnaire (Submitted on 3/31/2025)  Chief Complaint: Female genitourinary complaint  genital itching: Yes  genital odor: Yes  vaginal discharge: Yes  Chronicity: recurrent  Onset: in the past 7 days  Frequency: intermittently  Progression since onset: gradually worsening  Pain severity: no pain  Affected side: both  Pregnant now?: No  Aggravated by: intercourse  Sexual activity: multiple partners  Partner with STD symptoms: unknown  Birth control: tubal ligation  Menstrual history: regular  Discharge characteristics: malodorous, milky, thick  Passing clots?: No  Passing tissue?: No      Objective   Physical Exam:   General Appearance: alert and oriented, in no acute distress  Abdomen: soft, non-tender; bowel sounds normal; no masses, no organomegaly  Pelvic Exam: external genitalia normal, uterus normal size, shape, and consistency, no cervical motion tenderness, cervix normal in appearance, no adnexal masses or tenderness, rectovaginal septum normal, and positive findings: thin discharge noted at cervix. Thick white discharge noted adhered to vaginal wall. C/w both yeast and BV.   Urine dipstick: not done.    Assessment/Plan   Diagnoses and all orders for this visit:  Acute vaginitis  Gram stain.   Flagyl and diflucan ordered.   Screen STD  GC and chlamydia by cervical cultures.   Will alter treatment as needed pending results.

## 2025-04-02 PROBLEM — Z96.652 S/P TOTAL KNEE REPLACEMENT, LEFT: Status: ACTIVE | Noted: 2025-04-02
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
## 2025-04-07 ENCOUNTER — THERAPY VISIT (OUTPATIENT)
Dept: PHYSICAL THERAPY | Facility: OTHER | Age: 65
End: 2025-04-07
Attending: ORTHOPAEDIC SURGERY
Payer: COMMERCIAL

## 2025-04-07 DIAGNOSIS — Z96.652 S/P TOTAL KNEE REPLACEMENT, LEFT: Primary | ICD-10-CM

## 2025-04-07 PROCEDURE — 97110 THERAPEUTIC EXERCISES: CPT | Mod: GP

## 2025-04-10 ENCOUNTER — THERAPY VISIT (OUTPATIENT)
Dept: PHYSICAL THERAPY | Facility: OTHER | Age: 65
End: 2025-04-10
Attending: ORTHOPAEDIC SURGERY
Payer: COMMERCIAL

## 2025-04-10 ENCOUNTER — OFFICE VISIT (OUTPATIENT)
Dept: CARDIOLOGY | Facility: OTHER | Age: 65
End: 2025-04-10
Attending: INTERNAL MEDICINE
Payer: COMMERCIAL

## 2025-04-10 VITALS
OXYGEN SATURATION: 99 % | WEIGHT: 315 LBS | TEMPERATURE: 97.4 F | BODY MASS INDEX: 40.43 KG/M2 | DIASTOLIC BLOOD PRESSURE: 72 MMHG | SYSTOLIC BLOOD PRESSURE: 100 MMHG | HEIGHT: 74 IN | RESPIRATION RATE: 16 BRPM | HEART RATE: 61 BPM

## 2025-04-10 DIAGNOSIS — Z96.652 S/P TOTAL KNEE REPLACEMENT, LEFT: Primary | ICD-10-CM

## 2025-04-10 DIAGNOSIS — G47.33 OBSTRUCTIVE SLEEP APNEA SYNDROME: ICD-10-CM

## 2025-04-10 DIAGNOSIS — I50.810 RIGHT HEART FAILURE WITH REDUCED RIGHT VENTRICULAR FUNCTION (H): ICD-10-CM

## 2025-04-10 DIAGNOSIS — I10 ESSENTIAL HYPERTENSION: ICD-10-CM

## 2025-04-10 DIAGNOSIS — Z79.01 ON CONTINUOUS ORAL ANTICOAGULATION: ICD-10-CM

## 2025-04-10 DIAGNOSIS — R73.03 PREDIABETES: ICD-10-CM

## 2025-04-10 DIAGNOSIS — R60.0 PERIPHERAL EDEMA: ICD-10-CM

## 2025-04-10 DIAGNOSIS — I77.89 ASCENDING AORTA ENLARGEMENT: ICD-10-CM

## 2025-04-10 DIAGNOSIS — I71.40 ABDOMINAL AORTIC ANEURYSM (AAA) WITHOUT RUPTURE, UNSPECIFIED PART: ICD-10-CM

## 2025-04-10 DIAGNOSIS — I50.32 CHRONIC HEART FAILURE WITH PRESERVED EJECTION FRACTION (H): Primary | ICD-10-CM

## 2025-04-10 DIAGNOSIS — I87.8 VENOUS STASIS OF LOWER EXTREMITY: ICD-10-CM

## 2025-04-10 DIAGNOSIS — I48.21 PERMANENT ATRIAL FIBRILLATION (H): ICD-10-CM

## 2025-04-10 PROCEDURE — G0463 HOSPITAL OUTPT CLINIC VISIT: HCPCS

## 2025-04-10 PROCEDURE — 97110 THERAPEUTIC EXERCISES: CPT | Mod: GP

## 2025-04-10 RX ORDER — TORSEMIDE 20 MG/1
20 TABLET ORAL DAILY
Qty: 90 TABLET | Refills: 3 | Status: SHIPPED | OUTPATIENT
Start: 2025-04-10

## 2025-04-10 RX ORDER — TORSEMIDE 10 MG/1
10 TABLET ORAL DAILY
Qty: 90 TABLET | Refills: 3 | Status: SHIPPED | OUTPATIENT
Start: 2025-04-10

## 2025-04-10 ASSESSMENT — PAIN SCALES - GENERAL: PAINLEVEL_OUTOF10: NO PAIN (0)

## 2025-04-10 NOTE — PROGRESS NOTES
[facility] HEART CARE   CARDIOLOGY PROGRESS NOTE     Chief Complaint   Patient presents with    Follow Up     6 months- tests and meds          Diagnosis:  1.  CHF-diastolic.   -Right ventricular enlargement on 7/31/2024   -BNP of 200 on 8/7/2022.   -Limited evaluation 11/30/2012.  2.  CVA.   -2/4/2017.   -Basal ganglia infarction appears to have occurred more recently    -Anterior inferior right frontal lobe and right basal ganglia.   -Right malar CN II/III/XVII.  3.  A-fib, new on 12/13/2011.   -Metoprolol and Coumadin.   -On EKG of 7/31/2023.   -Continuous.  4.  CAD.  5.  TAAA.   -4.3 cm on 7/31/2024.   -4.4 cm on 11/30/2012.  6.  Hypertension-controlled.  7.  Prediabetes-controlled.   -5.1% on/9/24.  8.  BARRIE.   -High likelihood on 9/24/24.   -Mild on 3/5/2014.  9.  Morbid obesity.   -BMI 45 on 9/24/2024.  10.  Anemia-mild.  11.  B12 deficiency.   -417 on 9/29/2020.   -197 on 9/9/2019.        Assessment/Plan:    1.  CHF: Diastolic in nature.  Previously, switched from Lasix 40 mg daily to torsemide 20 mg daily.  With this change, had seen reduced edema/swelling.  Describes increased urination within the first 3 hours after taking torsemide.  However, he still has mild/moderate peripheral edema.  Will increase torsemide from 20 to 30 mg daily. Previously, he had noted elevated pressures on his echocardiogram from 7/31/2024.  The right ventricle was moderately enlarged with mildly reduced right global function.  Once again, stressed the importance of salt restriction, fluid restriction, and activity.  His wife does most cooking and uses minimal salt.  Discussed importance of activity. Will revisit Entresto/SGLT2 inhibitors in the future.  I am concerned his diastolic dysfunction is from untreated sleep apnea.  2.  A-fib: Permanent.  A-fib has been seen on every EKG dating back to 12/13/2011.  He is currently on Coumadin with volatility in his INR's.  I did send a prescription to [facility] to his pharmacy last time.  He  never  picked it up.  He never looked at the cost.  He is still interested in starting Xarelto.  Prescription was sent once again.  He will check with pharmacy to see the cost of the medication.  He is not to start Xarelto unless his INR is less than 3.0.  Continues to deny symptoms.  Continue metoprolol and Coumadin.  Will switch to Xarelto if affordable.  3.  BARRIE: Concern for.  He does have fatigue.  Is seeing sleep medicine.  Diagnosed in the past with mild sleep apnea.  Was evaluated more recently 9/24/2024 and had high likelihood that he has sleep apnea.  Patient willing to be treated.  Will refer again.  3.  Ascending aortic aneurysm: Stable at 4.3 cm on 7/31/2024.  4.  CVA occurred in 2017.  Consider aspirin/statin in the future.  Plan for ultrasound of carotid arteries.    5.  Carotid artery stenosis: Concern for.  Less than 50% stenosis bilaterally on 10/22/2024.  6.  AAA.  3.2 cm on 10/16/2024.  Repeat ultrasound for AAA in 1 year.  7.  Follow-up in 1 year.  Xarelto sent to his pharmacy.  Will take place Coumadin is portable.  Plan for repeat ultrasound of carotids and abdomen for AAA year from previous testing.  Referred to sleep medicine.  Increase torsemide from 20 to 30 mg daily.          Interval history:  See above.      HPI:    Mr. Babak MoranJr. is a very pleasant patient of Dr. Alvino Yi for primary care and Dr. Aline Acosta for cardiology with chronic atrial fibrillation, hypertension, family history of early coronary artery disease, morbid obesity, and obstructive sleep apnea.   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 INR noted to be low, as he had missed a few days of Warfarin (he was waiting for insurance card, so he could get a refill). He was treated in route with tPA. Urgent CT angio performed with successful thrombectomy of right MCA. Babak saw Dr. Acosta  last on 5/15/17. There had subsequently been some concern that Babak had a new left foot drag; therefore, he was appropriately recommended to present to the ED, which he did and it was subsequently determined to be a residual deficit from previous stroke. I saw Babak last on 1/25/18 for general cardiology follow up; see my last note for details. 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 Babak returns today for ongoing general cardiology follow up.  Babak notes he and his wife were asking if there was any urgency to this visit as he eats as he had an appointment with Dr. Acosta on the 22nd that he did not know about until the day prior when he got a reminder text, but was unable to make it and got a letter the following week that he had missed an appointment and needed to reschedule. He notes nobody had called him to schedule it or told him when it was scheduled initially, which he was a little frustrated with. It does appear that one of our Jasmyn have left him a message to call back and confirm upcoming appointment so I am not sure if he never got this message or what happened. However, he notes he feels okay. He has no particular complaints or concerns. His wife noticed he seems to be snoring more at night, but does not necessarily endorse orthopnea or post nocturnal dyspnea. He does endorse some shortness of breath. He notes he previously could walk over a mile a day. Now will get short of breath before this. He does not have any symptoms climbing a flight of stairs however. He admits he has not been overly active though as he is now the 24/7 caregiver for the elderly female that he is a PCA for. It is hard for him to get out and get any exercise. He and his wife are now living in her basement. It is apparently supposed to be a temporary solution but it has been 8 months now; however, she  recently broke her hip in a nursing home for rehabilitation so that is why he was able to make this appointment for followup. He has gained 60 pounds since April, which his wife is concerned about. They think it might be related to him being less active. He has weighed more than this before certainly and has struggled with his weight in the past. Did have issues with swelling over the summer when he was taking a diet pill, but swelling is now not really an issue. He is trying to endorse a low-sodium diet. Denies feeling lightheaded or faint. Also denies heart racing, palpitations, or chest pain or pressure. He got his cholesterol checked last in July at [facility] in [place]. We should be able to get the records from there. His wife thinks that he seems to sleep better at his own house because they have an elevated bed and seems to snore less with that. He does have sleep apnea. He tells me it was diagnosed as mild, but he has been unable to tolerate a CPAP machine in the past. Is not willing to reconsider this. They sleep in a flat bed when they are staying with the elderly female so his wife thinks this might be contributing to why he seems to be snoring more. They tried a wedge, but apparently Babak did not feel that this was comfortable. We updated his medication list to reflect the medications he is currently taking. We discussed the potential benefits of a statin, that he should consider restarting it, but appears he was tolerating. It sounds like some other reason that he was not taking the medication he previously was, was due to cost as he does not currently have any insurance and medications are expensive for him. They are thinking about getting him medical insurance. Will let us know if he is going to reconsider this medication again. They otherwise deny other questions or concerns at this time. Did review different weight loss programs we have available. He is not sure if he  would be able to attend these as he lives quite far away, and again, it is hard for him to get out given he is a 24/7 caregiver for an elderly female. He is also not getting much sleep at night. We discussed how this can affect his overall health and encouraged them to find a way for him to get more sleep at night. His wife notes that she is disabled, but she thinks she could probably help out some at night so that Babak could get sleep. They otherwise deny other questions or concerns at this time.       Relevant testing:  US carotids on 10/22/2024:  1. Less than 50% stenosis of the EVA.    2. Less than 50% stenosis of the LICA.     US abdominal aorta on 10/16/2024:  Mild aneurysmal dilatation of the aorta measuring up to  3.2 cm. Ultrasound follow-up is recommended in 3 years.    Echocardiogram on 7/31/2024:  The patient's rhythm is atrial fibrillation.  Global and regional left ventricular function is normal with an EF of 60-65%.  Mild left ventricular dilation is present.  Moderate right ventricular dilation is present.  Global right ventricular function is mildly reduced.  No significant valvular abnormalities present.  There is no prior study for direct comparison.    Echocardiogram 11/30/2012:  1.  Technically difficult study with poor endocardial definition.   2.  Probable normal left ventricular size and systolic function, estimated   ejection fraction 60%.   3.  Moderate concentric increase in left ventricular wall thickness.    4.  Marked left atrial enlargement and moderate right atrial enlargement.   5.  Mild increase in diameter of the aortic root and moderate increase in   diameter of the ascending aorta.   6.  Doppler examination appears to be within normal limits.    7.  When compared to a previous transthoracic echocardiogram report from   12/2011 there has been a significant increase in the diameter of the   ascending aorta.           ICD-10-CM    1. Chronic heart failure with preserved ejection  fraction (H)  I50.32 torsemide (DEMADEX) 10 MG tablet     torsemide (DEMADEX) 20 MG tablet      2. Peripheral edema  R60.0 torsemide (DEMADEX) 10 MG tablet     torsemide (DEMADEX) 20 MG tablet      3. On continuous oral anticoagulation  Z79.01       4. Obstructive sleep apnea syndrome  G47.33 Adult Sleep Eval & Management Referral      5. Prediabetes  R73.03       6. Right heart failure with reduced right ventricular function (H)  I50.810 torsemide (DEMADEX) 10 MG tablet     torsemide (DEMADEX) 20 MG tablet      7. Permanent atrial fibrillation (H)  I48.21 rivaroxaban ANTICOAGULANT (XARELTO) 20 MG TABS tablet      8. Essential hypertension  I10 torsemide (DEMADEX) 10 MG tablet     torsemide (DEMADEX) 20 MG tablet      9. Ascending aorta enlargement  I77.89       10. Abdominal aortic aneurysm (AAA) without rupture, unspecified part  I71.40       11. Venous stasis of lower extremity  I87.8 torsemide (DEMADEX) 10 MG tablet     torsemide (DEMADEX) 20 MG tablet            Past Medical History:   Diagnosis Date    Atrial fibrillation (H) 02/03/2017    on Warfarin    Bacterial sepsis (H) 8/8/2022    Cellulitis of left lower extremity 9/5/2019    Cerebral infarction (H)     Cerebral infarction due to unspecified occlusion or stenosis of right middle cerebral artery (H) 02/03/2017    ,Left hemiparesis, left neglect, impaired balance and gait following R MCA stroke with mild hemorrhagic transformation s/p tissue plasminogen activator and mechanical thrombectomy, s/p C7 facet fracture from fall off forklift.    Chest pain 02/1997    Disorder of skin or subcutaneous tissue 07/25/2011    Essential (primary) hypertension 02/1997    Fracture of cervical vertebra (H)     02/03/2017,C7 facet fracture from fall off forklift, nondisplaced    Gout     Hemiplegia affecting left nondominant side (H) 02/03/2017    Obesity 02/03/2017    body mass index of 40    Other local lupus erythematosus     Sepsis (H) 9/6/2019       Past Surgical  History:   Procedure Laterality Date    ARTHROPLASTY KNEE Left 1/9/2023    Procedure: ARTHROPLASTY, KNEE, TOTAL;  Surgeon: Elliott Johnson MD;  Location: [facility] OR    ARTHROPLASTY KNEE Right 6/26/2023    Procedure: Arthroplasty knee;  Surgeon: Elliott Johnson MD;  Location: [facility] OR    ARTHROSCOPY KNEE      bilateral knees    ARTHROSCOPY KNEE Left 11/13/2023    Procedure: Knee Patellar Extensor Mechanism Repair;  Surgeon: Elliott Johnson MD;  Location:  OR    COLONOSCOPY  01/02/2012    Normal; Next due 2022    ORIF WRIST FRACTURE  2019       Allergies   Allergen Reactions    Diatrizoate Rash    Ioxaglate Other (See Comments)     Does not recall    Levofloxacin Hives and Rash    Metrizamide Rash     (Diagnostic X-Ray materials)    Probenecid Rash       Current Outpatient Medications   Medication Sig Dispense Refill    acetaminophen (TYLENOL) 325 MG tablet Take 2 tablets (650 mg) by mouth every 4 hours as needed for other (mild pain). 100 tablet 4    amLODIPine (NORVASC) 10 MG tablet Take 1 tablet (10 mg) by mouth daily. 90 tablet 4    amoxicillin (AMOXIL) 500 MG capsule Take 1,000 mg by mouth.      calcium carbonate (TUMS) 500 MG chewable tablet Take 1 tablet (500 mg) by mouth every 6 hours as needed for heartburn or other (indigestion). 90 tablet 4    Cranberry 125 MG TABS Take 200 mg by mouth daily. 90 tablet 4    D-Mannose 500 MG CAPS Take 1,500 mg by mouth 2 times daily. 180 capsule 4    Emollient (CERAVE MOISTURIZING) CREA Apply 1 Application topically 2 times daily. To bilateral knees 340 g 11    lisinopril (ZESTRIL) 10 MG tablet Take 1 tablet (10 mg) by mouth daily. 90 tablet 4    magnesium hydroxide (MILK OF MAGNESIA) 400 MG/5ML suspension Take 30 mLs by mouth daily as needed for constipation. 354 mL 11    metoprolol succinate ER (TOPROL XL) 50 MG 24 hr tablet Take 1 tablet (50 mg) by mouth daily. 90 tablet 3    rivaroxaban ANTICOAGULANT (XARELTO) 20 MG TABS tablet Take 1 tablet (20 mg) by mouth daily (with  dinner). 90 tablet 3    saccharomyces boulardii (FLORASTOR) 250 MG capsule Take 1 capsule (250 mg) by mouth daily. 90 capsule 4    senna-docusate (SENOKOT-S/PERICOLACE) 8.6-50 MG tablet 1 TAB PO every day AND 1 tab daily  tablet 11    torsemide (DEMADEX) 10 MG tablet Take 1 tablet (10 mg) by mouth daily. 90 tablet 3    torsemide (DEMADEX) 20 MG tablet Take 1 tablet (20 mg) by mouth daily. 90 tablet 3    warfarin ANTICOAGULANT (COUMADIN) 5 MG tablet 10 mg (2 tablets) every Mon or as directed by the [facility] 20 tablet 1    warfarin ANTICOAGULANT (COUMADIN) 7.5 MG tablet 7.5 mg (one 7.5 mg tablets) every day except Monday or as directed by [facility]. 80 tablet 1    potassium chloride macey ER (KLOR-CON M10) 10 MEQ CR tablet Take 1 tablet (10 mEq) by mouth every other day. (Patient not taking: Reported on 4/10/2025) 90 tablet 4       Social History     Socioeconomic History    Marital status:      Spouse name: Erika    Number of children: Not on file    Years of education: Not on file    Highest education level: Not on file   Occupational History    Not on file   Tobacco Use    Smoking status: Never     Passive exposure: Yes    Smokeless tobacco: Never   Vaping Use    Vaping status: Never Used   Substance and Sexual Activity    Alcohol use: Not Currently     Comment: couple of times per year    Drug use: Never    Sexual activity: Yes     Partners: Female   Other Topics Concern    Not on file   Social History Narrative    Lives with his wife and sons.    Enjoy's deer hunting.    No tobacco use.   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  Now unemployed, wife is on disability, not able to afford their utilities or health insurance.     Social Drivers of Health     Financial Resource Strain: Low Risk  (3/16/2025)    Financial Resource Strain     Within the past 12 months, have you or your family members you live with been unable to get utilities (heat, electricity) when  it was really needed?: No   Food Insecurity: Low Risk  (3/16/2025)    Food Insecurity     Within the past 12 months, did you worry that your food would run out before you got money to buy more?: No     Within the past 12 months, did the food you bought just not last and you didn t have money to get more?: No   Transportation Needs: Low Risk  (3/16/2025)    Transportation Needs     Within the past 12 months, has lack of transportation kept you from medical appointments, getting your medicines, non-medical meetings or appointments, work, or from getting things that you need?: No   Physical Activity: Inactive (3/16/2025)    Exercise Vital Sign     Days of Exercise per Week: 0 days     Minutes of Exercise per Session: 0 min   Stress: Stress Concern Present (3/16/2025)    [employer] - Occupational Stress Questionnaire     Feeling of Stress : Very much   Social Connections: Unknown (3/16/2025)    Social Connection and Isolation Panel [NHANES]     Frequency of Communication with Friends and Family: Not on file     Frequency of Social Gatherings with Friends and Family: Once a week     Attends Jew Services: Not on file     Active Member of Clubs or Organizations: Not on file     Attends Club or Organization Meetings: Not on file     Marital Status: Not on file   Interpersonal Safety: Low Risk  (4/2/2025)    Interpersonal Safety     Do you feel physically and emotionally safe where you currently live?: Yes     Within the past 12 months, have you been hit, slapped, kicked or otherwise physically hurt by someone?: No     Within the past 12 months, have you been humiliated or emotionally abused in other ways by your partner or ex-partner?: No   Housing Stability: Low Risk  (3/16/2025)    Housing Stability     Do you have housing? : Yes     Are you worried about losing your housing?: No       LAB RESULTS:   Lab on 09/27/2024   Component Date Value Ref Range Status    pH Venous 09/27/2024 7.38   7.32 - 7.43 Final    pCO2 Venous 09/27/2024 50  40 - 50 mm Hg Final    pO2 Venous 09/27/2024 44  25 - 47 mm Hg Final    Bicarbonate Venous 09/27/2024 30 (H)  21 - 28 mmol/L Final    Base Excess/Deficit Venous 09/27/2024 3.3 (H)  -3.0 - 3.0 mmol/L Final    FIO2 09/27/2024 21   Final    Oxyhemoglobin Venous 09/27/2024 72  70 - 75 % Final    O2 Sat, Venous 09/27/2024 72.6  70.0 - 75.0 % Final   Anticoagulation Therapy Visit on 09/23/2024   Component Date Value Ref Range Status    INR Point of Care 09/23/2024 2.6 (A)  0.9 - 1.1 Final   Lab Requisition on 09/11/2024   Component Date Value Ref Range Status    Sodium 09/11/2024 141  135 - 145 mmol/L Final    Potassium 09/11/2024 3.7  3.4 - 5.3 mmol/L Final    Chloride 09/11/2024 101  98 - 107 mmol/L Final    Carbon Dioxide (CO2) 09/11/2024 31 (H)  22 - 29 mmol/L Final    Anion Gap 09/11/2024 9  7 - 15 mmol/L Final    Urea Nitrogen 09/11/2024 23.0  8.0 - 23.0 mg/dL Final    Creatinine 09/11/2024 0.81  0.67 - 1.17 mg/dL Final    GFR Estimate 09/11/2024 >90  >60 mL/min/1.73m2 Final    Calcium 09/11/2024 9.2  8.8 - 10.4 mg/dL Final    Glucose 09/11/2024 107 (H)  70 - 99 mg/dL Final   Lab Requisition on 09/11/2024   Component Date Value Ref Range Status    Hemoglobin 09/11/2024 14.4  13.3 - 17.7 g/dL Final    INR 09/11/2024 2.47 (H)  0.85 - 1.15 Final   Lab Requisition on 09/04/2024   Component Date Value Ref Range Status    Sodium 09/04/2024 142  135 - 145 mmol/L Final    Potassium 09/04/2024 4.5  3.4 - 5.3 mmol/L Final    Chloride 09/04/2024 98  98 - 107 mmol/L Final    Carbon Dioxide (CO2) 09/04/2024 34 (H)  22 - 29 mmol/L Final    Anion Gap 09/04/2024 10  7 - 15 mmol/L Final    Urea Nitrogen 09/04/2024 22.1  8.0 - 23.0 mg/dL Final    Creatinine 09/04/2024 0.84  0.67 - 1.17 mg/dL Final    GFR Estimate 09/04/2024 >90  >60 mL/min/1.73m2 Final    Calcium 09/04/2024 9.6  8.8 - 10.4 mg/dL Final    Glucose 09/04/2024 83  70 - 99 mg/dL Final   Anticoagulation Therapy Visit on  "08/28/2024   Component Date Value Ref Range Status    INR (External) 08/28/2024 2.5 (A)  0.9 - 1.1 Final   Lab Requisition on 08/26/2024   Component Date Value Ref Range Status    Bullous Pemphigoid Antigen IgG 08/26/2024 See Note   Final    EER Bullous Pemphigoid Antigen 08/26/2024 See Note   Final   Lab Requisition on 08/20/2024   Component Date Value Ref Range Status    Sodium 08/20/2024 142  135 - 145 mmol/L Final    Potassium 08/20/2024 4.6  3.4 - 5.3 mmol/L Final    Chloride 08/20/2024 100  98 - 107 mmol/L Final    Carbon Dioxide (CO2) 08/20/2024 32 (H)  22 - 29 mmol/L Final    Anion Gap 08/20/2024 10  7 - 15 mmol/L Final    Urea Nitrogen 08/20/2024 19.4  8.0 - 23.0 mg/dL Final    Creatinine 08/20/2024 0.83  0.67 - 1.17 mg/dL Final    GFR Estimate 08/20/2024 >90  >60 mL/min/1.73m2 Final    Calcium 08/20/2024 9.5  8.8 - 10.4 mg/dL Final    Glucose 08/20/2024 75  70 - 99 mg/dL Final   Anticoagulation Therapy Visit on 08/14/2024   Component Date Value Ref Range Status    INR (External) 08/14/2024 2.0 (A)  0.9 - 1.1 Final        Review of systems: Negative except that which was noted in the HPI.    Physical examination:  /72 (BP Location: Right arm, Patient Position: Sitting, Cuff Size: Adult Large)   Pulse 61   Temp 97.4  F (36.3  C) (Temporal)   Resp 16   Ht 1.88 m (6' 2.02\")   Wt (!) 157.4 kg (347 lb)   SpO2 99%   BMI 44.53 kg/m      GENERAL APPEARANCE: healthy, alert and no distress  CHEST: lungs clear to auscultation.  CARDIOVASCULAR: regular rhythm, normal S1 with physiologic split S2, no S3 or S4 and no murmur, click or rub  EXTREMITIES: no clubbing, cyanosis or edema.    Total time spent on day of visit, including review of tests, obtaining/reviewing separately obtained history, ordering medications/tests/procedures, communicating with PCP/consultants, and documenting in electronic medical record: 30 minutes.                Thank you for allowing me to participate in the care of your patient. " Please do not hesitate to contact me if you have any questions.     Ashok Sepulveda, DO

## 2025-04-10 NOTE — NURSING NOTE
"Chief Complaint   Patient presents with    Follow Up     6 months- tests and meds       Initial /72 (BP Location: Right arm, Patient Position: Sitting, Cuff Size: Adult Large)   Pulse 61   Temp 97.4  F (36.3  C) (Temporal)   Resp 16   Ht 1.88 m (6' 2.02\")   Wt (!) 157.4 kg (347 lb)   SpO2 99%   BMI 44.53 kg/m   Estimated body mass index is 44.53 kg/m  as calculated from the following:    Height as of this encounter: 1.88 m (6' 2.02\").    Weight as of this encounter: 157.4 kg (347 lb).  Meds Reconciled: complete  Pt is not on Aspirin  Pt is not on a Statin  PHQ and/or ULISSES reviewed. Pt referred to PCP/MH Provider as appropriate.    Fabiola Amaral LPN    "

## 2025-04-16 ENCOUNTER — THERAPY VISIT (OUTPATIENT)
Dept: PHYSICAL THERAPY | Facility: OTHER | Age: 65
End: 2025-04-16
Attending: ORTHOPAEDIC SURGERY
Payer: COMMERCIAL

## 2025-04-16 DIAGNOSIS — Z96.652 S/P TOTAL KNEE REPLACEMENT, LEFT: Primary | ICD-10-CM

## 2025-04-16 PROCEDURE — 97110 THERAPEUTIC EXERCISES: CPT | Mod: GP

## 2025-04-23 ENCOUNTER — THERAPY VISIT (OUTPATIENT)
Dept: PHYSICAL THERAPY | Facility: OTHER | Age: 65
End: 2025-04-23
Attending: ORTHOPAEDIC SURGERY
Payer: COMMERCIAL

## 2025-04-23 DIAGNOSIS — Z96.652 S/P TOTAL KNEE REPLACEMENT, LEFT: Primary | ICD-10-CM

## 2025-04-23 PROCEDURE — 97116 GAIT TRAINING THERAPY: CPT | Mod: GP

## 2025-04-23 PROCEDURE — 97110 THERAPEUTIC EXERCISES: CPT | Mod: GP

## 2025-04-28 ENCOUNTER — THERAPY VISIT (OUTPATIENT)
Dept: PHYSICAL THERAPY | Facility: OTHER | Age: 65
End: 2025-04-28
Attending: ORTHOPAEDIC SURGERY
Payer: COMMERCIAL

## 2025-04-28 DIAGNOSIS — Z96.652 S/P TOTAL KNEE REPLACEMENT, LEFT: Primary | ICD-10-CM

## 2025-04-28 PROCEDURE — 97110 THERAPEUTIC EXERCISES: CPT | Mod: GP

## 2025-04-28 PROCEDURE — 97116 GAIT TRAINING THERAPY: CPT | Mod: GP

## 2025-05-04 NOTE — PROGRESS NOTES
"Virtual Visit Details    Type of service:  Video Visit   Video Start Time:  4pm  Video End Time: 4:20pm    Originating Location (pt. Location): Home    Distant Location (provider location):  On-site  Platform used for Video Visit: Angelica Moran is a 64 year old male who is being evaluated via a billable video visit.       The patient has been notified of following:      \"This video visit will be conducted via a call between you and your physician/provider. We have found that certain health care needs can be provided without the need for an in-person physical exam.  This service lets us provide the care you need with a video conversation.  If a prescription is necessary we can send it directly to your pharmacy.  If lab work is needed we can place an order for that and you can then stop by our lab to have the test done at a later time.     Video visits are billed at different rates depending on your insurance coverage.  Please reach out to your insurance provider with any questions.     If during the course of the call the physician/provider feels a video visit is not appropriate, you will not be charged for this service.\"     Patient has given verbal consent for Video visit? Yes  How would you like to obtain your AVS? Mail a copy  If you are dropped from the video visit, the video invite should be resent to: Text to cell phone: -  Will anyone else be joining your video visit? No  If patient encounters technical issues they should call [phone]      Video-Visit Details     Type of service:  Video Visit     Virtual visit for history of mild BARRIE.     A/P:     1.)  High likelihood of BARRIE with STOP-BANG score of 4 with history of mild BARRIE  2.)  History of CVA - need to evaluate for modifiable risk factors  3.) HTN - stable     Interim weight loss of ~30 lbs since initial sleep testing in 2014.     Plan to proceed with WatchPAT home sleep testing, orders placed today.    Recommended Sleep " "Testing/Procedures:    WatchPat      SUBJECTIVE:  Babak Moran is a 64 year old male.    Pertinent PMHx of CVA with persistent left hemiparesis, BARRIE, obesity, prediabetes, HTN, atrial fibrillation, SLE.     Echo:  7/31/2024 - LVEF 60-65%     Prior Sleep Testing:  3/5/2014 - PSG with weight 355 lbs, BMI 44.4.  AHI 8.7, jocelyn SpO2 81%.  CPAP 9 effective.  EKG consistent atrial fib.    9/24/2024 - he reports that he is using CPAP, settings of 9 cm H2O though I did not have CPAP download for review.  He feels it has been effective with no morning headaches.     Chief concern per questionnaire: \"Dr told me to \"     Duration of symptoms:  \"not sure\"     Goals for visit per questionnaire: \"not sure\"     Sleep pattern:  [place]days.  [place] - 6am.  Weekends.  9pm - 6am.  Time to fall asleep: ~few minutes.  Awakenings: 2-4 times per night, few minutes to return to sleep.  Average total sleep time:  6-7 hours  Napping.  1-3 days per week, 0.5-1 hours per nap.     No for RLS screen.  No for sleep walking.  No for dream enactment behavior.  No for bruxism.     No for morning headaches.  Yes for snoring.  No for observed apnea.  No for FHx of BARRIE.     Caffeine use:  No for 3+ per day.  No for within 6 hours of bed.    A/P to get up to date CPAP download, WatchPAT on CPAP.    Today -he presents today for follow-up, low to somewhat unclear what prompted the follow-up at this time.  As reviewed our previous visit, he corrected me that he has not been using CPAP.  He states he did try it briefly after his 2014 sleep study, but found it poorly tolerated and discontinued.  He currently does not have any concerns with his sleep, though estimates his total sleep time of 4-5 hours.    Past medical history:    Patient Active Problem List    Diagnosis Date Noted    S/P total knee replacement, left 04/02/2025     Priority: Medium    Abdominal aortic aneurysm (AAA) without rupture, unspecified part 10/16/2024     Priority: Medium    Chronic heart " failure with preserved ejection fraction (H) 10/10/2024     Priority: Medium    Venous stasis of lower extremity 10/10/2024     Priority: Medium    Peripheral edema 10/10/2024     Priority: Medium    On continuous oral anticoagulation 10/10/2024     Priority: Medium    History of CVA 10/10/2024     Priority: Medium     On 2/4/17      Vitamin B12 deficiency (non anemic) 10/10/2024     Priority: Medium    Iron deficiency anemia, unspecified iron deficiency anemia type 10/10/2024     Priority: Medium    Blister of right leg 09/12/2024     Priority: Medium    Right heart failure with reduced right ventricular function (H) 08/06/2024     Priority: Medium    Hx of left knee surgery 04/08/2024     Priority: Medium     Revision by Dr Aida Jauregui with most recent revision done 3/12/24.       Recurrent gross hematuria 12/13/2023     Priority: Medium    Prediabetes 12/01/2023     Priority: Medium    Patellar dislocation, left, sequela 11/07/2023     Priority: Medium     Recurrence dislocation after revision done 8 weeks ago (early September 2023)  Revised surgery by Dr Johnson Nov 13, 2023.       Dislocation of both patellae 09/13/2023     Priority: Medium     Surgical repair by Dr Johnson Bilateral 9/8/23      Chronic venous stasis dermatitis of both lower extremities 06/28/2023     Priority: Medium    Status post total right knee replacement 06/26/2023     Priority: Medium    Nail dystrophy 02/23/2023     Priority: Medium    Onychomycosis 02/23/2023     Priority: Medium    Aftercare following knee joint replacement surgery, unspecified laterality 01/09/2023     Priority: Medium    Onychogryphosis 08/27/2022     Priority: Medium    Need for vaccination 04/10/2022     Priority: Medium    Obstructive sleep apnea syndrome 05/13/2019     Priority: Medium    Permanent atrial fibrillation (H) 02/11/2018     Priority: Medium    Osteoarthrosis 01/16/2018     Priority: Medium    Morbid obesity with BMI of 40.0-44.9, adult (H)  02/08/2017     Priority: Medium    Left hemiparesis (H) 02/03/2017     Priority: Medium    Family history of coronary artery disease 05/19/2014     Priority: Medium    Ascending aorta enlargement 02/13/2014     Priority: Medium    Gout 01/07/2014     Priority: Medium     Overview:   Overview:   after 3 attacks in < a year, tapped and crystal proven 5/2/13;  Allopurinol started then got ? Atypical side effect, stopped; (short term colchicine prophylaxis)      Overweight 05/02/2013     Priority: Medium     Formatting of this note might be different from the original.  Max weight reported 475 lbs;  5/13 369 lbs      Pain in joint, ankle and foot 05/17/2012     Priority: Medium    Essential hypertension 09/08/2009     Priority: Medium    Cutaneous lupus erythematosus 11/12/2008     Priority: Medium    Systemic lupus erythematosus (H) 09/18/2008     Priority: Medium     Overview:   Dermatitis         10 point ROS of systems including Constitutional, Eyes, Respiratory, Cardiovascular, Gastroenterology, Genitourinary, Integumentary, Muscularskeletal, Psychiatric were all negative except for pertinent positives noted in my HPI.    Current Outpatient Medications   Medication Sig Dispense Refill    acetaminophen (TYLENOL) 325 MG tablet Take 2 tablets (650 mg) by mouth every 4 hours as needed for other (mild pain). 100 tablet 4    amLODIPine (NORVASC) 10 MG tablet Take 1 tablet (10 mg) by mouth daily. 90 tablet 4    amoxicillin (AMOXIL) 500 MG capsule Take 1,000 mg by mouth.      calcium carbonate (TUMS) 500 MG chewable tablet Take 1 tablet (500 mg) by mouth every 6 hours as needed for heartburn or other (indigestion). 90 tablet 4    Cranberry 125 MG TABS Take 200 mg by mouth daily. 90 tablet 4    D-Mannose 500 MG CAPS Take 1,500 mg by mouth 2 times daily. 180 capsule 4    Emollient (CERAVE MOISTURIZING) CREA Apply 1 Application topically 2 times daily. To bilateral knees 340 g 11    lisinopril (ZESTRIL) 10 MG tablet Take 1  tablet (10 mg) by mouth daily. 90 tablet 4    magnesium hydroxide (MILK OF MAGNESIA) 400 MG/5ML suspension Take 30 mLs by mouth daily as needed for constipation. 354 mL 11    metoprolol succinate ER (TOPROL XL) 50 MG 24 hr tablet Take 1 tablet (50 mg) by mouth daily. 90 tablet 3    potassium chloride macey ER (KLOR-CON M10) 10 MEQ CR tablet Take 1 tablet (10 mEq) by mouth every other day. (Patient not taking: Reported on 4/10/2025) 90 tablet 4    rivaroxaban ANTICOAGULANT (XARELTO) 20 MG TABS tablet Take 1 tablet (20 mg) by mouth daily (with dinner). 90 tablet 3    saccharomyces boulardii (FLORASTOR) 250 MG capsule Take 1 capsule (250 mg) by mouth daily. 90 capsule 4    senna-docusate (SENOKOT-S/PERICOLACE) 8.6-50 MG tablet 1 TAB PO every day AND 1 tab daily  tablet 11    torsemide (DEMADEX) 10 MG tablet Take 1 tablet (10 mg) by mouth daily. 90 tablet 3    torsemide (DEMADEX) 20 MG tablet Take 1 tablet (20 mg) by mouth daily. 90 tablet 3    warfarin ANTICOAGULANT (COUMADIN) 5 MG tablet 10 mg (2 tablets) every Mon or as directed by the [facility] 20 tablet 1    warfarin ANTICOAGULANT (COUMADIN) 7.5 MG tablet 7.5 mg (one 7.5 mg tablets) every day except Monday or as directed by [facility]. 80 tablet 1       OBJECTIVE:  There were no vitals taken for this visit.    Physical Exam     ---  This note was written with the assistance of the Dragon voice-dictation technology software. The final document, although reviewed, may contain errors. For corrections, please contact the office.    Total time spent preparing to see the patient, review of chart, obtaining history and physical examination, review of sleep testing, review of treatment options, education, discussion with patient and documenting in Epic / EMR was 25 minutes.  All time involved was spent on the day of service for the patient (the same day as the patient's appointment).    Alexei Mack MD    Sleep Medicine  [facility]  Clinic  - [place], [place]  Main Office: [phone]  [facility], [facility] [place]  [address] [place] [address], [facility], [address]  Schedule visits: [phone]  Main Office: [phone]  Fax: [phone]

## 2025-05-05 ENCOUNTER — THERAPY VISIT (OUTPATIENT)
Dept: PHYSICAL THERAPY | Facility: OTHER | Age: 65
End: 2025-05-05
Attending: ORTHOPAEDIC SURGERY
Payer: COMMERCIAL

## 2025-05-05 ENCOUNTER — VIRTUAL VISIT (OUTPATIENT)
Dept: PULMONOLOGY | Facility: OTHER | Age: 65
End: 2025-05-05
Attending: FAMILY MEDICINE
Payer: COMMERCIAL

## 2025-05-05 VITALS — HEIGHT: 75 IN | BODY MASS INDEX: 39.17 KG/M2 | WEIGHT: 315 LBS

## 2025-05-05 DIAGNOSIS — I10 ESSENTIAL HYPERTENSION: ICD-10-CM

## 2025-05-05 DIAGNOSIS — M32.9 SYSTEMIC LUPUS ERYTHEMATOSUS, UNSPECIFIED SLE TYPE, UNSPECIFIED ORGAN INVOLVEMENT STATUS (H): ICD-10-CM

## 2025-05-05 DIAGNOSIS — G47.33 OSA (OBSTRUCTIVE SLEEP APNEA): Primary | ICD-10-CM

## 2025-05-05 DIAGNOSIS — I63.411 CEREBROVASCULAR ACCIDENT (CVA) DUE TO EMBOLISM OF RIGHT MIDDLE CEREBRAL ARTERY (H): ICD-10-CM

## 2025-05-05 DIAGNOSIS — Z96.652 S/P TOTAL KNEE REPLACEMENT, LEFT: Primary | ICD-10-CM

## 2025-05-05 PROCEDURE — 97110 THERAPEUTIC EXERCISES: CPT | Mod: GP

## 2025-05-05 ASSESSMENT — PAIN SCALES - GENERAL: PAINLEVEL_OUTOF10: NO PAIN (0)

## 2025-05-05 NOTE — NURSING NOTE
Current patient location: [facility] [address]  [facility] [address]    Is the patient currently in the state of [place]? YES    Visit mode: VIDEO    If the visit is dropped, the patient can be reconnected by:VIDEO VISIT: Text to cell phone:   Telephone Information:   Mobile [phone]       Will anyone else be joining the visit? NO  (If patient encounters technical issues they should call [phone]    Are changes needed to the allergy or medication list? Pt stated no changes to allergies and Pt stated no med changes    Are refills needed on medications prescribed by this physician? NO    Rooming Documentation:  Not applicable    Reason for visit: RECHDAVE Covarrubias VVF

## 2025-05-07 ENCOUNTER — THERAPY VISIT (OUTPATIENT)
Dept: PHYSICAL THERAPY | Facility: OTHER | Age: 65
End: 2025-05-07
Attending: ORTHOPAEDIC SURGERY
Payer: COMMERCIAL

## 2025-05-07 DIAGNOSIS — Z96.652 S/P TOTAL KNEE REPLACEMENT, LEFT: Primary | ICD-10-CM

## 2025-05-07 PROCEDURE — 97110 THERAPEUTIC EXERCISES: CPT | Mod: GP

## 2025-05-13 ENCOUNTER — THERAPY VISIT (OUTPATIENT)
Dept: PHYSICAL THERAPY | Facility: OTHER | Age: 65
End: 2025-05-13
Attending: ORTHOPAEDIC SURGERY
Payer: COMMERCIAL

## 2025-05-13 DIAGNOSIS — Z96.652 S/P TOTAL KNEE REPLACEMENT, LEFT: Primary | ICD-10-CM

## 2025-05-13 PROCEDURE — 97110 THERAPEUTIC EXERCISES: CPT | Mod: GP

## 2025-05-14 NOTE — PROGRESS NOTES
PLAN  Continue therapy per current plan of care. Babak is supposed to be getting a fixed brace in the mail soon. His current brace does not support him enough and allows for a significant amount of hyperextension during ambulation. He is getting strong and he notes that getting out of chairs has become easier. We have worked on some stair navigation and he is feeling more confident with those. He is hoping to get a second hand rail added he can get upstairs for the first time in quite a while. We will continue to progress his strength and mobility as he is able. Babak continues to be well below the functional levels we would like and would benefit from extensive PT services. PT is requesting a plan of care that allows for a frequency and duration of 1-2x/week for 10 weeks. We have had to cancel PT before due to delay in authorization from insurance.     Beginning/End Dates of Progress Note Reporting Period:   3/31/25 to 05/13/2025    Referring Provider:  Yi Parra        05/13/25 0500   Appointment Info   Signing clinician's name / credentials Jaleel Martínez DPT   Total/Authorized Visits 9   Visits Used 9 of 10   Medical Diagnosis s/p L TKA   PT Tx Diagnosis impaired mobility   Progress Note/Certification   Start of Care Date 03/31/25   Onset of illness/injury or Date of Surgery 11/26/24   Therapy Frequency 1-2x/week   Predicted Duration 12 weeks   Certification date from 03/31/25   Certification date to 06/23/25   [facility] Authorization Information   Condition type Chronic (continuous duration <3 months)   Cause of current episode Post Surgical   Type of surgery 6- Other (specify in comments)  (Initial surgery was L TKA, Most recent was a  debridement and irrigation of left knee with swap of components)   Nature of treatment Rehabilitative   Functional ability Severe functional limitations   Documented POC (choose all that apply) Measurable short and long term/discharge treatment goals related to physical and  "functional deficits.;Frequency of treatment visits and treatment activities to address deficit areas.;Patient agrees to program participation including home program   Briefly describe symptoms significant weakness, impaired mobility, pain   How did the symptoms start surgery 1/9/23; infections, falls, patellar dislocations, most recent debridement and irrigation surgery on 11/26/24   Last 24H: avg pain/symptom intensity  2/10   Past wk: avg pain/symptom intensity 2/10   Frequency of Symptoms Occasionally (26-50% of the time)   Symptom impact on ADLs Extremely   Condition change since eval Better   General health reported by patient Good   Supervision   PT Assistant Visit Number 1   GOALS   PT Goals 2;3;4   PT Goal 1   Goal Identifier Ambulation   Goal Description Babak will be able to ambulate 250' with a single cane for improved mobility and independence.   Goal Progress Not met, continues to need walker for support.   Target Date 06/11/25   PT Goal 2   Goal Identifier Sit to stand   Goal Description Babak will be able to complete 5 consecutive sit to stands from 20\" surface height, even foot position, and no UE support for improved symmetry of function and strength.   Goal Progress Improving   Target Date 06/25/25   PT Goal 3   Goal Identifier LAQ   Goal Description Babak will be able to achieve terminal knee extension with seated long arc quad for improved quad strength and control.   Goal Progress Continues to work at controlled TKE.   Target Date 05/28/25   PT Goal 4   Goal Identifier Single leg stance   Goal Description Babak will be able to complete supported single leg stance without hyperextension of L knee for improved stability and progression towards least restrictive device for ambulation.   Goal Progress B WB with improved ability to stand without hyperextension. In current t-scope brace he dose have more hyperextension.   Target Date 06/25/25   Subjective Report   Subjective Report Babak is going to his " "grandpaul graduation on Thursday.   Objective Measures   Objective Measures Objective Measure 1   Objective Measure 1   Objective Measure L Knee ROM: 0-110 passive. Does not achieve TKE actively   Details WB posture L knee hyperextension.   Treatment Interventions (PT)   Interventions Therapeutic Procedure/Exercise;Gait Training   Therapeutic Procedure/Exercise   Therapeutic Procedures: strength, endurance, ROM, flexibility minutes (80640) 40   Therapeutic Procedures Ther Proc 2   Ther Proc 1 LAQs 2# x 20 - cuing for slow speed and control. Blue band HS curls on slide board, adductor ball squeeze 5\" x 10   Ther Proc 1 - Details Scifit stepper Gridcentric course lvl 11 x 7 minutes, Lateral step ups 6\" box x 10 - needing seated rest break   Ther Proc 2 hip abduction 110# 2x 15, leg press seat 25 260# x 15. Attempted 140# single leg press on L x 10 today. Less fatigue   Ther Proc 2 - Details Standing blue band extensions x 15 then high rows x 15   Gait Training   Gait 1 Stair training B Hand rails - PT assist to limit hyperextension.   Plan   Home program standing G/S stretches, mini-squats or sit to stands without UE, hip abduction and extensions, LAQs   Total Session Time   Timed Code Treatment Minutes 40   Total Treatment Time (sum of timed and untimed services) 40       "

## 2025-05-20 NOTE — PROGRESS NOTES
Patient was provided both verbal and written education and instructions on use of Watch PAT device. Watch PAT device has been registered and shipped via [employer] on 5/20/2025. Patient was notified that package was mailed out.   Watch [employer] serial number: [id number].  Tracking number: [id number]

## 2025-05-21 ENCOUNTER — THERAPY VISIT (OUTPATIENT)
Dept: PHYSICAL THERAPY | Facility: OTHER | Age: 65
End: 2025-05-21
Attending: ORTHOPAEDIC SURGERY
Payer: COMMERCIAL

## 2025-05-21 ENCOUNTER — ANTICOAGULATION THERAPY VISIT (OUTPATIENT)
Dept: ANTICOAGULATION | Facility: OTHER | Age: 65
End: 2025-05-21
Attending: PHYSICIAN ASSISTANT
Payer: COMMERCIAL

## 2025-05-21 DIAGNOSIS — Z96.652 S/P TOTAL KNEE REPLACEMENT, LEFT: Primary | ICD-10-CM

## 2025-05-21 DIAGNOSIS — I48.21 PERMANENT ATRIAL FIBRILLATION (H): Primary | ICD-10-CM

## 2025-05-21 LAB — INR POINT OF CARE: 2.5 (ref 0.9–1.1)

## 2025-05-21 PROCEDURE — 85610 PROTHROMBIN TIME: CPT | Mod: ZL,QW

## 2025-05-21 PROCEDURE — 97116 GAIT TRAINING THERAPY: CPT | Mod: GP

## 2025-05-21 PROCEDURE — 97110 THERAPEUTIC EXERCISES: CPT | Mod: GP

## 2025-05-21 NOTE — PROGRESS NOTES
ANTICOAGULATION MANAGEMENT     Babak Moran 64 year old male is on warfarin with therapeutic INR result. (Goal INR 2.0-3.0)    Recent labs: (last 7 days)     05/21/25  1244   INR 2.5*       ASSESSMENT     Source(s): Chart Review and In person     Warfarin doses taken: Warfarin taken as instructed  Diet: No new diet changes identified  New illness, injury, or hospitalization: No  Medication/supplement changes: None noted  Signs or symptoms of bleeding or clotting: No  Previous INR: Supratherapeutic  Additional findings: Patient likes to have his INR closer to 3.0 and would like to increase his dose       PLAN     Recommended plan for no diet, medication or health factor changes affecting INR     Dosing Instructions: Increase your warfarin dose (5% change) with next INR in 1 week       Summary  As of 5/21/2025      Full warfarin instructions:  7.5 mg every day   Next INR check:  5/28/2025               In person    Lab visit scheduled    Education provided: Please call back if any changes to your diet, medications or how you've been taking warfarin    Plan made per [facility] anticoagulation protocol    Nikki Hernandez RN  Anticoagulation Clinic  5/21/2025    _______________________________________________________________________     Anticoagulation Episode Summary       Current INR goal:  2.0-3.0   TTR:  74.7% (1 y)   Target end date:  Indefinite   Send INR reminders to:   ANTICOAGULATION NURSE    Indications    Permanent atrial fibrillation (H) [I48.21]  Anticoagulation monitoring  INR range 2-3 (Resolved) [Z79.01]  Atrial fibrillation with RVR (H) (Resolved) [I48.91]             Comments:  Babak prefers to be closer to 3.0 d/t previous CVA check Q 4 weeks.Declines to reduce dose when slightly over 3.0  Needs to change PCP  Takes a long time get up to therapeutic after holding warfarin.             Anticoagulation Care Providers       Provider Role Specialty Phone number    Cris Major PA-C Referring Family  Medicine [phone]

## 2025-05-22 ENCOUNTER — VIRTUAL VISIT (OUTPATIENT)
Dept: SLEEP MEDICINE | Facility: HOSPITAL | Age: 65
End: 2025-05-22
Attending: FAMILY MEDICINE
Payer: COMMERCIAL

## 2025-05-22 DIAGNOSIS — R06.83 SNORING: Primary | ICD-10-CM

## 2025-05-28 ENCOUNTER — THERAPY VISIT (OUTPATIENT)
Dept: PHYSICAL THERAPY | Facility: OTHER | Age: 65
End: 2025-05-28
Attending: ORTHOPAEDIC SURGERY
Payer: COMMERCIAL

## 2025-05-28 DIAGNOSIS — Z96.652 S/P TOTAL KNEE REPLACEMENT, LEFT: Primary | ICD-10-CM

## 2025-05-28 PROCEDURE — 97110 THERAPEUTIC EXERCISES: CPT | Mod: GP

## 2025-05-29 ENCOUNTER — ANTICOAGULATION THERAPY VISIT (OUTPATIENT)
Dept: ANTICOAGULATION | Facility: OTHER | Age: 65
End: 2025-05-29
Attending: PHYSICIAN ASSISTANT
Payer: COMMERCIAL

## 2025-05-29 DIAGNOSIS — I48.21 PERMANENT ATRIAL FIBRILLATION (H): Primary | ICD-10-CM

## 2025-05-29 LAB — INR POINT OF CARE: 2.8 (ref 0.9–1.1)

## 2025-05-29 PROCEDURE — 36416 COLLJ CAPILLARY BLOOD SPEC: CPT | Mod: ZL

## 2025-05-29 NOTE — PROGRESS NOTES
ANTICOAGULATION MANAGEMENT     Babak Moran 64 year old male is on warfarin with therapeutic INR result. (Goal INR 2.0-3.0)    Recent labs: (last 7 days)     05/29/25  1115   INR 2.8*       ASSESSMENT     Source(s): Chart Review and In person     Warfarin doses taken: Warfarin taken as instructed  Diet: No new diet changes identified  New illness, injury, or hospitalization: No  Medication/supplement changes: None noted  Signs or symptoms of bleeding or clotting: No  Previous INR: Therapeutic last visit; previously outside of goal range  Additional findings: None       PLAN     Recommended plan for no diet, medication or health factor changes affecting INR     Dosing Instructions: Continue your current warfarin dose with next INR in 2 weeks       Summary  As of 5/29/2025      Full warfarin instructions:  7.5 mg every day   Next INR check:  6/11/2025               In person    Lab visit scheduled    Education provided: Please call back if any changes to your diet, medications or how you've been taking warfarin    Plan made per [facility] anticoagulation protocol    Nikki Hernandez RN  Anticoagulation Clinic  5/29/2025    _______________________________________________________________________     Anticoagulation Episode Summary       Current INR goal:  2.0-3.0   TTR:  74.7% (1 y)   Target end date:  Indefinite   Send INR reminders to:   ANTICOAGULATION NURSE    Indications    Permanent atrial fibrillation (H) [I48.21]  Anticoagulation monitoring  INR range 2-3 (Resolved) [Z79.01]  Atrial fibrillation with RVR (H) (Resolved) [I48.91]             Comments:  Babak prefers to be closer to 3.0 d/t previous CVA check Q 4 weeks.Declines to reduce dose when slightly over 3.0  Needs to change PCP  Takes a long time get up to therapeutic after holding warfarin.             Anticoagulation Care Providers       Provider Role Specialty Phone number    Cris Major PA-C Referring Family Medicine [phone]

## 2025-06-01 ENCOUNTER — MYC REFILL (OUTPATIENT)
Dept: CARDIOLOGY | Facility: OTHER | Age: 65
End: 2025-06-01
Payer: COMMERCIAL

## 2025-06-01 DIAGNOSIS — I87.8 VENOUS STASIS OF LOWER EXTREMITY: ICD-10-CM

## 2025-06-01 DIAGNOSIS — R60.0 PERIPHERAL EDEMA: ICD-10-CM

## 2025-06-01 DIAGNOSIS — I50.810 RIGHT HEART FAILURE WITH REDUCED RIGHT VENTRICULAR FUNCTION (H): ICD-10-CM

## 2025-06-01 DIAGNOSIS — I50.32 CHRONIC HEART FAILURE WITH PRESERVED EJECTION FRACTION (H): ICD-10-CM

## 2025-06-01 DIAGNOSIS — I10 ESSENTIAL HYPERTENSION: ICD-10-CM

## 2025-06-01 RX ORDER — TORSEMIDE 20 MG/1
20 TABLET ORAL DAILY
Qty: 90 TABLET | Refills: 3 | Status: CANCELLED | OUTPATIENT
Start: 2025-06-01

## 2025-06-02 ENCOUNTER — THERAPY VISIT (OUTPATIENT)
Dept: PHYSICAL THERAPY | Facility: OTHER | Age: 65
End: 2025-06-02
Attending: ORTHOPAEDIC SURGERY
Payer: COMMERCIAL

## 2025-06-02 DIAGNOSIS — Z96.652 S/P TOTAL KNEE REPLACEMENT, LEFT: Primary | ICD-10-CM

## 2025-06-02 PROCEDURE — 97110 THERAPEUTIC EXERCISES: CPT | Mod: GP

## 2025-06-02 NOTE — TELEPHONE ENCOUNTER
Refills on file at [facility] already per Lili at [facility].   Disp Refills Start End TED   torsemide (DEMADEX) 20 MG tablet 90 tablet 3 4/10/2025 -- No   Sig - Route: Take 1 tablet (20 mg) by mouth daily.

## 2025-06-04 ENCOUNTER — THERAPY VISIT (OUTPATIENT)
Dept: PHYSICAL THERAPY | Facility: OTHER | Age: 65
End: 2025-06-04
Attending: ORTHOPAEDIC SURGERY
Payer: COMMERCIAL

## 2025-06-04 DIAGNOSIS — Z96.652 S/P TOTAL KNEE REPLACEMENT, LEFT: Primary | ICD-10-CM

## 2025-06-04 PROCEDURE — 97110 THERAPEUTIC EXERCISES: CPT | Mod: GP

## 2025-06-11 ENCOUNTER — THERAPY VISIT (OUTPATIENT)
Dept: PHYSICAL THERAPY | Facility: OTHER | Age: 65
End: 2025-06-11
Attending: ORTHOPAEDIC SURGERY
Payer: COMMERCIAL

## 2025-06-11 DIAGNOSIS — Z96.652 S/P TOTAL KNEE REPLACEMENT, LEFT: Primary | ICD-10-CM

## 2025-06-11 PROCEDURE — 97116 GAIT TRAINING THERAPY: CPT | Mod: GP

## 2025-06-11 PROCEDURE — 97110 THERAPEUTIC EXERCISES: CPT | Mod: GP

## 2025-06-16 ENCOUNTER — THERAPY VISIT (OUTPATIENT)
Dept: PHYSICAL THERAPY | Facility: OTHER | Age: 65
End: 2025-06-16
Attending: ORTHOPAEDIC SURGERY
Payer: COMMERCIAL

## 2025-06-16 DIAGNOSIS — Z96.652 S/P TOTAL KNEE REPLACEMENT, LEFT: Primary | ICD-10-CM

## 2025-06-16 PROCEDURE — 97110 THERAPEUTIC EXERCISES: CPT | Mod: GP

## 2025-06-16 PROCEDURE — 97116 GAIT TRAINING THERAPY: CPT | Mod: GP

## 2025-06-26 ENCOUNTER — THERAPY VISIT (OUTPATIENT)
Dept: PHYSICAL THERAPY | Facility: OTHER | Age: 65
End: 2025-06-26
Attending: ORTHOPAEDIC SURGERY
Payer: COMMERCIAL

## 2025-06-26 DIAGNOSIS — Z96.652 S/P TOTAL KNEE REPLACEMENT, LEFT: Primary | ICD-10-CM

## 2025-06-26 PROCEDURE — 97110 THERAPEUTIC EXERCISES: CPT | Mod: GP

## 2025-06-30 ENCOUNTER — THERAPY VISIT (OUTPATIENT)
Dept: PHYSICAL THERAPY | Facility: OTHER | Age: 65
End: 2025-06-30
Attending: ORTHOPAEDIC SURGERY
Payer: COMMERCIAL

## 2025-06-30 DIAGNOSIS — Z96.652 S/P TOTAL KNEE REPLACEMENT, LEFT: Primary | ICD-10-CM

## 2025-06-30 PROCEDURE — 97110 THERAPEUTIC EXERCISES: CPT | Mod: GP

## 2025-06-30 NOTE — PROGRESS NOTES
[facility]                                                                                   OUTPATIENT PHYSICAL THERAPY    PLAN OF TREATMENT FOR OUTPATIENT REHABILITATION   Patient's Last Name, First Name, Babak Cristina YOB: 1960   Provider's Name   [facility]   Medical Record No.  [id number]     Onset Date: 11/26/24  Start of Care Date: 03/31/25     Medical Diagnosis:  s/p L TKA      PT Treatment Diagnosis:  impaired mobility Plan of Treatment  Frequency/Duration: 1-2x/week/ 12 weeks    Certification date from 06/23/25 to 09/15/25         See note for plan of treatment details and functional goals     Jaleel Martínez PT                         I CERTIFY THE NEED FOR THESE SERVICES FURNISHED UNDER        THIS PLAN OF TREATMENT AND WHILE UNDER MY CARE     (Physician attestation of this document indicates review and certification of the therapy plan).              Referring Provider:  Yi Parra    Initial Assessment  See Epic Evaluation- Start of Care Date: 03/31/25            PLAN  Continue therapy per current plan of care. Babak has made positive strides in his rehab following his numerous surgeries, infections, and hospitalizations evidenced by his LEFS score and improved strength. He has a new brace today that is significantly better at limiting his hyperextension in weightbearing. He is feeling more confident in his motion but does continue to fatigue easily. We are seeing the most weakness primarily with left hip flexion and knee extension during activity. Babak was able to complete 50' of ambulation with a quad cane with good stability and positioning but was limited due to fatigue. We continue to work towards a goal of Babak ambulating all distances with a single hand device but is currently limited to only short distances likely inside the home. Babak will continue to benefit from skilled therapy services to promote his  highest level of function.     Beginning/End Dates of Progress Note Reporting Period:  05/13/25 to 06/30/2025    Referring Provider:  Yi Parra    06/30/25 0500   Appointment Info   Signing clinician's name / credentials Jaleel Martínez DPT   Total/Authorized Visits 18   Visits Used 9 of 10   Medical Diagnosis s/p L TKA   PT Tx Diagnosis impaired mobility   Progress Note/Certification   Start of Care Date 03/31/25   Onset of illness/injury or Date of Surgery 11/26/24   Therapy Frequency 1-2x/week   Predicted Duration 12 weeks   Certification date from 06/23/25   Certification date to 09/15/25   Progress Note Completed Date 05/13/25   [facility] Authorization Information   Condition type Chronic (continuous duration >3 months)   Cause of current episode Post Surgical   Type of surgery 6- Other (specify in comments)  (Initial surgery was L TKA, Most recent was a  debridement and irrigation of left knee with swap of components)   Nature of treatment Rehabilitative   Functional ability Severe functional limitations   Documented POC (choose all that apply) Measurable short and long term/discharge treatment goals related to physical and functional deficits.;Frequency of treatment visits and treatment activities to address deficit areas.;Patient agrees to program participation including home program   Briefly describe symptoms L knee pain, weakness, balance impairments   How did the symptoms start surgery 1/9/23; infections, falls, patellar dislocations, most recent debridement and irrigation surgery on 11/26/24   Last 24H: avg pain/symptom intensity  2/10   Past wk: avg pain/symptom intensity 2/10   Frequency of Symptoms Frequently (51-75% of the time)   Symptom impact on ADLs Extremely   Condition change since eval A little better   General health reported by patient Good   Supervision   PT Assistant Visit Number 3   GOALS   PT Goals 2;3;4   PT Goal 1   Goal Identifier Ambulation   Goal Description Babak will be able to  "ambulate 250' with a single cane for improved mobility and independence.   Goal Progress Able to walk approximately 50' with quad cane and new brace.   Target Date 06/11/25   PT Goal 2   Goal Identifier Sit to stand   Goal Description Babak will be able to complete 5 consecutive sit to stands from 20\" surface height, even foot position, and no UE support for improved symmetry of function and strength.   Goal Progress Continues to work on even foot position, completed with L UE support 2 sets of 8.   Target Date 06/25/25   PT Goal 3   Goal Identifier LAQ   Goal Description Babak will be able to achieve terminal knee extension with seated long arc quad for improved quad strength and control.   Goal Progress Continues to work at controlled TKE. - improving quality of kick, does lack last few degrees actively.   Target Date 05/28/25   PT Goal 4   Goal Identifier Single leg stance   Goal Description Babak will be able to complete supported single leg stance without hyperextension of L knee for improved stability and progression towards least restrictive device for ambulation.   Goal Progress B WB with improved ability to stand without hyperextension. In current t-scope brace he dose have more hyperextension.   Target Date 06/25/25   Subjective Report   Subjective Report Babak has his new brace. He is liking it as it doesnt let him hyperextend. He has to be in [place] on Wednesday for his and his wife's appt.   Objective Measures   Objective Measures Objective Measure 1;Objective Measure 2   Objective Measure 1   Objective Measure L Knee ROM: 0-110 passive. Does not achieve TKE actively   Details WB posture L knee hyperextension.   Objective Measure 2   Objective Measure LEFS - 6/30/25 = 12/80.   Treatment Interventions (PT)   Interventions Therapeutic Procedure/Exercise;Gait Training   Therapeutic Procedure/Exercise   Therapeutic Procedures: strength, endurance, ROM, flexibility minutes (89549) 40   Therapeutic Procedures " Ther Proc 2   Ther Proc 1 Sit to stand with R leg fwd slightly, L leg back 2x8 - discussed how this makes L leg work harder, completed from tall chair with grey foam for added boost, light L UE assist needed on arm rest.   Ther Proc 1 - Details SciFit seat:26 L8 x7 minutes. seated hip flexion  x 15 B   Ther Proc 2 hip abduction 160# 2 x 15, leg press seat 25 320# x15. 100# single leg press on L x 15 - PT/PTA assist to prevent abd. more challenging with new brace today.   Ther Proc 2 - Details Ambulation with walker x 150' for endurance.   Patient Response/Progress improved endurance and less rest breaks needed.   Gait Training   Gait Training Minutes, includes stair climbing (75652) 5   Gait 1 Ambulation with quad cane today, CGA for 50 feet - fatiguing for lizeth, decreased step length, no loss of balance   Patient Response/Progress New brace does a great job of limiting hyperextension.   Plan   Home program standing G/S stretches, mini-squats or sit to stands without UE, hip abduction and extensions, LAQs   Plan for next session hemistance, sit to stand with offset R LE, possibly ball toss against rebounder   Total Session Time   Timed Code Treatment Minutes 45   Total Treatment Time (sum of timed and untimed services) 45

## 2025-07-01 ENCOUNTER — VIRTUAL VISIT (OUTPATIENT)
Dept: PULMONOLOGY | Facility: OTHER | Age: 65
End: 2025-07-01
Attending: FAMILY MEDICINE
Payer: COMMERCIAL

## 2025-07-01 VITALS — BODY MASS INDEX: 39.17 KG/M2 | HEIGHT: 75 IN | WEIGHT: 315 LBS

## 2025-07-01 DIAGNOSIS — I10 ESSENTIAL HYPERTENSION: ICD-10-CM

## 2025-07-01 DIAGNOSIS — I63.411 CEREBROVASCULAR ACCIDENT (CVA) DUE TO EMBOLISM OF RIGHT MIDDLE CEREBRAL ARTERY (H): ICD-10-CM

## 2025-07-01 DIAGNOSIS — M32.9 SYSTEMIC LUPUS ERYTHEMATOSUS, UNSPECIFIED SLE TYPE, UNSPECIFIED ORGAN INVOLVEMENT STATUS (H): ICD-10-CM

## 2025-07-01 DIAGNOSIS — G47.33 OSA (OBSTRUCTIVE SLEEP APNEA): Primary | ICD-10-CM

## 2025-07-01 ASSESSMENT — PAIN SCALES - GENERAL: PAINLEVEL_OUTOF10: NO PAIN (0)

## 2025-07-01 NOTE — PROGRESS NOTES
"Virtual Visit Details    Type of service:  Video Visit   Video Start Time: {video visit start/end time for provider to select:275622}  Video End Time:{video visit start/end time for provider to select:309821}    Originating Location (pt. Location): {video visit patient location:381103::\"Home\"}  {PROVIDER LOCATION On-site should be selected for visits conducted from your clinic location or adjoining [facility] hospital, academic office, or other nearby [facility] building. Off-site should be selected for all other provider locations, including home:738800}  Distant Location (provider location):  {virtual location provider:651091}  Platform used for Video Visit: {Virtual Visit Platforms:648174::\"wizboo\"}    "

## 2025-07-01 NOTE — NURSING NOTE
Current patient location: [facility] [address]  [facility] [address]    Is the patient currently in the state of [place]? YES    Visit mode: VIDEO    If the visit is dropped, the patient can be reconnected by:VIDEO VISIT: Text to cell phone:   Telephone Information:   Mobile [phone]       Will anyone else be joining the visit? NO  (If patient encounters technical issues they should call [phone])    Are changes needed to the allergy or medication list? No    Are refills needed on medications prescribed by this physician? NO    Rooming Documentation:  Questionnaire(s) completed    Reason for visit: RECHECK    Alli HALLF

## 2025-07-01 NOTE — PROGRESS NOTES
"Babak Moran is a 65 year old male who is being evaluated via a billable video visit.       The patient has been notified of following:      \"This video visit will be conducted via a call between you and your physician/provider. We have found that certain health care needs can be provided without the need for an in-person physical exam.  This service lets us provide the care you need with a video conversation.  If a prescription is necessary we can send it directly to your pharmacy.  If lab work is needed we can place an order for that and you can then stop by our lab to have the test done at a later time.     Video visits are billed at different rates depending on your insurance coverage.  Please reach out to your insurance provider with any questions.     If during the course of the call the physician/provider feels a video visit is not appropriate, you will not be charged for this service.\"     Patient has given verbal consent for Video visit? Yes  How would you like to obtain your AVS? Mail a copy  If you are dropped from the video visit, the video invite should be resent to: Text to cell phone: -  Will anyone else be joining your video visit? No  If patient encounters technical issues they should call [phone]      Video-Visit Details     Type of service:  Video Visit     Start Time: 3:30pm  End Time: 3:50pm    Originating Location (pt. Location): Home     Distant Location (provider location):  [facility]       Platform used for Video Visit: [employer]    Virtual visit for review of sleep testing results.     A/P:     1.)  Severe BARRIE (pAHI 41) with sleep-associated hypoxemia (SpO2 <= 88% for 53.5 minutes)  2.)  History of CVA - need to evaluate for modifiable risk factors  3.) HTN - stable     It is somewhat hard to correlate the worsening of his BARRIE compared to previous sleep testing 11 years ago given interim weight loss, though I do suspect this is secondary to his interim right " "MCA stroke with persistent left hemiparesis.    We discussed that I would strongly recommend treatment to reduce long-term cardiovascular factors, normalize nocturnal SpO2, as well as related to his comorbid hypertension and atrial fibrillation.    Our plan for today is to proceed with CPAP auto titrate 5-15 cm H2O.  Plan for routine follow-up 4-6 weeks after starting CPAP.    SUBJECTIVE:  Babak Moran is a 65 year old male.    Pertinent PMHx of CVA with persistent left hemiparesis (Right MCA), BARRIE, obesity, prediabetes, HTN, atrial fibrillation, SLE.     Echo:  7/31/2024 - LVEF 60-65%     Prior Sleep Testing:  3/5/2014 - PSG with weight 355 lbs, BMI 44.4.  AHI 8.7, jocelyn SpO2 81%.  CPAP 9 effective.  EKG consistent atrial fib.     9/24/2024 - he reports that he is using CPAP, settings of 9 cm H2O though I did not have CPAP download for review.  He feels it has been effective with no morning headaches.     Chief concern per questionnaire: \"Dr told me to \"     Duration of symptoms:  \"not sure\"     Goals for visit per questionnaire: \"not sure\"     Sleep pattern:  [place]days.  [place] - 6am.  Weekends.  9pm - 6am.  Time to fall asleep: ~few minutes.  Awakenings: 2-4 times per night, few minutes to return to sleep.  Average total sleep time:  6-7 hours  Napping.  1-3 days per week, 0.5-1 hours per nap.     No for RLS screen.  No for sleep walking.  No for dream enactment behavior.  No for bruxism.     No for morning headaches.  Yes for snoring.  No for observed apnea.  No for FHx of BARRIE.     Caffeine use:  No for 3+ per day.  No for within 6 hours of bed.     A/P to get up to date CPAP download, WatchPAT on CPAP.     5/5/2025 -he presents today for follow-up, somewhat unclear what prompted the follow-up at this time.  As reviewed our previous visit, he corrected me that he has not been using CPAP.  He states he did try it briefly after his 2014 sleep study, but found it poorly tolerated and discontinued.  He currently does not " "have any concerns with his sleep, though estimates his total sleep time of 4-5 hours.    A/P for repeat WatchPAT HST.    Today -we reviewed his sleep test results in detail.    WatchPAT - HOME SLEEP STUDY INTERPRETATION     Patient: Babak Moran  MRN: [id number]  YOB: 1960  Study Date: 6/17/2025  Referring Provider: Cris Major  Ordering Provider: Alexei Mack MD, MD     Chain of custody patient verification was not enabled.       Indications for Home Study: Babak Moran is a 65 year old male with a history of CVA with persistent left hemiparesis, BARRIE, obesity, prediabetes, HTN, atrial fibrillation, SLE who presents with symptoms suggestive of obstructive sleep apnea.     Estimated body mass index is 41.87 kg/m  as calculated from the following:    Height as of 5/5/25: 1.905 m (6' 3\").    Weight as of 5/5/25: 152 kg (335 lb).  Total score - [place]: 5 (8/6/2024  8:28 PM)  Total Score: 5 (8/6/2024  8:26 PM)     Data: A full night home sleep study was performed recording the standard physiologic parameters including peripheral arterial tonometry (PAT), sound/snoring, body position,  movement, sound, and oxygen saturation by pulse oximetry. Pulse rate was estimated by oximetry recording. Sleep staging (wake, REM, light, and deep sleep) was derived from PAT signal.  This study was considered adequate based on > 4 hours of quality oximetry and respiratory recording. As specified by the AASM Manual for the Scoring of Sleep and Associated events, version 2.3, Rule VIII.D 1B, 4% oxygen desaturation scoring for hypopneas is used as a standard of care on all home sleep apnea testing.     Total Recording Time: 7 hrs, 30 min  Total Sleep Time: 6 hrs, 45 min  % of Sleep Time REM: 26.2%     Respiratory:  Snoring: Snoring was present.  Respiratory events: The PAT respiratory disturbance index [pRDI] was 48.2 events per hour.  The PAT apnea/hypopnea index [pAHI] was 41 events per hour.  WARNER was 22.5 " events per hour.  During REM sleep the pAHI was 63.8.  Sleep Associated Hypoxemia: sustained hypoxemia was present in supine REM. Mean oxygen saturation was 90%.  Minimum was 73%.  Time with saturation less than 88% was 53.5 minutes.     Heart Rate: By pulse oximetry normal rate was noted.  Per cardiac rhythm analysis, suggestive of 4 hours and 20 minutes of atrial fibrillation.     Position: Percent of time spent: supine - 100%, prone - 0%, on right - 0%, on left - 0%.  pAHI was 41 per hour supine, - per hour prone, - per hour on right side, and - per hour on left side.      Assessment:   Severe obstructive sleep apnea.  Sleep associated hypoxemia was present.  Cardiac rhythm analysis suggested of 4+ hours of atrial fibrillation     Recommendations:  Consider auto-CPAP at 5-15 cmH2O or polysomnography with full night PAP titration.  Suggest optimizing sleep hygiene and avoiding sleep deprivation.  Weight management.     Diagnosis Code(s): Obstructive Sleep Apnea G47.33, Hypoxemia G47.36     Alexei Mack MD, MD, June 24, 2025   Diplomate, American Board of Family Medicine, Sleep Medicine    Past medical history:    Patient Active Problem List    Diagnosis Date Noted    S/P total knee replacement, left 04/02/2025     Priority: Medium    Abdominal aortic aneurysm (AAA) without rupture, unspecified part 10/16/2024     Priority: Medium    Chronic heart failure with preserved ejection fraction (H) 10/10/2024     Priority: Medium    Venous stasis of lower extremity 10/10/2024     Priority: Medium    Peripheral edema 10/10/2024     Priority: Medium    On continuous oral anticoagulation 10/10/2024     Priority: Medium    History of CVA 10/10/2024     Priority: Medium     On 2/4/17      Vitamin B12 deficiency (non anemic) 10/10/2024     Priority: Medium    Iron deficiency anemia, unspecified iron deficiency anemia type 10/10/2024     Priority: Medium    Blister of right leg 09/12/2024     Priority: Medium    Right  heart failure with reduced right ventricular function (H) 08/06/2024     Priority: Medium    Hx of left knee surgery 04/08/2024     Priority: Medium     Revision by Dr Aida Jauregui with most recent revision done 3/12/24.       Recurrent gross hematuria 12/13/2023     Priority: Medium    Prediabetes 12/01/2023     Priority: Medium    Patellar dislocation, left, sequela 11/07/2023     Priority: Medium     Recurrence dislocation after revision done 8 weeks ago (early September 2023)  Revised surgery by Dr Johnson Nov 13, 2023.       Dislocation of both patellae 09/13/2023     Priority: Medium     Surgical repair by Dr Johnson Bilateral 9/8/23      Chronic venous stasis dermatitis of both lower extremities 06/28/2023     Priority: Medium    Status post total right knee replacement 06/26/2023     Priority: Medium    Nail dystrophy 02/23/2023     Priority: Medium    Onychomycosis 02/23/2023     Priority: Medium    Aftercare following knee joint replacement surgery, unspecified laterality 01/09/2023     Priority: Medium    Onychogryphosis 08/27/2022     Priority: Medium    Need for vaccination 04/10/2022     Priority: Medium    Obstructive sleep apnea syndrome 05/13/2019     Priority: Medium    Permanent atrial fibrillation (H) 02/11/2018     Priority: Medium    Osteoarthrosis 01/16/2018     Priority: Medium    Morbid obesity with BMI of 40.0-44.9, adult (H) 02/08/2017     Priority: Medium    Left hemiparesis (H) 02/03/2017     Priority: Medium    Family history of coronary artery disease 05/19/2014     Priority: Medium    Ascending aorta enlargement 02/13/2014     Priority: Medium    Gout 01/07/2014     Priority: Medium     Overview:   Overview:   after 3 attacks in < a year, tapped and crystal proven 5/2/13;  Allopurinol started then got ? Atypical side effect, stopped; (short term colchicine prophylaxis)      Overweight 05/02/2013     Priority: Medium     Formatting of this note might be different from the  original.  Max weight reported 475 lbs;  5/13 369 lbs      Pain in joint, ankle and foot 05/17/2012     Priority: Medium    Essential hypertension 09/08/2009     Priority: Medium    Cutaneous lupus erythematosus 11/12/2008     Priority: Medium    Systemic lupus erythematosus (H) 09/18/2008     Priority: Medium     Overview:   Dermatitis         10 point ROS of systems including Constitutional, Eyes, Respiratory, Cardiovascular, Gastroenterology, Genitourinary, Integumentary, Muscularskeletal, Psychiatric were all negative except for pertinent positives noted in my HPI.    Current Outpatient Medications   Medication Sig Dispense Refill    acetaminophen (TYLENOL) 325 MG tablet Take 2 tablets (650 mg) by mouth every 4 hours as needed for other (mild pain). 100 tablet 4    amLODIPine (NORVASC) 10 MG tablet Take 1 tablet (10 mg) by mouth daily. 90 tablet 4    amoxicillin (AMOXIL) 500 MG capsule Take 1,000 mg by mouth.      calcium carbonate (TUMS) 500 MG chewable tablet Take 1 tablet (500 mg) by mouth every 6 hours as needed for heartburn or other (indigestion). 90 tablet 4    Cranberry 125 MG TABS Take 200 mg by mouth daily. 90 tablet 4    D-Mannose 500 MG CAPS Take 1,500 mg by mouth 2 times daily. 180 capsule 4    Emollient (CERAVE MOISTURIZING) CREA Apply 1 Application topically 2 times daily. To bilateral knees 340 g 11    lisinopril (ZESTRIL) 10 MG tablet Take 1 tablet (10 mg) by mouth daily. 90 tablet 4    magnesium hydroxide (MILK OF MAGNESIA) 400 MG/5ML suspension Take 30 mLs by mouth daily as needed for constipation. 354 mL 11    metoprolol succinate ER (TOPROL XL) 50 MG 24 hr tablet Take 1 tablet (50 mg) by mouth daily. 90 tablet 3    potassium chloride macey ER (KLOR-CON M10) 10 MEQ CR tablet Take 1 tablet (10 mEq) by mouth every other day. (Patient not taking: Reported on 4/10/2025) 90 tablet 4    rivaroxaban ANTICOAGULANT (XARELTO) 20 MG TABS tablet Take 1 tablet (20 mg) by mouth daily (with dinner). 90  tablet 3    saccharomyces boulardii (FLORASTOR) 250 MG capsule Take 1 capsule (250 mg) by mouth daily. 90 capsule 4    senna-docusate (SENOKOT-S/PERICOLACE) 8.6-50 MG tablet 1 TAB PO every day AND 1 tab daily  tablet 11    torsemide (DEMADEX) 10 MG tablet Take 1 tablet (10 mg) by mouth daily. 90 tablet 3    torsemide (DEMADEX) 20 MG tablet Take 1 tablet (20 mg) by mouth daily. 90 tablet 3    warfarin ANTICOAGULANT (COUMADIN) 5 MG tablet 10 mg (2 tablets) every Mon or [facility] 20 tablet 1    warfarin ANTICOAGULANT (COUMADIN) 7.5 MG tablet 7.5 mg (one 7.5 mg tablets) every day except Monday or as directed by [facility]. 80 tablet 1       OBJECTIVE:  There were no vitals taken for this visit.    Physical Exam     ---  This note was written with the assistance of the Dragon voice-dictation technology software. The final document, although reviewed, may contain errors. For corrections, please contact the office.    Total time spent preparing to see the patient, review of chart, obtaining history and physical examination, review of sleep testing, review of treatment options, education, discussion with patient and documenting in Epic / EMR was 25 minutes.  All time involved was spent on the day of service for the patient (the same day as the patient's appointment).    Alexei Mack MD    Sleep Medicine  [place], [place]  Main Office: [phone]  [facility] [place]  [address] [place] [address], [address]  Schedule visits: [phone]  Main Office: [phone]  Fax: [phone]

## 2025-07-07 ENCOUNTER — THERAPY VISIT (OUTPATIENT)
Dept: PHYSICAL THERAPY | Facility: OTHER | Age: 65
End: 2025-07-07
Attending: ORTHOPAEDIC SURGERY
Payer: COMMERCIAL

## 2025-07-07 ENCOUNTER — MEDICAL CORRESPONDENCE (OUTPATIENT)
Dept: HEALTH INFORMATION MANAGEMENT | Facility: OTHER | Age: 65
End: 2025-07-07

## 2025-07-07 DIAGNOSIS — Z96.652 S/P TOTAL KNEE REPLACEMENT, LEFT: Primary | ICD-10-CM

## 2025-07-07 PROCEDURE — 97110 THERAPEUTIC EXERCISES: CPT | Mod: GP

## 2025-07-09 ENCOUNTER — ANTICOAGULATION THERAPY VISIT (OUTPATIENT)
Dept: ANTICOAGULATION | Facility: OTHER | Age: 65
End: 2025-07-09
Payer: COMMERCIAL

## 2025-07-09 ENCOUNTER — DOCUMENTATION ONLY (OUTPATIENT)
Dept: HOME HEALTH SERVICES | Facility: CLINIC | Age: 65
End: 2025-07-09

## 2025-07-09 DIAGNOSIS — I48.21 PERMANENT ATRIAL FIBRILLATION (H): Primary | ICD-10-CM

## 2025-07-09 LAB — INR POINT OF CARE: 5.9 (ref 0.9–1.1)

## 2025-07-09 PROCEDURE — 85610 PROTHROMBIN TIME: CPT | Mod: ZL,QW

## 2025-07-09 NOTE — PROGRESS NOTES
Patient was offered choice of [facility]  Patient Babak Moran was set up at Outside Vendor [facility] on July 9, 2025. Patient received a Resmed Airsense 11 Pressures were set at 5-15 cm H2O.   Patient s ramp is Off and FLEX/EPR is EPR, 2.  Patient received a Resmed Mask name: Airfit P30i  Pillow mask size Medium, heated tubing and heated humidifier.  Patient has the following compliance requirements: using and visit requirements  Patient has a follow up on tbd with Dr. Mack.    Silver Farnsworth

## 2025-07-09 NOTE — PROGRESS NOTES
ANTICOAGULATION MANAGEMENT     Babak Moran 65 year old male is on warfarin with supratherapeutic INR result. (Goal INR 2.0-3.0)    Recent labs: (last 7 days)     07/09/25  1010   INR 5.9*       ASSESSMENT     Source(s): Chart Review and In person     Warfarin doses taken: More warfarin taken than planned which may be affecting INR  Patient verbalized that he usually puts takes all his individual morning medications out of the original pill bottle and puts them in an empty pill bottle each morning to take them all at once, Patient accidently took what he thought was the pill bottle with all his morning medications but ended up being what ever he had left in his 5 mg warfarin bottle. Patient verbalized that it was potentially 5+ pills but not able to remember total of tablets and which specific day   Diet: No new diet changes identified  Medication/supplement changes: None noted  New illness, injury, or hospitalization: No  Signs or symptoms of bleeding or clotting: No  Previous result: Therapeutic last visit; previously outside of goal range  Additional findings: None       PLAN     Recommended plan for temporary change(s) affecting INR     Dosing Instructions: hold 1.5 doses then continue your current warfarin dose with next INR in 2 days       Summary  As of 7/9/2025      Full warfarin instructions:  7/9: Hold; 7/10: 3.75 mg; Otherwise 7.5 mg every day   Next INR check:  7/11/2025               In person    Patient offered & declined to schedule next visit    Education provided: Please call back if any changes to your diet, medications or how you've been taking warfarin  Symptom monitoring: monitoring for bleeding signs and symptoms, when to seek medical attention/emergency care, and if you hit your head or have a bad fall seek emergency care    Plan made per [facility] anticoagulation protocol    Nikki Hernandez, RN  7/9/2025  Anticoagulation Clinic  [facility] for routing messages: [address]yush ST ANTICOAGULATION NURSE  [facility]  patient phone line: [phone]        _______________________________________________________________________     Anticoagulation Episode Summary       Current INR goal:  2.0-3.0   TTR:  69.0% (1 y)   Target end date:  Indefinite   Send INR reminders to:   ANTICOAGULATION NURSE    Indications    Permanent atrial fibrillation (H) [I48.21]  Anticoagulation monitoring  INR range 2-3 (Resolved) [Z79.01]  Atrial fibrillation with RVR (H) (Resolved) [I48.91]             Comments:  Babak prefers to be closer to 3.0 d/t previous CVA check Q 4 weeks.Declines to reduce dose when slightly over 3.0  Needs to change PCP  Takes a long time get up to therapeutic after holding warfarin.             Anticoagulation Care Providers       Provider Role Specialty Phone number    Cris Major PA-C Referring Family Medicine [phone]

## 2025-07-10 ENCOUNTER — THERAPY VISIT (OUTPATIENT)
Dept: PHYSICAL THERAPY | Facility: OTHER | Age: 65
End: 2025-07-10
Attending: ORTHOPAEDIC SURGERY
Payer: COMMERCIAL

## 2025-07-10 DIAGNOSIS — Z96.652 S/P TOTAL KNEE REPLACEMENT, LEFT: Primary | ICD-10-CM

## 2025-07-10 PROCEDURE — 97110 THERAPEUTIC EXERCISES: CPT | Mod: GP

## 2025-07-15 ENCOUNTER — ANTICOAGULATION THERAPY VISIT (OUTPATIENT)
Dept: ANTICOAGULATION | Facility: OTHER | Age: 65
End: 2025-07-15
Payer: COMMERCIAL

## 2025-07-15 DIAGNOSIS — I48.21 PERMANENT ATRIAL FIBRILLATION (H): Primary | ICD-10-CM

## 2025-07-15 LAB — INR POINT OF CARE: 2.1 (ref 0.9–1.1)

## 2025-07-15 PROCEDURE — 85610 PROTHROMBIN TIME: CPT | Mod: ZL,QW

## 2025-07-15 NOTE — PROGRESS NOTES
ANTICOAGULATION MANAGEMENT     Babak Moran 65 year old male is on warfarin with therapeutic INR result. (Goal INR 2.0-3.0)    Recent labs: (last 7 days)     07/15/25  1538   INR 2.1*       ASSESSMENT     Source(s): Chart Review and In person     Warfarin doses taken: Held for high INR  recently which may be affecting INR  Diet: No new diet changes identified  Medication/supplement changes: None noted  New illness, injury, or hospitalization: No  Signs or symptoms of bleeding or clotting: No  Previous result: Supratherapeutic  Additional findings: None       PLAN     Recommended plan for temporary change(s) affecting INR     Dosing Instructions: Continue your current warfarin dose with next INR in 1 week       Summary  As of 7/15/2025      Full warfarin instructions:  7.5 mg every day   Next INR check:  7/22/2025               In person    Lab visit scheduled    Education provided: Please call back if any changes to your diet, medications or how you've been taking warfarin    Plan made per [facility] anticoagulation protocol    Nikki Hernandez RN  7/15/2025  Anticoagulation Clinic  [employer] for routing messages: piyush  ANTICOAGULATION NURSE  [facility] patient phone line: [phone]        _______________________________________________________________________     Anticoagulation Episode Summary       Current INR goal:  2.0-3.0   TTR:  67.8% (1 y)   Target end date:  Indefinite   Send INR reminders to:   ANTICOAGULATION NURSE    Indications    Permanent atrial fibrillation (H) [I48.21]  Anticoagulation monitoring  INR range 2-3 (Resolved) [Z79.01]  Atrial fibrillation with RVR (H) (Resolved) [I48.91]             Comments:  Babak prefers to be closer to 3.0 d/t previous CVA check Q 4 weeks.Declines to reduce dose when slightly over 3.0  Needs to change PCP  Takes a long time get up to therapeutic after holding warfarin.             Anticoagulation Care Providers       Provider Role Specialty Phone number    Moriah  PERFECTO [facility] [phone]

## 2025-07-22 ENCOUNTER — ANTICOAGULATION THERAPY VISIT (OUTPATIENT)
Dept: ANTICOAGULATION | Facility: OTHER | Age: 65
End: 2025-07-22
Payer: COMMERCIAL

## 2025-07-22 ENCOUNTER — THERAPY VISIT (OUTPATIENT)
Dept: PHYSICAL THERAPY | Facility: OTHER | Age: 65
End: 2025-07-22
Attending: ORTHOPAEDIC SURGERY
Payer: COMMERCIAL

## 2025-07-22 DIAGNOSIS — I48.21 PERMANENT ATRIAL FIBRILLATION (H): Primary | ICD-10-CM

## 2025-07-22 DIAGNOSIS — Z96.652 S/P TOTAL KNEE REPLACEMENT, LEFT: Primary | ICD-10-CM

## 2025-07-22 LAB — INR POINT OF CARE: 3 (ref 0.9–1.1)

## 2025-07-22 PROCEDURE — 97110 THERAPEUTIC EXERCISES: CPT | Mod: GP

## 2025-07-22 PROCEDURE — 85610 PROTHROMBIN TIME: CPT | Mod: ZL,QW

## 2025-07-22 PROCEDURE — 97112 NEUROMUSCULAR REEDUCATION: CPT | Mod: GP

## 2025-07-22 NOTE — PROGRESS NOTES
ANTICOAGULATION MANAGEMENT     Babak Moran 65 year old male is on warfarin with therapeutic INR result. (Goal INR 2.0-3.0)    Recent labs: (last 7 days)     07/22/25  1353   INR 3.0*       ASSESSMENT     Source(s): Chart Review and in person     Warfarin doses taken: Held for high INR  recently which may be affecting INR  Diet: No new diet changes identified  Medication/supplement changes: None noted  New illness, injury, or hospitalization: No  Signs or symptoms of bleeding or clotting: No  Previous result: Therapeutic last visit; previously outside of goal range  Additional findings: None       PLAN     Recommended plan for temporary change(s) affecting INR     Dosing Instructions: Continue your current warfarin dose with next INR in 2 weeks       Summary  As of 7/22/2025      Full warfarin instructions:  7.5 mg every day   Next INR check:  8/5/2025               In person    Lab visit scheduled    Education provided: Please call back if any changes to your diet, medications or how you've been taking warfarin    Plan made per [facility] anticoagulation protocol    Saadia Alexander RN  7/22/2025  Anticoagulation Clinic  [employer] for routing messages: piyush  ANTICOAGULATION NURSE  [facility] patient phone line: [phone]        _______________________________________________________________________     Anticoagulation Episode Summary       Current INR goal:  2.0-3.0   TTR:  67.8% (1 y)   Target end date:  Indefinite   Send INR reminders to:   ANTICOAGULATION NURSE    Indications    Permanent atrial fibrillation (H) [I48.21]  Anticoagulation monitoring  INR range 2-3 (Resolved) [Z79.01]  Atrial fibrillation with RVR (H) (Resolved) [I48.91]             Comments:  Babak prefers to be closer to 3.0 d/t previous CVA check Q 4 weeks.Declines to reduce dose when slightly over 3.0  Needs to change PCP  Takes a long time get up to therapeutic after holding warfarin.             Anticoagulation Care Providers       Provider Role  Specialty Phone number    Cris Major PA-C Referring Family Medicine [phone]               .

## 2025-07-23 ENCOUNTER — HOSPITAL ENCOUNTER (EMERGENCY)
Facility: OTHER | Age: 65
Discharge: HOME | End: 2025-07-23
Attending: FAMILY MEDICINE
Payer: COMMERCIAL

## 2025-07-23 ENCOUNTER — APPOINTMENT (OUTPATIENT)
Dept: GENERAL RADIOLOGY | Facility: OTHER | Age: 65
End: 2025-07-23
Attending: FAMILY MEDICINE
Payer: COMMERCIAL

## 2025-07-23 VITALS
WEIGHT: 315 LBS | RESPIRATION RATE: 20 BRPM | BODY MASS INDEX: 39.17 KG/M2 | OXYGEN SATURATION: 96 % | SYSTOLIC BLOOD PRESSURE: 109 MMHG | HEART RATE: 98 BPM | TEMPERATURE: 98.2 F | DIASTOLIC BLOOD PRESSURE: 65 MMHG | HEIGHT: 75 IN

## 2025-07-23 DIAGNOSIS — M25.562 PAIN AND SWELLING OF LEFT KNEE: Primary | ICD-10-CM

## 2025-07-23 DIAGNOSIS — M00.9 CHRONIC INFECTION OF LEFT KNEE (H): ICD-10-CM

## 2025-07-23 DIAGNOSIS — M25.462 PAIN AND SWELLING OF LEFT KNEE: Primary | ICD-10-CM

## 2025-07-23 LAB
ALBUMIN SERPL BCG-MCNC: 3.7 G/DL (ref 3.5–5.2)
ALP SERPL-CCNC: 77 U/L (ref 40–150)
ALT SERPL W P-5'-P-CCNC: 22 U/L (ref 0–70)
ANION GAP SERPL CALCULATED.3IONS-SCNC: 12 MMOL/L (ref 7–15)
AST SERPL W P-5'-P-CCNC: 23 U/L (ref 0–45)
BASOPHILS # BLD AUTO: 0.1 10E3/UL (ref 0–0.2)
BASOPHILS NFR BLD AUTO: 1 %
BILIRUB SERPL-MCNC: 0.6 MG/DL
BUN SERPL-MCNC: 25.9 MG/DL (ref 8–23)
CALCIUM SERPL-MCNC: 9.2 MG/DL (ref 8.8–10.4)
CHLORIDE SERPL-SCNC: 99 MMOL/L (ref 98–107)
CREAT SERPL-MCNC: 1.41 MG/DL (ref 0.67–1.17)
CRP SERPL-MCNC: 19.07 MG/L
EGFRCR SERPLBLD CKD-EPI 2021: 56 ML/MIN/1.73M2
EOSINOPHIL # BLD AUTO: 0.1 10E3/UL (ref 0–0.7)
EOSINOPHIL NFR BLD AUTO: 1 %
ERYTHROCYTE [DISTWIDTH] IN BLOOD BY AUTOMATED COUNT: 18.3 % (ref 10–15)
ERYTHROCYTE [SEDIMENTATION RATE] IN BLOOD BY WESTERGREN METHOD: 45 MM/HR (ref 0–20)
GLUCOSE SERPL-MCNC: 133 MG/DL (ref 70–99)
HCO3 SERPL-SCNC: 30 MMOL/L (ref 22–29)
HCT VFR BLD AUTO: 46 % (ref 40–53)
HGB BLD-MCNC: 14.6 G/DL (ref 13.3–17.7)
HOLD SPECIMEN: NORMAL
HOLD SPECIMEN: NORMAL
IMM GRANULOCYTES # BLD: 0 10E3/UL
IMM GRANULOCYTES NFR BLD: 0 %
LACTATE SERPL-SCNC: 1.8 MMOL/L (ref 0.7–2)
LYMPHOCYTES # BLD AUTO: 1.4 10E3/UL (ref 0.8–5.3)
LYMPHOCYTES NFR BLD AUTO: 15 %
MCH RBC QN AUTO: 26.6 PG (ref 26.5–33)
MCHC RBC AUTO-ENTMCNC: 31.7 G/DL (ref 31.5–36.5)
MCV RBC AUTO: 84 FL (ref 78–100)
MONOCYTES # BLD AUTO: 0.9 10E3/UL (ref 0–1.3)
MONOCYTES NFR BLD AUTO: 10 %
NEUTROPHILS # BLD AUTO: 7 10E3/UL (ref 1.6–8.3)
NEUTROPHILS NFR BLD AUTO: 74 %
NRBC # BLD AUTO: 0 10E3/UL
NRBC BLD AUTO-RTO: 0 /100
PLATELET # BLD AUTO: 308 10E3/UL (ref 150–450)
POTASSIUM SERPL-SCNC: 4 MMOL/L (ref 3.4–5.3)
PROCALCITONIN SERPL IA-MCNC: 0.03 NG/ML
PROT SERPL-MCNC: 7.2 G/DL (ref 6.4–8.3)
RBC # BLD AUTO: 5.48 10E6/UL (ref 4.4–5.9)
SODIUM SERPL-SCNC: 141 MMOL/L (ref 135–145)
WBC # BLD AUTO: 9.5 10E3/UL (ref 4–11)

## 2025-07-23 PROCEDURE — 84145 PROCALCITONIN (PCT): CPT | Performed by: FAMILY MEDICINE

## 2025-07-23 PROCEDURE — 73562 X-RAY EXAM OF KNEE 3: CPT | Mod: LT

## 2025-07-23 PROCEDURE — 73562 X-RAY EXAM OF KNEE 3: CPT | Mod: 26 | Performed by: RADIOLOGY

## 2025-07-23 PROCEDURE — 86140 C-REACTIVE PROTEIN: CPT | Performed by: FAMILY MEDICINE

## 2025-07-23 PROCEDURE — 85004 AUTOMATED DIFF WBC COUNT: CPT | Performed by: FAMILY MEDICINE

## 2025-07-23 PROCEDURE — 99284 EMERGENCY DEPT VISIT MOD MDM: CPT | Performed by: FAMILY MEDICINE

## 2025-07-23 PROCEDURE — 84155 ASSAY OF PROTEIN SERUM: CPT | Performed by: FAMILY MEDICINE

## 2025-07-23 PROCEDURE — 85652 RBC SED RATE AUTOMATED: CPT | Performed by: FAMILY MEDICINE

## 2025-07-23 PROCEDURE — 83605 ASSAY OF LACTIC ACID: CPT | Performed by: FAMILY MEDICINE

## 2025-07-23 PROCEDURE — 36415 COLL VENOUS BLD VENIPUNCTURE: CPT | Performed by: FAMILY MEDICINE

## 2025-07-23 ASSESSMENT — COLUMBIA-SUICIDE SEVERITY RATING SCALE - C-SSRS
1. IN THE PAST MONTH, HAVE YOU WISHED YOU WERE DEAD OR WISHED YOU COULD GO TO SLEEP AND NOT WAKE UP?: NO
2. HAVE YOU ACTUALLY HAD ANY THOUGHTS OF KILLING YOURSELF IN THE PAST MONTH?: NO
6. HAVE YOU EVER DONE ANYTHING, STARTED TO DO ANYTHING, OR PREPARED TO DO ANYTHING TO END YOUR LIFE?: NO

## 2025-07-23 ASSESSMENT — ACTIVITIES OF DAILY LIVING (ADL)
ADLS_ACUITY_SCORE: 59
ADLS_ACUITY_SCORE: 59

## 2025-07-23 NOTE — ED TRIAGE NOTES
"ED Nursing Triage Note (General)   ________________________________    Babak Moran is a 65 year old Male that presents to triage via private vehicle with complaints of L) sided knee pain.  Patient states significant hx of prior joint infections and follows in [place].  Patient states he was seen earlier in July and told his CRP was elevated.  Patient reached out to ortho who advised him to come to the ER.  Hx of prior staph infection in affected joint.  No fevers reported by patient.    Significant symptoms had onset 24 hour(s) ago.  Vital signs:  Temp: 98.2  F (36.8  C) Temp src: Tympanic BP: 109/65 Pulse: 98   Resp: 20 SpO2: 96 %     Height: 190.5 cm (6' 3\") Weight: (!) 154.2 kg (340 lb)  Estimated body mass index is 42.5 kg/m  as calculated from the following:    Height as of this encounter: 1.905 m (6' 3\").    Weight as of this encounter: 154.2 kg (340 lb).       PRE HOSPITAL PRIOR LIVING SITUATION Spouse      Triage Assessment (Adult)       Row Name 07/23/25 1223          Triage Assessment    Airway WDL WDL        Respiratory WDL    Respiratory WDL WDL        Skin Circulation/Temperature WDL    Skin Circulation/Temperature WDL WDL        Cardiac WDL    Cardiac WDL WDL     Cardiac Rhythm NSR        Peripheral/Neurovascular WDL    Peripheral Neurovascular WDL WDL        Cognitive/Neuro/Behavioral WDL    Cognitive/Neuro/Behavioral WDL WDL           "

## 2025-07-24 ENCOUNTER — PATIENT OUTREACH (OUTPATIENT)
Dept: CARE COORDINATION | Facility: CLINIC | Age: 65
End: 2025-07-24
Payer: COMMERCIAL

## 2025-07-24 ENCOUNTER — DOCUMENTATION ONLY (OUTPATIENT)
Dept: FAMILY MEDICINE | Facility: OTHER | Age: 65
End: 2025-07-24
Payer: COMMERCIAL

## 2025-07-24 NOTE — PROGRESS NOTES
ANTICOAGULATION  MANAGEMENT: Discharge Review    Babak Moran chart reviewed for anticoagulation continuity of care    Emergency room visit on 7/23/25 for chronic left knee infection .    Discharge disposition: Home    Results:    Recent labs: (last 7 days)     07/22/25  1353   INR 3.0*     Anticoagulation inpatient management:     not applicable     Anticoagulation discharge instructions: Was not addressed in ED discharge instructions    Warfarin dosing: home regimen continued   Bridging: No   INR goal change: No      Medication changes affecting anticoagulation: No    Additional factors affecting anticoagulation: No     PLAN     No adjustment to anticoagulation plan needed    Patient not contacted    No adjustment to Anticoagulation Calendar was required    Saadia Alexander RN  7/24/2025  Anticoagulation Clinic  [facility] for routing messages: piyush ST ANTICOAGULATION NURSE  [facility] patient phone line: [phone]

## 2025-07-24 NOTE — ED PROVIDER NOTES
[facility]    eMERGENCY dEPARTMENT eNCOUnter    Name:  Babak Moran  MRN:  [id number]  :  1960    Date of Service:  2025     Chief Complaint   Patient presents with    Knee Pain     After sign-out, I personally reviewed pertinent labs, history and imaging and examined the patient myself.    Babak Moran is a 65 year old male who presented to the Emergency Department for evaluation of left knee swelling and erythema and who was signed out to me by the previous ED provider. Please refer to previous documentation for complete details of HPI and complete exam.     At the time of sign out pertinent findings include mild left knee swelling.     The patient is currently pending labs.     Working impression / differential diagnosis is septic arthritis, cellulitis, tendinitis.     RADIOLOGY  XR Knee Left 3 Views   Final Result   IMPRESSION: Status post left total knee arthroplasty. No hardware complication. No definite evidence of acute fracture or osteomyelitis. Small knee joint effusion. Osteopenia. Atherosclerosis.           LABS  Results for orders placed or performed during the hospital encounter of 25   XR Knee Left 3 Views     Status: None    Narrative    EXAM: XR KNEE LEFT 3 VIEWS  LOCATION: [facility]  DATE: 2025    INDICATION: swelling, prior joint infections  COMPARISON: 2024      Impression    IMPRESSION: Status post left total knee arthroplasty. No hardware complication. No definite evidence of acute fracture or osteomyelitis. Small knee joint effusion. Osteopenia. Atherosclerosis.   Comprehensive Metabolic Panel (Limited Occurrences)     Status: Abnormal   Result Value Ref Range    Sodium 141 135 - 145 mmol/L    Potassium 4.0 3.4 - 5.3 mmol/L    Carbon Dioxide (CO2) 30 (H) 22 - 29 mmol/L    Anion Gap 12 7 - 15 mmol/L    Urea Nitrogen 25.9 (H) 8.0 - 23.0 mg/dL    Creatinine 1.41 (H) 0.67 - 1.17 mg/dL    GFR Estimate 56 (L) >60  mL/min/1.73m2    Calcium 9.2 8.8 - 10.4 mg/dL    Chloride 99 98 - 107 mmol/L    Glucose 133 (H) 70 - 99 mg/dL    Alkaline Phosphatase 77 40 - 150 U/L    AST 23 0 - 45 U/L    ALT 22 0 - 70 U/L    Protein Total 7.2 6.4 - 8.3 g/dL    Albumin 3.7 3.5 - 5.2 g/dL    Bilirubin Total 0.6 <=1.2 mg/dL   Lactic acid whole blood with 1x repeat in 2 hr when >2     Status: Normal   Result Value Ref Range    Lactic Acid, Initial 1.8 0.7 - 2.0 mmol/L   Procalcitonin     Status: Normal   Result Value Ref Range    Procalcitonin 0.03 <0.50 ng/mL   CRP inflammation     Status: Abnormal   Result Value Ref Range    CRP Inflammation 19.07 (H) <5.00 mg/L   Erythrocyte sedimentation rate auto     Status: Abnormal   Result Value Ref Range    Erythrocyte Sedimentation Rate 45 (H) 0 - 20 mm/hr   CBC with platelets and differential     Status: Abnormal   Result Value Ref Range    WBC Count 9.5 4.0 - 11.0 10e3/uL    RBC Count 5.48 4.40 - 5.90 10e6/uL    Hemoglobin 14.6 13.3 - 17.7 g/dL    Hematocrit 46.0 40.0 - 53.0 %    MCV 84 78 - 100 fL    MCH 26.6 26.5 - 33.0 pg    MCHC 31.7 31.5 - 36.5 g/dL    RDW 18.3 (H) 10.0 - 15.0 %    Platelet Count 308 150 - 450 10e3/uL    % Neutrophils 74 %    % Lymphocytes 15 %    % Monocytes 10 %    % Eosinophils 1 %    % Basophils 1 %    % Immature Granulocytes 0 %    NRBCs per 100 WBC 0 <1 /100    Absolute Neutrophils 7.0 1.6 - 8.3 10e3/uL    Absolute Lymphocytes 1.4 0.8 - 5.3 10e3/uL    Absolute Monocytes 0.9 0.0 - 1.3 10e3/uL    Absolute Eosinophils 0.1 0.0 - 0.7 10e3/uL    Absolute Basophils 0.1 0.0 - 0.2 10e3/uL    Absolute Immature Granulocytes 0.0 <=0.4 10e3/uL    Absolute NRBCs 0.0 10e3/uL   Extra Tube     Status: None    Narrative    The following orders were created for panel order Extra Tube.  Procedure                               Abnormality         Status                     ---------                               -----------         ------                     Extra Blue Top Tube[[id number]]                              Final result               Extra Red Top Tube[[id number]]                              Final result                 Please view results for these tests on the individual orders.   Extra Blue Top Tube     Status: None   Result Value Ref Range    Hold Specimen JIC    Extra Red Top Tube     Status: None   Result Value Ref Range    Hold Specimen JIC    CBC with Platelets and Differential (Limited Occurrences)     Status: Abnormal    Narrative    The following orders were created for panel order CBC with Platelets and Differential (Limited Occurrences).  Procedure                               Abnormality         Status                     ---------                               -----------         ------                     CBC with platelets and ...[[id number]]  Abnormal            Final result                 Please view results for these tests on the individual orders.     Gen: well-appearing, in no acute distress  HEENT: normocephalic atraumatic, EOM intact, Pupils Round, Equal and Symmetric, No conjunctival injection nor erythema  CV: Pink warm and well-perfused   Pulm: breathing comfortably on room air  Ext:  LEFT knee without erythema nor overt swelling, nontender to palpation  MSK: Moves all extremities appropriately  Skin: bilateral venous stasis dermatitis  Neuro: A/O x 4 (PPTE), no focal deficits  Psych: behavior and affect normal        POCUS  Mild prepatellar effusion noted on exam, intact patellar and quadriceps tendon, nontender to palpation      ED COURSE & MEDICAL DECISION MAKING      ED Course as of 07/24/25 0133   Wed Jul 23, 2025 2031 Called and discussed rising CRP as well as patient presentation with Dr. London who was on call for [facility]. Given no overt signs and symptoms of sepsis, recommended continued PO abx and monitoring. They will attempt to get the patient in sooner to be seen for follow up.      Evidence of increasing CRP, however, no exam findings of  overt septic arthritis/systemic sepsis.  At this time patient was recommended to continue his p.o. amoxicillin [facility] and [facility] of his chronic knee infection.  They will attempt to get him in early next week for follow-up.  Patient was given strict return precautions for any worsening swelling, erythema, fevers, chills, rigors or any worsening knee pain.    FINAL IMPRESSION      1. Pain and swelling of left knee    2. Chronic infection of left knee (H)          Disposition and Plan:      Medications: Continue your current medications as prescribed.  Medications started at discharge:   Discharge Medication List as of 7/23/2025  8:35 PM             Aaron Aponte MD  07/23/25 1278       Aaron Aponte MD  07/24/25 6587

## 2025-07-24 NOTE — DISCHARGE INSTRUCTIONS
During this ER visit you were seen and evaluated for your left knee swelling and pain.  You did have a rising CRP which is a measure of inflammation that is above your normal baseline.  We discussed this with the on-call orthopedic physician at [facility] and the plan will be to continue your oral antibiotics with amoxicillin and have earlier follow-up with an orthopedic surgeon soon to further evaluate.  Should you have any new symptoms of fevers, chills, worsening swelling or any redness to your knee please do not hesitate to return to the ER for further treatment evaluation.   PRINCIPAL DISCHARGE DIAGNOSIS  Diagnosis: Cervical spinal stenosis  Assessment and Plan of Treatment: - You underwent posterior C2-T3 decompression Fusion on 6/5/2024.  - It is ok to shower with supervision and assistance due to elevated risk of falls on post operative day 5, which will be 6/10/2024. please keep incision clean and dry, do not submerge wound in water for prolonged periods of time, pat dry after showering, and do not use any creams or ointments to incision.  - No strenous activity. No heavy lifting. No bending back. No twisting back. Do not return to work until cleared by physician. No driving until cleared by physician.  - You have been started on a new medication, oxycodone.  Oxycodone helps to control pain. Oxycodone is an additive medication so it is important to limit the use of this medication.  Side effects include nausea, vomiting, constipation, dry mouth, lightheadedness, dizziness or drowsiness.  It is important to tell your physician if you experience any of these side effects.  - You are on robaxin.  Robaxin is a muscle relaxtant. Side effects of robaxin: tiredness, drowyness  - You can take tylenol as needed for pain. Do not take more than 4000mg per day to avoid liver injury.  - Return to Emergency Department or contact your Neurosurgeon if any changes in mental status, severe headache, weakness, gait instability, seizures, numbness or tingling of extremities; difficulty swallowing; drainage or redness of wound, high fever, unrelenting vomiting; pain in legs; difficulty urinating or constipation.  - Donot take any NSAIDS (for example, motrin, aleeve, ibuprofen, advil, etc) until checking with your Neurosurgeon.  - Please follow up with Dr. Collins within 1-2 weekks after discharge for post op incision evaluations and surgical staples removal.      SECONDARY DISCHARGE DIAGNOSES  Diagnosis: Chronic diastolic heart failure  Assessment and Plan of Treatment: - continue coreg with holding parameter  - Please follow up with cardiology within 1 week after discharge    Diagnosis: Atrial fibrillation  Assessment and Plan of Treatment: - You can resume Eliquis 5mg twice a day on post operative day 14.  - Please follow up with cardiology within 1 week after discharge    Diagnosis: Stage 4 chronic kidney disease  Assessment and Plan of Treatment: - baseline creatitine 2.4-2.8  - please continue to monitor BMPs at least twice a week at rehab  - please follow up with nephrology within 1 week after discharge    Diagnosis: NU (obstructive sleep apnea)  Assessment and Plan of Treatment: - not on CPAP at home  - please follow up with your primary care physician or pulmonology within 1-2 weeks after discharge    Diagnosis: Factor 5 Leiden mutation, heterozygous  Assessment and Plan of Treatment:     Diagnosis: CAD (coronary artery disease)  Assessment and Plan of Treatment:     Diagnosis: Urinary retention  Assessment and Plan of Treatment:     Diagnosis: HTN (hypertension)  Assessment and Plan of Treatment:     Diagnosis: HLD (hyperlipidemia)  Assessment and Plan of Treatment:     Diagnosis: T2DM (type 2 diabetes mellitus)  Assessment and Plan of Treatment:     Diagnosis: Thyroid nodule  Assessment and Plan of Treatment:     Diagnosis: Lung nodule  Assessment and Plan of Treatment:      PRINCIPAL DISCHARGE DIAGNOSIS  Diagnosis: Cervical spinal stenosis  Assessment and Plan of Treatment: - You underwent posterior C2-T3 decompression Fusion on 6/5/2024. Please continue to wear cervical collar with thoracic extension when you are out of bed.  - It is ok to shower with supervision and assistance due to elevated risk of falls on post operative day 5, which will be 6/10/2024. please keep incision clean and dry, do not submerge wound in water for prolonged periods of time, pat dry after showering, and do not use any creams or ointments to incision.  - No strenous activity. No heavy lifting. No bending back. No twisting back. Do not return to work until cleared by physician. No driving until cleared by physician.  - You have been started on a new medication, oxycodone.  Oxycodone helps to control pain. Oxycodone is an additive medication so it is important to limit the use of this medication.  Side effects include nausea, vomiting, constipation, dry mouth, lightheadedness, dizziness or drowsiness.  It is important to tell your physician if you experience any of these side effects.  - You are on robaxin.  Robaxin is a muscle relaxtant. Side effects of robaxin: tiredness, drowyness  - You can take tylenol as needed for pain. Do not take more than 4000mg per day to avoid liver injury.  - Return to Emergency Department or contact your Neurosurgeon if any changes in mental status, severe headache, weakness, gait instability, seizures, numbness or tingling of extremities; difficulty swallowing; drainage or redness of wound, high fever, unrelenting vomiting; pain in legs; difficulty urinating or constipation.  - Donot take any NSAIDS (for example, motrin, aleeve, ibuprofen, advil, etc) until checking with your Neurosurgeon.  - Please follow up with Dr. Collins within 1-2 weekks after discharge for post op incision evaluations and surgical staples removal.      SECONDARY DISCHARGE DIAGNOSES  Diagnosis: Chronic diastolic heart failure  Assessment and Plan of Treatment: - continue coreg with holding parameter  - Your home lasix was held during this admission. please check daily weight at rehab. If any sign of fluid overload, may consider resume lasix if blood pressure allows.  - Please follow up with cardiology within 1 week after discharge    Diagnosis: Atrial fibrillation  Assessment and Plan of Treatment: - You can resume Eliquis 5mg twice a day on post operative day 14.  - Please follow up with cardiology within 1 week after discharge    Diagnosis: Stage 4 chronic kidney disease  Assessment and Plan of Treatment: - baseline creatitine 2.4-2.8  - please continue to monitor BMPs at least twice a week at rehab  - please follow up with nephrology within 1 week after discharge    Diagnosis: NU (obstructive sleep apnea)  Assessment and Plan of Treatment: - not on CPAP at home  - please follow up with your primary care physician or pulmonology within 1-2 weeks after discharge    Diagnosis: Factor 5 Leiden mutation, heterozygous  Assessment and Plan of Treatment: - You can resume Eliquis 5mg twice a day on post operative day 14.  - Please follow up with your primary care physician or hematology within 1-2 weeks after discharge    Diagnosis: CAD (coronary artery disease)  Assessment and Plan of Treatment: - You can resume your ASA 81mg daily on post operative day 7  - Please follow up with cardiology within 1 week after discharge    Diagnosis: Urinary retention  Assessment and Plan of Treatment: - Now you has a french for urinary retention. French catheter care per rehab protocol. May consider trial of void when more mobile. Please continue flomax and oxybutinin at rehab for BPH  - Please follow up with urology or primary care physician within 1-2 weeks after discharge.    Diagnosis: HTN (hypertension)  Assessment and Plan of Treatment: - Your home lisinopril has been held during this admission due to soft SBPs. Please continue to monitor blood pressures at rehab and may consider resuming if blood pressure persistently elevated.   - Please follow up with cardiology within 1 week after discharge    Diagnosis: HLD (hyperlipidemia)  Assessment and Plan of Treatment: - You can continue your home statin  - Please follow up with your primary care physician within 1-2 weeks after discharge.    Diagnosis: T2DM (type 2 diabetes mellitus)  Assessment and Plan of Treatment: - please follow insulin regimens at rehab and adjust  as needed.  - Please follow up with your endocrinologist within 1 week after dishcarge due to insulin regimen changes.    Diagnosis: Thyroid nodule  Assessment and Plan of Treatment: - Please follow up with your primary care physician within 1-2 weeks after discharge to assess the timing for repeat imaging.    Diagnosis: Lung nodule  Assessment and Plan of Treatment: - incidental finding of 0.3cm right lung apex nodule on CT scan  - Please follow up with your primary care physician within 1-2 weeks after discharge to assess the timing for repeat imaging.    Diagnosis: Prophylactic measure  Assessment and Plan of Treatment: - please adjust and continue bowel regimens at rehab to prevent constipations/straining.     PRINCIPAL DISCHARGE DIAGNOSIS  Diagnosis: Cervical spinal stenosis  Assessment and Plan of Treatment: - You underwent posterior C2-T3 decompression Fusion on 6/5/2024. Please continue to wear cervical collar with thoracic extension when you are out of bed.  - It is ok to shower with supervision and assistance due to elevated risk of falls on post operative day 5, which will be 6/10/2024. please keep incision clean and dry, do not submerge wound in water for prolonged periods of time, pat dry after showering, and do not use any creams or ointments to incision.  - No strenous activity. No heavy lifting. No bending back. No twisting back. Do not return to work until cleared by physician. No driving until cleared by physician.  - You have been started on a new medication, oxycodone.  Oxycodone helps to control pain. Oxycodone is an additive medication so it is important to limit the use of this medication.  Side effects include nausea, vomiting, constipation, dry mouth, lightheadedness, dizziness or drowsiness.  It is important to tell your physician if you experience any of these side effects.  - You are on robaxin.  Robaxin is a muscle relaxtant. Side effects of robaxin: tiredness, drowyness  - You can take tylenol as needed for pain. Do not take more than 4000mg per day to avoid liver injury.  - Return to Emergency Department or contact your Neurosurgeon if any changes in mental status, severe headache, weakness, gait instability, seizures, numbness or tingling of extremities; difficulty swallowing; drainage or redness of wound, high fever, unrelenting vomiting; pain in legs; difficulty urinating or constipation.  - Donot take any NSAIDS (for example, motrin, aleeve, ibuprofen, advil, etc) until checking with your Neurosurgeon.  - Please follow up with Dr. Collins within 1-2 weekks after discharge for post op incision evaluations and surgical staples removal.      SECONDARY DISCHARGE DIAGNOSES  Diagnosis: Chronic diastolic heart failure  Assessment and Plan of Treatment: - continue coreg with holding parameter  - Your home lasix was held during this admission. please check daily weight at rehab. If any sign of fluid overload, may consider resume lasix if blood pressure allows.  - Please follow up with cardiology within 1 week after discharge    Diagnosis: Atrial fibrillation  Assessment and Plan of Treatment: - You can resume Eliquis 5mg twice a day on post operative day 14.  - Please follow up with cardiology within 1 week after discharge    Diagnosis: Stage 4 chronic kidney disease  Assessment and Plan of Treatment: - baseline creatitine 2.4-2.8  - please continue to monitor BMPs at least twice a week at rehab  - please follow up with nephrology within 1 week after discharge    Diagnosis: NU (obstructive sleep apnea)  Assessment and Plan of Treatment: - not on CPAP at home, but sometimes require nocturnal nasal cannular for sleep apnea  - please follow up with your primary care physician or pulmonology within 1-2 weeks after discharge    Diagnosis: Factor 5 Leiden mutation, heterozygous  Assessment and Plan of Treatment: - You can resume Eliquis 5mg twice a day on post operative day 14.  - Please follow up with your primary care physician or hematology within 1-2 weeks after discharge    Diagnosis: CAD (coronary artery disease)  Assessment and Plan of Treatment: - You can resume your ASA 81mg daily on post operative day 7  - Please follow up with cardiology within 1 week after discharge    Diagnosis: Urinary retention  Assessment and Plan of Treatment: - Now you has a french for urinary retention. French catheter care per rehab protocol. May consider trial of void when more mobile. Please continue flomax and oxybutinin at rehab for BPH  - Please follow up with urology or primary care physician within 1-2 weeks after discharge.    Diagnosis: HTN (hypertension)  Assessment and Plan of Treatment: - Your home lisinopril has been held during this admission due to soft SBPs. Please continue to monitor blood pressures at rehab and may consider resuming if blood pressure persistently elevated.   - Please follow up with cardiology within 1 week after discharge    Diagnosis: HLD (hyperlipidemia)  Assessment and Plan of Treatment: - You can continue your home statin  - Please follow up with your primary care physician within 1-2 weeks after discharge.    Diagnosis: T2DM (type 2 diabetes mellitus)  Assessment and Plan of Treatment: - please follow insulin regimens at rehab and adjust  as needed.  - Please follow up with your endocrinologist within 1 week after dishcarge due to insulin regimen changes.    Diagnosis: Thyroid nodule  Assessment and Plan of Treatment: - Please follow up with your primary care physician within 1-2 weeks after discharge to assess the timing for repeat imaging.    Diagnosis: Lung nodule  Assessment and Plan of Treatment: - incidental finding of 0.3cm right lung apex nodule on CT scan  - Please follow up with your primary care physician within 1-2 weeks after discharge to assess the timing for repeat imaging.    Diagnosis: Anemia of chronic disease  Assessment and Plan of Treatment: - also related to blood loss from surgery  - stable  - Please continue to monitor CBC twice a week at rehab.  - Please follow up with your primary care physician within 1-2 weeks after discharge.    Diagnosis: Prophylactic measure  Assessment and Plan of Treatment: - please adjust and continue bowel regimens at rehab to prevent constipations/straining.     PRINCIPAL DISCHARGE DIAGNOSIS  Diagnosis: Cervical spinal stenosis  Assessment and Plan of Treatment: - You underwent posterior C2-T3 decompression Fusion on 6/5/2024. Please continue to wear cervical collar with thoracic extension when you are out of bed.  - It is ok to shower with supervision and assistance due to elevated risk of falls on post operative day 5, which will be 6/10/2024. please keep incision clean and dry, do not submerge wound in water for prolonged periods of time, pat dry after showering, and do not use any creams or ointments to incision.  - No strenous activity. No heavy lifting. No bending back. No twisting back. Do not return to work until cleared by physician. No driving until cleared by physician.  - You have been started on a new medication, oxycodone.  Oxycodone helps to control pain. Oxycodone is an additive medication so it is important to limit the use of this medication.  Side effects include nausea, vomiting, constipation, dry mouth, lightheadedness, dizziness or drowsiness.  It is important to tell your physician if you experience any of these side effects.  - You are on robaxin.  Robaxin is a muscle relaxtant. Side effects of robaxin: tiredness, drowyness  - You can take tylenol as needed for pain. Do not take more than 4000mg per day to avoid liver injury.  - Return to Emergency Department or contact your Neurosurgeon if any changes in mental status, severe headache, weakness, gait instability, seizures, numbness or tingling of extremities; difficulty swallowing; drainage or redness of wound, high fever, unrelenting vomiting; pain in legs; difficulty urinating or constipation.  - Donot take any NSAIDS (for example, motrin, aleeve, ibuprofen, advil, etc) until checking with your Neurosurgeon.  - Please follow up with Dr. Collins upon discharge from rehab for post op incision evaluations. Staple removeal for 6/19/24.      SECONDARY DISCHARGE DIAGNOSES  Diagnosis: Stage 4 chronic kidney disease  Assessment and Plan of Treatment: - baseline creatitine 2.4-2.8  - please continue to monitor BMPs at least twice a week at rehab  - please follow up with nephrology within 1 week after discharge    Diagnosis: Atrial fibrillation  Assessment and Plan of Treatment: - You can resume Eliquis 5mg twice a day on 6/20/24. Stop lovenox once restarting eliquis.   - Please follow up with cardiology within 1 week after discharge    Diagnosis: Chronic diastolic heart failure  Assessment and Plan of Treatment: - continue coreg with holding parameter  - Your home lasix was held during this admission. please check daily weight at rehab. If any sign of fluid overload, may consider resume lasix if blood pressure allows.  - Please follow up with cardiology within 1 week after discharge    Diagnosis: NU (obstructive sleep apnea)  Assessment and Plan of Treatment: - not on CPAP at home, but sometimes require nocturnal nasal cannular for sleep apnea  - please follow up with your primary care physician or pulmonology within 1-2 weeks after discharge    Diagnosis: Factor 5 Leiden mutation, heterozygous  Assessment and Plan of Treatment: - You can resume Eliquis 5mg twice a day on 6/20/24. Stop lovenox when restarting eliquis.   - Please follow up with your primary care physician or hematology within 1-2 weeks after discharge    Diagnosis: CAD (coronary artery disease)  Assessment and Plan of Treatment: - You can resume your ASA 81mg daily on 6/12/24.   - Please follow up with cardiology within 1 week after discharge    Diagnosis: Urinary retention  Assessment and Plan of Treatment: - Now you has a french for urinary retention on 6/8/24. French catheter care per rehab protocol. May consider trial of void when more mobile. Please continue flomax and oxybutinin at rehab for BPH  - Please follow up with urology or primary care physician within 1-2 weeks after discharge.    Diagnosis: HTN (hypertension)  Assessment and Plan of Treatment: - Your home lisinopril has been held during this admission due to soft SBPs. Please continue to monitor blood pressures at rehab and may consider resuming if blood pressure persistently elevated.   - Please follow up with cardiology within 1 week after discharge    Diagnosis: HLD (hyperlipidemia)  Assessment and Plan of Treatment: - You can continue your home statin  - Please follow up with your primary care physician within 1-2 weeks after discharge.    Diagnosis: T2DM (type 2 diabetes mellitus)  Assessment and Plan of Treatment: - please follow insulin regimens at rehab and adjust  as needed.  - Please follow up with your endocrinologist within 1 week after dishcarge due to insulin regimen changes.    Diagnosis: Thyroid nodule  Assessment and Plan of Treatment: - Please follow up with your primary care physician within 1-2 weeks after discharge to assess the timing for repeat imaging. You will need a thyroid ultrasound.    Diagnosis: Lung nodule  Assessment and Plan of Treatment: - incidental finding of 0.3 cm right lung apex nodule on CT scan  - Please follow up with your primary care physician within 1-2 weeks after discharge to assess the timing for repeat imaging. suggest 3 months.    Diagnosis: Prophylactic measure  Assessment and Plan of Treatment: - please adjust and continue bowel regimens at rehab to prevent constipations/straining.    Diagnosis: Anemia of chronic disease  Assessment and Plan of Treatment: - also related to blood loss from surgery  - stable  - Please continue to monitor CBC twice a week at rehab.  - Please follow up with your primary care physician within 1-2 weeks after discharge.     PRINCIPAL DISCHARGE DIAGNOSIS  Diagnosis: Cervical spinal stenosis  Assessment and Plan of Treatment: - You underwent posterior C2-T3 decompression Fusion on 2024. Please continue to wear cervical collar with thoracic extension when you are out of bed.  - It is ok to shower with supervision and assistance due to elevated risk of falls on post operative day 5, which will be 6/10/2024. please keep incision clean and dry, do not submerge wound in water for prolonged periods of time, pat dry after showering, and do not use any creams or ointments to incision.  - No strenous activity. No heavy lifting. No bending back. No twisting back. Do not return to work until cleared by physician. No driving until cleared by physician.  - You have been started on a new medication, oxycodone.  Oxycodone helps to control pain. Oxycodone is an additive medication so it is important to limit the use of this medication.  Side effects include nausea, vomiting, constipation, dry mouth, lightheadedness, dizziness or drowsiness.  It is important to tell your physician if you experience any of these side effects.  - You are on robaxin.  Robaxin is a muscle relaxtant. Side effects of robaxin: tiredness, drowyness  - You can take tylenol as needed for pain. Do not take more than 4000mg per day to avoid liver injury.  - Return to Emergency Department or contact your Neurosurgeon if any changes in mental status, severe headache, weakness, gait instability, seizures, numbness or tingling of extremities; difficulty swallowing; drainage or redness of wound, high fever, unrelenting vomiting; pain in legs; difficulty urinating or constipation.  - Donot take any NSAIDS (for example, motrin, aleeve, ibuprofen, advil, etc) until checking with your Neurosurgeon.  - Please follow up with Dr. Collins upon discharge from rehab for post op incision evaluations. Staple removeal for 24.      SECONDARY DISCHARGE DIAGNOSES  Diagnosis: Stage 4 chronic kidney disease  Assessment and Plan of Treatment: - baseline creatitine 2.4-2.8  - please continue to monitor BMPs at least twice a week at rehab  - please follow up with nephrology within 1 week after discharge    Diagnosis: Atrial fibrillation  Assessment and Plan of Treatment: - You can resume Eliquis 5mg twice a day on 24. Stop lovenox once restarting eliquis.   - Please follow up with cardiology within 1 week after discharge    Diagnosis: Chronic diastolic heart failure  Assessment and Plan of Treatment: - continue coreg with holding parameter  - Your home lasix was held during this admission. please check daily weight at rehab. If any sign of fluid overload, may consider resume lasix if blood pressure allows.  - Please follow up with cardiology within 1 week after discharge    Diagnosis: NU (obstructive sleep apnea)  Assessment and Plan of Treatment: - not on CPAP at home, but sometimes require nocturnal nasal cannular for sleep apnea  - please follow up with your primary care physician or pulmonology within 1-2 weeks after discharge    Diagnosis: Factor 5 Leiden mutation, heterozygous  Assessment and Plan of Treatment: - You can resume Eliquis 5mg twice a day on 24. Stop lovenox when restarting eliquis.   - Please follow up with your primary care physician or hematology within 1-2 weeks after discharge    Diagnosis: CAD (coronary artery disease)  Assessment and Plan of Treatment: - You can resume your ASA 81mg daily on 24.   - Please follow up with cardiology within 1 week after discharge    Diagnosis: Urinary retention  Assessment and Plan of Treatment: - Now you has a french for urinary retention on 24. French catheter care per rehab protocol. May consider trial of void when more mobile. Please continue flomax and oxybutinin at rehab for BPH  - Please follow up with urology or primary care physician within 1-2 weeks after discharge.    Diagnosis: HTN (hypertension)  Assessment and Plan of Treatment: - Your home lisinopril has been held during this admission due to soft SBPs. Please continue to monitor blood pressures at rehab and may consider resuming if blood pressure persistently elevated.   - Please follow up with cardiology within 1 week after discharge    Diagnosis: HLD (hyperlipidemia)  Assessment and Plan of Treatment: - You can continue your home statin  - Please follow up with your primary care physician within 1-2 weeks after discharge.    Diagnosis: T2DM (type 2 diabetes mellitus)  Assessment and Plan of Treatment: - please follow insulin regimens at rehab and adjust  as needed.  - Please follow up with your endocrinologist within 1 week after dishcarge due to insulin regimen changes.    Diagnosis: Thyroid nodule  Assessment and Plan of Treatment: - Please follow up with your primary care physician within 1-2 weeks after discharge from rehab to assess the timing for repeat imaging and/or aspiration as suggested from US on 6/10 revealin.6 cm TI-RADS 5 nodule in the left thyroid lobe.   Recommend aspiration, if there were no prior aspirations.    Diagnosis: Lung nodule  Assessment and Plan of Treatment: - incidental finding of 0.3 cm right lung apex nodule on CT scan  - Please follow up with your primary care physician within 1-2 weeks after discharge to assess the timing for repeat imaging. suggest 3 months.    Diagnosis: Prophylactic measure  Assessment and Plan of Treatment: - please adjust and continue bowel regimens at rehab to prevent constipations/straining.    Diagnosis: Anemia of chronic disease  Assessment and Plan of Treatment: - also related to blood loss from surgery  - stable  - Please continue to monitor CBC twice a week at rehab.  - Please follow up with your primary care physician within 1-2 weeks after discharge.

## 2025-07-24 NOTE — ED PROVIDER NOTES
History     Chief Complaint   Patient presents with    Knee Pain     HPI  Babak Moran is a 65 year old male who I saw briefly just prior to shift change.  He is known to me from prior hospitalizations.  He has had 7 surgeries on his left knee including recurrent infection.  Last surgery on the left knee was November 2024.  He required IV antibiotic therapy for 3 months.  He is now on oral antibiotic therapy until November.  He follows with Ortho and ID monthly.  He was seen at the beginning of the month and had elevated inflammatory markers but no swelling or concerns for infection at that time.  Over the weekend he wore a knee brace on Saturday to go to his grandsons birthday party and was slightly more active than usual.  On Sunday he woke up with swelling.  He alerted his orthopedic team and they recommend he come in given his chronic recurrent infections and comorbid issues.  Patient has not had any fevers or chills.  Discussed plan of care.  Will start with IV, labs, x-ray.    Allergies:  Allergies   Allergen Reactions    Diatrizoate Rash    Ioxaglate Other (See Comments)     Does not recall    Levofloxacin Hives and Rash    Metrizamide Rash     (Diagnostic X-Ray materials)    Probenecid Rash       Problem List:    Patient Active Problem List    Diagnosis Date Noted    Cerebrovascular accident (CVA) due to embolism of right middle cerebral artery (H) 07/01/2025     Priority: Medium    S/P total knee replacement, left 04/02/2025     Priority: Medium    Abdominal aortic aneurysm (AAA) without rupture, unspecified part 10/16/2024     Priority: Medium    Chronic heart failure with preserved ejection fraction (H) 10/10/2024     Priority: Medium    Venous stasis of lower extremity 10/10/2024     Priority: Medium    Peripheral edema 10/10/2024     Priority: Medium    On continuous oral anticoagulation 10/10/2024     Priority: Medium    History of CVA 10/10/2024     Priority: Medium     On 2/4/17      Vitamin B12  deficiency (non anemic) 10/10/2024     Priority: Medium    Iron deficiency anemia, unspecified iron deficiency anemia type 10/10/2024     Priority: Medium    Blister of right leg 09/12/2024     Priority: Medium    Right heart failure with reduced right ventricular function (H) 08/06/2024     Priority: Medium    Hx of left knee surgery 04/08/2024     Priority: Medium     Revision by Dr Aida Jauregui with most recent revision done 3/12/24.       Recurrent gross hematuria 12/13/2023     Priority: Medium    Prediabetes 12/01/2023     Priority: Medium    Patellar dislocation, left, sequela 11/07/2023     Priority: Medium     Recurrence dislocation after revision done 8 weeks ago (early September 2023)  Revised surgery by Dr Johnson Nov 13, 2023.       Dislocation of both patellae 09/13/2023     Priority: Medium     Surgical repair by Dr Johnson Bilateral 9/8/23      Chronic venous stasis dermatitis of both lower extremities 06/28/2023     Priority: Medium    Status post total right knee replacement 06/26/2023     Priority: Medium    Nail dystrophy 02/23/2023     Priority: Medium    Onychomycosis 02/23/2023     Priority: Medium    Aftercare following knee joint replacement surgery, unspecified laterality 01/09/2023     Priority: Medium    Onychogryphosis 08/27/2022     Priority: Medium    Need for vaccination 04/10/2022     Priority: Medium    Obstructive sleep apnea syndrome 05/13/2019     Priority: Medium    Permanent atrial fibrillation (H) 02/11/2018     Priority: Medium    Osteoarthrosis 01/16/2018     Priority: Medium    Morbid obesity with BMI of 40.0-44.9, adult (H) 02/08/2017     Priority: Medium    Left hemiparesis (H) 02/03/2017     Priority: Medium    Family history of coronary artery disease 05/19/2014     Priority: Medium    Ascending aorta enlargement 02/13/2014     Priority: Medium    Gout 01/07/2014     Priority: Medium     Overview:   Overview:   after 3 attacks in < a year, tapped and crystal proven  13;  Allopurinol started then got ? Atypical side effect, stopped; (short term colchicine prophylaxis)      Overweight 2013     Priority: Medium     Formatting of this note might be different from the original.  Max weight reported 475 lbs;   369 lbs      Pain in joint, ankle and foot 2012     Priority: Medium    Essential hypertension 2009     Priority: Medium    Cutaneous lupus erythematosus 2008     Priority: Medium    Systemic lupus erythematosus (H) 2008     Priority: Medium     Overview:   Dermatitis          Past Medical History:    Past Medical History:   Diagnosis Date    Atrial fibrillation (H) 2017    Bacterial sepsis (H) 2022    Cellulitis of left lower extremity 2019    Cerebral infarction (H)     Cerebral infarction due to unspecified occlusion or stenosis of right middle cerebral artery (H) 2017    Chest pain 1997    Disorder of skin or subcutaneous tissue 2011    Essential (primary) hypertension 1997    Fracture of cervical vertebra (H)     Gout     Hemiplegia affecting left nondominant side (H) 2017    Obesity 2017    Other local lupus erythematosus     Sepsis (H) 2019       Past Surgical History:    Past Surgical History:   Procedure Laterality Date    ARTHROPLASTY KNEE Left 2023    Procedure: ARTHROPLASTY, KNEE, TOTAL;  Surgeon: Elliott Johnson MD;  Location:  OR    ARTHROPLASTY KNEE Right 2023    Procedure: Arthroplasty knee;  Surgeon: Elliott Johnson MD;  Location:  OR    ARTHROSCOPY KNEE      bilateral knees    ARTHROSCOPY KNEE Left 2023    Procedure: Knee Patellar Extensor Mechanism Repair;  Surgeon: Elliott Johnson MD;  Location:  OR    COLONOSCOPY  2012    Normal; Next due     ORIF WRIST FRACTURE         Family History:    Family History   Problem Relation Age of Onset    Unknown/Adopted Paternal Grandfather         Unknown, around age 67.    Other - See Comments  "Father         Parkinson's disease Alzheimer's    Cancer Father         Cancer    Heart Disease Maternal Grandmother         Heart Disease,MI in her 60's.    Heart Disease Mother 60        Heart Disease,MI    Other - See Comments Mother         rheumatoid arthritis.    Heart Disease Maternal Uncle 69        Heart Disease    Hypertension Sister         Hypertension    Cancer Sister         Cancer,melanoma    Heart Disease Paternal Uncle         Heart Disease, around 69.       Social History:  Marital Status:   [2]  Social History     Tobacco Use    Smoking status: Never     Passive exposure: Yes    Smokeless tobacco: Never   Vaping Use    Vaping status: Never Used   Substance Use Topics    Alcohol use: Not Currently     Comment: couple of times per year    Drug use: Never        Medications:    acetaminophen (TYLENOL) 325 MG tablet  amLODIPine (NORVASC) 10 MG tablet  amoxicillin (AMOXIL) 500 MG capsule  calcium carbonate (TUMS) 500 MG chewable tablet  Cranberry 125 MG TABS  D-Mannose 500 MG CAPS  Emollient (CERAVE MOISTURIZING) CREA  lisinopril (ZESTRIL) 10 MG tablet  magnesium hydroxide (MILK OF MAGNESIA) 400 MG/5ML suspension  metoprolol succinate ER (TOPROL XL) 50 MG 24 hr tablet  potassium chloride macey ER (KLOR-CON M10) 10 MEQ CR tablet  rivaroxaban ANTICOAGULANT (XARELTO) 20 MG TABS tablet  saccharomyces boulardii (FLORASTOR) 250 MG capsule  senna-docusate (SENOKOT-S/PERICOLACE) 8.6-50 MG tablet  torsemide (DEMADEX) 10 MG tablet  torsemide (DEMADEX) 20 MG tablet  warfarin ANTICOAGULANT (COUMADIN) 5 MG tablet  warfarin ANTICOAGULANT (COUMADIN) 7.5 MG tablet          Review of Systems    Physical Exam   BP: 109/65  Pulse: 98  Temp: 98.2  F (36.8  C)  Resp: 20  Height: 190.5 cm (6' 3\")  Weight: (!) 154.2 kg (340 lb)  SpO2: 96 %      Physical Exam    ED Course        Procedures              Critical Care time:  none     None         Recent Results (from the past 24 hours)   CBC with Platelets and " Differential (Limited Occurrences)    Narrative    The following orders were created for panel order CBC with Platelets and Differential (Limited Occurrences).  Procedure                               Abnormality         Status                     ---------                               -----------         ------                     CBC with platelets and ...[[id number]]                      In process                   Please view results for these tests on the individual orders.       Medications - No data to display    Assessments & Plan (with Medical Decision Making)     I have reviewed the nursing notes.    I have reviewed the findings, diagnosis, plan and need for follow up with the patient.           Medical Decision Making  The patient's presentation was of high complexity (a chronic illness severe exacerbation, progression, or side effect of treatment).    The patient's evaluation involved:  ordering and/or review of 3+ test(s) in this encounter (see separate area of note for details)    The patient's management necessitated moderate risk (prescription drug management including medications given in the ED).        New Prescriptions    No medications on file       Final diagnoses:   Pain and swelling of left knee   Patient presents as above.  I saw him prior to shift change and ordered labs and imaging.  Please see Dr. Aponte's note for further details.  Signout at shift change.    7/23/2025   [facility] AND [facility]       Raina Díaz DO  07/23/25 [id number]

## 2025-07-28 ENCOUNTER — THERAPY VISIT (OUTPATIENT)
Dept: PHYSICAL THERAPY | Facility: OTHER | Age: 65
End: 2025-07-28
Attending: ORTHOPAEDIC SURGERY
Payer: COMMERCIAL

## 2025-07-28 DIAGNOSIS — Z96.652 S/P TOTAL KNEE REPLACEMENT, LEFT: Primary | ICD-10-CM

## 2025-07-28 PROCEDURE — 97110 THERAPEUTIC EXERCISES: CPT | Mod: GP

## 2025-07-31 ENCOUNTER — THERAPY VISIT (OUTPATIENT)
Dept: PHYSICAL THERAPY | Facility: OTHER | Age: 65
End: 2025-07-31
Attending: ORTHOPAEDIC SURGERY
Payer: COMMERCIAL

## 2025-07-31 DIAGNOSIS — Z96.652 S/P TOTAL KNEE REPLACEMENT, LEFT: Primary | ICD-10-CM

## 2025-07-31 PROCEDURE — 97110 THERAPEUTIC EXERCISES: CPT | Mod: GP

## 2025-08-04 ENCOUNTER — ANTICOAGULATION THERAPY VISIT (OUTPATIENT)
Dept: ANTICOAGULATION | Facility: OTHER | Age: 65
End: 2025-08-04
Attending: PHYSICIAN ASSISTANT
Payer: COMMERCIAL

## 2025-08-04 DIAGNOSIS — I48.21 PERMANENT ATRIAL FIBRILLATION (H): Primary | ICD-10-CM

## 2025-08-04 LAB — INR POINT OF CARE: 3.1 (ref 0.9–1.1)

## 2025-08-04 PROCEDURE — 85610 PROTHROMBIN TIME: CPT | Mod: ZL,QW

## 2025-08-05 ENCOUNTER — THERAPY VISIT (OUTPATIENT)
Dept: PHYSICAL THERAPY | Facility: OTHER | Age: 65
End: 2025-08-05
Attending: ORTHOPAEDIC SURGERY
Payer: COMMERCIAL

## 2025-08-05 DIAGNOSIS — Z96.652 S/P TOTAL KNEE REPLACEMENT, LEFT: Primary | ICD-10-CM

## 2025-08-05 PROCEDURE — 97110 THERAPEUTIC EXERCISES: CPT | Mod: GP

## 2025-08-07 ENCOUNTER — THERAPY VISIT (OUTPATIENT)
Dept: PHYSICAL THERAPY | Facility: OTHER | Age: 65
End: 2025-08-07
Attending: ORTHOPAEDIC SURGERY
Payer: COMMERCIAL

## 2025-08-07 DIAGNOSIS — Z96.652 S/P TOTAL KNEE REPLACEMENT, LEFT: Primary | ICD-10-CM

## 2025-08-07 PROCEDURE — 97110 THERAPEUTIC EXERCISES: CPT | Mod: GP

## 2025-08-11 ENCOUNTER — THERAPY VISIT (OUTPATIENT)
Dept: PHYSICAL THERAPY | Facility: OTHER | Age: 65
End: 2025-08-11
Attending: ORTHOPAEDIC SURGERY
Payer: COMMERCIAL

## 2025-08-11 DIAGNOSIS — Z96.652 S/P TOTAL KNEE REPLACEMENT, LEFT: Primary | ICD-10-CM

## 2025-08-11 PROCEDURE — 97110 THERAPEUTIC EXERCISES: CPT | Mod: GP

## 2025-08-19 ENCOUNTER — VIRTUAL VISIT (OUTPATIENT)
Dept: PULMONOLOGY | Facility: OTHER | Age: 65
End: 2025-08-19
Attending: FAMILY MEDICINE
Payer: COMMERCIAL

## 2025-08-19 VITALS — HEIGHT: 75 IN | BODY MASS INDEX: 39.17 KG/M2 | WEIGHT: 315 LBS

## 2025-08-19 DIAGNOSIS — I63.411 CEREBROVASCULAR ACCIDENT (CVA) DUE TO EMBOLISM OF RIGHT MIDDLE CEREBRAL ARTERY (H): ICD-10-CM

## 2025-08-19 DIAGNOSIS — G47.33 OSA (OBSTRUCTIVE SLEEP APNEA): Primary | ICD-10-CM

## 2025-08-19 DIAGNOSIS — M32.9 SYSTEMIC LUPUS ERYTHEMATOSUS, UNSPECIFIED SLE TYPE, UNSPECIFIED ORGAN INVOLVEMENT STATUS (H): ICD-10-CM

## 2025-08-19 DIAGNOSIS — I10 ESSENTIAL HYPERTENSION: ICD-10-CM

## 2025-08-19 ASSESSMENT — PAIN SCALES - GENERAL: PAINLEVEL_OUTOF10: NO PAIN (0)

## 2025-08-24 ENCOUNTER — PATIENT OUTREACH (OUTPATIENT)
Dept: CARE COORDINATION | Facility: CLINIC | Age: 65
End: 2025-08-24
Payer: COMMERCIAL

## 2025-08-26 ENCOUNTER — THERAPY VISIT (OUTPATIENT)
Dept: PHYSICAL THERAPY | Facility: OTHER | Age: 65
End: 2025-08-26
Attending: ORTHOPAEDIC SURGERY
Payer: COMMERCIAL

## 2025-08-26 ENCOUNTER — ANTICOAGULATION THERAPY VISIT (OUTPATIENT)
Dept: ANTICOAGULATION | Facility: OTHER | Age: 65
End: 2025-08-26
Payer: COMMERCIAL

## 2025-08-26 DIAGNOSIS — Z96.652 S/P TOTAL KNEE REPLACEMENT, LEFT: Primary | ICD-10-CM

## 2025-08-26 DIAGNOSIS — I48.21 PERMANENT ATRIAL FIBRILLATION (H): Primary | ICD-10-CM

## 2025-08-26 LAB — INR POINT OF CARE: 2.7 (ref 0.9–1.1)

## 2025-08-26 PROCEDURE — 85610 PROTHROMBIN TIME: CPT | Mod: ZL,QW

## 2025-08-26 PROCEDURE — 97110 THERAPEUTIC EXERCISES: CPT | Mod: GP

## (undated) DEVICE — PAD WRAP FOAM HOLDER POSITIONER KNEE DISP LF 2629-00

## (undated) DEVICE — SU VICRYL 1 CT-1 36" J347H

## (undated) DEVICE — HOOD T4 PROTECTIVE STERI FACE SHIELD 400-800

## (undated) DEVICE — COVER LIGHT HANDLE LT-F02

## (undated) DEVICE — SUCTION MANIFOLD NEPTUNE 2 SYS 4 PORT 0702-020-000

## (undated) DEVICE — DRAPE STOCKINETTE IMPERVIOUS 12" 1587

## (undated) DEVICE — BNDG ELASTIC 6" DBL LENGTH UNSTERILE 6611-16

## (undated) DEVICE — PEN MARKING SKIN W/LABELS 31145918

## (undated) DEVICE — STAPLER SKIN 35 WIDE PMW35 6/BX

## (undated) DEVICE — DRSG AQUACEL AG EXTRA 4X4.7" 420677

## (undated) DEVICE — COVER TABLE L60 IN 2 TIER BACK STRL 8197-

## (undated) DEVICE — STRAP STIRRUP W/O SLIP 30187-020

## (undated) DEVICE — GLOVE PROTEXIS POWDER FREE SMT 8.5 2D72PT85X

## (undated) DEVICE — DRAPE STERI U 1015

## (undated) DEVICE — BLADE SAW SAGITTAL STRK DUAL CUT 4118-135-090

## (undated) DEVICE — PENCIL MEGADYNE TELESCOPING SMOKE EVACUATION 10 FT 251010J

## (undated) DEVICE — SOL WATER 1500ML

## (undated) DEVICE — DRSG ABDOMINAL PAD UNSTERILE 5X9" 9190

## (undated) DEVICE — SU DERMABOND ADVANCED .7ML DNX12

## (undated) DEVICE — DECANTER VIAL 2006S

## (undated) DEVICE — PREP CHLORAPREP 26ML TINTED ORANGE  260815

## (undated) DEVICE — GLOVE BIOGEL PI SZ 8.5 40885

## (undated) DEVICE — CAST PADDING 6" WEBRIL II UNSTERILE 4519

## (undated) DEVICE — CLEANSER JET LAVAGE IRRISEPT 0.05% CHG IRRISEPT45USA

## (undated) DEVICE — SLEEVE COMPRESSION SCD KNEE MED 74022

## (undated) DEVICE — SPONGE LAP 18X18" 23250-400

## (undated) DEVICE — PACK MAJOR ORTHO SOP15MOFCA

## (undated) DEVICE — GLOVE PROTEXIS PI ORTHO PF 8.5 2D73HT76

## (undated) DEVICE — SU VICRYL 2-0 CT-1 36" UND J945H

## (undated) DEVICE — ESU GROUND PAD ADULT W/CORD E7507

## (undated) DEVICE — DRAPE EXTREMITY W/ARMBOARD 29405

## (undated) DEVICE — BLADE SAW OSCIL/SAG STRK 25X90X1.27MM 4125-127-090

## (undated) DEVICE — SU FIBERWIRE 2 38"  AR-7200

## (undated) DEVICE — DRAPE IOBAN INCISE 23X17" 6650EZ

## (undated) DEVICE — SAW BLADE STRYKER NARROW

## (undated) DEVICE — DRSG ADAPTIC 3X8" 2015

## (undated) DEVICE — CAST PADDING 4" COTTON WEBRIL UNSTERILE 9084

## (undated) DEVICE — ESU PENCIL SMOKE EVAC W/ROCKER SWITCH 0703-047-000

## (undated) DEVICE — TOURNIQUET SGL  BLADDER 30"X4" BLUE 5921030135

## (undated) DEVICE — PACK MAJOR EXTREMITY SOP15MEFCA

## (undated) DEVICE — DRAPE TOP SURGICAL HD 59352

## (undated) RX ORDER — DEXAMETHASONE SODIUM PHOSPHATE 10 MG/ML
INJECTION, SOLUTION INTRAMUSCULAR; INTRAVENOUS
Status: DISPENSED
Start: 2023-01-09

## (undated) RX ORDER — CEFTRIAXONE 2 G/1
INJECTION, POWDER, FOR SOLUTION INTRAMUSCULAR; INTRAVENOUS
Status: DISPENSED
Start: 2023-12-03

## (undated) RX ORDER — NITROFURANTOIN 25; 75 MG/1; MG/1
CAPSULE ORAL
Status: DISPENSED
Start: 2023-12-03

## (undated) RX ORDER — VANCOMYCIN 2 GRAM/500 ML IN 0.9 % SODIUM CHLORIDE INTRAVENOUS
Status: DISPENSED
Start: 2024-11-25

## (undated) RX ORDER — CYANOCOBALAMIN 1000 UG/ML
INJECTION, SOLUTION INTRAMUSCULAR; SUBCUTANEOUS
Status: DISPENSED
Start: 2019-11-27

## (undated) RX ORDER — LIDOCAINE HYDROCHLORIDE 10 MG/ML
INJECTION, SOLUTION EPIDURAL; INFILTRATION; INTRACAUDAL; PERINEURAL
Status: DISPENSED
Start: 2020-03-19

## (undated) RX ORDER — ONDANSETRON 2 MG/ML
INJECTION INTRAMUSCULAR; INTRAVENOUS
Status: DISPENSED
Start: 2023-11-13

## (undated) RX ORDER — BUPIVACAINE HYDROCHLORIDE 5 MG/ML
INJECTION, SOLUTION EPIDURAL; INTRACAUDAL
Status: DISPENSED
Start: 2023-06-26

## (undated) RX ORDER — LIDOCAINE HYDROCHLORIDE 10 MG/ML
INJECTION, SOLUTION INFILTRATION; PERINEURAL
Status: DISPENSED
Start: 2022-11-28

## (undated) RX ORDER — CEFTRIAXONE SODIUM 1 G
VIAL (EA) INJECTION
Status: DISPENSED
Start: 2020-03-19

## (undated) RX ORDER — DEXAMETHASONE SODIUM PHOSPHATE 4 MG/ML
INJECTION, SOLUTION INTRA-ARTICULAR; INTRALESIONAL; INTRAMUSCULAR; INTRAVENOUS; SOFT TISSUE
Status: DISPENSED
Start: 2023-06-26

## (undated) RX ORDER — TRIAMCINOLONE ACETONIDE 40 MG/ML
INJECTION, SUSPENSION INTRA-ARTICULAR; INTRAMUSCULAR
Status: DISPENSED
Start: 2022-11-02

## (undated) RX ORDER — SODIUM CHLORIDE, SODIUM LACTATE, POTASSIUM CHLORIDE, CALCIUM CHLORIDE 600; 310; 30; 20 MG/100ML; MG/100ML; MG/100ML; MG/100ML
INJECTION, SOLUTION INTRAVENOUS
Status: DISPENSED
Start: 2023-01-09

## (undated) RX ORDER — ACETAMINOPHEN 325 MG/1
TABLET ORAL
Status: DISPENSED
Start: 2023-05-25

## (undated) RX ORDER — FENTANYL CITRATE 50 UG/ML
INJECTION, SOLUTION INTRAMUSCULAR; INTRAVENOUS
Status: DISPENSED
Start: 2023-06-26

## (undated) RX ORDER — ONDANSETRON 2 MG/ML
INJECTION INTRAMUSCULAR; INTRAVENOUS
Status: DISPENSED
Start: 2023-06-26

## (undated) RX ORDER — ACETAMINOPHEN 500 MG
TABLET ORAL
Status: DISPENSED
Start: 2023-07-31

## (undated) RX ORDER — CYANOCOBALAMIN 1000 UG/ML
INJECTION, SOLUTION INTRAMUSCULAR; SUBCUTANEOUS
Status: DISPENSED
Start: 2020-01-08

## (undated) RX ORDER — PROPOFOL 10 MG/ML
INJECTION, EMULSION INTRAVENOUS
Status: DISPENSED
Start: 2023-11-13

## (undated) RX ORDER — BUPIVACAINE HYDROCHLORIDE 5 MG/ML
INJECTION, SOLUTION EPIDURAL; INTRACAUDAL
Status: DISPENSED
Start: 2023-11-13

## (undated) RX ORDER — SILVER SULFADIAZINE 10 MG/G
CREAM TOPICAL
Status: DISPENSED
Start: 2020-09-25

## (undated) RX ORDER — TRANEXAMIC ACID 650 MG/1
TABLET ORAL
Status: DISPENSED
Start: 2023-01-09

## (undated) RX ORDER — PROPOFOL 10 MG/ML
INJECTION, EMULSION INTRAVENOUS
Status: DISPENSED
Start: 2023-01-09

## (undated) RX ORDER — CYANOCOBALAMIN 1000 UG/ML
INJECTION, SOLUTION INTRAMUSCULAR; SUBCUTANEOUS
Status: DISPENSED
Start: 2019-11-06

## (undated) RX ORDER — ACETAMINOPHEN 500 MG
TABLET ORAL
Status: DISPENSED
Start: 2022-08-07

## (undated) RX ORDER — BUPIVACAINE HYDROCHLORIDE 5 MG/ML
INJECTION, SOLUTION EPIDURAL; INTRACAUDAL
Status: DISPENSED
Start: 2023-01-09

## (undated) RX ORDER — TRIAMCINOLONE ACETONIDE 40 MG/ML
INJECTION, SUSPENSION INTRA-ARTICULAR; INTRAMUSCULAR
Status: DISPENSED
Start: 2022-11-28

## (undated) RX ORDER — FENTANYL CITRATE 50 UG/ML
INJECTION, SOLUTION INTRAMUSCULAR; INTRAVENOUS
Status: DISPENSED
Start: 2023-01-09

## (undated) RX ORDER — ACETAMINOPHEN 10 MG/ML
INJECTION, SOLUTION INTRAVENOUS
Status: DISPENSED
Start: 2023-06-26

## (undated) RX ORDER — CEFTRIAXONE 2 G/1
INJECTION, POWDER, FOR SOLUTION INTRAMUSCULAR; INTRAVENOUS
Status: DISPENSED
Start: 2023-07-31

## (undated) RX ORDER — CEFAZOLIN SODIUM/WATER 3 G/30 ML
SYRINGE (ML) INTRAVENOUS
Status: DISPENSED
Start: 2023-06-26

## (undated) RX ORDER — DEXAMETHASONE SODIUM PHOSPHATE 4 MG/ML
INJECTION, SOLUTION INTRA-ARTICULAR; INTRALESIONAL; INTRAMUSCULAR; INTRAVENOUS; SOFT TISSUE
Status: DISPENSED
Start: 2023-01-09

## (undated) RX ORDER — KETOROLAC TROMETHAMINE 30 MG/ML
INJECTION, SOLUTION INTRAMUSCULAR; INTRAVENOUS
Status: DISPENSED
Start: 2023-01-09

## (undated) RX ORDER — CEFAZOLIN SODIUM/WATER 3 G/30 ML
SYRINGE (ML) INTRAVENOUS
Status: DISPENSED
Start: 2023-11-13

## (undated) RX ORDER — DEXAMETHASONE SODIUM PHOSPHATE 4 MG/ML
INJECTION, SOLUTION INTRA-ARTICULAR; INTRALESIONAL; INTRAMUSCULAR; INTRAVENOUS; SOFT TISSUE
Status: DISPENSED
Start: 2023-11-13

## (undated) RX ORDER — ONDANSETRON 2 MG/ML
INJECTION INTRAMUSCULAR; INTRAVENOUS
Status: DISPENSED
Start: 2024-11-25

## (undated) RX ORDER — PROPOFOL 10 MG/ML
INJECTION, EMULSION INTRAVENOUS
Status: DISPENSED
Start: 2023-06-26

## (undated) RX ORDER — DEXMEDETOMIDINE HYDROCHLORIDE 4 UG/ML
INJECTION, SOLUTION INTRAVENOUS
Status: DISPENSED
Start: 2023-06-26

## (undated) RX ORDER — DEXAMETHASONE SODIUM PHOSPHATE 10 MG/ML
INJECTION, SOLUTION INTRAMUSCULAR; INTRAVENOUS
Status: DISPENSED
Start: 2023-11-13

## (undated) RX ORDER — LIDOCAINE HYDROCHLORIDE 10 MG/ML
INJECTION, SOLUTION EPIDURAL; INFILTRATION; INTRACAUDAL; PERINEURAL
Status: DISPENSED
Start: 2023-01-09

## (undated) RX ORDER — CEFEPIME HYDROCHLORIDE 2 G/1
INJECTION, POWDER, FOR SOLUTION INTRAVENOUS
Status: DISPENSED
Start: 2024-11-25

## (undated) RX ORDER — DEXAMETHASONE SODIUM PHOSPHATE 10 MG/ML
INJECTION, SOLUTION INTRAMUSCULAR; INTRAVENOUS
Status: DISPENSED
Start: 2023-06-26

## (undated) RX ORDER — KETOROLAC TROMETHAMINE 30 MG/ML
INJECTION, SOLUTION INTRAMUSCULAR; INTRAVENOUS
Status: DISPENSED
Start: 2023-06-26

## (undated) RX ORDER — ONDANSETRON 2 MG/ML
INJECTION INTRAMUSCULAR; INTRAVENOUS
Status: DISPENSED
Start: 2023-01-09

## (undated) RX ORDER — CEFAZOLIN SODIUM/WATER 3 G/30 ML
SYRINGE (ML) INTRAVENOUS
Status: DISPENSED
Start: 2023-01-09